# Patient Record
Sex: MALE | Race: WHITE | NOT HISPANIC OR LATINO | Employment: OTHER | URBAN - METROPOLITAN AREA
[De-identification: names, ages, dates, MRNs, and addresses within clinical notes are randomized per-mention and may not be internally consistent; named-entity substitution may affect disease eponyms.]

---

## 2020-10-06 ENCOUNTER — COMMUNICATION - HEALTHEAST (OUTPATIENT)
Dept: SCHEDULING | Facility: CLINIC | Age: 55
End: 2020-10-06

## 2020-10-15 ENCOUNTER — COMMUNICATION - HEALTHEAST (OUTPATIENT)
Dept: SCHEDULING | Facility: CLINIC | Age: 55
End: 2020-10-15

## 2020-10-19 ENCOUNTER — RECORDS - HEALTHEAST (OUTPATIENT)
Dept: LAB | Facility: CLINIC | Age: 55
End: 2020-10-19

## 2020-10-20 LAB
ALBUMIN SERPL-MCNC: 2 G/DL (ref 3.5–5)
ALP SERPL-CCNC: 188 U/L (ref 45–120)
ALT SERPL W P-5'-P-CCNC: 37 U/L (ref 0–45)
ANION GAP SERPL CALCULATED.3IONS-SCNC: 5 MMOL/L (ref 5–18)
AST SERPL W P-5'-P-CCNC: 48 U/L (ref 0–40)
BASOPHILS # BLD AUTO: 0.2 THOU/UL (ref 0–0.2)
BASOPHILS NFR BLD AUTO: 2 % (ref 0–2)
BILIRUB SERPL-MCNC: 5.2 MG/DL (ref 0–1)
BUN SERPL-MCNC: 3 MG/DL (ref 8–22)
CALCIUM SERPL-MCNC: 7.9 MG/DL (ref 8.5–10.5)
CHLORIDE BLD-SCNC: 101 MMOL/L (ref 98–107)
CO2 SERPL-SCNC: 28 MMOL/L (ref 22–31)
CREAT SERPL-MCNC: 0.59 MG/DL (ref 0.7–1.3)
EOSINOPHIL # BLD AUTO: 0.4 THOU/UL (ref 0–0.4)
EOSINOPHIL NFR BLD AUTO: 3 % (ref 0–6)
ERYTHROCYTE [DISTWIDTH] IN BLOOD BY AUTOMATED COUNT: 12.2 % (ref 11–14.5)
GFR SERPL CREATININE-BSD FRML MDRD: >60 ML/MIN/1.73M2
GLUCOSE BLD-MCNC: 52 MG/DL (ref 70–125)
HCT VFR BLD AUTO: 31.1 % (ref 40–54)
HGB BLD-MCNC: 10.6 G/DL (ref 14–18)
IMM GRANULOCYTES # BLD: 0.1 THOU/UL
IMM GRANULOCYTES NFR BLD: 1 %
LYMPHOCYTES # BLD AUTO: 2.8 THOU/UL (ref 0.8–4.4)
LYMPHOCYTES NFR BLD AUTO: 19 % (ref 20–40)
MAGNESIUM SERPL-MCNC: 1.6 MG/DL (ref 1.8–2.6)
MCH RBC QN AUTO: 35.5 PG (ref 27–34)
MCHC RBC AUTO-ENTMCNC: 34.1 G/DL (ref 32–36)
MCV RBC AUTO: 104 FL (ref 80–100)
MONOCYTES # BLD AUTO: 1.5 THOU/UL (ref 0–0.9)
MONOCYTES NFR BLD AUTO: 11 % (ref 2–10)
NEUTROPHILS # BLD AUTO: 9.4 THOU/UL (ref 2–7.7)
NEUTROPHILS NFR BLD AUTO: 66 % (ref 50–70)
PLATELET # BLD AUTO: 338 THOU/UL (ref 140–440)
PMV BLD AUTO: 11.7 FL (ref 8.5–12.5)
POTASSIUM BLD-SCNC: 3.5 MMOL/L (ref 3.5–5)
PROT SERPL-MCNC: 5.4 G/DL (ref 6–8)
RBC # BLD AUTO: 2.99 MILL/UL (ref 4.4–6.2)
SODIUM SERPL-SCNC: 134 MMOL/L (ref 136–145)
WBC: 14.3 THOU/UL (ref 4–11)

## 2020-12-30 ENCOUNTER — VIRTUAL VISIT (OUTPATIENT)
Dept: NEUROLOGY | Facility: CLINIC | Age: 55
End: 2020-12-30
Payer: MEDICARE

## 2020-12-30 ENCOUNTER — RECORDS - HEALTHEAST (OUTPATIENT)
Dept: ADMINISTRATIVE | Facility: OTHER | Age: 55
End: 2020-12-30

## 2020-12-30 VITALS — HEIGHT: 73 IN | WEIGHT: 170 LBS | BODY MASS INDEX: 22.53 KG/M2

## 2020-12-30 DIAGNOSIS — M21.331: Primary | ICD-10-CM

## 2020-12-30 DIAGNOSIS — D32.9 MENINGIOMA (H): ICD-10-CM

## 2020-12-30 DIAGNOSIS — S06.5XAA SUBDURAL HEMATOMA (H): ICD-10-CM

## 2020-12-30 PROBLEM — I15.9 SECONDARY HYPERTENSION: Status: ACTIVE | Noted: 2017-01-03

## 2020-12-30 PROBLEM — G93.40 ACUTE ENCEPHALOPATHY: Status: ACTIVE | Noted: 2020-12-30

## 2020-12-30 PROBLEM — M14.672 CHARCOT'S JOINT OF FOOT, LEFT: Status: ACTIVE | Noted: 2019-10-09

## 2020-12-30 PROBLEM — E87.6 HYPOKALEMIA: Status: ACTIVE | Noted: 2020-12-30

## 2020-12-30 PROBLEM — G93.40 ACUTE ENCEPHALOPATHY: Status: RESOLVED | Noted: 2020-12-30 | Resolved: 2020-12-30

## 2020-12-30 PROCEDURE — 99205 OFFICE O/P NEW HI 60 MIN: CPT | Mod: 95 | Performed by: PSYCHIATRY & NEUROLOGY

## 2020-12-30 RX ORDER — NAPROXEN 500 MG/1
500 TABLET ORAL 2 TIMES DAILY
COMMUNITY
Start: 2020-12-13 | End: 2021-01-01

## 2020-12-30 RX ORDER — ASPIRIN 81 MG
1 TABLET, DELAYED RELEASE (ENTERIC COATED) ORAL DAILY
COMMUNITY

## 2020-12-30 RX ORDER — GABAPENTIN 300 MG/1
2 CAPSULE ORAL 2 TIMES DAILY
COMMUNITY
Start: 2020-11-06

## 2020-12-30 RX ORDER — INSULIN ASPART 100 [IU]/ML
INJECTION, SOLUTION INTRAVENOUS; SUBCUTANEOUS
COMMUNITY
Start: 2020-10-14 | End: 2022-01-01

## 2020-12-30 SDOH — HEALTH STABILITY: MENTAL HEALTH: HOW OFTEN DO YOU HAVE 6 OR MORE DRINKS ON ONE OCCASION?: NOT ASKED

## 2020-12-30 SDOH — HEALTH STABILITY: MENTAL HEALTH: HOW MANY STANDARD DRINKS CONTAINING ALCOHOL DO YOU HAVE ON A TYPICAL DAY?: NOT ASKED

## 2020-12-30 SDOH — HEALTH STABILITY: MENTAL HEALTH: HOW OFTEN DO YOU HAVE A DRINK CONTAINING ALCOHOL?: NOT ASKED

## 2020-12-30 ASSESSMENT — MIFFLIN-ST. JEOR: SCORE: 1669.99

## 2020-12-30 NOTE — PROGRESS NOTES
NEUROLOGY CONSULTATION NOTE       [facility] NEUROLOGY [place]  [address] [place] [address]  Tel: [phone]  Fax: [phone]  [email or web address]     Peewee Hess,  1965, MRN [id number]  PCP: Sarah Canseco, [phone]  Date: 2020     ASSESSMENT & PLAN     Diagnosis code  1.  Acquired wrist drop, right  2. Subdural hematoma (H)  3. Meningioma (H)     Right wrist wrap  55-year-old male with history of alcohol abuse, alcohol withdrawal seizure who was recently admitted to the hospital with encephalopathy and afterwards was noted to have right wrist drop.  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  I have scheduled him for EMG of the right upper extremity followed by a face-to-face visit to decide on further treatment.  Generally speaking wristdrop tends to respond to conservative treatment.    Left frontal convexity subdural hematoma  During hospitalization in 2020 he had a CT of the head that showed 3 mm left frontal convexity subdural hematoma.  Additionally he had a right frontal convexity meningioma.  I have recommended repeating CT of the head to ensure stability.  Follow-up will be the day he gets EMG    Thank you again for this referral, please feel free to contact me if you have any questions.    Tiburcio Gallagher MD  [facility]  ([facility], [facility]     REASON FOR CONSULTATION Extremity Weakness and Video Visit        HISTORY OF PRESENT ILLNESS     We have been requested by Dr. Canseco to evaluate Peewee Hess who is a 55 year old  male for right wrist drop    Patient is a 55-year-old male with history of alcohol abuse, diabetes, hypertension who was admitted to the hospital in 2020 with confusion and had a CT of the head that showed 2 mm subdural hematoma who was referred for evaluation of right upper extremity weakness along with difficulty moving his right wrist and finger.   According to patient he was admitted to the hospital for alcohol withdrawal seizure and afterwards he was in a TCU.  Couple of days later he noticed that he had difficulty with extending his right wrist.  He did not have any weakness proximally.  Also, although he had a small subdural hematoma and a right frontal convexity meningioma he was told no intervention is needed.  He denies any loss of consciousness, tonic-clonic activity or any tongue biting.  He denies any weakness in the right lower extremity or similar symptoms on the left.     PROBLEM LIST   Patient Active Problem List   Diagnosis Code     Alcohol dependence with unspecified alcohol-induced disorder (H) F10.29     Charcot's joint of foot, left M14.672     Fall W19.XXXA     Hypokalemia E87.6     Hypomagnesemia E83.42     Iron deficiency anemia D50.9     Restless legs G25.81     Type 2 diabetes mellitus without complication (H) E11.9     Secondary hypertension I15.9     Tobacco use disorder F17.200     Meningioma (H) D32.9         PAST MEDICAL & SURGICAL HISTORY     Past Medical History:   Patient  has a past medical history of Acute encephalopathy (12/30/2020).    Surgical History:  He  has no past surgical history on file.     SOCIAL HISTORY     Reviewed, and he  reports that he has been smoking cigarettes. He has been smoking about 0.50 packs per day. He has never used smokeless tobacco. He reports previous alcohol use.     FAMILY HISTORY     Reviewed, and family history includes Cancer in his father; Hyperlipidemia in his mother.     ALLERGIES     No Known Allergies      REVIEW OF SYSTEMS     A 12 point review of system was performed and was negative except as outlined in the history of present illness.     HOME MEDICATIONS       Current Outpatient Medications:      acetaminophen-codeine (TYLENOL #3) 300-30 MG tablet, TAKE 1 TABLET BY MOUTH TWICE DAILY AS NEEDED, Disp: , Rfl:      aspirin (ASA) 81 MG EC tablet, Take 81 mg by mouth, Disp: , Rfl:       "gabapentin (NEURONTIN) 300 MG capsule, Take 2 capsules by mouth 2 times daily, Disp: , Rfl:      insulin aspart (NOVOLOG FLEXPEN) 100 UNIT/ML pen, Check blood sugar three (3) times daily. 11.9 Type 2 without complications BD Ultra-fine Magdalena Pen Needles - NDC [address]-98 - dispense 1 case, refill PRN for 1 year Accu-chek Guide blood glucose meter - dispense 1 Accu-chek Guide test strips (50 ct. boxes) - dispense 1, refill PRN for 1 year Accu-chek FastClix lancets (box of 102 ct.) - dispense 1, refill PRN for 1 year, Disp: , Rfl:      magnesium oxide (MAG-OX) 400 (241.3 Mg) MG tablet, Take 400 mg by mouth, Disp: , Rfl:      multivitamin, therapeutic with minerals (THERA-VIT-M) TABS tablet, Take 1 tablet by mouth, Disp: , Rfl:      naproxen (NAPROSYN) 500 MG tablet, Take 500 mg by mouth 2 times daily, Disp: , Rfl:      Omega-3 Fatty Acids (FISH OIL PO), Take 1 g by mouth, Disp: , Rfl:       PHYSICAL EXAM     Vital signs  Ht 1.854 m (6' 1\")   Wt 77.1 kg (170 lb)   BMI 22.43 kg/m      Weight:   170 lbs 0 oz    Patient is alert and oriented x3 no acute distress.  Vital signs were reviewed and are documented in electronic medical record.  Neck supple.  Speech was normal with no dysarthria or aphasia.  He has right wrist drop.  No weakness proximally.  Left upper extremity strength was normal.  Gait normal.     DIAGNOSTIC STUDIES     PERTINENT RADIOLOGY  Following imaging studies were reviewed:     CT BRAIN 10/8/2020  1.  Stable head CT compared to 10/08/2020.  2.  No significant change in the 3 mm thick extra-axial tubular hyperdensity in the left frontal convexity, suspicious for small subdural hematoma.  3.  No new intracranial hemorrhage.  4.  Stable 6.8 x 8.4 mm partially calcified extra-axial density in the right frontal convexity, likely meningioma.   5.  Mild diffuse cerebral parenchymal volume loss. Presumed chronic hypertensive/microvascular ischemic white matter changes     PERTINENT LABS  Following labs were " "reviewed:  Component Name Value Ref Range   Sodium 134 (L) 136 - 145 mmol/L   Potassium 3.5 3.5 - 5 mmol/L   Chloride 101 98 - 107 mmol/L   CO2 28 22 - 31 mmol/L   Anion Gap, Calculation 5 5 - 18 mmol/L   Glucose 52 (LL)   Comment: Results questionable: Plasma in contact with cells greater than 4 hours. 70 - 125 mg/dL   BUN 3 (L) 8 - 22 mg/dL   Creatinine 0.59 (L) 0.7 - 1.3 mg/dL   GFR MDRD Af Amer >60 >60 mL/min/1.73m2   GFR MDRD Non Af Amer >60 >60 mL/min/1.73m2   Bilirubin, Total 5.2 (H) 0 - 1 mg/dL   Calcium 7.9 (L) 8.5 - 10.5 mg/dL   Protein, Total 5.4 (L) 6 - 8 g/dL   Albumin 2.0 (L) 3.5 - 5 g/dL   Alkaline Phosphatase 188 (H) 45 - 120 U/L   AST 48 (H) 0 - 40 U/L   ALT 37 0 - 45 U/L   Specimen Collected on   Blood 10/20/2020 5:37 AM     VIDEO VISIT CONSENT  Patient is being evaluated via a billable video visit. The patient has been notified of following:     \"This video visit will be conducted via a call between you and your physician/provider. We have found that certain health care needs can be provided without the need for an in-person physical exam. This service lets us provide the care you need with a video conversation. If a prescription is necessary we can send it directly to your pharmacy. If lab work is needed we can place an order for that and you can then stop by our lab to have the test done at a later time. If during the course of the call the physician/provider feels a video visit is not appropriate, you will not be charged for this service.\"    Physician has received verbal consent for a Video Visit from the patient? YES  Patient would like the video invitation sent by: []E-mail  [x]Evostor     VIDEO VISIT DETAILS  Type of service: Video Visit  Video Start Time: 12:25 PM  Video End Time (time video stopped): 12:35 PM  Originating Location (Patient Location): Patient's Home  Distant Location (provider location): [facility]   Mode of Communication: Video Conference via " []Ale [x]Doximity              Total time spent for face to face visit, reviewing labs/imaging studies, counseling and coordination of care was: 1 Hour 15 Minutes More than 50% of this time was spent on counseling and coordination of care.      This note was dictated using voice recognition software.  Any grammatical or context distortions are unintentional and inherent to the software.

## 2020-12-30 NOTE — LETTER
2020         RE: Peewee Hess   [address] [facility] [address]        Dear Colleague,    Thank you for referring your patient, Peewee Hess, to the [facility] [place]. Please see a copy of my visit note below.    NEUROLOGY CONSULTATION NOTE       [facility] NEUROLOGY [place] [address] [place] [address]  Tel: [phone]  Fax: [phone]  [email or web address]     Peewee Hess,  1965, MRN [id number]  PCP: Sarah Canseco, [phone]  Date: 2020     ASSESSMENT & PLAN     Diagnosis code  1.  Acquired wrist drop, right  2. Subdural hematoma (H)  3. Meningioma (H)     Right wrist wrap  55-year-old male with history of alcohol abuse, alcohol withdrawal seizure who was recently admitted to the hospital with encephalopathy and afterwards was noted to have right wrist drop.  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  I have scheduled him for EMG of the right upper extremity followed by a face-to-face visit to decide on further treatment.  Generally speaking wristdrop tends to respond to conservative treatment.    Left frontal convexity subdural hematoma  During hospitalization in 2020 he had a CT of the head that showed 3 mm left frontal convexity subdural hematoma.  Additionally he had a right frontal convexity meningioma.  I have recommended repeating CT of the head to ensure stability.  Follow-up will be the day he gets EMG    Thank you again for this referral, please feel free to contact me if you have any questions.    Tiburcio Gallagher MD  [facility]  ([facility], [facility]     REASON FOR CONSULTATION Extremity Weakness and Video Visit        HISTORY OF PRESENT ILLNESS     We have been requested by Dr. Canseco to evaluate Peewee eHss who is a 55 year old  male for right wrist drop    Patient is a 55-year-old male with history of alcohol abuse, diabetes,  hypertension who was admitted to the hospital in October 2020 with confusion and had a CT of the head that showed 2 mm subdural hematoma who was referred for evaluation of right upper extremity weakness along with difficulty moving his right wrist and finger.  According to patient he was admitted to the hospital for alcohol withdrawal seizure and afterwards he was in a TCU.  Couple of days later he noticed that he had difficulty with extending his right wrist.  He did not have any weakness proximally.  Also, although he had a small subdural hematoma and a right frontal convexity meningioma he was told no intervention is needed.  He denies any loss of consciousness, tonic-clonic activity or any tongue biting.  He denies any weakness in the right lower extremity or similar symptoms on the left.     PROBLEM LIST   Patient Active Problem List   Diagnosis Code     Alcohol dependence with unspecified alcohol-induced disorder (H) F10.29     Charcot's joint of foot, left M14.672     Fall W19.XXXA     Hypokalemia E87.6     Hypomagnesemia E83.42     Iron deficiency anemia D50.9     Restless legs G25.81     Type 2 diabetes mellitus without complication (H) E11.9     Secondary hypertension I15.9     Tobacco use disorder F17.200     Meningioma (H) D32.9         PAST MEDICAL & SURGICAL HISTORY     Past Medical History:   Patient  has a past medical history of Acute encephalopathy (12/30/2020).    Surgical History:  He  has no past surgical history on file.     SOCIAL HISTORY     Reviewed, and he  reports that he has been smoking cigarettes. He has been smoking about 0.50 packs per day. He has never used smokeless tobacco. He reports previous alcohol use.     FAMILY HISTORY     Reviewed, and family history includes Cancer in his father; Hyperlipidemia in his mother.     ALLERGIES     No Known Allergies      REVIEW OF SYSTEMS     A 12 point review of system was performed and was negative except as outlined in the history of present  "illness.     HOME MEDICATIONS       Current Outpatient Medications:      acetaminophen-codeine (TYLENOL #3) 300-30 MG tablet, TAKE 1 TABLET BY MOUTH TWICE DAILY AS NEEDED, Disp: , Rfl:      aspirin (ASA) 81 MG EC tablet, Take 81 mg by mouth, Disp: , Rfl:      gabapentin (NEURONTIN) 300 MG capsule, Take 2 capsules by mouth 2 times daily, Disp: , Rfl:      insulin aspart (NOVOLOG FLEXPEN) 100 UNIT/ML pen, Check blood sugar three (3) times daily. 11.9 Type 2 without complications BD Ultra-fine Magdalena Pen Needles - NDC [address]-37 - dispense 1 case, refill PRN for 1 year Accu-chek Guide blood glucose meter - dispense 1 Accu-chek Guide test strips (50 ct. boxes) - dispense 1, refill PRN for 1 year Accu-chek FastClix lancets (box of 102 ct.) - dispense 1, refill PRN for 1 year, Disp: , Rfl:      magnesium oxide (MAG-OX) 400 (241.3 Mg) MG tablet, Take 400 mg by mouth, Disp: , Rfl:      multivitamin, therapeutic with minerals (THERA-VIT-M) TABS tablet, Take 1 tablet by mouth, Disp: , Rfl:      naproxen (NAPROSYN) 500 MG tablet, Take 500 mg by mouth 2 times daily, Disp: , Rfl:      Omega-3 Fatty Acids (FISH OIL PO), Take 1 g by mouth, Disp: , Rfl:       PHYSICAL EXAM     Vital signs  Ht 1.854 m (6' 1\")   Wt 77.1 kg (170 lb)   BMI 22.43 kg/m      Weight:   170 lbs 0 oz    Patient is alert and oriented x3 no acute distress.  Vital signs were reviewed and are documented in electronic medical record.  Neck supple.  Speech was normal with no dysarthria or aphasia.  He has right wrist drop.  No weakness proximally.  Left upper extremity strength was normal.  Gait normal.     DIAGNOSTIC STUDIES     PERTINENT RADIOLOGY  Following imaging studies were reviewed:     CT BRAIN 10/8/2020  1.  Stable head CT compared to 10/08/2020.  2.  No significant change in the 3 mm thick extra-axial tubular hyperdensity in the left frontal convexity, suspicious for small subdural hematoma.  3.  No new intracranial hemorrhage.  4.  Stable 6.8 x 8.4 " "mm partially calcified extra-axial density in the right frontal convexity, likely meningioma.   5.  Mild diffuse cerebral parenchymal volume loss. Presumed chronic hypertensive/microvascular ischemic white matter changes     PERTINENT LABS  Following labs were reviewed:  Component Name Value Ref Range   Sodium 134 (L) 136 - 145 mmol/L   Potassium 3.5 3.5 - 5 mmol/L   Chloride 101 98 - 107 mmol/L   CO2 28 22 - 31 mmol/L   Anion Gap, Calculation 5 5 - 18 mmol/L   Glucose 52 (LL)   Comment: Results questionable: Plasma in contact with cells greater than 4 hours. 70 - 125 mg/dL   BUN 3 (L) 8 - 22 mg/dL   Creatinine 0.59 (L) 0.7 - 1.3 mg/dL   GFR MDRD Af Amer >60 >60 mL/min/1.73m2   GFR MDRD Non Af Amer >60 >60 mL/min/1.73m2   Bilirubin, Total 5.2 (H) 0 - 1 mg/dL   Calcium 7.9 (L) 8.5 - 10.5 mg/dL   Protein, Total 5.4 (L) 6 - 8 g/dL   Albumin 2.0 (L) 3.5 - 5 g/dL   Alkaline Phosphatase 188 (H) 45 - 120 U/L   AST 48 (H) 0 - 40 U/L   ALT 37 0 - 45 U/L   Specimen Collected on   Blood 10/20/2020 5:37 AM     VIDEO VISIT CONSENT  Patient is being evaluated via a billable video visit. The patient has been notified of following:     \"This video visit will be conducted via a call between you and your physician/provider. We have found that certain health care needs can be provided without the need for an in-person physical exam. This service lets us provide the care you need with a video conversation. If a prescription is necessary we can send it directly to your pharmacy. If lab work is needed we can place an order for that and you can then stop by our lab to have the test done at a later time. If during the course of the call the physician/provider feels a video visit is not appropriate, you will not be charged for this service.\"    Physician has received verbal consent for a Video Visit from the patient? YES  Patient would like the video invitation sent by: []E-mail  [x]AudioPixels     VIDEO VISIT DETAILS  Type of service: Video " Visit  Video Start Time: 12:25 PM  Video End Time (time video stopped): 12:35 PM  Originating Location (Patient Location): Patient's Home  Distant Location (provider location): [facility] [place]   Mode of Communication: Video Conference via [][employer] [x]Doximity              Total time spent for face to face visit, reviewing labs/imaging studies, counseling and coordination of care was: 1 Hour 15 Minutes More than 50% of this time was spent on counseling and coordination of care.      This note was dictated using voice recognition software.  Any grammatical or context distortions are unintentional and inherent to the software.       Again, thank you for allowing me to participate in the care of your patient.        Sincerely,        Tiburcio Gallagher MD

## 2020-12-30 NOTE — NURSING NOTE
Chief Complaint   Patient presents with     Extremity Weakness     RUE weakness- difficulty moving his wrist and fingers      Video Visit     Text: [phone]     Juani Rizvi CMA on 12/30/2020 at 12:06 PM

## 2020-12-30 NOTE — NURSING NOTE
Study Result    EXAM: CT HEAD WO CONTRAST  LOCATION: [facility]  DATE/TIME: 10/8/2020 6:14 PM     INDICATION: Altered mental status Confusion. Previous CT head concerns a 2 mm subdural hematoma and recommend repeat CT head in about 6 hours  COMPARISON: Head CT 10/08/2020  TECHNIQUE: Routine without IV contrast. Multiplanar reformats. Dose reduction techniques were used.     FINDINGS:  INTRACRANIAL CONTENTS: Stable 3 mm thick extra-axial tubular hyperdensity in the left frontal convexity (series 4.2, image 109). No new intracranial hemorrhage. Mild diffuse cerebral parenchymal volume loss. No midline shift. The basilar cisterns are   patent. No cerebellar tonsillar ectopia. Stable 6.8 x 8.4 mm partially calcified extra-axial density in the right frontal convexity. Mild periventricular and scattered foci of deep white matter hypodensities involving both cerebral hemispheres. No CT   evidence for an acute infarct. No cerebellar tonsillar ectopia.     VISUALIZED ORBITS/SINUSES/MASTOIDS: No intraorbital abnormality. Large chronic mucus retention cysts involving the left maxillary sinus and the left sphenoid sinus. No middle ear or mastoid effusion.     BONES/SOFT TISSUES: No acute abnormality.     IMPRESSION:   1.  Stable head CT compared to 10/08/2020.  2.  No significant change in the 3 mm thick extra-axial tubular hyperdensity in the left frontal convexity, suspicious for small subdural hematoma.  3.  No new intracranial hemorrhage.  4.  Stable 6.8 x 8.4 mm partially calcified extra-axial density in the right frontal convexity, likely meningioma.   5.  Mild diffuse cerebral parenchymal volume loss. Presumed chronic hypertensive/microvascular ischemic white matter changes.

## 2021-01-01 ENCOUNTER — RECORDS - HEALTHEAST (OUTPATIENT)
Dept: ADMINISTRATIVE | Facility: CLINIC | Age: 56
End: 2021-01-01

## 2021-01-01 ENCOUNTER — APPOINTMENT (OUTPATIENT)
Dept: CT IMAGING | Facility: HOSPITAL | Age: 56
DRG: 896 | End: 2021-01-01
Attending: EMERGENCY MEDICINE
Payer: COMMERCIAL

## 2021-01-01 ENCOUNTER — APPOINTMENT (OUTPATIENT)
Dept: OCCUPATIONAL THERAPY | Facility: HOSPITAL | Age: 56
DRG: 896 | End: 2021-01-01
Payer: COMMERCIAL

## 2021-01-01 ENCOUNTER — APPOINTMENT (OUTPATIENT)
Dept: CARDIOLOGY | Facility: HOSPITAL | Age: 56
DRG: 896 | End: 2021-01-01
Attending: HOSPITALIST
Payer: COMMERCIAL

## 2021-01-01 ENCOUNTER — APPOINTMENT (OUTPATIENT)
Dept: PHYSICAL THERAPY | Facility: HOSPITAL | Age: 56
DRG: 896 | End: 2021-01-01
Payer: COMMERCIAL

## 2021-01-01 ENCOUNTER — APPOINTMENT (OUTPATIENT)
Dept: RADIOLOGY | Facility: HOSPITAL | Age: 56
DRG: 896 | End: 2021-01-01
Attending: EMERGENCY MEDICINE
Payer: COMMERCIAL

## 2021-01-01 ENCOUNTER — APPOINTMENT (OUTPATIENT)
Dept: ULTRASOUND IMAGING | Facility: HOSPITAL | Age: 56
DRG: 896 | End: 2021-01-01
Attending: INTERNAL MEDICINE
Payer: COMMERCIAL

## 2021-01-01 ENCOUNTER — APPOINTMENT (OUTPATIENT)
Dept: PHYSICAL THERAPY | Facility: HOSPITAL | Age: 56
DRG: 896 | End: 2021-01-01
Attending: HOSPITALIST
Payer: COMMERCIAL

## 2021-01-01 ENCOUNTER — HOSPITAL ENCOUNTER (INPATIENT)
Facility: HOSPITAL | Age: 56
LOS: 8 days | Discharge: HOME HEALTH | DRG: 896 | End: 2021-11-12
Attending: EMERGENCY MEDICINE | Admitting: HOSPITALIST
Payer: COMMERCIAL

## 2021-01-01 ENCOUNTER — APPOINTMENT (OUTPATIENT)
Dept: OCCUPATIONAL THERAPY | Facility: HOSPITAL | Age: 56
DRG: 896 | End: 2021-01-01
Attending: HOSPITALIST
Payer: COMMERCIAL

## 2021-01-01 ENCOUNTER — APPOINTMENT (OUTPATIENT)
Dept: RADIOLOGY | Facility: HOSPITAL | Age: 56
DRG: 896 | End: 2021-01-01
Attending: HOSPITALIST
Payer: COMMERCIAL

## 2021-01-01 ENCOUNTER — HEALTH MAINTENANCE LETTER (OUTPATIENT)
Age: 56
End: 2021-01-01

## 2021-01-01 VITALS
SYSTOLIC BLOOD PRESSURE: 104 MMHG | HEIGHT: 73 IN | DIASTOLIC BLOOD PRESSURE: 59 MMHG | RESPIRATION RATE: 18 BRPM | BODY MASS INDEX: 23.64 KG/M2 | HEART RATE: 94 BPM | OXYGEN SATURATION: 90 % | WEIGHT: 178.4 LBS | TEMPERATURE: 99.6 F

## 2021-01-01 DIAGNOSIS — F10.929 ALCOHOLIC INTOXICATION WITH COMPLICATION (H): ICD-10-CM

## 2021-01-01 DIAGNOSIS — R29.6 FALLS FREQUENTLY: ICD-10-CM

## 2021-01-01 DIAGNOSIS — S92.912A CLOSED NONDISPLACED FRACTURE OF PHALANX OF TOE OF LEFT FOOT, UNSPECIFIED TOE, INITIAL ENCOUNTER: ICD-10-CM

## 2021-01-01 DIAGNOSIS — R73.9 HYPERGLYCEMIA: ICD-10-CM

## 2021-01-01 DIAGNOSIS — F10.29 ALCOHOL DEPENDENCE WITH UNSPECIFIED ALCOHOL-INDUCED DISORDER (H): Primary | ICD-10-CM

## 2021-01-01 LAB
ALBUMIN SERPL-MCNC: 1.7 G/DL (ref 3.5–5)
ALBUMIN SERPL-MCNC: 1.8 G/DL (ref 3.5–5)
ALBUMIN SERPL-MCNC: 1.9 G/DL (ref 3.5–5)
ALBUMIN SERPL-MCNC: 2 G/DL (ref 3.5–5)
ALBUMIN SERPL-MCNC: 2.1 G/DL (ref 3.5–5)
ALBUMIN UR-MCNC: 30 MG/DL
ALP SERPL-CCNC: 252 U/L (ref 45–120)
ALP SERPL-CCNC: 258 U/L (ref 45–120)
ALP SERPL-CCNC: 275 U/L (ref 45–120)
ALP SERPL-CCNC: 284 U/L (ref 45–120)
ALP SERPL-CCNC: 306 U/L (ref 45–120)
ALP SERPL-CCNC: 384 U/L (ref 45–120)
ALP SERPL-CCNC: 431 U/L (ref 45–120)
ALT SERPL W P-5'-P-CCNC: 21 U/L (ref 0–45)
ALT SERPL W P-5'-P-CCNC: 24 U/L (ref 0–45)
ALT SERPL W P-5'-P-CCNC: 25 U/L (ref 0–45)
ALT SERPL W P-5'-P-CCNC: 28 U/L (ref 0–45)
ALT SERPL W P-5'-P-CCNC: 28 U/L (ref 0–45)
ALT SERPL W P-5'-P-CCNC: 34 U/L (ref 0–45)
ALT SERPL W P-5'-P-CCNC: 41 U/L (ref 0–45)
ANION GAP SERPL CALCULATED.3IONS-SCNC: 12 MMOL/L (ref 5–18)
ANION GAP SERPL CALCULATED.3IONS-SCNC: 13 MMOL/L (ref 5–18)
ANION GAP SERPL CALCULATED.3IONS-SCNC: 4 MMOL/L (ref 5–18)
ANION GAP SERPL CALCULATED.3IONS-SCNC: 5 MMOL/L (ref 5–18)
ANION GAP SERPL CALCULATED.3IONS-SCNC: 5 MMOL/L (ref 5–18)
ANION GAP SERPL CALCULATED.3IONS-SCNC: 7 MMOL/L (ref 5–18)
APPEARANCE UR: CLEAR
AST SERPL W P-5'-P-CCNC: 39 U/L (ref 0–40)
AST SERPL W P-5'-P-CCNC: 48 U/L (ref 0–40)
AST SERPL W P-5'-P-CCNC: 54 U/L (ref 0–40)
AST SERPL W P-5'-P-CCNC: 58 U/L (ref 0–40)
AST SERPL W P-5'-P-CCNC: 61 U/L (ref 0–40)
AST SERPL W P-5'-P-CCNC: 63 U/L (ref 0–40)
AST SERPL W P-5'-P-CCNC: 91 U/L (ref 0–40)
ATRIAL RATE - MUSE: 102 BPM
ATRIAL RATE - MUSE: 126 BPM
BACTERIA #/AREA URNS HPF: ABNORMAL /HPF
BACTERIA BLD CULT: NO GROWTH
BACTERIA BLD CULT: NO GROWTH
BACTERIA UR CULT: NO GROWTH
BILIRUB DIRECT SERPL-MCNC: 1.7 MG/DL
BILIRUB DIRECT SERPL-MCNC: 2 MG/DL
BILIRUB SERPL-MCNC: 2.3 MG/DL (ref 0–1)
BILIRUB SERPL-MCNC: 2.7 MG/DL (ref 0–1)
BILIRUB SERPL-MCNC: 2.8 MG/DL (ref 0–1)
BILIRUB SERPL-MCNC: 3.2 MG/DL (ref 0–1)
BILIRUB SERPL-MCNC: 3.4 MG/DL (ref 0–1)
BILIRUB SERPL-MCNC: 3.6 MG/DL (ref 0–1)
BILIRUB SERPL-MCNC: 7.1 MG/DL (ref 0–1)
BILIRUB UR QL STRIP: ABNORMAL
BUN SERPL-MCNC: 2 MG/DL (ref 8–22)
BUN SERPL-MCNC: 2 MG/DL (ref 8–22)
BUN SERPL-MCNC: 5 MG/DL (ref 8–22)
BUN SERPL-MCNC: 6 MG/DL (ref 8–22)
BUN SERPL-MCNC: 8 MG/DL (ref 8–22)
BUN SERPL-MCNC: 8 MG/DL (ref 8–22)
CALCIUM SERPL-MCNC: 7.1 MG/DL (ref 8.5–10.5)
CALCIUM SERPL-MCNC: 7.2 MG/DL (ref 8.5–10.5)
CALCIUM SERPL-MCNC: 7.4 MG/DL (ref 8.5–10.5)
CALCIUM SERPL-MCNC: 7.6 MG/DL (ref 8.5–10.5)
CALCIUM SERPL-MCNC: 7.8 MG/DL (ref 8.5–10.5)
CALCIUM SERPL-MCNC: 7.8 MG/DL (ref 8.5–10.5)
CHLORIDE BLD-SCNC: 84 MMOL/L (ref 98–107)
CHLORIDE BLD-SCNC: 88 MMOL/L (ref 98–107)
CHLORIDE BLD-SCNC: 95 MMOL/L (ref 98–107)
CHLORIDE BLD-SCNC: 95 MMOL/L (ref 98–107)
CHLORIDE BLD-SCNC: 96 MMOL/L (ref 98–107)
CHLORIDE BLD-SCNC: 98 MMOL/L (ref 98–107)
CO2 SERPL-SCNC: 26 MMOL/L (ref 22–31)
CO2 SERPL-SCNC: 28 MMOL/L (ref 22–31)
CO2 SERPL-SCNC: 30 MMOL/L (ref 22–31)
CO2 SERPL-SCNC: 31 MMOL/L (ref 22–31)
CO2 SERPL-SCNC: 32 MMOL/L (ref 22–31)
CO2 SERPL-SCNC: 32 MMOL/L (ref 22–31)
COLOR UR AUTO: ABNORMAL
CREAT SERPL-MCNC: 0.6 MG/DL (ref 0.7–1.3)
CREAT SERPL-MCNC: 0.62 MG/DL (ref 0.7–1.3)
CREAT SERPL-MCNC: 0.65 MG/DL (ref 0.7–1.3)
CREAT SERPL-MCNC: 0.67 MG/DL (ref 0.7–1.3)
CREAT SERPL-MCNC: 0.83 MG/DL (ref 0.7–1.3)
CREAT SERPL-MCNC: 0.84 MG/DL (ref 0.7–1.3)
DIASTOLIC BLOOD PRESSURE - MUSE: 80 MMHG
DIASTOLIC BLOOD PRESSURE - MUSE: NORMAL MMHG
ERYTHROCYTE [DISTWIDTH] IN BLOOD BY AUTOMATED COUNT: 14.6 % (ref 10–15)
ERYTHROCYTE [DISTWIDTH] IN BLOOD BY AUTOMATED COUNT: 15.4 % (ref 10–15)
ERYTHROCYTE [DISTWIDTH] IN BLOOD BY AUTOMATED COUNT: 15.5 % (ref 10–15)
ERYTHROCYTE [DISTWIDTH] IN BLOOD BY AUTOMATED COUNT: 15.8 % (ref 10–15)
ETHANOL SERPL-MCNC: 144 MG/DL
GFR SERPL CREATININE-BSD FRML MDRD: >90 ML/MIN/1.73M2
GLUCOSE BLD-MCNC: 113 MG/DL (ref 70–125)
GLUCOSE BLD-MCNC: 179 MG/DL (ref 70–125)
GLUCOSE BLD-MCNC: 266 MG/DL (ref 70–125)
GLUCOSE BLD-MCNC: 355 MG/DL (ref 70–125)
GLUCOSE BLD-MCNC: 440 MG/DL (ref 70–125)
GLUCOSE BLD-MCNC: 79 MG/DL (ref 70–125)
GLUCOSE BLDC GLUCOMTR-MCNC: 106 MG/DL (ref 70–99)
GLUCOSE BLDC GLUCOMTR-MCNC: 109 MG/DL (ref 70–99)
GLUCOSE BLDC GLUCOMTR-MCNC: 110 MG/DL (ref 70–99)
GLUCOSE BLDC GLUCOMTR-MCNC: 111 MG/DL (ref 70–99)
GLUCOSE BLDC GLUCOMTR-MCNC: 116 MG/DL (ref 70–99)
GLUCOSE BLDC GLUCOMTR-MCNC: 125 MG/DL (ref 70–99)
GLUCOSE BLDC GLUCOMTR-MCNC: 126 MG/DL (ref 70–99)
GLUCOSE BLDC GLUCOMTR-MCNC: 127 MG/DL (ref 70–99)
GLUCOSE BLDC GLUCOMTR-MCNC: 128 MG/DL (ref 70–99)
GLUCOSE BLDC GLUCOMTR-MCNC: 158 MG/DL (ref 70–99)
GLUCOSE BLDC GLUCOMTR-MCNC: 162 MG/DL (ref 70–99)
GLUCOSE BLDC GLUCOMTR-MCNC: 167 MG/DL (ref 70–99)
GLUCOSE BLDC GLUCOMTR-MCNC: 176 MG/DL (ref 70–99)
GLUCOSE BLDC GLUCOMTR-MCNC: 179 MG/DL (ref 70–99)
GLUCOSE BLDC GLUCOMTR-MCNC: 184 MG/DL (ref 70–99)
GLUCOSE BLDC GLUCOMTR-MCNC: 186 MG/DL (ref 70–99)
GLUCOSE BLDC GLUCOMTR-MCNC: 188 MG/DL (ref 70–99)
GLUCOSE BLDC GLUCOMTR-MCNC: 255 MG/DL (ref 70–99)
GLUCOSE BLDC GLUCOMTR-MCNC: 272 MG/DL (ref 70–99)
GLUCOSE BLDC GLUCOMTR-MCNC: 276 MG/DL (ref 70–99)
GLUCOSE BLDC GLUCOMTR-MCNC: 286 MG/DL (ref 70–99)
GLUCOSE BLDC GLUCOMTR-MCNC: 290 MG/DL (ref 70–99)
GLUCOSE BLDC GLUCOMTR-MCNC: 308 MG/DL (ref 70–99)
GLUCOSE BLDC GLUCOMTR-MCNC: 308 MG/DL (ref 70–99)
GLUCOSE BLDC GLUCOMTR-MCNC: 310 MG/DL (ref 70–99)
GLUCOSE BLDC GLUCOMTR-MCNC: 324 MG/DL (ref 70–99)
GLUCOSE BLDC GLUCOMTR-MCNC: 38 MG/DL (ref 70–99)
GLUCOSE BLDC GLUCOMTR-MCNC: 39 MG/DL (ref 70–99)
GLUCOSE BLDC GLUCOMTR-MCNC: 40 MG/DL (ref 70–99)
GLUCOSE BLDC GLUCOMTR-MCNC: 45 MG/DL (ref 70–99)
GLUCOSE BLDC GLUCOMTR-MCNC: 56 MG/DL (ref 70–99)
GLUCOSE BLDC GLUCOMTR-MCNC: 59 MG/DL (ref 70–99)
GLUCOSE BLDC GLUCOMTR-MCNC: 77 MG/DL (ref 70–99)
GLUCOSE BLDC GLUCOMTR-MCNC: 78 MG/DL (ref 70–99)
GLUCOSE BLDC GLUCOMTR-MCNC: 79 MG/DL (ref 70–99)
GLUCOSE BLDC GLUCOMTR-MCNC: 79 MG/DL (ref 70–99)
GLUCOSE BLDC GLUCOMTR-MCNC: 80 MG/DL (ref 70–99)
GLUCOSE BLDC GLUCOMTR-MCNC: 81 MG/DL (ref 70–99)
GLUCOSE BLDC GLUCOMTR-MCNC: 85 MG/DL (ref 70–99)
GLUCOSE BLDC GLUCOMTR-MCNC: 87 MG/DL (ref 70–99)
GLUCOSE BLDC GLUCOMTR-MCNC: 88 MG/DL (ref 70–99)
GLUCOSE BLDC GLUCOMTR-MCNC: 89 MG/DL (ref 70–99)
GLUCOSE BLDC GLUCOMTR-MCNC: 96 MG/DL (ref 70–99)
GLUCOSE UR STRIP-MCNC: 70 MG/DL
HBA1C MFR BLD: 7.8 %
HBV CORE AB SERPL QL IA: NEGATIVE
HBV SURFACE AB SERPLBLD QL IA.RAPID: NEGATIVE
HBV SURFACE AG SERPL QL IA: NONREACTIVE
HCT VFR BLD AUTO: 26.3 % (ref 40–53)
HCT VFR BLD AUTO: 28.6 % (ref 40–53)
HCT VFR BLD AUTO: 33.3 % (ref 40–53)
HCT VFR BLD AUTO: 34.5 % (ref 40–53)
HCV AB SERPL QL IA: NEGATIVE
HGB BLD-MCNC: 10.1 G/DL (ref 13.3–17.7)
HGB BLD-MCNC: 11.5 G/DL (ref 13.3–17.7)
HGB BLD-MCNC: 12.1 G/DL (ref 13.3–17.7)
HGB BLD-MCNC: 9.3 G/DL (ref 13.3–17.7)
HGB UR QL STRIP: ABNORMAL
HOLD SPECIMEN: NORMAL
HOLD SPECIMEN: NORMAL
HYALINE CASTS: 10 /LPF
INR PPP: 1.41 (ref 0.9–1.15)
INR PPP: 1.51 (ref 0.9–1.15)
INR PPP: 1.58 (ref 0.9–1.15)
INTERPRETATION ECG - MUSE: NORMAL
INTERPRETATION ECG - MUSE: NORMAL
KETONES UR STRIP-MCNC: NEGATIVE MG/DL
LACTATE SERPL-SCNC: 1.3 MMOL/L (ref 0.7–2)
LACTATE SERPL-SCNC: 1.7 MMOL/L (ref 0.7–2)
LACTATE SERPL-SCNC: 4.3 MMOL/L (ref 0.7–2)
LACTATE SERPL-SCNC: 7.5 MMOL/L (ref 0.7–2)
LEUKOCYTE ESTERASE UR QL STRIP: ABNORMAL
LIPASE SERPL-CCNC: <9 U/L (ref 0–52)
MAGNESIUM SERPL-MCNC: 1.6 MG/DL (ref 1.8–2.6)
MAGNESIUM SERPL-MCNC: 1.6 MG/DL (ref 1.8–2.6)
MAGNESIUM SERPL-MCNC: 1.7 MG/DL (ref 1.8–2.6)
MAGNESIUM SERPL-MCNC: 1.9 MG/DL (ref 1.8–2.6)
MAGNESIUM SERPL-MCNC: 2 MG/DL (ref 1.8–2.6)
MCH RBC QN AUTO: 33 PG (ref 26.5–33)
MCH RBC QN AUTO: 33.2 PG (ref 26.5–33)
MCH RBC QN AUTO: 33.9 PG (ref 26.5–33)
MCH RBC QN AUTO: 34.1 PG (ref 26.5–33)
MCHC RBC AUTO-ENTMCNC: 34.5 G/DL (ref 31.5–36.5)
MCHC RBC AUTO-ENTMCNC: 35.1 G/DL (ref 31.5–36.5)
MCHC RBC AUTO-ENTMCNC: 35.3 G/DL (ref 31.5–36.5)
MCHC RBC AUTO-ENTMCNC: 35.4 G/DL (ref 31.5–36.5)
MCV RBC AUTO: 95 FL (ref 78–100)
MCV RBC AUTO: 96 FL (ref 78–100)
MUCOUS THREADS #/AREA URNS LPF: PRESENT /LPF
NITRATE UR QL: NEGATIVE
P AXIS - MUSE: 53 DEGREES
P AXIS - MUSE: 65 DEGREES
PH UR STRIP: 6.5 [PH] (ref 5–7)
PHOSPHATE SERPL-MCNC: 2.4 MG/DL (ref 2.5–4.5)
PHOSPHATE SERPL-MCNC: 2.4 MG/DL (ref 2.5–4.5)
PHOSPHATE SERPL-MCNC: 2.5 MG/DL (ref 2.5–4.5)
PHOSPHATE SERPL-MCNC: 2.8 MG/DL (ref 2.5–4.5)
PHOSPHATE SERPL-MCNC: 2.8 MG/DL (ref 2.5–4.5)
PHOSPHATE SERPL-MCNC: 2.9 MG/DL (ref 2.5–4.5)
PLATELET # BLD AUTO: 114 10E3/UL (ref 150–450)
PLATELET # BLD AUTO: 124 10E3/UL (ref 150–450)
PLATELET # BLD AUTO: 129 10E3/UL (ref 150–450)
PLATELET # BLD AUTO: 134 10E3/UL (ref 150–450)
PLATELET # BLD AUTO: 244 10E3/UL (ref 150–450)
POTASSIUM BLD-SCNC: 3.6 MMOL/L (ref 3.5–5)
POTASSIUM BLD-SCNC: 3.6 MMOL/L (ref 3.5–5)
POTASSIUM BLD-SCNC: 3.7 MMOL/L (ref 3.5–5)
POTASSIUM BLD-SCNC: 3.7 MMOL/L (ref 3.5–5)
POTASSIUM BLD-SCNC: 3.9 MMOL/L (ref 3.5–5)
POTASSIUM BLD-SCNC: 4.1 MMOL/L (ref 3.5–5)
POTASSIUM BLD-SCNC: 4.6 MMOL/L (ref 3.5–5)
PR INTERVAL - MUSE: 162 MS
PR INTERVAL - MUSE: 176 MS
PROT SERPL-MCNC: 4.8 G/DL (ref 6–8)
PROT SERPL-MCNC: 4.9 G/DL (ref 6–8)
PROT SERPL-MCNC: 5.1 G/DL (ref 6–8)
PROT SERPL-MCNC: 5.2 G/DL (ref 6–8)
PROT SERPL-MCNC: 5.5 G/DL (ref 6–8)
PROT SERPL-MCNC: 5.9 G/DL (ref 6–8)
PROT SERPL-MCNC: 6.1 G/DL (ref 6–8)
QRS DURATION - MUSE: 72 MS
QRS DURATION - MUSE: 74 MS
QT - MUSE: 302 MS
QT - MUSE: 366 MS
QTC - MUSE: 437 MS
QTC - MUSE: 477 MS
R AXIS - MUSE: -3 DEGREES
R AXIS - MUSE: -36 DEGREES
RBC # BLD AUTO: 2.73 10E6/UL (ref 4.4–5.9)
RBC # BLD AUTO: 2.98 10E6/UL (ref 4.4–5.9)
RBC # BLD AUTO: 3.48 10E6/UL (ref 4.4–5.9)
RBC # BLD AUTO: 3.65 10E6/UL (ref 4.4–5.9)
RBC URINE: >182 /HPF
SARS-COV-2 RNA RESP QL NAA+PROBE: NEGATIVE
SARS-COV-2 RNA RESP QL NAA+PROBE: NEGATIVE
SODIUM SERPL-SCNC: 128 MMOL/L (ref 136–145)
SODIUM SERPL-SCNC: 129 MMOL/L (ref 136–145)
SODIUM SERPL-SCNC: 131 MMOL/L (ref 136–145)
SP GR UR STRIP: 1.03 (ref 1–1.03)
SYSTOLIC BLOOD PRESSURE - MUSE: 126 MMHG
SYSTOLIC BLOOD PRESSURE - MUSE: NORMAL MMHG
T AXIS - MUSE: 20 DEGREES
T AXIS - MUSE: 41 DEGREES
UROBILINOGEN UR STRIP-MCNC: >12 MG/DL
VENTRICULAR RATE- MUSE: 102 BPM
VENTRICULAR RATE- MUSE: 126 BPM
WBC # BLD AUTO: 10.3 10E3/UL (ref 4–11)
WBC # BLD AUTO: 11 10E3/UL (ref 4–11)
WBC # BLD AUTO: 11.7 10E3/UL (ref 4–11)
WBC # BLD AUTO: 12.3 10E3/UL (ref 4–11)
WBC URINE: 22 /HPF

## 2021-01-01 PROCEDURE — 250N000011 HC RX IP 250 OP 636: Performed by: HOSPITALIST

## 2021-01-01 PROCEDURE — 93005 ELECTROCARDIOGRAM TRACING: CPT

## 2021-01-01 PROCEDURE — 250N000013 HC RX MED GY IP 250 OP 250 PS 637: Performed by: HOSPITALIST

## 2021-01-01 PROCEDURE — 97116 GAIT TRAINING THERAPY: CPT | Mod: GP

## 2021-01-01 PROCEDURE — 84100 ASSAY OF PHOSPHORUS: CPT | Performed by: HOSPITALIST

## 2021-01-01 PROCEDURE — 36415 COLL VENOUS BLD VENIPUNCTURE: CPT | Performed by: INTERNAL MEDICINE

## 2021-01-01 PROCEDURE — 120N000001 HC R&B MED SURG/OB

## 2021-01-01 PROCEDURE — 250N000012 HC RX MED GY IP 250 OP 636 PS 637: Performed by: HOSPITALIST

## 2021-01-01 PROCEDURE — 99232 SBSQ HOSP IP/OBS MODERATE 35: CPT | Performed by: INTERNAL MEDICINE

## 2021-01-01 PROCEDURE — 36415 COLL VENOUS BLD VENIPUNCTURE: CPT | Performed by: HOSPITALIST

## 2021-01-01 PROCEDURE — 83036 HEMOGLOBIN GLYCOSYLATED A1C: CPT | Performed by: HOSPITALIST

## 2021-01-01 PROCEDURE — 85027 COMPLETE CBC AUTOMATED: CPT | Performed by: EMERGENCY MEDICINE

## 2021-01-01 PROCEDURE — 99285 EMERGENCY DEPT VISIT HI MDM: CPT | Mod: 25

## 2021-01-01 PROCEDURE — 97530 THERAPEUTIC ACTIVITIES: CPT | Mod: GP

## 2021-01-01 PROCEDURE — 93306 TTE W/DOPPLER COMPLETE: CPT

## 2021-01-01 PROCEDURE — 250N000013 HC RX MED GY IP 250 OP 250 PS 637: Performed by: INTERNAL MEDICINE

## 2021-01-01 PROCEDURE — 97535 SELF CARE MNGMENT TRAINING: CPT | Mod: GO

## 2021-01-01 PROCEDURE — 85049 AUTOMATED PLATELET COUNT: CPT | Performed by: HOSPITALIST

## 2021-01-01 PROCEDURE — 258N000003 HC RX IP 258 OP 636: Performed by: HOSPITALIST

## 2021-01-01 PROCEDURE — 85027 COMPLETE CBC AUTOMATED: CPT | Performed by: HOSPITALIST

## 2021-01-01 PROCEDURE — 76705 ECHO EXAM OF ABDOMEN: CPT

## 2021-01-01 PROCEDURE — 99223 1ST HOSP IP/OBS HIGH 75: CPT | Performed by: HOSPITALIST

## 2021-01-01 PROCEDURE — 83735 ASSAY OF MAGNESIUM: CPT | Performed by: HOSPITALIST

## 2021-01-01 PROCEDURE — 80076 HEPATIC FUNCTION PANEL: CPT | Performed by: FAMILY MEDICINE

## 2021-01-01 PROCEDURE — C9803 HOPD COVID-19 SPEC COLLECT: HCPCS

## 2021-01-01 PROCEDURE — 99233 SBSQ HOSP IP/OBS HIGH 50: CPT | Performed by: HOSPITALIST

## 2021-01-01 PROCEDURE — 73560 X-RAY EXAM OF KNEE 1 OR 2: CPT | Mod: LT

## 2021-01-01 PROCEDURE — 250N000011 HC RX IP 250 OP 636: Performed by: EMERGENCY MEDICINE

## 2021-01-01 PROCEDURE — 83735 ASSAY OF MAGNESIUM: CPT | Performed by: INTERNAL MEDICINE

## 2021-01-01 PROCEDURE — 84100 ASSAY OF PHOSPHORUS: CPT | Performed by: INTERNAL MEDICINE

## 2021-01-01 PROCEDURE — 93005 ELECTROCARDIOGRAM TRACING: CPT | Performed by: HOSPITALIST

## 2021-01-01 PROCEDURE — 250N000012 HC RX MED GY IP 250 OP 636 PS 637: Performed by: EMERGENCY MEDICINE

## 2021-01-01 PROCEDURE — 82040 ASSAY OF SERUM ALBUMIN: CPT | Performed by: INTERNAL MEDICINE

## 2021-01-01 PROCEDURE — 250N000013 HC RX MED GY IP 250 OP 250 PS 637: Performed by: EMERGENCY MEDICINE

## 2021-01-01 PROCEDURE — 81001 URINALYSIS AUTO W/SCOPE: CPT | Performed by: HOSPITALIST

## 2021-01-01 PROCEDURE — 99232 SBSQ HOSP IP/OBS MODERATE 35: CPT | Performed by: FAMILY MEDICINE

## 2021-01-01 PROCEDURE — 80053 COMPREHEN METABOLIC PANEL: CPT | Performed by: INTERNAL MEDICINE

## 2021-01-01 PROCEDURE — 99222 1ST HOSP IP/OBS MODERATE 55: CPT | Mod: 95 | Performed by: INTERNAL MEDICINE

## 2021-01-01 PROCEDURE — 97110 THERAPEUTIC EXERCISES: CPT | Mod: GP

## 2021-01-01 PROCEDURE — 99233 SBSQ HOSP IP/OBS HIGH 50: CPT | Performed by: INTERNAL MEDICINE

## 2021-01-01 PROCEDURE — 258N000003 HC RX IP 258 OP 636: Performed by: INTERNAL MEDICINE

## 2021-01-01 PROCEDURE — 83605 ASSAY OF LACTIC ACID: CPT | Performed by: FAMILY MEDICINE

## 2021-01-01 PROCEDURE — 250N000013 HC RX MED GY IP 250 OP 250 PS 637: Performed by: FAMILY MEDICINE

## 2021-01-01 PROCEDURE — 85610 PROTHROMBIN TIME: CPT | Performed by: HOSPITALIST

## 2021-01-01 PROCEDURE — 93306 TTE W/DOPPLER COMPLETE: CPT | Mod: 26 | Performed by: INTERNAL MEDICINE

## 2021-01-01 PROCEDURE — HZ2ZZZZ DETOXIFICATION SERVICES FOR SUBSTANCE ABUSE TREATMENT: ICD-10-PCS | Performed by: EMERGENCY MEDICINE

## 2021-01-01 PROCEDURE — 97162 PT EVAL MOD COMPLEX 30 MIN: CPT | Mod: GP

## 2021-01-01 PROCEDURE — 97166 OT EVAL MOD COMPLEX 45 MIN: CPT | Mod: GO

## 2021-01-01 PROCEDURE — 83735 ASSAY OF MAGNESIUM: CPT | Performed by: EMERGENCY MEDICINE

## 2021-01-01 PROCEDURE — 84132 ASSAY OF SERUM POTASSIUM: CPT | Performed by: HOSPITALIST

## 2021-01-01 PROCEDURE — 82040 ASSAY OF SERUM ALBUMIN: CPT | Performed by: FAMILY MEDICINE

## 2021-01-01 PROCEDURE — 36415 COLL VENOUS BLD VENIPUNCTURE: CPT | Performed by: EMERGENCY MEDICINE

## 2021-01-01 PROCEDURE — 82077 ASSAY SPEC XCP UR&BREATH IA: CPT | Performed by: EMERGENCY MEDICINE

## 2021-01-01 PROCEDURE — 86706 HEP B SURFACE ANTIBODY: CPT | Performed by: INTERNAL MEDICINE

## 2021-01-01 PROCEDURE — 80053 COMPREHEN METABOLIC PANEL: CPT | Performed by: HOSPITALIST

## 2021-01-01 PROCEDURE — 96374 THER/PROPH/DIAG INJ IV PUSH: CPT

## 2021-01-01 PROCEDURE — 80048 BASIC METABOLIC PNL TOTAL CA: CPT | Performed by: EMERGENCY MEDICINE

## 2021-01-01 PROCEDURE — 82040 ASSAY OF SERUM ALBUMIN: CPT | Performed by: HOSPITALIST

## 2021-01-01 PROCEDURE — 83605 ASSAY OF LACTIC ACID: CPT | Performed by: HOSPITALIST

## 2021-01-01 PROCEDURE — 73630 X-RAY EXAM OF FOOT: CPT | Mod: LT

## 2021-01-01 PROCEDURE — 71045 X-RAY EXAM CHEST 1 VIEW: CPT

## 2021-01-01 PROCEDURE — 93005 ELECTROCARDIOGRAM TRACING: CPT | Performed by: EMERGENCY MEDICINE

## 2021-01-01 PROCEDURE — 84295 ASSAY OF SERUM SODIUM: CPT | Performed by: INTERNAL MEDICINE

## 2021-01-01 PROCEDURE — 36415 COLL VENOUS BLD VENIPUNCTURE: CPT | Performed by: FAMILY MEDICINE

## 2021-01-01 PROCEDURE — 258N000001 HC RX 258: Performed by: STUDENT IN AN ORGANIZED HEALTH CARE EDUCATION/TRAINING PROGRAM

## 2021-01-01 PROCEDURE — 86803 HEPATITIS C AB TEST: CPT | Performed by: INTERNAL MEDICINE

## 2021-01-01 PROCEDURE — 93010 ELECTROCARDIOGRAM REPORT: CPT | Performed by: INTERNAL MEDICINE

## 2021-01-01 PROCEDURE — 84100 ASSAY OF PHOSPHORUS: CPT | Performed by: EMERGENCY MEDICINE

## 2021-01-01 PROCEDURE — 82247 BILIRUBIN TOTAL: CPT | Performed by: EMERGENCY MEDICINE

## 2021-01-01 PROCEDURE — 87040 BLOOD CULTURE FOR BACTERIA: CPT | Performed by: HOSPITALIST

## 2021-01-01 PROCEDURE — 99222 1ST HOSP IP/OBS MODERATE 55: CPT | Performed by: PODIATRIST

## 2021-01-01 PROCEDURE — 87635 SARS-COV-2 COVID-19 AMP PRB: CPT | Performed by: INTERNAL MEDICINE

## 2021-01-01 PROCEDURE — 85610 PROTHROMBIN TIME: CPT | Performed by: EMERGENCY MEDICINE

## 2021-01-01 PROCEDURE — 97110 THERAPEUTIC EXERCISES: CPT | Mod: GO

## 2021-01-01 PROCEDURE — 99239 HOSP IP/OBS DSCHRG MGMT >30: CPT | Performed by: INTERNAL MEDICINE

## 2021-01-01 PROCEDURE — 87635 SARS-COV-2 COVID-19 AMP PRB: CPT | Performed by: EMERGENCY MEDICINE

## 2021-01-01 PROCEDURE — 86704 HEP B CORE ANTIBODY TOTAL: CPT | Performed by: INTERNAL MEDICINE

## 2021-01-01 PROCEDURE — 87086 URINE CULTURE/COLONY COUNT: CPT | Performed by: HOSPITALIST

## 2021-01-01 PROCEDURE — 70450 CT HEAD/BRAIN W/O DYE: CPT

## 2021-01-01 PROCEDURE — 83690 ASSAY OF LIPASE: CPT | Performed by: EMERGENCY MEDICINE

## 2021-01-01 PROCEDURE — 87340 HEPATITIS B SURFACE AG IA: CPT | Performed by: INTERNAL MEDICINE

## 2021-01-01 RX ORDER — DULOXETIN HYDROCHLORIDE 60 MG/1
60 CAPSULE, DELAYED RELEASE ORAL DAILY
Status: DISCONTINUED | OUTPATIENT
Start: 2021-01-01 | End: 2021-01-01 | Stop reason: HOSPADM

## 2021-01-01 RX ORDER — ONDANSETRON 2 MG/ML
4 INJECTION INTRAMUSCULAR; INTRAVENOUS EVERY 6 HOURS PRN
Status: DISCONTINUED | OUTPATIENT
Start: 2021-01-01 | End: 2021-01-01 | Stop reason: HOSPADM

## 2021-01-01 RX ORDER — FOLIC ACID 1 MG/1
1 TABLET ORAL DAILY
Status: DISCONTINUED | OUTPATIENT
Start: 2021-01-01 | End: 2021-01-01 | Stop reason: HOSPADM

## 2021-01-01 RX ORDER — LIDOCAINE 40 MG/G
CREAM TOPICAL
Status: DISCONTINUED | OUTPATIENT
Start: 2021-01-01 | End: 2021-01-01 | Stop reason: HOSPADM

## 2021-01-01 RX ORDER — PIPERACILLIN SODIUM, TAZOBACTAM SODIUM 3; .375 G/15ML; G/15ML
3.38 INJECTION, POWDER, LYOPHILIZED, FOR SOLUTION INTRAVENOUS EVERY 8 HOURS
Status: DISCONTINUED | OUTPATIENT
Start: 2021-01-01 | End: 2021-01-01

## 2021-01-01 RX ORDER — GABAPENTIN 300 MG/1
900 CAPSULE ORAL EVERY 8 HOURS
Status: COMPLETED | OUTPATIENT
Start: 2021-01-01 | End: 2021-01-01

## 2021-01-01 RX ORDER — HALOPERIDOL 5 MG/ML
2.5-5 INJECTION INTRAMUSCULAR EVERY 6 HOURS PRN
Status: DISCONTINUED | OUTPATIENT
Start: 2021-01-01 | End: 2021-01-01

## 2021-01-01 RX ORDER — METHOCARBAMOL 500 MG/1
500 TABLET, FILM COATED ORAL EVERY 4 HOURS PRN
Status: DISCONTINUED | OUTPATIENT
Start: 2021-01-01 | End: 2021-01-01 | Stop reason: HOSPADM

## 2021-01-01 RX ORDER — ACETAMINOPHEN 325 MG/1
650 TABLET ORAL EVERY 4 HOURS PRN
Status: DISCONTINUED | OUTPATIENT
Start: 2021-01-01 | End: 2021-01-01

## 2021-01-01 RX ORDER — BISACODYL 10 MG
10 SUPPOSITORY, RECTAL RECTAL DAILY PRN
Status: DISCONTINUED | OUTPATIENT
Start: 2021-01-01 | End: 2021-01-01 | Stop reason: HOSPADM

## 2021-01-01 RX ORDER — ACAMPROSATE CALCIUM 333 MG/1
666 TABLET, DELAYED RELEASE ORAL 3 TIMES DAILY
Qty: 90 TABLET | Refills: 0 | Status: SHIPPED | OUTPATIENT
Start: 2021-01-01

## 2021-01-01 RX ORDER — METOPROLOL TARTRATE 25 MG/1
12.5 TABLET, FILM COATED ORAL 2 TIMES DAILY
Qty: 60 TABLET | Refills: 0 | Status: SHIPPED | OUTPATIENT
Start: 2021-01-01 | End: 2022-01-01

## 2021-01-01 RX ORDER — ACAMPROSATE CALCIUM 333 MG/1
666 TABLET, DELAYED RELEASE ORAL 3 TIMES DAILY
Status: DISCONTINUED | OUTPATIENT
Start: 2021-01-01 | End: 2021-01-01 | Stop reason: HOSPADM

## 2021-01-01 RX ORDER — MAGNESIUM OXIDE 400 MG/1
400 TABLET ORAL 2 TIMES DAILY
Status: DISCONTINUED | OUTPATIENT
Start: 2021-01-01 | End: 2021-01-01 | Stop reason: HOSPADM

## 2021-01-01 RX ORDER — FOLIC ACID 1 MG/1
1 TABLET ORAL ONCE
Status: COMPLETED | OUTPATIENT
Start: 2021-01-01 | End: 2021-01-01

## 2021-01-01 RX ORDER — SODIUM CHLORIDE 9 MG/ML
INJECTION, SOLUTION INTRAVENOUS CONTINUOUS
Status: DISCONTINUED | OUTPATIENT
Start: 2021-01-01 | End: 2021-01-01

## 2021-01-01 RX ORDER — MULTIVITAMIN,THERAPEUTIC
1 TABLET ORAL DAILY
Status: DISCONTINUED | OUTPATIENT
Start: 2021-01-01 | End: 2021-01-01

## 2021-01-01 RX ORDER — LANOLIN ALCOHOL/MO/W.PET/CERES
100 CREAM (GRAM) TOPICAL DAILY
Status: COMPLETED | OUTPATIENT
Start: 2021-01-01 | End: 2021-01-01

## 2021-01-01 RX ORDER — FLUMAZENIL 0.1 MG/ML
0.2 INJECTION, SOLUTION INTRAVENOUS
Status: DISCONTINUED | OUTPATIENT
Start: 2021-01-01 | End: 2021-01-01 | Stop reason: HOSPADM

## 2021-01-01 RX ORDER — GABAPENTIN 300 MG/1
600 CAPSULE ORAL 3 TIMES DAILY
Status: DISCONTINUED | OUTPATIENT
Start: 2021-01-01 | End: 2021-01-01 | Stop reason: HOSPADM

## 2021-01-01 RX ORDER — LORAZEPAM 0.5 MG/1
1-2 TABLET ORAL EVERY 30 MIN PRN
Status: DISCONTINUED | OUTPATIENT
Start: 2021-01-01 | End: 2021-01-01

## 2021-01-01 RX ORDER — GABAPENTIN 300 MG/1
600 CAPSULE ORAL EVERY 8 HOURS
Status: DISCONTINUED | OUTPATIENT
Start: 2021-01-01 | End: 2021-01-01

## 2021-01-01 RX ORDER — GABAPENTIN 100 MG/1
100 CAPSULE ORAL EVERY 8 HOURS
Status: DISCONTINUED | OUTPATIENT
Start: 2021-01-01 | End: 2021-01-01

## 2021-01-01 RX ORDER — DEXTROSE MONOHYDRATE 25 G/50ML
25-50 INJECTION, SOLUTION INTRAVENOUS
Status: DISCONTINUED | OUTPATIENT
Start: 2021-01-01 | End: 2021-01-01 | Stop reason: HOSPADM

## 2021-01-01 RX ORDER — AMOXICILLIN 250 MG
2 CAPSULE ORAL 2 TIMES DAILY PRN
Status: DISCONTINUED | OUTPATIENT
Start: 2021-01-01 | End: 2021-01-01 | Stop reason: HOSPADM

## 2021-01-01 RX ORDER — GABAPENTIN 300 MG/1
1200 CAPSULE ORAL ONCE
Status: COMPLETED | OUTPATIENT
Start: 2021-01-01 | End: 2021-01-01

## 2021-01-01 RX ORDER — LORAZEPAM 2 MG/ML
1-2 INJECTION INTRAMUSCULAR EVERY 30 MIN PRN
Status: DISCONTINUED | OUTPATIENT
Start: 2021-01-01 | End: 2021-01-01

## 2021-01-01 RX ORDER — FOLIC ACID 1 MG/1
1 TABLET ORAL DAILY
Qty: 30 TABLET | Refills: 0 | Status: SHIPPED | OUTPATIENT
Start: 2021-01-01

## 2021-01-01 RX ORDER — METHOCARBAMOL 500 MG/1
500 TABLET, FILM COATED ORAL EVERY 6 HOURS PRN
Status: DISCONTINUED | OUTPATIENT
Start: 2021-01-01 | End: 2021-01-01

## 2021-01-01 RX ORDER — MULTIPLE VITAMINS W/ MINERALS TAB 9MG-400MCG
1 TAB ORAL DAILY
Status: DISCONTINUED | OUTPATIENT
Start: 2021-01-01 | End: 2021-01-01 | Stop reason: HOSPADM

## 2021-01-01 RX ORDER — GABAPENTIN 600 MG/1
600 TABLET ORAL 2 TIMES DAILY
Status: DISCONTINUED | OUTPATIENT
Start: 2021-01-01 | End: 2021-01-01

## 2021-01-01 RX ORDER — OLANZAPINE 5 MG/1
5-10 TABLET, ORALLY DISINTEGRATING ORAL EVERY 6 HOURS PRN
Status: DISCONTINUED | OUTPATIENT
Start: 2021-01-01 | End: 2021-01-01

## 2021-01-01 RX ORDER — AMOXICILLIN 250 MG
1 CAPSULE ORAL 2 TIMES DAILY PRN
Status: DISCONTINUED | OUTPATIENT
Start: 2021-01-01 | End: 2021-01-01 | Stop reason: HOSPADM

## 2021-01-01 RX ORDER — SODIUM CHLORIDE 9 MG/ML
INJECTION, SOLUTION INTRAVENOUS CONTINUOUS
Status: ACTIVE | OUTPATIENT
Start: 2021-01-01 | End: 2021-01-01

## 2021-01-01 RX ORDER — PIPERACILLIN SODIUM, TAZOBACTAM SODIUM 3; .375 G/15ML; G/15ML
3.38 INJECTION, POWDER, LYOPHILIZED, FOR SOLUTION INTRAVENOUS ONCE
Status: COMPLETED | OUTPATIENT
Start: 2021-01-01 | End: 2021-01-01

## 2021-01-01 RX ORDER — NICOTINE POLACRILEX 4 MG
15-30 LOZENGE BUCCAL
Status: DISCONTINUED | OUTPATIENT
Start: 2021-01-01 | End: 2021-01-01 | Stop reason: HOSPADM

## 2021-01-01 RX ORDER — HALOPERIDOL 5 MG/ML
2.5-5 INJECTION INTRAMUSCULAR EVERY 4 HOURS PRN
Status: DISCONTINUED | OUTPATIENT
Start: 2021-01-01 | End: 2021-01-01

## 2021-01-01 RX ORDER — ONDANSETRON 4 MG/1
4 TABLET, ORALLY DISINTEGRATING ORAL EVERY 6 HOURS PRN
Status: DISCONTINUED | OUTPATIENT
Start: 2021-01-01 | End: 2021-01-01 | Stop reason: HOSPADM

## 2021-01-01 RX ORDER — MAGNESIUM HYDROXIDE/ALUMINUM HYDROXICE/SIMETHICONE 120; 1200; 1200 MG/30ML; MG/30ML; MG/30ML
30 SUSPENSION ORAL EVERY 4 HOURS PRN
Status: DISCONTINUED | OUTPATIENT
Start: 2021-01-01 | End: 2021-01-01 | Stop reason: HOSPADM

## 2021-01-01 RX ORDER — LORAZEPAM 2 MG/ML
1 INJECTION INTRAMUSCULAR ONCE
Status: COMPLETED | OUTPATIENT
Start: 2021-01-01 | End: 2021-01-01

## 2021-01-01 RX ORDER — ASPIRIN 81 MG/1
81 TABLET ORAL DAILY
Status: DISCONTINUED | OUTPATIENT
Start: 2021-01-01 | End: 2021-01-01 | Stop reason: HOSPADM

## 2021-01-01 RX ORDER — DULOXETIN HYDROCHLORIDE 60 MG/1
60 CAPSULE, DELAYED RELEASE ORAL DAILY
COMMUNITY
Start: 2021-01-01

## 2021-01-01 RX ORDER — GABAPENTIN 300 MG/1
300 CAPSULE ORAL EVERY 8 HOURS
Status: DISCONTINUED | OUTPATIENT
Start: 2021-01-01 | End: 2021-01-01

## 2021-01-01 RX ADMIN — ALUMINUM HYDROXIDE, MAGNESIUM HYDROXIDE, AND SIMETHICONE 30 ML: 200; 200; 20 SUSPENSION ORAL at 09:07

## 2021-01-01 RX ADMIN — INSULIN ASPART 3 UNITS: 100 INJECTION, SOLUTION INTRAVENOUS; SUBCUTANEOUS at 08:06

## 2021-01-01 RX ADMIN — SODIUM CHLORIDE: 9 INJECTION, SOLUTION INTRAVENOUS at 18:06

## 2021-01-01 RX ADMIN — GABAPENTIN 600 MG: 300 CAPSULE ORAL at 09:56

## 2021-01-01 RX ADMIN — METOPROLOL TARTRATE 12.5 MG: 25 TABLET, FILM COATED ORAL at 09:21

## 2021-01-01 RX ADMIN — GABAPENTIN 900 MG: 300 CAPSULE ORAL at 14:13

## 2021-01-01 RX ADMIN — METHOCARBAMOL 500 MG: 500 TABLET, FILM COATED ORAL at 22:18

## 2021-01-01 RX ADMIN — MAGNESIUM OXIDE TAB 400 MG (241.3 MG ELEMENTAL MG) 400 MG: 400 (241.3 MG) TAB at 08:13

## 2021-01-01 RX ADMIN — Medication 100 MG: at 08:06

## 2021-01-01 RX ADMIN — ACETAMINOPHEN 650 MG: 325 TABLET ORAL at 22:58

## 2021-01-01 RX ADMIN — GABAPENTIN 900 MG: 300 CAPSULE ORAL at 22:37

## 2021-01-01 RX ADMIN — INSULIN ASPART 1 UNITS: 100 INJECTION, SOLUTION INTRAVENOUS; SUBCUTANEOUS at 09:09

## 2021-01-01 RX ADMIN — SODIUM CHLORIDE: 9 INJECTION, SOLUTION INTRAVENOUS at 14:55

## 2021-01-01 RX ADMIN — DEXTROSE AND SODIUM CHLORIDE: 5; 450 INJECTION, SOLUTION INTRAVENOUS at 04:43

## 2021-01-01 RX ADMIN — MULTIPLE VITAMINS W/ MINERALS TAB 1 TABLET: TAB at 08:06

## 2021-01-01 RX ADMIN — METHOCARBAMOL 500 MG: 500 TABLET, FILM COATED ORAL at 18:28

## 2021-01-01 RX ADMIN — ACAMPROSATE CALCIUM ENTERIC-COATED 666 MG: 333 TABLET, DELAYED RELEASE ORAL at 13:34

## 2021-01-01 RX ADMIN — FOLIC ACID 1 MG: 1 TABLET ORAL at 08:18

## 2021-01-01 RX ADMIN — Medication 50 MG: at 13:11

## 2021-01-01 RX ADMIN — INSULIN GLARGINE 15 UNITS: 100 INJECTION, SOLUTION SUBCUTANEOUS at 08:06

## 2021-01-01 RX ADMIN — ONDANSETRON 4 MG: 2 INJECTION INTRAMUSCULAR; INTRAVENOUS at 04:57

## 2021-01-01 RX ADMIN — METHOCARBAMOL 500 MG: 500 TABLET, FILM COATED ORAL at 19:18

## 2021-01-01 RX ADMIN — ACAMPROSATE CALCIUM ENTERIC-COATED 666 MG: 333 TABLET, DELAYED RELEASE ORAL at 21:08

## 2021-01-01 RX ADMIN — METHOCARBAMOL 500 MG: 500 TABLET, FILM COATED ORAL at 05:03

## 2021-01-01 RX ADMIN — GABAPENTIN 600 MG: 300 CAPSULE ORAL at 21:02

## 2021-01-01 RX ADMIN — ASPIRIN 81 MG: 81 TABLET, COATED ORAL at 09:51

## 2021-01-01 RX ADMIN — Medication 100 MG: at 17:11

## 2021-01-01 RX ADMIN — ONDANSETRON 4 MG: 2 INJECTION INTRAMUSCULAR; INTRAVENOUS at 06:25

## 2021-01-01 RX ADMIN — GABAPENTIN 600 MG: 300 CAPSULE ORAL at 13:00

## 2021-01-01 RX ADMIN — Medication 5 MG: at 02:05

## 2021-01-01 RX ADMIN — ONDANSETRON 4 MG: 2 INJECTION INTRAMUSCULAR; INTRAVENOUS at 08:47

## 2021-01-01 RX ADMIN — METOPROLOL TARTRATE 12.5 MG: 25 TABLET, FILM COATED ORAL at 08:13

## 2021-01-01 RX ADMIN — POTASSIUM & SODIUM PHOSPHATES POWDER PACK 280-160-250 MG 1 PACKET: 280-160-250 PACK at 09:59

## 2021-01-01 RX ADMIN — Medication 100 MG: at 09:59

## 2021-01-01 RX ADMIN — GABAPENTIN 900 MG: 300 CAPSULE ORAL at 06:46

## 2021-01-01 RX ADMIN — GABAPENTIN 900 MG: 300 CAPSULE ORAL at 22:58

## 2021-01-01 RX ADMIN — GABAPENTIN 900 MG: 300 CAPSULE ORAL at 14:59

## 2021-01-01 RX ADMIN — ENOXAPARIN SODIUM 40 MG: 40 INJECTION SUBCUTANEOUS at 20:55

## 2021-01-01 RX ADMIN — LORAZEPAM 1 MG: 2 INJECTION INTRAMUSCULAR; INTRAVENOUS at 13:03

## 2021-01-01 RX ADMIN — MAGNESIUM OXIDE TAB 400 MG (241.3 MG ELEMENTAL MG) 400 MG: 400 (241.3 MG) TAB at 09:59

## 2021-01-01 RX ADMIN — Medication 5 MG: at 20:50

## 2021-01-01 RX ADMIN — POTASSIUM & SODIUM PHOSPHATES POWDER PACK 280-160-250 MG 1 PACKET: 280-160-250 PACK at 21:08

## 2021-01-01 RX ADMIN — ENOXAPARIN SODIUM 40 MG: 40 INJECTION SUBCUTANEOUS at 21:19

## 2021-01-01 RX ADMIN — METHOCARBAMOL 500 MG: 500 TABLET, FILM COATED ORAL at 00:50

## 2021-01-01 RX ADMIN — ASPIRIN 81 MG: 81 TABLET, COATED ORAL at 09:59

## 2021-01-01 RX ADMIN — DULOXETINE HYDROCHLORIDE 60 MG: 60 CAPSULE, DELAYED RELEASE ORAL at 08:13

## 2021-01-01 RX ADMIN — FOLIC ACID 1 MG: 1 TABLET ORAL at 08:13

## 2021-01-01 RX ADMIN — ASPIRIN 81 MG: 81 TABLET, COATED ORAL at 08:18

## 2021-01-01 RX ADMIN — PIPERACILLIN SODIUM AND TAZOBACTAM SODIUM 3.38 G: 3; .375 INJECTION, POWDER, LYOPHILIZED, FOR SOLUTION INTRAVENOUS at 11:27

## 2021-01-01 RX ADMIN — METOPROLOL TARTRATE 12.5 MG: 25 TABLET, FILM COATED ORAL at 08:32

## 2021-01-01 RX ADMIN — ASPIRIN 81 MG: 81 TABLET, COATED ORAL at 08:32

## 2021-01-01 RX ADMIN — FOLIC ACID 1 MG: 1 TABLET ORAL at 10:00

## 2021-01-01 RX ADMIN — FOLIC ACID 1 MG: 1 TABLET ORAL at 09:20

## 2021-01-01 RX ADMIN — GABAPENTIN 600 MG: 300 CAPSULE ORAL at 14:15

## 2021-01-01 RX ADMIN — ONDANSETRON 4 MG: 2 INJECTION INTRAMUSCULAR; INTRAVENOUS at 22:11

## 2021-01-01 RX ADMIN — MAGNESIUM OXIDE TAB 400 MG (241.3 MG ELEMENTAL MG) 400 MG: 400 (241.3 MG) TAB at 11:08

## 2021-01-01 RX ADMIN — DULOXETINE HYDROCHLORIDE 60 MG: 60 CAPSULE, DELAYED RELEASE ORAL at 09:20

## 2021-01-01 RX ADMIN — METOPROLOL TARTRATE 12.5 MG: 25 TABLET, FILM COATED ORAL at 10:45

## 2021-01-01 RX ADMIN — INSULIN ASPART 2 UNITS: 100 INJECTION, SOLUTION INTRAVENOUS; SUBCUTANEOUS at 08:35

## 2021-01-01 RX ADMIN — METOPROLOL TARTRATE 12.5 MG: 25 TABLET, FILM COATED ORAL at 21:08

## 2021-01-01 RX ADMIN — GABAPENTIN 600 MG: 300 CAPSULE ORAL at 21:18

## 2021-01-01 RX ADMIN — Medication 1 MG: at 20:52

## 2021-01-01 RX ADMIN — METHOCARBAMOL 500 MG: 500 TABLET, FILM COATED ORAL at 08:06

## 2021-01-01 RX ADMIN — DULOXETINE HYDROCHLORIDE 60 MG: 60 CAPSULE, DELAYED RELEASE ORAL at 09:52

## 2021-01-01 RX ADMIN — METHOCARBAMOL 500 MG: 500 TABLET, FILM COATED ORAL at 04:49

## 2021-01-01 RX ADMIN — PIPERACILLIN SODIUM AND TAZOBACTAM SODIUM 3.38 G: 3; .375 INJECTION, POWDER, LYOPHILIZED, FOR SOLUTION INTRAVENOUS at 09:18

## 2021-01-01 RX ADMIN — ACAMPROSATE CALCIUM ENTERIC-COATED 666 MG: 333 TABLET, DELAYED RELEASE ORAL at 13:00

## 2021-01-01 RX ADMIN — POTASSIUM & SODIUM PHOSPHATES POWDER PACK 280-160-250 MG 1 PACKET: 280-160-250 PACK at 14:02

## 2021-01-01 RX ADMIN — ENOXAPARIN SODIUM 40 MG: 40 INJECTION SUBCUTANEOUS at 21:00

## 2021-01-01 RX ADMIN — MAGNESIUM OXIDE TAB 400 MG (241.3 MG ELEMENTAL MG) 400 MG: 400 (241.3 MG) TAB at 21:18

## 2021-01-01 RX ADMIN — INSULIN GLARGINE 15 UNITS: 100 INJECTION, SOLUTION SUBCUTANEOUS at 09:08

## 2021-01-01 RX ADMIN — GABAPENTIN 900 MG: 300 CAPSULE ORAL at 22:30

## 2021-01-01 RX ADMIN — METHOCARBAMOL 500 MG: 500 TABLET, FILM COATED ORAL at 20:48

## 2021-01-01 RX ADMIN — Medication 100 MG: at 09:18

## 2021-01-01 RX ADMIN — PIPERACILLIN SODIUM AND TAZOBACTAM SODIUM 3.38 G: 3; .375 INJECTION, POWDER, LYOPHILIZED, FOR SOLUTION INTRAVENOUS at 17:01

## 2021-01-01 RX ADMIN — DULOXETINE HYDROCHLORIDE 60 MG: 60 CAPSULE, DELAYED RELEASE ORAL at 08:32

## 2021-01-01 RX ADMIN — ACAMPROSATE CALCIUM ENTERIC-COATED 666 MG: 333 TABLET, DELAYED RELEASE ORAL at 21:18

## 2021-01-01 RX ADMIN — ACAMPROSATE CALCIUM ENTERIC-COATED 666 MG: 333 TABLET, DELAYED RELEASE ORAL at 14:15

## 2021-01-01 RX ADMIN — POTASSIUM & SODIUM PHOSPHATES POWDER PACK 280-160-250 MG 1 PACKET: 280-160-250 PACK at 09:20

## 2021-01-01 RX ADMIN — DEXTROSE AND SODIUM CHLORIDE: 5; 900 INJECTION, SOLUTION INTRAVENOUS at 09:14

## 2021-01-01 RX ADMIN — MULTIPLE VITAMINS W/ MINERALS TAB 1 TABLET: TAB at 09:59

## 2021-01-01 RX ADMIN — METOPROLOL TARTRATE 12.5 MG: 25 TABLET, FILM COATED ORAL at 20:50

## 2021-01-01 RX ADMIN — GABAPENTIN 1200 MG: 300 CAPSULE ORAL at 17:12

## 2021-01-01 RX ADMIN — GABAPENTIN 900 MG: 300 CAPSULE ORAL at 06:41

## 2021-01-01 RX ADMIN — ACAMPROSATE CALCIUM ENTERIC-COATED 666 MG: 333 TABLET, DELAYED RELEASE ORAL at 21:02

## 2021-01-01 RX ADMIN — ENOXAPARIN SODIUM 40 MG: 40 INJECTION SUBCUTANEOUS at 20:57

## 2021-01-01 RX ADMIN — INSULIN GLARGINE 15 UNITS: 100 INJECTION, SOLUTION SUBCUTANEOUS at 08:30

## 2021-01-01 RX ADMIN — GABAPENTIN 600 MG: 300 CAPSULE ORAL at 14:02

## 2021-01-01 RX ADMIN — ACAMPROSATE CALCIUM ENTERIC-COATED 666 MG: 333 TABLET, DELAYED RELEASE ORAL at 20:52

## 2021-01-01 RX ADMIN — ACAMPROSATE CALCIUM ENTERIC-COATED 666 MG: 333 TABLET, DELAYED RELEASE ORAL at 08:13

## 2021-01-01 RX ADMIN — FOLIC ACID 1 MG: 1 TABLET ORAL at 17:12

## 2021-01-01 RX ADMIN — METOPROLOL TARTRATE 12.5 MG: 25 TABLET, FILM COATED ORAL at 08:17

## 2021-01-01 RX ADMIN — SODIUM CHLORIDE: 9 INJECTION, SOLUTION INTRAVENOUS at 02:10

## 2021-01-01 RX ADMIN — MULTIPLE VITAMINS W/ MINERALS TAB 1 TABLET: TAB at 09:21

## 2021-01-01 RX ADMIN — FOLIC ACID 1 MG: 1 TABLET ORAL at 13:11

## 2021-01-01 RX ADMIN — ACAMPROSATE CALCIUM ENTERIC-COATED 666 MG: 333 TABLET, DELAYED RELEASE ORAL at 09:51

## 2021-01-01 RX ADMIN — ONDANSETRON 4 MG: 2 INJECTION INTRAMUSCULAR; INTRAVENOUS at 13:07

## 2021-01-01 RX ADMIN — INSULIN ASPART 1 UNITS: 100 INJECTION, SOLUTION INTRAVENOUS; SUBCUTANEOUS at 09:21

## 2021-01-01 RX ADMIN — ACETAMINOPHEN 650 MG: 325 TABLET ORAL at 08:37

## 2021-01-01 RX ADMIN — SODIUM CHLORIDE 500 ML: 9 INJECTION, SOLUTION INTRAVENOUS at 17:01

## 2021-01-01 RX ADMIN — ACAMPROSATE CALCIUM ENTERIC-COATED 666 MG: 333 TABLET, DELAYED RELEASE ORAL at 09:20

## 2021-01-01 RX ADMIN — METHOCARBAMOL 500 MG: 500 TABLET, FILM COATED ORAL at 13:37

## 2021-01-01 RX ADMIN — GABAPENTIN 900 MG: 300 CAPSULE ORAL at 14:42

## 2021-01-01 RX ADMIN — ACAMPROSATE CALCIUM ENTERIC-COATED 666 MG: 333 TABLET, DELAYED RELEASE ORAL at 14:07

## 2021-01-01 RX ADMIN — INSULIN GLARGINE 15 UNITS: 100 INJECTION, SOLUTION SUBCUTANEOUS at 09:21

## 2021-01-01 RX ADMIN — GABAPENTIN 600 MG: 300 CAPSULE ORAL at 08:17

## 2021-01-01 RX ADMIN — MAGNESIUM OXIDE TAB 400 MG (241.3 MG ELEMENTAL MG) 400 MG: 400 (241.3 MG) TAB at 20:50

## 2021-01-01 RX ADMIN — ACAMPROSATE CALCIUM ENTERIC-COATED 666 MG: 333 TABLET, DELAYED RELEASE ORAL at 08:17

## 2021-01-01 RX ADMIN — GABAPENTIN 600 MG: 300 CAPSULE ORAL at 08:13

## 2021-01-01 RX ADMIN — ASPIRIN 81 MG: 81 TABLET, COATED ORAL at 09:20

## 2021-01-01 RX ADMIN — DULOXETINE HYDROCHLORIDE 60 MG: 60 CAPSULE, DELAYED RELEASE ORAL at 10:00

## 2021-01-01 RX ADMIN — SODIUM CHLORIDE 1000 ML: 9 INJECTION, SOLUTION INTRAVENOUS at 10:46

## 2021-01-01 RX ADMIN — ACETAMINOPHEN 650 MG: 325 TABLET ORAL at 17:12

## 2021-01-01 RX ADMIN — GABAPENTIN 600 MG: 300 CAPSULE ORAL at 13:34

## 2021-01-01 RX ADMIN — METHOCARBAMOL 500 MG: 500 TABLET, FILM COATED ORAL at 21:38

## 2021-01-01 RX ADMIN — GABAPENTIN 900 MG: 300 CAPSULE ORAL at 06:21

## 2021-01-01 RX ADMIN — ASPIRIN 81 MG: 81 TABLET, COATED ORAL at 08:06

## 2021-01-01 RX ADMIN — SODIUM CHLORIDE: 9 INJECTION, SOLUTION INTRAVENOUS at 21:06

## 2021-01-01 RX ADMIN — ASPIRIN 81 MG: 81 TABLET, COATED ORAL at 09:18

## 2021-01-01 RX ADMIN — MULTIPLE VITAMINS W/ MINERALS TAB 1 TABLET: TAB at 08:17

## 2021-01-01 RX ADMIN — GABAPENTIN 600 MG: 300 CAPSULE ORAL at 08:32

## 2021-01-01 RX ADMIN — GABAPENTIN 600 MG: 300 CAPSULE ORAL at 14:06

## 2021-01-01 RX ADMIN — INSULIN ASPART 3 UNITS: 100 INJECTION, SOLUTION INTRAVENOUS; SUBCUTANEOUS at 17:45

## 2021-01-01 RX ADMIN — DULOXETINE HYDROCHLORIDE 60 MG: 60 CAPSULE, DELAYED RELEASE ORAL at 09:18

## 2021-01-01 RX ADMIN — MAGNESIUM OXIDE TAB 400 MG (241.3 MG ELEMENTAL MG) 400 MG: 400 (241.3 MG) TAB at 08:18

## 2021-01-01 RX ADMIN — ACAMPROSATE CALCIUM ENTERIC-COATED 666 MG: 333 TABLET, DELAYED RELEASE ORAL at 14:02

## 2021-01-01 RX ADMIN — INSULIN ASPART 1 UNITS: 100 INJECTION, SOLUTION INTRAVENOUS; SUBCUTANEOUS at 13:06

## 2021-01-01 RX ADMIN — LORAZEPAM 1 MG: 0.5 TABLET ORAL at 08:47

## 2021-01-01 RX ADMIN — ENOXAPARIN SODIUM 40 MG: 40 INJECTION SUBCUTANEOUS at 21:02

## 2021-01-01 RX ADMIN — POTASSIUM & SODIUM PHOSPHATES POWDER PACK 280-160-250 MG 1 PACKET: 280-160-250 PACK at 14:42

## 2021-01-01 RX ADMIN — SODIUM CHLORIDE: 9 INJECTION, SOLUTION INTRAVENOUS at 17:05

## 2021-01-01 RX ADMIN — GABAPENTIN 600 MG: 300 CAPSULE ORAL at 20:48

## 2021-01-01 RX ADMIN — MULTIPLE VITAMINS W/ MINERALS TAB 1 TABLET: TAB at 09:18

## 2021-01-01 RX ADMIN — PIPERACILLIN SODIUM AND TAZOBACTAM SODIUM 3.38 G: 3; .375 INJECTION, POWDER, LYOPHILIZED, FOR SOLUTION INTRAVENOUS at 00:54

## 2021-01-01 RX ADMIN — FOLIC ACID 1 MG: 1 TABLET ORAL at 08:33

## 2021-01-01 RX ADMIN — ASPIRIN 81 MG: 81 TABLET, COATED ORAL at 08:13

## 2021-01-01 RX ADMIN — Medication 5 MG: at 22:17

## 2021-01-01 RX ADMIN — METHOCARBAMOL 500 MG: 500 TABLET, FILM COATED ORAL at 13:49

## 2021-01-01 RX ADMIN — POTASSIUM & SODIUM PHOSPHATES POWDER PACK 280-160-250 MG 1 PACKET: 280-160-250 PACK at 22:05

## 2021-01-01 RX ADMIN — GABAPENTIN 600 MG: 300 CAPSULE ORAL at 09:20

## 2021-01-01 RX ADMIN — DULOXETINE HYDROCHLORIDE 60 MG: 60 CAPSULE, DELAYED RELEASE ORAL at 08:18

## 2021-01-01 RX ADMIN — FOLIC ACID 1 MG: 1 TABLET ORAL at 09:18

## 2021-01-01 RX ADMIN — MULTIPLE VITAMINS W/ MINERALS TAB 1 TABLET: TAB at 16:14

## 2021-01-01 RX ADMIN — INSULIN ASPART 8 UNITS: 100 INJECTION, SOLUTION INTRAVENOUS; SUBCUTANEOUS at 14:48

## 2021-01-01 RX ADMIN — MULTIPLE VITAMINS W/ MINERALS TAB 1 TABLET: TAB at 17:12

## 2021-01-01 RX ADMIN — ENOXAPARIN SODIUM 40 MG: 40 INJECTION SUBCUTANEOUS at 21:35

## 2021-01-01 RX ADMIN — DULOXETINE HYDROCHLORIDE 60 MG: 60 CAPSULE, DELAYED RELEASE ORAL at 08:06

## 2021-01-01 RX ADMIN — FOLIC ACID 1 MG: 1 TABLET ORAL at 08:06

## 2021-01-01 RX ADMIN — GABAPENTIN 600 MG: 300 CAPSULE ORAL at 21:08

## 2021-01-01 RX ADMIN — METOPROLOL TARTRATE 12.5 MG: 25 TABLET, FILM COATED ORAL at 21:02

## 2021-01-01 RX ADMIN — ENOXAPARIN SODIUM 40 MG: 40 INJECTION SUBCUTANEOUS at 20:30

## 2021-01-01 RX ADMIN — MAGNESIUM OXIDE TAB 400 MG (241.3 MG ELEMENTAL MG) 400 MG: 400 (241.3 MG) TAB at 21:02

## 2021-01-01 RX ADMIN — MULTIPLE VITAMINS W/ MINERALS TAB 1 TABLET: TAB at 08:13

## 2021-01-01 RX ADMIN — SODIUM CHLORIDE: 9 INJECTION, SOLUTION INTRAVENOUS at 10:42

## 2021-01-01 RX ADMIN — INSULIN ASPART 2 UNITS: 100 INJECTION, SOLUTION INTRAVENOUS; SUBCUTANEOUS at 18:09

## 2021-01-01 RX ADMIN — FOLIC ACID 1 MG: 1 TABLET ORAL at 09:56

## 2021-01-01 RX ADMIN — MULTIPLE VITAMINS W/ MINERALS TAB 1 TABLET: TAB at 08:32

## 2021-01-01 RX ADMIN — Medication 100 MG: at 08:47

## 2021-01-01 RX ADMIN — ENOXAPARIN SODIUM 40 MG: 40 INJECTION SUBCUTANEOUS at 21:09

## 2021-01-01 RX ADMIN — METHOCARBAMOL 500 MG: 500 TABLET, FILM COATED ORAL at 08:13

## 2021-01-01 RX ADMIN — METOPROLOL TARTRATE 12.5 MG: 25 TABLET, FILM COATED ORAL at 09:59

## 2021-01-01 ASSESSMENT — ACTIVITIES OF DAILY LIVING (ADL)
ADLS_ACUITY_SCORE: 15
ADLS_ACUITY_SCORE: 21
ADLS_ACUITY_SCORE: 17
ADLS_ACUITY_SCORE: 11
ADLS_ACUITY_SCORE: 8
ADLS_ACUITY_SCORE: 19
ADLS_ACUITY_SCORE: 14
ADLS_ACUITY_SCORE: 15
ADLS_ACUITY_SCORE: 15
ADLS_ACUITY_SCORE: 14
DEPENDENT_IADLS:: CLEANING;COOKING;LAUNDRY;SHOPPING;MEAL PREPARATION;TRANSPORTATION
ADLS_ACUITY_SCORE: 17
ADLS_ACUITY_SCORE: 8
ADLS_ACUITY_SCORE: 17
ADLS_ACUITY_SCORE: 19
ADLS_ACUITY_SCORE: 15
ADLS_ACUITY_SCORE: 13
ADLS_ACUITY_SCORE: 12
ADLS_ACUITY_SCORE: 15
ADLS_ACUITY_SCORE: 16
ADLS_ACUITY_SCORE: 21
ADLS_ACUITY_SCORE: 11
ADLS_ACUITY_SCORE: 15
ADLS_ACUITY_SCORE: 11
ADLS_ACUITY_SCORE: 14
ADLS_ACUITY_SCORE: 13
ADLS_ACUITY_SCORE: 21
ADLS_ACUITY_SCORE: 15
ADLS_ACUITY_SCORE: 17
ADLS_ACUITY_SCORE: 19
ADLS_ACUITY_SCORE: 17
ADLS_ACUITY_SCORE: 15
ADLS_ACUITY_SCORE: 17
ADLS_ACUITY_SCORE: 13
ADLS_ACUITY_SCORE: 23
ADLS_ACUITY_SCORE: 15
ADLS_ACUITY_SCORE: 17
ADLS_ACUITY_SCORE: 17
ADLS_ACUITY_SCORE: 15
ADLS_ACUITY_SCORE: 21
ADLS_ACUITY_SCORE: 17
ADLS_ACUITY_SCORE: 15
ADLS_ACUITY_SCORE: 13
ADLS_ACUITY_SCORE: 17
ADLS_ACUITY_SCORE: 14
ADLS_ACUITY_SCORE: 17
ADLS_ACUITY_SCORE: 15
ADLS_ACUITY_SCORE: 19
ADLS_ACUITY_SCORE: 17
ADLS_ACUITY_SCORE: 15
ADLS_ACUITY_SCORE: 12
ADLS_ACUITY_SCORE: 15
ADLS_ACUITY_SCORE: 13
ADLS_ACUITY_SCORE: 15
ADLS_ACUITY_SCORE: 17
ADLS_ACUITY_SCORE: 17
ADLS_ACUITY_SCORE: 19
ADLS_ACUITY_SCORE: 15
ADLS_ACUITY_SCORE: 17
ADLS_ACUITY_SCORE: 21
ADLS_ACUITY_SCORE: 13
ADLS_ACUITY_SCORE: 11
ADLS_ACUITY_SCORE: 15
ADLS_ACUITY_SCORE: 21
ADLS_ACUITY_SCORE: 12
ADLS_ACUITY_SCORE: 19
ADLS_ACUITY_SCORE: 19
ADLS_ACUITY_SCORE: 14
ADLS_ACUITY_SCORE: 11
ADLS_ACUITY_SCORE: 15
ADLS_ACUITY_SCORE: 15
ADLS_ACUITY_SCORE: 21
ADLS_ACUITY_SCORE: 13
ADLS_ACUITY_SCORE: 17
ADLS_ACUITY_SCORE: 11
ADLS_ACUITY_SCORE: 15
ADLS_ACUITY_SCORE: 11
ADLS_ACUITY_SCORE: 21
ADLS_ACUITY_SCORE: 15
ADLS_ACUITY_SCORE: 17
ADLS_ACUITY_SCORE: 15
ADLS_ACUITY_SCORE: 17
ADLS_ACUITY_SCORE: 15
ADLS_ACUITY_SCORE: 19
ADLS_ACUITY_SCORE: 13
ADLS_ACUITY_SCORE: 21
ADLS_ACUITY_SCORE: 19
ADLS_ACUITY_SCORE: 17
ADLS_ACUITY_SCORE: 14
ADLS_ACUITY_SCORE: 17
ADLS_ACUITY_SCORE: 15
ADLS_ACUITY_SCORE: 19
ADLS_ACUITY_SCORE: 15
ADLS_ACUITY_SCORE: 13
ADLS_ACUITY_SCORE: 14
ADLS_ACUITY_SCORE: 11
ADLS_ACUITY_SCORE: 15
ADLS_ACUITY_SCORE: 19
ADLS_ACUITY_SCORE: 19
ADLS_ACUITY_SCORE: 12
ADLS_ACUITY_SCORE: 19
ADLS_ACUITY_SCORE: 21
ADLS_ACUITY_SCORE: 17
ADLS_ACUITY_SCORE: 17
ADLS_ACUITY_SCORE: 21
ADLS_ACUITY_SCORE: 15
ADLS_ACUITY_SCORE: 12
ADLS_ACUITY_SCORE: 19
ADLS_ACUITY_SCORE: 17
ADLS_ACUITY_SCORE: 8
ADLS_ACUITY_SCORE: 17
ADLS_ACUITY_SCORE: 21
ADLS_ACUITY_SCORE: 8
ADLS_ACUITY_SCORE: 17
ADLS_ACUITY_SCORE: 15
ADLS_ACUITY_SCORE: 13
ADLS_ACUITY_SCORE: 11
ADLS_ACUITY_SCORE: 14
ADLS_ACUITY_SCORE: 15
ADLS_ACUITY_SCORE: 19
ADLS_ACUITY_SCORE: 13
ADLS_ACUITY_SCORE: 11
ADLS_ACUITY_SCORE: 19
ADLS_ACUITY_SCORE: 12
ADLS_ACUITY_SCORE: 17
ADLS_ACUITY_SCORE: 21
ADLS_ACUITY_SCORE: 17
ADLS_ACUITY_SCORE: 21
ADLS_ACUITY_SCORE: 17
ADLS_ACUITY_SCORE: 21
ADLS_ACUITY_SCORE: 14
ADLS_ACUITY_SCORE: 17
ADLS_ACUITY_SCORE: 15
ADLS_ACUITY_SCORE: 15
ADLS_ACUITY_SCORE: 19
ADLS_ACUITY_SCORE: 17
ADLS_ACUITY_SCORE: 15
ADLS_ACUITY_SCORE: 17
ADLS_ACUITY_SCORE: 15
ADLS_ACUITY_SCORE: 21
ADLS_ACUITY_SCORE: 17
ADLS_ACUITY_SCORE: 21
ADLS_ACUITY_SCORE: 13
ADLS_ACUITY_SCORE: 14
ADLS_ACUITY_SCORE: 17
ADLS_ACUITY_SCORE: 15
ADLS_ACUITY_SCORE: 13
ADLS_ACUITY_SCORE: 17
ADLS_ACUITY_SCORE: 15
ADLS_ACUITY_SCORE: 19
ADLS_ACUITY_SCORE: 13
ADLS_ACUITY_SCORE: 11
ADLS_ACUITY_SCORE: 13
ADLS_ACUITY_SCORE: 15
ADLS_ACUITY_SCORE: 15
ADLS_ACUITY_SCORE: 19
ADLS_ACUITY_SCORE: 19
ADLS_ACUITY_SCORE: 11
ADLS_ACUITY_SCORE: 15
ADLS_ACUITY_SCORE: 21
ADLS_ACUITY_SCORE: 19
ADLS_ACUITY_SCORE: 19
ADLS_ACUITY_SCORE: 15
ADLS_ACUITY_SCORE: 21
ADLS_ACUITY_SCORE: 19
ADLS_ACUITY_SCORE: 17
ADLS_ACUITY_SCORE: 19
ADLS_ACUITY_SCORE: 17
ADLS_ACUITY_SCORE: 15
ADLS_ACUITY_SCORE: 11
ADLS_ACUITY_SCORE: 21
ADLS_ACUITY_SCORE: 15
ADLS_ACUITY_SCORE: 15
ADLS_ACUITY_SCORE: 14
ADLS_ACUITY_SCORE: 8
ADLS_ACUITY_SCORE: 17
ADLS_ACUITY_SCORE: 11
ADLS_ACUITY_SCORE: 17
ADLS_ACUITY_SCORE: 12
ADLS_ACUITY_SCORE: 16
ADLS_ACUITY_SCORE: 15
ADLS_ACUITY_SCORE: 19
ADLS_ACUITY_SCORE: 21
ADLS_ACUITY_SCORE: 17

## 2021-01-01 ASSESSMENT — MIFFLIN-ST. JEOR
SCORE: 1703.1
SCORE: 1701.74
SCORE: 1664.99
SCORE: 1734.4
SCORE: 1699.47

## 2021-01-01 ASSESSMENT — ENCOUNTER SYMPTOMS
MYALGIAS: 1
NECK PAIN: 1
HEADACHES: 1
ABDOMINAL PAIN: 0
BACK PAIN: 0
APPETITE CHANGE: 1
JOINT SWELLING: 1

## 2021-01-21 ENCOUNTER — HOSPITAL ENCOUNTER (OUTPATIENT)
Dept: CT IMAGING | Facility: CLINIC | Age: 56
Discharge: HOME | End: 2021-01-21
Attending: PSYCHIATRY & NEUROLOGY

## 2021-01-21 DIAGNOSIS — D32.9 MENINGIOMA (H): ICD-10-CM

## 2021-01-21 DIAGNOSIS — M21.331: ICD-10-CM

## 2021-11-04 PROBLEM — F10.929 ALCOHOLIC INTOXICATION WITH COMPLICATION (H): Status: ACTIVE | Noted: 2021-01-01
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
## 2021-11-04 PROBLEM — S92.912A: Status: ACTIVE | Noted: 2021-01-01

## 2021-11-04 PROBLEM — R73.9 HYPERGLYCEMIA: Status: ACTIVE | Noted: 2021-01-01

## 2021-11-04 PROBLEM — R29.6 FALLS FREQUENTLY: Status: ACTIVE | Noted: 2021-01-01

## 2021-11-04 NOTE — ED NOTES
Pt is sleeping, VSS. OK for pt to eat/drink per . Water at bedside. Daughter will order food when pt wakes up

## 2021-11-04 NOTE — ED NOTES
"[facility] ED Handoff Report    ED Chief Complaint: ETOH, falls    ED Diagnosis:  (R29.6) Falls frequently  Comment: ETOH,falls, hyperglycemia  Plan: n/a    (S92.912A) Closed nondisplaced fracture of phalanx of toe of left foot, unspecified toe, initial encounter  Comment: n/a  Plan: admit    (F10.929) Alcoholic intoxication with complication (H)  Comment: n/a  Plan: n/a    (R73.9) Hyperglycemia  Comment: n/a  Plan: n/a       PMH:    Past Medical History:   Diagnosis Date     Acute encephalopathy 12/30/2020     Diabetes mellitus (H)         Code Status:  No Order     Falls Risk: Yes Band: Applied    Current Living Situation/Residence: lives with a significant other     Elimination Status: Continent: Yes     Activity Level: SBA    Patients Preferred Language:  English     Needed: No    Vital Signs:  /74   Pulse 100   Temp 98  F (36.7  C) (Oral)   Resp 17   Ht 1.854 m (6' 1\")   Wt 77.1 kg (170 lb)   SpO2 96%   BMI 22.43 kg/m       Cardiac Rhythm: SR and St    Pain Score: 0/10    Is the Patient Confused:  No    Last Food or Drink: 11/3/21 at unknown    Focused Assessment:  See note    Tests Performed: Done: Imaging    Treatments Provided:  Fluids, vitamins, insulin    Family Dynamics/Concerns: No    Family Updated On Visitor Policy: Yes    Plan of Care Communicated to Family: Yes    Who Was Updated about Plan of Care: Wife    Belongings Checklist Done and Signed by Patient: Yes    Covid: asymptomatic , negative    Additional Information: Pt awake and alert. Has not been getting up out of bed here. Has a broken toe so will need assist with moving     11/4/2021 4:08 PM   {RN:[id number]:Ly Bergeron  "

## 2021-11-04 NOTE — CONSULTS
"Care Management Initial Consult    General Information  Assessment completed with: Patient, Spouse or significant other, Peewee and wife Daina  Type of CM/SW Visit: Initial Assessment    Primary Care Provider verified and updated as needed: Yes   Readmission within the last 30 days: no previous admission in last 30 days      Reason for Consult: discharge planning  Advance Care Planning: Advance Care Planning Reviewed: other (comment) (\"Don't have one\")          Communication Assessment  Patient's communication style: spoken language (English or Bilingual)             Cognitive  Cognitive/Neuro/Behavioral: WDL                      Living Environment:   People in home: spouse, other (see comments) (\"wife and niece\")     Current living Arrangements: house (\"live on the lower level of niece's house\")      Able to return to prior arrangements: yes       Family/Social Support:  Care provided by: self  Provides care for: no one  Marital Status:   Wife  Daina       Description of Support System: Supportive, Involved    Support Assessment: Adequate family and caregiver support, Adequate social supports, Patient communicates needs well met    Current Resources:   Patient receiving home care services: No     Community Resources: None  Equipment currently used at home: walker, rolling, cane, straight, wheelchair, manual, glucometer (\"I use the walker the most inside the house\")  Supplies currently used at home: Diabetic Supplies, Other (\"dentures\")    Employment/Financial:  Employment Status: disabled     Employment/ Comments: \"no  history\"  Financial Concerns:     Referral to Financial Counselor: No       Lifestyle & Psychosocial Needs:  Social Determinants of Health     Tobacco Use: High Risk     Smoking Tobacco Use: Current Every Day Smoker     Smokeless Tobacco Use: Never Used   Alcohol Use:      Frequency of Alcohol Consumption:      Average Number of Drinks:      Frequency of Binge Drinking:  " "  Financial Resource Strain:      Difficulty of Paying Living Expenses:    Food Insecurity:      Worried About Running Out of Food in the Last Year:      Ran Out of Food in the Last Year:    Transportation Needs:      Lack of Transportation (Medical):      Lack of Transportation (Non-Medical):    Physical Activity:      Days of Exercise per Week:      Minutes of Exercise per Session:    Stress:      Feeling of Stress :    Social Connections:      Frequency of Communication with Friends and Family:      Frequency of Social Gatherings with Friends and Family:      Attends Tenriism Services:      Active Member of Clubs or Organizations:      Attends Club or Organization Meetings:      Marital Status:    Intimate Partner Violence:      Fear of Current or Ex-Partner:      Emotionally Abused:      Physically Abused:      Sexually Abused:    Depression:      PHQ-2 Score:    Housing Stability:      Unable to Pay for Housing in the Last Year:      Number of Places Lived in the Last Year:      Unstable Housing in the Last Year:        Functional Status:  Prior to admission patient needed assistance:   Dependent ADLs:: Ambulation-walker, Ambulation-cane, Wheelchair-independent, Bathing  Dependent IADLs:: Cleaning, Cooking, Laundry, Shopping, Meal Preparation, Transportation (\"I only drive very occasionally\")  Assesssment of Functional Status: Not at baseline with ADL Functioning, Not at baseline with mobility, Not at  functional baseline    Mental Health Status:  Mental Health Status: Current Concern  Mental Health Management: Medication    Chemical Dependency Status:  Chemical Dependency Status: Current Concern  Chemical Dependency Management: Previous treatment (\"I have had Rule 25's in the past\")          Values/Beliefs:  Spiritual, Cultural Beliefs, Tenriism Practices, Values that affect care:                 Additional Information:  Peewee lives in a house with his wife and wife's niece. They live in the \"lower level of " "the house and there are many steps he has to use. We are looking for a different place to live with less steps\".    He has a cane, walker, and wheelchair at home for mobility. He \"mostly uses the walker around the house\".    He is independent with some ADLs and wife helps with other needs.    He is interested in a Rule 25 and CD resources.    Wife to transport at discharge.    Liset Diehl RN      "

## 2021-11-04 NOTE — ED TRIAGE NOTES
W/c to triage.  Pt states has been drinking gain for about 5 months and in the last week has had some falls.  C/o pain in L knee and foot since last fall on [place].  denies neck pain. Denies blood thinners.  Wife reports pt has not been eating much but drinking a lot.  Pt states last drink this morning--[employer] HARD LEMONADE AND [place].

## 2021-11-04 NOTE — ED PROVIDER NOTES
EMERGENCY DEPARTMENT ENCOUNTER      NAME: Peewee Hess  AGE: 56 year old male  YOB: 1965  MRN: [id number]  EVALUATION DATE & TIME: 11/4/2021  9:38 AM    PCP: Sarah Canseco    ED PROVIDER: Sridhar Birmingham M.D.      Chief Complaint   Patient presents with     Alcohol Problem         FINAL IMPRESSION:  1. Falls frequently    2. Closed nondisplaced fracture of phalanx of toe of left foot, unspecified toe, initial encounter    3. Alcoholic intoxication with complication (H)    4. Hyperglycemia          ED COURSE & MEDICAL DECISION MAKING:    Pertinent Labs & Imaging studies reviewed. (See chart for details)  ED Course as of Nov 04 1526   Thu Nov 04, 2021   0956 Patient is a 56-year-old gentleman with history of insulin-dependent diabetes, alcohol dependence, presents with frequent falls, anorexia due to upset stomach, essential failure to thrive at home.  Brought in by his wife.  He is pleasant, intoxicated, has swelling of his left knee and left foot which we will get x-rays of.  We also get a CT scan of the head due to his recent falls.  Syncope at home could be due to a number of things, hypovolemia, hypoglycemia, arrhythmia due to of electrolyte imbalance.  Work-up will revolve around this as well as liver failure. Patient will need admission. He will certainly go into withdrawal (drinking for 3 months), so we will watch him closely with [facility] protocol      1040 EKG shows sinus tachycardia with a rate of 102. No acute ischemic ST-T wave morphology.  Left axis deviation.  Normal intervals.  When compared to prior EKG on October 5, 2020, there is no significant change.  Impression: Sinus tachycardia.      1129 Alcohol, Blood(!): 144   1130 INR(!): 1.41   1130 C/w prior labs. Known liver disease   Bilirubin Total(!): 3.6   1130 Sodium(!): 128   1130 Without DKA   Glucose(!!): 440   1130 Anion Gap: 12   1139 Diffuse demineralization. Fracture of the proximal phalanx of the second toe, both at the  base and distal margin. The fracture at the base may be more corticated and chronic. Posttraumatic fracture deformity of the calcaneus but the chronicity of this   finding is uncertain.   Foot XR, G/E 3 views, left   1139 The left knee is negative for fracture or compartmental narrowing. No effusion.   XR Knee Left 1/2 Views   1159 1.  No CT evidence for acute intracranial process.  2.  Brain atrophy and presumed chronic microvascular ischemic changes as above.   Head CT w/o contrast     Patient was admitted to the hospital for left foot fracture, syncope, alcohol dependence, hypoglycemia.  He was given 1 mg of Ativan so far, [facility] orders are in with protocol to give additional doses of Ativan as needed.  I have a strong suspicion he will go into withdrawal during this hospitalization.    Additional ED Course Timestamps:  9:43 AM I met with the patient for the initial interview and physical examination. Discussed plan for treatment and workup in the ED.    12:44 PM I discussed the case with hospitalist, Dr Stanley, who accepts the patient      At the conclusion of the encounter I discussed the results of all of the tests and the disposition. The questions were answered. The patient or family acknowledged understanding and was agreeable with the care plan.       PPE: Provider wore gloves, N95 mask, eye protection, and paper mask.     MEDICATIONS GIVEN IN THE EMERGENCY:  Medications   haloperidol lactate (HALDOL) injection 2.5-5 mg (has no administration in time range)   LORazepam (ATIVAN) tablet 1-2 mg (has no administration in time range)     Or   LORazepam (ATIVAN) injection 1-2 mg (has no administration in time range)   sodium chloride 0.9% infusion ( Intravenous New Bag 11/4/21 1455)   LORazepam (ATIVAN) injection 1 mg (1 mg Intravenous Given 11/4/21 1303)   folic acid (FOLVITE) tablet 1 mg (1 mg Oral Given 11/4/21 1311)   thiamine tablet 50 mg (50 mg Oral Given 11/4/21 1311)   insulin aspart (NovoLOG) injection  "(RAPID ACTING) (8 Units Subcutaneous Given 11/4/21 5938)         NEW PRESCRIPTIONS STARTED AT TODAY'S ER VISIT  New Prescriptions    No medications on file          =================================================================    HPI    Patient information was obtained from: the patient and his wife    Use of : N/A         Peewee Hess is a 56 year old male with a pertinent history of diabetes mellitus, acute encephalopathy, alcohol dependence, charcot's joint of left foot, hypokalemia, hypomagnesemia, hypertension, tobacco use disorder, and meingioma who presents to this ED via walk in for evaluation of an alcohol problem.     The patient reports that he has been drinking excessively for the past three months. This has caused him to fall many times recently, with him falling three times two days ago (11/2). The patient endorses syncope, left leg pain, left foot swelling and intermittent headache and neck pain. Additionally, he has had a decreased appetite for the last three weeks. He states that his head \"feels funny.\" He notes that he has gone through withdrawal symptoms previously. The patient currently takes tylenol 3 for chronic shoulder pain, baby Asprin, gabapentin and an antidepressant. The patient denies back pain, chest pain, abdominal pain, and any other symptoms or complaints at this time.      REVIEW OF SYSTEMS   Review of Systems   Constitutional: Positive for appetite change.        Positive for intoxicated    Cardiovascular: Negative for chest pain.   Gastrointestinal: Negative for abdominal pain.   Musculoskeletal: Positive for joint swelling (left foot), myalgias (left leg and foot) and neck pain (intermittent). Negative for back pain.   Neurological: Positive for syncope and headaches (intermittent).        Positive for falls, and blacking out due to drinking.    All other systems reviewed and are negative.      PAST MEDICAL HISTORY:  Past Medical History:   Diagnosis Date     " Acute encephalopathy 12/30/2020     Diabetes mellitus (H)        PAST SURGICAL HISTORY:  Past Surgical History:   Procedure Laterality Date     IR MISCELLANEOUS PROCEDURE  4/14/2007           CURRENT MEDICATIONS:    Current Facility-Administered Medications   Medication     haloperidol lactate (HALDOL) injection 2.5-5 mg     LORazepam (ATIVAN) tablet 1-2 mg    Or     LORazepam (ATIVAN) injection 1-2 mg     sodium chloride 0.9% infusion     Current Outpatient Medications   Medication     acetaminophen-codeine (TYLENOL #3) 300-30 MG tablet     aspirin (ASA) 81 MG EC tablet     DULoxetine (CYMBALTA) 60 MG capsule     gabapentin (NEURONTIN) 300 MG capsule     insulin aspart (NOVOLOG FLEXPEN) 100 UNIT/ML pen     magnesium oxide (MAG-OX) 400 (241.3 Mg) MG tablet     multivitamin, therapeutic with minerals (THERA-VIT-M) TABS tablet     Omega-3 Fatty Acids (FISH OIL PO)       ALLERGIES:  No Known Allergies    FAMILY HISTORY:  Family History   Problem Relation Age of Onset     Hyperlipidemia Mother      Cancer Father        SOCIAL HISTORY:   Social History     Socioeconomic History     Marital status:      Spouse name: Not on file     Number of children: Not on file     Years of education: Not on file     Highest education level: Not on file   Occupational History     Not on file   Tobacco Use     Smoking status: Current Every Day Smoker     Packs/day: 0.50     Types: Cigarettes     Smokeless tobacco: Never Used   Substance and Sexual Activity     Alcohol use: Not Currently     Comment: 6 pack daily with 2-4 shots daily. Last drink 10/4     Drug use: Not on file     Sexual activity: Not on file   Other Topics Concern     Not on file   Social History Narrative     Not on file     Social Determinants of Health     Financial Resource Strain:      Difficulty of Paying Living Expenses:    Food Insecurity:      Worried About Running Out of Food in the Last Year:      Ran Out of Food in the Last Year:    Transportation  "Needs:      Lack of Transportation (Medical):      Lack of Transportation (Non-Medical):    Physical Activity:      Days of Exercise per Week:      Minutes of Exercise per Session:    Stress:      Feeling of Stress :    Social Connections:      Frequency of Communication with Friends and Family:      Frequency of Social Gatherings with Friends and Family:      Attends Restoration Services:      Active Member of Clubs or Organizations:      Attends Club or Organization Meetings:      Marital Status:    Intimate Partner Violence:      Fear of Current or Ex-Partner:      Emotionally Abused:      Physically Abused:      Sexually Abused:        VITALS:  /74   Pulse 100   Temp 98  F (36.7  C) (Oral)   Resp 17   Ht 1.854 m (6' 1\")   Wt 77.1 kg (170 lb)   SpO2 96%   BMI 22.43 kg/m      PHYSICAL EXAM    Constitutional: Well developed, well nourished. Weak and chronically ill appearing.   HENT: Normocephalic, atraumatic, mucous membranes moist, nose normal. Neck- Supple, gross ROM intact.   Eyes: Pupils mid-range, conjunctiva without injection, no discharge.   Respiratory: Clear to auscultation bilaterally, no respiratory distress, no wheezing, speaks full sentences easily. No cough.  Cardiovascular: Tachycardic, regular rhythm, no murmurs. No pedal edema, 2+ DP pulses.   GI: Soft, no tenderness to deep palpation in all quadrants, no masses.  Musculoskeletal: Moving all 4 extremities intentionally and without pain. Poor ROM from left shoulder pain. Swelling of left knee and left foot with erythema overlying the mid foot.   Skin: Warm, dry, no rash. Scattered abrasions to bilateral legs.  Neurologic: Alert & oriented x 3, cranial nerves grossly intact. Clinically intoxicated.  Psychiatric: Affect normal, cooperative.       LAB:  All pertinent labs reviewed and interpreted.  Labs Ordered and Resulted from Time of ED Arrival to Time of ED Departure   CBC WITH PLATELETS - Abnormal       Result Value    WBC Count 11.0   "    RBC Count 3.65 (*)     Hemoglobin 12.1 (*)     Hematocrit 34.5 (*)     MCV 95      MCH 33.2 (*)     MCHC 35.1      RDW 14.6      Platelet Count 129 (*)    BASIC METABOLIC PANEL - Abnormal    Sodium 128 (*)     Potassium 3.6      Chloride 84 (*)     Carbon Dioxide (CO2) 32 (*)     Anion Gap 12      Urea Nitrogen 5 (*)     Creatinine 0.83      Calcium 7.8 (*)     Glucose 440 (*)     GFR Estimate >90     HEPATIC FUNCTION PANEL - Abnormal    Bilirubin Total 3.6 (*)     Bilirubin Direct 2.0 (*)     Protein Total 6.1      Albumin 2.1 (*)     Alkaline Phosphatase 431 (*)     AST 91 (*)     ALT 41     INR - Abnormal    INR 1.41 (*)    ETHYL ALCOHOL LEVEL - Abnormal    Alcohol, Blood 144 (*)    GLUCOSE BY METER - Abnormal    GLUCOSE BY METER POCT 308 (*)    LIPASE - Normal    Lipase <9     COVID-19 VIRUS (CORONAVIRUS) BY PCR - Normal    SARS CoV2 PCR Negative     MAGNESIUM - Normal    Magnesium 1.9     PHOSPHORUS - Normal    Phosphorus 2.8     GLUCOSE MONITOR NURSING POCT       RADIOLOGY:  Reviewed all pertinent imaging. Please see official radiology report.  XR Knee Left 1/2 Views   Final Result   IMPRESSION:    The left knee is negative for fracture or compartmental narrowing. No effusion.      Foot XR, G/E 3 views, left   Final Result   IMPRESSION:    Diffuse demineralization. Fracture of the proximal phalanx of the second toe, both at the base and distal margin. The fracture at the base may be more corticated and chronic. Posttraumatic fracture deformity of the calcaneus but the chronicity of this    finding is uncertain.      Head CT w/o contrast   Final Result   IMPRESSION:   1.  No CT evidence for acute intracranial process.   2.  Brain atrophy and presumed chronic microvascular ischemic changes as above.          EKG:    All EKG interpretations will be found in ED course above.      I, Susan Murillo am serving as a scribe to document services personally performed by Dr. Sridhar Birmingham based on my observation and  the provider's statements to me. I, Sridhar Birmingham MD attest that Susan Murillo is acting in a scribe capacity, has observed my performance of the services and has documented them in accordance with my direction.    Sridhar Birmingham M.D.  Emergency Medicine  [facility] EMERGENCY DEPARTMENT  [address] [place] [id number]  [phone]  Dept: [phone]      Sridhar Birmingham MD  11/04/21 6610

## 2021-11-04 NOTE — ED NOTES
Nursing Assessment: Patient presents here for evaluation of injury related to a fall that happened about two days ago in which he passed out and fell. He is complaining of left knee pain. He is unaware if he struck his head. He also reports that he has been drinking for the past 5 months and is seeking resources to stop. His last drink occurred this morning at about 6am. He drinks ryan and [employer] Hard Lemonade. He is answering questions appropriately and appears very lucid. No obvious injuries noted, including abrasions or bruises. Lung sounds are clear, heart tones are normal. Cardiac monitor reveals normal sinus rhythm.

## 2021-11-04 NOTE — PHARMACY-ADMISSION MEDICATION HISTORY
Pharmacy Note - Admission Medication History    Pertinent Provider Information:      ______________________________________________________________________    Prior To Admission (PTA) med list completed and updated in EMR.       Prior to Admission Medications   Prescriptions Last Dose Informant Patient Reported? Taking?   DULoxetine (CYMBALTA) 60 MG capsule 11/4/2021 at am  Yes Yes   Sig: Take 60 mg by mouth daily   Omega-3 Fatty Acids (FISH OIL PO) Past Week at Unknown time  Yes Yes   Sig: Take 1 g by mouth   acetaminophen-codeine (TYLENOL #3) 300-30 MG tablet 11/4/2021 at am  Yes Yes   Sig: TAKE 1 TABLET BY MOUTH TWICE DAILY AS NEEDED   aspirin (ASA) 81 MG EC tablet 11/4/2021 at am  Yes Yes   Sig: Take 81 mg by mouth   gabapentin (NEURONTIN) 300 MG capsule 11/4/2021 at am  Yes Yes   Sig: Take 2 capsules by mouth 2 times daily   insulin aspart (NOVOLOG FLEXPEN) 100 UNIT/ML pen 11/4/2021 at Unknown time  Yes Yes   Sig: Check blood sugar three (3) times daily.  11.9 Type 2 without complications  BD Ultra-fine Magdalena Pen Needles - NDC [address]-11 - dispense 1 case,  refill PRN for 1 year  Accu-chek Guide blood glucose meter - dispense 1  Accu-chek Guide test strips (50 ct. boxes) - dispense 1, refill PRN for 1 year  Accu-chek FastClix lancets (box of 102 ct.) - dispense 1, refill PRN for 1 year   magnesium oxide (MAG-OX) 400 (241.3 Mg) MG tablet 11/3/2021 at pm  Yes Yes   Sig: Take 400 mg by mouth every evening    multivitamin, therapeutic with minerals (THERA-VIT-M) TABS tablet Past Week at Unknown time  Yes Yes   Sig: Take 1 tablet by mouth      Facility-Administered Medications: None     Information source(s): Patient, Family member and CareEverywhere/SureScripts  Method of interview communication: in-person    Summary of Changes to PTA Med List  New:   Discontinued:   Changed:     Patient was asked about OTC/herbal products specifically.  PTA med list reflects this.    In the past week, patient estimated taking  medication this percent of the time:  greater than 90%.    Allergies were reviewed, assessed, and updated with the patient.      Patient did not bring any medications to the hospital and can't retrieve from home. No multi-dose medications are available for use during hospital stay.     The information provided in this note is only as accurate as the sources available at the time of the update(s).    Thank you for the opportunity to participate in the care of this patient.    Amara Coughlin PharmD.  11/4/2021 10:53 AM

## 2021-11-04 NOTE — ED NOTES
Can't get to insulin from accudose machine. Notified pharmacy who is profiling med now, will send from central pharmacy

## 2021-11-04 NOTE — H&P
Admission History and Physical   Peewee Hess,  1965, MRN [id number]    [facility]    PCP: Sarah Canseco, [phone]          Extended Emergency Contact Information  Primary Emergency Contact: Daina Hess  Address: [address] [place] [address] [place]  Home Phone: [phone]  Mobile Phone: [phone]  Relation: Spouse       Assessment and Plan       56M with severe alcohol abuse disorder, hx of severe necrotizing pancreatitis requiring resection, peripheral neuropathy, diabetes who presents with 5 months of excessive alcohol intake, alcohol intoxication, falls, and hyperglycemia.    #EtOH abuse, intoxication and withdrawal  -CIWA with ativan  -gabapentin taper down to target dose 600mg TID (home dose BID)  - consult  -thiamine, folate, MVI    #Alcoholic liver disease, alcoholic hepatitis -   -monitor, no need for steroids at this time.    #Insulin dependent DM -   -home regimen: 70/30 approximately 10-15units.  -inpatient: lantus 10, 1:10 carb exchange, medium sliding scale insulin.     #Falls - Fall precautions.  PT/OT    #L Charcot foot, fracture 2nd toe - podiatry consult.    #Acute toxic encephalopathy - due to alcohol.  Treatment as above.    #old lacunar cerebellar CVA seen on CT- asa      Clinically Significant Risk Factors Present on Admission         #Pseudo Hyponatremia: Na = 128 mmol/L (Ref range: 136 - 145 mmol/L) on admission, will monitor as appropriate     #Coagulapathy of liver disease: INR = 1.41 (Ref range: 0.90 - 1.15) and/or PTT = N/A on admission, will monitor for bleeding  #thrombocytopenia - due to liver disease  #Platelet Defect: home medication list includes an antiplatelet medication        Checklist:  Code Status:   full, confirmed with wife  Diet:   DM  Booth Catheter: Not present  Central Lines: None  DVT px:  Enoxaparin (Lovenox) SQ        Advanced Care Planning  I anticipate the patient will be admitted to the  "hospital for at least 2 midnights for the evaluation and treatment of the conditions discussed above.     Chief Complaint: Falls, alcohol intake     HPI:    Peewee Hess is a 56 year old male with severe alcohol abuse disorder, hx of severe necrotizing pancreatitis requiring resection, peripheral neuropathy, diabetes who presents with 5 months of excessive alcohol intake with multiple blackout episodes, repeating falls, poor oral intake.  Things have gotten bad enough at home that his wife was able to convince to come in for detox and referral to rehab.  After his last admission 1 year ago he remained sober for about 6 months.  He was discharged to TCU and didn't make to CD rehab due to COVID.       In the ER: received ativan x 1.    History is provided by patient and wife       Physical Exam:  Temp:  [97  F (36.1  C)-98  F (36.7  C)] 98  F (36.7  C)  Pulse:  [101-120] 103  Resp:  [15-20] 16  BP: ()/(63-85) 94/64  SpO2:  [92 %-100 %] 95 %  BP 94/64   Pulse 103   Temp 98  F (36.7  C) (Oral)   Resp 16   Ht 1.854 m (6' 1\")   Wt 77.1 kg (170 lb)   SpO2 95%   BMI 22.43 kg/m       General:  Poor self care, chronically ill appearing, no distress. Resting comfortably  Neurologic:  Somnolent after ativan but easily awakened.  Oriented, facial symmetry preserved, fluent speech. Moves all 4 spontaneously  Psych: calm, mood and affect appropriate to situation  HEENT:  Anicteric, MMM, unremarkable dentition  CV: RRR no MRG, normal S1 and S2, no edema  Lungs: CTAB.  Easyrespirations  Abd: soft, NT, normoactive BS  Skin: abrasions, picking.  MSK: charcot L foot with some edema 3rd toe.  Central Lines and Tubes: None (no ling, CVC, feeding tubes)         Pertinent Test Findings  Radiology Results (results reviewed):   Results for orders placed or performed during the hospital encounter of 11/04/21   Head CT w/o contrast    Impression    IMPRESSION:  1.  No CT evidence for acute intracranial process.  2.  Brain " atrophy and presumed chronic microvascular ischemic changes as above.   Foot XR, G/E 3 views, left    Impression    IMPRESSION:   Diffuse demineralization. Fracture of the proximal phalanx of the second toe, both at the base and distal margin. The fracture at the base may be more corticated and chronic. Posttraumatic fracture deformity of the calcaneus but the chronicity of this   finding is uncertain.   XR Knee Left 1/2 Views    Impression    IMPRESSION:   The left knee is negative for fracture or compartmental narrowing. No effusion.       EKG (personally reviewed): sinus tachycardia and no acute ischemic changes      Medical History  Past Medical History:   Diagnosis Date     Acute encephalopathy 12/30/2020     Diabetes mellitus (H)         Surgical History  Past Surgical History:   Procedure Laterality Date     IR MISCELLANEOUS PROCEDURE  4/14/2007          Social History  Social History     Tobacco Use     Smoking status: Current Every Day Smoker     Packs/day: 0.50     Types: Cigarettes     Smokeless tobacco: Never Used   Substance Use Topics     Alcohol use: Not Currently     Comment: 6 pack daily with 2-4 shots daily. Last drink 10/4     Drug use: None          Allergies  No Known Allergies Family History  I have reviewed this patient's family history and updated it with pertinent information if needed.  Family History   Problem Relation Age of Onset     Hyperlipidemia Mother      Cancer Father                Prior to Admission Medications   Prior to Admission Medications   Prescriptions Last Dose Informant Patient Reported? Taking?   DULoxetine (CYMBALTA) 60 MG capsule 11/4/2021 at am  Yes Yes   Sig: Take 60 mg by mouth daily   Omega-3 Fatty Acids (FISH OIL PO) Past Week at Unknown time  Yes Yes   Sig: Take 1 g by mouth   acetaminophen-codeine (TYLENOL #3) 300-30 MG tablet 11/4/2021 at am  Yes Yes   Sig: TAKE 1 TABLET BY MOUTH TWICE DAILY AS NEEDED   aspirin (ASA) 81 MG EC tablet 11/4/2021 at am  Yes Yes    Sig: Take 81 mg by mouth   gabapentin (NEURONTIN) 300 MG capsule 11/4/2021 at am  Yes Yes   Sig: Take 2 capsules by mouth 2 times daily   insulin aspart (NOVOLOG FLEXPEN) 100 UNIT/ML pen 11/4/2021 at Unknown time  Yes Yes   Sig: Check blood sugar three (3) times daily.  11.9 Type 2 without complications  BD Ultra-fine Magdalena Pen Needles - NDC [address]-36 - dispense 1 case,  refill PRN for 1 year  Accu-chek Guide blood glucose meter - dispense 1  Accu-chek Guide test strips (50 ct. boxes) - dispense 1, refill PRN for 1 year  Accu-chek FastClix lancets (box of 102 ct.) - dispense 1, refill PRN for 1 year   magnesium oxide (MAG-OX) 400 (241.3 Mg) MG tablet 11/3/2021 at pm  Yes Yes   Sig: Take 400 mg by mouth every evening    multivitamin, therapeutic with minerals (THERA-VIT-M) TABS tablet Past Week at Unknown time  Yes Yes   Sig: Take 1 tablet by mouth      Facility-Administered Medications: None          Review of Systems:    10point review of systems negative except as listed in HPI      Pertinent Labs  Lab Results: personally reviewed.     Most Recent 3 CBC's:  Recent Labs   Lab Test 11/04/21  1027 10/20/20  0537 10/12/20  0628   WBC 11.0 14.3* 10.5   HGB 12.1* 10.6* 10.7*   MCV 95 104* 105*   * 338 208     Most Recent 3 BMP's:  Recent Labs   Lab Test 11/04/21  1027 10/20/20  0537 10/14/20  1116 10/14/20  0729 10/14/20  0537 10/13/20  0753 10/13/20  0606   * 134*  --   --   --   --  134*   POTASSIUM 3.6 3.5  --   --  4.0   < > 3.6  3.6   CHLORIDE 84* 101  --   --   --   --  101   CO2 32* 28  --   --   --   --  25   BUN 5* 3*  --   --   --   --  6*   CR 0.83 0.59*  --   --   --   --  0.58*   ANIONGAP 12 5  --   --   --   --  8   BECKY 7.8* 7.9*  --   --   --   --  7.5*   * 52* 209*   < >  --    < > 250*    < > = values in this interval not displayed.     Most Recent 2 LFT's:  Recent Labs   Lab Test 11/04/21  1027 10/20/20  0537   AST 91* 48*   ALT 41 37   ALKPHOS 431* 188*   BILITOTAL 3.6* 5.2*      Most Recent 3 INR's:  Recent Labs   Lab Test 11/04/21  1027 10/05/20  1124   INR 1.41* 1.44*     Most Recent 3 Troponin's:No lab results found.    Senait Stanley MD  Internal Medicine Hospitalist  11/4/2021  2:48 PM

## 2021-11-05 NOTE — PLAN OF CARE
Problem: Alcohol Withdrawal  Goal: Alcohol Withdrawal Symptom Control  Outcome: Improving   Pt AxO but forgets sometime. Pt c/o H/A got tylenol X2 and was effective. Pt had an emesis this night was given prn Zofran and was effective. CIWA scoring 3 and 4. Tele rhythm sinus tachy. NS at 100 ml/ Hr.

## 2021-11-05 NOTE — CONSULTS
Consultation - Foot and Ankle/Podiatry  Peewee Hess,  1965, MRN [id number]    Admitting Dx: Hyperglycemia [R73.9]  Falls frequently [R29.6]  Alcoholic intoxication with complication (H) [F10.929]  Closed nondisplaced fracture of phalanx of toe of left foot, unspecified toe, initial encounter [L03.378S]    PCP: Sarah Canseco, [phone]   Code status:  Full Code       Extended Emergency Contact Information  Primary Emergency Contact: Daina Hess  Address: [address] [id number] [facility]  Home Phone: [phone]  Mobile Phone: [phone]  Relation: Spouse       ASSESSMENT   Charcot left foot  Diabetic Foot Ulceration right   Principal Problem:    Alcoholic intoxication with complication (H)  Active Problems:    Alcohol dependence with unspecified alcohol-induced disorder (H)    Fall    Type 2 diabetes mellitus without complication (H)    Hyperglycemia    Falls frequently    Closed nondisplaced fracture of phalanx of toe of left foot, unspecified toe, initial encounter       PLAN   -There is a stable eschar, resolving ulceration dorsal medial right midfoot. No erythema bilateral feet. Mild ecchymosis dorsal left forefoot. I reviewed the patient's x-rays which indicate a mildly displaced intra articular fracture of the medial head and base of the proximal phalanx of the 2nd digit. Old fracture with depression of the calcaneus. WBC 12.3. Afebrile.     -I reviewed the above with the patient today, and based on these findings I do not recommend surgical treatment of either the resolving ulceration or fractures of the 2nd digit left foot.     -We discussed that the ulceration on the right midfoot has a stable eschar and is progressing well; no dressings necessary at this time as there is no open lesion. He is asymptomatic regarding the 2nd digit fractures, will continue to monitor. The calcaneal fracturing does appear to be old fractures associated with charcot process.      -Medical management per hospitalist. I will like to see him for follow-up in 2-3 weeks at the [facility] vascular center for outpatient cares.     Thank you for the consultation. I will sign off at this time. Please contact me with questions.      Forrest Starks DPM  [facility] Podiatry/Foot & Ankle Surgery  On-Call: [phone]  ______________________________________________________________________        Reason For Consult: Alcoholic intoxication with complication (H)  Fracture 2nd digit left foot  Charcot left foot  Diabetic Foot Ulceration left foot        HPI    I have been requested by Dr. Stanley to evaluate Peewee Hess who is a 56 year old year old male for the above. The patient was admitted to [facility] for detoxification. He describes having a sore on his right midfoot over the past year but believes it has resolved. He does not currently follow with a foot specialist. PMH significant for alcoholism, DM2.          Medical History  Past Medical History:   Diagnosis Date     Acute encephalopathy 12/30/2020     Diabetes mellitus (H)      Surgical History  He  has a past surgical history that includes IR Miscellaneous Procedure (4/14/2007).   Social History  Reviewed, and he  reports that he has been smoking cigarettes. He has been smoking about 0.50 packs per day. He has never used smokeless tobacco. He reports previous alcohol use.   Allergies  No Known Allergies Family History  family history includes Cancer in his father; Hyperlipidemia in his mother.  family history not pertinent to presenting problem.    Psychosocial Needs  Social History     Social History Narrative     Not on file     Additional psychosocial needs reviewed per nursing assessment.       Prior to Admission Medications   Medications Prior to Admission   Medication Sig Dispense Refill Last Dose     acetaminophen-codeine (TYLENOL #3) 300-30 MG tablet TAKE 1 TABLET BY MOUTH TWICE DAILY AS NEEDED   11/4/2021 at am      aspirin (ASA) 81 MG EC tablet Take 81 mg by mouth   11/4/2021 at am     DULoxetine (CYMBALTA) 60 MG capsule Take 60 mg by mouth daily   11/4/2021 at am     gabapentin (NEURONTIN) 300 MG capsule Take 2 capsules by mouth 2 times daily   11/4/2021 at am     insulin aspart (NOVOLOG FLEXPEN) 100 UNIT/ML pen Check blood sugar three (3) times daily.  11.9 Type 2 without complications  BD Ultra-fine Magdalena Pen Needles - NDC [address]-09 - dispense 1 case,  refill PRN for 1 year  Accu-chek Guide blood glucose meter - dispense 1  Accu-chek Guide test strips (50 ct. boxes) - dispense 1, refill PRN for 1 year  Accu-chek FastClix lancets (box of 102 ct.) - dispense 1, refill PRN for 1 year   11/4/2021 at Unknown time     magnesium oxide (MAG-OX) 400 (241.3 Mg) MG tablet Take 400 mg by mouth every evening    11/3/2021 at pm     multivitamin, therapeutic with minerals (THERA-VIT-M) TABS tablet Take 1 tablet by mouth   Past Week at Unknown time     Omega-3 Fatty Acids (FISH OIL PO) Take 1 g by mouth   Past Week at Unknown time          Imaging: reviewed images          Review of Systems:  All other systems negative in detail except what is noted in HPI.  Physical Exam:  Temp:  [98  F (36.7  C)-98.5  F (36.9  C)] 98.3  F (36.8  C)  Pulse:  [100-140] 124  Resp:  [15-20] 18  BP: ()/(59-85) 113/69  SpO2:  [92 %-98 %] 94 %    General appearance: Patient is alert and fully cooperative with history & exam.  No sign of distress is noted during the visit.  Psychiatric: Affect is pleasant & appropriate.  Patient appears motivated to improve health.  Respiratory: Breathing is regular & unlabored while sitting.  HEENT: Hearing is intact to spoken word.  Speech is clear.  No gross evidence of visual impairment that would impact ambulation.    Vascular: Dorsalis pedis palpable bilateral.   Dermatologic: Stable eschar, resolving ulceration dorsal medial right midfoot. No erythema bilateral feet. Mild ecchymosis dorsal left forefoot.  "  Neurologic: Diminished to light touch bilateral.   Musculoskeletal: Collapse of medial arch left foot. Contracted digits bilateral.      /69 (BP Location: Left arm, Patient Position: Left side)   Pulse (!) 124   Temp 98.3  F (36.8  C) (Oral)   Resp 18   Ht 1.854 m (6' 1\")   Wt 80.8 kg (178 lb 1.6 oz)   SpO2 94%   BMI 23.50 kg/m      BMI= Body mass index is 23.5 kg/m .    Pertinent Labs    [unfilled]       Recent Labs   Lab 11/05/21  0914 11/04/21  1027   * 128*   CO2 30 32*   BUN 8 5*       Lab Results   Component Value Date    ALT 34 11/05/2021    AST 63 (H) 11/05/2021    ALKPHOS 384 (H) 11/05/2021        No results found for: CRP   [unfilled]     Pertinent Radiology  Radiology Results: Reviewed  Foot XR, G/E 3 views, left    Result Date: 11/4/2021  EXAM: XR FOOT LEFT G/E 3 VIEWS LOCATION: [facility] DATE/TIME: 11/4/2021 11:11 AM INDICATION: Neuropathy, painless swelling and redness to midfoot. COMPARISON: None.     IMPRESSION: Diffuse demineralization. Fracture of the proximal phalanx of the second toe, both at the base and distal margin. The fracture at the base may be more corticated and chronic. Posttraumatic fracture deformity of the calcaneus but the chronicity of this finding is uncertain.    XR Knee Left 1/2 Views    Result Date: 11/4/2021  EXAM: XR KNEE LT 1/2 VW LOCATION: [facility] DATE/TIME: 11/4/2021 11:11 AM INDICATION: Frequent falls, pain, swelling. COMPARISON: None.     IMPRESSION: The left knee is negative for fracture or compartmental narrowing. No effusion.    Head CT w/o contrast    Result Date: 11/4/2021  EXAM: CT HEAD W/O CONTRAST LOCATION: [facility] DATE/TIME: 11/4/2021 11:06 AM INDICATION: Trauma. Head injury. COMPARISON: None. TECHNIQUE: Routine CT Head without IV contrast. Multiplanar reformats. Dose reduction techniques were used. FINDINGS: INTRACRANIAL CONTENTS: No intracranial hemorrhage, " extraaxial collection, or mass effect.  No CT evidence of acute infarct. Mild presumed chronic small vessel ischemic changes. Mild to moderate generalized volume loss. No hydrocephalus. VISUALIZED ORBITS/SINUSES/MASTOIDS: No intraorbital abnormality. Mucus retention cysts in the left sphenoid sinus locule. Complete opacification of the left maxillary sinus. Subtotal opacification of the left frontal sinus. Opacified left frontoethmoidal  recess. No middle ear or mastoid effusion. BONES/SOFT TISSUES: No acute abnormality.     IMPRESSION: 1.  No CT evidence for acute intracranial process. 2.  Brain atrophy and presumed chronic microvascular ischemic changes as above.

## 2021-11-05 NOTE — PROGRESS NOTES
[facility] Hospitalist Progress Note  [facility]  Summary:     56M with severe alcohol abuse disorder, hx of severe necrotizing pancreatitis requiring resection, peripheral neuropathy, diabetes who presents with 5 months of excessive alcohol intake, alcohol intoxication, falls, and hyperglycemia.    Assessment/Plan    #EtOH abuse, intoxication and withdrawal  -CIWA with ativan  -gabapentin taper down to target dose 600mg TID (home dose BID)  - consult   -thiamine, folate, MVI     #Elevated lactate - Very likely due to alcoholic hepatitis and impaired lactate clearance.  No acidosis or anion gap.  Review of labs reveals a chronically elevated lactate.  Nothing for sepsis.  -IVF, zosyn for now and obtain basic infectious w/u.  Stop zosyn if cultures negative and afebrile      #Alcoholic liver disease, alcoholic hepatitis -   -monitor, no need for steroids at this time. GI consult      #Insulin dependent DM - A1c 7.8, running high  -home regimen: 70/30 approximately 10-15units.  -inpatient: lantus 15, 1:10 carb exchange, medium sliding scale insulin.      #Falls - Fall precautions.  PT/OT     #L Charcot foot, fracture 2nd toe; DM foot ulcer - podiatry f/u outpatient in 2-3 weeks.     #Acute toxic encephalopathy - due to alcohol.  Treatment as above.    #hypophos - replacement per protocol    #tachycardia - looks like may have had an atrial tachycardia related to EtOH intake. He was abruptly tachycardic and p-wave morphology was different than when sinus 100s.     -ECHO.  -low dose lopressor if needed     #old lacunar cerebellar CVA seen on CT- asa    Clinically Significant Risk Factors Present on Admission         # Hyponatremia: Na = 128 mmol/L (Ref range: 136 - 145 mmol/L) on admission, will monitor as appropriate     # Coagulation Defect: INR = 1.41 (Ref range: 0.90 - 1.15) and/or PTT = N/A on admission, will monitor for bleeding  # Platelet Defect: home medication list includes an antiplatelet  medication      Checklist:  Code Status: Full Code    Diet: Combination Diet Regular Diet Adult; Consistent Carb 60 Grams CHO per Meal Diet    Ling Catheter: Not present  Central Lines: None  DVT px:  Enoxaparin (Lovenox) SQ        Expected discharge: multiday    Overnight Events/Subjective/Notable results:  Seen twice today.  Once in AM and then again later in AM when lactate returned (within 10 minutes)  Not with significant withdrawal symptoms  BG improving.  Worsening T.bili  Abruputly tachy 130s last night.  P-wave morphology is different.  Asymptomatic.  No CP or SOB.  Emesis overnight    4 point ROS otherwise negative    Objective    Vital signs in last 24 hours  Temp:  [97  F (36.1  C)-98.5  F (36.9  C)] 98  F (36.7  C)  Pulse:  [100-140] 140  Resp:  [15-20] 17  BP: ()/(63-85) 136/63  SpO2:  [92 %-100 %] 98 % O2 Device: None (Room air)    Weight:   178 lbs 1.6 oz    Intake/Output last 3 shifts  I/O last 3 completed shifts:  In: 1291 [I.V.:1291]  Out: 1 [Emesis/NG output:1]  Body mass index is 23.5 kg/m .    Physical Exam     General:  Poor self care, chronically ill appearing, no distress. Resting comfortably  Neurologic:  Somnolent after ativan but easily awakened.  Oriented, facial symmetry preserved, fluent speech. Moves all 4 spontaneously  Psych: calm, mood and affect appropriate to situation  HEENT:  Anicteric, MMM, unremarkable dentition  CV: RRR no MRG, normal S1 and S2, no edema  Lungs: CTAB.  Easyrespirations  Abd: soft, NT, normoactive BS  Skin: abrasions, picking.  MSK: charcot L foot with some edema 3rd toe.  Central Lines and Tubes: None (no ling, CVC, feeding tubes)                         I have reviewed all labs, medications, imaging studies in the last 24 hours.  Pertinent findings&changes discussed above.    Data     Discussed with: podiatry    Senait Stanley MD  Internal Medicine Hospitalist  1:21 PM

## 2021-11-05 NOTE — PROGRESS NOTES
"CLINICAL NUTRITION SERVICES - ASSESSMENT NOTE     Nutrition Prescription    RECOMMENDATIONS FOR MDs/PROVIDERS TO ORDER:  None    Malnutrition Status:    severe    Recommendations already ordered by Registered Dietitian (RD):  Medical food supplement therapy- Glucerna daily  Multivitamin/mineral supplement therapy     Future/Additional Recommendations:  Adjust supplements pending intake/acceptance     REASON FOR ASSESSMENT  Peewee Hess is a/an 56 year old male assessed by the dietitian for Admission Nutrition Risk Screen for positive with weight loss but unsure of how much.     Pt presents with 5 month excessive ETOH intake with ETOH hepatitis, frequent falls with foot fx  Hx DM, severe etoh abuse disorder, severe necrotizing pancreatitis, peripheral neuropathy.    NUTRITION HISTORY  Pt reports eating < 50% past week or 2. Was eating regular 3 average size meals/day prior.   Drinking daily mikes hard lemonade and ryan  Take 1 Ensure shake/day at home    CURRENT NUTRITION ORDERS  Diet: Moderate Consistent Carbohydrate 60 g /meal  Intake/Tolerance:   Pt reports did not tolerate breakfast and had emesis. Was able to tolerate a small amount of MP and chicken for lunch    Pt requesting protein monico    LABS  Labs reviewed  Na 131, improved  MEDICATIONS  Medications reviewed  Ssi, novolog 1u: 10 g cHO, folic acid, lantus daily, mvi, iv abx, neutraphos, thiamine    ANTHROPOMETRICS  Height: 185.4 cm (6' 1\")  Most Recent Weight: 80.8 kg (178 lb 1.6 oz)    IBW: 83.6 kg  BMI: Normal BMI  Weight History:   Wt Readings from Last 10 Encounters:   11/04/21 80.8 kg (178 lb 1.6 oz)   12/30/20 77.1 kg (170 lb)   1013/20 81.6 kg (169 lb 12.8 oz)  No weight loss noted x 10 months. Weight gain d/t drinking    Dosing Weight: 80.8 kg    ASSESSED NUTRITION NEEDS  Estimated Energy Needs: 7150-6177 kcals/day (25 - 30 kcals/kg)  Justification: Maintenance  Estimated Protein Needs: 64-81 grams protein/day (0.8 - 1 grams of " pro/kg)  Justification: Maintenance  Estimated Fluid Needs: 2479-9715 mL/day (25 - 30 mL/kg)   Justification: Maintenance    PHYSICAL FINDINGS  See malnutrition section below.  N/V multiple times overnight and this am.     MALNUTRITION:  % Weight Loss:  None noted  % Intake:  </= 50% for >/= 5 days (severe malnutrition)  Subcutaneous Fat Loss:  Orbital region moderate depletion and Upper arm region moderate depletion  Muscle Loss:  Dorsal hand region moderate depletion and Posterior calf region moderate depletion  Fluid Retention:  None noted  Pt notes decreased strength and muscle loss in legs  No chronic vitamin deficiencies noted    Malnutrition: Patient meets diagnostic criteria for severe protein calorie malnutrition in the context of acute illness as evidenced by  poor nutrient intake, subcutaneous fat loss and muscle loss      NUTRITION DIAGNOSIS  Malnutrition related to acute on chronic illness as evidenced by intake < 50% x 2 weeks, moderate fat and muscle loss      INTERVENTIONS  Implementation  Recommended pt go slow with food and choose easy to digest foods for the next 24 hours  Medical food supplement therapy- Glucerna daily  Multivitamin/mineral supplement therapy     Goals  Intake > 75% of estimated needs     Monitoring/Evaluation  Progress toward goals will be monitored and evaluated per protocol.

## 2021-11-05 NOTE — PLAN OF CARE
Problem: Adult Inpatient Plan of Care  Goal: Optimal Comfort and Wellbeing  Outcome: Improving     Problem: Alcohol Withdrawal  Goal: Alcohol Withdrawal Symptom Control  Outcome: Improving     Problem: Substance Misuse (Alcohol Withdrawal)  Goal: Readiness for Change Identified  Outcome: Improving   Pt alert/ oriented, denies pain. CIWA score 4-5, on tele, sinus tachy. Had several episodes of nausea and vomiting, given zofran and it was effective. Call light within reach, NS infusing at 100 ml/hr.

## 2021-11-05 NOTE — PLAN OF CARE
Patient complained of bilateral shoulder pain this morning. PRN tylenol given. Patient stated pain as better, but still rated pain 7/10. Patient up to the chair with an assist of one with therapy this afternoon. Call light within reach and encouraged patient to use call light for assistance. Bed and chair alarm used for safety. Patient score 2 and then 1 on CIWA during this shift for slight anxiety and numbness/tingling. Patient noted to have lactic of 7.5. MD notified. Patient received total of 1250 ml in boluses during this shift. CXR done. UA sent. Patient received IV zosyn as ordered. Patient remained afebrile during this shift.

## 2021-11-05 NOTE — PROGRESS NOTES
11/05/21 1335   Quick Adds   Type of Visit Initial Occupational Therapy Evaluation       Present no   Living Environment   People in home spouse   Current Living Arrangements house   Living Environment Comments Pt lives in basement level, walk-in shower, pt has walker and cane   Self-Care   Usual Activity Tolerance moderate   Current Activity Tolerance moderate   Disability/Function   Hearing Difficulty or Deaf no   Change in Functional Status Since Onset of Current Illness/Injury yes   General Information   Onset of Illness/Injury or Date of Surgery 11/04/21   Referring Physician Jaden   Patient/Family Therapy Goal Statement (OT) home   Existing Precautions/Restrictions fall   Cognitive Status Examination   Orientation Status person;place;time   Cognitive Status Comments Pt demonstrates decreased safety awareness, decreased judgement, and decreased insight into deficits.   Visual Perception   Visual Impairment/Limitations WFL   Sensory   Sensory Comments Impaired sensation B hands and feet   Posture   Posture Comments B shoulders limited R greater than L   Range of Motion Comprehensive   Comment, General Range of Motion Pt has anti gravity motion but does not take resistance.  Question if he is truly able to follow MMT.   Coordination   Coordination Comments limited FMC B   Bed Mobility   Comment (Bed Mobility) NT   Transfers   Transfer Comments Min A, poor motor planning and decreased safety with mobility.   Balance   Balance Comments CGA/Min A with standing balance   Activities of Daily Living   BADL Assessment   (SBA to don and doff socks)   Clinical Impression   Criteria for Skilled Therapeutic Interventions Met (OT) yes;meets criteria;skilled treatment is necessary   OT Diagnosis Impaired independence with ADLs   OT Problem List-Impairments impacting ADL activity tolerance impaired;balance;cognition;coordination;mobility;range of motion (ROM);sensation;strength   Assessment of  Occupational Performance 5 or more Performance Deficits   Planned Therapy Interventions (OT) ADL retraining;balance training;bed mobility training;cognition;fine motor coordination training;ROM;strengthening;transfer training   Clinical Decision Making Complexity (OT) moderate complexity   Therapy Frequency (OT) Daily   Predicted Duration of Therapy 1 week   Risk & Benefits of therapy have been explained evaluation/treatment results reviewed;care plan/treatment goals reviewed;participants included;patient   Comment-Clinical Impression Pt seen bedside for OT eval and treatment.  Pt demonstrates decreased cognition as well as decreased independence and safety with ADLs and mobility.  Will continue to address daily while inpatient.  Recommend TCU at discharge.  Pt will need 24 hour assist and supervision due to cognitive deficits and high risk for falls.   OT Discharge Planning    OT Discharge Recommendation (DC Rec) Transitional Care Facility;Home with assist;home with home care occupational therapy   OT Rationale for DC Rec Pt is high fall risk.  If pt returns home, he would need 24 hour assist.   Total Evaluation Time (Minutes)   Total Evaluation Time (Minutes) 15

## 2021-11-05 NOTE — PROGRESS NOTES
11/05/21 1300   Quick Adds   Type of Visit Initial PT Evaluation   Living Environment   People in home spouse   Current Living Arrangements house   Home Accessibility stairs within home   Number of Stairs, Within Home, Primary greater than 10 stairs   Stair Railings, Within Home, Primary railings safe and in good condition   Transportation Anticipated family or friend will provide   Living Environment Comments bedroom basement   Self-Care   Usual Activity Tolerance moderate   Current Activity Tolerance poor   Equipment Currently Used at Home walker, rolling   Activity/Exercise/Self-Care Comment states indep ADL's, wife supervises shower   General Information   Onset of Illness/Injury or Date of Surgery 11/04/21   Referring Physician ANAI Stanley MD   Patient/Family Therapy Goals Statement (PT) none stated   Pertinent History of Current Problem (include personal factors and/or comorbidities that impact the POC) admit 2/2 frequent falls, ETOH abuse, fx toe L foot   Existing Precautions/Restrictions fall   General Observations agreeable to PT   Cognition   Orientation Status (Cognition) oriented x 3   Affect/Mental Status (Cognition) WFL   Follows Commands (Cognition) 75-90% accuracy;verbal cues/prompting required;repetition of directions required   Pain Assessment   Patient Currently in Pain No   Range of Motion (ROM)   ROM Comment BLE ROM WFL   Strength   Strength Comments BLE strength 4/5, faatigues quickly   Bed Mobility   Bed Mobility supine-sit   Supine-Sit [place] (Bed Mobility) minimum assist (75% patient effort);verbal cues   Bed Mobility Limitations decreased ability to use arms for pushing/pulling;decreased ability to use legs for bridging/pushing   Impairments Contributing to Impaired Bed Mobility impaired coordination;decreased strength   Assistive Device (Bed Mobility) bed rails   Comment (Bed Mobility) needs sstep by step instruction to complete on his own   Transfers   Transfers sit-stand transfer    Sit-Stand Transfer   Sit-Stand [place] (Transfers) minimum assist (75% patient effort);verbal cues   Assistive Device (Sit-Stand Transfers) walker, front-wheeled   Sit/Stand Transfer Comments cues for hand placement and positioning   Gait/Stairs (Locomotion)   [place] Level (Gait) contact guard;verbal cues   Assistive Device (Gait) walker, front-wheeled   Distance in Feet (Required for LE Total Joints) 8   Pattern (Gait) step-to   Deviations/Abnormal Patterns (Gait) ataxic;base of support, wide;[place] decreased;stride length decreased   Comment (Gait/Stairs) feet do not cleat the floor when taking steps, cues to bring legs closer together and stay closer to the walker   Clinical Impression   Criteria for Skilled Therapeutic Intervention yes, treatment indicated   PT Diagnosis (PT) decreased balance, decreased activity tolerance   Influenced by the following impairments fatigue, decreased balance   Functional limitations due to impairments fatigue, decreased balance   Clinical Presentation Stable/Uncomplicated   Clinical Presentation Rationale presents as medically diagnosed   Clinical Decision Making (Complexity) moderate complexity   Therapy Frequency (PT) Daily   Predicted Duration of Therapy Intervention (days/wks) 7 days   Planned Therapy Interventions (PT) balance training;bed mobility training;gait training;strengthening;transfer training   Anticipated Equipment Needs at Discharge (PT) walker, rolling   Risk & Benefits of therapy have been explained care plan/treatment goals reviewed;patient   PT Discharge Planning    PT Discharge Recommendation (DC Rec) Transitional Care Facility;home with assist;home with home care physical therapy   PT Rationale for DC Rec if home will need 24 hour supervision for all OOB activities as is high risk for falls   PT Brief overview of current status  tolerated PT well   Total Evaluation Time   Total Evaluation Time (Minutes) 10
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
## 2021-11-06 NOTE — PLAN OF CARE
Problem: Adult Inpatient Plan of Care  Goal: Absence of Hospital-Acquired Illness or Injury  Intervention: Identify and Manage Fall Risk  Recent Flowsheet Documentation  Taken 11/5/2021 0173 by Florin Marie RN  Safety Promotion/Fall Prevention:   activity supervised   bed alarm on    Problem: Alcohol Withdrawal  Goal: Alcohol Withdrawal Symptom Control  Outcome: Improving    Problem: Acute Neurologic Deterioration (Alcohol Withdrawal)  Goal: Optimal Neurologic Function  Outcome: Improving    No acute events overnight.  Scores between 2-6 on CIWA for anxiety, mild tremors, mild nausea.  Not high enough to medicate. Did complain of pain in shoulders and legs rated 8/10.  Contacted Dr. Chen, ordered PRN robaxin 500 mg Q4h which patient states is effective.  Sinus tach on telemetry.  IVF NS running at 75 ml/hr

## 2021-11-06 NOTE — PLAN OF CARE
Problem: Alcohol Withdrawal  Goal: Alcohol Withdrawal Symptom Control  Outcome: Improving   Pt denies any pain. Pt is feeling much better this evening after his late dinner.  Pt still on CIWA, scoring 1 for mild anxiety. Tele sinus tachy and NS @ 75 ml.

## 2021-11-06 NOTE — PLAN OF CARE
Patient complained of chronic pain throughout this shift. PRN robaxin given once with some relief reported. Patient up with an assist of one and walker. Call light within reach and encouraged patient to use call light for assistance. Bed and chair alarm on for safety. Patient scored a 3 and then 6 on CIWA. Patient has chronic numbness/tingling. Patient also not aware of current date, but he did know it was November 2021. Patient noted to have mild headache with nausea and dry-heaving this afternoon. PRN zofran given with relief reported. Patient had poor appetite throughout this shift. BG was 84 and 111. Encouraged food and fluids.

## 2021-11-06 NOTE — PROGRESS NOTES
Chart reviewed. 11/05 OT/PT recommendations TCU. Referral made to [facility] team regarding pt's interest in a RULE 25 and CD resources.    Met with patient to introduce care management role, progression of care, and possible services at discharges. Discussed OT/PT recommendations that of TCU. Provided Human TCU list. In agreement of having referrals made; referrals sent to [facility], [facility] and [facility]. Pt feels that spouse is able to provide transport at discharge.

## 2021-11-06 NOTE — CONSULTS
"SW consulted for CD resources. Pt admitted with Alcohol Intoxication.   Sw completed chart review and met with patient in his room to introduce self, SW role and provide resources. Pt lives with his wife, pt disabled at baseline (Has [employer] Medicare plan). Pt states he has been on disability stemming from a fall on stairs where he broke both shoulders. Prior, pt was a nicole.   Pt reports he has been drinking his whole life and drinks most of the day, as early as 6:30am. Pt has no children, reports his wife does not drink, \"one glass of wine every 4 months\"  Pt reports longest period of sobriety was for 9 months ago when he was in rehab for his shoulders. Pt uses a walker at home and able to complete steps with hand rail. Pt reports his goal is \"to stay alive.\" SW discussed treatment and provided resources on obtaining a Rule 25 Assessment/Chemical Dependency assessment ([facility]), pt has a Medicare plan, unsure what coverage may be. Encouraged pt to consider treatment program to promote overall well-being, health and prevent further hospitalizations. Pt's wife is involved and supportive in treatment and recovery.    Sw left contact information in room for wife.     Monica Zaman, HANANE    "

## 2021-11-06 NOTE — PROGRESS NOTES
[facility] DIGESTIVE HEALTH PROGRESS NOTE      Subjective:              Feeling ok today. A little more jittery today. No abd pain. Some reports of nausea/vomiting.      Objective:                Body mass index is 23.5 kg/m .  Vital signs in last 24 hrs;  Temp:  [97.9  F (36.6  C)-98.5  F (36.9  C)] 97.9  F (36.6  C)  Pulse:  [] 100  Resp:  [16-18] 17  BP: ()/(55-81) 108/81  SpO2:  [90 %-97 %] 97 %    Physical Exam:   General: A little more agitated appearing today.   Cardiovascular: Tachycardic.   Chest: Non-labored breathing.   Abdomen: soft, non-tender, non-distended  Neurologic: Alert. Mild postural weakness.     Current Labs:  CMP Results: Recent Labs   Lab Test 11/06/21  0855 11/06/21  0727 11/05/21  2146   NA  --  131*  --    POTASSIUM  --  3.6  --    CHLORIDE  --  95*  --    CO2  --  32*  --    ANIONGAP  --  4*  --    GLC 85 79   < >   BUN  --  8  --    CR  --  0.67*  --    BILITOTAL  --  3.4*  --    ALKPHOS  --  284*  --    ALT  --  28  --    AST  --  61*  --     < > = values in this interval not displayed.      CBC  Recent Labs   Lab 11/06/21  0727 11/05/21  0914 11/04/21  1027   WBC 11.7* 12.3* 11.0   RBC 2.73* 3.48* 3.65*   HGB 9.3* 11.5* 12.1*   HCT 26.3* 33.3* 34.5*   MCV 96 96 95   MCH 34.1* 33.0 33.2*   MCHC 35.4 34.5 35.1   RDW 15.8* 15.4* 14.6   * 124* 129*     INR  Recent Labs   Lab 11/05/21  1148 11/04/21  1027   INR 1.58* 1.41*      Lipase   Date Value Ref Range Status   11/04/2021 <9 0 - 52 U/L Final   10/05/2020 <9 0 - 52 U/L Final       Relevant Imaging:  US with cholelithiasis. CBD 5 mm. Hepatic steatosis.     Assessment:       Khai is a 56-year-old male with history of alcohol abuse who we are consulted on for alcoholic hepatitis.     # Alcoholic hepatitis  # Coagulopathy  # hepatic steatosis  - Labs from admission are consistent with alcoholic hepatitis with AST>ALT. His INR was elevated at 1.41. His discriminate function on admission was 22. His bilirubin 11/5 was up to  7.1. This brings his DF to 25 which is still consistent with mild alcoholic hepatitis.   -It is less likely that his liver lab bump is due to an obstructive process or viral hep.     - Liver labs down a little today.   - Hep B Sag negative. Hep C ab, B core Ab and B Ab pending.   - US showing hepatic steatosis and cholelithiasis    # Alcohol withdrawal.   - On CIWA      Plan:     - Continue supportive care  - Alcohol abstinence is mainstay of treatment, along with good nutrition.   - I will arrange GI clinic follow up in 4-8 weeks on discharge.   - GI will now sign off. Please call with additional questions or concerns.                Wesley Khan, DO  Thank you for the opportunity to participate in the care of this patient.   Please feel free to call me with any questions or concerns.  Phone number [phone].

## 2021-11-06 NOTE — PROGRESS NOTES
[facility]    Medicine Progress Note - Hospitalist Service       Date of Admission:  11/4/2021    Assessment & Plan           Peewee Hess is a 56 year old male admitted on 11/4/2021. He has history of severe alcohol abuse disorder, hx of severe necrotizing pancreatitis requiring resection, peripheral neuropathy, diabetes and presented with 5 months of excessive alcohol intake, alcohol intoxication, falls, and hyperglycemia     #EtOH abuse, intoxication and withdrawal  -CIWA with ativan. Still scoring but hasn't needed any protocol triggered Ativan since 11/4  -gabapentin taper down to target dose 600mg TID (home dose BID)  - consult   -thiamine, folate, MVI     #Lactic acidosis - Very likely due to alcoholic hepatitis and impaired lactate clearance.  No acidosis or anion gap.  Review of labs reveals a chronically elevated lactate.  Nothing for sepsis.  -Was on empiric Zosyn but cultures negative, lactate normalized, abdominal exam benign. Can stop Zosyn     #Alcoholic liver disease, alcoholic steatosis  #Alcoholic hepatitis  -monitor, no need for steroids at this time. GI saw and recommend outpatient followup and alcohol cessation  -Trend liver labs. Thus far improving    #Nausea, vomiting  -Uncertain cause  -Labs all improving, and he states nausea a bit better today though did still throw up this morning- tolerated few bites breakfast afterward though.  -Maybe from withdrawal  -Monitor symptoms  -Further workup may be needed if not improving      #Insulin dependent DM - A1c 7.8  -home regimen: 70/30 approximately 10-15units.  -inpatient: lantus 15, 1:10 carb exchange, medium sliding scale insulin. Controlling BG well     #Falls - Fall precautions.  PT/OT     #L Charcot foot, fracture 2nd toe; DM foot ulcer - podiatry f/u outpatient in 2-3 weeks.     #Acute toxic encephalopathy - due to alcohol.  Treatment as above. Seems to be getting better.     #hypophos - replacement per protocol.  Resolved    #Severe malnutrition- reduced intake and muscle loss noted. RD following and recommend Glucerna supplement.     #tachycardia - looks like may have had an atrial tachycardia related to EtOH intake. He was abruptly tachycardic and p-wave morphology was different than when sinus 100s.     -ECHO ok  -low dose lopressor if needed     #old lacunar cerebellar CVA seen on CT- home asa    #Mood, home Cymbalta       Diet: Combination Diet Regular Diet Adult; Consistent Carb 60 Grams CHO per Meal Diet  Snacks/Supplements Adult: Glucerna; Between Meals    DVT Prophylaxis: Moderate risk. Lovenox   Booth Catheter: Not present  Central Lines: None  Code Status: Full Code      Disposition Plan   Disposition: Home vs TCU in 1-2 days  Discharge barriers: nausea, lab stability  Medically ready to discharge today: No  Estimated discharge date: 11/08/2021     The patient's care was discussed with the Patient and Patient's Family.    Valentine Herrera MD  Hospitalist Service  [facility]  Text page via [employer] Paging/Directory      Clinically Significant Risk Factors Present on Admission           # Severe Malnutrition, POA: based on Reduced intake;Subcutaneous fat loss;Muscle loss (11/05/21 1500)     ____________        Physical Exam   Vital Signs: Temp: 97.9  F (36.6  C) Temp src: Oral BP: 108/81 Pulse: 100   Resp: 17 SpO2: 97 % O2 Device: None (Room air)    Weight: 178 lbs 1.6 oz  General: in no apparent distress, non-toxic and alert male lying in hospital bed oriented x3. Appears fatigued  HEENT: Head normocephalic atraumatic, oral mucosa moist. Sclerae anicteric  CV: Regular rhythm, normal rate, no murmurs  Resp: No wheezes, no rales or rhonchi, no focal consolidations  GI: Belly soft, nondistended, nontender, bowel sounds present  Skin: No rashes or lesions  Extremities: No peripheral edema  Psych: Normal affect, mood euthymic  Neuro: CNII-XII grossly intact, moving all 4 extremities      Data   Recent  Results (from the past 24 hour(s))   Hepatitis B surface antigen    Collection Time: 11/05/21  4:23 PM   Result Value Ref Range    Hepatitis B Surface Antigen Nonreactive Nonreactive   Lactic acid whole blood    Collection Time: 11/05/21  4:23 PM   Result Value Ref Range    Lactic Acid 4.3 (HH) 0.7 - 2.0 mmol/L   Glucose by meter    Collection Time: 11/05/21  4:53 PM   Result Value Ref Range    GLUCOSE BY METER POCT 116 (H) 70 - 99 mg/dL   Glucose by meter    Collection Time: 11/05/21  9:46 PM   Result Value Ref Range    GLUCOSE BY METER POCT 81 70 - 99 mg/dL   Lactic acid whole blood    Collection Time: 11/05/21 10:43 PM   Result Value Ref Range    Lactic Acid 1.3 0.7 - 2.0 mmol/L   CBC with platelets    Collection Time: 11/06/21  7:27 AM   Result Value Ref Range    WBC Count 11.7 (H) 4.0 - 11.0 10e3/uL    RBC Count 2.73 (L) 4.40 - 5.90 10e6/uL    Hemoglobin 9.3 (L) 13.3 - 17.7 g/dL    Hematocrit 26.3 (L) 40.0 - 53.0 %    MCV 96 78 - 100 fL    MCH 34.1 (H) 26.5 - 33.0 pg    MCHC 35.4 31.5 - 36.5 g/dL    RDW 15.8 (H) 10.0 - 15.0 %    Platelet Count 114 (L) 150 - 450 10e3/uL   Comprehensive metabolic panel    Collection Time: 11/06/21  7:27 AM   Result Value Ref Range    Sodium 131 (L) 136 - 145 mmol/L    Potassium 3.6 3.5 - 5.0 mmol/L    Chloride 95 (L) 98 - 107 mmol/L    Carbon Dioxide (CO2) 32 (H) 22 - 31 mmol/L    Anion Gap 4 (L) 5 - 18 mmol/L    Urea Nitrogen 8 8 - 22 mg/dL    Creatinine 0.67 (L) 0.70 - 1.30 mg/dL    Calcium 7.1 (L) 8.5 - 10.5 mg/dL    Glucose 79 70 - 125 mg/dL    Alkaline Phosphatase 284 (H) 45 - 120 U/L    AST 61 (H) 0 - 40 U/L    ALT 28 0 - 45 U/L    Protein Total 4.8 (L) 6.0 - 8.0 g/dL    Albumin 1.7 (L) 3.5 - 5.0 g/dL    Bilirubin Total 3.4 (H) 0.0 - 1.0 mg/dL    GFR Estimate >90 >60 mL/min/1.73m2   Magnesium    Collection Time: 11/06/21  7:27 AM   Result Value Ref Range    Magnesium 1.9 1.8 - 2.6 mg/dL   Phosphorus    Collection Time: 11/06/21  7:27 AM   Result Value Ref Range     Phosphorus 2.8 2.5 - 4.5 mg/dL   Glucose by meter    Collection Time: 11/06/21  8:55 AM   Result Value Ref Range    GLUCOSE BY METER POCT 85 70 - 99 mg/dL   Glucose by meter    Collection Time: 11/06/21  1:48 PM   Result Value Ref Range    GLUCOSE BY METER POCT 111 (H) 70 - 99 mg/dL     ____________  Interval History   Data reviewed today: I reviewed all medications, new labs and imaging results over the last 24 hours. I personally reviewed no images or EKG's today.  patient states doing ok today, feeling better bit by bit. not much appetite, throwing up at times. ate few bites breakfast today. denies abd pain. stooling ok. urine very dark, has not noticed blood. dizzy when stands up, has been falling at home. labs improving. he asked that I call and update his wife Daina and I did call her today.

## 2021-11-07 NOTE — PROGRESS NOTES
[facility]    Medicine Progress Note - Hospitalist Service       Date of Admission:  11/4/2021    Assessment & Plan                      Peewee Hess is a 56 year old male admitted on 11/4/2021. He has history of severe alcohol abuse disorder, hx of severe necrotizing pancreatitis requiring resection, peripheral neuropathy, diabetes and presented with 5 months of excessive alcohol intake, alcohol intoxication, falls, and hyperglycemia     #EtOH abuse, intoxication and withdrawal  #Acute toxic encephalopathy   -CIWA with ativan. Still scoring but hasn't needed any protocol triggered Ativan since 11/4  -gabapentin taper down to target dose 600mg TID (home dose BID)  -SW consult, addiction medicine consult  -thiamine, folate, MVI     #Lactic acidosis - Very likely due to alcoholic hepatitis and impaired lactate clearance.  No acidosis or anion gap.  Review of labs reveals a chronically elevated lactate.  Nothing for sepsis.  -Was on empiric Zosyn but cultures negative, lactate normalized, abdominal exam benign. Can stop Zosyn     #Alcoholic liver disease, alcoholic steatosis  #Alcoholic hepatitis  -monitor, no need for steroids at this time. GI saw and recommend outpatient followup and alcohol cessation  -GI signed off  -- Monitor LFT    #Nausea, vomiting, resolved  -Uncertain cause  -Maybe from withdrawal      #Insulin dependent DM - A1c 7.8  -home regimen: 70/30 approximately 10-15units.  -inpatient: lantus 15, 1:10 carb exchange, medium sliding scale insulin. Controlling BG well     #Falls - Fall precautions.  PT/OT     #L Charcot foot, fracture 2nd toe; DM foot ulcer -   -- Saw podiatry here, no interventions needed. Recommend OP follow up in 2-3 weeks      #hypophos - replacement per protocol. Resolved    #Severe malnutrition- reduced intake and muscle loss noted. RD following and recommend Glucerna supplement.     #tachycardia - looks like may have had an atrial tachycardia related to  EtOH intake. He was abruptly tachycardic and p-wave morphology was different than when sinus 100s.     -ECHO ok  -low dose lopressor if needed     #old lacunar cerebellar CVA seen on CT- home asa    #Mood, home Cymbalta         Diet: Snacks/Supplements Adult: Glucerna; Between Meals  Combination Diet Consistent Carb 60 Grams CHO per Meal Diet    DVT Prophylaxis: Enoxaparin (Lovenox) SQ  Booth Catheter: Not present  Central Lines: None  Code Status: Full Code      Disposition Plan   Expected discharge: 11/08/2021   recommended to transitional care unit once safe disposition plan/ TCU bed available.     The patient's care was discussed with the Patient and Patient's Family.    Sara Atkinson MD  Hospitalist Service  [facility]  Securely message with the [employer]oc[employer] Web Console (learn more here)  Text page via [employer] Paging/Directory        Clinically Significant Risk Factors Present on Admission           # Severe Malnutrition, POA: based on Reduced intake;Subcutaneous fat loss;Muscle loss (11/05/21 1500)     ______________________________________________________________________    Interval History   Scoring low on CIWA. His wife is at the bedside and she is hoping that he can get resources for IP treatment and medication therapy prior to discharge as his previous Rule 25 assessments with OP Tx recs have not been successful and he returns to drinking.    Data reviewed today: I reviewed all medications, new labs and imaging results over the last 24 hours.     Physical Exam   Vital Signs: Temp: 98.5  F (36.9  C) Temp src: Oral BP: 116/84 Pulse: 96   Resp: 20 SpO2: 92 % O2 Device: None (Room air) Oxygen Delivery: 2 LPM  Weight: 178 lbs 1.6 oz  General: Well developed overweight middle aged man, No apparent distress  Head: Normocephalic, atraumatic  Eyes: Pupils equal, round and reactive, Extra-ocular movements intact  Mouth: Mucus membranes moist  Neck: Supple  Cardiovascular: Regular rate and  rhythm, Normal S1, S2  Lungs: Clear to auscultation bilaterally  Abdomen: Soft, Non-tender, not distended, Bowel sounds present  Extremities: No edema, no clubbing  Skin: Warm and well-perfused without lesions.  Neurologic: Alert and oriented. Face is symmetric, Moves all extremities equally      Data   Recent Labs   Lab 11/07/21  1653 11/07/21  1159 11/07/21  0701 11/06/21  0855 11/06/21  0727 11/05/21  1653 11/05/21  1148 11/05/21  1138 11/05/21  0914 11/04/21  1509 11/04/21  1027   WBC  --   --  10.3  --  11.7*  --   --   --  12.3*  --  11.0   HGB  --   --  10.1*  --  9.3*  --   --   --  11.5*  --  12.1*   MCV  --   --  96  --  96  --   --   --  96  --  95   PLT  --   --  134*  --  114*  --   --   --  124*  --  129*   INR  --   --  1.51*  --   --   --  1.58*  --   --   --  1.41*   NA  --   --  131*  --  131*  --   --   --  131*  --  128*   POTASSIUM  --   --  3.7  --  3.6  --   --   --  4.6  --  3.6   CHLORIDE  --   --  95*  --  95*  --   --   --  88*  --  84*   CO2  --   --  31  --  32*  --   --   --  30  --  32*   BUN  --   --  6*  --  8  --   --   --  8  --  5*   CR  --   --  0.65*  --  0.67*  --   --   --  0.84  --  0.83   ANIONGAP  --   --  5  --  4*  --   --   --  13  --  12   BECKY  --   --  7.2*  --  7.1*  --   --   --  7.8*  --  7.8*   GLC 87 89 113   < > 79   < >  --    < > 355*   < > 440*   ALBUMIN  --   --  1.8*  --  1.7*  --   --   --  2.0*  --  2.1*   PROTTOTAL  --   --  5.2*  --  4.8*  --   --   --  5.9*  --  6.1   BILITOTAL  --   --  3.2*  --  3.4*  --   --   --  7.1*  --  3.6*   ALKPHOS  --   --  306*  --  284*  --   --   --  384*  --  431*   ALT  --   --  28  --  28  --   --   --  34  --  41   AST  --   --  58*  --  61*  --   --   --  63*  --  91*   LIPASE  --   --   --   --   --   --   --   --   --   --  <9    < > = values in this interval not displayed.     No results found for this or any previous visit (from the past 24 hour(s)).

## 2021-11-07 NOTE — PLAN OF CARE
Problem: Adult Inpatient Plan of Care  Goal: Plan of Care Review  Outcome: Improving     Problem: Alcohol Withdrawal  Goal: Alcohol Withdrawal Symptom Control  Outcome: Improving     Problem: Adult Inpatient Plan of Care  Goal: Optimal Comfort and Wellbeing  Outcome: Improving     VSS. CIWA scoring 2. Pt is pleasant and cooperative. Small emesis this AM, PRN zofran.

## 2021-11-07 NOTE — PLAN OF CARE
Problem: Alcohol Withdrawal  Goal: Alcohol Withdrawal Symptom Control  Outcome: Improving     Problem: Substance Misuse (Alcohol Withdrawal)  Goal: Readiness for Change Identified  Outcome: Improving   Pt is still on CIWA scoring 2 from mild headache and tactile disturbances. Pt had no nausea this shift. Pt's pain and headache was managed with prn Robaxin and was effective.

## 2021-11-07 NOTE — PLAN OF CARE
Patient complained of chronic shoulder pain and headache this morning. Robaxin given with some relief reported. Patient denied headache this afternoon. Patient is unsteady on his feet. Patient needs an assist of one and walker. Call light within reach and encouraged patient to use call light for assistance. Bed and chair alarm used for safety. Patient scored 5 and then 0 on CIWA. Patient scored for headache, anxiety, numbness/tingling. Sx resolved this afternoon.

## 2021-11-08 NOTE — PLAN OF CARE
Patient denied pain until bed time when he complained of a headache.  Robaxin given with relief, patient sleeping on re-assessment.  Scoring a 1 on CIWA for mild anxiety.  Ate 50% of dinner.  Sinus tach on tele.  Assist x 1 with walker, call light within reach.

## 2021-11-08 NOTE — PLAN OF CARE
"  Problem: Alcohol Withdrawal  Goal: Alcohol Withdrawal Symptom Control  11/8/2021 1641 by Sandhya Donohue, RN  Outcome: Improving  11/8/2021 1639 by Sandhya Donohue, RN  Outcome: Improving     Problem: Diabetes Comorbidity  Goal: Blood Glucose Level Within Targeted Range  Outcome: Improving   Pt was pleasant this am. He c/o pain in his BUE shoulders. He denied pain intervention.    CIWA's were performed twice so far this shift and at noon the pt was sleeping so unable to perform. He has scored 2-8 this shift.    At 1045 the pt stated that he had to go to the bathroom and this writer ambulated the pt to the toilet with a gait belt. He was shaky and slightly unsteady. While he was sitting on the toilet he became very diaphoretic and pale in color. This writer along with CNA got the pt back to bed and checked the pt's sugar at 1055. The BS at that time was 38.  Pt was alert and conscious and was given 30 grams CHO orange juice per his request. He declined the glucose tablets.  He then came up to 39 and another 30 grams CHO was given and the blood sugar was 56 at 1125. He started to get his color back and ordered lunch. He ate 50% of a burger, but guzzled 480 ml of apple juice.  He sugar was rechecked at 1144 and at that time his sugar was 126.  He was back to baseline ad stated he felt so much better.  MD was paged, Dr. Perkins and he stated that he was going to put the Lantus on hold and discontinue the carb count ratio with meals.    Blood sugar was rechecked at 1400 and at that time it was 125.    Pt had a large loose BM this shift.  IV fluids were started and are running at 75 ml/hr through PIV in the left wrist, which is P/I.    Pt did have some nausea and a high CIWA this am that required him to have Zofran and ativan. This was helpful and the pt was able to sleep and the \"bugs\" he was seeing went away.    Pt on mag, potassium and phos protocol. All were within limits except the phos which " was 2.4 and PO intervention was ordered and will recheck after tomorrow am dose the following am 11/11/2021.    Pt was also seen by addiction specialist and started on Campral 666 mg TID. First dose was given.    Will cont to monitor this pt and alert the MD of any hypoglycemia or status changes.

## 2021-11-08 NOTE — PLAN OF CARE
Problem: Adult Inpatient Plan of Care  Goal: Plan of Care Review  Outcome: Improving     Problem: Alcohol Withdrawal  Goal: Alcohol Withdrawal Symptom Control  Outcome: Improving     VSS. Pt states slight headache here and there. No nausea overnight. CIWA protocol continues. Pt is pleasant.

## 2021-11-08 NOTE — CONSULTS
"Addiction Medicine Inpatient Consultation      Peewee Hess,  1965, MRN [id number]    Hospital Locations: [facility] [R73.9]  Falls frequently [R29.6]  Alcoholic intoxication with complication (H) [F10929]  Closed nondisplaced fracture of phalanx of toe of left foot, unspecified toe, initial encounter [N41.805D]    PCP: Sarah Canseco, [phone]   Code status:  Full Code       Extended Emergency Contact Information  Primary Emergency Contact: Daina Hess  Address: [address] [id number] [facility]  Home Phone: [phone]  Mobile Phone: [phone]  Relation: Spouse       Date of Admission: 21  Date of Consult: 2021    Tele-Visit Details    Type of service:  Video Visit    Time Service  Began (time 1st connected with pt): 0955    Time Service Ended (time completely finished with pt): 1021    Originating Location (pt. Location): Pt room     Distant Location (provider location): [facility]    Reason for Televisit: COVID 19    Mode of Communication:  Video Conference via [employer]    Physician has received verbal consent for a video visit from the patient? Yes    _________________________________________________________    Attestation:     Case reviewed and patient seen by video with Dr. Shelby Velasco.  Agree with Hx, Diagnosis and recommendations as outlined below.        Sheri Mclain MD  Addiction Medicine Service  [facility]   Page me (click here for Meliza Mclain)         ___________________________________________________________      Reason for Consultation: \"alcohol withdrawal\"         ASSESSMENT and RECOMMENDATIONS:   #alcohol use disorder, recurrent, severe  #cannabis use, unspecified  #major depressive disorder     Peewee Hess is a 56 year old male who currently meets diagnostic criteria for alcohol use disorder, severe. He is expresses some ambivalence towards engaging in treatment for this " "though is open to hearing options and starting medication. He appears to be in the preparatory stage of change regarding his alcohol use and the precontemplative stage of change regarding his cannabis use. As outlined below, will begin medication assistance today and refer to our clinic for additional follow-up and CD assessment.        Patient has elevated bbilirubin and evidence of hepatic steatosis       No significant withdrawal symptoms       Also depression is significant, and may be worsened by his alcohol use.         Wife is supportive of him getting treatment.      Plan:    1. Start acamprosate 666 mg tid   2. Referral made to [facility] addiction clinic for follow-up and chemical assessment  3. Additional treatment recommendations following completion of chemical assessment and/or Rule 25; residential vs outpatient treamtnet options   - given comorbid depression, would likely benefit from a dual diagnosis program              Admission Status:  Voluntary    Code Status:  CODE STATUS: Full Code    HPI:    Peewee Hess is a 56 year old old male with a history of alcohol use, cannabis use, severe necrotizing pancreatitis requiring resection, peripheral neuropathy, diabetes, and fatty infiltration of the liver.  He was admitted 10/2020 for jaundice and found to have hepatic steatosis at that time.  However, his bilirubing normalized during that admission.   It is unclear whether the pancreatitis was related to alcohol use, but likely.      History is provided by the patient and review of the electronic medical record.    Today he states he has been \"self medicating\" with alcohol and marijuana for ~1 year; states these are helpful with his pain and stress though have negatively impacted his mood \"well it helps and then it makes it worse.\" Currently smoking Cannabis daily and drinking ~14 standard drinks/day, has been drinking daily since October 2020 exact amount \"fluctuates\" though current amount is less " "than prior as his wife expressed concerns about his drinking. He is more vague about his cannabis frequency and adds that this is under the direction of his primary care physician who \"can't wait for it to be legal\".  He has fallen multiple times this week which led up to his admission to the hospital.      Will crave alcohol and experience withdrawal between drinks; these withdrawal symptoms including shaking, sweating, dizziness WITHOUT any history of seizure or hallucinations. He also notices that he is needed more alcohol to have the same effect as before. Longest period of sobriety was when doing shoulder rehab following workplace injury (this was 9 months in duration, during 2020).     A typical day for him is \"just sitting in the basement\" will play some videogames, watch HOSTEX. Says it has been depressing being out of work, going from a physical job to not leaving his house \"it's basically like being on house arrest.\" He says he \"might\" be interested in participating in a treatment program for alcohol use though expresses frustration with \"all the red tape\" and logistical barriers to this. He is also open to starting a medication for alcohol cravings and we discussed the pros/cons of various options ultimately recommending acamprosate, which he is agreeable with.     Patient's SANDY was 199 on presentation, Total Bili was 3.6 and Albumin was 2.1,  Lipase was WNL. He also had hypokalemia and hypomagnesemia.     Since admission, his CIWA scores have been0 to 3.  He was given 1 mg lorazepam this am, but more for agitation, rather than elevated  CIWA.      ETOH:  Type and method of use: Method of use: drinking  First use: >20 years ago  Last use: 11/4/21  Amount/frequency: 6 light beers + 1/3 litre of braydon / day (~14 standard drinks/day)   Previous pharmacotherapy: YES/NO: no   Hx of complicated withdrawal: none -- endorses history of \"DTs\" which he describes as shakes, sweating, dizziness. Denies seizures or " "hallucinations.     Opioids:  Type and method of use: tylenol with codeine, vicodin (has prescriptions)  First use: not asked   Last use: 11/8/21     Cannabis: endorses daily use of cannabis \"as much as I need\" for pain management, amount fluctuates daily    Denies any other current recreational or pharmacologic substance use.     Treatment Hx:  States he has participated in treatment programs for alcohol use >20 years ago (1 residential, 1 outpatient program)    DSM 5 Criteria for Substance Use Disorder alcohol cannabis   Tolerance yes    Withdrawal yes    Larger/longer yes yes   Cut down/control yes    Time     Interfering with activities yes yes   Use despite harm yes    Role obligations     Hazardous situations     Cravings yes    Social or Interpersonal problems yes      Total                                 Severe =  > 6 criteria    Past Medical History:    PAST MEDICAL HISTORY:   Past Medical History:   Diagnosis Date     Acute encephalopathy 12/30/2020     Diabetes mellitus (H)      Necrotizing pancreatitis with resection.     Sudural hematoma in past.      Psychiatric History:  PSYCH HISTORY: history of depression, worsening, on cymbalta. Current symptoms low mood, low energy, anhedonia. Saw therapist many years ago, none lately. Denies SI/SIB.     Family History:   Substance Use History:  None per chart review  Mental illness: none per chart review.       Social History:  SOCIAL HISTORY: Lives with wife, niece, and niece's boyfriend + 3 dogs. Supportive family though unideal living situation as he hopes to move out of niece's home. Not working >1 year, on disability since shoulder injury, had previously worked as a nicole.     PTA Meds:  Medications Prior to Admission   Medication Sig Dispense Refill Last Dose     acetaminophen-codeine (TYLENOL #3) 300-30 MG tablet TAKE 1 TABLET BY MOUTH TWICE DAILY AS NEEDED   11/4/2021 at am     aspirin (ASA) 81 MG EC tablet Take 81 mg by mouth   11/4/2021 at am     " DULoxetine (CYMBALTA) 60 MG capsule Take 60 mg by mouth daily   11/4/2021 at am     gabapentin (NEURONTIN) 300 MG capsule Take 2 capsules by mouth 2 times daily   11/4/2021 at am     insulin aspart (NOVOLOG FLEXPEN) 100 UNIT/ML pen Check blood sugar three (3) times daily.  11.9 Type 2 without complications  BD Ultra-fine Magdalena Pen Needles - NDC [address]-45 - dispense 1 case,  refill PRN for 1 year  Accu-chek Guide blood glucose meter - dispense 1  Accu-chek Guide test strips (50 ct. boxes) - dispense 1, refill PRN for 1 year  Accu-chek FastClix lancets (box of 102 ct.) - dispense 1, refill PRN for 1 year   11/4/2021 at Unknown time     magnesium oxide (MAG-OX) 400 (241.3 Mg) MG tablet Take 400 mg by mouth every evening    11/3/2021 at pm     multivitamin, therapeutic with minerals (THERA-VIT-M) TABS tablet Take 1 tablet by mouth   Past Week at Unknown time     Omega-3 Fatty Acids (FISH OIL PO) Take 1 g by mouth   Past Week at Unknown time               Allergies:  No Known Allergies    [place] Prescription Monitoring Program:  Links to  and Non-Opioid SmartSet:     Filled vicodin rx in October 2021.     Review of Systems:    Constitutional               No fever. Recent decreased appetite.   Vision and Hearing:     Within normal limits   Respiratory:              No cough or shortness or breath.    Cardiovascular   No chest pain at rest or with exertion.      Gastrointestinal    No nausea, vomiting, diarrhea, or constipation.    Urologic   Denies dysuria or change in frequency.  Neurologic   Denies headache, tremor, hx of seizure or focal weakness.     Psychiatric   Denies suicidal ideation, plan or intent.  Denies homicidal ideaton or hallucinations  Rheumatologic   Endorses multiple sources of pain, including shoulders  Hematologic   Denies epistaxis or easy bruising.   Dermatalogic   No piloerection or diaphoresis.  No rash or itching.            Physical Exam:  /74   Pulse 99   Temp 97.8  F (36.6  " C) (Oral)   Resp 19   Ht 1.854 m (6' 1\")   Wt 80.6 kg (177 lb 9.6 oz)   SpO2 93%   BMI 23.43 kg/m      Consult conducted over video.    Physical exam from yesterday showed Lungs, CV and abdomen - WNL    .General appearance   Appears stated age   Dermatologic              No piloerection or diaphoresis  Neurologic   Orientation:person, place, time and situation   No Tremor   Psychiatric Mental Status Examination     Semi- Cooperative     Mood:  \"shitty\"                Affect:  blunted                Thought content:  No SI, HI or hallucinations                Thought processes:  Linear                Speech:  Normal                Motor:  Normal                Insight/judgement:  fair/ fair    Pertinent Labs, Radiology, and EKG:  Results for orders placed or performed during the hospital encounter of 11/04/21 (from the past 24 hour(s))   Glucose by meter   Result Value Ref Range    GLUCOSE BY METER POCT 89 70 - 99 mg/dL   Lactic Acid STAT   Result Value Ref Range    Lactic Acid 1.7 0.7 - 2.0 mmol/L   Glucose by meter   Result Value Ref Range    GLUCOSE BY METER POCT 87 70 - 99 mg/dL   Potassium   Result Value Ref Range    Potassium 3.7 3.5 - 5.0 mmol/L   Magnesium   Result Value Ref Range    Magnesium 1.6 (L) 1.8 - 2.6 mg/dL   Phosphorus   Result Value Ref Range    Phosphorus 2.4 (L) 2.5 - 4.5 mg/dL   Hepatic panel   Result Value Ref Range    Bilirubin Total 2.8 (H) 0.0 - 1.0 mg/dL    Bilirubin Direct 1.7 (H) <=0.5 mg/dL    Protein Total 5.1 (L) 6.0 - 8.0 g/dL    Albumin 1.7 (L) 3.5 - 5.0 g/dL    Alkaline Phosphatase 275 (H) 45 - 120 U/L    AST 54 (H) 0 - 40 U/L    ALT 25 0 - 45 U/L   Extra Tube    Narrative    The following orders were created for panel order Extra Tube.  Procedure                               Abnormality         Status                     ---------                               -----------         ------                     Extra Purple Top Tube[[id number]]                            In " process                   Please view results for these tests on the individual orders.   Glucose by meter   Result Value Ref Range    GLUCOSE BY METER POCT 78 70 - 99 mg/dL

## 2021-11-08 NOTE — PROGRESS NOTES
[facility]    PROGRESS NOTE - Hospitalist Service    Assessment and Plan  Patient is new to me, Khai Hess is 56 years old male with a past medical history of severe alcohol abuse disorder, hx of severe necrotizing pancreatitis requiring resection, peripheral neuropathy, diabetes and presented with 5 months of excessive alcohol intake, alcohol intoxication, falls, and hyperglycemia, he was admitted with    Acute alcohol desiccation/withdrawal  - Started on CIWA protocol  - Still scores high requires Ativan.  - Addiction medicine following, appreciate input  - Start IV fluid support as patient has oral intake.    Acute encephalopathy  - Metabolic, secondary to alcohol withdrawal  - Improving  - Continue supportive care  - PT/OT evaluation    Lactic acidosis  - Secondary to above  - Improving  - No sign or symptoms of sepsis  - Initially started on Zosyn empirically  - Discontinue Zosyn with negative cultures    Tachycardia  - Secondary to the above  - Start low-dose metoprolol  - Patient had low oral intake  - Echo shows EF of 65% otherwise unremarkable  - Start IV fluid     Nausea and vomiting  - Secondary to alcohol withdrawal  - Had episode of vomiting this morning  - Start IV fluid continue with Zofran  - As needed    Diabetes mellitus type 2  - Patient had multiple runs of hypoglycemia this morning  - Last hemoglobin A1c 7.8  - Hold Lantus and carb counting  - Cover with insulin sliding scale  - If persistent hypoglycemia will change IV fluid to D5    Acute alcoholic hepatitis  - Second alcohol dependence  - GI consult appreciate input  - No need for steroid at this point  - Continue to monitor LFTs    Hyponatremia  - Second alcohol abuse  - Start gentle IV hydration  - Continue to monitor sodium level    Left Charcot joint with left second toe fracture  - Podiatry consult, appreciate input  - No intervention needed  - Follow-up with podiatry as outpatient 2 to 3 weeks    Severe  malnutrition  - Protein and calories  - Dietitian consult  - Continue supplement    History of cerebellar CVA  - Resume home aspirin    Depression/anxiety  - Continue Cymbalta    Significant weakness and deconditioning  -Secondary to above  - PT/OT evaluation      Principal Problem:    Alcoholic intoxication with complication (H)  Active Problems:    Alcohol dependence with unspecified alcohol-induced disorder (H)    Fall    Type 2 diabetes mellitus without complication (H)    Hyperglycemia    Falls frequently    Closed nondisplaced fracture of phalanx of toe of left foot, unspecified toe, initial encounter      VTE prophylaxis:  Pneumatic Compression Devices  DIET: Orders Placed This Encounter      Combination Diet Consistent Carb 60 Grams CHO per Meal Diet      Disposition/Barriers to discharge: Alcohol withdrawal, hypoglycemia  Code Status: Full Code    Subjective:  Khai is feeling worse today, had vomiting this morning.  Blood sugars in the low side.  Still shaky.    PHYSICAL EXAM  Vitals:    11/04/21 0829 11/04/21 1644 11/08/21 0832   Weight: 77.1 kg (170 lb) 80.8 kg (178 lb 1.6 oz) 80.6 kg (177 lb 9.6 oz)     B/P:126/82 T:98.3 P:95 R:17     Intake/Output Summary (Last 24 hours) at 11/8/2021 1204  Last data filed at 11/8/2021 0800  Gross per 24 hour   Intake 723 ml   Output 800 ml   Net -77 ml      Body mass index is 23.43 kg/m .    Constitutional: awake, alert, cooperative, no apparent distress, and appears stated age  Eyes: Lids and lashes normal, pupils equal, round and reactive to light, extra ocular muscles intact, sclera clear, conjunctiva normal  ENT: Normocephalic, without obvious abnormality, atraumatic, sinuses nontender on palpation, external ears without lesions, oral pharynx with moist mucous membranes, tonsils without erythema or exudates, gums normal and good dentition.  Respiratory: No increased work of breathing, good air exchange, clear to auscultation bilaterally, no crackles or  wheezing  Cardiovascular: Tachycardia, normal S1 and S2, no S3 or S4, and no murmur noted  GI: No scars, normal bowel sounds, soft, non-distended, non-tender, no masses palpated, no hepatosplenomegally  Skin: no bruising or bleeding and normal skin color, texture, turgor  Musculoskeletal: There is no redness, warmth, or swelling of the joints.  Full range of motion noted.  no lower extremity pitting edema present  Neurologic: Awake, alert, oriented to name, place and time.  Bilateral hand tremors.  Neuropsychiatric: Appropriate with examiner      PERTINENT LABS/IMAGING:  Recent Labs   Lab 11/08/21  1144 11/08/21  1125 11/08/21  1116 11/08/21  0751 11/08/21  0636 11/07/21  1159 11/07/21  0701 11/06/21  0855 11/06/21  0727 11/05/21  1653 11/05/21  1148 11/05/21  1138 11/05/21  0914 11/04/21  1509 11/04/21  1027   WBC  --   --   --   --   --   --  10.3  --  11.7*  --   --   --  12.3*  --  11.0   HGB  --   --   --   --   --   --  10.1*  --  9.3*  --   --   --  11.5*  --  12.1*   MCV  --   --   --   --   --   --  96  --  96  --   --   --  96  --  95   PLT  --   --   --   --   --   --  134*  --  114*  --   --   --  124*  --  129*   INR  --   --   --   --   --   --  1.51*  --   --   --  1.58*  --   --   --  1.41*   NA  --   --   --   --   --   --  131*  --  131*  --   --   --  131*  --  128*   POTASSIUM  --   --   --   --  3.7  --  3.7  --  3.6  --   --   --  4.6  --  3.6   CHLORIDE  --   --   --   --   --   --  95*  --  95*  --   --   --  88*  --  84*   CO2  --   --   --   --   --   --  31  --  32*  --   --   --  30  --  32*   BUN  --   --   --   --   --   --  6*  --  8  --   --   --  8  --  5*   CR  --   --   --   --   --   --  0.65*  --  0.67*  --   --   --  0.84  --  0.83   ANIONGAP  --   --   --   --   --   --  5  --  4*  --   --   --  13  --  12   BECKY  --   --   --   --   --   --  7.2*  --  7.1*  --   --   --  7.8*  --  7.8*   GLC 77 56* 45*   < >  --    < > 113   < > 79   < >  --    < > 355*   < > 440*   ALBUMIN  --    --   --   --  1.7*  --  1.8*  --  1.7*  --   --   --  2.0*  --  2.1*   PROTTOTAL  --   --   --   --  5.1*  --  5.2*  --  4.8*  --   --   --  5.9*  --  6.1   BILITOTAL  --   --   --   --  2.8*  --  3.2*  --  3.4*  --   --   --  7.1*  --  3.6*   ALKPHOS  --   --   --   --  275*  --  306*  --  284*  --   --   --  384*  --  431*   ALT  --   --   --   --  25  --  28  --  28  --   --   --  34  --  41   AST  --   --   --   --  54*  --  58*  --  61*  --   --   --  63*  --  91*   LIPASE  --   --   --   --   --   --   --   --   --   --   --   --   --   --  <9    < > = values in this interval not displayed.     No results found for this or any previous visit (from the past 24 hour(s)).    Discussed with patient, family, GI, nursing staff and discharge planner    Johnnie Perkins MD  [facility]  [phone]

## 2021-11-09 NOTE — PROGRESS NOTES
"CLINICAL NUTRITION SERVICES - REASSESSMENT NOTE     Nutrition Prescription    RECOMMENDATIONS FOR MDs/PROVIDERS TO ORDER:  Recommend discontinuing or lifting CHO restriction d/t hypoglycemia and minimal hyperglycemia. Current CHO limit also does not meet estimated kcal needs at 5803-6968 kcal/day. Would recommend at least CHO of 75 g/meal or more.     Malnutrition Status:    Severe    Recommendations already ordered by Registered Dietitian (RD):  Change Glucerna to Ensure enlive and increased to tid    Future/Additional Recommendations:  Adjust supplements pending intake/BG/acceptance     EVALUATION OF THE PROGRESS TOWARD GOALS   Diet: Moderate Consistent Carbohydrate, 60 g /meal  Supplement: Glucerna daily  Intake: Variable, 25-75% of meals. Usually breakfast and lunch </= 50% and supper at 75%  Noted ate 25% of breakfast and had emesis today  Good fluid intake, 1840 ml/day     Estimate intake 750-780 kcal, 13-26 g protein/day    Started on D5NaCl 50 ml/hr = 1200 ml, 60 g cho, 204 kcal/day    Pt meeting < 50% of estimated needs x 5 days hospitalized + < 50% x 2 weeks PTA    Pt reports doing \" a little better\". He had emesis after bites of eggs at breakfast and lunch. He reports fruit and Glucerna going down the best. He agreed to increase supplements. He reports he is taking the glucerna, however 2 unopened on bedside table  C/o not getting much sleep.      NEW FINDINGS   Continues in ETOH withdrawl    ANTHROPOMETRICS  Height: 185.4 cm (6' 1\")  Most Recent Weight: 80.6 kg (177 lb 9.6 oz)  11/8. Down 1 lb from 4 days prior  IBW: 83.6 kg  BMI: Normal BMI  Weight History:       Wt Readings from Last 10 Encounters:   11/04/21 80.8 kg (178 lb 1.6 oz)   12/30/20 77.1 kg (170 lb)   1013/20           81.6 kg (169 lb 12.8 oz)  No weight loss noted x 10 months. Weight gain d/t drinking  Dosing Weight: 80.8 kg     ASSESSED NUTRITION NEEDS  Estimated Energy Needs: 5053-1381 kcals/day (25 - 30 kcals/kg)  Justification: " Maintenance  Estimated Protein Needs: 64-81 grams protein/day (0.8 - 1 grams of pro/kg)  Justification: Maintenance  Estimated Fluid Needs: 5359-1594 mL/day (25 - 30 mL/kg)   Justification: Maintenance    PHYSICAL FINDINGS  See malnutrition section below.    GI CONCERNS  Loose stools, 2-4/day  Emesis this am and per pt, at lunch    LABS  Reviewed  Na 131, low and stable  Mag 1.6, decreasing- being replaced  Phos 2.4, decreasing- being replaced  LFTs improving  BG  mg/dl past 48 hours. Having daily episodes of hypoglycemia. At 1055 yesterday and 0345 overnight    MEDICATIONS  Reviewed  Folic acid, ssi, magox bid, mvi with minerals  Kphos replacement yesterday    MALNUTRITION:  % Weight Loss:  None noted  % Intake:  </= 50% for >/= 5 days (severe malnutrition)  Subcutaneous Fat Loss:  Orbital region moderate depletion and Upper arm region moderate depletion  Muscle Loss:  Dorsal hand region moderate depletion and Posterior calf region moderate depletion  Fluid Retention:  None noted  Pt notes decreased strength and muscle loss in legs  No chronic vitamin deficiencies noted     Malnutrition: Patient meets diagnostic criteria for severe protein calorie malnutrition in the context of acute illness as evidenced by  poor nutrient intake, subcutaneous fat loss and muscle loss    Previous Goals   Intake > 75% of estimated needs  Evaluation: Not met    Previous Nutrition Diagnosis  Malnutrition related to acute on chronic illness as evidenced by intake < 50% x 2 weeks, moderate fat and muscle loss    Evaluation: No change    CURRENT NUTRITION DIAGNOSIS  Malnutrition related to acute on chronic illness as evidenced by intake < 50% x 2 weeks, moderate fat and muscle loss      INTERVENTIONS  Implementation  Recommend discontinuing or lifting CHO restriction d/t hypoglycemia and minimal hyperglycemia. Current CHO limit also does not meet estimated kcal needs at 9766-6944 kcal/day. Would recommend at least CHO of 75 g/meal  or more.     Will change Glucerna to Ensure Enlive to help prevent hypoglycemia and increase nutrition intake, TID between meals = 1080 kcal, 60 g protein total.     Goals  Intake > 75% of estimated needs  BG     Monitoring/Evaluation  Progress toward goals will be monitored and evaluated per protocol.

## 2021-11-09 NOTE — SIGNIFICANT EVENT
Chart reviewed. 02.08.16 - LOV w/Dr. Charles Olea. 04.19.17 - Pending Endo f/u appt w/Dr. Tyson Levi. Rx e-prescribed. Pt had a fall approximately at 0008.  Pt had been attempting to sort the blankets in the bed and moved himself to the edge of the bed, slipping down.  Staff arrived immediately after bed alarm went off.  Pt denied hitting his head, said he landed on his right knee.  Denies pain, ROM intact to RLE.  Vitals intact.  MD updated.

## 2021-11-09 NOTE — PLAN OF CARE
Problem: Diabetes Comorbidity  Goal: Blood Glucose Level Within Targeted Range  Outcome: No Change   BG spot checked at 0345- 40.  Pt felt clammy and tired at this time otherwise denied Sx.  Juice w/ sugar packets and a snack given with recheck 59.  More juice provided with recheck 80.  MD updated and IV D5 initiated.      Problem: Alcohol Withdrawal  Goal: Alcohol Withdrawal Symptom Control  Outcome: Improving   CIWAs 1, 2 for slight tremor and nausea.  Pt tolerating PO intake well.  Tele NSR.

## 2021-11-09 NOTE — PROGRESS NOTES
Care Management Follow Up    Length of Stay (days): 5    Expected Discharge Date: 11/10/2021     Concerns to be Addressed:     Medical mgmt  Patient plan of care discussed at interdisciplinary rounds: Yes    Anticipated Discharge Disposition:  TCU     Anticipated Discharge Services:  Therapy   Anticipated Discharge DME:      Patient/family educated on Medicare website which has current facility and service quality ratings:  yes  Education Provided on the Discharge Plan:  yes  Patient/Family in Agreement with the Plan:  Yes, pt and spouse agreeable to TCU    Referrals Placed by CM/SW:  To [facility]  Private pay costs discussed: Not applicable    Additional Information:  BI spoke with spouse Daina to discuss discharge plan. Updated Daina on bed limitations in community. Referrals out to area around home. CD resources previously provided. Pt/spouse hope pt will go to CD treatment after he is physically stronger. Plan to [facility] with family transport once bed is available.     HANANE Heredia

## 2021-11-09 NOTE — PROGRESS NOTES
[facility]    PROGRESS NOTE - Hospitalist Service    Assessment and Plan    56 years old male with a past medical history of severe alcohol abuse disorder, hx of severe necrotizing pancreatitis requiring resection, peripheral neuropathy, diabetes and presented with 5 months of excessive alcohol intake, alcohol intoxication, falls, and hyperglycemia, he was admitted with     Acute alcohol desiccation/withdrawal  - Started on CIWA protocol  - Initially scores high requires Ativan.  Now improving  - Addiction medicine following, appreciate input  - Start IV fluid support as patient has oral intake.  - Change IV fluid to dextrose because of hypoglycemia     Acute encephalopathy  - Metabolic, secondary to alcohol withdrawal  - Improving  - Continue supportive care  - PT/OT evaluation     Lactic acidosis  - Secondary to above  - Improving  - No sign or symptoms of sepsis  - Initially started on Zosyn empirically  - Discontinue Zosyn with negative cultures     Tachycardia  - Secondary to the above  - Start low-dose metoprolol  - Patient had low oral intake  - Echo shows EF of 65% otherwise unremarkable  - Start IV fluid for hypoglycemia     Nausea and vomiting  - Secondary to alcohol withdrawal  - Had episode of vomiting this morning  - Start IV fluid continue with Zofran  - As needed     Diabetes mellitus type 2  - Patient had multiple runs of hypoglycemia this morning  - Last hemoglobin A1c 7.8  - Hold Lantus and carb counting  - Cover with insulin sliding scale  - will change IV fluid to D5, may discontinue dextrose if blood sugar is above 200     Acute alcoholic hepatitis  - Second alcohol dependence  - GI consult appreciate input  - No need for steroid at this point  - Continue to monitor LFTs     Hyponatremia  - Second alcohol abuse  - Start gentle IV hydration  - Continue to monitor sodium level     Left Charcot joint with left second toe fracture  - Podiatry consult, appreciate input  - No  intervention needed  - Follow-up with podiatry as outpatient 2 to 3 weeks     Severe malnutrition  - Protein and calories  - Dietitian consult  - Continue supplement     History of cerebellar CVA  - Resume home aspirin     Depression/anxiety  - Continue Cymbalta     Significant weakness and deconditioning  -Secondary to above  - PT/OT evaluation  - Patient needs TCU on discharge    Principal Problem:    Alcoholic intoxication with complication (H)  Active Problems:    Alcohol dependence with unspecified alcohol-induced disorder (H)    Fall    Type 2 diabetes mellitus without complication (H)    Hyperglycemia    Falls frequently    Closed nondisplaced fracture of phalanx of toe of left foot, unspecified toe, initial encounter      VTE prophylaxis:  Pneumatic Compression Devices  DIET: Orders Placed This Encounter      Combination Diet Consistent Carb 60 Grams CHO per Meal Diet      Disposition/Barriers to discharge: TCU placement, hypoglycemia  Code Status: Full Code    Subjective:  Peewee is feeling much better today, had hypoglycemic episode this morning with shakiness.  Resolved after oral intake    PHYSICAL EXAM  Vitals:    11/04/21 0829 11/04/21 1644 11/08/21 0832   Weight: 77.1 kg (170 lb) 80.8 kg (178 lb 1.6 oz) 80.6 kg (177 lb 9.6 oz)     B/P:118/71 T:98.2 P:90 R:17     Intake/Output Summary (Last 24 hours) at 11/9/2021 1633  Last data filed at 11/9/2021 1535  Gross per 24 hour   Intake 2766 ml   Output 1360 ml   Net 1406 ml      Body mass index is 23.43 kg/m .    Constitutional: awake, alert, cooperative, no apparent distress, and appears stated age  Eyes: Lids and lashes normal, pupils equal, round and reactive to light, extra ocular muscles intact, sclera clear, conjunctiva normal  ENT: Normocephalic, without obvious abnormality, atraumatic, sinuses nontender on palpation, external ears without lesions, oral pharynx with moist mucous membranes, tonsils without erythema or exudates, gums normal and good  dentition.  Respiratory: No increased work of breathing, good air exchange, clear to auscultation bilaterally, no crackles or wheezing  Cardiovascular: Normal apical impulse, regular rate and rhythm, normal S1 and S2, no S3 or S4, and no murmur noted  GI: No scars, normal bowel sounds, soft, non-distended, non-tender, no masses palpated, no hepatosplenomegally  Skin: no bruising or bleeding and normal skin color, texture, turgor  Musculoskeletal: There is no redness, warmth, or swelling of the joints.  Full range of motion noted.  no lower extremity pitting edema present  Neurologic: Bilateral tremors.    Neuropsychiatric: Appropriate with examiner      PERTINENT LABS/IMAGING:  Recent Labs   Lab 11/09/21  1202 11/09/21  0856 11/09/21  0712 11/08/21  0751 11/08/21  0636 11/07/21  0846 11/07/21  0701 11/06/21  0855 11/06/21  0727 11/05/21  1653 11/05/21  1148 11/05/21  1138 11/05/21  0914 11/04/21  1509 11/04/21  1027   WBC  --   --   --   --   --   --  10.3  --  11.7*  --   --   --  12.3*  --  11.0   HGB  --   --   --   --   --   --  10.1*  --  9.3*  --   --   --  11.5*  --  12.1*   MCV  --   --   --   --   --   --  96  --  96  --   --   --  96  --  95   PLT  --   --   --   --   --   --  134*  --  114*  --   --   --  124*  --  129*   INR  --   --   --   --   --   --  1.51*  --   --   --  1.58*  --   --   --  1.41*   NA  --   --  131*  --   --   --  131*  --  131*  --   --   --  131*  --  128*   POTASSIUM  --   --  4.1  --  3.7  --  3.7  --  3.6  --   --   --  4.6  --  3.6   CHLORIDE  --   --  98  --   --   --  95*  --  95*  --   --   --  88*  --  84*   CO2  --   --  28  --   --   --  31  --  32*  --   --   --  30  --  32*   BUN  --   --  2*  --   --   --  6*  --  8  --   --   --  8  --  5*   CR  --   --  0.60*  --   --   --  0.65*  --  0.67*  --   --   --  0.84  --  0.83   ANIONGAP  --   --  5  --   --   --  5  --  4*  --   --   --  13  --  12   BECKY  --   --  7.4*  --   --   --  7.2*  --  7.1*  --   --   --  7.8*  --   7.8*   * 176* 179*   < >  --    < > 113   < > 79   < >  --    < > 355*   < > 440*   ALBUMIN  --   --  1.7*  --  1.7*  --  1.8*  --  1.7*  --   --   --  2.0*  --  2.1*   PROTTOTAL  --   --  4.9*  --  5.1*  --  5.2*  --  4.8*  --   --   --  5.9*  --  6.1   BILITOTAL  --   --  2.3*  --  2.8*  --  3.2*  --  3.4*  --   --   --  7.1*  --  3.6*   ALKPHOS  --   --  252*  --  275*  --  306*  --  284*  --   --   --  384*  --  431*   ALT  --   --  24  --  25  --  28  --  28  --   --   --  34  --  41   AST  --   --  48*  --  54*  --  58*  --  61*  --   --   --  63*  --  91*   LIPASE  --   --   --   --   --   --   --   --   --   --   --   --   --   --  <9    < > = values in this interval not displayed.     No results found for this or any previous visit (from the past 24 hour(s)).    Discussed with patient, family, nursing staff and discharge planner    Johnnie Perkins MD  [facility] Medicine Service  [phone]

## 2021-11-10 NOTE — PROGRESS NOTES
"Spiritual Assessment:     Frustrated and restless during this visit; reports that he is not sleeping well at night    Anxious/concerned about the future    Open to options for TCU    Support from wife    Care Provided:   Empathic listening and presence  Helped patient in processing of emotions  Normalized/validated his feelings of frustration, disappointment and discouragement  Encouraged being patient with himself    Full Spiritual Care Note: Met with Harlan due to staff referral and LOS. He states that today is going badly because he found out he is not going to discharge today. He is disappointed by this, as he is anxious to get some sleep in a comfortable bed. He states that there is a lot of \"construction noise\" all through the night. He was physically restless during our conversation, shifting his weight and trying to find a comfortable position. Harlan expressed frustration with being in a bed with a bed alarm on. He also voiced irritation that \"I'm not able to make any decisions for myself.... my wife won't tell me what's going on.\" When asked, he states that he trusts that his wife loves him and cares enough to make decisions for him. He states that he would like to go to a facility in [place], but says he will accept whatever is open to him. Harlan says \"I've been down this road before.\" When asked for clarification, he expresses some feelings of concern/anxiety that he doesn't know how this is going to end. I encouraged him to be patient with himself as he is going through this. Harlan welcomed my offer of lavender oil.     Plan of Care: Will remain available for further support as patient/family needs/desires.    Fatou Atkinson M.Div.  Staff   [phone]    "

## 2021-11-10 NOTE — PLAN OF CARE
Problem: Alcohol Withdrawal  Goal: Alcohol Withdrawal Symptom Control  Outcome: Improving   CIWA done. No medications given per protocol.       Problem: Diabetes Comorbidity  Goal: Blood Glucose Level Within Targeted Range  Outcome: Improving   Blood sugar high at bedtime. House officer notified. Orders to discontinue D5NS and check blood sugar in a couple hours.     Tiffanie Galan RN

## 2021-11-10 NOTE — PLAN OF CARE
Problem: Alcohol Withdrawal  Goal: Alcohol Withdrawal Symptom Control  Outcome: Improving     Problem: Diabetes Comorbidity  Goal: Blood Glucose Level Within Targeted Range  Outcome: Improving   Pt pleasant and cooperative most of this shift.  He has used his call light appropriately and has had no falls, after fall on nocs last night. All alarms are on and audible.    Pain- pt still c/o pain in his BUE primarily in his shoulders.  He states that it always hurts and at home he would use alcohol to help with the pain of his injury one year ago.  He stated also that he has been drinking since he was 15 years old.    Blood sugars today on this shift have been below 200 he was 176(breakfast),128 (lunch), and 184 (dinner). Only s/s insulin was used thi shift. Was instructed to call the MD if BS was greater than 200.    Pt cont on the D5 0.9% Ns running at 50 ml/hr cont, sodium this am was 131    Mag level this am was 1.6 again and spoke with MD to see if we could discontinue the mag protocol as it never changes bc we are not treating it every time. MD agreed and ordered for the pt to receive Mag Oxide  mg. He also dc'd the phos and K+ protocol this am as well.    Pt's PIV accidentally was pulled out as the pt was sleeping and a new PIV was placed in his left forearm and is P/I.    CIWA's this shift have been 1-3 and pt is doing much better today, no hallucinations or auditory disturbances.    SW has been working on TCU for the pt and been in contact with the his wife Daina to coordinate the POC.    VSS pt on tele and has been NSR    Cont to be cont/incontinent with bowels at times.  He used the urinal several times this shift and has put out greater that 700 ml of margi urine.    Will cont to monitor this pt and alert the MD of any status changes.

## 2021-11-10 NOTE — PROGRESS NOTES
Addiction Medicine Progress Note  11/10/2021      Tele-Visit Details     Type of service:  Video Visit  Video Start Time (time video started):  0937  Video End Time (time video stopped):  0947  Originating Location (pt. Location):  [facility], patient's room  Distant Location (provider location): writer onsite at [facility]   Reason for Televisit: COVID 19   Mode of Communication:  [facility]  Physician has received verbal consent for a video visit from the patient? Yes      Assessment/Plan    Principal Problem:    Alcoholic intoxication with complication (H)  Active Problems:    Alcohol dependence with unspecified alcohol-induced disorder (H)    Fall    Type 2 diabetes mellitus without complication (H)    Hyperglycemia    Falls frequently    Closed nondisplaced fracture of phalanx of toe of left foot, unspecified toe, initial encounter    Alcohol use disorder, severe, dependence (H)    Started on acamprosate 666 mg 3 times daily and has no known side effects thus far.  Renal function has remained within normal limits and this may be continued at discharge.  He is no longer in alcohol withdrawal.  I have discontinued CIWA and associated medications.  An order has been placed in the discharge navigator for patient to follow-up at the [facility] outpatient addiction medicine clinic.  Discussed with patient today the importance of considering returning to alcoholics anonymous or another type of outpatient psychosocial treatment since he has had minimal success with finding a treatment facility in that takes his insurance.  Patient is agreeable to this plan in addition to discharging to a TCU.    Addiction medicine will sign off at this time.  If there are any furtherconcerns or questions, please do not hesitate to contact the addiction medicine team through [facility].      Subjective  Patient reports that he is hopeful about going to the transitional care unit since he had 9 months of sobriety  "last year following a TCU stay.  He is highly motivated to not return to drinking and is willing to continue taking acamprosate.  He denies any known side effects to the medication over the last couple of days.  He has been to AA in the past and is willing to try going to that since he has difficulty finding a CD treatment that will take his insurance.    ROS:    No nausea or vomiting  Denies tremor    Objective    Vital signs in last 24 hours  Temp:  [97.6  F (36.4  C)-98.9  F (37.2  C)] 98.9  F (37.2  C)  Pulse:  [87-97] 94  Resp:  [16-20] 18  BP: (114-142)/(64-79) 115/64  SpO2:  [91 %-96 %] 94 %        Physical Exam    Notes of previous providers and nursing staff reviewed for the purpose of the this telehealth visit.    Gen: Awake and alert. In no acute distress. Pleasant and cooperative  Neuro: speech is normal  Tremor: none  Eyes: EOMI. There is no scleral icterus.  Skin: no jaundice, diaphoresis, goose bumps  Respiratory: no cough, breathing is non-labored    Psychiatric Mental Status Examination:  Orientation: person, place, date, time  Appearance: The patient appears stated age, appropriately dressed.   Reliability:  appears to be an adequate historian.    Behavior: makes good eye contact, cooperative and engaged in the interview.   There is no evidence of responding to hallucinations or flashbacks.  Speech: spontaneous and coherent, with a normal rate, rhythm and tone.  Associations: connected, intact.  Language:There are no difficulties with expressive or receptive language as observed throughout the interview.    Mood: Described as \" down\"    Affect:  Slightly blunted   Judgement: Able to make basic decision regarding safety.  Insight: Good, intact.   Thought process: Logical   Thought content: No evidence of delusions or paranoia.    Fund of knowledge: Average, intact.   Attention / Concentration: Able to remain focused during the interview with minimal distractibility or need for redirection.  Short " Term Memory: Intact  Long Term Memory: Intact  Cognitive Function: Intact      Pertinent Labs   Lab Results: I have personally reviewed the labs.      Marilou Glover MD  Addiction Medicine

## 2021-11-10 NOTE — PROGRESS NOTES
[facility]    PROGRESS NOTE - Hospitalist Service    Assessment and Plan    56 years old male with a past medical history of severe alcohol abuse disorder, hx of severe necrotizing pancreatitis requiring resection, peripheral neuropathy, diabetes and presented with 5 months of excessive alcohol intake, alcohol intoxication, falls, and hyperglycemia, he was admitted with     Acute alcohol desiccation/withdrawal  - Started on CIWA protocol  - Initially scores high requires Ativan.  Now improving  - Addiction medicine following, appreciate input  - Start IV fluid support as patient has oral intake.  - Change IV fluid to dextrose because of hypoglycemia  - Discontinue IV fluid and 11/10/2021 as blood sugar is stable     Acute encephalopathy  - Metabolic, secondary to alcohol withdrawal  - Improving  - Continue supportive care  - PT/OT evaluation     Lactic acidosis  - Secondary to above  - Improving  - No sign or symptoms of sepsis  - Initially started on Zosyn empirically  - Discontinue Zosyn with negative cultures     Tachycardia  - Secondary to the above  - Started on low-dose metoprolol  - Patient had low oral intake  - Echo shows EF of 65% otherwise unremarkable  - Improving on IV fluid, discontinue IV fluid     Nausea and vomiting  - Secondary to alcohol withdrawal  - Had episode of vomiting this morning  - Start IV fluid continue with Zofran  - As needed     Diabetes mellitus type 2  - Patient had multiple runs of hypoglycemia this morning  - Last hemoglobin A1c 7.8  - Held Lantus and carb counting  - Cover with insulin sliding scale  -Improving with D5,   - Discontinue IV fluid and start low-dose Lantus at lower dose  - Continue to monitor blood glucose     Acute alcoholic hepatitis  - Second alcohol dependence  - GI consult appreciate input  - No need for steroid at this point  - Continue to monitor LFTs     Chronic hyponatremia  - Second alcohol abuse  - Stable, sodium level between  129-131  - Continue to monitor sodium level  - Discontinue IV fluid     Left Charcot joint with left second toe fracture  - Podiatry consult, appreciate input  - No intervention needed  - Follow-up with podiatry as outpatient 2 to 3 weeks     Severe malnutrition  - Protein and calories  - Dietitian consult  - Continue supplement     History of cerebellar CVA  - Resume home aspirin     Depression/anxiety  - Continue Cymbalta     Significant weakness and deconditioning  -Secondary to above  - PT/OT evaluation  - Patient needs TCU on discharge, pending acceptance       Principal Problem:    Alcoholic intoxication with complication (H)  Active Problems:    Alcohol dependence with unspecified alcohol-induced disorder (H)    Fall    Type 2 diabetes mellitus without complication (H)    Hyperglycemia    Falls frequently    Closed nondisplaced fracture of phalanx of toe of left foot, unspecified toe, initial encounter    Alcohol use disorder, severe, dependence (H)      VTE prophylaxis:  Pneumatic Compression Devices  DIET: Orders Placed This Encounter      Combination Diet Consistent Carb 60 Grams CHO per Meal Diet      Disposition/Barriers to discharge: TCU placement  Code Status: Full Code    Subjective:  Peewee is feeling much better today, still blood sugar since yesterday . no fever chills overnight.  Tolerating oral diet.    PHYSICAL EXAM  Vitals:    11/04/21 1644 11/08/21 0832 11/09/21 1924   Weight: 80.8 kg (178 lb 1.6 oz) 80.6 kg (177 lb 9.6 oz) 84.1 kg (185 lb 4.8 oz)     B/P:113/68 T:99 P:92 R:18     Intake/Output Summary (Last 24 hours) at 11/10/2021 1330  Last data filed at 11/10/2021 1300  Gross per 24 hour   Intake 966 ml   Output 750 ml   Net 216 ml      Body mass index is 24.45 kg/m .    Constitutional: awake, alert, cooperative, no apparent distress, and appears stated age  Eyes: Lids and lashes normal, pupils equal, round and reactive to light, extra ocular muscles intact, sclera clear, conjunctiva  normal  ENT: Normocephalic, without obvious abnormality, atraumatic, sinuses nontender on palpation, external ears without lesions, oral pharynx with moist mucous membranes, tonsils without erythema or exudates, gums normal and good dentition.  Respiratory: No increased work of breathing, good air exchange, clear to auscultation bilaterally, no crackles or wheezing  Cardiovascular: Normal apical impulse, regular rate and rhythm, normal S1 and S2, no S3 or S4, and no murmur noted  GI: No scars, normal bowel sounds, soft, non-distended, non-tender, no masses palpated, no hepatosplenomegally  Skin: no bruising or bleeding and normal skin color, texture, turgor  Musculoskeletal: There is no redness, warmth, or swelling of the joints.  Full range of motion noted.  no lower extremity pitting edema present  Neurologic: Awake, alert, oriented to name, place and time.  Cranial nerves II-XII are grossly intact.  Motor is 5 out of 5 bilaterally.   Sensory is intact.    Neuropsychiatric: Appropriate with examiner      PERTINENT LABS/IMAGING:  Recent Labs   Lab 11/10/21  1128 11/10/21  0748 11/10/21  0732 11/09/21  0856 11/09/21  0712 11/08/21  0751 11/08/21  0636 11/07/21  0846 11/07/21  0701 11/06/21  0855 11/06/21  0727 11/05/21  1653 11/05/21  1148 11/05/21  1138 11/05/21  0914 11/04/21  1509 11/04/21  1027   WBC  --   --   --   --   --   --   --   --  10.3  --  11.7*  --   --   --  12.3*  --  11.0   HGB  --   --   --   --   --   --   --   --  10.1*  --  9.3*  --   --   --  11.5*  --  12.1*   MCV  --   --   --   --   --   --   --   --  96  --  96  --   --   --  96  --  95   PLT  --   --  244  --   --   --   --   --  134*  --  114*  --   --   --  124*  --  129*   INR  --   --   --   --   --   --   --   --  1.51*  --   --   --  1.58*  --   --   --  1.41*   NA  --   --  129*  --  131*  --   --   --  131*  --  131*  --   --   --  131*  --  128*   POTASSIUM  --   --  3.9  --  4.1  --  3.7  --  3.7  --  3.6  --   --   --  4.6  --   3.6   CHLORIDE  --   --  96*  --  98  --   --   --  95*  --  95*  --   --   --  88*  --  84*   CO2  --   --  26  --  28  --   --   --  31  --  32*  --   --   --  30  --  32*   BUN  --   --  2*  --  2*  --   --   --  6*  --  8  --   --   --  8  --  5*   CR  --   --  0.62*  --  0.60*  --   --   --  0.65*  --  0.67*  --   --   --  0.84  --  0.83   ANIONGAP  --   --  7  --  5  --   --   --  5  --  4*  --   --   --  13  --  12   BECKY  --   --  7.6*  --  7.4*  --   --   --  7.2*  --  7.1*  --   --   --  7.8*  --  7.8*   * 272* 266*   < > 179*   < >  --    < > 113   < > 79   < >  --    < > 355*   < > 440*   ALBUMIN  --   --  1.9*  --  1.7*  --  1.7*  --  1.8*  --  1.7*  --   --   --  2.0*  --  2.1*   PROTTOTAL  --   --  5.5*  --  4.9*  --  5.1*  --  5.2*  --  4.8*  --   --   --  5.9*  --  6.1   BILITOTAL  --   --  2.7*  --  2.3*  --  2.8*  --  3.2*  --  3.4*  --   --   --  7.1*  --  3.6*   ALKPHOS  --   --  258*  --  252*  --  275*  --  306*  --  284*  --   --   --  384*  --  431*   ALT  --   --  21  --  24  --  25  --  28  --  28  --   --   --  34  --  41   AST  --   --  39  --  48*  --  54*  --  58*  --  61*  --   --   --  63*  --  91*   LIPASE  --   --   --   --   --   --   --   --   --   --   --   --   --   --   --   --  <9    < > = values in this interval not displayed.     No results found for this or any previous visit (from the past 24 hour(s)).    Discussed with patient, family, addiction medicine, nursing staff and discharge planner    Johnnie Perkins MD  [facility] Medicine Service  [phone]

## 2021-11-10 NOTE — PLAN OF CARE
Problem: Alcohol Withdrawal  Goal: Alcohol Withdrawal Symptom Control  Outcome: Improving   Peewee scored a 1 at midnight for mild/intermittent nausea and a 2 at 04 for a mild headache.  Problem: Diabetes Comorbidity  Goal: Blood Glucose Level Within Targeted Range  Outcome: No Change   His glucose was high at 9 & 11pm but was rechecked at 02 due to his previous drop at 04.  It was still high at 255.

## 2021-11-10 NOTE — PROGRESS NOTES
Care Management Follow Up    Length of Stay (days): 6    Expected Discharge Date: 11/11/2021     Concerns to be Addressed:     Medical mgmt  Patient plan of care discussed at interdisciplinary rounds: Yes    Anticipated Discharge Disposition:  TCU     Anticipated Discharge Services:  Therapy   Anticipated Discharge DME:    Referrals Placed by CM/BI:  To [facility] [facility]Us  Private pay costs discussed: Not applicable    Additional Information:  Left additional VMs for facilities and sent new referrals to [facility].     Spoke [facility], needed full referral as regular admissions lady is not there. Sent. Spoke with Ami; referral sent to [facility] to get insurance auth if bed is available. Plan to [facility] with family transport once bed is available and insurance auth provided.  CM following.    HANANE Heredia

## 2021-11-11 NOTE — PLAN OF CARE
Problem: Adult Inpatient Plan of Care  Goal: Absence of Hospital-Acquired Illness or Injury  Intervention: Prevent and Manage VTE (Venous Thromboembolism) Risk  Recent Flowsheet Documentation  Taken 11/10/2021 0817 by Sandhya Donohue RN  VTE Prevention/Management: anticoagulant therapy maintained   Pt cooperative with all cares and treatments this shift. He stated that his pain was tolerable and denied any intervention. He used the TV to distract him.    Pt very upset this am that he was not going to leave. He say she likes us here, but that it is very loud and he cannot sleep, with doors slamming and opening nonstop.      Pt up and much more steady on his feet today with walker. Ambulating to the bathroom with gait belt and SBA.   CIWA scores have been 0-1 for the past 24 hours, and those were dc'd    Tele NSR, VSS    Blood sugars have been up this past 24 hours and MD aware and ordered for the Lantus to be restarted at 5 units this evening.  He is back on his carb count as well and eating fairly well. He states that he gets full very easy.  Also, per MD order, did the diabetic skills education and the pt as well as wife were well educated and versed in how to deal with hyper and hypo glycemia. They were both able to verbalize how to treat and manage both. The wife stated that the pt doesn't always do a good job of managing either at times especially when he is drinking.    PIV is saline locked    Will cont to monitor this pt and alert the MD of any status changes.

## 2021-11-11 NOTE — PROGRESS NOTES
"CLINICAL NUTRITION SERVICES - REASSESSMENT NOTE     Nutrition Prescription    RECOMMENDATIONS FOR MDs/PROVIDERS TO ORDER:  None    Malnutrition Status:    Severe    Recommendations already ordered by Registered Dietitian (RD):  Change Ensure to Glucerna tid = 660 kcal, 30 g protein    Future/Additional Recommendations:  Adjust supplements pending intake/BG/acceptance     EVALUATION OF THE PROGRESS TOWARD GOALS   Diet: Moderate Consistent Carbohydrate, 60 g /meal  Supplement: Ensure Enlive tid = 1080 kcal, 60 g protein    Intake: Improved yesterday; 75% of lunch, 25% of supper with estimated intake 789 kcal, 40 g protein. Ordered 3 rd meal at 705 kcal, 33 g protein but intake not documented.   Ate 0-25% day prior.   Supplement intake not documented but pt reports taking    Pt meeting < 50% of estimated needs x 6 days hospitalized + < 50% x 2 weeks PTA       NEW FINDINGS   Continues in ETOH withdrawl    ANTHROPOMETRICS  Height: 185.4 cm (6' 1\")  Most Recent Weight: 80.6 kg (177 lb 9.6 oz)  11/8. Down 1 lb from 4 days prior  IBW: 83.6 kg  BMI: Normal BMI  Weight History:       Wt Readings from Last 10 Encounters:   11/04/21 80.8 kg (178 lb 1.6 oz)   12/30/20 77.1 kg (170 lb)   1013/20           81.6 kg (169 lb 12.8 oz)  No weight loss noted x 10 months. Weight gain d/t drinking  Dosing Weight: 80.8 kg     ASSESSED NUTRITION NEEDS  Estimated Energy Needs: 7987-7569 kcals/day (25 - 30 kcals/kg)  Justification: Maintenance  Estimated Protein Needs: 64-81 grams protein/day (0.8 - 1 grams of pro/kg)  Justification: Maintenance  Estimated Fluid Needs: 8405-5633 mL/day (25 - 30 mL/kg)   Justification: Maintenance    PHYSICAL FINDINGS  See malnutrition section below.    GI CONCERNS  Loose stools, 2 yesterday  Early satiety    LABS  Reviewed 11/10  Na 129, decreased  LFTs improving  -272 mg/dl past 24 hours. No more episodes of hypoglycemia, now elevated    MEDICATIONS  Reviewed  Folic acid, ssi, magox bid, mvi with " minerals  novolog 1: 10 g CHO, lantus daily added yesterday    MALNUTRITION:  % Weight Loss:  None noted  % Intake:  </= 50% for >/= 5 days (severe malnutrition)  Subcutaneous Fat Loss:  Orbital region moderate depletion and Upper arm region moderate depletion  Muscle Loss:  Dorsal hand region moderate depletion and Posterior calf region moderate depletion  Fluid Retention:  None noted  Pt notes decreased strength and muscle loss in legs  No chronic vitamin deficiencies noted     Malnutrition: Patient meets diagnostic criteria for severe protein calorie malnutrition in the context of acute illness as evidenced by  poor nutrient intake, subcutaneous fat loss and muscle loss    Previous Goals   Intake > 75% of estimated needs  Evaluation: Improving    BG   Evaluation: Not met    Previous Nutrition Diagnosis  Malnutrition related to acute on chronic illness as evidenced by intake < 50% x 2 weeks, moderate fat and muscle loss    Evaluation: Improving    CURRENT NUTRITION DIAGNOSIS  Malnutrition related to acute on chronic illness as evidenced by intake < 50% x 2 weeks, moderate fat and muscle loss      Altered nutrient utilization r/t DM, ETOH withdrawl evidenced by hyperglycemia/hypoglycemia    INTERVENTIONS  Implementation  Change Ensure Enlive back to Glucerna tid to improve BG control = 660 kcal, 30 g protein    Goals  Intake > 75% of estimated needs  BG     Monitoring/Evaluation  Progress toward goals will be monitored and evaluated per protocol.

## 2021-11-11 NOTE — PROGRESS NOTES
Care Management Follow Up    Length of Stay (days): 7    Expected Discharge Date: 11/12/2021     Concerns to be Addressed:  Medical management; Placement     Patient plan of care discussed at interdisciplinary rounds: No    Anticipated Discharge Disposition:  TCU vs. CD treatment?     Anticipated Discharge Services:  Therapy  Anticipated Discharge DME:  None    Private pay costs discussed: Not applicable    Additional Information:  The writer called to follow up on placement at [facility]. Admissions stated that they had not seen a referral and requested another full referral be sent. The writer faxed a full SNF referral. Will follow up later today.    Mao Willard RN

## 2021-11-11 NOTE — PLAN OF CARE
Problem: Fall Injury Risk  Goal: Absence of Fall and Fall-Related Injury  Outcome: Improving  Intervention: Promote Injury-Free Environment  Recent Flowsheet Documentation  Taken 11/11/2021 0400 by Briseyda Fuller, RN  Safety Promotion/Fall Prevention:   room near nurse's station   safety round/check completed  Taken 11/11/2021 0000 by Briseyda Fuller, RN  Safety Promotion/Fall Prevention:   room near nurse's station   safety round/check completed       Pt reporting HA. Ice pack given. LSC but diminished. BS active. Voiding without difficulty.

## 2021-11-11 NOTE — PROGRESS NOTES
[facility]    PROGRESS NOTE - Hospitalist Service    Assessment and Plan    56 years old male with a past medical history of severe alcohol abuse disorder, hx of severe necrotizing pancreatitis requiring resection, peripheral neuropathy, diabetes and presented with 5 months of excessive alcohol intake, alcohol intoxication, falls, and hyperglycemia, he was admitted with     Acute alcohol withdrawal  - off  CIWA protocol  - Addiction medicine consulted   Started on acamprosate - c/w this at discharge      Acute encephalopathy  - Metabolic, secondary to alcohol withdrawal  - Improving  - Continue supportive care  - PT/OT evaluation     Lactic acidosis  - Secondary to above  -resolved   - No sign or symptoms of sepsis  - Initially started on Zosyn empirically  - Discontinue Zosyn with negative cultures     Tachycardia  - Secondary to the above  - Started on low-dose metoprolol  - Patient had low oral intake  - Echo shows EF of 65% otherwise unremarkable     Nausea and vomiting  Resolved, asking for food today   Prn antiemetics      Diabetes mellitus type 2  - Patient had multiple runs of hypoglycemia 10/10  - Last hemoglobin A1c 7.8  - Cover with insulin sliding scale  C/w low-dose Lantus      Acute alcoholic hepatitis  - Second alcohol dependence  - GI consult appreciate input  - No need for steroid at this point  - Continue to monitor LFTs     Chronic hyponatremia  -due to alcohol abuse  - Stable, sodium level between 129-131  - Continue to monitor sodium level  - Discontinue IV fluid     Left Charcot joint with left second toe fracture  - Podiatry consult, appreciate input  - No intervention needed  - Follow-up with podiatry as outpatient 2 to 3 weeks     Severe malnutrition  - Protein and calories  - Dietitian consult  - Continue supplement     History of cerebellar CVA  - Resume home aspirin     Depression/anxiety  - Continue Cymbalta     Significant weakness and deconditioning  -Secondary  to above  - PT/OT evaluation  - Patient needs TCU on discharge,care manager following      VTE prophylaxis:  Pneumatic Compression Devices    Disposition/Barriers to discharge: TCU placement  Code Status: Full Code    Subjective:  Sugars better, asking for food , not in pain     PHYSICAL EXAM  Vitals:    11/04/21 1644 11/08/21 0832 11/09/21 1924   Weight: 80.8 kg (178 lb 1.6 oz) 80.6 kg (177 lb 9.6 oz) 84.1 kg (185 lb 4.8 oz)     B/P:113/68 T:99 P:92 R:18     Intake/Output Summary (Last 24 hours) at 11/10/2021 1330  Last data filed at 11/10/2021 1300  Gross per 24 hour   Intake 966 ml   Output 750 ml   Net 216 ml      Body mass index is 24.45 kg/m .    Constitutional: aaox3   Respiratory: clear to auscultation bilaterally, no crackles or wheezing  Cardiovascular: Normal apical impulse, regular rate and rhythm, normal S1 and S2, no S3 or S4, and no murmur noted  GI:  normal bowel sounds, soft, non-distended, non-tender, no masses palpated, no hepatosplenomegally  Neurologic: Awake, alert, oriented to name, place and time.  Cranial nerves II-XII are grossly intact.  Motor is 5 out of 5 bilaterally.   Sensory is intact.    Neuropsychiatric: Appropriate with examiner      PERTINENT LABS/IMAGING:  Recent Labs   Lab 11/11/21  1208 11/11/21  0847 11/10/21  2056 11/10/21  0748 11/10/21  0732 11/09/21  0856 11/09/21  0712 11/08/21  0751 11/08/21  0636 11/07/21  0846 11/07/21  0701 11/06/21  0855 11/06/21  0727 11/05/21  1653 11/05/21  1148 11/05/21  1138 11/05/21  0914   WBC  --   --   --   --   --   --   --   --   --   --  10.3  --  11.7*  --   --   --  12.3*   HGB  --   --   --   --   --   --   --   --   --   --  10.1*  --  9.3*  --   --   --  11.5*   MCV  --   --   --   --   --   --   --   --   --   --  96  --  96  --   --   --  96   PLT  --   --   --   --  244  --   --   --   --   --  134*  --  114*  --   --   --  124*   INR  --   --   --   --   --   --   --   --   --   --  1.51*  --   --   --  1.58*  --   --    NA  --    --   --   --  129*  --  131*  --   --   --  131*  --  131*  --   --   --  131*   POTASSIUM  --   --   --   --  3.9  --  4.1  --  3.7  --  3.7  --  3.6  --   --   --  4.6   CHLORIDE  --   --   --   --  96*  --  98  --   --   --  95*  --  95*  --   --   --  88*   CO2  --   --   --   --  26  --  28  --   --   --  31  --  32*  --   --   --  30   BUN  --   --   --   --  2*  --  2*  --   --   --  6*  --  8  --   --   --  8   CR  --   --   --   --  0.62*  --  0.60*  --   --   --  0.65*  --  0.67*  --   --   --  0.84   ANIONGAP  --   --   --   --  7  --  5  --   --   --  5  --  4*  --   --   --  13   BECKY  --   --   --   --  7.6*  --  7.4*  --   --   --  7.2*  --  7.1*  --   --   --  7.8*   GLC 96 79 167*   < > 266*   < > 179*   < >  --    < > 113   < > 79   < >  --    < > 355*   ALBUMIN  --   --   --   --  1.9*  --  1.7*  --  1.7*  --  1.8*  --  1.7*  --   --   --  2.0*   PROTTOTAL  --   --   --   --  5.5*  --  4.9*  --  5.1*  --  5.2*  --  4.8*  --   --   --  5.9*   BILITOTAL  --   --   --   --  2.7*  --  2.3*  --  2.8*  --  3.2*  --  3.4*  --   --   --  7.1*   ALKPHOS  --   --   --   --  258*  --  252*  --  275*  --  306*  --  284*  --   --   --  384*   ALT  --   --   --   --  21  --  24  --  25  --  28  --  28  --   --   --  34   AST  --   --   --   --  39  --  48*  --  54*  --  58*  --  61*  --   --   --  63*    < > = values in this interval not displayed.     No results found for this or any previous visit (from the past 24 hour(s)).    Discussed with patient, family, addiction medicine, nursing staff and discharge planner

## 2021-11-11 NOTE — PLAN OF CARE
"  Problem: Adult Inpatient Plan of Care  Goal: Patient-Specific Goal (Individualized)  Outcome: Improving   Peewee has been hospitalized before with alcohol intoxication that causes multiple problems with his diabetes. States he doesn't really care if he goes to treatment or tcu. He is alright with either. Has good support of his wife.  Problem: Substance Misuse (Alcohol Withdrawal)  Goal: Readiness for Change Identified  Outcome: Improving   \"I understand no alcohol but then they take away my cigarettes to? Reaction I got when confronted about his vape instrument.  Problem: Fall Injury Risk  Goal: Absence of Fall and Fall-Related Injury  Outcome: Improving  Intervention: Promote Injury-Free Environment  Recent Flowsheet Documentation  Taken 11/11/2021 0800 by Harper Elliott RN  Safety Promotion/Fall Prevention:   activity supervised   bed alarm on   nonskid shoes/slippers when out of bed   room door open   room near nurse's station   Needs reminding to use his call light before leaving bed or chair. Alarms are on.  "

## 2021-11-11 NOTE — PROGRESS NOTES
Care Management Follow Up    Length of Stay (days): 7    Expected Discharge Date: 11/11/2021     Concerns to be Addressed:     Medical mgmt  Patient plan of care discussed at interdisciplinary rounds: Yes    Anticipated Discharge Disposition:  Is TCU still appropriate?      Anticipated Discharge Services:  Therapy   Anticipated Discharge DME:    Referrals Placed by CM/SW:  To [facility] TCUs  Private pay costs discussed: Not applicable    Additional Information:  Facility reviews questioning stabilization of blood sugar before acceptance. Two facilities questioning skilled need for TCU and concerned about not getting insurance auth due to low skilled need. Believe that CD treatment is more needed. Updates from therapy recommended. Referral also sent yesterday to [facility] with TCU and CD trx options. SWCM to review options with MD and pt/spouse. CM continuing to follow.     HANANE Heredia

## 2021-11-12 NOTE — PLAN OF CARE
Occupational Therapy Discharge Summary    Reason for therapy discharge:    Discharged to home.    Progress towards therapy goal(s). See goals on Care Plan in [facility] electronic health record for goal details.  Goals not met.  Barriers to achieving goals:   discharge from facility.    Therapy recommendation(s):    24 hour assist

## 2021-11-12 NOTE — PROGRESS NOTES
Care Management Follow Up    Length of Stay (days): 8    Expected Discharge Date: 11/12/2021     Concerns to be Addressed:     Placement  Patient plan of care discussed at interdisciplinary rounds: Yes    Anticipated Discharge Disposition:  Home w/HC vs. TCU     Anticipated Discharge Services:  RN/PT/OT/HHA/SW  Anticipated Discharge DME:      Patient/family educated on Medicare website which has current facility and service quality ratings:  yes  Education Provided on the Discharge Plan:  Yes, ongoing  Patient/Family in Agreement with the Plan:  Yes, ongoing    Referrals Placed by CM/SW:  TCU / home care  Private pay costs discussed: Not applicable    Additional Information:  Call to [facility]. Able to accept pt for start of care on Mon/Tues. MD updated. Spouse updated.     Sent referral to [facility]. Awaiting confirmation of availability.    [facility] not able to accept for this service area.    BICM spoke with spouse regarding discharge home today with home care. Explained TCU limitations and limited skilled need per facility admissions. Spouse is working until 4; set to be with her mother who has Alzheimers tonight; She will work on arranging care for her mother. Pt has previously had home care after a TCU stay at a [facility]. Can't remember agency name. BI will provide CD trx resources and set up home care before discharge. Likely discharge to home w/home care and spouse to transport after 4 PM.     HANANE Heredia

## 2021-11-12 NOTE — PLAN OF CARE
Problem: Alcohol Withdrawal  Goal: Alcohol Withdrawal Symptom Control  Outcome: Adequate for Discharge     CIWA 0      Problem: Adult Inpatient Plan of Care  Goal: Plan of Care Review  Outcome: Adequate for Discharge      Discharge after works get done with work at 4pm. Planning on home with home care.

## 2021-11-12 NOTE — PLAN OF CARE
Physical Therapy Discharge Summary    Reason for therapy discharge:    Discharged to home with home therapy.    Progress towards therapy goal(s). See goals on Care Plan in [facility] electronic health record for goal details.  Goals partially met.  Barriers to achieving goals:   discharge from facility.    Therapy recommendation(s):    Further PT is recommended

## 2021-11-12 NOTE — PLAN OF CARE
Problem: Fall Injury Risk  Goal: Absence of Fall and Fall-Related Injury  Outcome: Improving       Pt reports HA. Pt oriented, pleasant. Using call light appropriately.   LSC. BS active. Incontinent of urine X1. NSR on tele, tachy with activity.

## 2021-11-12 NOTE — DISCHARGE SUMMARY
[facility] MEDICINE  DISCHARGE SUMMARY     Primary Care Physician: Sarah Canseco  Admission Date: 11/4/2021   Discharge Provider: Abimael Hayden MD Discharge Date: 11/12/2021   Diet: regular   Code Status: Full Code   Activity: as tolerated        Condition at Discharge: stable      REASON FOR PRESENTATION(See Admission Note for Details)   etoh abuse     PRINCIPAL & ACTIVE DISCHARGE DIAGNOSES     Principal Problem:    Alcoholic intoxication with complication (H)  Active Problems:    Alcohol dependence with unspecified alcohol-induced disorder (H)    Fall    Type 2 diabetes mellitus without complication (H)    Hyperglycemia    Falls frequently    Closed nondisplaced fracture of phalanx of toe of left foot, unspecified toe, initial encounter    Alcohol use disorder, severe, dependence (H)      SIGNIFICANT FINDINGS (Imaging, labs):     Results for orders placed or performed during the hospital encounter of 11/04/21   Head CT w/o contrast    Narrative    EXAM: CT HEAD W/O CONTRAST  LOCATION: [facility]  DATE/TIME: 11/4/2021 11:06 AM    INDICATION: Trauma. Head injury.  COMPARISON: None.  TECHNIQUE: Routine CT Head without IV contrast. Multiplanar reformats. Dose reduction techniques were used.    FINDINGS:  INTRACRANIAL CONTENTS: No intracranial hemorrhage, extraaxial collection, or mass effect.  No CT evidence of acute infarct. Mild presumed chronic small vessel ischemic changes. Mild to moderate generalized volume loss. No hydrocephalus.     VISUALIZED ORBITS/SINUSES/MASTOIDS: No intraorbital abnormality. Mucus retention cysts in the left sphenoid sinus locule. Complete opacification of the left maxillary sinus. Subtotal opacification of the left frontal sinus. Opacified left frontoethmoidal   recess. No middle ear or mastoid effusion.    BONES/SOFT TISSUES: No acute abnormality.      Impression    IMPRESSION:  1.  No CT evidence for acute intracranial  process.  2.  Brain atrophy and presumed chronic microvascular ischemic changes as above.   Foot XR, G/E 3 views, left    Narrative    EXAM: XR FOOT LEFT G/E 3 VIEWS  LOCATION: [facility]  DATE/TIME: 11/4/2021 11:11 AM    INDICATION: Neuropathy, painless swelling and redness to midfoot.  COMPARISON: None.      Impression    IMPRESSION:   Diffuse demineralization. Fracture of the proximal phalanx of the second toe, both at the base and distal margin. The fracture at the base may be more corticated and chronic. Posttraumatic fracture deformity of the calcaneus but the chronicity of this   finding is uncertain.   XR Knee Left 1/2 Views    Narrative    EXAM: XR KNEE LT 1/2 VW  LOCATION: [facility]  DATE/TIME: 11/4/2021 11:11 AM    INDICATION: Frequent falls, pain, swelling.  COMPARISON: None.      Impression    IMPRESSION:   The left knee is negative for fracture or compartmental narrowing. No effusion.   XR Chest Port 1 View    Narrative    EXAM: XR CHEST PORT 1 VIEW  LOCATION: [facility]  DATE/TIME: 11/5/2021 11:52 AM    INDICATION: Elevated lactic acid.  COMPARISON: Portable chest 10/06/2020      Impression    IMPRESSION: The cardiomediastinal silhouette and pulmonary vascularity are normal. Shallow inspiration with no focal consolidation, pneumothorax nor pleural effusion. Multiple healing left rib fractures redemonstrated.   US Abdomen Limited    Narrative    EXAM: US ABDOMEN LIMITED  LOCATION: [facility]  DATE/TIME: 11/5/2021 7:15 PM    INDICATION: Elevated liver transaminases.  COMPARISON: 5/6/2021.  TECHNIQUE: Limited abdominal ultrasound.    FINDINGS:    GALLBLADDER: Nondistended with a normal wall thickness. Within the gallbladder lumen are 2 small echogenic mobile shadowing calculi. No pericholecystic fluid or reproducible sonographic Jolley's sign.    BILE DUCTS: No biliary dilatation. The common duct measures  5 mm.    LIVER:  Increased parenchymal echogenicity, with decreased acoustic penetration, suggestive of fatty parenchymal infiltration. No focal mass.    RIGHT KIDNEY: No hydronephrosis.    PANCREAS: The visualized portions are normal.    No ascites.      Impression    IMPRESSION:  1.  Sonographic features suggestive of hepatic steatosis.  2.  Cholelithiasis without additional features to suggest acute cholecystitis.       Echocardiogram Complete    Narrative    [id number]  [id number]  [id number]  [id number]^BRAEDEN^[place] [address]     Name: KAREY LIVINGSTON  MRN: [id number]  : 1965  Study Date: 2021 12:58 PM  Age: 56 yrs  Gender: Male  Patient Location: [facility]  Reason For Study: Tachycardia  Ordering Physician: BOUBACAR DERAS  Referring Physician: BOUBACAR DERAS  Performed By: KADEN     BSA: 2.0 m2  Height: 73 in  Weight: 178 lb  HR: 93  ______________________________________________________________________________  Procedure  Complete Echo Adult.  ______________________________________________________________________________  Interpretation Summary     1. Normal left ventricular size and systolic performance with a visually  estimated ejection fraction of 65%.  2. No significant valvular heart disease is identified on this study.  3. Normal right ventricular size and systolic performance.  ______________________________________________________________________________  Left ventricle:  Normal left ventricular size and systolic performance with a visually  estimated ejection fraction of 65%. There is normal regional wall motion. Left  ventricular wall thickness is normal.     Assessment of LV Diastolic Function: The cumulative findings suggest normal  diastolic filling [The septal e' velocity is > 7 cm/s & lateral e' velocity  is  < 10 cm/s. The average E/e' is < 14. The TR velocity cannot be determined due  to insufficient tricuspid insufficiency signal. Left  atrial volume index is  less than 34 mL/mÂ ].     Right ventricle:  Normal right ventricular size and systolic performance.     Left atrium:  The left atrium is of normal size.     Right atrium:  The right atrium is of normal size.     IVC:  The IVC is of normal caliber.     Aortic valve:  The aortic valve is comprised of three cusps. No significant aortic stenosis  or aortic insufficiency is detected on this study.     Mitral valve:  The mitral valve appears morphologically normal. There is trace mitral  insufficiency.     Tricuspid valve:  The tricuspid valve is grossly morphologically normal. There is trace  tricuspid insufficiency.     Pulmonic valve:  The pulmonic valve is grossly morphologically normal.     Thoracic aorta:  The aortic root and proximal ascending aorta are of normal dimension.     Pericardium:  There is no significant pericardial effusion.  ______________________________________________________________________________  ______________________________________________________________________________  MMode/2D Measurements & Calculations  IVSd: 1.1 cm  LVIDd: 4.7 cm  LVIDs: 3.1 cm  LVPWd: 1.00 cm  FS: 33.2 %  LV mass(C)d: 178.1 grams  LV mass(C)dI: 87.0 grams/m2  Ao root diam: 3.6 cm  LA dimension: 3.5 cm  asc Aorta Diam: 3.5 cm  LA/Ao: 0.97  LVOT diam: 2.4 cm  LVOT area: 4.7 cm2  LA Volume Indexed (AL/bp): 27.1 ml/m2     RWT: 0.43     Time Measurements  MM HR: 93.0 BPM     Doppler Measurements & Calculations  MV E max gustavo: 64.5 cm/sec  MV A max gustavo: 93.5 cm/sec  MV E/A: 0.69  MV dec slope: 287.5 cm/sec2  MV dec time: 0.22 sec  Ao V2 max: 108.6 cm/sec  Ao max P.0 mmHg  Ao V2 mean: 89.5 cm/sec  Ao mean PG: 3.3 mmHg  Ao V2 VTI: 19.2 cm  LELO(I,D): 5.0 cm2  LELO(V,D): 4.5 cm2  LV V1 max P.4 mmHg  LV V1 max: 104.9 cm/sec  LV V1 VTI: 20.2 cm  SV(LVOT): 94.9 ml  SI(LVOT): 46.3 ml/m2  PA acc time: 0.10 sec  AV Gustavo Ratio (DI): 0.97  LELO Index (cm2/m2): 2.4  E/E' av.8  Lateral E/e': 6.7     Medial  E/e': 8.9     ______________________________________________________________________________  Report approved by: Randal Berkowitz 11/05/2021 01:50 PM               PENDING LABS          PROCEDURES ( this hospitalization only)          RECOMMENDATIONS TO OUTPATIENT PROVIDER FOR F/U VISIT     pcp fup 1-2 wks   Addiction med clinic     DISPOSITION     Home with home care    SUMMARY OF HOSPITAL COURSE:      Assessment and Plan     56 years old male with a past medical history of severe alcohol abuse disorder, hx of severe necrotizing pancreatitis requiring resection, peripheral neuropathy, diabetes and presented with 5 months of excessive alcohol intake, alcohol intoxication, falls, and hyperglycemia, he was admitted with     Acute alcohol withdrawal  - off  [facility] protocol  - Addiction medicine consulted   Started on acamprosate - c/w this at discharge      Acute encephalopathy  - Metabolic, secondary to alcohol withdrawal  -resolved , head ct negative   - Continue supportive care  - PT/OT evaluation     Lactic acidosis  - Secondary to above  -resolved   - No sign or symptoms of sepsis  - Initially started on Zosyn empirically  - Discontinue Zosyn with negative cultures     Tachycardia  - Secondary to the above  - Started on low-dose metoprolol  - Patient had low oral intake  - Echo shows EF of 65% otherwise unremarkable     Nausea and vomiting  Resolved, asking for food   Prn antiemetics      Diabetes mellitus type 2  - Patient had multiple runs of hypoglycemia 10/10  - Last hemoglobin A1c 7.8  Glucose checks at home, pcp fup      Acute alcoholic hepatitis  - Second alcohol dependence  - GI consult appreciate input  - No need for steroid at this point  - Continue to monitor LFTs     Chronic hyponatremia  -due to alcohol abuse  - Stable, sodium level between 129-131  - Continue to monitor sodium level  - Discontinue IV fluid     Left Charcot joint with left second toe fracture  - Podiatry consult, appreciate  input  - No intervention needed  - Follow-up with podiatry as outpatient 2 to 3 weeks     Severe malnutrition  - Protein and calories  - Dietitian consult  - Continue supplement     History of cerebellar CVA  - Resume home aspirin     Depression/anxiety  - Continue Cymbalta     Significant weakness and deconditioning  - Patient needs TCU on discharge but no tcu beds available and he is ok with discharge to home with home care        Discharge Medications with Med changes:        Review of your medicines      START taking      Dose / Directions   acamprosate 333 MG EC tablet  Commonly known as: CAMPRAL  Indication: Abuse or Misuse of Alcohol  Used for: Alcohol dependence with unspecified alcohol-induced disorder (H)      Dose: 666 mg  Take 2 tablets (666 mg) by mouth 3 times daily  Quantity: 90 tablet  Refills: 0     folic acid 1 MG tablet  Commonly known as: FOLVITE  Used for: Alcohol dependence with unspecified alcohol-induced disorder (H)      Dose: 1 mg  Start taking on: November 13, 2021  Take 1 tablet (1 mg) by mouth daily  Quantity: 30 tablet  Refills: 0     metoprolol tartrate 25 MG tablet  Commonly known as: LOPRESSOR  Used for: Alcohol dependence with unspecified alcohol-induced disorder (H)      Dose: 12.5 mg  Take 0.5 tablets (12.5 mg) by mouth 2 times daily  Quantity: 60 tablet  Refills: 0        CONTINUE these medicines which have NOT CHANGED      Dose / Directions   acetaminophen-codeine 300-30 MG tablet  Commonly known as: TYLENOL #3  Notes to patient: Ok to take a dose if needed for pain as prescribed      TAKE 1 TABLET BY MOUTH TWICE DAILY AS NEEDED  Refills: 0     aspirin 81 MG EC tablet  Commonly known as: ASA      Dose: 81 mg  Take 81 mg by mouth  Refills: 0     DULoxetine 60 MG capsule  Commonly known as: CYMBALTA      Dose: 60 mg  Take 60 mg by mouth daily  Refills: 0     FISH OIL PO      Dose: 1 g  Take 1 g by mouth  Refills: 0     gabapentin 300 MG capsule  Commonly known as: NEURONTIN       Dose: 2 capsule  Take 2 capsules by mouth 2 times daily  Refills: 0     magnesium oxide 400 (241.3 Mg) MG tablet  Commonly known as: MAG-OX      Dose: 400 mg  Take 400 mg by mouth every evening  Refills: 0     multivitamin, therapeutic with minerals tablet  Generic drug: multivitamin w/minerals      Dose: 1 tablet  Take 1 tablet by mouth  Refills: 0     NovoLOG FLEXPEN 100 UNIT/ML pen  Generic drug: insulin aspart      Check blood sugar three (3) times daily.  11.9 Type 2 without complications  BD Ultra-fine Magdalena Pen Needles - NDC [address]-11 - dispense 1 case,  refill PRN for 1 year  Accu-chek Guide blood glucose meter - dispense 1  Accu-chek Guide test strips (50 ct. boxes) - dispense 1, refill PRN for 1 year  Accu-chek FastClix lancets (box of 102 ct.) - dispense 1, refill PRN for 1 year  Refills: 0           Where to get your medicines      These medications were sent to [facility] [address]  [address] [facility] [address], [facility] [address]    Phone: [phone]     acamprosate 333 MG EC tablet    folic acid 1 MG tablet    metoprolol tartrate 25 MG tablet             Rationale for medication changes:      Above         Consults         Immunizations given this encounter     Most Recent Immunizations   Administered Date(s) Administered     Flu, Unspecified 10/05/2020           Anticoagulation Information      Recent INR results:   Recent Labs   Lab 11/07/21  0701   INR 1.51*     Warfarin doses (if applicable) or name of other anticoagulant:       Discharge Orders     Discharge Procedure Orders   MENTAL HEALTH REFERRAL  - Adult; Addiction Medicine Provider; Addiction Medicine Evaluation & Treatment; Addiction Med Consult & Eval - [facility] - Mental Health & Addiction (Medication Evals Only); Alcohol; Medication Assisted Treatment: Alcohol; ...   Standing Status: Future   Referral Priority: Routine Referral Type: Mental Health Outpatient   Requested Specialty: Addiction  Medicine     MENTAL HEALTH REFERRAL  - Adult; Addiction Medicine Provider, Assessments and Testing; Chemical Health Assessment; [facility] - Mental Health & Addiction - . [place] - [phone]; Patient call to schedule; Addiction Medicine Evaluation & Treatment; O...   Standing Status: Future   Referral Priority: Routine Referral Type: Mental Health Outpatient   Number of Visits Requested: 1     Reason for your hospital stay   Order Comments: Etoh abuse     Follow-up and recommended labs and tests    Order Comments: Follow up with primary care provider, Sarah Canseco, within 7 days for hospital follow- up.  No follow up labs or test are needed.     Activity   Order Comments: Your activity upon discharge: activity as tolerated     Order Specific Question Answer Comments   Is discharge order? Yes      MD face to face encounter   Order Comments: Documentation of Face to Face and Certification for Home Health Services    I certify that patient: Peewee Hess is under my care and that I, or a nurse practitioner or physician's assistant working with me, had a face-to-face encounter that meets the physician face-to-face encounter requirements with this patient on: 11/12/2021.    This encounter with the patient was in whole, or in part, for the following medical condition, which is the primary reason for home health care: deconditioned.    I certify that, based on my findings, the following services are medically necessary home health services: Nursing, Occupational Therapy, Physical Therapy, Social Work, and HHA .    My clinical findings support the need for the above services because: Nurse is needed: To provide assessment and oversight required in the home to assure adherence to the medical plan due to: deconditioned.., Occupational Therapy Services are needed to assess and treat cognitive ability and address ADL safety due to impairment in above ., Physical Therapy Services are needed to assess and treat the following  "functional impairments: above ., and above     Further, I certify that my clinical findings support that this patient is homebound (i.e. absences from home require considerable and taxing effort and are for medical reasons or [employer] services or infrequently or of short duration when for other reasons) because: Patient is bedbound due to: deconditioned..    Based on the above findings. I certify that this patient is confined to the home and needs intermittent skilled nursing care, physical therapy and/or speech therapy.  The patient is under my care, and I have initiated the establishment of the plan of care.  This patient will be followed by a physician who will periodically review the plan of care.  Physician/Provider to provide follow up care: Sarah Canseco    Attending hospital physician (the Medicare certified [place] provider): Abimael Hayden MD  Physician Signature: See electronic signature associated with these discharge orders.  Date: 11/12/2021     Diet   Order Comments: Follow this diet upon discharge: Orders Placed This Encounter      Snacks/Supplements Adult: Glucerna; Between Meals      Combination Diet Consistent Carb 60 Grams CHO per Meal Diet     Order Specific Question Answer Comments   Is discharge order? Yes      Examination     Vital Signs in last 24 hours:   Temp:  [98.2  F (36.8  C)-99.6  F (37.6  C)] 99.6  F (37.6  C)  Pulse:  [] 94  Resp:  [16-20] 18  BP: ()/(53-68) 104/59  SpO2:  [90 %-94 %] 90 %   /59 (BP Location: Left arm)   Pulse 94   Temp 99.6  F (37.6  C) (Oral)   Resp 18   Ht 1.854 m (6' 1\")   Wt 80.9 kg (178 lb 6.4 oz)   SpO2 90%   BMI 23.54 kg/m    General appearance: alert, appears stated age and cooperative  Lungs: clear to auscultation bilaterally  Heart: s1 and 2  Abdomen: soft, non-tender; bowel sounds normal; no masses,  no organomegaly  Extremities: no edema        Please see EMR for more detailed significant labs, imaging, consultant notes " etc.  Total time spent on discharge: 35 minutes    Abimael Hayden MD   [facility]: Ph:[phone]    CC:Sarah Canseco

## 2021-11-12 NOTE — PLAN OF CARE
Problem: Alcohol Withdrawal  Goal: Alcohol Withdrawal Symptom Control  Outcome: Improving   Pt calm and cooperative with cares. Alert and oriented x4. Up with assist of Reports loss of appetite. Endorses generalized weakness.

## 2021-11-12 NOTE — DISCHARGE INSTRUCTIONS
Home Care has been set up for you with [facility] If you haven't heard from them within 48 hours, call [phone] to schedule your first appointment next week.

## 2022-01-01 ENCOUNTER — APPOINTMENT (OUTPATIENT)
Dept: PHYSICAL THERAPY | Facility: HOSPITAL | Age: 57
DRG: 870 | End: 2022-01-01
Payer: COMMERCIAL

## 2022-01-01 ENCOUNTER — APPOINTMENT (OUTPATIENT)
Dept: OCCUPATIONAL THERAPY | Facility: HOSPITAL | Age: 57
DRG: 870 | End: 2022-01-01
Payer: COMMERCIAL

## 2022-01-01 ENCOUNTER — APPOINTMENT (OUTPATIENT)
Dept: SPEECH THERAPY | Facility: HOSPITAL | Age: 57
DRG: 870 | End: 2022-01-01
Attending: INTERNAL MEDICINE
Payer: COMMERCIAL

## 2022-01-01 ENCOUNTER — APPOINTMENT (OUTPATIENT)
Dept: CT IMAGING | Facility: HOSPITAL | Age: 57
DRG: 870 | End: 2022-01-01
Attending: INTERNAL MEDICINE
Payer: COMMERCIAL

## 2022-01-01 ENCOUNTER — APPOINTMENT (OUTPATIENT)
Dept: SPEECH THERAPY | Facility: HOSPITAL | Age: 57
DRG: 870 | End: 2022-01-01
Payer: COMMERCIAL

## 2022-01-01 ENCOUNTER — HOSPITAL ENCOUNTER (INPATIENT)
Facility: HOSPITAL | Age: 57
LOS: 44 days | DRG: 870 | End: 2022-02-28
Attending: EMERGENCY MEDICINE | Admitting: HOSPITALIST
Payer: COMMERCIAL

## 2022-01-01 ENCOUNTER — APPOINTMENT (OUTPATIENT)
Dept: OCCUPATIONAL THERAPY | Facility: HOSPITAL | Age: 57
DRG: 870 | End: 2022-01-01
Attending: INTERNAL MEDICINE
Payer: COMMERCIAL

## 2022-01-01 ENCOUNTER — APPOINTMENT (OUTPATIENT)
Dept: RADIOLOGY | Facility: HOSPITAL | Age: 57
DRG: 870 | End: 2022-01-01
Attending: INTERNAL MEDICINE
Payer: COMMERCIAL

## 2022-01-01 ENCOUNTER — APPOINTMENT (OUTPATIENT)
Dept: CT IMAGING | Facility: HOSPITAL | Age: 57
DRG: 870 | End: 2022-01-01
Attending: EMERGENCY MEDICINE
Payer: COMMERCIAL

## 2022-01-01 ENCOUNTER — APPOINTMENT (OUTPATIENT)
Dept: NEUROLOGY | Facility: HOSPITAL | Age: 57
DRG: 870 | End: 2022-01-01
Attending: INTERNAL MEDICINE
Payer: COMMERCIAL

## 2022-01-01 ENCOUNTER — APPOINTMENT (OUTPATIENT)
Dept: RADIOLOGY | Facility: HOSPITAL | Age: 57
DRG: 870 | End: 2022-01-01
Attending: EMERGENCY MEDICINE
Payer: COMMERCIAL

## 2022-01-01 ENCOUNTER — APPOINTMENT (OUTPATIENT)
Dept: CT IMAGING | Facility: HOSPITAL | Age: 57
DRG: 870 | End: 2022-01-01
Payer: COMMERCIAL

## 2022-01-01 ENCOUNTER — APPOINTMENT (OUTPATIENT)
Dept: PHYSICAL THERAPY | Facility: HOSPITAL | Age: 57
DRG: 870 | End: 2022-01-01
Attending: INTERNAL MEDICINE
Payer: COMMERCIAL

## 2022-01-01 ENCOUNTER — APPOINTMENT (OUTPATIENT)
Dept: CARDIOLOGY | Facility: HOSPITAL | Age: 57
DRG: 870 | End: 2022-01-01
Attending: INTERNAL MEDICINE
Payer: COMMERCIAL

## 2022-01-01 ENCOUNTER — APPOINTMENT (OUTPATIENT)
Dept: MRI IMAGING | Facility: HOSPITAL | Age: 57
DRG: 870 | End: 2022-01-01
Attending: INTERNAL MEDICINE
Payer: COMMERCIAL

## 2022-01-01 VITALS
WEIGHT: 171.4 LBS | HEART RATE: 94 BPM | DIASTOLIC BLOOD PRESSURE: 31 MMHG | SYSTOLIC BLOOD PRESSURE: 46 MMHG | RESPIRATION RATE: 22 BRPM | OXYGEN SATURATION: 92 % | HEIGHT: 73 IN | BODY MASS INDEX: 22.72 KG/M2 | TEMPERATURE: 97.3 F

## 2022-01-01 DIAGNOSIS — R00.0 TACHYCARDIA: ICD-10-CM

## 2022-01-01 DIAGNOSIS — R11.2 NON-INTRACTABLE VOMITING WITH NAUSEA, UNSPECIFIED VOMITING TYPE: ICD-10-CM

## 2022-01-01 DIAGNOSIS — J18.9 PNEUMONIA OF BOTH LUNGS DUE TO INFECTIOUS ORGANISM, UNSPECIFIED PART OF LUNG: ICD-10-CM

## 2022-01-01 DIAGNOSIS — R45.1 AGITATION: ICD-10-CM

## 2022-01-01 LAB
ACTH PLAS-MCNC: 12 PG/ML
ALBUMIN SERPL-MCNC: 1.1 G/DL (ref 3.5–5)
ALBUMIN SERPL-MCNC: 1.3 G/DL (ref 3.5–5)
ALBUMIN SERPL-MCNC: 1.5 G/DL (ref 3.5–5)
ALBUMIN SERPL-MCNC: 1.5 G/DL (ref 3.5–5)
ALBUMIN SERPL-MCNC: 1.6 G/DL (ref 3.5–5)
ALBUMIN SERPL-MCNC: 1.8 G/DL (ref 3.5–5)
ALBUMIN SERPL-MCNC: 2 G/DL (ref 3.5–5)
ALBUMIN SERPL-MCNC: 2.1 G/DL (ref 3.5–5)
ALBUMIN UR-MCNC: 30 MG/DL
ALBUMIN UR-MCNC: NEGATIVE MG/DL
ALP SERPL-CCNC: 106 U/L (ref 45–120)
ALP SERPL-CCNC: 107 U/L (ref 45–120)
ALP SERPL-CCNC: 114 U/L (ref 45–120)
ALP SERPL-CCNC: 118 U/L (ref 45–120)
ALP SERPL-CCNC: 132 U/L (ref 45–120)
ALP SERPL-CCNC: 133 U/L (ref 45–120)
ALP SERPL-CCNC: 135 U/L (ref 45–120)
ALP SERPL-CCNC: 137 U/L (ref 45–120)
ALP SERPL-CCNC: 145 U/L (ref 45–120)
ALP SERPL-CCNC: 173 U/L (ref 45–120)
ALP SERPL-CCNC: 194 U/L (ref 45–120)
ALP SERPL-CCNC: 199 U/L (ref 45–120)
ALP SERPL-CCNC: 237 U/L (ref 45–120)
ALT SERPL W P-5'-P-CCNC: 13 U/L (ref 0–45)
ALT SERPL W P-5'-P-CCNC: 13 U/L (ref 0–45)
ALT SERPL W P-5'-P-CCNC: 14 U/L (ref 0–45)
ALT SERPL W P-5'-P-CCNC: 16 U/L (ref 0–45)
ALT SERPL W P-5'-P-CCNC: 18 U/L (ref 0–45)
ALT SERPL W P-5'-P-CCNC: 19 U/L (ref 0–45)
ALT SERPL W P-5'-P-CCNC: 23 U/L (ref 0–45)
ALT SERPL W P-5'-P-CCNC: 23 U/L (ref 0–45)
ALT SERPL W P-5'-P-CCNC: 25 U/L (ref 0–45)
AMMONIA PLAS-SCNC: 32 UMOL/L (ref 11–35)
AMMONIA PLAS-SCNC: 56 UMOL/L (ref 11–35)
ANION GAP SERPL CALCULATED.3IONS-SCNC: 10 MMOL/L (ref 5–18)
ANION GAP SERPL CALCULATED.3IONS-SCNC: 11 MMOL/L (ref 5–18)
ANION GAP SERPL CALCULATED.3IONS-SCNC: 11 MMOL/L (ref 5–18)
ANION GAP SERPL CALCULATED.3IONS-SCNC: 12 MMOL/L (ref 5–18)
ANION GAP SERPL CALCULATED.3IONS-SCNC: 12 MMOL/L (ref 5–18)
ANION GAP SERPL CALCULATED.3IONS-SCNC: 13 MMOL/L (ref 5–18)
ANION GAP SERPL CALCULATED.3IONS-SCNC: 16 MMOL/L (ref 5–18)
ANION GAP SERPL CALCULATED.3IONS-SCNC: 16 MMOL/L (ref 5–18)
ANION GAP SERPL CALCULATED.3IONS-SCNC: 5 MMOL/L (ref 5–18)
ANION GAP SERPL CALCULATED.3IONS-SCNC: 6 MMOL/L (ref 5–18)
ANION GAP SERPL CALCULATED.3IONS-SCNC: 7 MMOL/L (ref 5–18)
ANION GAP SERPL CALCULATED.3IONS-SCNC: 8 MMOL/L (ref 5–18)
ANION GAP SERPL CALCULATED.3IONS-SCNC: 9 MMOL/L (ref 5–18)
APPEARANCE UR: CLEAR
APPEARANCE UR: CLEAR
AST SERPL W P-5'-P-CCNC: 27 U/L (ref 0–40)
AST SERPL W P-5'-P-CCNC: 31 U/L (ref 0–40)
AST SERPL W P-5'-P-CCNC: 35 U/L (ref 0–40)
AST SERPL W P-5'-P-CCNC: 37 U/L (ref 0–40)
AST SERPL W P-5'-P-CCNC: 37 U/L (ref 0–40)
AST SERPL W P-5'-P-CCNC: 40 U/L (ref 0–40)
AST SERPL W P-5'-P-CCNC: 44 U/L (ref 0–40)
AST SERPL W P-5'-P-CCNC: 47 U/L (ref 0–40)
AST SERPL W P-5'-P-CCNC: 48 U/L (ref 0–40)
AST SERPL W P-5'-P-CCNC: 49 U/L (ref 0–40)
AST SERPL W P-5'-P-CCNC: 51 U/L (ref 0–40)
AST SERPL W P-5'-P-CCNC: 52 U/L (ref 0–40)
AST SERPL W P-5'-P-CCNC: 56 U/L (ref 0–40)
ATRIAL RATE - MUSE: 131 BPM
ATRIAL RATE - MUSE: 138 BPM
ATRIAL RATE - MUSE: 87 BPM
BACTERIA #/AREA URNS HPF: ABNORMAL /HPF
BACTERIA BLD CULT: NO GROWTH
BACTERIA SPT CULT: NORMAL
BACTERIA SPT CULT: NORMAL
BACTERIA UR CULT: NO GROWTH
BASE EXCESS BLDA CALC-SCNC: 2.8 MMOL/L
BASE EXCESS BLDA CALC-SCNC: 4.7 MMOL/L
BASE EXCESS BLDV CALC-SCNC: 3.3 MMOL/L
BASOPHILS # BLD AUTO: 0 10E3/UL (ref 0–0.2)
BASOPHILS # BLD AUTO: 0.1 10E3/UL (ref 0–0.2)
BASOPHILS # BLD AUTO: 0.2 10E3/UL (ref 0–0.2)
BASOPHILS # BLD AUTO: 0.2 10E3/UL (ref 0–0.2)
BASOPHILS NFR BLD AUTO: 0 %
BASOPHILS NFR BLD AUTO: 0 %
BASOPHILS NFR BLD AUTO: 1 %
BILIRUB DIRECT SERPL-MCNC: 0.4 MG/DL
BILIRUB DIRECT SERPL-MCNC: 0.6 MG/DL
BILIRUB DIRECT SERPL-MCNC: 0.8 MG/DL
BILIRUB SERPL-MCNC: 0.6 MG/DL (ref 0–1)
BILIRUB SERPL-MCNC: 0.7 MG/DL (ref 0–1)
BILIRUB SERPL-MCNC: 0.7 MG/DL (ref 0–1)
BILIRUB SERPL-MCNC: 0.8 MG/DL (ref 0–1)
BILIRUB SERPL-MCNC: 0.9 MG/DL (ref 0–1)
BILIRUB SERPL-MCNC: 1 MG/DL (ref 0–1)
BILIRUB SERPL-MCNC: 1.1 MG/DL (ref 0–1)
BILIRUB SERPL-MCNC: 1.1 MG/DL (ref 0–1)
BILIRUB SERPL-MCNC: 1.2 MG/DL (ref 0–1)
BILIRUB SERPL-MCNC: 1.3 MG/DL (ref 0–1)
BILIRUB SERPL-MCNC: 1.9 MG/DL (ref 0–1)
BILIRUB UR QL STRIP: NEGATIVE
BILIRUB UR QL STRIP: NEGATIVE
BNP SERPL-MCNC: 100 PG/ML (ref 0–45)
BNP SERPL-MCNC: 137 PG/ML (ref 0–45)
BUN SERPL-MCNC: 10 MG/DL (ref 8–22)
BUN SERPL-MCNC: 12 MG/DL (ref 8–22)
BUN SERPL-MCNC: 12 MG/DL (ref 8–22)
BUN SERPL-MCNC: 3 MG/DL (ref 8–22)
BUN SERPL-MCNC: 4 MG/DL (ref 8–22)
BUN SERPL-MCNC: 5 MG/DL (ref 8–22)
BUN SERPL-MCNC: 5 MG/DL (ref 8–22)
BUN SERPL-MCNC: 6 MG/DL (ref 8–22)
BUN SERPL-MCNC: 7 MG/DL (ref 8–22)
BUN SERPL-MCNC: 8 MG/DL (ref 8–22)
C DIFF TOX B STL QL: NEGATIVE
C DIFF TOX B STL QL: NEGATIVE
C REACTIVE PROTEIN LHE: 0.2 MG/DL (ref 0–0.8)
C REACTIVE PROTEIN LHE: 0.3 MG/DL (ref 0–0.8)
C REACTIVE PROTEIN LHE: 0.6 MG/DL (ref 0–0.8)
C REACTIVE PROTEIN LHE: 0.8 MG/DL (ref 0–0.8)
C REACTIVE PROTEIN LHE: 2.3 MG/DL (ref 0–0.8)
C REACTIVE PROTEIN LHE: 2.8 MG/DL (ref 0–0.8)
C REACTIVE PROTEIN LHE: 4.8 MG/DL (ref 0–0.8)
C REACTIVE PROTEIN LHE: 6 MG/DL (ref 0–0.8)
C REACTIVE PROTEIN LHE: 6.3 MG/DL (ref 0–0.8)
CALCIUM SERPL-MCNC: 6.6 MG/DL (ref 8.5–10.5)
CALCIUM SERPL-MCNC: 6.7 MG/DL (ref 8.5–10.5)
CALCIUM SERPL-MCNC: 6.9 MG/DL (ref 8.5–10.5)
CALCIUM SERPL-MCNC: 7.1 MG/DL (ref 8.5–10.5)
CALCIUM SERPL-MCNC: 7.2 MG/DL (ref 8.5–10.5)
CALCIUM SERPL-MCNC: 7.2 MG/DL (ref 8.5–10.5)
CALCIUM SERPL-MCNC: 7.3 MG/DL (ref 8.5–10.5)
CALCIUM SERPL-MCNC: 7.4 MG/DL (ref 8.5–10.5)
CALCIUM SERPL-MCNC: 7.5 MG/DL (ref 8.5–10.5)
CALCIUM SERPL-MCNC: 7.6 MG/DL (ref 8.5–10.5)
CALCIUM SERPL-MCNC: 7.6 MG/DL (ref 8.5–10.5)
CALCIUM SERPL-MCNC: 7.7 MG/DL (ref 8.5–10.5)
CALCIUM SERPL-MCNC: 7.8 MG/DL (ref 8.5–10.5)
CALCIUM SERPL-MCNC: 7.9 MG/DL (ref 8.5–10.5)
CALCIUM SERPL-MCNC: 8 MG/DL (ref 8.5–10.5)
CALCIUM, IONIZED MEASURED: 1.06 MMOL/L (ref 1.11–1.3)
CALCIUM, IONIZED MEASURED: 1.07 MMOL/L (ref 1.11–1.3)
CALCIUM, IONIZED MEASURED: 1.13 MMOL/L (ref 1.11–1.3)
CHLORIDE BLD-SCNC: 100 MMOL/L (ref 98–107)
CHLORIDE BLD-SCNC: 100 MMOL/L (ref 98–107)
CHLORIDE BLD-SCNC: 101 MMOL/L (ref 98–107)
CHLORIDE BLD-SCNC: 101 MMOL/L (ref 98–107)
CHLORIDE BLD-SCNC: 102 MMOL/L (ref 98–107)
CHLORIDE BLD-SCNC: 104 MMOL/L (ref 98–107)
CHLORIDE BLD-SCNC: 106 MMOL/L (ref 98–107)
CHLORIDE BLD-SCNC: 106 MMOL/L (ref 98–107)
CHLORIDE BLD-SCNC: 107 MMOL/L (ref 98–107)
CHLORIDE BLD-SCNC: 108 MMOL/L (ref 98–107)
CHLORIDE BLD-SCNC: 109 MMOL/L (ref 98–107)
CHLORIDE BLD-SCNC: 110 MMOL/L (ref 98–107)
CHLORIDE BLD-SCNC: 111 MMOL/L (ref 98–107)
CHLORIDE BLD-SCNC: 111 MMOL/L (ref 98–107)
CHLORIDE BLD-SCNC: 112 MMOL/L (ref 98–107)
CHLORIDE BLD-SCNC: 112 MMOL/L (ref 98–107)
CHLORIDE BLD-SCNC: 115 MMOL/L (ref 98–107)
CHLORIDE BLD-SCNC: 116 MMOL/L (ref 98–107)
CHLORIDE BLD-SCNC: 116 MMOL/L (ref 98–107)
CHLORIDE BLD-SCNC: 118 MMOL/L (ref 98–107)
CHLORIDE BLD-SCNC: 99 MMOL/L (ref 98–107)
CO2 SERPL-SCNC: 18 MMOL/L (ref 22–31)
CO2 SERPL-SCNC: 18 MMOL/L (ref 22–31)
CO2 SERPL-SCNC: 19 MMOL/L (ref 22–31)
CO2 SERPL-SCNC: 20 MMOL/L (ref 22–31)
CO2 SERPL-SCNC: 21 MMOL/L (ref 22–31)
CO2 SERPL-SCNC: 22 MMOL/L (ref 22–31)
CO2 SERPL-SCNC: 22 MMOL/L (ref 22–31)
CO2 SERPL-SCNC: 23 MMOL/L (ref 22–31)
CO2 SERPL-SCNC: 24 MMOL/L (ref 22–31)
CO2 SERPL-SCNC: 25 MMOL/L (ref 22–31)
CO2 SERPL-SCNC: 26 MMOL/L (ref 22–31)
CO2 SERPL-SCNC: 26 MMOL/L (ref 22–31)
CO2 SERPL-SCNC: 27 MMOL/L (ref 22–31)
CO2 SERPL-SCNC: 27 MMOL/L (ref 22–31)
CO2 SERPL-SCNC: 28 MMOL/L (ref 22–31)
CO2 SERPL-SCNC: 28 MMOL/L (ref 22–31)
CO2 SERPL-SCNC: 31 MMOL/L (ref 22–31)
CO2 SERPL-SCNC: 32 MMOL/L (ref 22–31)
CO2 SERPL-SCNC: 33 MMOL/L (ref 22–31)
CO2 SERPL-SCNC: 33 MMOL/L (ref 22–31)
COHGB MFR BLD: 96.5 % (ref 96–97)
COHGB MFR BLD: 96.9 % (ref 96–97)
COLOR UR AUTO: YELLOW
COLOR UR AUTO: YELLOW
CORTICOSTER 1H P 250 UG ACTH SERPL-SCNC: 21 UG/DL
CORTICOSTER 30M P 250 UG ACTH SERPL-SCNC: 19.4 UG/DL
CORTICOSTER SERPL-MCNC: 10.6 UG/DL
CORTIS SERPL-MCNC: 19.4 UG/DL
CREAT SERPL-MCNC: 0.56 MG/DL (ref 0.7–1.3)
CREAT SERPL-MCNC: 0.57 MG/DL (ref 0.7–1.3)
CREAT SERPL-MCNC: 0.57 MG/DL (ref 0.7–1.3)
CREAT SERPL-MCNC: 0.58 MG/DL (ref 0.7–1.3)
CREAT SERPL-MCNC: 0.6 MG/DL (ref 0.7–1.3)
CREAT SERPL-MCNC: 0.61 MG/DL (ref 0.7–1.3)
CREAT SERPL-MCNC: 0.62 MG/DL (ref 0.7–1.3)
CREAT SERPL-MCNC: 0.62 MG/DL (ref 0.7–1.3)
CREAT SERPL-MCNC: 0.63 MG/DL (ref 0.7–1.3)
CREAT SERPL-MCNC: 0.66 MG/DL (ref 0.7–1.3)
CREAT SERPL-MCNC: 0.67 MG/DL (ref 0.7–1.3)
CREAT SERPL-MCNC: 0.69 MG/DL (ref 0.7–1.3)
CREAT SERPL-MCNC: 0.7 MG/DL (ref 0.7–1.3)
CREAT SERPL-MCNC: 0.7 MG/DL (ref 0.7–1.3)
CREAT SERPL-MCNC: 0.71 MG/DL (ref 0.7–1.3)
CREAT SERPL-MCNC: 0.71 MG/DL (ref 0.7–1.3)
CREAT SERPL-MCNC: 0.74 MG/DL (ref 0.7–1.3)
CREAT SERPL-MCNC: 0.75 MG/DL (ref 0.7–1.3)
CREAT SERPL-MCNC: 0.76 MG/DL (ref 0.7–1.3)
CREAT SERPL-MCNC: 0.77 MG/DL (ref 0.7–1.3)
CREAT SERPL-MCNC: 0.78 MG/DL (ref 0.7–1.3)
CREAT SERPL-MCNC: 0.78 MG/DL (ref 0.7–1.3)
CREAT SERPL-MCNC: 0.84 MG/DL (ref 0.7–1.3)
CREAT SERPL-MCNC: 0.85 MG/DL (ref 0.7–1.3)
D DIMER PPP FEU-MCNC: 0.46 UG/ML FEU (ref 0–0.5)
D DIMER PPP FEU-MCNC: 0.78 UG/ML FEU (ref 0–0.5)
D DIMER PPP FEU-MCNC: 0.81 UG/ML FEU (ref 0–0.5)
D DIMER PPP FEU-MCNC: 0.81 UG/ML FEU (ref 0–0.5)
D DIMER PPP FEU-MCNC: 0.82 UG/ML FEU (ref 0–0.5)
D DIMER PPP FEU-MCNC: 1.13 UG/ML FEU (ref 0–0.5)
DEPRECATED CALCIDIOL+CALCIFEROL SERPL-MC: 7 UG/L (ref 30–80)
DIASTOLIC BLOOD PRESSURE - MUSE: 59 MMHG
DIASTOLIC BLOOD PRESSURE - MUSE: NORMAL MMHG
DIASTOLIC BLOOD PRESSURE - MUSE: NORMAL MMHG
EOSINOPHIL # BLD AUTO: 0 10E3/UL (ref 0–0.7)
EOSINOPHIL # BLD AUTO: 0.1 10E3/UL (ref 0–0.7)
EOSINOPHIL # BLD AUTO: 0.2 10E3/UL (ref 0–0.7)
EOSINOPHIL # BLD AUTO: 0.4 10E3/UL (ref 0–0.7)
EOSINOPHIL # BLD AUTO: 0.5 10E3/UL (ref 0–0.7)
EOSINOPHIL # BLD AUTO: 0.6 10E3/UL (ref 0–0.7)
EOSINOPHIL NFR BLD AUTO: 0 %
EOSINOPHIL NFR BLD AUTO: 1 %
EOSINOPHIL NFR BLD AUTO: 1 %
EOSINOPHIL NFR BLD AUTO: 3 %
EOSINOPHIL NFR BLD AUTO: 4 %
EOSINOPHIL NFR BLD AUTO: 4 %
ERYTHROCYTE [DISTWIDTH] IN BLOOD BY AUTOMATED COUNT: 18.1 % (ref 10–15)
ERYTHROCYTE [DISTWIDTH] IN BLOOD BY AUTOMATED COUNT: 18.4 % (ref 10–15)
ERYTHROCYTE [DISTWIDTH] IN BLOOD BY AUTOMATED COUNT: 18.6 % (ref 10–15)
ERYTHROCYTE [DISTWIDTH] IN BLOOD BY AUTOMATED COUNT: 18.6 % (ref 10–15)
ERYTHROCYTE [DISTWIDTH] IN BLOOD BY AUTOMATED COUNT: 18.7 % (ref 10–15)
ERYTHROCYTE [DISTWIDTH] IN BLOOD BY AUTOMATED COUNT: 18.8 % (ref 10–15)
ERYTHROCYTE [DISTWIDTH] IN BLOOD BY AUTOMATED COUNT: 18.9 % (ref 10–15)
ERYTHROCYTE [DISTWIDTH] IN BLOOD BY AUTOMATED COUNT: 19 % (ref 10–15)
ERYTHROCYTE [DISTWIDTH] IN BLOOD BY AUTOMATED COUNT: 19.1 % (ref 10–15)
ERYTHROCYTE [DISTWIDTH] IN BLOOD BY AUTOMATED COUNT: 19.2 % (ref 10–15)
ERYTHROCYTE [DISTWIDTH] IN BLOOD BY AUTOMATED COUNT: 19.2 % (ref 10–15)
ERYTHROCYTE [DISTWIDTH] IN BLOOD BY AUTOMATED COUNT: 19.3 % (ref 10–15)
ERYTHROCYTE [DISTWIDTH] IN BLOOD BY AUTOMATED COUNT: 19.4 % (ref 10–15)
ERYTHROCYTE [DISTWIDTH] IN BLOOD BY AUTOMATED COUNT: 19.4 % (ref 10–15)
ERYTHROCYTE [DISTWIDTH] IN BLOOD BY AUTOMATED COUNT: 19.5 % (ref 10–15)
ERYTHROCYTE [DISTWIDTH] IN BLOOD BY AUTOMATED COUNT: 19.6 % (ref 10–15)
ERYTHROCYTE [DISTWIDTH] IN BLOOD BY AUTOMATED COUNT: 19.7 % (ref 10–15)
ERYTHROCYTE [DISTWIDTH] IN BLOOD BY AUTOMATED COUNT: 19.7 % (ref 10–15)
ERYTHROCYTE [DISTWIDTH] IN BLOOD BY AUTOMATED COUNT: 19.9 % (ref 10–15)
ETHANOL SERPL-MCNC: 10 MG/DL
FLUAV RNA SPEC QL NAA+PROBE: NEGATIVE
FLUBV RNA RESP QL NAA+PROBE: NEGATIVE
GFR SERPL CREATININE-BSD FRML MDRD: >90 ML/MIN/1.73M2
GLUCOSE BLD-MCNC: 102 MG/DL (ref 70–125)
GLUCOSE BLD-MCNC: 106 MG/DL (ref 70–125)
GLUCOSE BLD-MCNC: 149 MG/DL (ref 70–125)
GLUCOSE BLD-MCNC: 160 MG/DL (ref 70–125)
GLUCOSE BLD-MCNC: 161 MG/DL (ref 70–125)
GLUCOSE BLD-MCNC: 168 MG/DL (ref 70–125)
GLUCOSE BLD-MCNC: 173 MG/DL (ref 70–125)
GLUCOSE BLD-MCNC: 174 MG/DL (ref 70–125)
GLUCOSE BLD-MCNC: 179 MG/DL (ref 70–125)
GLUCOSE BLD-MCNC: 179 MG/DL (ref 70–125)
GLUCOSE BLD-MCNC: 180 MG/DL (ref 70–125)
GLUCOSE BLD-MCNC: 193 MG/DL (ref 70–125)
GLUCOSE BLD-MCNC: 195 MG/DL (ref 70–125)
GLUCOSE BLD-MCNC: 208 MG/DL (ref 70–125)
GLUCOSE BLD-MCNC: 217 MG/DL (ref 70–125)
GLUCOSE BLD-MCNC: 217 MG/DL (ref 70–125)
GLUCOSE BLD-MCNC: 255 MG/DL (ref 70–125)
GLUCOSE BLD-MCNC: 259 MG/DL (ref 70–125)
GLUCOSE BLD-MCNC: 264 MG/DL (ref 70–125)
GLUCOSE BLD-MCNC: 269 MG/DL (ref 70–125)
GLUCOSE BLD-MCNC: 269 MG/DL (ref 70–125)
GLUCOSE BLD-MCNC: 270 MG/DL (ref 70–125)
GLUCOSE BLD-MCNC: 288 MG/DL (ref 70–125)
GLUCOSE BLD-MCNC: 308 MG/DL (ref 70–125)
GLUCOSE BLD-MCNC: 317 MG/DL (ref 70–125)
GLUCOSE BLD-MCNC: 39 MG/DL (ref 70–125)
GLUCOSE BLD-MCNC: 65 MG/DL (ref 70–125)
GLUCOSE BLD-MCNC: 80 MG/DL (ref 70–125)
GLUCOSE BLD-MCNC: 85 MG/DL (ref 70–125)
GLUCOSE BLD-MCNC: 98 MG/DL (ref 70–125)
GLUCOSE BLDC GLUCOMTR-MCNC: 100 MG/DL (ref 70–99)
GLUCOSE BLDC GLUCOMTR-MCNC: 101 MG/DL (ref 70–99)
GLUCOSE BLDC GLUCOMTR-MCNC: 101 MG/DL (ref 70–99)
GLUCOSE BLDC GLUCOMTR-MCNC: 102 MG/DL (ref 70–99)
GLUCOSE BLDC GLUCOMTR-MCNC: 102 MG/DL (ref 70–99)
GLUCOSE BLDC GLUCOMTR-MCNC: 103 MG/DL (ref 70–99)
GLUCOSE BLDC GLUCOMTR-MCNC: 104 MG/DL (ref 70–99)
GLUCOSE BLDC GLUCOMTR-MCNC: 106 MG/DL (ref 70–99)
GLUCOSE BLDC GLUCOMTR-MCNC: 106 MG/DL (ref 70–99)
GLUCOSE BLDC GLUCOMTR-MCNC: 107 MG/DL (ref 70–99)
GLUCOSE BLDC GLUCOMTR-MCNC: 109 MG/DL (ref 70–99)
GLUCOSE BLDC GLUCOMTR-MCNC: 110 MG/DL (ref 70–99)
GLUCOSE BLDC GLUCOMTR-MCNC: 114 MG/DL (ref 70–99)
GLUCOSE BLDC GLUCOMTR-MCNC: 115 MG/DL (ref 70–99)
GLUCOSE BLDC GLUCOMTR-MCNC: 116 MG/DL (ref 70–99)
GLUCOSE BLDC GLUCOMTR-MCNC: 117 MG/DL (ref 70–99)
GLUCOSE BLDC GLUCOMTR-MCNC: 118 MG/DL (ref 70–99)
GLUCOSE BLDC GLUCOMTR-MCNC: 118 MG/DL (ref 70–99)
GLUCOSE BLDC GLUCOMTR-MCNC: 119 MG/DL (ref 70–99)
GLUCOSE BLDC GLUCOMTR-MCNC: 119 MG/DL (ref 70–99)
GLUCOSE BLDC GLUCOMTR-MCNC: 120 MG/DL (ref 70–99)
GLUCOSE BLDC GLUCOMTR-MCNC: 123 MG/DL (ref 70–99)
GLUCOSE BLDC GLUCOMTR-MCNC: 125 MG/DL (ref 70–99)
GLUCOSE BLDC GLUCOMTR-MCNC: 127 MG/DL (ref 70–99)
GLUCOSE BLDC GLUCOMTR-MCNC: 128 MG/DL (ref 70–99)
GLUCOSE BLDC GLUCOMTR-MCNC: 13 MG/DL (ref 70–99)
GLUCOSE BLDC GLUCOMTR-MCNC: 133 MG/DL (ref 70–99)
GLUCOSE BLDC GLUCOMTR-MCNC: 134 MG/DL (ref 70–99)
GLUCOSE BLDC GLUCOMTR-MCNC: 134 MG/DL (ref 70–99)
GLUCOSE BLDC GLUCOMTR-MCNC: 135 MG/DL (ref 70–99)
GLUCOSE BLDC GLUCOMTR-MCNC: 135 MG/DL (ref 70–99)
GLUCOSE BLDC GLUCOMTR-MCNC: 136 MG/DL (ref 70–99)
GLUCOSE BLDC GLUCOMTR-MCNC: 136 MG/DL (ref 70–99)
GLUCOSE BLDC GLUCOMTR-MCNC: 137 MG/DL (ref 70–99)
GLUCOSE BLDC GLUCOMTR-MCNC: 137 MG/DL (ref 70–99)
GLUCOSE BLDC GLUCOMTR-MCNC: 138 MG/DL (ref 70–99)
GLUCOSE BLDC GLUCOMTR-MCNC: 138 MG/DL (ref 70–99)
GLUCOSE BLDC GLUCOMTR-MCNC: 139 MG/DL (ref 70–99)
GLUCOSE BLDC GLUCOMTR-MCNC: 139 MG/DL (ref 70–99)
GLUCOSE BLDC GLUCOMTR-MCNC: 140 MG/DL (ref 70–99)
GLUCOSE BLDC GLUCOMTR-MCNC: 142 MG/DL (ref 70–99)
GLUCOSE BLDC GLUCOMTR-MCNC: 143 MG/DL (ref 70–99)
GLUCOSE BLDC GLUCOMTR-MCNC: 143 MG/DL (ref 70–99)
GLUCOSE BLDC GLUCOMTR-MCNC: 144 MG/DL (ref 70–99)
GLUCOSE BLDC GLUCOMTR-MCNC: 144 MG/DL (ref 70–99)
GLUCOSE BLDC GLUCOMTR-MCNC: 145 MG/DL (ref 70–99)
GLUCOSE BLDC GLUCOMTR-MCNC: 145 MG/DL (ref 70–99)
GLUCOSE BLDC GLUCOMTR-MCNC: 146 MG/DL (ref 70–99)
GLUCOSE BLDC GLUCOMTR-MCNC: 147 MG/DL (ref 70–99)
GLUCOSE BLDC GLUCOMTR-MCNC: 148 MG/DL (ref 70–99)
GLUCOSE BLDC GLUCOMTR-MCNC: 150 MG/DL (ref 70–99)
GLUCOSE BLDC GLUCOMTR-MCNC: 151 MG/DL (ref 70–99)
GLUCOSE BLDC GLUCOMTR-MCNC: 151 MG/DL (ref 70–99)
GLUCOSE BLDC GLUCOMTR-MCNC: 152 MG/DL (ref 70–99)
GLUCOSE BLDC GLUCOMTR-MCNC: 153 MG/DL (ref 70–99)
GLUCOSE BLDC GLUCOMTR-MCNC: 154 MG/DL (ref 70–99)
GLUCOSE BLDC GLUCOMTR-MCNC: 155 MG/DL (ref 70–99)
GLUCOSE BLDC GLUCOMTR-MCNC: 156 MG/DL (ref 70–99)
GLUCOSE BLDC GLUCOMTR-MCNC: 157 MG/DL (ref 70–99)
GLUCOSE BLDC GLUCOMTR-MCNC: 158 MG/DL (ref 70–99)
GLUCOSE BLDC GLUCOMTR-MCNC: 158 MG/DL (ref 70–99)
GLUCOSE BLDC GLUCOMTR-MCNC: 159 MG/DL (ref 70–99)
GLUCOSE BLDC GLUCOMTR-MCNC: 160 MG/DL (ref 70–99)
GLUCOSE BLDC GLUCOMTR-MCNC: 160 MG/DL (ref 70–99)
GLUCOSE BLDC GLUCOMTR-MCNC: 161 MG/DL (ref 70–99)
GLUCOSE BLDC GLUCOMTR-MCNC: 161 MG/DL (ref 70–99)
GLUCOSE BLDC GLUCOMTR-MCNC: 162 MG/DL (ref 70–99)
GLUCOSE BLDC GLUCOMTR-MCNC: 163 MG/DL (ref 70–99)
GLUCOSE BLDC GLUCOMTR-MCNC: 164 MG/DL (ref 70–99)
GLUCOSE BLDC GLUCOMTR-MCNC: 164 MG/DL (ref 70–99)
GLUCOSE BLDC GLUCOMTR-MCNC: 165 MG/DL (ref 70–99)
GLUCOSE BLDC GLUCOMTR-MCNC: 165 MG/DL (ref 70–99)
GLUCOSE BLDC GLUCOMTR-MCNC: 166 MG/DL (ref 70–99)
GLUCOSE BLDC GLUCOMTR-MCNC: 166 MG/DL (ref 70–99)
GLUCOSE BLDC GLUCOMTR-MCNC: 167 MG/DL (ref 70–99)
GLUCOSE BLDC GLUCOMTR-MCNC: 167 MG/DL (ref 70–99)
GLUCOSE BLDC GLUCOMTR-MCNC: 168 MG/DL (ref 70–99)
GLUCOSE BLDC GLUCOMTR-MCNC: 168 MG/DL (ref 70–99)
GLUCOSE BLDC GLUCOMTR-MCNC: 169 MG/DL (ref 70–99)
GLUCOSE BLDC GLUCOMTR-MCNC: 170 MG/DL (ref 70–99)
GLUCOSE BLDC GLUCOMTR-MCNC: 171 MG/DL (ref 70–99)
GLUCOSE BLDC GLUCOMTR-MCNC: 171 MG/DL (ref 70–99)
GLUCOSE BLDC GLUCOMTR-MCNC: 173 MG/DL (ref 70–99)
GLUCOSE BLDC GLUCOMTR-MCNC: 174 MG/DL (ref 70–99)
GLUCOSE BLDC GLUCOMTR-MCNC: 174 MG/DL (ref 70–99)
GLUCOSE BLDC GLUCOMTR-MCNC: 175 MG/DL (ref 70–99)
GLUCOSE BLDC GLUCOMTR-MCNC: 175 MG/DL (ref 70–99)
GLUCOSE BLDC GLUCOMTR-MCNC: 176 MG/DL (ref 70–99)
GLUCOSE BLDC GLUCOMTR-MCNC: 178 MG/DL (ref 70–99)
GLUCOSE BLDC GLUCOMTR-MCNC: 179 MG/DL (ref 70–99)
GLUCOSE BLDC GLUCOMTR-MCNC: 180 MG/DL (ref 70–99)
GLUCOSE BLDC GLUCOMTR-MCNC: 181 MG/DL (ref 70–99)
GLUCOSE BLDC GLUCOMTR-MCNC: 182 MG/DL (ref 70–99)
GLUCOSE BLDC GLUCOMTR-MCNC: 184 MG/DL (ref 70–99)
GLUCOSE BLDC GLUCOMTR-MCNC: 184 MG/DL (ref 70–99)
GLUCOSE BLDC GLUCOMTR-MCNC: 185 MG/DL (ref 70–99)
GLUCOSE BLDC GLUCOMTR-MCNC: 187 MG/DL (ref 70–99)
GLUCOSE BLDC GLUCOMTR-MCNC: 191 MG/DL (ref 70–99)
GLUCOSE BLDC GLUCOMTR-MCNC: 192 MG/DL (ref 70–99)
GLUCOSE BLDC GLUCOMTR-MCNC: 195 MG/DL (ref 70–99)
GLUCOSE BLDC GLUCOMTR-MCNC: 196 MG/DL (ref 70–99)
GLUCOSE BLDC GLUCOMTR-MCNC: 196 MG/DL (ref 70–99)
GLUCOSE BLDC GLUCOMTR-MCNC: 198 MG/DL (ref 70–99)
GLUCOSE BLDC GLUCOMTR-MCNC: 199 MG/DL (ref 70–99)
GLUCOSE BLDC GLUCOMTR-MCNC: 200 MG/DL (ref 70–99)
GLUCOSE BLDC GLUCOMTR-MCNC: 200 MG/DL (ref 70–99)
GLUCOSE BLDC GLUCOMTR-MCNC: 202 MG/DL (ref 70–99)
GLUCOSE BLDC GLUCOMTR-MCNC: 203 MG/DL (ref 70–99)
GLUCOSE BLDC GLUCOMTR-MCNC: 204 MG/DL (ref 70–99)
GLUCOSE BLDC GLUCOMTR-MCNC: 205 MG/DL (ref 70–99)
GLUCOSE BLDC GLUCOMTR-MCNC: 208 MG/DL (ref 70–99)
GLUCOSE BLDC GLUCOMTR-MCNC: 208 MG/DL (ref 70–99)
GLUCOSE BLDC GLUCOMTR-MCNC: 210 MG/DL (ref 70–99)
GLUCOSE BLDC GLUCOMTR-MCNC: 211 MG/DL (ref 70–99)
GLUCOSE BLDC GLUCOMTR-MCNC: 214 MG/DL (ref 70–99)
GLUCOSE BLDC GLUCOMTR-MCNC: 214 MG/DL (ref 70–99)
GLUCOSE BLDC GLUCOMTR-MCNC: 215 MG/DL (ref 70–99)
GLUCOSE BLDC GLUCOMTR-MCNC: 215 MG/DL (ref 70–99)
GLUCOSE BLDC GLUCOMTR-MCNC: 216 MG/DL (ref 70–99)
GLUCOSE BLDC GLUCOMTR-MCNC: 217 MG/DL (ref 70–99)
GLUCOSE BLDC GLUCOMTR-MCNC: 218 MG/DL (ref 70–99)
GLUCOSE BLDC GLUCOMTR-MCNC: 219 MG/DL (ref 70–99)
GLUCOSE BLDC GLUCOMTR-MCNC: 220 MG/DL (ref 70–99)
GLUCOSE BLDC GLUCOMTR-MCNC: 222 MG/DL (ref 70–99)
GLUCOSE BLDC GLUCOMTR-MCNC: 222 MG/DL (ref 70–99)
GLUCOSE BLDC GLUCOMTR-MCNC: 223 MG/DL (ref 70–99)
GLUCOSE BLDC GLUCOMTR-MCNC: 225 MG/DL (ref 70–99)
GLUCOSE BLDC GLUCOMTR-MCNC: 226 MG/DL (ref 70–99)
GLUCOSE BLDC GLUCOMTR-MCNC: 230 MG/DL (ref 70–99)
GLUCOSE BLDC GLUCOMTR-MCNC: 231 MG/DL (ref 70–99)
GLUCOSE BLDC GLUCOMTR-MCNC: 231 MG/DL (ref 70–99)
GLUCOSE BLDC GLUCOMTR-MCNC: 232 MG/DL (ref 70–99)
GLUCOSE BLDC GLUCOMTR-MCNC: 234 MG/DL (ref 70–99)
GLUCOSE BLDC GLUCOMTR-MCNC: 234 MG/DL (ref 70–99)
GLUCOSE BLDC GLUCOMTR-MCNC: 235 MG/DL (ref 70–99)
GLUCOSE BLDC GLUCOMTR-MCNC: 238 MG/DL (ref 70–99)
GLUCOSE BLDC GLUCOMTR-MCNC: 240 MG/DL (ref 70–99)
GLUCOSE BLDC GLUCOMTR-MCNC: 242 MG/DL (ref 70–99)
GLUCOSE BLDC GLUCOMTR-MCNC: 242 MG/DL (ref 70–99)
GLUCOSE BLDC GLUCOMTR-MCNC: 243 MG/DL (ref 70–99)
GLUCOSE BLDC GLUCOMTR-MCNC: 243 MG/DL (ref 70–99)
GLUCOSE BLDC GLUCOMTR-MCNC: 244 MG/DL (ref 70–99)
GLUCOSE BLDC GLUCOMTR-MCNC: 244 MG/DL (ref 70–99)
GLUCOSE BLDC GLUCOMTR-MCNC: 250 MG/DL (ref 70–99)
GLUCOSE BLDC GLUCOMTR-MCNC: 251 MG/DL (ref 70–99)
GLUCOSE BLDC GLUCOMTR-MCNC: 251 MG/DL (ref 70–99)
GLUCOSE BLDC GLUCOMTR-MCNC: 252 MG/DL (ref 70–99)
GLUCOSE BLDC GLUCOMTR-MCNC: 254 MG/DL (ref 70–99)
GLUCOSE BLDC GLUCOMTR-MCNC: 255 MG/DL (ref 70–99)
GLUCOSE BLDC GLUCOMTR-MCNC: 257 MG/DL (ref 70–99)
GLUCOSE BLDC GLUCOMTR-MCNC: 257 MG/DL (ref 70–99)
GLUCOSE BLDC GLUCOMTR-MCNC: 259 MG/DL (ref 70–99)
GLUCOSE BLDC GLUCOMTR-MCNC: 260 MG/DL (ref 70–99)
GLUCOSE BLDC GLUCOMTR-MCNC: 261 MG/DL (ref 70–99)
GLUCOSE BLDC GLUCOMTR-MCNC: 263 MG/DL (ref 70–99)
GLUCOSE BLDC GLUCOMTR-MCNC: 266 MG/DL (ref 70–99)
GLUCOSE BLDC GLUCOMTR-MCNC: 266 MG/DL (ref 70–99)
GLUCOSE BLDC GLUCOMTR-MCNC: 267 MG/DL (ref 70–99)
GLUCOSE BLDC GLUCOMTR-MCNC: 275 MG/DL (ref 70–99)
GLUCOSE BLDC GLUCOMTR-MCNC: 275 MG/DL (ref 70–99)
GLUCOSE BLDC GLUCOMTR-MCNC: 280 MG/DL (ref 70–99)
GLUCOSE BLDC GLUCOMTR-MCNC: 280 MG/DL (ref 70–99)
GLUCOSE BLDC GLUCOMTR-MCNC: 284 MG/DL (ref 70–99)
GLUCOSE BLDC GLUCOMTR-MCNC: 287 MG/DL (ref 70–99)
GLUCOSE BLDC GLUCOMTR-MCNC: 289 MG/DL (ref 70–99)
GLUCOSE BLDC GLUCOMTR-MCNC: 291 MG/DL (ref 70–99)
GLUCOSE BLDC GLUCOMTR-MCNC: 292 MG/DL (ref 70–99)
GLUCOSE BLDC GLUCOMTR-MCNC: 293 MG/DL (ref 70–99)
GLUCOSE BLDC GLUCOMTR-MCNC: 295 MG/DL (ref 70–99)
GLUCOSE BLDC GLUCOMTR-MCNC: 296 MG/DL (ref 70–99)
GLUCOSE BLDC GLUCOMTR-MCNC: 302 MG/DL (ref 70–99)
GLUCOSE BLDC GLUCOMTR-MCNC: 305 MG/DL (ref 70–99)
GLUCOSE BLDC GLUCOMTR-MCNC: 309 MG/DL (ref 70–99)
GLUCOSE BLDC GLUCOMTR-MCNC: 311 MG/DL (ref 70–99)
GLUCOSE BLDC GLUCOMTR-MCNC: 314 MG/DL (ref 70–99)
GLUCOSE BLDC GLUCOMTR-MCNC: 32 MG/DL (ref 70–99)
GLUCOSE BLDC GLUCOMTR-MCNC: 322 MG/DL (ref 70–99)
GLUCOSE BLDC GLUCOMTR-MCNC: 324 MG/DL (ref 70–99)
GLUCOSE BLDC GLUCOMTR-MCNC: 324 MG/DL (ref 70–99)
GLUCOSE BLDC GLUCOMTR-MCNC: 326 MG/DL (ref 70–99)
GLUCOSE BLDC GLUCOMTR-MCNC: 330 MG/DL (ref 70–99)
GLUCOSE BLDC GLUCOMTR-MCNC: 331 MG/DL (ref 70–99)
GLUCOSE BLDC GLUCOMTR-MCNC: 334 MG/DL (ref 70–99)
GLUCOSE BLDC GLUCOMTR-MCNC: 336 MG/DL (ref 70–99)
GLUCOSE BLDC GLUCOMTR-MCNC: 339 MG/DL (ref 70–99)
GLUCOSE BLDC GLUCOMTR-MCNC: 379 MG/DL (ref 70–99)
GLUCOSE BLDC GLUCOMTR-MCNC: 41 MG/DL (ref 70–99)
GLUCOSE BLDC GLUCOMTR-MCNC: 47 MG/DL (ref 70–99)
GLUCOSE BLDC GLUCOMTR-MCNC: 52 MG/DL (ref 70–99)
GLUCOSE BLDC GLUCOMTR-MCNC: 53 MG/DL (ref 70–99)
GLUCOSE BLDC GLUCOMTR-MCNC: 54 MG/DL (ref 70–99)
GLUCOSE BLDC GLUCOMTR-MCNC: 55 MG/DL (ref 70–99)
GLUCOSE BLDC GLUCOMTR-MCNC: 55 MG/DL (ref 70–99)
GLUCOSE BLDC GLUCOMTR-MCNC: 56 MG/DL (ref 70–99)
GLUCOSE BLDC GLUCOMTR-MCNC: 57 MG/DL (ref 70–99)
GLUCOSE BLDC GLUCOMTR-MCNC: 63 MG/DL (ref 70–99)
GLUCOSE BLDC GLUCOMTR-MCNC: 64 MG/DL (ref 70–99)
GLUCOSE BLDC GLUCOMTR-MCNC: 64 MG/DL (ref 70–99)
GLUCOSE BLDC GLUCOMTR-MCNC: 65 MG/DL (ref 70–99)
GLUCOSE BLDC GLUCOMTR-MCNC: 66 MG/DL (ref 70–99)
GLUCOSE BLDC GLUCOMTR-MCNC: 67 MG/DL (ref 70–99)
GLUCOSE BLDC GLUCOMTR-MCNC: 68 MG/DL (ref 70–99)
GLUCOSE BLDC GLUCOMTR-MCNC: 68 MG/DL (ref 70–99)
GLUCOSE BLDC GLUCOMTR-MCNC: 70 MG/DL (ref 70–99)
GLUCOSE BLDC GLUCOMTR-MCNC: 71 MG/DL (ref 70–99)
GLUCOSE BLDC GLUCOMTR-MCNC: 71 MG/DL (ref 70–99)
GLUCOSE BLDC GLUCOMTR-MCNC: 73 MG/DL (ref 70–99)
GLUCOSE BLDC GLUCOMTR-MCNC: 73 MG/DL (ref 70–99)
GLUCOSE BLDC GLUCOMTR-MCNC: 77 MG/DL (ref 70–99)
GLUCOSE BLDC GLUCOMTR-MCNC: 77 MG/DL (ref 70–99)
GLUCOSE BLDC GLUCOMTR-MCNC: 79 MG/DL (ref 70–99)
GLUCOSE BLDC GLUCOMTR-MCNC: 81 MG/DL (ref 70–99)
GLUCOSE BLDC GLUCOMTR-MCNC: 83 MG/DL (ref 70–99)
GLUCOSE BLDC GLUCOMTR-MCNC: 84 MG/DL (ref 70–99)
GLUCOSE BLDC GLUCOMTR-MCNC: 85 MG/DL (ref 70–99)
GLUCOSE BLDC GLUCOMTR-MCNC: 85 MG/DL (ref 70–99)
GLUCOSE BLDC GLUCOMTR-MCNC: 86 MG/DL (ref 70–99)
GLUCOSE BLDC GLUCOMTR-MCNC: 87 MG/DL (ref 70–99)
GLUCOSE BLDC GLUCOMTR-MCNC: 87 MG/DL (ref 70–99)
GLUCOSE BLDC GLUCOMTR-MCNC: 89 MG/DL (ref 70–99)
GLUCOSE BLDC GLUCOMTR-MCNC: 90 MG/DL (ref 70–99)
GLUCOSE BLDC GLUCOMTR-MCNC: 91 MG/DL (ref 70–99)
GLUCOSE BLDC GLUCOMTR-MCNC: 91 MG/DL (ref 70–99)
GLUCOSE BLDC GLUCOMTR-MCNC: 92 MG/DL (ref 70–99)
GLUCOSE BLDC GLUCOMTR-MCNC: 94 MG/DL (ref 70–99)
GLUCOSE BLDC GLUCOMTR-MCNC: 95 MG/DL (ref 70–99)
GLUCOSE BLDC GLUCOMTR-MCNC: 97 MG/DL (ref 70–99)
GLUCOSE BLDC GLUCOMTR-MCNC: 98 MG/DL (ref 70–99)
GLUCOSE BLDC GLUCOMTR-MCNC: 99 MG/DL (ref 70–99)
GLUCOSE BLDC GLUCOMTR-MCNC: <10 MG/DL (ref 70–99)
GLUCOSE UR STRIP-MCNC: NEGATIVE MG/DL
GLUCOSE UR STRIP-MCNC: NEGATIVE MG/DL
GRAM STAIN RESULT: NORMAL
HBA1C MFR BLD: 6.7 %
HBA1C MFR BLD: 7 %
HCO3 BLD-SCNC: 27 MMOL/L (ref 23–29)
HCO3 BLD-SCNC: 28 MMOL/L (ref 23–29)
HCO3 BLDV-SCNC: 26 MMOL/L (ref 24–30)
HCT VFR BLD AUTO: 27.4 % (ref 40–53)
HCT VFR BLD AUTO: 27.5 % (ref 40–53)
HCT VFR BLD AUTO: 28.2 % (ref 40–53)
HCT VFR BLD AUTO: 28.4 % (ref 40–53)
HCT VFR BLD AUTO: 28.4 % (ref 40–53)
HCT VFR BLD AUTO: 28.5 % (ref 40–53)
HCT VFR BLD AUTO: 28.6 % (ref 40–53)
HCT VFR BLD AUTO: 28.8 % (ref 40–53)
HCT VFR BLD AUTO: 29 % (ref 40–53)
HCT VFR BLD AUTO: 29.5 % (ref 40–53)
HCT VFR BLD AUTO: 29.7 % (ref 40–53)
HCT VFR BLD AUTO: 30.1 % (ref 40–53)
HCT VFR BLD AUTO: 30.2 % (ref 40–53)
HCT VFR BLD AUTO: 30.5 % (ref 40–53)
HCT VFR BLD AUTO: 30.9 % (ref 40–53)
HCT VFR BLD AUTO: 31.2 % (ref 40–53)
HCT VFR BLD AUTO: 31.6 % (ref 40–53)
HCT VFR BLD AUTO: 31.6 % (ref 40–53)
HCT VFR BLD AUTO: 31.7 % (ref 40–53)
HCT VFR BLD AUTO: 31.7 % (ref 40–53)
HCT VFR BLD AUTO: 32.3 % (ref 40–53)
HCT VFR BLD AUTO: 32.4 % (ref 40–53)
HCT VFR BLD AUTO: 32.8 % (ref 40–53)
HCT VFR BLD AUTO: 33.5 % (ref 40–53)
HCT VFR BLD AUTO: 33.8 % (ref 40–53)
HGB BLD-MCNC: 10.1 G/DL (ref 13.3–17.7)
HGB BLD-MCNC: 10.2 G/DL (ref 13.3–17.7)
HGB BLD-MCNC: 10.3 G/DL (ref 13.3–17.7)
HGB BLD-MCNC: 10.4 G/DL (ref 13.3–17.7)
HGB BLD-MCNC: 10.5 G/DL (ref 13.3–17.7)
HGB BLD-MCNC: 10.5 G/DL (ref 13.3–17.7)
HGB BLD-MCNC: 10.6 G/DL (ref 13.3–17.7)
HGB BLD-MCNC: 10.7 G/DL (ref 13.3–17.7)
HGB BLD-MCNC: 10.8 G/DL (ref 13.3–17.7)
HGB BLD-MCNC: 11 G/DL (ref 13.3–17.7)
HGB BLD-MCNC: 11.1 G/DL (ref 13.3–17.7)
HGB BLD-MCNC: 11.2 G/DL (ref 13.3–17.7)
HGB BLD-MCNC: 11.3 G/DL (ref 13.3–17.7)
HGB BLD-MCNC: 11.4 G/DL (ref 13.3–17.7)
HGB BLD-MCNC: 11.7 G/DL (ref 13.3–17.7)
HGB BLD-MCNC: 11.8 G/DL (ref 13.3–17.7)
HGB BLD-MCNC: 9.6 G/DL (ref 13.3–17.7)
HGB BLD-MCNC: 9.7 G/DL (ref 13.3–17.7)
HGB BLD-MCNC: 9.7 G/DL (ref 13.3–17.7)
HGB BLD-MCNC: 9.8 G/DL (ref 13.3–17.7)
HGB BLD-MCNC: 9.9 G/DL (ref 13.3–17.7)
HGB UR QL STRIP: ABNORMAL
HGB UR QL STRIP: ABNORMAL
HOLD SPECIMEN: NORMAL
HYALINE CASTS: 9 /LPF
IL6 SERPL-MCNC: 61.54 PG/ML
IMM GRANULOCYTES # BLD: 0 10E3/UL
IMM GRANULOCYTES # BLD: 0.1 10E3/UL
IMM GRANULOCYTES NFR BLD: 0 %
IMM GRANULOCYTES NFR BLD: 0 %
IMM GRANULOCYTES NFR BLD: 1 %
INR PPP: 1.67 (ref 0.9–1.15)
INR PPP: 1.69 (ref 0.9–1.15)
INR PPP: 1.72 (ref 0.9–1.15)
INTERPRETATION ECG - MUSE: NORMAL
ION CA PH 7.4: 1.13 MMOL/L (ref 1.11–1.3)
ION CA PH 7.4: 1.14 MMOL/L (ref 1.11–1.3)
ION CA PH 7.4: 1.2 MMOL/L (ref 1.11–1.3)
KETONES UR STRIP-MCNC: ABNORMAL MG/DL
KETONES UR STRIP-MCNC: NEGATIVE MG/DL
LACTATE SERPL-SCNC: 1 MMOL/L (ref 0.7–2)
LACTATE SERPL-SCNC: 1 MMOL/L (ref 0.7–2)
LACTATE SERPL-SCNC: 1.2 MMOL/L (ref 0.7–2)
LACTATE SERPL-SCNC: 1.3 MMOL/L (ref 0.7–2)
LACTATE SERPL-SCNC: 1.3 MMOL/L (ref 0.7–2)
LACTATE SERPL-SCNC: 1.6 MMOL/L (ref 0.7–2)
LACTATE SERPL-SCNC: 1.7 MMOL/L (ref 0.7–2)
LACTATE SERPL-SCNC: 1.7 MMOL/L (ref 0.7–2)
LACTATE SERPL-SCNC: 2.1 MMOL/L (ref 0.7–2)
LACTATE SERPL-SCNC: 2.1 MMOL/L (ref 0.7–2)
LACTATE SERPL-SCNC: 2.3 MMOL/L (ref 0.7–2)
LACTATE SERPL-SCNC: 2.4 MMOL/L (ref 0.7–2)
LACTATE SERPL-SCNC: 2.5 MMOL/L (ref 0.7–2)
LACTATE SERPL-SCNC: 3 MMOL/L (ref 0.7–2)
LACTATE SERPL-SCNC: 3.1 MMOL/L (ref 0.7–2)
LACTATE SERPL-SCNC: 3.1 MMOL/L (ref 0.7–2)
LACTATE SERPL-SCNC: 3.3 MMOL/L (ref 0.7–2)
LACTATE SERPL-SCNC: 3.3 MMOL/L (ref 0.7–2)
LACTATE SERPL-SCNC: 3.4 MMOL/L (ref 0.7–2)
LACTATE SERPL-SCNC: 3.6 MMOL/L (ref 0.7–2)
LEUKOCYTE ESTERASE UR QL STRIP: ABNORMAL
LEUKOCYTE ESTERASE UR QL STRIP: ABNORMAL
LIPASE SERPL-CCNC: <9 U/L (ref 0–52)
LIPASE SERPL-CCNC: <9 U/L (ref 0–52)
LVEF ECHO: NORMAL
LYMPHOCYTES # BLD AUTO: 0.7 10E3/UL (ref 0.8–5.3)
LYMPHOCYTES # BLD AUTO: 1.4 10E3/UL (ref 0.8–5.3)
LYMPHOCYTES # BLD AUTO: 1.9 10E3/UL (ref 0.8–5.3)
LYMPHOCYTES # BLD AUTO: 3 10E3/UL (ref 0.8–5.3)
LYMPHOCYTES # BLD AUTO: 3.1 10E3/UL (ref 0.8–5.3)
LYMPHOCYTES # BLD AUTO: 3.8 10E3/UL (ref 0.8–5.3)
LYMPHOCYTES NFR BLD AUTO: 14 %
LYMPHOCYTES NFR BLD AUTO: 22 %
LYMPHOCYTES NFR BLD AUTO: 23 %
LYMPHOCYTES NFR BLD AUTO: 36 %
LYMPHOCYTES NFR BLD AUTO: 5 %
LYMPHOCYTES NFR BLD AUTO: 6 %
MAGNESIUM SERPL-MCNC: 1.1 MG/DL (ref 1.8–2.6)
MAGNESIUM SERPL-MCNC: 1.3 MG/DL (ref 1.8–2.6)
MAGNESIUM SERPL-MCNC: 1.5 MG/DL (ref 1.8–2.6)
MAGNESIUM SERPL-MCNC: 1.6 MG/DL (ref 1.8–2.6)
MAGNESIUM SERPL-MCNC: 1.7 MG/DL (ref 1.8–2.6)
MAGNESIUM SERPL-MCNC: 1.8 MG/DL (ref 1.8–2.6)
MCH RBC QN AUTO: 30.4 PG (ref 26.5–33)
MCH RBC QN AUTO: 30.4 PG (ref 26.5–33)
MCH RBC QN AUTO: 30.5 PG (ref 26.5–33)
MCH RBC QN AUTO: 30.6 PG (ref 26.5–33)
MCH RBC QN AUTO: 30.6 PG (ref 26.5–33)
MCH RBC QN AUTO: 30.7 PG (ref 26.5–33)
MCH RBC QN AUTO: 30.7 PG (ref 26.5–33)
MCH RBC QN AUTO: 30.8 PG (ref 26.5–33)
MCH RBC QN AUTO: 30.9 PG (ref 26.5–33)
MCH RBC QN AUTO: 30.9 PG (ref 26.5–33)
MCH RBC QN AUTO: 31 PG (ref 26.5–33)
MCH RBC QN AUTO: 31.1 PG (ref 26.5–33)
MCH RBC QN AUTO: 31.2 PG (ref 26.5–33)
MCH RBC QN AUTO: 31.3 PG (ref 26.5–33)
MCH RBC QN AUTO: 31.5 PG (ref 26.5–33)
MCHC RBC AUTO-ENTMCNC: 32.8 G/DL (ref 31.5–36.5)
MCHC RBC AUTO-ENTMCNC: 33.9 G/DL (ref 31.5–36.5)
MCHC RBC AUTO-ENTMCNC: 34 G/DL (ref 31.5–36.5)
MCHC RBC AUTO-ENTMCNC: 34 G/DL (ref 31.5–36.5)
MCHC RBC AUTO-ENTMCNC: 34.1 G/DL (ref 31.5–36.5)
MCHC RBC AUTO-ENTMCNC: 34.1 G/DL (ref 31.5–36.5)
MCHC RBC AUTO-ENTMCNC: 34.2 G/DL (ref 31.5–36.5)
MCHC RBC AUTO-ENTMCNC: 34.3 G/DL (ref 31.5–36.5)
MCHC RBC AUTO-ENTMCNC: 34.4 G/DL (ref 31.5–36.5)
MCHC RBC AUTO-ENTMCNC: 34.5 G/DL (ref 31.5–36.5)
MCHC RBC AUTO-ENTMCNC: 34.6 G/DL (ref 31.5–36.5)
MCHC RBC AUTO-ENTMCNC: 34.7 G/DL (ref 31.5–36.5)
MCHC RBC AUTO-ENTMCNC: 34.8 G/DL (ref 31.5–36.5)
MCHC RBC AUTO-ENTMCNC: 34.8 G/DL (ref 31.5–36.5)
MCHC RBC AUTO-ENTMCNC: 34.9 G/DL (ref 31.5–36.5)
MCHC RBC AUTO-ENTMCNC: 35 G/DL (ref 31.5–36.5)
MCHC RBC AUTO-ENTMCNC: 35.1 G/DL (ref 31.5–36.5)
MCHC RBC AUTO-ENTMCNC: 35.3 G/DL (ref 31.5–36.5)
MCHC RBC AUTO-ENTMCNC: 35.3 G/DL (ref 31.5–36.5)
MCHC RBC AUTO-ENTMCNC: 35.4 G/DL (ref 31.5–36.5)
MCHC RBC AUTO-ENTMCNC: 35.7 G/DL (ref 31.5–36.5)
MCV RBC AUTO: 87 FL (ref 78–100)
MCV RBC AUTO: 87 FL (ref 78–100)
MCV RBC AUTO: 88 FL (ref 78–100)
MCV RBC AUTO: 89 FL (ref 78–100)
MCV RBC AUTO: 90 FL (ref 78–100)
MCV RBC AUTO: 91 FL (ref 78–100)
MCV RBC AUTO: 91 FL (ref 78–100)
MCV RBC AUTO: 94 FL (ref 78–100)
MONOCYTES # BLD AUTO: 0.4 10E3/UL (ref 0–1.3)
MONOCYTES # BLD AUTO: 0.5 10E3/UL (ref 0–1.3)
MONOCYTES # BLD AUTO: 0.6 10E3/UL (ref 0–1.3)
MONOCYTES # BLD AUTO: 0.6 10E3/UL (ref 0–1.3)
MONOCYTES # BLD AUTO: 0.7 10E3/UL (ref 0–1.3)
MONOCYTES # BLD AUTO: 0.9 10E3/UL (ref 0–1.3)
MONOCYTES NFR BLD AUTO: 2 %
MONOCYTES NFR BLD AUTO: 4 %
MONOCYTES NFR BLD AUTO: 7 %
MONOCYTES NFR BLD AUTO: 7 %
MRSA DNA SPEC QL NAA+PROBE: NEGATIVE
MRSA DNA SPEC QL NAA+PROBE: NEGATIVE
MUCOUS THREADS #/AREA URNS LPF: PRESENT /LPF
MUCOUS THREADS #/AREA URNS LPF: PRESENT /LPF
NEUTROPHILS # BLD AUTO: 10.1 10E3/UL (ref 1.6–8.3)
NEUTROPHILS # BLD AUTO: 10.4 10E3/UL (ref 1.6–8.3)
NEUTROPHILS # BLD AUTO: 14.7 10E3/UL (ref 1.6–8.3)
NEUTROPHILS # BLD AUTO: 22.6 10E3/UL (ref 1.6–8.3)
NEUTROPHILS # BLD AUTO: 5.5 10E3/UL (ref 1.6–8.3)
NEUTROPHILS # BLD AUTO: 9.2 10E3/UL (ref 1.6–8.3)
NEUTROPHILS NFR BLD AUTO: 51 %
NEUTROPHILS NFR BLD AUTO: 67 %
NEUTROPHILS NFR BLD AUTO: 69 %
NEUTROPHILS NFR BLD AUTO: 78 %
NEUTROPHILS NFR BLD AUTO: 90 %
NEUTROPHILS NFR BLD AUTO: 90 %
NITRATE UR QL: NEGATIVE
NITRATE UR QL: NEGATIVE
NRBC # BLD AUTO: 0 10E3/UL
NRBC BLD AUTO-RTO: 0 /100
O2/TOTAL GAS SETTING VFR VENT: 35 %
O2/TOTAL GAS SETTING VFR VENT: 80 %
OXYHGB MFR BLD: 95.1 % (ref 96–97)
OXYHGB MFR BLD: 95.6 % (ref 96–97)
OXYHGB MFR BLDV: 54.3 % (ref 70–75)
P AXIS - MUSE: 47 DEGREES
P AXIS - MUSE: 51 DEGREES
P AXIS - MUSE: 76 DEGREES
PCO2 BLD: 44 MM HG (ref 35–45)
PCO2 BLD: 44 MM HG (ref 35–45)
PCO2 BLDV: 48 MM HG (ref 35–50)
PEEP: 10 CM H2O
PEEP: 8 CM H2O
PH BLD: 7.4 [PH] (ref 7.37–7.44)
PH BLD: 7.43 [PH] (ref 7.37–7.44)
PH BLDV: 7.38 [PH] (ref 7.35–7.45)
PH UR STRIP: 5 [PH] (ref 5–7)
PH UR STRIP: 6.5 [PH] (ref 5–7)
PH: 7.5 (ref 7.35–7.45)
PH: 7.52 (ref 7.35–7.45)
PH: 7.52 (ref 7.35–7.45)
PHOSPHATE SERPL-MCNC: 2.4 MG/DL (ref 2.5–4.5)
PHOSPHATE SERPL-MCNC: 3.2 MG/DL (ref 2.5–4.5)
PLATELET # BLD AUTO: 141 10E3/UL (ref 150–450)
PLATELET # BLD AUTO: 143 10E3/UL (ref 150–450)
PLATELET # BLD AUTO: 157 10E3/UL (ref 150–450)
PLATELET # BLD AUTO: 161 10E3/UL (ref 150–450)
PLATELET # BLD AUTO: 166 10E3/UL (ref 150–450)
PLATELET # BLD AUTO: 192 10E3/UL (ref 150–450)
PLATELET # BLD AUTO: 202 10E3/UL (ref 150–450)
PLATELET # BLD AUTO: 202 10E3/UL (ref 150–450)
PLATELET # BLD AUTO: 205 10E3/UL (ref 150–450)
PLATELET # BLD AUTO: 205 10E3/UL (ref 150–450)
PLATELET # BLD AUTO: 206 10E3/UL (ref 150–450)
PLATELET # BLD AUTO: 214 10E3/UL (ref 150–450)
PLATELET # BLD AUTO: 222 10E3/UL (ref 150–450)
PLATELET # BLD AUTO: 223 10E3/UL (ref 150–450)
PLATELET # BLD AUTO: 226 10E3/UL (ref 150–450)
PLATELET # BLD AUTO: 229 10E3/UL (ref 150–450)
PLATELET # BLD AUTO: 233 10E3/UL (ref 150–450)
PLATELET # BLD AUTO: 258 10E3/UL (ref 150–450)
PLATELET # BLD AUTO: 259 10E3/UL (ref 150–450)
PLATELET # BLD AUTO: 269 10E3/UL (ref 150–450)
PLATELET # BLD AUTO: 274 10E3/UL (ref 150–450)
PLATELET # BLD AUTO: 282 10E3/UL (ref 150–450)
PLATELET # BLD AUTO: 284 10E3/UL (ref 150–450)
PLATELET # BLD AUTO: 285 10E3/UL (ref 150–450)
PLATELET # BLD AUTO: 294 10E3/UL (ref 150–450)
PLATELET # BLD AUTO: 297 10E3/UL (ref 150–450)
PLATELET # BLD AUTO: 303 10E3/UL (ref 150–450)
PO2 BLD: 83 MM HG (ref 80–90)
PO2 BLD: 84 MM HG (ref 80–90)
PO2 BLDV: 32 MM HG (ref 25–47)
POTASSIUM BLD-SCNC: 2.9 MMOL/L (ref 3.5–5)
POTASSIUM BLD-SCNC: 3 MMOL/L (ref 3.5–5)
POTASSIUM BLD-SCNC: 3.2 MMOL/L (ref 3.5–5)
POTASSIUM BLD-SCNC: 3.2 MMOL/L (ref 3.5–5)
POTASSIUM BLD-SCNC: 3.3 MMOL/L (ref 3.5–5)
POTASSIUM BLD-SCNC: 3.4 MMOL/L (ref 3.5–5)
POTASSIUM BLD-SCNC: 3.5 MMOL/L (ref 3.5–5)
POTASSIUM BLD-SCNC: 3.6 MMOL/L (ref 3.5–5)
POTASSIUM BLD-SCNC: 3.7 MMOL/L (ref 3.5–5)
POTASSIUM BLD-SCNC: 3.7 MMOL/L (ref 3.5–5)
POTASSIUM BLD-SCNC: 3.8 MMOL/L (ref 3.5–5)
POTASSIUM BLD-SCNC: 3.9 MMOL/L (ref 3.5–5)
POTASSIUM BLD-SCNC: 4 MMOL/L (ref 3.5–5)
POTASSIUM BLD-SCNC: 4.1 MMOL/L (ref 3.5–5)
POTASSIUM BLD-SCNC: 4.2 MMOL/L (ref 3.5–5)
POTASSIUM BLD-SCNC: 4.3 MMOL/L (ref 3.5–5)
POTASSIUM BLD-SCNC: 4.3 MMOL/L (ref 3.5–5)
POTASSIUM BLD-SCNC: 4.4 MMOL/L (ref 3.5–5)
POTASSIUM BLD-SCNC: 4.5 MMOL/L (ref 3.5–5)
POTASSIUM BLD-SCNC: 4.6 MMOL/L (ref 3.5–5)
POTASSIUM BLD-SCNC: 4.7 MMOL/L (ref 3.5–5)
POTASSIUM BLD-SCNC: 4.8 MMOL/L (ref 3.5–5)
PR INTERVAL - MUSE: 154 MS
PR INTERVAL - MUSE: 154 MS
PR INTERVAL - MUSE: 192 MS
PROCALCITONIN SERPL-MCNC: 0.23 NG/ML (ref 0–0.49)
PROCALCITONIN SERPL-MCNC: 0.31 NG/ML (ref 0–0.49)
PROCALCITONIN SERPL-MCNC: 0.36 NG/ML (ref 0–0.49)
PROCALCITONIN SERPL-MCNC: 0.73 NG/ML (ref 0–0.49)
PROCALCITONIN SERPL-MCNC: 2.52 NG/ML (ref 0–0.49)
PROT SERPL-MCNC: 3.3 G/DL (ref 6–8)
PROT SERPL-MCNC: 3.7 G/DL (ref 6–8)
PROT SERPL-MCNC: 3.8 G/DL (ref 6–8)
PROT SERPL-MCNC: 4 G/DL (ref 6–8)
PROT SERPL-MCNC: 4 G/DL (ref 6–8)
PROT SERPL-MCNC: 4.3 G/DL (ref 6–8)
PROT SERPL-MCNC: 4.5 G/DL (ref 6–8)
PROT SERPL-MCNC: 4.6 G/DL (ref 6–8)
PROT SERPL-MCNC: 4.9 G/DL (ref 6–8)
PROT SERPL-MCNC: 5 G/DL (ref 6–8)
PROT SERPL-MCNC: 5 G/DL (ref 6–8)
PROT SERPL-MCNC: 5.2 G/DL (ref 6–8)
PROT SERPL-MCNC: 5.9 G/DL (ref 6–8)
QRS DURATION - MUSE: 62 MS
QRS DURATION - MUSE: 72 MS
QRS DURATION - MUSE: 74 MS
QT - MUSE: 286 MS
QT - MUSE: 354 MS
QT - MUSE: 396 MS
QTC - MUSE: 422 MS
QTC - MUSE: 476 MS
QTC - MUSE: 536 MS
R AXIS - MUSE: -7 DEGREES
R AXIS - MUSE: 12 DEGREES
R AXIS - MUSE: 19 DEGREES
RATE: 16 RR/MIN
RATE: 16 RR/MIN
RBC # BLD AUTO: 3.08 10E6/UL (ref 4.4–5.9)
RBC # BLD AUTO: 3.1 10E6/UL (ref 4.4–5.9)
RBC # BLD AUTO: 3.14 10E6/UL (ref 4.4–5.9)
RBC # BLD AUTO: 3.15 10E6/UL (ref 4.4–5.9)
RBC # BLD AUTO: 3.16 10E6/UL (ref 4.4–5.9)
RBC # BLD AUTO: 3.17 10E6/UL (ref 4.4–5.9)
RBC # BLD AUTO: 3.18 10E6/UL (ref 4.4–5.9)
RBC # BLD AUTO: 3.2 10E6/UL (ref 4.4–5.9)
RBC # BLD AUTO: 3.21 10E6/UL (ref 4.4–5.9)
RBC # BLD AUTO: 3.29 10E6/UL (ref 4.4–5.9)
RBC # BLD AUTO: 3.29 10E6/UL (ref 4.4–5.9)
RBC # BLD AUTO: 3.35 10E6/UL (ref 4.4–5.9)
RBC # BLD AUTO: 3.36 10E6/UL (ref 4.4–5.9)
RBC # BLD AUTO: 3.37 10E6/UL (ref 4.4–5.9)
RBC # BLD AUTO: 3.39 10E6/UL (ref 4.4–5.9)
RBC # BLD AUTO: 3.39 10E6/UL (ref 4.4–5.9)
RBC # BLD AUTO: 3.42 10E6/UL (ref 4.4–5.9)
RBC # BLD AUTO: 3.43 10E6/UL (ref 4.4–5.9)
RBC # BLD AUTO: 3.44 10E6/UL (ref 4.4–5.9)
RBC # BLD AUTO: 3.47 10E6/UL (ref 4.4–5.9)
RBC # BLD AUTO: 3.53 10E6/UL (ref 4.4–5.9)
RBC # BLD AUTO: 3.57 10E6/UL (ref 4.4–5.9)
RBC # BLD AUTO: 3.59 10E6/UL (ref 4.4–5.9)
RBC # BLD AUTO: 3.61 10E6/UL (ref 4.4–5.9)
RBC # BLD AUTO: 3.64 10E6/UL (ref 4.4–5.9)
RBC # BLD AUTO: 3.64 10E6/UL (ref 4.4–5.9)
RBC # BLD AUTO: 3.71 10E6/UL (ref 4.4–5.9)
RBC # BLD AUTO: 3.82 10E6/UL (ref 4.4–5.9)
RBC # BLD AUTO: 3.83 10E6/UL (ref 4.4–5.9)
RBC URINE: 10 /HPF
RBC URINE: 9 /HPF
SA TARGET DNA: NEGATIVE
SA TARGET DNA: NEGATIVE
SAO2 % BLDV: 55.1 % (ref 70–75)
SARS-COV-2 RNA RESP QL NAA+PROBE: NEGATIVE
SARS-COV-2 RNA RESP QL NAA+PROBE: NEGATIVE
SARS-COV-2 RNA RESP QL NAA+PROBE: POSITIVE
SODIUM SERPL-SCNC: 137 MMOL/L (ref 136–145)
SODIUM SERPL-SCNC: 138 MMOL/L (ref 136–145)
SODIUM SERPL-SCNC: 139 MMOL/L (ref 136–145)
SODIUM SERPL-SCNC: 140 MMOL/L (ref 136–145)
SODIUM SERPL-SCNC: 141 MMOL/L (ref 136–145)
SODIUM SERPL-SCNC: 142 MMOL/L (ref 136–145)
SODIUM SERPL-SCNC: 143 MMOL/L (ref 136–145)
SODIUM SERPL-SCNC: 144 MMOL/L (ref 136–145)
SODIUM SERPL-SCNC: 145 MMOL/L (ref 136–145)
SP GR UR STRIP: 1.01 (ref 1–1.03)
SP GR UR STRIP: >1.05 (ref 1–1.03)
SQUAMOUS EPITHELIAL: <1 /HPF
SQUAMOUS EPITHELIAL: <1 /HPF
SYSTOLIC BLOOD PRESSURE - MUSE: 117 MMHG
SYSTOLIC BLOOD PRESSURE - MUSE: NORMAL MMHG
SYSTOLIC BLOOD PRESSURE - MUSE: NORMAL MMHG
T AXIS - MUSE: 42 DEGREES
T AXIS - MUSE: 54 DEGREES
T AXIS - MUSE: 81 DEGREES
TEMPERATURE: 37 DEGREES C
TEMPERATURE: 37 DEGREES C
TROPONIN I SERPL-MCNC: <0.01 NG/ML (ref 0–0.29)
UROBILINOGEN UR STRIP-MCNC: <2 MG/DL
UROBILINOGEN UR STRIP-MCNC: <2 MG/DL
VANCOMYCIN SERPL-MCNC: 21.5 MG/L
VENTILATION MODE: ABNORMAL
VENTILATION MODE: ABNORMAL
VENTILATOR TIDAL VOLUME: 500 ML
VENTILATOR TIDAL VOLUME: 500 ML
VENTRICULAR RATE- MUSE: 131 BPM
VENTRICULAR RATE- MUSE: 138 BPM
VENTRICULAR RATE- MUSE: 87 BPM
WBC # BLD AUTO: 10.1 10E3/UL (ref 4–11)
WBC # BLD AUTO: 10.1 10E3/UL (ref 4–11)
WBC # BLD AUTO: 10.5 10E3/UL (ref 4–11)
WBC # BLD AUTO: 11.4 10E3/UL (ref 4–11)
WBC # BLD AUTO: 12.1 10E3/UL (ref 4–11)
WBC # BLD AUTO: 12.4 10E3/UL (ref 4–11)
WBC # BLD AUTO: 12.5 10E3/UL (ref 4–11)
WBC # BLD AUTO: 12.7 10E3/UL (ref 4–11)
WBC # BLD AUTO: 12.8 10E3/UL (ref 4–11)
WBC # BLD AUTO: 13 10E3/UL (ref 4–11)
WBC # BLD AUTO: 13.3 10E3/UL (ref 4–11)
WBC # BLD AUTO: 13.4 10E3/UL (ref 4–11)
WBC # BLD AUTO: 13.9 10E3/UL (ref 4–11)
WBC # BLD AUTO: 14.4 10E3/UL (ref 4–11)
WBC # BLD AUTO: 14.5 10E3/UL (ref 4–11)
WBC # BLD AUTO: 14.5 10E3/UL (ref 4–11)
WBC # BLD AUTO: 14.6 10E3/UL (ref 4–11)
WBC # BLD AUTO: 14.8 10E3/UL (ref 4–11)
WBC # BLD AUTO: 14.9 10E3/UL (ref 4–11)
WBC # BLD AUTO: 15 10E3/UL (ref 4–11)
WBC # BLD AUTO: 15.3 10E3/UL (ref 4–11)
WBC # BLD AUTO: 16 10E3/UL (ref 4–11)
WBC # BLD AUTO: 16.4 10E3/UL (ref 4–11)
WBC # BLD AUTO: 16.6 10E3/UL (ref 4–11)
WBC # BLD AUTO: 18.8 10E3/UL (ref 4–11)
WBC # BLD AUTO: 24.6 10E3/UL (ref 4–11)
WBC # BLD AUTO: 24.6 10E3/UL (ref 4–11)
WBC # BLD AUTO: 25.8 10E3/UL (ref 4–11)
WBC # BLD AUTO: 8 10E3/UL (ref 4–11)
WBC # BLD AUTO: 9.3 10E3/UL (ref 4–11)
WBC # BLD AUTO: 9.4 10E3/UL (ref 4–11)
WBC CLUMPS #/AREA URNS HPF: PRESENT /HPF
WBC URINE: 10 /HPF
WBC URINE: 8 /HPF

## 2022-01-01 PROCEDURE — 97110 THERAPEUTIC EXERCISES: CPT | Mod: GP | Performed by: PHYSICAL THERAPIST

## 2022-01-01 PROCEDURE — 999N000215 HC STATISTIC HFNC ADULT NON-CPAP

## 2022-01-01 PROCEDURE — 250N000011 HC RX IP 250 OP 636: Performed by: INTERNAL MEDICINE

## 2022-01-01 PROCEDURE — P9047 ALBUMIN (HUMAN), 25%, 50ML: HCPCS | Performed by: INTERNAL MEDICINE

## 2022-01-01 PROCEDURE — 5A09357 ASSISTANCE WITH RESPIRATORY VENTILATION, LESS THAN 24 CONSECUTIVE HOURS, CONTINUOUS POSITIVE AIRWAY PRESSURE: ICD-10-PCS | Performed by: EMERGENCY MEDICINE

## 2022-01-01 PROCEDURE — 999N000157 HC STATISTIC RCP TIME EA 10 MIN

## 2022-01-01 PROCEDURE — 250N000013 HC RX MED GY IP 250 OP 250 PS 637: Performed by: HOSPITALIST

## 2022-01-01 PROCEDURE — C9113 INJ PANTOPRAZOLE SODIUM, VIA: HCPCS | Performed by: HOSPITALIST

## 2022-01-01 PROCEDURE — 83605 ASSAY OF LACTIC ACID: CPT | Performed by: INTERNAL MEDICINE

## 2022-01-01 PROCEDURE — C9113 INJ PANTOPRAZOLE SODIUM, VIA: HCPCS | Performed by: INTERNAL MEDICINE

## 2022-01-01 PROCEDURE — 71045 X-RAY EXAM CHEST 1 VIEW: CPT

## 2022-01-01 PROCEDURE — 84484 ASSAY OF TROPONIN QUANT: CPT | Performed by: EMERGENCY MEDICINE

## 2022-01-01 PROCEDURE — 99232 SBSQ HOSP IP/OBS MODERATE 35: CPT | Performed by: INTERNAL MEDICINE

## 2022-01-01 PROCEDURE — 250N000009 HC RX 250: Performed by: INTERNAL MEDICINE

## 2022-01-01 PROCEDURE — 84132 ASSAY OF SERUM POTASSIUM: CPT | Performed by: INTERNAL MEDICINE

## 2022-01-01 PROCEDURE — C9803 HOPD COVID-19 SPEC COLLECT: HCPCS

## 2022-01-01 PROCEDURE — 83735 ASSAY OF MAGNESIUM: CPT | Performed by: EMERGENCY MEDICINE

## 2022-01-01 PROCEDURE — 85027 COMPLETE CBC AUTOMATED: CPT | Performed by: INTERNAL MEDICINE

## 2022-01-01 PROCEDURE — 97530 THERAPEUTIC ACTIVITIES: CPT | Mod: GP

## 2022-01-01 PROCEDURE — 36415 COLL VENOUS BLD VENIPUNCTURE: CPT | Performed by: HOSPITALIST

## 2022-01-01 PROCEDURE — 250N000013 HC RX MED GY IP 250 OP 250 PS 637: Performed by: NURSE PRACTITIONER

## 2022-01-01 PROCEDURE — 92526 ORAL FUNCTION THERAPY: CPT | Mod: GN

## 2022-01-01 PROCEDURE — 250N000013 HC RX MED GY IP 250 OP 250 PS 637: Performed by: INTERNAL MEDICINE

## 2022-01-01 PROCEDURE — 99223 1ST HOSP IP/OBS HIGH 75: CPT | Performed by: NURSE PRACTITIONER

## 2022-01-01 PROCEDURE — 250N000009 HC RX 250: Performed by: HOSPITALIST

## 2022-01-01 PROCEDURE — 36415 COLL VENOUS BLD VENIPUNCTURE: CPT | Performed by: STUDENT IN AN ORGANIZED HEALTH CARE EDUCATION/TRAINING PROGRAM

## 2022-01-01 PROCEDURE — 120N000001 HC R&B MED SURG/OB

## 2022-01-01 PROCEDURE — 74230 X-RAY XM SWLNG FUNCJ C+: CPT

## 2022-01-01 PROCEDURE — HZ2ZZZZ DETOXIFICATION SERVICES FOR SUBSTANCE ABUSE TREATMENT: ICD-10-PCS | Performed by: HOSPITALIST

## 2022-01-01 PROCEDURE — 94640 AIRWAY INHALATION TREATMENT: CPT | Mod: 76

## 2022-01-01 PROCEDURE — 85379 FIBRIN DEGRADATION QUANT: CPT | Performed by: INTERNAL MEDICINE

## 2022-01-01 PROCEDURE — 200N000001 HC R&B ICU

## 2022-01-01 PROCEDURE — 258N000003 HC RX IP 258 OP 636: Performed by: HOSPITALIST

## 2022-01-01 PROCEDURE — 258N000003 HC RX IP 258 OP 636: Performed by: INTERNAL MEDICINE

## 2022-01-01 PROCEDURE — 97110 THERAPEUTIC EXERCISES: CPT | Mod: GP

## 2022-01-01 PROCEDURE — 999N000185 HC STATISTIC TRANSPORT TIME EA 15 MIN

## 2022-01-01 PROCEDURE — 82330 ASSAY OF CALCIUM: CPT | Performed by: INTERNAL MEDICINE

## 2022-01-01 PROCEDURE — 36415 COLL VENOUS BLD VENIPUNCTURE: CPT | Performed by: EMERGENCY MEDICINE

## 2022-01-01 PROCEDURE — 258N000001 HC RX 258: Performed by: INTERNAL MEDICINE

## 2022-01-01 PROCEDURE — 96375 TX/PRO/DX INJ NEW DRUG ADDON: CPT | Mod: 59

## 2022-01-01 PROCEDURE — 97530 THERAPEUTIC ACTIVITIES: CPT | Mod: GO

## 2022-01-01 PROCEDURE — 250N000011 HC RX IP 250 OP 636: Performed by: HOSPITALIST

## 2022-01-01 PROCEDURE — 97110 THERAPEUTIC EXERCISES: CPT | Mod: GO

## 2022-01-01 PROCEDURE — 70450 CT HEAD/BRAIN W/O DYE: CPT

## 2022-01-01 PROCEDURE — 97530 THERAPEUTIC ACTIVITIES: CPT | Mod: GP | Performed by: PHYSICAL THERAPIST

## 2022-01-01 PROCEDURE — 250N000012 HC RX MED GY IP 250 OP 636 PS 637: Performed by: INTERNAL MEDICINE

## 2022-01-01 PROCEDURE — 97535 SELF CARE MNGMENT TRAINING: CPT | Mod: GO

## 2022-01-01 PROCEDURE — 36415 COLL VENOUS BLD VENIPUNCTURE: CPT | Performed by: INTERNAL MEDICINE

## 2022-01-01 PROCEDURE — 92611 MOTION FLUOROSCOPY/SWALLOW: CPT | Mod: GN | Performed by: SPEECH-LANGUAGE PATHOLOGIST

## 2022-01-01 PROCEDURE — 84145 PROCALCITONIN (PCT): CPT | Performed by: INTERNAL MEDICINE

## 2022-01-01 PROCEDURE — 82306 VITAMIN D 25 HYDROXY: CPT | Performed by: INTERNAL MEDICINE

## 2022-01-01 PROCEDURE — 99233 SBSQ HOSP IP/OBS HIGH 50: CPT | Performed by: INTERNAL MEDICINE

## 2022-01-01 PROCEDURE — 94640 AIRWAY INHALATION TREATMENT: CPT

## 2022-01-01 PROCEDURE — 84100 ASSAY OF PHOSPHORUS: CPT | Performed by: INTERNAL MEDICINE

## 2022-01-01 PROCEDURE — 94003 VENT MGMT INPAT SUBQ DAY: CPT

## 2022-01-01 PROCEDURE — 99291 CRITICAL CARE FIRST HOUR: CPT | Performed by: INTERNAL MEDICINE

## 2022-01-01 PROCEDURE — 83735 ASSAY OF MAGNESIUM: CPT | Performed by: INTERNAL MEDICINE

## 2022-01-01 PROCEDURE — 82310 ASSAY OF CALCIUM: CPT | Performed by: INTERNAL MEDICINE

## 2022-01-01 PROCEDURE — 82805 BLOOD GASES W/O2 SATURATION: CPT | Performed by: HOSPITALIST

## 2022-01-01 PROCEDURE — 258N000002 HC RX IP 258 OP 250: Performed by: INTERNAL MEDICINE

## 2022-01-01 PROCEDURE — 97129 THER IVNTJ 1ST 15 MIN: CPT | Mod: GO

## 2022-01-01 PROCEDURE — 82140 ASSAY OF AMMONIA: CPT | Performed by: INTERNAL MEDICINE

## 2022-01-01 PROCEDURE — 84132 ASSAY OF SERUM POTASSIUM: CPT | Performed by: HOSPITALIST

## 2022-01-01 PROCEDURE — 999N000150 HC STATISTIC PT MED CONFERENCE < 30 MIN

## 2022-01-01 PROCEDURE — XW033H5 INTRODUCTION OF TOCILIZUMAB INTO PERIPHERAL VEIN, PERCUTANEOUS APPROACH, NEW TECHNOLOGY GROUP 5: ICD-10-PCS | Performed by: INTERNAL MEDICINE

## 2022-01-01 PROCEDURE — 92611 MOTION FLUOROSCOPY/SWALLOW: CPT | Mod: GN

## 2022-01-01 PROCEDURE — 97116 GAIT TRAINING THERAPY: CPT | Mod: GP

## 2022-01-01 PROCEDURE — 81001 URINALYSIS AUTO W/SCOPE: CPT | Performed by: INTERNAL MEDICINE

## 2022-01-01 PROCEDURE — 258N000003 HC RX IP 258 OP 636: Performed by: EMERGENCY MEDICINE

## 2022-01-01 PROCEDURE — 250N000009 HC RX 250: Performed by: NURSE PRACTITIONER

## 2022-01-01 PROCEDURE — 94799 UNLISTED PULMONARY SVC/PX: CPT

## 2022-01-01 PROCEDURE — 999N000009 HC STATISTIC AIRWAY CARE

## 2022-01-01 PROCEDURE — 94668 MNPJ CHEST WALL SBSQ: CPT

## 2022-01-01 PROCEDURE — 250N000011 HC RX IP 250 OP 636: Performed by: EMERGENCY MEDICINE

## 2022-01-01 PROCEDURE — 258N000003 HC RX IP 258 OP 636

## 2022-01-01 PROCEDURE — 999N000055 HC STATISTIC END TITIAL CO2 MONITORING

## 2022-01-01 PROCEDURE — 93005 ELECTROCARDIOGRAM TRACING: CPT

## 2022-01-01 PROCEDURE — 93005 ELECTROCARDIOGRAM TRACING: CPT | Performed by: NURSE PRACTITIONER

## 2022-01-01 PROCEDURE — 94667 MNPJ CHEST WALL 1ST: CPT

## 2022-01-01 PROCEDURE — 83605 ASSAY OF LACTIC ACID: CPT | Performed by: STUDENT IN AN ORGANIZED HEALTH CARE EDUCATION/TRAINING PROGRAM

## 2022-01-01 PROCEDURE — 96366 THER/PROPH/DIAG IV INF ADDON: CPT | Mod: 59

## 2022-01-01 PROCEDURE — 86140 C-REACTIVE PROTEIN: CPT | Performed by: INTERNAL MEDICINE

## 2022-01-01 PROCEDURE — 250N000013 HC RX MED GY IP 250 OP 250 PS 637: Performed by: EMERGENCY MEDICINE

## 2022-01-01 PROCEDURE — 272N000452 HC KIT SHRLOCK 5FR POWER PICC TRIPLE LUMEN

## 2022-01-01 PROCEDURE — 31500 INSERT EMERGENCY AIRWAY: CPT

## 2022-01-01 PROCEDURE — 82565 ASSAY OF CREATININE: CPT | Performed by: INTERNAL MEDICINE

## 2022-01-01 PROCEDURE — 87636 SARSCOV2 & INF A&B AMP PRB: CPT | Performed by: STUDENT IN AN ORGANIZED HEALTH CARE EDUCATION/TRAINING PROGRAM

## 2022-01-01 PROCEDURE — 85025 COMPLETE CBC W/AUTO DIFF WBC: CPT | Performed by: EMERGENCY MEDICINE

## 2022-01-01 PROCEDURE — 85025 COMPLETE CBC W/AUTO DIFF WBC: CPT | Performed by: INTERNAL MEDICINE

## 2022-01-01 PROCEDURE — 99232 SBSQ HOSP IP/OBS MODERATE 35: CPT | Performed by: NURSE PRACTITIONER

## 2022-01-01 PROCEDURE — 83036 HEMOGLOBIN GLYCOSYLATED A1C: CPT | Performed by: HOSPITALIST

## 2022-01-01 PROCEDURE — 94660 CPAP INITIATION&MGMT: CPT

## 2022-01-01 PROCEDURE — 80053 COMPREHEN METABOLIC PANEL: CPT | Performed by: INTERNAL MEDICINE

## 2022-01-01 PROCEDURE — 80053 COMPREHEN METABOLIC PANEL: CPT | Performed by: EMERGENCY MEDICINE

## 2022-01-01 PROCEDURE — 83605 ASSAY OF LACTIC ACID: CPT | Performed by: EMERGENCY MEDICINE

## 2022-01-01 PROCEDURE — 80048 BASIC METABOLIC PNL TOTAL CA: CPT | Performed by: INTERNAL MEDICINE

## 2022-01-01 PROCEDURE — 250N000009 HC RX 250: Performed by: EMERGENCY MEDICINE

## 2022-01-01 PROCEDURE — 250N000012 HC RX MED GY IP 250 OP 636 PS 637: Performed by: HOSPITALIST

## 2022-01-01 PROCEDURE — 97116 GAIT TRAINING THERAPY: CPT | Mod: GP | Performed by: PHYSICAL THERAPIST

## 2022-01-01 PROCEDURE — 82374 ASSAY BLOOD CARBON DIOXIDE: CPT | Performed by: INTERNAL MEDICINE

## 2022-01-01 PROCEDURE — 82533 TOTAL CORTISOL: CPT | Performed by: INTERNAL MEDICINE

## 2022-01-01 PROCEDURE — 82310 ASSAY OF CALCIUM: CPT | Performed by: HOSPITALIST

## 2022-01-01 PROCEDURE — 92610 EVALUATE SWALLOWING FUNCTION: CPT | Mod: GN

## 2022-01-01 PROCEDURE — 82248 BILIRUBIN DIRECT: CPT | Performed by: INTERNAL MEDICINE

## 2022-01-01 PROCEDURE — 82805 BLOOD GASES W/O2 SATURATION: CPT | Performed by: INTERNAL MEDICINE

## 2022-01-01 PROCEDURE — 92526 ORAL FUNCTION THERAPY: CPT | Mod: GN | Performed by: SPEECH-LANGUAGE PATHOLOGIST

## 2022-01-01 PROCEDURE — 83690 ASSAY OF LIPASE: CPT | Performed by: INTERNAL MEDICINE

## 2022-01-01 PROCEDURE — 85014 HEMATOCRIT: CPT | Performed by: INTERNAL MEDICINE

## 2022-01-01 PROCEDURE — 83529 ASAY OF INTERLEUKIN-6 (IL-6): CPT | Performed by: INTERNAL MEDICINE

## 2022-01-01 PROCEDURE — 250N000011 HC RX IP 250 OP 636: Performed by: STUDENT IN AN ORGANIZED HEALTH CARE EDUCATION/TRAINING PROGRAM

## 2022-01-01 PROCEDURE — 36569 INSJ PICC 5 YR+ W/O IMAGING: CPT

## 2022-01-01 PROCEDURE — 82077 ASSAY SPEC XCP UR&BREATH IA: CPT | Performed by: EMERGENCY MEDICINE

## 2022-01-01 PROCEDURE — 82040 ASSAY OF SERUM ALBUMIN: CPT | Performed by: INTERNAL MEDICINE

## 2022-01-01 PROCEDURE — 80400 ACTH STIMULATION PANEL: CPT | Performed by: INTERNAL MEDICINE

## 2022-01-01 PROCEDURE — 36600 WITHDRAWAL OF ARTERIAL BLOOD: CPT

## 2022-01-01 PROCEDURE — 87040 BLOOD CULTURE FOR BACTERIA: CPT | Performed by: INTERNAL MEDICINE

## 2022-01-01 PROCEDURE — 96376 TX/PRO/DX INJ SAME DRUG ADON: CPT

## 2022-01-01 PROCEDURE — 96365 THER/PROPH/DIAG IV INF INIT: CPT | Mod: 59

## 2022-01-01 PROCEDURE — 3E043XZ INTRODUCTION OF VASOPRESSOR INTO CENTRAL VEIN, PERCUTANEOUS APPROACH: ICD-10-PCS | Performed by: INTERNAL MEDICINE

## 2022-01-01 PROCEDURE — 99223 1ST HOSP IP/OBS HIGH 75: CPT | Performed by: HOSPITALIST

## 2022-01-01 PROCEDURE — 85610 PROTHROMBIN TIME: CPT | Performed by: HOSPITALIST

## 2022-01-01 PROCEDURE — 87641 MR-STAPH DNA AMP PROBE: CPT | Performed by: INTERNAL MEDICINE

## 2022-01-01 PROCEDURE — 83880 ASSAY OF NATRIURETIC PEPTIDE: CPT | Performed by: EMERGENCY MEDICINE

## 2022-01-01 PROCEDURE — 999N000065 XR CHEST PORT 1 VIEW

## 2022-01-01 PROCEDURE — 70551 MRI BRAIN STEM W/O DYE: CPT

## 2022-01-01 PROCEDURE — 85041 AUTOMATED RBC COUNT: CPT | Performed by: INTERNAL MEDICINE

## 2022-01-01 PROCEDURE — 999N000128 HC STATISTIC PERIPHERAL IV START W/O US GUIDANCE

## 2022-01-01 PROCEDURE — 84132 ASSAY OF SERUM POTASSIUM: CPT | Performed by: EMERGENCY MEDICINE

## 2022-01-01 PROCEDURE — XW043E5 INTRODUCTION OF REMDESIVIR ANTI-INFECTIVE INTO CENTRAL VEIN, PERCUTANEOUS APPROACH, NEW TECHNOLOGY GROUP 5: ICD-10-PCS | Performed by: INTERNAL MEDICINE

## 2022-01-01 PROCEDURE — 85610 PROTHROMBIN TIME: CPT | Performed by: EMERGENCY MEDICINE

## 2022-01-01 PROCEDURE — 71275 CT ANGIOGRAPHY CHEST: CPT

## 2022-01-01 PROCEDURE — 258N000001 HC RX 258: Performed by: HOSPITALIST

## 2022-01-01 PROCEDURE — 99231 SBSQ HOSP IP/OBS SF/LOW 25: CPT | Performed by: INTERNAL MEDICINE

## 2022-01-01 PROCEDURE — 82248 BILIRUBIN DIRECT: CPT | Performed by: HOSPITALIST

## 2022-01-01 PROCEDURE — 96361 HYDRATE IV INFUSION ADD-ON: CPT | Mod: 59

## 2022-01-01 PROCEDURE — 87635 SARS-COV-2 COVID-19 AMP PRB: CPT | Performed by: INTERNAL MEDICINE

## 2022-01-01 PROCEDURE — 96367 TX/PROPH/DG ADDL SEQ IV INF: CPT | Mod: 59

## 2022-01-01 PROCEDURE — G0463 HOSPITAL OUTPT CLINIC VISIT: HCPCS

## 2022-01-01 PROCEDURE — 83735 ASSAY OF MAGNESIUM: CPT | Performed by: HOSPITALIST

## 2022-01-01 PROCEDURE — 85048 AUTOMATED LEUKOCYTE COUNT: CPT | Performed by: INTERNAL MEDICINE

## 2022-01-01 PROCEDURE — 258N000003 HC RX IP 258 OP 636: Performed by: STUDENT IN AN ORGANIZED HEALTH CARE EDUCATION/TRAINING PROGRAM

## 2022-01-01 PROCEDURE — 74018 RADEX ABDOMEN 1 VIEW: CPT

## 2022-01-01 PROCEDURE — 94002 VENT MGMT INPAT INIT DAY: CPT

## 2022-01-01 PROCEDURE — 93306 TTE W/DOPPLER COMPLETE: CPT

## 2022-01-01 PROCEDURE — 96376 TX/PRO/DX INJ SAME DRUG ADON: CPT | Mod: 59

## 2022-01-01 PROCEDURE — 93010 ELECTROCARDIOGRAM REPORT: CPT | Performed by: INTERNAL MEDICINE

## 2022-01-01 PROCEDURE — 85610 PROTHROMBIN TIME: CPT | Performed by: INTERNAL MEDICINE

## 2022-01-01 PROCEDURE — 87493 C DIFF AMPLIFIED PROBE: CPT | Performed by: INTERNAL MEDICINE

## 2022-01-01 PROCEDURE — 84100 ASSAY OF PHOSPHORUS: CPT | Performed by: HOSPITALIST

## 2022-01-01 PROCEDURE — 74177 CT ABD & PELVIS W/CONTRAST: CPT

## 2022-01-01 PROCEDURE — 93306 TTE W/DOPPLER COMPLETE: CPT | Mod: 26 | Performed by: INTERNAL MEDICINE

## 2022-01-01 PROCEDURE — 250N000013 HC RX MED GY IP 250 OP 250 PS 637

## 2022-01-01 PROCEDURE — 99285 EMERGENCY DEPT VISIT HI MDM: CPT | Mod: 25

## 2022-01-01 PROCEDURE — 87493 C DIFF AMPLIFIED PROBE: CPT

## 2022-01-01 PROCEDURE — 97167 OT EVAL HIGH COMPLEX 60 MIN: CPT | Mod: GO

## 2022-01-01 PROCEDURE — 93005 ELECTROCARDIOGRAM TRACING: CPT | Performed by: HOSPITALIST

## 2022-01-01 PROCEDURE — 99207 PR NO CHARGE LOS: CPT | Performed by: INTERNAL MEDICINE

## 2022-01-01 PROCEDURE — 97162 PT EVAL MOD COMPLEX 30 MIN: CPT | Mod: GP

## 2022-01-01 PROCEDURE — 87205 SMEAR GRAM STAIN: CPT | Performed by: INTERNAL MEDICINE

## 2022-01-01 PROCEDURE — 87086 URINE CULTURE/COLONY COUNT: CPT | Performed by: INTERNAL MEDICINE

## 2022-01-01 PROCEDURE — 85004 AUTOMATED DIFF WBC COUNT: CPT | Performed by: INTERNAL MEDICINE

## 2022-01-01 PROCEDURE — 83036 HEMOGLOBIN GLYCOSYLATED A1C: CPT | Performed by: INTERNAL MEDICINE

## 2022-01-01 PROCEDURE — 80051 ELECTROLYTE PANEL: CPT | Performed by: INTERNAL MEDICINE

## 2022-01-01 PROCEDURE — 82024 ASSAY OF ACTH: CPT | Performed by: INTERNAL MEDICINE

## 2022-01-01 PROCEDURE — 71250 CT THORAX DX C-: CPT

## 2022-01-01 PROCEDURE — 99233 SBSQ HOSP IP/OBS HIGH 50: CPT | Performed by: EMERGENCY MEDICINE

## 2022-01-01 PROCEDURE — 83880 ASSAY OF NATRIURETIC PEPTIDE: CPT | Performed by: INTERNAL MEDICINE

## 2022-01-01 PROCEDURE — 99233 SBSQ HOSP IP/OBS HIGH 50: CPT | Performed by: NURSE PRACTITIONER

## 2022-01-01 PROCEDURE — 5A1955Z RESPIRATORY VENTILATION, GREATER THAN 96 CONSECUTIVE HOURS: ICD-10-PCS | Performed by: INTERNAL MEDICINE

## 2022-01-01 PROCEDURE — 999N000158 HC STATISTIC RCP TIME ED VENT EA 10 MIN

## 2022-01-01 PROCEDURE — 83605 ASSAY OF LACTIC ACID: CPT | Performed by: HOSPITALIST

## 2022-01-01 PROCEDURE — 99232 SBSQ HOSP IP/OBS MODERATE 35: CPT | Performed by: HOSPITALIST

## 2022-01-01 PROCEDURE — 99222 1ST HOSP IP/OBS MODERATE 55: CPT | Performed by: INTERNAL MEDICINE

## 2022-01-01 PROCEDURE — 99207 PR CDG-MDM COMPONENT: MEETS MODERATE - DOWN CODED: CPT | Performed by: INTERNAL MEDICINE

## 2022-01-01 PROCEDURE — 95822 EEG COMA OR SLEEP ONLY: CPT | Mod: 26 | Performed by: PSYCHIATRY & NEUROLOGY

## 2022-01-01 PROCEDURE — 85014 HEMATOCRIT: CPT | Performed by: HOSPITALIST

## 2022-01-01 PROCEDURE — 95822 EEG COMA OR SLEEP ONLY: CPT

## 2022-01-01 PROCEDURE — 87070 CULTURE OTHR SPECIMN AEROBIC: CPT | Performed by: INTERNAL MEDICINE

## 2022-01-01 PROCEDURE — 92526 ORAL FUNCTION THERAPY: CPT | Mod: GN | Performed by: REHABILITATION PRACTITIONER

## 2022-01-01 PROCEDURE — 80202 ASSAY OF VANCOMYCIN: CPT | Performed by: INTERNAL MEDICINE

## 2022-01-01 RX ORDER — RAMELTEON 8 MG/1
8 TABLET ORAL AT BEDTIME
Status: DISCONTINUED | OUTPATIENT
Start: 2022-01-01 | End: 2022-03-01 | Stop reason: HOSPADM

## 2022-01-01 RX ORDER — POTASSIUM CHLORIDE 7.45 MG/ML
10 INJECTION INTRAVENOUS
Status: COMPLETED | OUTPATIENT
Start: 2022-01-01 | End: 2022-01-01

## 2022-01-01 RX ORDER — LORAZEPAM 2 MG/ML
1-2 INJECTION INTRAMUSCULAR EVERY 4 HOURS PRN
Status: DISCONTINUED | OUTPATIENT
Start: 2022-01-01 | End: 2022-01-01

## 2022-01-01 RX ORDER — SODIUM CHLORIDE FOR INHALATION 3 %
3 VIAL, NEBULIZER (ML) INHALATION
Status: DISCONTINUED | OUTPATIENT
Start: 2022-01-01 | End: 2022-01-01

## 2022-01-01 RX ORDER — POTASSIUM CHLORIDE 1500 MG/1
40 TABLET, EXTENDED RELEASE ORAL ONCE
Status: COMPLETED | OUTPATIENT
Start: 2022-01-01 | End: 2022-01-01

## 2022-01-01 RX ORDER — METOPROLOL TARTRATE 1 MG/ML
2.5 INJECTION, SOLUTION INTRAVENOUS EVERY 6 HOURS
Status: DISCONTINUED | OUTPATIENT
Start: 2022-01-01 | End: 2022-01-01

## 2022-01-01 RX ORDER — NALOXONE HYDROCHLORIDE 0.4 MG/ML
0.4 INJECTION, SOLUTION INTRAMUSCULAR; INTRAVENOUS; SUBCUTANEOUS
Status: DISCONTINUED | OUTPATIENT
Start: 2022-01-01 | End: 2022-01-01

## 2022-01-01 RX ORDER — BARIUM SULFATE 400 MG/ML
SUSPENSION ORAL ONCE
Status: COMPLETED | OUTPATIENT
Start: 2022-01-01 | End: 2022-01-01

## 2022-01-01 RX ORDER — POTASSIUM CHLORIDE 1500 MG/1
20 TABLET, EXTENDED RELEASE ORAL ONCE
Status: COMPLETED | OUTPATIENT
Start: 2022-01-01 | End: 2022-01-01

## 2022-01-01 RX ORDER — ACETAMINOPHEN 650 MG/1
650 SUPPOSITORY RECTAL EVERY 8 HOURS PRN
Status: DISCONTINUED | OUTPATIENT
Start: 2022-01-01 | End: 2022-01-01

## 2022-01-01 RX ORDER — MAGNESIUM SULFATE HEPTAHYDRATE 40 MG/ML
2 INJECTION, SOLUTION INTRAVENOUS ONCE
Status: COMPLETED | OUTPATIENT
Start: 2022-01-01 | End: 2022-01-01

## 2022-01-01 RX ORDER — PANTOPRAZOLE SODIUM 40 MG/1
40 TABLET, DELAYED RELEASE ORAL
Status: DISCONTINUED | OUTPATIENT
Start: 2022-01-01 | End: 2022-03-01 | Stop reason: HOSPADM

## 2022-01-01 RX ORDER — NOREPINEPHRINE BITARTRATE 0.02 MG/ML
.01-.6 INJECTION, SOLUTION INTRAVENOUS CONTINUOUS
Status: DISCONTINUED | OUTPATIENT
Start: 2022-01-01 | End: 2022-01-01

## 2022-01-01 RX ORDER — MAGNESIUM OXIDE 400 MG/1
400 TABLET ORAL 2 TIMES DAILY
Status: DISCONTINUED | OUTPATIENT
Start: 2022-01-01 | End: 2022-01-01

## 2022-01-01 RX ORDER — FOLIC ACID 1 MG/1
1 TABLET ORAL DAILY
Status: DISCONTINUED | OUTPATIENT
Start: 2022-01-01 | End: 2022-03-01 | Stop reason: HOSPADM

## 2022-01-01 RX ORDER — MAGNESIUM OXIDE 400 MG/1
400 TABLET ORAL 3 TIMES DAILY
Status: DISPENSED | OUTPATIENT
Start: 2022-01-01 | End: 2022-01-01

## 2022-01-01 RX ORDER — ACETAZOLAMIDE 250 MG/1
250 TABLET ORAL ONCE
Status: COMPLETED | OUTPATIENT
Start: 2022-01-01 | End: 2022-01-01

## 2022-01-01 RX ORDER — LORAZEPAM 2 MG/ML
1 INJECTION INTRAMUSCULAR ONCE
Status: COMPLETED | OUTPATIENT
Start: 2022-01-01 | End: 2022-01-01

## 2022-01-01 RX ORDER — MORPHINE SULFATE 2 MG/ML
1-2 INJECTION, SOLUTION INTRAMUSCULAR; INTRAVENOUS
Status: DISCONTINUED | OUTPATIENT
Start: 2022-01-01 | End: 2022-03-01 | Stop reason: HOSPADM

## 2022-01-01 RX ORDER — GABAPENTIN 300 MG/1
600 CAPSULE ORAL EVERY 8 HOURS
Status: DISCONTINUED | OUTPATIENT
Start: 2022-01-01 | End: 2022-01-01

## 2022-01-01 RX ORDER — LORAZEPAM 2 MG/ML
1 CONCENTRATE ORAL EVERY 8 HOURS
Status: DISCONTINUED | OUTPATIENT
Start: 2022-01-01 | End: 2022-01-01

## 2022-01-01 RX ORDER — DEXMEDETOMIDINE HYDROCHLORIDE 4 UG/ML
.1-1.2 INJECTION, SOLUTION INTRAVENOUS CONTINUOUS
Status: DISCONTINUED | OUTPATIENT
Start: 2022-01-01 | End: 2022-01-01

## 2022-01-01 RX ORDER — MIDODRINE HYDROCHLORIDE 2.5 MG/1
2.5 TABLET ORAL
Status: DISCONTINUED | OUTPATIENT
Start: 2022-01-01 | End: 2022-01-01

## 2022-01-01 RX ORDER — LOPERAMIDE HCL 2 MG
2 CAPSULE ORAL ONCE
Status: COMPLETED | OUTPATIENT
Start: 2022-01-01 | End: 2022-01-01

## 2022-01-01 RX ORDER — ATROPINE SULFATE 10 MG/ML
2 SOLUTION/ DROPS OPHTHALMIC EVERY 4 HOURS PRN
Status: DISCONTINUED | OUTPATIENT
Start: 2022-01-01 | End: 2022-03-01 | Stop reason: HOSPADM

## 2022-01-01 RX ORDER — POTASSIUM CHLORIDE 20MEQ/15ML
40 LIQUID (ML) ORAL ONCE
Status: COMPLETED | OUTPATIENT
Start: 2022-01-01 | End: 2022-01-01

## 2022-01-01 RX ORDER — DEXTROSE MONOHYDRATE 100 MG/ML
INJECTION, SOLUTION INTRAVENOUS CONTINUOUS
Status: DISCONTINUED | OUTPATIENT
Start: 2022-01-01 | End: 2022-01-01

## 2022-01-01 RX ORDER — NICOTINE POLACRILEX 4 MG
15-30 LOZENGE BUCCAL
Status: DISCONTINUED | OUTPATIENT
Start: 2022-01-01 | End: 2022-01-01

## 2022-01-01 RX ORDER — QUETIAPINE FUMARATE 25 MG/1
25 TABLET, FILM COATED ORAL 2 TIMES DAILY WITH MEALS
Status: DISCONTINUED | OUTPATIENT
Start: 2022-01-01 | End: 2022-01-01

## 2022-01-01 RX ORDER — LANOLIN ALCOHOL/MO/W.PET/CERES
3 CREAM (GRAM) TOPICAL
Status: DISCONTINUED | OUTPATIENT
Start: 2022-01-01 | End: 2022-03-01 | Stop reason: HOSPADM

## 2022-01-01 RX ORDER — IOPAMIDOL 755 MG/ML
100 INJECTION, SOLUTION INTRAVASCULAR ONCE
Status: COMPLETED | OUTPATIENT
Start: 2022-01-01 | End: 2022-01-01

## 2022-01-01 RX ORDER — MIDODRINE HYDROCHLORIDE 5 MG/1
10 TABLET ORAL
Status: DISCONTINUED | OUTPATIENT
Start: 2022-01-01 | End: 2022-03-01 | Stop reason: HOSPADM

## 2022-01-01 RX ORDER — POTASSIUM CHLORIDE 20MEQ/15ML
20 LIQUID (ML) ORAL ONCE
Status: COMPLETED | OUTPATIENT
Start: 2022-01-01 | End: 2022-01-01

## 2022-01-01 RX ORDER — MEROPENEM 1 G/1
1 INJECTION, POWDER, FOR SOLUTION INTRAVENOUS EVERY 8 HOURS
Status: COMPLETED | OUTPATIENT
Start: 2022-01-01 | End: 2022-01-01

## 2022-01-01 RX ORDER — LORAZEPAM 2 MG/ML
1 INJECTION INTRAMUSCULAR
Status: DISCONTINUED | OUTPATIENT
Start: 2022-01-01 | End: 2022-03-01 | Stop reason: HOSPADM

## 2022-01-01 RX ORDER — LORAZEPAM 2 MG/ML
1 CONCENTRATE ORAL EVERY 4 HOURS
Status: DISCONTINUED | OUTPATIENT
Start: 2022-01-01 | End: 2022-01-01

## 2022-01-01 RX ORDER — ALBUTEROL SULFATE 0.83 MG/ML
2.5 SOLUTION RESPIRATORY (INHALATION) 3 TIMES DAILY
Status: DISCONTINUED | OUTPATIENT
Start: 2022-01-01 | End: 2022-01-01

## 2022-01-01 RX ORDER — ONDANSETRON 2 MG/ML
4 INJECTION INTRAMUSCULAR; INTRAVENOUS EVERY 6 HOURS PRN
Status: DISCONTINUED | OUTPATIENT
Start: 2022-01-01 | End: 2022-01-01

## 2022-01-01 RX ORDER — MULTIVITAMIN,THERAPEUTIC
1 TABLET ORAL DAILY
Status: DISCONTINUED | OUTPATIENT
Start: 2022-01-01 | End: 2022-03-01 | Stop reason: HOSPADM

## 2022-01-01 RX ORDER — DEXTROSE MONOHYDRATE 25 G/50ML
25-50 INJECTION, SOLUTION INTRAVENOUS
Status: DISCONTINUED | OUTPATIENT
Start: 2022-01-01 | End: 2022-01-01

## 2022-01-01 RX ORDER — PIPERACILLIN SODIUM, TAZOBACTAM SODIUM 3; .375 G/15ML; G/15ML
3.38 INJECTION, POWDER, LYOPHILIZED, FOR SOLUTION INTRAVENOUS ONCE
Status: COMPLETED | OUTPATIENT
Start: 2022-01-01 | End: 2022-01-01

## 2022-01-01 RX ORDER — MAGNESIUM OXIDE 400 MG/1
400 TABLET ORAL 3 TIMES DAILY
Status: COMPLETED | OUTPATIENT
Start: 2022-01-01 | End: 2022-01-01

## 2022-01-01 RX ORDER — ONDANSETRON 4 MG/1
4 TABLET, ORALLY DISINTEGRATING ORAL EVERY 6 HOURS PRN
Status: DISCONTINUED | OUTPATIENT
Start: 2022-01-01 | End: 2022-03-01 | Stop reason: HOSPADM

## 2022-01-01 RX ORDER — CHLORHEXIDINE GLUCONATE ORAL RINSE 1.2 MG/ML
15 SOLUTION DENTAL EVERY 12 HOURS
Status: DISCONTINUED | OUTPATIENT
Start: 2022-01-01 | End: 2022-01-01

## 2022-01-01 RX ORDER — POLYETHYLENE GLYCOL 3350 17 G/17G
17 POWDER, FOR SOLUTION ORAL DAILY PRN
Status: DISCONTINUED | OUTPATIENT
Start: 2022-01-01 | End: 2022-03-01 | Stop reason: HOSPADM

## 2022-01-01 RX ORDER — ONDANSETRON 2 MG/ML
4 INJECTION INTRAMUSCULAR; INTRAVENOUS EVERY 6 HOURS PRN
Status: DISCONTINUED | OUTPATIENT
Start: 2022-01-01 | End: 2022-03-01 | Stop reason: HOSPADM

## 2022-01-01 RX ORDER — SUCRALFATE ORAL 1 G/10ML
1 SUSPENSION ORAL 4 TIMES DAILY PRN
Status: DISCONTINUED | OUTPATIENT
Start: 2022-01-01 | End: 2022-01-01

## 2022-01-01 RX ORDER — ALBUTEROL SULFATE 5 MG/ML
2.5 SOLUTION RESPIRATORY (INHALATION) 3 TIMES DAILY
Status: DISCONTINUED | OUTPATIENT
Start: 2022-01-01 | End: 2022-01-01

## 2022-01-01 RX ORDER — MAGNESIUM SULFATE 4 G/50ML
4 INJECTION INTRAVENOUS ONCE
Status: COMPLETED | OUTPATIENT
Start: 2022-01-01 | End: 2022-01-01

## 2022-01-01 RX ORDER — ALBUTEROL SULFATE 0.83 MG/ML
2.5 SOLUTION RESPIRATORY (INHALATION)
Status: DISCONTINUED | OUTPATIENT
Start: 2022-01-01 | End: 2022-01-01

## 2022-01-01 RX ORDER — MAGNESIUM OXIDE 400 MG/1
400 TABLET ORAL 2 TIMES DAILY
Status: COMPLETED | OUTPATIENT
Start: 2022-01-01 | End: 2022-01-01

## 2022-01-01 RX ORDER — SODIUM CHLORIDE 450 MG/100ML
INJECTION, SOLUTION INTRAVENOUS CONTINUOUS
Status: DISCONTINUED | OUTPATIENT
Start: 2022-01-01 | End: 2022-01-01

## 2022-01-01 RX ORDER — GABAPENTIN 300 MG/1
900 CAPSULE ORAL EVERY 8 HOURS
Status: DISCONTINUED | OUTPATIENT
Start: 2022-01-01 | End: 2022-01-01

## 2022-01-01 RX ORDER — NALOXONE HYDROCHLORIDE 0.4 MG/ML
0.2 INJECTION, SOLUTION INTRAMUSCULAR; INTRAVENOUS; SUBCUTANEOUS
Status: DISCONTINUED | OUTPATIENT
Start: 2022-01-01 | End: 2022-01-01

## 2022-01-01 RX ORDER — LOPERAMIDE HCL 2 MG
2 CAPSULE ORAL 4 TIMES DAILY PRN
Status: DISCONTINUED | OUTPATIENT
Start: 2022-01-01 | End: 2022-03-01 | Stop reason: HOSPADM

## 2022-01-01 RX ORDER — GABAPENTIN 300 MG/1
1200 CAPSULE ORAL ONCE
Status: COMPLETED | OUTPATIENT
Start: 2022-01-01 | End: 2022-01-01

## 2022-01-01 RX ORDER — MULTIPLE VITAMINS W/ MINERALS TAB 9MG-400MCG
1 TAB ORAL DAILY
Status: DISCONTINUED | OUTPATIENT
Start: 2022-01-01 | End: 2022-01-01 | Stop reason: ALTCHOICE

## 2022-01-01 RX ORDER — LACTOBACILLUS RHAMNOSUS GG 10B CELL
1 CAPSULE ORAL 2 TIMES DAILY
Status: DISCONTINUED | OUTPATIENT
Start: 2022-01-01 | End: 2022-03-01 | Stop reason: HOSPADM

## 2022-01-01 RX ORDER — PIPERACILLIN SODIUM, TAZOBACTAM SODIUM 3; .375 G/15ML; G/15ML
3.38 INJECTION, POWDER, LYOPHILIZED, FOR SOLUTION INTRAVENOUS EVERY 6 HOURS
Status: DISCONTINUED | OUTPATIENT
Start: 2022-01-01 | End: 2022-01-01 | Stop reason: ALTCHOICE

## 2022-01-01 RX ORDER — ACETAMINOPHEN 325 MG/1
650 TABLET ORAL EVERY 8 HOURS PRN
Status: DISCONTINUED | OUTPATIENT
Start: 2022-01-01 | End: 2022-01-01

## 2022-01-01 RX ORDER — OLANZAPINE 5 MG/1
5-10 TABLET, ORALLY DISINTEGRATING ORAL EVERY 6 HOURS PRN
Status: DISCONTINUED | OUTPATIENT
Start: 2022-01-01 | End: 2022-03-01 | Stop reason: HOSPADM

## 2022-01-01 RX ORDER — THIAMINE HYDROCHLORIDE 100 MG/ML
100 INJECTION, SOLUTION INTRAMUSCULAR; INTRAVENOUS DAILY
Status: DISCONTINUED | OUTPATIENT
Start: 2022-01-01 | End: 2022-01-01

## 2022-01-01 RX ORDER — POTASSIUM CHLORIDE 29.8 MG/ML
20 INJECTION INTRAVENOUS
Status: DISCONTINUED | OUTPATIENT
Start: 2022-01-01 | End: 2022-01-01 | Stop reason: ALTCHOICE

## 2022-01-01 RX ORDER — PIPERACILLIN SODIUM, TAZOBACTAM SODIUM 3; .375 G/15ML; G/15ML
3.38 INJECTION, POWDER, LYOPHILIZED, FOR SOLUTION INTRAVENOUS EVERY 8 HOURS
Status: COMPLETED | OUTPATIENT
Start: 2022-01-01 | End: 2022-01-01

## 2022-01-01 RX ORDER — FOLIC ACID 5 MG/ML
1 INJECTION, SOLUTION INTRAMUSCULAR; INTRAVENOUS; SUBCUTANEOUS DAILY
Status: DISCONTINUED | OUTPATIENT
Start: 2022-01-01 | End: 2022-01-01

## 2022-01-01 RX ORDER — GUAIFENESIN 600 MG/1
15 TABLET, EXTENDED RELEASE ORAL DAILY
Status: DISCONTINUED | OUTPATIENT
Start: 2022-01-01 | End: 2022-01-01

## 2022-01-01 RX ORDER — LACTULOSE 10 G/15ML
20 SOLUTION ORAL ONCE
Status: COMPLETED | OUTPATIENT
Start: 2022-01-01 | End: 2022-01-01

## 2022-01-01 RX ORDER — GABAPENTIN 100 MG/1
200 CAPSULE ORAL 2 TIMES DAILY
Status: DISCONTINUED | OUTPATIENT
Start: 2022-01-01 | End: 2022-01-01

## 2022-01-01 RX ORDER — FOLIC ACID 1 MG/1
1 TABLET ORAL DAILY
Status: DISCONTINUED | OUTPATIENT
Start: 2022-01-01 | End: 2022-01-01 | Stop reason: ALTCHOICE

## 2022-01-01 RX ORDER — POTASSIUM CHLORIDE 1500 MG/1
20 TABLET, EXTENDED RELEASE ORAL ONCE
Status: DISCONTINUED | OUTPATIENT
Start: 2022-01-01 | End: 2022-01-01

## 2022-01-01 RX ORDER — POTASSIUM CHLORIDE 1500 MG/1
40 TABLET, EXTENDED RELEASE ORAL ONCE
Status: DISCONTINUED | OUTPATIENT
Start: 2022-01-01 | End: 2022-01-01

## 2022-01-01 RX ORDER — VANCOMYCIN HYDROCHLORIDE 1 G/200ML
1000 INJECTION, SOLUTION INTRAVENOUS EVERY 12 HOURS
Status: DISCONTINUED | OUTPATIENT
Start: 2022-01-01 | End: 2022-01-01

## 2022-01-01 RX ORDER — METOPROLOL SUCCINATE 25 MG/1
25 TABLET, EXTENDED RELEASE ORAL DAILY
Status: DISCONTINUED | OUTPATIENT
Start: 2022-01-01 | End: 2022-01-01

## 2022-01-01 RX ORDER — ALBUTEROL SULFATE 90 UG/1
2 AEROSOL, METERED RESPIRATORY (INHALATION) 3 TIMES DAILY
Status: DISCONTINUED | OUTPATIENT
Start: 2022-01-01 | End: 2022-01-01

## 2022-01-01 RX ORDER — MAGNESIUM OXIDE 400 MG/1
400 TABLET ORAL 3 TIMES DAILY
Status: DISCONTINUED | OUTPATIENT
Start: 2022-01-01 | End: 2022-01-01

## 2022-01-01 RX ORDER — METOPROLOL SUCCINATE 25 MG/1
25 TABLET, EXTENDED RELEASE ORAL DAILY
COMMUNITY
Start: 2021-01-01

## 2022-01-01 RX ORDER — FUROSEMIDE 10 MG/ML
40 INJECTION INTRAMUSCULAR; INTRAVENOUS ONCE
Status: COMPLETED | OUTPATIENT
Start: 2022-01-01 | End: 2022-01-01

## 2022-01-01 RX ORDER — POTASSIUM CHLORIDE 29.8 MG/ML
20 INJECTION INTRAVENOUS ONCE
Status: DISCONTINUED | OUTPATIENT
Start: 2022-01-01 | End: 2022-01-01

## 2022-01-01 RX ORDER — SODIUM CHLORIDE 9 MG/ML
INJECTION, SOLUTION INTRAVENOUS CONTINUOUS
Status: DISCONTINUED | OUTPATIENT
Start: 2022-01-01 | End: 2022-03-01 | Stop reason: HOSPADM

## 2022-01-01 RX ORDER — GABAPENTIN 250 MG/5ML
200 SOLUTION ORAL 2 TIMES DAILY
Status: DISCONTINUED | OUTPATIENT
Start: 2022-01-01 | End: 2022-01-01

## 2022-01-01 RX ORDER — BISACODYL 10 MG
10 SUPPOSITORY, RECTAL RECTAL DAILY PRN
Status: DISCONTINUED | OUTPATIENT
Start: 2022-01-01 | End: 2022-03-01 | Stop reason: HOSPADM

## 2022-01-01 RX ORDER — FAMOTIDINE 20 MG/1
20 TABLET, FILM COATED ORAL 2 TIMES DAILY PRN
Status: DISCONTINUED | OUTPATIENT
Start: 2022-01-01 | End: 2022-03-01 | Stop reason: HOSPADM

## 2022-01-01 RX ORDER — NALOXONE HYDROCHLORIDE 0.4 MG/ML
0.1 INJECTION, SOLUTION INTRAMUSCULAR; INTRAVENOUS; SUBCUTANEOUS
Status: DISCONTINUED | OUTPATIENT
Start: 2022-01-01 | End: 2022-03-01 | Stop reason: HOSPADM

## 2022-01-01 RX ORDER — FAMOTIDINE 20 MG/1
20 TABLET, FILM COATED ORAL 2 TIMES DAILY PRN
Status: DISCONTINUED | OUTPATIENT
Start: 2022-01-01 | End: 2022-01-01

## 2022-01-01 RX ORDER — FUROSEMIDE 10 MG/ML
20 INJECTION INTRAMUSCULAR; INTRAVENOUS EVERY 8 HOURS
Status: DISCONTINUED | OUTPATIENT
Start: 2022-01-01 | End: 2022-01-01

## 2022-01-01 RX ORDER — ALBUTEROL SULFATE 0.83 MG/ML
2.5 SOLUTION RESPIRATORY (INHALATION) 3 TIMES DAILY PRN
Status: DISCONTINUED | OUTPATIENT
Start: 2022-01-01 | End: 2022-01-01

## 2022-01-01 RX ORDER — LORAZEPAM 1 MG/1
1 TABLET ORAL 3 TIMES DAILY PRN
Status: DISCONTINUED | OUTPATIENT
Start: 2022-01-01 | End: 2022-01-01

## 2022-01-01 RX ORDER — ACETAMINOPHEN 650 MG/1
650 SUPPOSITORY RECTAL EVERY 8 HOURS PRN
Status: DISCONTINUED | OUTPATIENT
Start: 2022-01-01 | End: 2022-03-01 | Stop reason: HOSPADM

## 2022-01-01 RX ORDER — OLANZAPINE 5 MG/1
5 TABLET, ORALLY DISINTEGRATING ORAL ONCE
Status: COMPLETED | OUTPATIENT
Start: 2022-01-01 | End: 2022-01-01

## 2022-01-01 RX ORDER — ETOMIDATE 2 MG/ML
20 INJECTION INTRAVENOUS ONCE
Status: COMPLETED | OUTPATIENT
Start: 2022-01-01 | End: 2022-01-01

## 2022-01-01 RX ORDER — OLANZAPINE 5 MG/1
5 TABLET ORAL AT BEDTIME
Status: DISCONTINUED | OUTPATIENT
Start: 2022-01-01 | End: 2022-01-01

## 2022-01-01 RX ORDER — HUMAN INSULIN 100 [IU]/ML
INJECTION, SUSPENSION SUBCUTANEOUS 2 TIMES DAILY
COMMUNITY
Start: 2021-01-01

## 2022-01-01 RX ORDER — CALCIUM CARBONATE 500 MG/1
1000 TABLET, CHEWABLE ORAL 2 TIMES DAILY
Status: DISCONTINUED | OUTPATIENT
Start: 2022-01-01 | End: 2022-03-01 | Stop reason: HOSPADM

## 2022-01-01 RX ORDER — DEXAMETHASONE SODIUM PHOSPHATE 10 MG/ML
6 INJECTION, SOLUTION INTRAMUSCULAR; INTRAVENOUS DAILY
Status: DISCONTINUED | OUTPATIENT
Start: 2022-01-01 | End: 2022-01-01

## 2022-01-01 RX ORDER — NICOTINE POLACRILEX 4 MG
15-30 LOZENGE BUCCAL
Status: DISCONTINUED | OUTPATIENT
Start: 2022-01-01 | End: 2022-03-01 | Stop reason: HOSPADM

## 2022-01-01 RX ORDER — ALBUMIN (HUMAN) 12.5 G/50ML
25 SOLUTION INTRAVENOUS EVERY 8 HOURS
Status: COMPLETED | OUTPATIENT
Start: 2022-01-01 | End: 2022-01-01

## 2022-01-01 RX ORDER — HALOPERIDOL 5 MG/ML
2.5-5 INJECTION INTRAMUSCULAR EVERY 6 HOURS PRN
Status: DISCONTINUED | OUTPATIENT
Start: 2022-01-01 | End: 2022-03-01 | Stop reason: HOSPADM

## 2022-01-01 RX ORDER — CLONIDINE HYDROCHLORIDE 0.1 MG/1
0.1 TABLET ORAL EVERY 8 HOURS
Status: DISCONTINUED | OUTPATIENT
Start: 2022-01-01 | End: 2022-01-01

## 2022-01-01 RX ORDER — PIPERACILLIN SODIUM, TAZOBACTAM SODIUM 3; .375 G/15ML; G/15ML
3.38 INJECTION, POWDER, LYOPHILIZED, FOR SOLUTION INTRAVENOUS EVERY 8 HOURS
Status: DISCONTINUED | OUTPATIENT
Start: 2022-01-01 | End: 2022-01-01

## 2022-01-01 RX ORDER — SODIUM CHLORIDE FOR INHALATION 3 %
3 VIAL, NEBULIZER (ML) INHALATION 3 TIMES DAILY
Status: DISCONTINUED | OUTPATIENT
Start: 2022-01-01 | End: 2022-01-01

## 2022-01-01 RX ORDER — LIDOCAINE 40 MG/G
CREAM TOPICAL
Status: DISCONTINUED | OUTPATIENT
Start: 2022-01-01 | End: 2022-03-01 | Stop reason: HOSPADM

## 2022-01-01 RX ORDER — GABAPENTIN 100 MG/1
100 CAPSULE ORAL EVERY 8 HOURS
Status: DISCONTINUED | OUTPATIENT
Start: 2022-01-01 | End: 2022-01-01

## 2022-01-01 RX ORDER — FLUMAZENIL 0.1 MG/ML
0.2 INJECTION, SOLUTION INTRAVENOUS
Status: DISCONTINUED | OUTPATIENT
Start: 2022-01-01 | End: 2022-03-01 | Stop reason: HOSPADM

## 2022-01-01 RX ORDER — ONDANSETRON 2 MG/ML
4 INJECTION INTRAMUSCULAR; INTRAVENOUS ONCE
Status: COMPLETED | OUTPATIENT
Start: 2022-01-01 | End: 2022-01-01

## 2022-01-01 RX ORDER — POTASSIUM CHLORIDE 29.8 MG/ML
20 INJECTION INTRAVENOUS ONCE
Status: COMPLETED | OUTPATIENT
Start: 2022-01-01 | End: 2022-01-01

## 2022-01-01 RX ORDER — GABAPENTIN 300 MG/1
300 CAPSULE ORAL EVERY 8 HOURS
Status: DISCONTINUED | OUTPATIENT
Start: 2022-01-01 | End: 2022-01-01

## 2022-01-01 RX ORDER — NALOXONE HYDROCHLORIDE 0.4 MG/ML
0.2 INJECTION, SOLUTION INTRAMUSCULAR; INTRAVENOUS; SUBCUTANEOUS
Status: DISCONTINUED | OUTPATIENT
Start: 2022-01-01 | End: 2022-03-01 | Stop reason: HOSPADM

## 2022-01-01 RX ORDER — SUCRALFATE ORAL 1 G/10ML
1 SUSPENSION ORAL 4 TIMES DAILY PRN
Status: DISCONTINUED | OUTPATIENT
Start: 2022-01-01 | End: 2022-03-01 | Stop reason: HOSPADM

## 2022-01-01 RX ORDER — LIDOCAINE 4 G/G
1 PATCH TOPICAL EVERY 24 HOURS
Status: DISCONTINUED | OUTPATIENT
Start: 2022-01-01 | End: 2022-03-01 | Stop reason: HOSPADM

## 2022-01-01 RX ORDER — GABAPENTIN 100 MG/1
100 CAPSULE ORAL 2 TIMES DAILY
Status: DISCONTINUED | OUTPATIENT
Start: 2022-01-01 | End: 2022-03-01 | Stop reason: HOSPADM

## 2022-01-01 RX ORDER — DEXTROSE MONOHYDRATE 25 G/50ML
25-50 INJECTION, SOLUTION INTRAVENOUS
Status: DISCONTINUED | OUTPATIENT
Start: 2022-01-01 | End: 2022-03-01 | Stop reason: HOSPADM

## 2022-01-01 RX ORDER — CALCIUM GLUCONATE 20 MG/ML
2 INJECTION, SOLUTION INTRAVENOUS ONCE
Status: COMPLETED | OUTPATIENT
Start: 2022-01-01 | End: 2022-01-01

## 2022-01-01 RX ORDER — OLANZAPINE 5 MG/1
5 TABLET, ORALLY DISINTEGRATING ORAL 2 TIMES DAILY PRN
Status: DISCONTINUED | OUTPATIENT
Start: 2022-01-01 | End: 2022-03-01 | Stop reason: HOSPADM

## 2022-01-01 RX ORDER — QUETIAPINE FUMARATE 25 MG/1
75 TABLET, FILM COATED ORAL AT BEDTIME
Status: DISCONTINUED | OUTPATIENT
Start: 2022-01-01 | End: 2022-03-01 | Stop reason: HOSPADM

## 2022-01-01 RX ORDER — LORAZEPAM 1 MG/1
1 TABLET ORAL
Status: DISCONTINUED | OUTPATIENT
Start: 2022-01-01 | End: 2022-03-01 | Stop reason: HOSPADM

## 2022-01-01 RX ORDER — MIDODRINE HYDROCHLORIDE 2.5 MG/1
2.5 TABLET ORAL ONCE
Status: COMPLETED | OUTPATIENT
Start: 2022-01-01 | End: 2022-01-01

## 2022-01-01 RX ORDER — CEFAZOLIN SODIUM 1 G/50ML
1250 SOLUTION INTRAVENOUS ONCE
Status: COMPLETED | OUTPATIENT
Start: 2022-01-01 | End: 2022-01-01

## 2022-01-01 RX ORDER — METHOCARBAMOL 500 MG/1
500 TABLET, FILM COATED ORAL EVERY 6 HOURS PRN
Status: ACTIVE | OUTPATIENT
Start: 2022-01-01 | End: 2022-01-01

## 2022-01-01 RX ORDER — PROPOFOL 10 MG/ML
5-75 INJECTION, EMULSION INTRAVENOUS CONTINUOUS
Status: DISCONTINUED | OUTPATIENT
Start: 2022-01-01 | End: 2022-01-01

## 2022-01-01 RX ORDER — METOPROLOL SUCCINATE 25 MG/1
25 TABLET, EXTENDED RELEASE ORAL DAILY
Status: DISCONTINUED | OUTPATIENT
Start: 2022-01-01 | End: 2022-03-01 | Stop reason: HOSPADM

## 2022-01-01 RX ORDER — METOPROLOL TARTRATE 1 MG/ML
5 INJECTION, SOLUTION INTRAVENOUS EVERY 4 HOURS PRN
Status: DISCONTINUED | OUTPATIENT
Start: 2022-01-01 | End: 2022-03-01 | Stop reason: HOSPADM

## 2022-01-01 RX ORDER — HALOPERIDOL 5 MG/ML
1 INJECTION INTRAMUSCULAR EVERY 6 HOURS PRN
Status: DISCONTINUED | OUTPATIENT
Start: 2022-01-01 | End: 2022-03-01 | Stop reason: HOSPADM

## 2022-01-01 RX ORDER — ACETAMINOPHEN 325 MG/1
650 TABLET ORAL EVERY 8 HOURS PRN
Status: DISCONTINUED | OUTPATIENT
Start: 2022-01-01 | End: 2022-03-01 | Stop reason: HOSPADM

## 2022-01-01 RX ORDER — ACETAMINOPHEN AND CODEINE PHOSPHATE 300; 30 MG/1; MG/1
1 TABLET ORAL EVERY 6 HOURS PRN
COMMUNITY
Start: 2021-01-01

## 2022-01-01 RX ORDER — SODIUM CHLORIDE 9 MG/ML
INJECTION, SOLUTION INTRAVENOUS CONTINUOUS
Status: DISCONTINUED | OUTPATIENT
Start: 2022-01-01 | End: 2022-01-01

## 2022-01-01 RX ORDER — POTASSIUM CHLORIDE 29.8 MG/ML
20 INJECTION INTRAVENOUS
Status: COMPLETED | OUTPATIENT
Start: 2022-01-01 | End: 2022-01-01

## 2022-01-01 RX ORDER — LORAZEPAM 2 MG/ML
1-2 INJECTION INTRAMUSCULAR EVERY 30 MIN PRN
Status: DISCONTINUED | OUTPATIENT
Start: 2022-01-01 | End: 2022-01-01

## 2022-01-01 RX ORDER — ALBUMIN (HUMAN) 12.5 G/50ML
25 SOLUTION INTRAVENOUS ONCE
Status: COMPLETED | OUTPATIENT
Start: 2022-01-01 | End: 2022-01-01

## 2022-01-01 RX ORDER — LORAZEPAM 0.5 MG/1
1-2 TABLET ORAL EVERY 30 MIN PRN
Status: DISCONTINUED | OUTPATIENT
Start: 2022-01-01 | End: 2022-01-01

## 2022-01-01 RX ORDER — DULOXETIN HYDROCHLORIDE 60 MG/1
60 CAPSULE, DELAYED RELEASE ORAL DAILY
Status: DISCONTINUED | OUTPATIENT
Start: 2022-01-01 | End: 2022-03-01 | Stop reason: HOSPADM

## 2022-01-01 RX ORDER — DULOXETIN HYDROCHLORIDE 60 MG/1
60 CAPSULE, DELAYED RELEASE ORAL DAILY
Status: DISCONTINUED | OUTPATIENT
Start: 2022-01-01 | End: 2022-01-01

## 2022-01-01 RX ORDER — DEXTROSE MONOHYDRATE 100 MG/ML
INJECTION, SOLUTION INTRAVENOUS CONTINUOUS PRN
Status: DISCONTINUED | OUTPATIENT
Start: 2022-01-01 | End: 2022-03-01 | Stop reason: HOSPADM

## 2022-01-01 RX ORDER — CALCIUM CARBONATE 500 MG/1
1000 TABLET, CHEWABLE ORAL 4 TIMES DAILY PRN
Status: DISCONTINUED | OUTPATIENT
Start: 2022-01-01 | End: 2022-01-01

## 2022-01-01 RX ORDER — LIDOCAINE 40 MG/G
CREAM TOPICAL
Status: DISCONTINUED | OUTPATIENT
Start: 2022-01-01 | End: 2022-01-01

## 2022-01-01 RX ORDER — QUETIAPINE FUMARATE 25 MG/1
50 TABLET, FILM COATED ORAL AT BEDTIME
Status: DISCONTINUED | OUTPATIENT
Start: 2022-01-01 | End: 2022-01-01

## 2022-01-01 RX ORDER — FOLIC ACID 1 MG/1
1 TABLET ORAL DAILY
Status: DISCONTINUED | OUTPATIENT
Start: 2022-01-01 | End: 2022-01-01

## 2022-01-01 RX ORDER — TRAZODONE HYDROCHLORIDE 50 MG/1
50 TABLET, FILM COATED ORAL AT BEDTIME
Status: DISCONTINUED | OUTPATIENT
Start: 2022-01-01 | End: 2022-01-01

## 2022-01-01 RX ORDER — QUETIAPINE FUMARATE 25 MG/1
25 TABLET, FILM COATED ORAL 4 TIMES DAILY PRN
Status: DISCONTINUED | OUTPATIENT
Start: 2022-01-01 | End: 2022-03-01 | Stop reason: HOSPADM

## 2022-01-01 RX ORDER — COSYNTROPIN 0.25 MG/ML
250 INJECTION, POWDER, FOR SOLUTION INTRAMUSCULAR; INTRAVENOUS ONCE
Status: COMPLETED | OUTPATIENT
Start: 2022-01-01 | End: 2022-01-01

## 2022-01-01 RX ORDER — LACTULOSE 10 G/15ML
20 SOLUTION ORAL EVERY 4 HOURS
Status: DISPENSED | OUTPATIENT
Start: 2022-01-01 | End: 2022-01-01

## 2022-01-01 RX ADMIN — CALCIUM CARBONATE (ANTACID) CHEW TAB 500 MG 1000 MG: 500 CHEW TAB at 22:24

## 2022-01-01 RX ADMIN — ACETAMINOPHEN 650 MG: 325 TABLET, FILM COATED ORAL at 20:57

## 2022-01-01 RX ADMIN — QUETIAPINE 6.25 MG: 25 TABLET ORAL at 10:15

## 2022-01-01 RX ADMIN — MIDODRINE HYDROCHLORIDE 2.5 MG: 2.5 TABLET ORAL at 18:46

## 2022-01-01 RX ADMIN — QUETIAPINE 12.5 MG: 25 TABLET, FILM COATED ORAL at 08:26

## 2022-01-01 RX ADMIN — GABAPENTIN 100 MG: 100 CAPSULE ORAL at 09:20

## 2022-01-01 RX ADMIN — MIDODRINE HYDROCHLORIDE 2.5 MG: 2.5 TABLET ORAL at 17:47

## 2022-01-01 RX ADMIN — GABAPENTIN 200 MG: 100 CAPSULE ORAL at 20:28

## 2022-01-01 RX ADMIN — IPRATROPIUM BROMIDE AND ALBUTEROL 1 PUFF: 20; 100 SPRAY, METERED RESPIRATORY (INHALATION) at 18:48

## 2022-01-01 RX ADMIN — HALOPERIDOL LACTATE 1 MG: 5 INJECTION, SOLUTION INTRAMUSCULAR at 01:28

## 2022-01-01 RX ADMIN — MAGNESIUM OXIDE TAB 400 MG (241.3 MG ELEMENTAL MG) 400 MG: 400 (241.3 MG) TAB at 09:12

## 2022-01-01 RX ADMIN — CALCIUM CARBONATE (ANTACID) CHEW TAB 500 MG 1000 MG: 500 CHEW TAB at 08:41

## 2022-01-01 RX ADMIN — MIDODRINE HYDROCHLORIDE 2.5 MG: 2.5 TABLET ORAL at 17:59

## 2022-01-01 RX ADMIN — MIDODRINE HYDROCHLORIDE 2.5 MG: 2.5 TABLET ORAL at 08:53

## 2022-01-01 RX ADMIN — GABAPENTIN 200 MG: 250 SUSPENSION ORAL at 08:16

## 2022-01-01 RX ADMIN — ENOXAPARIN SODIUM 40 MG: 40 INJECTION SUBCUTANEOUS at 17:19

## 2022-01-01 RX ADMIN — LIDOCAINE 1 PATCH: 246 PATCH TOPICAL at 21:08

## 2022-01-01 RX ADMIN — IPRATROPIUM BROMIDE AND ALBUTEROL 1 PUFF: 20; 100 SPRAY, METERED RESPIRATORY (INHALATION) at 20:22

## 2022-01-01 RX ADMIN — Medication 15 G: at 08:06

## 2022-01-01 RX ADMIN — Medication 1 CAPSULE: at 18:46

## 2022-01-01 RX ADMIN — IPRATROPIUM BROMIDE AND ALBUTEROL 1 PUFF: 20; 100 SPRAY, METERED RESPIRATORY (INHALATION) at 10:16

## 2022-01-01 RX ADMIN — GABAPENTIN 200 MG: 100 CAPSULE ORAL at 20:48

## 2022-01-01 RX ADMIN — DEXAMETHASONE SODIUM PHOSPHATE 6 MG: 10 INJECTION, SOLUTION INTRAMUSCULAR; INTRAVENOUS at 12:11

## 2022-01-01 RX ADMIN — IPRATROPIUM BROMIDE AND ALBUTEROL 1 PUFF: 20; 100 SPRAY, METERED RESPIRATORY (INHALATION) at 17:59

## 2022-01-01 RX ADMIN — DULOXETINE HYDROCHLORIDE 60 MG: 60 CAPSULE, DELAYED RELEASE ORAL at 09:22

## 2022-01-01 RX ADMIN — IPRATROPIUM BROMIDE AND ALBUTEROL 1 PUFF: 20; 100 SPRAY, METERED RESPIRATORY (INHALATION) at 12:37

## 2022-01-01 RX ADMIN — INSULIN ASPART 1 UNITS: 100 INJECTION, SOLUTION INTRAVENOUS; SUBCUTANEOUS at 04:29

## 2022-01-01 RX ADMIN — MEROPENEM 1 G: 1 INJECTION, POWDER, FOR SOLUTION INTRAVENOUS at 20:34

## 2022-01-01 RX ADMIN — LIDOCAINE 1 PATCH: 246 PATCH TOPICAL at 20:38

## 2022-01-01 RX ADMIN — Medication 250 MG: at 08:37

## 2022-01-01 RX ADMIN — ANORECTAL OINTMENT: 15.7; .44; 24; 20.6 OINTMENT TOPICAL at 09:23

## 2022-01-01 RX ADMIN — CALCIUM CARBONATE (ANTACID) CHEW TAB 500 MG 1000 MG: 500 CHEW TAB at 21:40

## 2022-01-01 RX ADMIN — INSULIN ASPART 2 UNITS: 100 INJECTION, SOLUTION INTRAVENOUS; SUBCUTANEOUS at 12:15

## 2022-01-01 RX ADMIN — Medication 1 CAPSULE: at 08:03

## 2022-01-01 RX ADMIN — QUETIAPINE 25 MG: 25 TABLET ORAL at 08:05

## 2022-01-01 RX ADMIN — MAGNESIUM OXIDE TAB 400 MG (241.3 MG ELEMENTAL MG) 400 MG: 400 (241.3 MG) TAB at 20:49

## 2022-01-01 RX ADMIN — IPRATROPIUM BROMIDE AND ALBUTEROL 1 PUFF: 20; 100 SPRAY, METERED RESPIRATORY (INHALATION) at 16:16

## 2022-01-01 RX ADMIN — MORPHINE SULFATE 2 MG: 2 INJECTION, SOLUTION INTRAMUSCULAR; INTRAVENOUS at 21:31

## 2022-01-01 RX ADMIN — FOLIC ACID 1 MG: 1 TABLET ORAL at 09:15

## 2022-01-01 RX ADMIN — Medication 30 G: at 21:23

## 2022-01-01 RX ADMIN — IPRATROPIUM BROMIDE AND ALBUTEROL 1 PUFF: 20; 100 SPRAY, METERED RESPIRATORY (INHALATION) at 16:08

## 2022-01-01 RX ADMIN — GABAPENTIN 200 MG: 100 CAPSULE ORAL at 08:42

## 2022-01-01 RX ADMIN — Medication 200 MG: at 08:06

## 2022-01-01 RX ADMIN — FUROSEMIDE 20 MG: 10 INJECTION, SOLUTION INTRAMUSCULAR; INTRAVENOUS at 03:16

## 2022-01-01 RX ADMIN — PIPERACILLIN SODIUM AND TAZOBACTAM SODIUM 3.38 G: 3; .375 INJECTION, POWDER, LYOPHILIZED, FOR SOLUTION INTRAVENOUS at 05:38

## 2022-01-01 RX ADMIN — QUETIAPINE 12.5 MG: 25 TABLET, FILM COATED ORAL at 08:53

## 2022-01-01 RX ADMIN — DULOXETINE HYDROCHLORIDE 60 MG: 60 CAPSULE, DELAYED RELEASE ORAL at 08:03

## 2022-01-01 RX ADMIN — ACETAMINOPHEN AND CODEINE PHOSPHATE 1 TABLET: 300; 30 TABLET ORAL at 03:03

## 2022-01-01 RX ADMIN — IPRATROPIUM BROMIDE AND ALBUTEROL 1 PUFF: 20; 100 SPRAY, METERED RESPIRATORY (INHALATION) at 12:57

## 2022-01-01 RX ADMIN — ACETAMINOPHEN AND CODEINE PHOSPHATE 1 TABLET: 300; 30 TABLET ORAL at 16:52

## 2022-01-01 RX ADMIN — METOPROLOL SUCCINATE 25 MG: 25 TABLET, EXTENDED RELEASE ORAL at 08:58

## 2022-01-01 RX ADMIN — POTASSIUM CHLORIDE 40 MEQ: 1.5 SOLUTION ORAL at 09:38

## 2022-01-01 RX ADMIN — MAGNESIUM OXIDE TAB 400 MG (241.3 MG ELEMENTAL MG) 400 MG: 400 (241.3 MG) TAB at 21:00

## 2022-01-01 RX ADMIN — IPRATROPIUM BROMIDE AND ALBUTEROL 1 PUFF: 20; 100 SPRAY, METERED RESPIRATORY (INHALATION) at 20:31

## 2022-01-01 RX ADMIN — ACETAMINOPHEN AND CODEINE PHOSPHATE 1 TABLET: 300; 30 TABLET ORAL at 00:23

## 2022-01-01 RX ADMIN — PANTOPRAZOLE SODIUM 40 MG: 40 TABLET, DELAYED RELEASE ORAL at 06:15

## 2022-01-01 RX ADMIN — ACETAZOLAMIDE 250 MG: 250 TABLET ORAL at 08:19

## 2022-01-01 RX ADMIN — MAGNESIUM OXIDE TAB 400 MG (241.3 MG ELEMENTAL MG) 400 MG: 400 (241.3 MG) TAB at 20:25

## 2022-01-01 RX ADMIN — LIDOCAINE 1 PATCH: 246 PATCH TOPICAL at 21:41

## 2022-01-01 RX ADMIN — RAMELTEON 8 MG: 8 TABLET, FILM COATED ORAL at 20:57

## 2022-01-01 RX ADMIN — CLONIDINE HYDROCHLORIDE 0.1 MG: 0.1 TABLET ORAL at 21:32

## 2022-01-01 RX ADMIN — SODIUM CHLORIDE 1000 ML: 9 INJECTION, SOLUTION INTRAVENOUS at 18:00

## 2022-01-01 RX ADMIN — CALCIUM CARBONATE (ANTACID) CHEW TAB 500 MG 1000 MG: 500 CHEW TAB at 20:11

## 2022-01-01 RX ADMIN — ACETAMINOPHEN 650 MG: 325 TABLET, FILM COATED ORAL at 17:39

## 2022-01-01 RX ADMIN — DEXAMETHASONE SODIUM PHOSPHATE 6 MG: 10 INJECTION, SOLUTION INTRAMUSCULAR; INTRAVENOUS at 12:26

## 2022-01-01 RX ADMIN — POTASSIUM CHLORIDE 40 MEQ: 1500 TABLET, EXTENDED RELEASE ORAL at 09:15

## 2022-01-01 RX ADMIN — PANTOPRAZOLE SODIUM 40 MG: 40 TABLET, DELAYED RELEASE ORAL at 06:32

## 2022-01-01 RX ADMIN — DULOXETINE HYDROCHLORIDE 60 MG: 60 CAPSULE, DELAYED RELEASE ORAL at 08:25

## 2022-01-01 RX ADMIN — PANTOPRAZOLE SODIUM 40 MG: 40 TABLET, DELAYED RELEASE ORAL at 06:58

## 2022-01-01 RX ADMIN — CALCIUM CARBONATE (ANTACID) CHEW TAB 500 MG 1000 MG: 500 CHEW TAB at 21:44

## 2022-01-01 RX ADMIN — CALCIUM CARBONATE (ANTACID) CHEW TAB 500 MG 1000 MG: 500 CHEW TAB at 08:44

## 2022-01-01 RX ADMIN — MIDODRINE HYDROCHLORIDE 2.5 MG: 2.5 TABLET ORAL at 12:32

## 2022-01-01 RX ADMIN — MEROPENEM 1 G: 1 INJECTION, POWDER, FOR SOLUTION INTRAVENOUS at 19:23

## 2022-01-01 RX ADMIN — IPRATROPIUM BROMIDE AND ALBUTEROL 1 PUFF: 20; 100 SPRAY, METERED RESPIRATORY (INHALATION) at 07:57

## 2022-01-01 RX ADMIN — LIDOCAINE 1 PATCH: 246 PATCH TOPICAL at 20:46

## 2022-01-01 RX ADMIN — INSULIN ASPART 1 UNITS: 100 INJECTION, SOLUTION INTRAVENOUS; SUBCUTANEOUS at 23:23

## 2022-01-01 RX ADMIN — METFORMIN HYDROCHLORIDE 500 MG: 500 TABLET, FILM COATED ORAL at 16:48

## 2022-01-01 RX ADMIN — Medication 1 CAPSULE: at 16:56

## 2022-01-01 RX ADMIN — ENOXAPARIN SODIUM 40 MG: 40 INJECTION SUBCUTANEOUS at 14:46

## 2022-01-01 RX ADMIN — LORAZEPAM 2 MG: 2 INJECTION INTRAMUSCULAR; INTRAVENOUS at 22:57

## 2022-01-01 RX ADMIN — LIDOCAINE 1 PATCH: 246 PATCH TOPICAL at 21:00

## 2022-01-01 RX ADMIN — Medication 100 MG: at 09:20

## 2022-01-01 RX ADMIN — LORAZEPAM 2 MG: 2 INJECTION INTRAMUSCULAR; INTRAVENOUS at 01:50

## 2022-01-01 RX ADMIN — GABAPENTIN 1200 MG: 300 CAPSULE ORAL at 21:32

## 2022-01-01 RX ADMIN — DEXTROSE MONOHYDRATE 50 ML: 25 INJECTION, SOLUTION INTRAVENOUS at 05:19

## 2022-01-01 RX ADMIN — GABAPENTIN 200 MG: 100 CAPSULE ORAL at 20:55

## 2022-01-01 RX ADMIN — OLANZAPINE 10 MG: 5 TABLET, ORALLY DISINTEGRATING ORAL at 01:24

## 2022-01-01 RX ADMIN — MEROPENEM 1 G: 1 INJECTION, POWDER, FOR SOLUTION INTRAVENOUS at 12:32

## 2022-01-01 RX ADMIN — ENOXAPARIN SODIUM 40 MG: 40 INJECTION SUBCUTANEOUS at 14:23

## 2022-01-01 RX ADMIN — GABAPENTIN 200 MG: 100 CAPSULE ORAL at 20:32

## 2022-01-01 RX ADMIN — GABAPENTIN 100 MG: 100 CAPSULE ORAL at 21:26

## 2022-01-01 RX ADMIN — ENOXAPARIN SODIUM 40 MG: 40 INJECTION SUBCUTANEOUS at 16:33

## 2022-01-01 RX ADMIN — DULOXETINE HYDROCHLORIDE 60 MG: 60 CAPSULE, DELAYED RELEASE ORAL at 09:32

## 2022-01-01 RX ADMIN — IPRATROPIUM BROMIDE AND ALBUTEROL 1 PUFF: 20; 100 SPRAY, METERED RESPIRATORY (INHALATION) at 16:59

## 2022-01-01 RX ADMIN — FOLIC ACID 1 MG: 1 TABLET ORAL at 09:07

## 2022-01-01 RX ADMIN — IPRATROPIUM BROMIDE AND ALBUTEROL 1 PUFF: 20; 100 SPRAY, METERED RESPIRATORY (INHALATION) at 09:24

## 2022-01-01 RX ADMIN — IPRATROPIUM BROMIDE AND ALBUTEROL 1 PUFF: 20; 100 SPRAY, METERED RESPIRATORY (INHALATION) at 08:04

## 2022-01-01 RX ADMIN — PIPERACILLIN AND TAZOBACTAM 3.38 G: 3; .375 INJECTION, POWDER, FOR SOLUTION INTRAVENOUS at 16:34

## 2022-01-01 RX ADMIN — ALBUTEROL SULFATE 2.5 MG: 2.5 SOLUTION RESPIRATORY (INHALATION) at 20:01

## 2022-01-01 RX ADMIN — LORAZEPAM 2 MG: 2 INJECTION INTRAMUSCULAR; INTRAVENOUS at 10:26

## 2022-01-01 RX ADMIN — Medication 1 CAPSULE: at 17:09

## 2022-01-01 RX ADMIN — INSULIN ASPART 2 UNITS: 100 INJECTION, SOLUTION INTRAVENOUS; SUBCUTANEOUS at 09:09

## 2022-01-01 RX ADMIN — FUROSEMIDE 20 MG: 10 INJECTION, SOLUTION INTRAMUSCULAR; INTRAVENOUS at 11:38

## 2022-01-01 RX ADMIN — PIPERACILLIN AND TAZOBACTAM 3.38 G: 3; .375 INJECTION, POWDER, FOR SOLUTION INTRAVENOUS at 00:31

## 2022-01-01 RX ADMIN — INSULIN ASPART 2 UNITS: 100 INJECTION, SOLUTION INTRAVENOUS; SUBCUTANEOUS at 16:28

## 2022-01-01 RX ADMIN — Medication 100 MG: at 08:41

## 2022-01-01 RX ADMIN — IPRATROPIUM BROMIDE AND ALBUTEROL 1 PUFF: 20; 100 SPRAY, METERED RESPIRATORY (INHALATION) at 16:55

## 2022-01-01 RX ADMIN — PIPERACILLIN AND TAZOBACTAM 3.38 G: 3; .375 INJECTION, POWDER, FOR SOLUTION INTRAVENOUS at 17:23

## 2022-01-01 RX ADMIN — INSULIN GLARGINE 15 UNITS: 100 INJECTION, SOLUTION SUBCUTANEOUS at 12:23

## 2022-01-01 RX ADMIN — LIDOCAINE 1 PATCH: 246 PATCH TOPICAL at 20:36

## 2022-01-01 RX ADMIN — IPRATROPIUM BROMIDE AND ALBUTEROL 1 PUFF: 20; 100 SPRAY, METERED RESPIRATORY (INHALATION) at 17:01

## 2022-01-01 RX ADMIN — INSULIN ASPART 1 UNITS: 100 INJECTION, SOLUTION INTRAVENOUS; SUBCUTANEOUS at 08:12

## 2022-01-01 RX ADMIN — LORAZEPAM 1 MG: 1 TABLET ORAL at 02:16

## 2022-01-01 RX ADMIN — INSULIN ASPART 2 UNITS: 100 INJECTION, SOLUTION INTRAVENOUS; SUBCUTANEOUS at 11:51

## 2022-01-01 RX ADMIN — MIDODRINE HYDROCHLORIDE 2.5 MG: 2.5 TABLET ORAL at 08:41

## 2022-01-01 RX ADMIN — IPRATROPIUM BROMIDE AND ALBUTEROL 1 PUFF: 20; 100 SPRAY, METERED RESPIRATORY (INHALATION) at 12:42

## 2022-01-01 RX ADMIN — QUETIAPINE 75 MG: 25 TABLET ORAL at 20:04

## 2022-01-01 RX ADMIN — Medication 0.1 MCG/KG/MIN: at 23:43

## 2022-01-01 RX ADMIN — MELATONIN TAB 3 MG 3 MG: 3 TAB at 21:44

## 2022-01-01 RX ADMIN — MAGNESIUM OXIDE TAB 400 MG (241.3 MG ELEMENTAL MG) 400 MG: 400 (241.3 MG) TAB at 21:45

## 2022-01-01 RX ADMIN — THERA TABS 1 TABLET: TAB at 08:06

## 2022-01-01 RX ADMIN — METFORMIN HYDROCHLORIDE 1000 MG: 500 TABLET ORAL at 08:02

## 2022-01-01 RX ADMIN — CALCIUM GLUCONATE 2 G: 20 INJECTION, SOLUTION INTRAVENOUS at 11:49

## 2022-01-01 RX ADMIN — IOPAMIDOL 100 ML: 755 INJECTION, SOLUTION INTRAVENOUS at 18:23

## 2022-01-01 RX ADMIN — VANCOMYCIN HYDROCHLORIDE 750 MG: 1 INJECTION, POWDER, LYOPHILIZED, FOR SOLUTION INTRAVENOUS at 05:10

## 2022-01-01 RX ADMIN — RAMELTEON 8 MG: 8 TABLET, FILM COATED ORAL at 20:15

## 2022-01-01 RX ADMIN — MIDODRINE HYDROCHLORIDE 2.5 MG: 2.5 TABLET ORAL at 16:46

## 2022-01-01 RX ADMIN — MIDODRINE HYDROCHLORIDE 2.5 MG: 2.5 TABLET ORAL at 17:07

## 2022-01-01 RX ADMIN — THERA TABS 1 TABLET: TAB at 08:41

## 2022-01-01 RX ADMIN — DULOXETINE HYDROCHLORIDE 60 MG: 60 CAPSULE, DELAYED RELEASE ORAL at 09:21

## 2022-01-01 RX ADMIN — THERA TABS 1 TABLET: TAB at 08:12

## 2022-01-01 RX ADMIN — OLANZAPINE 5 MG: 5 TABLET, ORALLY DISINTEGRATING ORAL at 12:11

## 2022-01-01 RX ADMIN — QUETIAPINE 75 MG: 25 TABLET ORAL at 20:15

## 2022-01-01 RX ADMIN — ANORECTAL OINTMENT: 15.7; .44; 24; 20.6 OINTMENT TOPICAL at 08:33

## 2022-01-01 RX ADMIN — PANTOPRAZOLE SODIUM 40 MG: 40 TABLET, DELAYED RELEASE ORAL at 06:37

## 2022-01-01 RX ADMIN — IPRATROPIUM BROMIDE AND ALBUTEROL 1 PUFF: 20; 100 SPRAY, METERED RESPIRATORY (INHALATION) at 19:09

## 2022-01-01 RX ADMIN — INSULIN ASPART 1 UNITS: 100 INJECTION, SOLUTION INTRAVENOUS; SUBCUTANEOUS at 23:33

## 2022-01-01 RX ADMIN — ACETAMINOPHEN 650 MG: 325 TABLET, FILM COATED ORAL at 12:31

## 2022-01-01 RX ADMIN — THIAMINE HYDROCHLORIDE 100 MG: 100 INJECTION, SOLUTION INTRAMUSCULAR; INTRAVENOUS at 09:00

## 2022-01-01 RX ADMIN — DEXTROSE MONOHYDRATE: 100 INJECTION, SOLUTION INTRAVENOUS at 02:30

## 2022-01-01 RX ADMIN — FOLIC ACID 1 MG: 1 TABLET ORAL at 10:16

## 2022-01-01 RX ADMIN — IPRATROPIUM BROMIDE AND ALBUTEROL 1 PUFF: 20; 100 SPRAY, METERED RESPIRATORY (INHALATION) at 20:16

## 2022-01-01 RX ADMIN — THERA TABS 1 TABLET: TAB at 08:22

## 2022-01-01 RX ADMIN — ENOXAPARIN SODIUM 40 MG: 40 INJECTION SUBCUTANEOUS at 17:13

## 2022-01-01 RX ADMIN — OLANZAPINE 5 MG: 5 TABLET, FILM COATED ORAL at 21:01

## 2022-01-01 RX ADMIN — IPRATROPIUM BROMIDE AND ALBUTEROL 1 PUFF: 20; 100 SPRAY, METERED RESPIRATORY (INHALATION) at 21:01

## 2022-01-01 RX ADMIN — GABAPENTIN 200 MG: 100 CAPSULE ORAL at 08:31

## 2022-01-01 RX ADMIN — PANTOPRAZOLE SODIUM 40 MG: 40 TABLET, DELAYED RELEASE ORAL at 09:16

## 2022-01-01 RX ADMIN — GABAPENTIN 200 MG: 100 CAPSULE ORAL at 21:34

## 2022-01-01 RX ADMIN — METOPROLOL TARTRATE 5 MG: 5 INJECTION INTRAVENOUS at 00:15

## 2022-01-01 RX ADMIN — INSULIN ASPART 2 UNITS: 100 INJECTION, SOLUTION INTRAVENOUS; SUBCUTANEOUS at 16:07

## 2022-01-01 RX ADMIN — INSULIN ASPART 5 UNITS: 100 INJECTION, SOLUTION INTRAVENOUS; SUBCUTANEOUS at 20:03

## 2022-01-01 RX ADMIN — ENOXAPARIN SODIUM 40 MG: 40 INJECTION SUBCUTANEOUS at 16:30

## 2022-01-01 RX ADMIN — QUETIAPINE 75 MG: 25 TABLET ORAL at 20:44

## 2022-01-01 RX ADMIN — LIDOCAINE 1 PATCH: 246 PATCH TOPICAL at 21:22

## 2022-01-01 RX ADMIN — DULOXETINE HYDROCHLORIDE 60 MG: 60 CAPSULE, DELAYED RELEASE ORAL at 09:07

## 2022-01-01 RX ADMIN — CALCIUM CARBONATE (ANTACID) CHEW TAB 500 MG 1000 MG: 500 CHEW TAB at 21:47

## 2022-01-01 RX ADMIN — GABAPENTIN 200 MG: 100 CAPSULE ORAL at 08:34

## 2022-01-01 RX ADMIN — ACETAMINOPHEN AND CODEINE PHOSPHATE 1 TABLET: 300; 30 TABLET ORAL at 01:56

## 2022-01-01 RX ADMIN — THERA TABS 1 TABLET: TAB at 08:44

## 2022-01-01 RX ADMIN — QUETIAPINE 12.5 MG: 25 TABLET, FILM COATED ORAL at 09:39

## 2022-01-01 RX ADMIN — MULTIVIT AND MINERALS-FERROUS GLUCONATE 9 MG IRON/15 ML ORAL LIQUID 15 ML: at 10:37

## 2022-01-01 RX ADMIN — PIPERACILLIN SODIUM AND TAZOBACTAM SODIUM 3.38 G: 3; .375 INJECTION, POWDER, LYOPHILIZED, FOR SOLUTION INTRAVENOUS at 21:14

## 2022-01-01 RX ADMIN — ACETAMINOPHEN AND CODEINE PHOSPHATE 1 TABLET: 300; 30 TABLET ORAL at 01:58

## 2022-01-01 RX ADMIN — MIDODRINE HYDROCHLORIDE 2.5 MG: 2.5 TABLET ORAL at 12:26

## 2022-01-01 RX ADMIN — THERA TABS 1 TABLET: TAB at 08:27

## 2022-01-01 RX ADMIN — Medication 1 CAPSULE: at 21:01

## 2022-01-01 RX ADMIN — PANTOPRAZOLE SODIUM 40 MG: 40 TABLET, DELAYED RELEASE ORAL at 07:03

## 2022-01-01 RX ADMIN — GABAPENTIN 200 MG: 250 SUSPENSION ORAL at 22:31

## 2022-01-01 RX ADMIN — CHLORHEXIDINE GLUCONATE 0.12% ORAL RINSE 15 ML: 1.2 LIQUID ORAL at 20:07

## 2022-01-01 RX ADMIN — ANORECTAL OINTMENT: 15.7; .44; 24; 20.6 OINTMENT TOPICAL at 04:34

## 2022-01-01 RX ADMIN — DULOXETINE HYDROCHLORIDE 60 MG: 60 CAPSULE, DELAYED RELEASE ORAL at 08:41

## 2022-01-01 RX ADMIN — INSULIN ASPART 1 UNITS: 100 INJECTION, SOLUTION INTRAVENOUS; SUBCUTANEOUS at 03:53

## 2022-01-01 RX ADMIN — PANTOPRAZOLE SODIUM 40 MG: 40 INJECTION, POWDER, FOR SOLUTION INTRAVENOUS at 09:26

## 2022-01-01 RX ADMIN — MEROPENEM 1 G: 1 INJECTION, POWDER, FOR SOLUTION INTRAVENOUS at 11:20

## 2022-01-01 RX ADMIN — PANTOPRAZOLE SODIUM 40 MG: 40 TABLET, DELAYED RELEASE ORAL at 06:16

## 2022-01-01 RX ADMIN — IPRATROPIUM BROMIDE AND ALBUTEROL 1 PUFF: 20; 100 SPRAY, METERED RESPIRATORY (INHALATION) at 20:28

## 2022-01-01 RX ADMIN — PIPERACILLIN SODIUM AND TAZOBACTAM SODIUM 3.38 G: 3; .375 INJECTION, POWDER, LYOPHILIZED, FOR SOLUTION INTRAVENOUS at 19:38

## 2022-01-01 RX ADMIN — GABAPENTIN 200 MG: 250 SUSPENSION ORAL at 21:24

## 2022-01-01 RX ADMIN — IPRATROPIUM BROMIDE AND ALBUTEROL 1 PUFF: 20; 100 SPRAY, METERED RESPIRATORY (INHALATION) at 17:07

## 2022-01-01 RX ADMIN — THERA TABS 1 TABLET: TAB at 09:22

## 2022-01-01 RX ADMIN — Medication 1 CAPSULE: at 08:09

## 2022-01-01 RX ADMIN — Medication 1 CAPSULE: at 16:52

## 2022-01-01 RX ADMIN — PANTOPRAZOLE SODIUM 40 MG: 40 TABLET, DELAYED RELEASE ORAL at 06:10

## 2022-01-01 RX ADMIN — MAGNESIUM OXIDE TAB 400 MG (241.3 MG ELEMENTAL MG) 400 MG: 400 (241.3 MG) TAB at 21:22

## 2022-01-01 RX ADMIN — GABAPENTIN 200 MG: 100 CAPSULE ORAL at 08:38

## 2022-01-01 RX ADMIN — PIPERACILLIN SODIUM AND TAZOBACTAM SODIUM 3.38 G: 3; .375 INJECTION, POWDER, LYOPHILIZED, FOR SOLUTION INTRAVENOUS at 05:23

## 2022-01-01 RX ADMIN — IPRATROPIUM BROMIDE AND ALBUTEROL 1 PUFF: 20; 100 SPRAY, METERED RESPIRATORY (INHALATION) at 16:56

## 2022-01-01 RX ADMIN — PIPERACILLIN SODIUM AND TAZOBACTAM SODIUM 3.38 G: 3; .375 INJECTION, POWDER, LYOPHILIZED, FOR SOLUTION INTRAVENOUS at 21:47

## 2022-01-01 RX ADMIN — MIDODRINE HYDROCHLORIDE 2.5 MG: 2.5 TABLET ORAL at 11:45

## 2022-01-01 RX ADMIN — PANTOPRAZOLE SODIUM 40 MG: 40 INJECTION, POWDER, FOR SOLUTION INTRAVENOUS at 06:36

## 2022-01-01 RX ADMIN — ACETAMINOPHEN AND CODEINE PHOSPHATE 1 TABLET: 300; 30 TABLET ORAL at 07:08

## 2022-01-01 RX ADMIN — SODIUM CHLORIDE: 9 INJECTION, SOLUTION INTRAVENOUS at 00:52

## 2022-01-01 RX ADMIN — DULOXETINE HYDROCHLORIDE 60 MG: 60 CAPSULE, DELAYED RELEASE ORAL at 08:00

## 2022-01-01 RX ADMIN — POTASSIUM CHLORIDE 10 MEQ: 7.46 INJECTION, SOLUTION INTRAVENOUS at 14:20

## 2022-01-01 RX ADMIN — Medication 100 MG: at 09:15

## 2022-01-01 RX ADMIN — ENOXAPARIN SODIUM 40 MG: 40 INJECTION SUBCUTANEOUS at 14:33

## 2022-01-01 RX ADMIN — MAGNESIUM OXIDE TAB 400 MG (241.3 MG ELEMENTAL MG) 400 MG: 400 (241.3 MG) TAB at 09:51

## 2022-01-01 RX ADMIN — RAMELTEON 8 MG: 8 TABLET, FILM COATED ORAL at 20:46

## 2022-01-01 RX ADMIN — IPRATROPIUM BROMIDE AND ALBUTEROL 1 PUFF: 20; 100 SPRAY, METERED RESPIRATORY (INHALATION) at 12:44

## 2022-01-01 RX ADMIN — MELATONIN TAB 3 MG 3 MG: 3 TAB at 00:27

## 2022-01-01 RX ADMIN — ACETAMINOPHEN 650 MG: 325 TABLET ORAL at 00:13

## 2022-01-01 RX ADMIN — FOLIC ACID 1 MG: 1 TABLET ORAL at 09:19

## 2022-01-01 RX ADMIN — PIPERACILLIN SODIUM AND TAZOBACTAM SODIUM 3.38 G: 3; .375 INJECTION, POWDER, LYOPHILIZED, FOR SOLUTION INTRAVENOUS at 21:03

## 2022-01-01 RX ADMIN — MAGNESIUM OXIDE TAB 400 MG (241.3 MG ELEMENTAL MG) 400 MG: 400 (241.3 MG) TAB at 20:59

## 2022-01-01 RX ADMIN — ENOXAPARIN SODIUM 40 MG: 40 INJECTION SUBCUTANEOUS at 16:39

## 2022-01-01 RX ADMIN — MAGNESIUM SULFATE HEPTAHYDRATE 2 G: 40 INJECTION, SOLUTION INTRAVENOUS at 21:38

## 2022-01-01 RX ADMIN — ACETAMINOPHEN AND CODEINE PHOSPHATE 1 TABLET: 300; 30 TABLET ORAL at 10:33

## 2022-01-01 RX ADMIN — GABAPENTIN 200 MG: 100 CAPSULE ORAL at 08:03

## 2022-01-01 RX ADMIN — METOPROLOL TARTRATE 12.5 MG: 25 TABLET, FILM COATED ORAL at 21:30

## 2022-01-01 RX ADMIN — IPRATROPIUM BROMIDE AND ALBUTEROL 1 PUFF: 20; 100 SPRAY, METERED RESPIRATORY (INHALATION) at 08:15

## 2022-01-01 RX ADMIN — Medication 100 MG: at 09:33

## 2022-01-01 RX ADMIN — LORAZEPAM 1 MG: 1 TABLET ORAL at 01:58

## 2022-01-01 RX ADMIN — MAGNESIUM OXIDE TAB 400 MG (241.3 MG ELEMENTAL MG) 400 MG: 400 (241.3 MG) TAB at 20:51

## 2022-01-01 RX ADMIN — DEXMEDETOMIDINE HYDROCHLORIDE 0.5 MCG/KG/HR: 400 INJECTION INTRAVENOUS at 08:30

## 2022-01-01 RX ADMIN — TRAZODONE HYDROCHLORIDE 25 MG: 50 TABLET ORAL at 01:42

## 2022-01-01 RX ADMIN — ENOXAPARIN SODIUM 40 MG: 40 INJECTION SUBCUTANEOUS at 14:50

## 2022-01-01 RX ADMIN — MAGNESIUM OXIDE TAB 400 MG (241.3 MG ELEMENTAL MG) 400 MG: 400 (241.3 MG) TAB at 08:08

## 2022-01-01 RX ADMIN — INSULIN ASPART 2 UNITS: 100 INJECTION, SOLUTION INTRAVENOUS; SUBCUTANEOUS at 08:49

## 2022-01-01 RX ADMIN — LIDOCAINE 1 PATCH: 246 PATCH TOPICAL at 20:55

## 2022-01-01 RX ADMIN — CHLORHEXIDINE GLUCONATE 0.12% ORAL RINSE 15 ML: 1.2 LIQUID ORAL at 20:37

## 2022-01-01 RX ADMIN — LORAZEPAM 1 MG: 1 TABLET ORAL at 01:51

## 2022-01-01 RX ADMIN — MIDODRINE HYDROCHLORIDE 2.5 MG: 2.5 TABLET ORAL at 17:04

## 2022-01-01 RX ADMIN — GABAPENTIN 200 MG: 100 CAPSULE ORAL at 20:51

## 2022-01-01 RX ADMIN — DULOXETINE HYDROCHLORIDE 60 MG: 60 CAPSULE, DELAYED RELEASE ORAL at 08:23

## 2022-01-01 RX ADMIN — MORPHINE SULFATE 2 MG: 2 INJECTION, SOLUTION INTRAMUSCULAR; INTRAVENOUS at 20:50

## 2022-01-01 RX ADMIN — ENOXAPARIN SODIUM 40 MG: 40 INJECTION SUBCUTANEOUS at 14:15

## 2022-01-01 RX ADMIN — FOLIC ACID 1 MG: 1 TABLET ORAL at 08:14

## 2022-01-01 RX ADMIN — SODIUM CHLORIDE 30 MG/ML INHALATION SOLUTION 3 ML: 30 SOLUTION INHALANT at 08:21

## 2022-01-01 RX ADMIN — ENOXAPARIN SODIUM 40 MG: 40 INJECTION SUBCUTANEOUS at 18:46

## 2022-01-01 RX ADMIN — Medication 1 CAPSULE: at 09:13

## 2022-01-01 RX ADMIN — IPRATROPIUM BROMIDE AND ALBUTEROL 1 PUFF: 20; 100 SPRAY, METERED RESPIRATORY (INHALATION) at 07:49

## 2022-01-01 RX ADMIN — THIAMINE HYDROCHLORIDE 500 MG: 100 INJECTION, SOLUTION INTRAMUSCULAR; INTRAVENOUS at 14:35

## 2022-01-01 RX ADMIN — MULTIVIT AND MINERALS-FERROUS GLUCONATE 9 MG IRON/15 ML ORAL LIQUID 15 ML: at 08:26

## 2022-01-01 RX ADMIN — MELATONIN TAB 3 MG 3 MG: 3 TAB at 00:31

## 2022-01-01 RX ADMIN — ACETAMINOPHEN AND CODEINE PHOSPHATE 1 TABLET: 300; 30 TABLET ORAL at 23:46

## 2022-01-01 RX ADMIN — CALCIUM CARBONATE (ANTACID) CHEW TAB 500 MG 1000 MG: 500 CHEW TAB at 09:15

## 2022-01-01 RX ADMIN — FUROSEMIDE 20 MG: 10 INJECTION, SOLUTION INTRAMUSCULAR; INTRAVENOUS at 20:10

## 2022-01-01 RX ADMIN — ENOXAPARIN SODIUM 40 MG: 40 INJECTION SUBCUTANEOUS at 16:38

## 2022-01-01 RX ADMIN — CALCIUM CARBONATE (ANTACID) CHEW TAB 500 MG 1000 MG: 500 CHEW TAB at 08:01

## 2022-01-01 RX ADMIN — FENTANYL CITRATE 50 MCG/HR: 50 INJECTION INTRAVENOUS at 21:18

## 2022-01-01 RX ADMIN — MELATONIN TAB 3 MG 3 MG: 3 TAB at 20:53

## 2022-01-01 RX ADMIN — Medication 1 CAPSULE: at 20:54

## 2022-01-01 RX ADMIN — ENOXAPARIN SODIUM 40 MG: 40 INJECTION SUBCUTANEOUS at 17:59

## 2022-01-01 RX ADMIN — ACETAMINOPHEN 650 MG: 325 TABLET, FILM COATED ORAL at 00:47

## 2022-01-01 RX ADMIN — IPRATROPIUM BROMIDE AND ALBUTEROL 1 PUFF: 20; 100 SPRAY, METERED RESPIRATORY (INHALATION) at 09:57

## 2022-01-01 RX ADMIN — INSULIN ASPART 4 UNITS: 100 INJECTION, SOLUTION INTRAVENOUS; SUBCUTANEOUS at 08:13

## 2022-01-01 RX ADMIN — FAMOTIDINE 20 MG: 10 INJECTION, SOLUTION INTRAVENOUS at 18:23

## 2022-01-01 RX ADMIN — PIPERACILLIN SODIUM AND TAZOBACTAM SODIUM 3.38 G: 3; .375 INJECTION, POWDER, LYOPHILIZED, FOR SOLUTION INTRAVENOUS at 15:07

## 2022-01-01 RX ADMIN — TOCILIZUMAB 584 MG: 20 INJECTION, SOLUTION, CONCENTRATE INTRAVENOUS at 13:27

## 2022-01-01 RX ADMIN — MIDODRINE HYDROCHLORIDE 2.5 MG: 2.5 TABLET ORAL at 09:15

## 2022-01-01 RX ADMIN — IPRATROPIUM BROMIDE AND ALBUTEROL 1 PUFF: 20; 100 SPRAY, METERED RESPIRATORY (INHALATION) at 16:52

## 2022-01-01 RX ADMIN — QUETIAPINE 6.25 MG: 25 TABLET ORAL at 17:00

## 2022-01-01 RX ADMIN — THIAMINE HYDROCHLORIDE 500 MG: 100 INJECTION, SOLUTION INTRAMUSCULAR; INTRAVENOUS at 08:19

## 2022-01-01 RX ADMIN — MIDODRINE HYDROCHLORIDE 2.5 MG: 2.5 TABLET ORAL at 16:52

## 2022-01-01 RX ADMIN — CALCIUM CARBONATE (ANTACID) CHEW TAB 500 MG 1000 MG: 500 CHEW TAB at 08:53

## 2022-01-01 RX ADMIN — CHLORHEXIDINE GLUCONATE 0.12% ORAL RINSE 15 ML: 1.2 LIQUID ORAL at 19:23

## 2022-01-01 RX ADMIN — ENOXAPARIN SODIUM 40 MG: 40 INJECTION SUBCUTANEOUS at 15:06

## 2022-01-01 RX ADMIN — FUROSEMIDE 20 MG: 10 INJECTION, SOLUTION INTRAMUSCULAR; INTRAVENOUS at 03:19

## 2022-01-01 RX ADMIN — DEXTROSE MONOHYDRATE 50 ML: 25 INJECTION, SOLUTION INTRAVENOUS at 05:18

## 2022-01-01 RX ADMIN — VANCOMYCIN HYDROCHLORIDE 1000 MG: 1 INJECTION, SOLUTION INTRAVENOUS at 19:35

## 2022-01-01 RX ADMIN — GABAPENTIN 200 MG: 100 CAPSULE ORAL at 09:33

## 2022-01-01 RX ADMIN — MULTIPLE VITAMINS W/ MINERALS TAB 1 TABLET: TAB at 09:07

## 2022-01-01 RX ADMIN — CHLORHEXIDINE GLUCONATE 0.12% ORAL RINSE 15 ML: 1.2 LIQUID ORAL at 08:40

## 2022-01-01 RX ADMIN — MAGNESIUM OXIDE TAB 400 MG (241.3 MG ELEMENTAL MG) 400 MG: 400 (241.3 MG) TAB at 20:14

## 2022-01-01 RX ADMIN — ACETAMINOPHEN 650 MG: 325 TABLET, FILM COATED ORAL at 09:10

## 2022-01-01 RX ADMIN — LIDOCAINE 1 PATCH: 246 PATCH TOPICAL at 20:49

## 2022-01-01 RX ADMIN — ALBUTEROL SULFATE 2.5 MG: 2.5 SOLUTION RESPIRATORY (INHALATION) at 14:48

## 2022-01-01 RX ADMIN — QUETIAPINE 75 MG: 25 TABLET ORAL at 20:57

## 2022-01-01 RX ADMIN — MEROPENEM 1 G: 1 INJECTION, POWDER, FOR SOLUTION INTRAVENOUS at 03:26

## 2022-01-01 RX ADMIN — MAGNESIUM OXIDE TAB 400 MG (241.3 MG ELEMENTAL MG) 400 MG: 400 (241.3 MG) TAB at 20:04

## 2022-01-01 RX ADMIN — FOLIC ACID 1 MG: 1 TABLET ORAL at 08:06

## 2022-01-01 RX ADMIN — PANTOPRAZOLE SODIUM 40 MG: 40 TABLET, DELAYED RELEASE ORAL at 08:26

## 2022-01-01 RX ADMIN — GABAPENTIN 200 MG: 250 SUSPENSION ORAL at 09:27

## 2022-01-01 RX ADMIN — LIDOCAINE 1 PATCH: 246 PATCH TOPICAL at 21:45

## 2022-01-01 RX ADMIN — IPRATROPIUM BROMIDE AND ALBUTEROL 1 PUFF: 20; 100 SPRAY, METERED RESPIRATORY (INHALATION) at 12:56

## 2022-01-01 RX ADMIN — POTASSIUM CHLORIDE 40 MEQ: 1500 TABLET, EXTENDED RELEASE ORAL at 16:25

## 2022-01-01 RX ADMIN — SODIUM CHLORIDE 30 MG/ML INHALATION SOLUTION 3 ML: 30 SOLUTION INHALANT at 19:56

## 2022-01-01 RX ADMIN — QUETIAPINE 12.5 MG: 25 TABLET, FILM COATED ORAL at 16:02

## 2022-01-01 RX ADMIN — ALBUMIN HUMAN 25 G: 0.25 SOLUTION INTRAVENOUS at 08:13

## 2022-01-01 RX ADMIN — CALCIUM CARBONATE (ANTACID) CHEW TAB 500 MG 1000 MG: 500 CHEW TAB at 08:26

## 2022-01-01 RX ADMIN — THIAMINE HYDROCHLORIDE 500 MG: 100 INJECTION, SOLUTION INTRAMUSCULAR; INTRAVENOUS at 20:05

## 2022-01-01 RX ADMIN — POTASSIUM CHLORIDE 40 MEQ: 1500 TABLET, EXTENDED RELEASE ORAL at 18:26

## 2022-01-01 RX ADMIN — THERA TABS 1 TABLET: TAB at 09:12

## 2022-01-01 RX ADMIN — MAGNESIUM SULFATE HEPTAHYDRATE 2 G: 40 INJECTION, SOLUTION INTRAVENOUS at 07:56

## 2022-01-01 RX ADMIN — METFORMIN HYDROCHLORIDE 1000 MG: 500 TABLET ORAL at 16:25

## 2022-01-01 RX ADMIN — IPRATROPIUM BROMIDE AND ALBUTEROL 1 PUFF: 20; 100 SPRAY, METERED RESPIRATORY (INHALATION) at 16:39

## 2022-01-01 RX ADMIN — POTASSIUM CHLORIDE 20 MEQ: 1500 TABLET, EXTENDED RELEASE ORAL at 08:03

## 2022-01-01 RX ADMIN — INSULIN ASPART 2 UNITS: 100 INJECTION, SOLUTION INTRAVENOUS; SUBCUTANEOUS at 04:28

## 2022-01-01 RX ADMIN — IPRATROPIUM BROMIDE AND ALBUTEROL 1 PUFF: 20; 100 SPRAY, METERED RESPIRATORY (INHALATION) at 08:25

## 2022-01-01 RX ADMIN — MAGNESIUM OXIDE TAB 400 MG (241.3 MG ELEMENTAL MG) 400 MG: 400 (241.3 MG) TAB at 08:23

## 2022-01-01 RX ADMIN — IPRATROPIUM BROMIDE AND ALBUTEROL 1 PUFF: 20; 100 SPRAY, METERED RESPIRATORY (INHALATION) at 16:46

## 2022-01-01 RX ADMIN — INSULIN ASPART 6 UNITS: 100 INJECTION, SOLUTION INTRAVENOUS; SUBCUTANEOUS at 15:37

## 2022-01-01 RX ADMIN — TRAZODONE HYDROCHLORIDE 25 MG: 50 TABLET ORAL at 20:51

## 2022-01-01 RX ADMIN — ACETAMINOPHEN AND CODEINE PHOSPHATE 1 TABLET: 300; 30 TABLET ORAL at 17:36

## 2022-01-01 RX ADMIN — ACETAMINOPHEN AND CODEINE PHOSPHATE 1 TABLET: 300; 30 TABLET ORAL at 12:47

## 2022-01-01 RX ADMIN — ENOXAPARIN SODIUM 40 MG: 40 INJECTION SUBCUTANEOUS at 16:52

## 2022-01-01 RX ADMIN — IPRATROPIUM BROMIDE AND ALBUTEROL 1 PUFF: 20; 100 SPRAY, METERED RESPIRATORY (INHALATION) at 16:24

## 2022-01-01 RX ADMIN — POTASSIUM CHLORIDE 20 MEQ: 1500 TABLET, EXTENDED RELEASE ORAL at 09:58

## 2022-01-01 RX ADMIN — LORAZEPAM 1 MG: 1 TABLET ORAL at 08:01

## 2022-01-01 RX ADMIN — PIPERACILLIN SODIUM AND TAZOBACTAM SODIUM 3.38 G: 3; .375 INJECTION, POWDER, LYOPHILIZED, FOR SOLUTION INTRAVENOUS at 21:00

## 2022-01-01 RX ADMIN — CALCIUM CARBONATE (ANTACID) CHEW TAB 500 MG 1000 MG: 500 CHEW TAB at 08:14

## 2022-01-01 RX ADMIN — INSULIN ASPART 1 UNITS: 100 INJECTION, SOLUTION INTRAVENOUS; SUBCUTANEOUS at 12:20

## 2022-01-01 RX ADMIN — LIDOCAINE 1 PATCH: 246 PATCH TOPICAL at 20:31

## 2022-01-01 RX ADMIN — FOLIC ACID 1 MG: 1 TABLET ORAL at 09:48

## 2022-01-01 RX ADMIN — LORAZEPAM 1 MG: 2 INJECTION INTRAMUSCULAR; INTRAVENOUS at 21:31

## 2022-01-01 RX ADMIN — ACETAMINOPHEN 650 MG: 325 TABLET, FILM COATED ORAL at 01:33

## 2022-01-01 RX ADMIN — QUETIAPINE 6.25 MG: 25 TABLET ORAL at 08:31

## 2022-01-01 RX ADMIN — MAGNESIUM OXIDE TAB 400 MG (241.3 MG ELEMENTAL MG) 400 MG: 400 (241.3 MG) TAB at 20:57

## 2022-01-01 RX ADMIN — CALCIUM CARBONATE (ANTACID) CHEW TAB 500 MG 1000 MG: 500 CHEW TAB at 08:27

## 2022-01-01 RX ADMIN — TRAZODONE HYDROCHLORIDE 25 MG: 50 TABLET ORAL at 01:22

## 2022-01-01 RX ADMIN — IPRATROPIUM BROMIDE AND ALBUTEROL 1 PUFF: 20; 100 SPRAY, METERED RESPIRATORY (INHALATION) at 19:04

## 2022-01-01 RX ADMIN — ETOMIDATE 20 MG: 2 INJECTION INTRAVENOUS at 11:53

## 2022-01-01 RX ADMIN — METOPROLOL SUCCINATE 25 MG: 25 TABLET, EXTENDED RELEASE ORAL at 08:21

## 2022-01-01 RX ADMIN — DEXAMETHASONE SODIUM PHOSPHATE 6 MG: 10 INJECTION, SOLUTION INTRAMUSCULAR; INTRAVENOUS at 14:15

## 2022-01-01 RX ADMIN — GABAPENTIN 200 MG: 100 CAPSULE ORAL at 20:41

## 2022-01-01 RX ADMIN — MAGNESIUM OXIDE TAB 400 MG (241.3 MG ELEMENTAL MG) 400 MG: 400 (241.3 MG) TAB at 08:32

## 2022-01-01 RX ADMIN — CALCIUM CARBONATE (ANTACID) CHEW TAB 500 MG 1000 MG: 500 CHEW TAB at 21:00

## 2022-01-01 RX ADMIN — MULTIPLE VITAMINS W/ MINERALS TAB 1 TABLET: TAB at 08:42

## 2022-01-01 RX ADMIN — METOPROLOL TARTRATE 5 MG: 5 INJECTION INTRAVENOUS at 03:50

## 2022-01-01 RX ADMIN — DEXMEDETOMIDINE HYDROCHLORIDE 0.4 MCG/KG/HR: 400 INJECTION INTRAVENOUS at 22:13

## 2022-01-01 RX ADMIN — MIDODRINE HYDROCHLORIDE 2.5 MG: 2.5 TABLET ORAL at 08:14

## 2022-01-01 RX ADMIN — INSULIN ASPART 2 UNITS: 100 INJECTION, SOLUTION INTRAVENOUS; SUBCUTANEOUS at 08:06

## 2022-01-01 RX ADMIN — LIDOCAINE 1 PATCH: 246 PATCH TOPICAL at 20:56

## 2022-01-01 RX ADMIN — METOPROLOL SUCCINATE 25 MG: 25 TABLET, EXTENDED RELEASE ORAL at 08:08

## 2022-01-01 RX ADMIN — POTASSIUM CHLORIDE 20 MEQ: 29.8 INJECTION, SOLUTION INTRAVENOUS at 05:51

## 2022-01-01 RX ADMIN — THIAMINE HYDROCHLORIDE 500 MG: 100 INJECTION, SOLUTION INTRAMUSCULAR; INTRAVENOUS at 20:36

## 2022-01-01 RX ADMIN — THIAMINE HYDROCHLORIDE 100 MG: 100 INJECTION, SOLUTION INTRAMUSCULAR; INTRAVENOUS at 10:09

## 2022-01-01 RX ADMIN — POTASSIUM CHLORIDE 20 MEQ: 29.8 INJECTION, SOLUTION INTRAVENOUS at 08:24

## 2022-01-01 RX ADMIN — PIPERACILLIN SODIUM AND TAZOBACTAM SODIUM 3.38 G: 3; .375 INJECTION, POWDER, LYOPHILIZED, FOR SOLUTION INTRAVENOUS at 14:15

## 2022-01-01 RX ADMIN — IPRATROPIUM BROMIDE AND ALBUTEROL 1 PUFF: 20; 100 SPRAY, METERED RESPIRATORY (INHALATION) at 16:05

## 2022-01-01 RX ADMIN — Medication 1 CAPSULE: at 16:38

## 2022-01-01 RX ADMIN — ALBUTEROL SULFATE 2.5 MG: 2.5 SOLUTION RESPIRATORY (INHALATION) at 20:49

## 2022-01-01 RX ADMIN — MIDODRINE HYDROCHLORIDE 2.5 MG: 2.5 TABLET ORAL at 16:56

## 2022-01-01 RX ADMIN — LIDOCAINE 1 PATCH: 246 PATCH TOPICAL at 12:28

## 2022-01-01 RX ADMIN — FUROSEMIDE 20 MG: 10 INJECTION, SOLUTION INTRAMUSCULAR; INTRAVENOUS at 04:34

## 2022-01-01 RX ADMIN — PANTOPRAZOLE SODIUM 40 MG: 40 TABLET, DELAYED RELEASE ORAL at 06:38

## 2022-01-01 RX ADMIN — DULOXETINE HYDROCHLORIDE 60 MG: 60 CAPSULE, DELAYED RELEASE ORAL at 08:31

## 2022-01-01 RX ADMIN — Medication 100 MG: at 09:51

## 2022-01-01 RX ADMIN — FOLIC ACID 1 MG: 1 TABLET ORAL at 09:33

## 2022-01-01 RX ADMIN — CALCIUM CARBONATE (ANTACID) CHEW TAB 500 MG 1000 MG: 500 CHEW TAB at 21:26

## 2022-01-01 RX ADMIN — ALBUMIN HUMAN 25 G: 0.25 SOLUTION INTRAVENOUS at 17:09

## 2022-01-01 RX ADMIN — DULOXETINE HYDROCHLORIDE 60 MG: 60 CAPSULE, DELAYED RELEASE ORAL at 09:16

## 2022-01-01 RX ADMIN — GABAPENTIN 200 MG: 100 CAPSULE ORAL at 08:00

## 2022-01-01 RX ADMIN — RAMELTEON 8 MG: 8 TABLET, FILM COATED ORAL at 20:18

## 2022-01-01 RX ADMIN — INSULIN ASPART 3 UNITS: 100 INJECTION, SOLUTION INTRAVENOUS; SUBCUTANEOUS at 23:05

## 2022-01-01 RX ADMIN — INSULIN ASPART 4 UNITS: 100 INJECTION, SOLUTION INTRAVENOUS; SUBCUTANEOUS at 00:00

## 2022-01-01 RX ADMIN — LACTULOSE 20 G: 10 SOLUTION ORAL at 23:45

## 2022-01-01 RX ADMIN — INSULIN ASPART 5 UNITS: 100 INJECTION, SOLUTION INTRAVENOUS; SUBCUTANEOUS at 12:23

## 2022-01-01 RX ADMIN — QUETIAPINE 25 MG: 25 TABLET ORAL at 16:37

## 2022-01-01 RX ADMIN — HALOPERIDOL LACTATE 1 MG: 5 INJECTION, SOLUTION INTRAMUSCULAR at 21:47

## 2022-01-01 RX ADMIN — DEXTROSE MONOHYDRATE 25 ML: 25 INJECTION, SOLUTION INTRAVENOUS at 21:45

## 2022-01-01 RX ADMIN — INSULIN ASPART 1 UNITS: 100 INJECTION, SOLUTION INTRAVENOUS; SUBCUTANEOUS at 23:53

## 2022-01-01 RX ADMIN — VANCOMYCIN HYDROCHLORIDE 1000 MG: 1 INJECTION, SOLUTION INTRAVENOUS at 06:30

## 2022-01-01 RX ADMIN — CALCIUM CARBONATE (ANTACID) CHEW TAB 500 MG 1000 MG: 500 CHEW TAB at 20:57

## 2022-01-01 RX ADMIN — SODIUM CHLORIDE: 9 INJECTION, SOLUTION INTRAVENOUS at 16:33

## 2022-01-01 RX ADMIN — CHLORHEXIDINE GLUCONATE 0.12% ORAL RINSE 15 ML: 1.2 LIQUID ORAL at 09:26

## 2022-01-01 RX ADMIN — THERA TABS 1 TABLET: TAB at 08:53

## 2022-01-01 RX ADMIN — PANTOPRAZOLE SODIUM 40 MG: 40 TABLET, DELAYED RELEASE ORAL at 09:11

## 2022-01-01 RX ADMIN — GABAPENTIN 200 MG: 100 CAPSULE ORAL at 08:41

## 2022-01-01 RX ADMIN — QUETIAPINE 50 MG: 25 TABLET ORAL at 20:29

## 2022-01-01 RX ADMIN — METFORMIN HYDROCHLORIDE 1000 MG: 500 TABLET ORAL at 16:56

## 2022-01-01 RX ADMIN — CALCIUM CARBONATE (ANTACID) CHEW TAB 500 MG 1000 MG: 500 CHEW TAB at 09:50

## 2022-01-01 RX ADMIN — MAGNESIUM OXIDE TAB 400 MG (241.3 MG ELEMENTAL MG) 400 MG: 400 (241.3 MG) TAB at 10:16

## 2022-01-01 RX ADMIN — PROPOFOL 10 MCG/KG/MIN: 10 INJECTION, EMULSION INTRAVENOUS at 12:50

## 2022-01-01 RX ADMIN — POTASSIUM CHLORIDE 40 MEQ: 1500 TABLET, EXTENDED RELEASE ORAL at 09:33

## 2022-01-01 RX ADMIN — THIAMINE HYDROCHLORIDE 100 MG: 100 INJECTION, SOLUTION INTRAMUSCULAR; INTRAVENOUS at 08:51

## 2022-01-01 RX ADMIN — Medication 15 G: at 21:23

## 2022-01-01 RX ADMIN — TRAZODONE HYDROCHLORIDE 25 MG: 50 TABLET ORAL at 23:18

## 2022-01-01 RX ADMIN — ENOXAPARIN SODIUM 40 MG: 40 INJECTION SUBCUTANEOUS at 16:04

## 2022-01-01 RX ADMIN — LORAZEPAM 1 MG: 1 TABLET ORAL at 13:53

## 2022-01-01 RX ADMIN — MIDODRINE HYDROCHLORIDE 2.5 MG: 2.5 TABLET ORAL at 09:10

## 2022-01-01 RX ADMIN — GABAPENTIN 200 MG: 100 CAPSULE ORAL at 09:49

## 2022-01-01 RX ADMIN — MIDAZOLAM 2 MG: 1 INJECTION INTRAMUSCULAR; INTRAVENOUS at 21:16

## 2022-01-01 RX ADMIN — CALCIUM CARBONATE (ANTACID) CHEW TAB 500 MG 1000 MG: 500 CHEW TAB at 20:21

## 2022-01-01 RX ADMIN — QUETIAPINE 25 MG: 25 TABLET ORAL at 17:19

## 2022-01-01 RX ADMIN — QUETIAPINE 6.25 MG: 25 TABLET ORAL at 16:33

## 2022-01-01 RX ADMIN — IPRATROPIUM BROMIDE AND ALBUTEROL 1 PUFF: 20; 100 SPRAY, METERED RESPIRATORY (INHALATION) at 17:29

## 2022-01-01 RX ADMIN — CALCIUM CARBONATE (ANTACID) CHEW TAB 500 MG 1000 MG: 500 CHEW TAB at 20:04

## 2022-01-01 RX ADMIN — INSULIN ASPART 6 UNITS: 100 INJECTION, SOLUTION INTRAVENOUS; SUBCUTANEOUS at 00:47

## 2022-01-01 RX ADMIN — PIPERACILLIN SODIUM AND TAZOBACTAM SODIUM 3.38 G: 3; .375 INJECTION, POWDER, LYOPHILIZED, FOR SOLUTION INTRAVENOUS at 13:53

## 2022-01-01 RX ADMIN — OLANZAPINE 5 MG: 5 TABLET, ORALLY DISINTEGRATING ORAL at 10:11

## 2022-01-01 RX ADMIN — MAGNESIUM OXIDE TAB 400 MG (241.3 MG ELEMENTAL MG) 400 MG: 400 (241.3 MG) TAB at 08:11

## 2022-01-01 RX ADMIN — CALCIUM CARBONATE (ANTACID) CHEW TAB 500 MG 1000 MG: 500 CHEW TAB at 08:12

## 2022-01-01 RX ADMIN — MAGNESIUM SULFATE HEPTAHYDRATE 4 G: 80 INJECTION, SOLUTION INTRAVENOUS at 08:20

## 2022-01-01 RX ADMIN — HALOPERIDOL LACTATE 1 MG: 5 INJECTION, SOLUTION INTRAMUSCULAR at 06:24

## 2022-01-01 RX ADMIN — THERA TABS 1 TABLET: TAB at 08:43

## 2022-01-01 RX ADMIN — MAGNESIUM OXIDE TAB 400 MG (241.3 MG ELEMENTAL MG) 400 MG: 400 (241.3 MG) TAB at 08:02

## 2022-01-01 RX ADMIN — PIPERACILLIN SODIUM AND TAZOBACTAM SODIUM 3.38 G: 3; .375 INJECTION, POWDER, LYOPHILIZED, FOR SOLUTION INTRAVENOUS at 06:16

## 2022-01-01 RX ADMIN — MULTIPLE VITAMINS W/ MINERALS TAB 1 TABLET: TAB at 09:58

## 2022-01-01 RX ADMIN — SODIUM CHLORIDE: 9 INJECTION, SOLUTION INTRAVENOUS at 04:45

## 2022-01-01 RX ADMIN — PIPERACILLIN AND TAZOBACTAM 3.38 G: 3; .375 INJECTION, POWDER, FOR SOLUTION INTRAVENOUS at 04:15

## 2022-01-01 RX ADMIN — IPRATROPIUM BROMIDE AND ALBUTEROL 1 PUFF: 20; 100 SPRAY, METERED RESPIRATORY (INHALATION) at 20:56

## 2022-01-01 RX ADMIN — FUROSEMIDE 40 MG: 10 INJECTION, SOLUTION INTRAMUSCULAR; INTRAVENOUS at 07:59

## 2022-01-01 RX ADMIN — MAGNESIUM SULFATE IN WATER 2 G: 40 INJECTION, SOLUTION INTRAVENOUS at 08:22

## 2022-01-01 RX ADMIN — METOPROLOL SUCCINATE 25 MG: 25 TABLET, EXTENDED RELEASE ORAL at 08:00

## 2022-01-01 RX ADMIN — INSULIN ASPART 4 UNITS: 100 INJECTION, SOLUTION INTRAVENOUS; SUBCUTANEOUS at 02:09

## 2022-01-01 RX ADMIN — POTASSIUM CHLORIDE 20 MEQ: 29.8 INJECTION, SOLUTION INTRAVENOUS at 23:04

## 2022-01-01 RX ADMIN — INSULIN ASPART 6 UNITS: 100 INJECTION, SOLUTION INTRAVENOUS; SUBCUTANEOUS at 11:41

## 2022-01-01 RX ADMIN — DULOXETINE HYDROCHLORIDE 60 MG: 60 CAPSULE, DELAYED RELEASE ORAL at 07:47

## 2022-01-01 RX ADMIN — MAGNESIUM OXIDE TAB 400 MG (241.3 MG ELEMENTAL MG) 400 MG: 400 (241.3 MG) TAB at 08:42

## 2022-01-01 RX ADMIN — IPRATROPIUM BROMIDE AND ALBUTEROL 1 PUFF: 20; 100 SPRAY, METERED RESPIRATORY (INHALATION) at 20:41

## 2022-01-01 RX ADMIN — PANTOPRAZOLE SODIUM 40 MG: 40 TABLET, DELAYED RELEASE ORAL at 06:36

## 2022-01-01 RX ADMIN — ENOXAPARIN SODIUM 40 MG: 40 INJECTION SUBCUTANEOUS at 16:13

## 2022-01-01 RX ADMIN — LIDOCAINE 1 PATCH: 246 PATCH TOPICAL at 20:30

## 2022-01-01 RX ADMIN — VANCOMYCIN HYDROCHLORIDE 750 MG: 1 INJECTION, POWDER, LYOPHILIZED, FOR SOLUTION INTRAVENOUS at 20:58

## 2022-01-01 RX ADMIN — Medication 15 G: at 22:22

## 2022-01-01 RX ADMIN — RAMELTEON 8 MG: 8 TABLET, FILM COATED ORAL at 20:04

## 2022-01-01 RX ADMIN — PIPERACILLIN SODIUM AND TAZOBACTAM SODIUM 3.38 G: 3; .375 INJECTION, POWDER, LYOPHILIZED, FOR SOLUTION INTRAVENOUS at 05:31

## 2022-01-01 RX ADMIN — INSULIN ASPART 2 UNITS: 100 INJECTION, SOLUTION INTRAVENOUS; SUBCUTANEOUS at 17:32

## 2022-01-01 RX ADMIN — SODIUM CHLORIDE 500 ML: 9 INJECTION, SOLUTION INTRAVENOUS at 12:50

## 2022-01-01 RX ADMIN — LACTULOSE 20 G: 10 SOLUTION ORAL at 19:23

## 2022-01-01 RX ADMIN — GABAPENTIN 200 MG: 100 CAPSULE ORAL at 08:07

## 2022-01-01 RX ADMIN — METOPROLOL TARTRATE 12.5 MG: 25 TABLET, FILM COATED ORAL at 20:30

## 2022-01-01 RX ADMIN — PIPERACILLIN AND TAZOBACTAM 3.38 G: 3; .375 INJECTION, POWDER, LYOPHILIZED, FOR SOLUTION INTRAVENOUS at 16:50

## 2022-01-01 RX ADMIN — INSULIN ASPART 6 UNITS: 100 INJECTION, SOLUTION INTRAVENOUS; SUBCUTANEOUS at 08:32

## 2022-01-01 RX ADMIN — LORAZEPAM 1 MG: 1 TABLET ORAL at 00:04

## 2022-01-01 RX ADMIN — GABAPENTIN 200 MG: 100 CAPSULE ORAL at 21:41

## 2022-01-01 RX ADMIN — Medication 1 CAPSULE: at 09:15

## 2022-01-01 RX ADMIN — CALCIUM CARBONATE (ANTACID) CHEW TAB 500 MG 1000 MG: 500 CHEW TAB at 20:41

## 2022-01-01 RX ADMIN — INSULIN ASPART 6 UNITS: 100 INJECTION, SOLUTION INTRAVENOUS; SUBCUTANEOUS at 08:55

## 2022-01-01 RX ADMIN — LORAZEPAM 1 MG: 2 INJECTION INTRAMUSCULAR; INTRAVENOUS at 08:09

## 2022-01-01 RX ADMIN — METOPROLOL SUCCINATE 25 MG: 25 TABLET, EXTENDED RELEASE ORAL at 09:16

## 2022-01-01 RX ADMIN — MEROPENEM 1 G: 1 INJECTION, POWDER, FOR SOLUTION INTRAVENOUS at 19:30

## 2022-01-01 RX ADMIN — ENOXAPARIN SODIUM 40 MG: 40 INJECTION SUBCUTANEOUS at 14:39

## 2022-01-01 RX ADMIN — Medication 200 MG: at 20:05

## 2022-01-01 RX ADMIN — LORAZEPAM 1 MG: 1 TABLET ORAL at 20:51

## 2022-01-01 RX ADMIN — IPRATROPIUM BROMIDE AND ALBUTEROL 1 PUFF: 20; 100 SPRAY, METERED RESPIRATORY (INHALATION) at 17:15

## 2022-01-01 RX ADMIN — ALBUTEROL SULFATE 2.5 MG: 2.5 SOLUTION RESPIRATORY (INHALATION) at 15:18

## 2022-01-01 RX ADMIN — INSULIN ASPART 2 UNITS: 100 INJECTION, SOLUTION INTRAVENOUS; SUBCUTANEOUS at 08:29

## 2022-01-01 RX ADMIN — GABAPENTIN 200 MG: 100 CAPSULE ORAL at 08:25

## 2022-01-01 RX ADMIN — MEROPENEM 1 G: 1 INJECTION, POWDER, FOR SOLUTION INTRAVENOUS at 03:02

## 2022-01-01 RX ADMIN — MIDODRINE HYDROCHLORIDE 10 MG: 5 TABLET ORAL at 12:54

## 2022-01-01 RX ADMIN — INSULIN ASPART 2 UNITS: 100 INJECTION, SOLUTION INTRAVENOUS; SUBCUTANEOUS at 08:12

## 2022-01-01 RX ADMIN — MORPHINE SULFATE 1 MG: 2 INJECTION, SOLUTION INTRAMUSCULAR; INTRAVENOUS at 18:05

## 2022-01-01 RX ADMIN — DULOXETINE HYDROCHLORIDE 60 MG: 60 CAPSULE, DELAYED RELEASE ORAL at 09:27

## 2022-01-01 RX ADMIN — REMDESIVIR 100 MG: 100 INJECTION, POWDER, LYOPHILIZED, FOR SOLUTION INTRAVENOUS at 12:00

## 2022-01-01 RX ADMIN — LORAZEPAM 1 MG: 1 TABLET ORAL at 21:47

## 2022-01-01 RX ADMIN — LACTULOSE 20 G: 10 SOLUTION ORAL at 03:50

## 2022-01-01 RX ADMIN — MIDODRINE HYDROCHLORIDE 2.5 MG: 2.5 TABLET ORAL at 08:03

## 2022-01-01 RX ADMIN — ACETAMINOPHEN 650 MG: 325 TABLET, FILM COATED ORAL at 23:50

## 2022-01-01 RX ADMIN — Medication 100 MG: at 08:25

## 2022-01-01 RX ADMIN — Medication 1 CAPSULE: at 12:41

## 2022-01-01 RX ADMIN — VANCOMYCIN HYDROCHLORIDE 1000 MG: 1 INJECTION, SOLUTION INTRAVENOUS at 18:16

## 2022-01-01 RX ADMIN — DULOXETINE HYDROCHLORIDE 60 MG: 60 CAPSULE, DELAYED RELEASE ORAL at 09:09

## 2022-01-01 RX ADMIN — MAGNESIUM OXIDE TAB 400 MG (241.3 MG ELEMENTAL MG) 400 MG: 400 (241.3 MG) TAB at 13:00

## 2022-01-01 RX ADMIN — QUETIAPINE 50 MG: 25 TABLET ORAL at 20:41

## 2022-01-01 RX ADMIN — ACETAMINOPHEN AND CODEINE PHOSPHATE 1 TABLET: 300; 30 TABLET ORAL at 21:09

## 2022-01-01 RX ADMIN — MAGNESIUM SULFATE HEPTAHYDRATE 2 G: 40 INJECTION, SOLUTION INTRAVENOUS at 09:33

## 2022-01-01 RX ADMIN — Medication 1 CAPSULE: at 08:44

## 2022-01-01 RX ADMIN — THIAMINE HYDROCHLORIDE 100 MG: 100 INJECTION, SOLUTION INTRAMUSCULAR; INTRAVENOUS at 08:15

## 2022-01-01 RX ADMIN — ENOXAPARIN SODIUM 40 MG: 40 INJECTION SUBCUTANEOUS at 17:07

## 2022-01-01 RX ADMIN — PANTOPRAZOLE SODIUM 40 MG: 40 TABLET, DELAYED RELEASE ORAL at 06:29

## 2022-01-01 RX ADMIN — MAGNESIUM OXIDE TAB 400 MG (241.3 MG ELEMENTAL MG) 400 MG: 400 (241.3 MG) TAB at 20:55

## 2022-01-01 RX ADMIN — Medication 1 CAPSULE: at 17:28

## 2022-01-01 RX ADMIN — OLANZAPINE 5 MG: 5 TABLET, ORALLY DISINTEGRATING ORAL at 00:31

## 2022-01-01 RX ADMIN — MIDODRINE HYDROCHLORIDE 2.5 MG: 2.5 TABLET ORAL at 09:22

## 2022-01-01 RX ADMIN — MORPHINE SULFATE 2 MG: 2 INJECTION, SOLUTION INTRAMUSCULAR; INTRAVENOUS at 22:40

## 2022-01-01 RX ADMIN — ALBUTEROL SULFATE 2.5 MG: 2.5 SOLUTION RESPIRATORY (INHALATION) at 20:17

## 2022-01-01 RX ADMIN — LORAZEPAM 1 MG: 2 LIQUID ORAL at 23:34

## 2022-01-01 RX ADMIN — QUETIAPINE 12.5 MG: 25 TABLET, FILM COATED ORAL at 17:28

## 2022-01-01 RX ADMIN — Medication 100 MG: at 08:23

## 2022-01-01 RX ADMIN — MAGNESIUM OXIDE TAB 400 MG (241.3 MG ELEMENTAL MG) 400 MG: 400 (241.3 MG) TAB at 08:38

## 2022-01-01 RX ADMIN — IPRATROPIUM BROMIDE AND ALBUTEROL 1 PUFF: 20; 100 SPRAY, METERED RESPIRATORY (INHALATION) at 12:31

## 2022-01-01 RX ADMIN — ENOXAPARIN SODIUM 40 MG: 40 INJECTION SUBCUTANEOUS at 14:20

## 2022-01-01 RX ADMIN — GABAPENTIN 200 MG: 100 CAPSULE ORAL at 20:11

## 2022-01-01 RX ADMIN — IPRATROPIUM BROMIDE AND ALBUTEROL 1 PUFF: 20; 100 SPRAY, METERED RESPIRATORY (INHALATION) at 09:17

## 2022-01-01 RX ADMIN — LIDOCAINE 1 PATCH: 246 PATCH TOPICAL at 20:43

## 2022-01-01 RX ADMIN — IPRATROPIUM BROMIDE AND ALBUTEROL 1 PUFF: 20; 100 SPRAY, METERED RESPIRATORY (INHALATION) at 08:41

## 2022-01-01 RX ADMIN — CALCIUM CARBONATE (ANTACID) CHEW TAB 500 MG 1000 MG: 500 CHEW TAB at 09:21

## 2022-01-01 RX ADMIN — ACETAMINOPHEN AND CODEINE PHOSPHATE 1 TABLET: 300; 30 TABLET ORAL at 06:58

## 2022-01-01 RX ADMIN — LIDOCAINE 1 PATCH: 246 PATCH TOPICAL at 11:34

## 2022-01-01 RX ADMIN — MAGNESIUM OXIDE TAB 400 MG (241.3 MG ELEMENTAL MG) 400 MG: 400 (241.3 MG) TAB at 12:29

## 2022-01-01 RX ADMIN — LORAZEPAM 2 MG: 2 INJECTION INTRAMUSCULAR; INTRAVENOUS at 10:27

## 2022-01-01 RX ADMIN — PIPERACILLIN AND TAZOBACTAM 3.38 G: 3; .375 INJECTION, POWDER, FOR SOLUTION INTRAVENOUS at 16:30

## 2022-01-01 RX ADMIN — IPRATROPIUM BROMIDE AND ALBUTEROL 1 PUFF: 20; 100 SPRAY, METERED RESPIRATORY (INHALATION) at 13:44

## 2022-01-01 RX ADMIN — IPRATROPIUM BROMIDE AND ALBUTEROL 1 PUFF: 20; 100 SPRAY, METERED RESPIRATORY (INHALATION) at 20:05

## 2022-01-01 RX ADMIN — QUETIAPINE 6.25 MG: 25 TABLET ORAL at 16:59

## 2022-01-01 RX ADMIN — Medication 1 CAPSULE: at 17:59

## 2022-01-01 RX ADMIN — INSULIN ASPART 6 UNITS: 100 INJECTION, SOLUTION INTRAVENOUS; SUBCUTANEOUS at 17:37

## 2022-01-01 RX ADMIN — SODIUM CHLORIDE 500 ML: 9 INJECTION, SOLUTION INTRAVENOUS at 13:28

## 2022-01-01 RX ADMIN — FUROSEMIDE 20 MG: 10 INJECTION, SOLUTION INTRAMUSCULAR; INTRAVENOUS at 11:45

## 2022-01-01 RX ADMIN — IPRATROPIUM BROMIDE AND ALBUTEROL 1 PUFF: 20; 100 SPRAY, METERED RESPIRATORY (INHALATION) at 12:13

## 2022-01-01 RX ADMIN — Medication 1 CAPSULE: at 17:07

## 2022-01-01 RX ADMIN — ALBUTEROL SULFATE 2.5 MG: 2.5 SOLUTION RESPIRATORY (INHALATION) at 14:19

## 2022-01-01 RX ADMIN — PANTOPRAZOLE SODIUM 40 MG: 40 TABLET, DELAYED RELEASE ORAL at 06:39

## 2022-01-01 RX ADMIN — DULOXETINE HYDROCHLORIDE 60 MG: 60 CAPSULE, DELAYED RELEASE ORAL at 08:12

## 2022-01-01 RX ADMIN — IPRATROPIUM BROMIDE AND ALBUTEROL 1 PUFF: 20; 100 SPRAY, METERED RESPIRATORY (INHALATION) at 12:11

## 2022-01-01 RX ADMIN — REMDESIVIR 100 MG: 100 INJECTION, POWDER, LYOPHILIZED, FOR SOLUTION INTRAVENOUS at 17:01

## 2022-01-01 RX ADMIN — HALOPERIDOL LACTATE 1 MG: 5 INJECTION, SOLUTION INTRAMUSCULAR at 03:55

## 2022-01-01 RX ADMIN — MAGNESIUM OXIDE TAB 400 MG (241.3 MG ELEMENTAL MG) 400 MG: 400 (241.3 MG) TAB at 17:50

## 2022-01-01 RX ADMIN — DULOXETINE HYDROCHLORIDE 60 MG: 60 CAPSULE, DELAYED RELEASE ORAL at 08:58

## 2022-01-01 RX ADMIN — QUETIAPINE 75 MG: 25 TABLET ORAL at 21:23

## 2022-01-01 RX ADMIN — FUROSEMIDE 20 MG: 10 INJECTION, SOLUTION INTRAMUSCULAR; INTRAVENOUS at 20:46

## 2022-01-01 RX ADMIN — ALBUMIN HUMAN 25 G: 0.25 SOLUTION INTRAVENOUS at 08:20

## 2022-01-01 RX ADMIN — POTASSIUM CHLORIDE 40 MEQ: 1500 TABLET, EXTENDED RELEASE ORAL at 18:05

## 2022-01-01 RX ADMIN — SODIUM CHLORIDE 1000 ML: 9 INJECTION, SOLUTION INTRAVENOUS at 00:01

## 2022-01-01 RX ADMIN — LIDOCAINE 1 PATCH: 246 PATCH TOPICAL at 12:43

## 2022-01-01 RX ADMIN — LORAZEPAM 1 MG: 1 TABLET ORAL at 20:59

## 2022-01-01 RX ADMIN — THERA TABS 1 TABLET: TAB at 10:44

## 2022-01-01 RX ADMIN — DEXAMETHASONE SODIUM PHOSPHATE 6 MG: 10 INJECTION, SOLUTION INTRAMUSCULAR; INTRAVENOUS at 12:27

## 2022-01-01 RX ADMIN — SODIUM CHLORIDE 30 MG/ML INHALATION SOLUTION 3 ML: 30 SOLUTION INHALANT at 08:35

## 2022-01-01 RX ADMIN — METOPROLOL TARTRATE 5 MG: 5 INJECTION INTRAVENOUS at 20:35

## 2022-01-01 RX ADMIN — LORAZEPAM 1 MG: 1 TABLET ORAL at 04:15

## 2022-01-01 RX ADMIN — MEROPENEM 1 G: 1 INJECTION, POWDER, FOR SOLUTION INTRAVENOUS at 20:14

## 2022-01-01 RX ADMIN — FOLIC ACID 1 MG: 1 TABLET ORAL at 08:41

## 2022-01-01 RX ADMIN — PIPERACILLIN SODIUM AND TAZOBACTAM SODIUM 3.38 G: 3; .375 INJECTION, POWDER, LYOPHILIZED, FOR SOLUTION INTRAVENOUS at 06:01

## 2022-01-01 RX ADMIN — PANTOPRAZOLE SODIUM 40 MG: 40 TABLET, DELAYED RELEASE ORAL at 08:14

## 2022-01-01 RX ADMIN — IPRATROPIUM BROMIDE AND ALBUTEROL 1 PUFF: 20; 100 SPRAY, METERED RESPIRATORY (INHALATION) at 16:42

## 2022-01-01 RX ADMIN — GABAPENTIN 100 MG: 100 CAPSULE ORAL at 10:44

## 2022-01-01 RX ADMIN — PIPERACILLIN SODIUM AND TAZOBACTAM SODIUM 3.38 G: 3; .375 INJECTION, POWDER, LYOPHILIZED, FOR SOLUTION INTRAVENOUS at 05:02

## 2022-01-01 RX ADMIN — ANORECTAL OINTMENT: 15.7; .44; 24; 20.6 OINTMENT TOPICAL at 11:21

## 2022-01-01 RX ADMIN — ACETAMINOPHEN AND CODEINE PHOSPHATE 1 TABLET: 300; 30 TABLET ORAL at 13:16

## 2022-01-01 RX ADMIN — IPRATROPIUM BROMIDE AND ALBUTEROL 1 PUFF: 20; 100 SPRAY, METERED RESPIRATORY (INHALATION) at 13:20

## 2022-01-01 RX ADMIN — INSULIN ASPART 3 UNITS: 100 INJECTION, SOLUTION INTRAVENOUS; SUBCUTANEOUS at 20:08

## 2022-01-01 RX ADMIN — PANTOPRAZOLE SODIUM 40 MG: 40 INJECTION, POWDER, FOR SOLUTION INTRAVENOUS at 05:42

## 2022-01-01 RX ADMIN — PIPERACILLIN AND TAZOBACTAM 3.38 G: 3; .375 INJECTION, POWDER, FOR SOLUTION INTRAVENOUS at 00:36

## 2022-01-01 RX ADMIN — GABAPENTIN 200 MG: 250 SUSPENSION ORAL at 09:28

## 2022-01-01 RX ADMIN — QUETIAPINE 50 MG: 25 TABLET ORAL at 21:14

## 2022-01-01 RX ADMIN — LIDOCAINE 1 PATCH: 246 PATCH TOPICAL at 20:32

## 2022-01-01 RX ADMIN — THERA TABS 1 TABLET: TAB at 08:08

## 2022-01-01 RX ADMIN — INSULIN ASPART 8 UNITS: 100 INJECTION, SOLUTION INTRAVENOUS; SUBCUTANEOUS at 15:55

## 2022-01-01 RX ADMIN — TRAZODONE HYDROCHLORIDE 25 MG: 50 TABLET ORAL at 00:03

## 2022-01-01 RX ADMIN — CHLORHEXIDINE GLUCONATE 0.12% ORAL RINSE 15 ML: 1.2 LIQUID ORAL at 20:12

## 2022-01-01 RX ADMIN — GABAPENTIN 200 MG: 250 SUSPENSION ORAL at 09:07

## 2022-01-01 RX ADMIN — FUROSEMIDE 20 MG: 10 INJECTION, SOLUTION INTRAMUSCULAR; INTRAVENOUS at 03:46

## 2022-01-01 RX ADMIN — SODIUM CHLORIDE 500 ML: 9 INJECTION, SOLUTION INTRAVENOUS at 04:04

## 2022-01-01 RX ADMIN — METFORMIN HYDROCHLORIDE 1000 MG: 500 TABLET ORAL at 17:19

## 2022-01-01 RX ADMIN — MAGNESIUM OXIDE TAB 400 MG (241.3 MG ELEMENTAL MG) 400 MG: 400 (241.3 MG) TAB at 08:27

## 2022-01-01 RX ADMIN — GABAPENTIN 200 MG: 100 CAPSULE ORAL at 20:15

## 2022-01-01 RX ADMIN — ACETAMINOPHEN AND CODEINE PHOSPHATE 1 TABLET: 300; 30 TABLET ORAL at 22:44

## 2022-01-01 RX ADMIN — GABAPENTIN 200 MG: 100 CAPSULE ORAL at 08:05

## 2022-01-01 RX ADMIN — INSULIN ASPART 5 UNITS: 100 INJECTION, SOLUTION INTRAVENOUS; SUBCUTANEOUS at 08:05

## 2022-01-01 RX ADMIN — FOLIC ACID 1 MG: 1 TABLET ORAL at 08:03

## 2022-01-01 RX ADMIN — THERA TABS 1 TABLET: TAB at 08:38

## 2022-01-01 RX ADMIN — GABAPENTIN 200 MG: 250 SUSPENSION ORAL at 20:40

## 2022-01-01 RX ADMIN — HALOPERIDOL LACTATE 5 MG: 5 INJECTION, SOLUTION INTRAMUSCULAR at 03:56

## 2022-01-01 RX ADMIN — ENOXAPARIN SODIUM 40 MG: 40 INJECTION SUBCUTANEOUS at 16:46

## 2022-01-01 RX ADMIN — RAMELTEON 8 MG: 8 TABLET, FILM COATED ORAL at 21:26

## 2022-01-01 RX ADMIN — VANCOMYCIN HYDROCHLORIDE 750 MG: 1 INJECTION, POWDER, LYOPHILIZED, FOR SOLUTION INTRAVENOUS at 18:16

## 2022-01-01 RX ADMIN — QUETIAPINE 12.5 MG: 25 TABLET, FILM COATED ORAL at 12:39

## 2022-01-01 RX ADMIN — PIPERACILLIN AND TAZOBACTAM 3.38 G: 3; .375 INJECTION, POWDER, FOR SOLUTION INTRAVENOUS at 18:14

## 2022-01-01 RX ADMIN — CALCIUM CARBONATE (ANTACID) CHEW TAB 500 MG 1000 MG: 500 CHEW TAB at 10:51

## 2022-01-01 RX ADMIN — INSULIN ASPART 1 UNITS: 100 INJECTION, SOLUTION INTRAVENOUS; SUBCUTANEOUS at 23:11

## 2022-01-01 RX ADMIN — QUETIAPINE 25 MG: 25 TABLET ORAL at 16:31

## 2022-01-01 RX ADMIN — LORAZEPAM 1 MG: 1 TABLET ORAL at 08:14

## 2022-01-01 RX ADMIN — FOLIC ACID 1 MG: 1 TABLET ORAL at 08:08

## 2022-01-01 RX ADMIN — DULOXETINE HYDROCHLORIDE 60 MG: 60 CAPSULE, DELAYED RELEASE ORAL at 08:43

## 2022-01-01 RX ADMIN — SODIUM CHLORIDE: 9 INJECTION, SOLUTION INTRAVENOUS at 10:41

## 2022-01-01 RX ADMIN — PANTOPRAZOLE SODIUM 40 MG: 40 TABLET, DELAYED RELEASE ORAL at 06:13

## 2022-01-01 RX ADMIN — RAMELTEON 8 MG: 8 TABLET, FILM COATED ORAL at 20:44

## 2022-01-01 RX ADMIN — INSULIN ASPART 9 UNITS: 100 INJECTION, SOLUTION INTRAVENOUS; SUBCUTANEOUS at 08:52

## 2022-01-01 RX ADMIN — INSULIN ASPART 3 UNITS: 100 INJECTION, SOLUTION INTRAVENOUS; SUBCUTANEOUS at 08:24

## 2022-01-01 RX ADMIN — MIDODRINE HYDROCHLORIDE 2.5 MG: 2.5 TABLET ORAL at 16:38

## 2022-01-01 RX ADMIN — LORAZEPAM 1 MG: 1 TABLET ORAL at 00:42

## 2022-01-01 RX ADMIN — CALCIUM CARBONATE (ANTACID) CHEW TAB 500 MG 1000 MG: 500 CHEW TAB at 20:05

## 2022-01-01 RX ADMIN — METOPROLOL SUCCINATE 25 MG: 25 TABLET, EXTENDED RELEASE ORAL at 10:15

## 2022-01-01 RX ADMIN — FOLIC ACID 1 MG: 1 TABLET ORAL at 08:09

## 2022-01-01 RX ADMIN — CALCIUM CARBONATE (ANTACID) CHEW TAB 500 MG 1000 MG: 500 CHEW TAB at 08:42

## 2022-01-01 RX ADMIN — MAGNESIUM OXIDE TAB 400 MG (241.3 MG ELEMENTAL MG) 400 MG: 400 (241.3 MG) TAB at 08:48

## 2022-01-01 RX ADMIN — ACETAMINOPHEN AND CODEINE PHOSPHATE 1 TABLET: 300; 30 TABLET ORAL at 00:02

## 2022-01-01 RX ADMIN — ACETAMINOPHEN 650 MG: 325 TABLET ORAL at 15:45

## 2022-01-01 RX ADMIN — LIDOCAINE 1 PATCH: 246 PATCH TOPICAL at 00:52

## 2022-01-01 RX ADMIN — ACETAMINOPHEN 650 MG: 325 TABLET, FILM COATED ORAL at 00:04

## 2022-01-01 RX ADMIN — SODIUM CHLORIDE 500 ML: 9 INJECTION, SOLUTION INTRAVENOUS at 09:38

## 2022-01-01 RX ADMIN — SODIUM CHLORIDE: 9 INJECTION, SOLUTION INTRAVENOUS at 09:19

## 2022-01-01 RX ADMIN — SODIUM CHLORIDE 30 MG/ML INHALATION SOLUTION 3 ML: 30 SOLUTION INHALANT at 08:59

## 2022-01-01 RX ADMIN — FUROSEMIDE 20 MG: 10 INJECTION, SOLUTION INTRAMUSCULAR; INTRAVENOUS at 12:03

## 2022-01-01 RX ADMIN — CHLORHEXIDINE GLUCONATE 0.12% ORAL RINSE 15 ML: 1.2 LIQUID ORAL at 07:56

## 2022-01-01 RX ADMIN — ACETAMINOPHEN AND CODEINE PHOSPHATE 1 TABLET: 300; 30 TABLET ORAL at 17:09

## 2022-01-01 RX ADMIN — MIDODRINE HYDROCHLORIDE 2.5 MG: 2.5 TABLET ORAL at 11:17

## 2022-01-01 RX ADMIN — GABAPENTIN 200 MG: 100 CAPSULE ORAL at 20:57

## 2022-01-01 RX ADMIN — MULTIPLE VITAMINS W/ MINERALS TAB 1 TABLET: TAB at 08:59

## 2022-01-01 RX ADMIN — POTASSIUM CHLORIDE 40 MEQ: 1500 TABLET, EXTENDED RELEASE ORAL at 08:36

## 2022-01-01 RX ADMIN — FOLIC ACID 1 MG: 1 TABLET ORAL at 08:43

## 2022-01-01 RX ADMIN — INSULIN ASPART 4 UNITS: 100 INJECTION, SOLUTION INTRAVENOUS; SUBCUTANEOUS at 09:06

## 2022-01-01 RX ADMIN — LIDOCAINE 1 PATCH: 246 PATCH TOPICAL at 13:47

## 2022-01-01 RX ADMIN — MIDODRINE HYDROCHLORIDE 2.5 MG: 2.5 TABLET ORAL at 08:44

## 2022-01-01 RX ADMIN — Medication 1 CAPSULE: at 09:19

## 2022-01-01 RX ADMIN — LORAZEPAM 1 MG: 1 TABLET ORAL at 06:31

## 2022-01-01 RX ADMIN — QUETIAPINE 6.25 MG: 25 TABLET ORAL at 16:55

## 2022-01-01 RX ADMIN — GABAPENTIN 200 MG: 100 CAPSULE ORAL at 08:09

## 2022-01-01 RX ADMIN — CALCIUM CARBONATE (ANTACID) CHEW TAB 500 MG 1000 MG: 500 CHEW TAB at 20:46

## 2022-01-01 RX ADMIN — THERA TABS 1 TABLET: TAB at 08:01

## 2022-01-01 RX ADMIN — ACETAMINOPHEN 650 MG: 325 TABLET, FILM COATED ORAL at 16:45

## 2022-01-01 RX ADMIN — ENOXAPARIN SODIUM 40 MG: 40 INJECTION SUBCUTANEOUS at 16:56

## 2022-01-01 RX ADMIN — IPRATROPIUM BROMIDE AND ALBUTEROL 1 PUFF: 20; 100 SPRAY, METERED RESPIRATORY (INHALATION) at 09:09

## 2022-01-01 RX ADMIN — IPRATROPIUM BROMIDE AND ALBUTEROL 1 PUFF: 20; 100 SPRAY, METERED RESPIRATORY (INHALATION) at 20:32

## 2022-01-01 RX ADMIN — METOPROLOL SUCCINATE 25 MG: 25 TABLET, EXTENDED RELEASE ORAL at 08:10

## 2022-01-01 RX ADMIN — ACETAMINOPHEN 650 MG: 325 TABLET, FILM COATED ORAL at 17:29

## 2022-01-01 RX ADMIN — DEXTROSE MONOHYDRATE 15 ML/HR: 100 INJECTION, SOLUTION INTRAVENOUS at 02:00

## 2022-01-01 RX ADMIN — ACETAMINOPHEN 650 MG: 325 TABLET ORAL at 00:16

## 2022-01-01 RX ADMIN — INSULIN ASPART 1 UNITS: 100 INJECTION, SOLUTION INTRAVENOUS; SUBCUTANEOUS at 04:04

## 2022-01-01 RX ADMIN — POTASSIUM CHLORIDE 20 MEQ: 1500 TABLET, EXTENDED RELEASE ORAL at 08:11

## 2022-01-01 RX ADMIN — DULOXETINE HYDROCHLORIDE 60 MG: 60 CAPSULE, DELAYED RELEASE ORAL at 08:38

## 2022-01-01 RX ADMIN — IPRATROPIUM BROMIDE AND ALBUTEROL 1 PUFF: 20; 100 SPRAY, METERED RESPIRATORY (INHALATION) at 08:40

## 2022-01-01 RX ADMIN — LORAZEPAM 1 MG: 2 INJECTION INTRAMUSCULAR; INTRAVENOUS at 20:04

## 2022-01-01 RX ADMIN — Medication 100 MG: at 08:03

## 2022-01-01 RX ADMIN — IPRATROPIUM BROMIDE AND ALBUTEROL 1 PUFF: 20; 100 SPRAY, METERED RESPIRATORY (INHALATION) at 08:07

## 2022-01-01 RX ADMIN — CALCIUM CARBONATE (ANTACID) CHEW TAB 500 MG 1000 MG: 500 CHEW TAB at 20:55

## 2022-01-01 RX ADMIN — ACETAMINOPHEN AND CODEINE PHOSPHATE 1 TABLET: 300; 30 TABLET ORAL at 04:15

## 2022-01-01 RX ADMIN — CHLORHEXIDINE GLUCONATE 0.12% ORAL RINSE 15 ML: 1.2 LIQUID ORAL at 20:25

## 2022-01-01 RX ADMIN — DULOXETINE HYDROCHLORIDE 60 MG: 60 CAPSULE, DELAYED RELEASE ORAL at 08:26

## 2022-01-01 RX ADMIN — FUROSEMIDE 20 MG: 10 INJECTION, SOLUTION INTRAMUSCULAR; INTRAVENOUS at 12:21

## 2022-01-01 RX ADMIN — FUROSEMIDE 20 MG: 10 INJECTION, SOLUTION INTRAMUSCULAR; INTRAVENOUS at 12:19

## 2022-01-01 RX ADMIN — INSULIN ASPART 1 UNITS: 100 INJECTION, SOLUTION INTRAVENOUS; SUBCUTANEOUS at 23:45

## 2022-01-01 RX ADMIN — LIDOCAINE 1 PATCH: 246 PATCH TOPICAL at 12:37

## 2022-01-01 RX ADMIN — CALCIUM CARBONATE (ANTACID) CHEW TAB 500 MG 1000 MG: 500 CHEW TAB at 20:30

## 2022-01-01 RX ADMIN — ENOXAPARIN SODIUM 40 MG: 40 INJECTION SUBCUTANEOUS at 16:55

## 2022-01-01 RX ADMIN — METOPROLOL SUCCINATE 25 MG: 25 TABLET, EXTENDED RELEASE ORAL at 08:14

## 2022-01-01 RX ADMIN — PROPOFOL 40 MCG/KG/MIN: 10 INJECTION, EMULSION INTRAVENOUS at 14:45

## 2022-01-01 RX ADMIN — DULOXETINE HYDROCHLORIDE 60 MG: 60 CAPSULE, DELAYED RELEASE ORAL at 09:12

## 2022-01-01 RX ADMIN — QUETIAPINE 6.25 MG: 25 TABLET ORAL at 12:17

## 2022-01-01 RX ADMIN — LORAZEPAM 1 MG: 2 LIQUID ORAL at 00:36

## 2022-01-01 RX ADMIN — INSULIN ASPART 4 UNITS: 100 INJECTION, SOLUTION INTRAVENOUS; SUBCUTANEOUS at 08:36

## 2022-01-01 RX ADMIN — ALBUTEROL SULFATE 2.5 MG: 2.5 SOLUTION RESPIRATORY (INHALATION) at 13:04

## 2022-01-01 RX ADMIN — HALOPERIDOL LACTATE 1 MG: 5 INJECTION, SOLUTION INTRAMUSCULAR at 22:04

## 2022-01-01 RX ADMIN — SODIUM CHLORIDE 50 ML: 9 INJECTION, SOLUTION INTRAVENOUS at 12:20

## 2022-01-01 RX ADMIN — GABAPENTIN 200 MG: 100 CAPSULE ORAL at 20:54

## 2022-01-01 RX ADMIN — CALCIUM CARBONATE (ANTACID) CHEW TAB 500 MG 1000 MG: 500 CHEW TAB at 10:15

## 2022-01-01 RX ADMIN — METOPROLOL SUCCINATE 25 MG: 25 TABLET, EXTENDED RELEASE ORAL at 09:49

## 2022-01-01 RX ADMIN — QUETIAPINE 6.25 MG: 25 TABLET ORAL at 16:38

## 2022-01-01 RX ADMIN — CHLORHEXIDINE GLUCONATE 0.12% ORAL RINSE 15 ML: 1.2 LIQUID ORAL at 10:07

## 2022-01-01 RX ADMIN — MIDODRINE HYDROCHLORIDE 2.5 MG: 2.5 TABLET ORAL at 13:38

## 2022-01-01 RX ADMIN — DULOXETINE HYDROCHLORIDE 60 MG: 60 CAPSULE, DELAYED RELEASE ORAL at 08:44

## 2022-01-01 RX ADMIN — PIPERACILLIN SODIUM AND TAZOBACTAM SODIUM 3.38 G: 3; .375 INJECTION, POWDER, LYOPHILIZED, FOR SOLUTION INTRAVENOUS at 21:02

## 2022-01-01 RX ADMIN — Medication 100 MG: at 10:16

## 2022-01-01 RX ADMIN — QUETIAPINE 50 MG: 25 TABLET ORAL at 20:57

## 2022-01-01 RX ADMIN — GABAPENTIN 200 MG: 100 CAPSULE ORAL at 09:22

## 2022-01-01 RX ADMIN — CALCIUM CARBONATE (ANTACID) CHEW TAB 500 MG 1000 MG: 500 CHEW TAB at 10:44

## 2022-01-01 RX ADMIN — IPRATROPIUM BROMIDE AND ALBUTEROL 1 PUFF: 20; 100 SPRAY, METERED RESPIRATORY (INHALATION) at 12:26

## 2022-01-01 RX ADMIN — MELATONIN TAB 3 MG 3 MG: 3 TAB at 00:00

## 2022-01-01 RX ADMIN — ALBUTEROL SULFATE 2.5 MG: 2.5 SOLUTION RESPIRATORY (INHALATION) at 14:08

## 2022-01-01 RX ADMIN — GABAPENTIN 200 MG: 100 CAPSULE ORAL at 21:45

## 2022-01-01 RX ADMIN — METOPROLOL TARTRATE 12.5 MG: 25 TABLET, FILM COATED ORAL at 09:33

## 2022-01-01 RX ADMIN — ENOXAPARIN SODIUM 40 MG: 40 INJECTION SUBCUTANEOUS at 15:41

## 2022-01-01 RX ADMIN — LIDOCAINE 1 PATCH: 246 PATCH TOPICAL at 11:18

## 2022-01-01 RX ADMIN — METFORMIN HYDROCHLORIDE 1000 MG: 500 TABLET ORAL at 17:08

## 2022-01-01 RX ADMIN — LORAZEPAM 1 MG: 1 TABLET ORAL at 02:44

## 2022-01-01 RX ADMIN — QUETIAPINE 6.25 MG: 25 TABLET ORAL at 19:06

## 2022-01-01 RX ADMIN — FOLIC ACID 1 MG: 5 INJECTION, SOLUTION INTRAMUSCULAR; INTRAVENOUS; SUBCUTANEOUS at 11:48

## 2022-01-01 RX ADMIN — GABAPENTIN 200 MG: 100 CAPSULE ORAL at 20:25

## 2022-01-01 RX ADMIN — FUROSEMIDE 20 MG: 10 INJECTION, SOLUTION INTRAMUSCULAR; INTRAVENOUS at 20:28

## 2022-01-01 RX ADMIN — FOLIC ACID 1 MG: 1 TABLET ORAL at 09:51

## 2022-01-01 RX ADMIN — IPRATROPIUM BROMIDE AND ALBUTEROL 1 PUFF: 20; 100 SPRAY, METERED RESPIRATORY (INHALATION) at 08:38

## 2022-01-01 RX ADMIN — PIPERACILLIN AND TAZOBACTAM 3.38 G: 3; .375 INJECTION, POWDER, FOR SOLUTION INTRAVENOUS at 18:02

## 2022-01-01 RX ADMIN — INSULIN ASPART 3 UNITS: 100 INJECTION, SOLUTION INTRAVENOUS; SUBCUTANEOUS at 11:48

## 2022-01-01 RX ADMIN — MEROPENEM 1 G: 1 INJECTION, POWDER, FOR SOLUTION INTRAVENOUS at 03:04

## 2022-01-01 RX ADMIN — IPRATROPIUM BROMIDE AND ALBUTEROL 1 PUFF: 20; 100 SPRAY, METERED RESPIRATORY (INHALATION) at 11:37

## 2022-01-01 RX ADMIN — METOPROLOL SUCCINATE 25 MG: 25 TABLET, EXTENDED RELEASE ORAL at 08:44

## 2022-01-01 RX ADMIN — INSULIN ASPART 2 UNITS: 100 INJECTION, SOLUTION INTRAVENOUS; SUBCUTANEOUS at 20:24

## 2022-01-01 RX ADMIN — QUETIAPINE 6.25 MG: 25 TABLET ORAL at 08:13

## 2022-01-01 RX ADMIN — PIPERACILLIN SODIUM AND TAZOBACTAM SODIUM 3.38 G: 3; .375 INJECTION, POWDER, LYOPHILIZED, FOR SOLUTION INTRAVENOUS at 06:17

## 2022-01-01 RX ADMIN — PANTOPRAZOLE SODIUM 40 MG: 40 TABLET, DELAYED RELEASE ORAL at 06:43

## 2022-01-01 RX ADMIN — INSULIN ASPART 1 UNITS: 100 INJECTION, SOLUTION INTRAVENOUS; SUBCUTANEOUS at 20:36

## 2022-01-01 RX ADMIN — THERA TABS 1 TABLET: TAB at 10:15

## 2022-01-01 RX ADMIN — POTASSIUM CHLORIDE 20 MEQ: 1500 TABLET, EXTENDED RELEASE ORAL at 12:38

## 2022-01-01 RX ADMIN — GABAPENTIN 200 MG: 100 CAPSULE ORAL at 20:46

## 2022-01-01 RX ADMIN — QUETIAPINE 50 MG: 25 TABLET ORAL at 21:00

## 2022-01-01 RX ADMIN — INSULIN ASPART 6 UNITS: 100 INJECTION, SOLUTION INTRAVENOUS; SUBCUTANEOUS at 17:42

## 2022-01-01 RX ADMIN — CHLORHEXIDINE GLUCONATE 0.12% ORAL RINSE 15 ML: 1.2 LIQUID ORAL at 08:13

## 2022-01-01 RX ADMIN — GABAPENTIN 200 MG: 100 CAPSULE ORAL at 20:42

## 2022-01-01 RX ADMIN — THERA TABS 1 TABLET: TAB at 08:25

## 2022-01-01 RX ADMIN — MAGNESIUM SULFATE HEPTAHYDRATE 4 G: 80 INJECTION, SOLUTION INTRAVENOUS at 15:28

## 2022-01-01 RX ADMIN — FOLIC ACID 1 MG: 1 TABLET ORAL at 08:02

## 2022-01-01 RX ADMIN — MULTIPLE VITAMINS W/ MINERALS TAB 1 TABLET: TAB at 08:11

## 2022-01-01 RX ADMIN — LORAZEPAM 1 MG: 1 TABLET ORAL at 03:26

## 2022-01-01 RX ADMIN — INSULIN ASPART 3 UNITS: 100 INJECTION, SOLUTION INTRAVENOUS; SUBCUTANEOUS at 06:08

## 2022-01-01 RX ADMIN — GABAPENTIN 200 MG: 100 CAPSULE ORAL at 08:44

## 2022-01-01 RX ADMIN — LORAZEPAM 1 MG: 2 LIQUID ORAL at 23:05

## 2022-01-01 RX ADMIN — MAGNESIUM OXIDE TAB 400 MG (241.3 MG ELEMENTAL MG) 400 MG: 400 (241.3 MG) TAB at 20:21

## 2022-01-01 RX ADMIN — METOPROLOL TARTRATE 5 MG: 5 INJECTION INTRAVENOUS at 08:40

## 2022-01-01 RX ADMIN — CALCIUM CARBONATE (ANTACID) CHEW TAB 500 MG 1000 MG: 500 CHEW TAB at 20:54

## 2022-01-01 RX ADMIN — PIPERACILLIN AND TAZOBACTAM 3.38 G: 3; .375 INJECTION, POWDER, FOR SOLUTION INTRAVENOUS at 01:39

## 2022-01-01 RX ADMIN — SODIUM CHLORIDE 1000 ML: 9 INJECTION, SOLUTION INTRAVENOUS at 19:41

## 2022-01-01 RX ADMIN — LIDOCAINE 1 PATCH: 246 PATCH TOPICAL at 12:33

## 2022-01-01 RX ADMIN — PANTOPRAZOLE SODIUM 40 MG: 40 TABLET, DELAYED RELEASE ORAL at 06:31

## 2022-01-01 RX ADMIN — PANTOPRAZOLE SODIUM 40 MG: 40 TABLET, DELAYED RELEASE ORAL at 05:30

## 2022-01-01 RX ADMIN — MAGNESIUM OXIDE TAB 400 MG (241.3 MG ELEMENTAL MG) 400 MG: 400 (241.3 MG) TAB at 09:21

## 2022-01-01 RX ADMIN — ACETAMINOPHEN AND CODEINE PHOSPHATE 1 TABLET: 300; 30 TABLET ORAL at 08:03

## 2022-01-01 RX ADMIN — METOPROLOL SUCCINATE 25 MG: 25 TABLET, EXTENDED RELEASE ORAL at 09:10

## 2022-01-01 RX ADMIN — REMDESIVIR 100 MG: 100 INJECTION, POWDER, LYOPHILIZED, FOR SOLUTION INTRAVENOUS at 10:52

## 2022-01-01 RX ADMIN — SODIUM CHLORIDE 500 ML: 9 INJECTION, SOLUTION INTRAVENOUS at 21:48

## 2022-01-01 RX ADMIN — MAGNESIUM SULFATE HEPTAHYDRATE 4 G: 80 INJECTION, SOLUTION INTRAVENOUS at 16:26

## 2022-01-01 RX ADMIN — SODIUM CHLORIDE: 9 INJECTION, SOLUTION INTRAVENOUS at 22:58

## 2022-01-01 RX ADMIN — HALOPERIDOL LACTATE 2.5 MG: 5 INJECTION, SOLUTION INTRAMUSCULAR at 19:55

## 2022-01-01 RX ADMIN — MORPHINE SULFATE 1 MG: 2 INJECTION, SOLUTION INTRAMUSCULAR; INTRAVENOUS at 15:57

## 2022-01-01 RX ADMIN — FUROSEMIDE 20 MG: 10 INJECTION, SOLUTION INTRAMUSCULAR; INTRAVENOUS at 04:35

## 2022-01-01 RX ADMIN — QUETIAPINE 6.25 MG: 25 TABLET ORAL at 16:07

## 2022-01-01 RX ADMIN — ALBUTEROL SULFATE 2.5 MG: 2.5 SOLUTION RESPIRATORY (INHALATION) at 19:59

## 2022-01-01 RX ADMIN — QUETIAPINE 6.25 MG: 25 TABLET ORAL at 08:42

## 2022-01-01 RX ADMIN — ACETAMINOPHEN AND CODEINE PHOSPHATE 1 TABLET: 300; 30 TABLET ORAL at 10:50

## 2022-01-01 RX ADMIN — Medication 1 CAPSULE: at 08:53

## 2022-01-01 RX ADMIN — IPRATROPIUM BROMIDE AND ALBUTEROL 1 PUFF: 20; 100 SPRAY, METERED RESPIRATORY (INHALATION) at 11:53

## 2022-01-01 RX ADMIN — DEXTROSE MONOHYDRATE 50 ML: 25 INJECTION, SOLUTION INTRAVENOUS at 23:41

## 2022-01-01 RX ADMIN — HALOPERIDOL LACTATE 5 MG: 5 INJECTION, SOLUTION INTRAMUSCULAR at 22:46

## 2022-01-01 RX ADMIN — IPRATROPIUM BROMIDE AND ALBUTEROL 1 PUFF: 20; 100 SPRAY, METERED RESPIRATORY (INHALATION) at 20:54

## 2022-01-01 RX ADMIN — PANTOPRAZOLE SODIUM 40 MG: 40 TABLET, DELAYED RELEASE ORAL at 06:53

## 2022-01-01 RX ADMIN — FOLIC ACID 1 MG: 1 TABLET ORAL at 09:23

## 2022-01-01 RX ADMIN — ALBUTEROL SULFATE 2.5 MG: 2.5 SOLUTION RESPIRATORY (INHALATION) at 11:45

## 2022-01-01 RX ADMIN — SODIUM CHLORIDE 30 MG/ML INHALATION SOLUTION 3 ML: 30 SOLUTION INHALANT at 14:01

## 2022-01-01 RX ADMIN — LORAZEPAM 2 MG: 2 INJECTION INTRAMUSCULAR; INTRAVENOUS at 13:39

## 2022-01-01 RX ADMIN — MAGNESIUM SULFATE HEPTAHYDRATE 2 G: 40 INJECTION, SOLUTION INTRAVENOUS at 08:58

## 2022-01-01 RX ADMIN — DULOXETINE HYDROCHLORIDE 60 MG: 60 CAPSULE, DELAYED RELEASE ORAL at 08:53

## 2022-01-01 RX ADMIN — QUETIAPINE 12.5 MG: 25 TABLET, FILM COATED ORAL at 16:04

## 2022-01-01 RX ADMIN — CHLORHEXIDINE GLUCONATE 0.12% ORAL RINSE 15 ML: 1.2 LIQUID ORAL at 20:05

## 2022-01-01 RX ADMIN — INSULIN ASPART 2 UNITS: 100 INJECTION, SOLUTION INTRAVENOUS; SUBCUTANEOUS at 15:44

## 2022-01-01 RX ADMIN — SODIUM CHLORIDE 50 ML: 9 INJECTION, SOLUTION INTRAVENOUS at 18:24

## 2022-01-01 RX ADMIN — ENOXAPARIN SODIUM 40 MG: 40 INJECTION SUBCUTANEOUS at 17:29

## 2022-01-01 RX ADMIN — FUROSEMIDE 20 MG: 10 INJECTION, SOLUTION INTRAMUSCULAR; INTRAVENOUS at 21:13

## 2022-01-01 RX ADMIN — FUROSEMIDE 20 MG: 10 INJECTION, SOLUTION INTRAMUSCULAR; INTRAVENOUS at 20:05

## 2022-01-01 RX ADMIN — ACETAMINOPHEN 650 MG: 325 TABLET, FILM COATED ORAL at 08:23

## 2022-01-01 RX ADMIN — FUROSEMIDE 20 MG: 10 INJECTION, SOLUTION INTRAMUSCULAR; INTRAVENOUS at 12:27

## 2022-01-01 RX ADMIN — REMDESIVIR 200 MG: 100 INJECTION, POWDER, LYOPHILIZED, FOR SOLUTION INTRAVENOUS at 12:19

## 2022-01-01 RX ADMIN — DEXMEDETOMIDINE HYDROCHLORIDE 0.2 MCG/KG/HR: 400 INJECTION INTRAVENOUS at 09:27

## 2022-01-01 RX ADMIN — PANTOPRAZOLE SODIUM 40 MG: 40 INJECTION, POWDER, FOR SOLUTION INTRAVENOUS at 05:11

## 2022-01-01 RX ADMIN — POTASSIUM CHLORIDE 20 MEQ: 1500 TABLET, EXTENDED RELEASE ORAL at 23:02

## 2022-01-01 RX ADMIN — Medication 1 CAPSULE: at 20:16

## 2022-01-01 RX ADMIN — PIPERACILLIN SODIUM AND TAZOBACTAM SODIUM 3.38 G: 3; .375 INJECTION, POWDER, LYOPHILIZED, FOR SOLUTION INTRAVENOUS at 13:13

## 2022-01-01 RX ADMIN — MELATONIN TAB 3 MG 3 MG: 3 TAB at 22:04

## 2022-01-01 RX ADMIN — THERA TABS 1 TABLET: TAB at 09:33

## 2022-01-01 RX ADMIN — PIPERACILLIN SODIUM AND TAZOBACTAM SODIUM 3.38 G: 3; .375 INJECTION, POWDER, LYOPHILIZED, FOR SOLUTION INTRAVENOUS at 21:06

## 2022-01-01 RX ADMIN — GABAPENTIN 100 MG: 100 CAPSULE ORAL at 20:15

## 2022-01-01 RX ADMIN — Medication 30 G: at 20:34

## 2022-01-01 RX ADMIN — Medication 30 G: at 08:02

## 2022-01-01 RX ADMIN — ENOXAPARIN SODIUM 40 MG: 40 INJECTION SUBCUTANEOUS at 17:00

## 2022-01-01 RX ADMIN — MEROPENEM 1 G: 1 INJECTION, POWDER, FOR SOLUTION INTRAVENOUS at 03:49

## 2022-01-01 RX ADMIN — PIPERACILLIN SODIUM AND TAZOBACTAM SODIUM 3.38 G: 3; .375 INJECTION, POWDER, LYOPHILIZED, FOR SOLUTION INTRAVENOUS at 21:05

## 2022-01-01 RX ADMIN — FUROSEMIDE 20 MG: 10 INJECTION, SOLUTION INTRAMUSCULAR; INTRAVENOUS at 19:48

## 2022-01-01 RX ADMIN — POTASSIUM CHLORIDE 40 MEQ: 1.5 SOLUTION ORAL at 06:37

## 2022-01-01 RX ADMIN — FOLIC ACID 1 MG: 1 TABLET ORAL at 10:44

## 2022-01-01 RX ADMIN — SODIUM CHLORIDE 500 ML: 9 INJECTION, SOLUTION INTRAVENOUS at 05:09

## 2022-01-01 RX ADMIN — ENOXAPARIN SODIUM 40 MG: 40 INJECTION SUBCUTANEOUS at 14:35

## 2022-01-01 RX ADMIN — Medication 0.12 MCG/KG/MIN: at 12:24

## 2022-01-01 RX ADMIN — INSULIN GLARGINE 5 UNITS: 100 INJECTION, SOLUTION SUBCUTANEOUS at 20:38

## 2022-01-01 RX ADMIN — PANTOPRAZOLE SODIUM 40 MG: 40 TABLET, DELAYED RELEASE ORAL at 06:30

## 2022-01-01 RX ADMIN — SODIUM CHLORIDE 30 MG/ML INHALATION SOLUTION 3 ML: 30 SOLUTION INHALANT at 14:29

## 2022-01-01 RX ADMIN — PANTOPRAZOLE SODIUM 40 MG: 40 TABLET, DELAYED RELEASE ORAL at 08:25

## 2022-01-01 RX ADMIN — IPRATROPIUM BROMIDE AND ALBUTEROL 1 PUFF: 20; 100 SPRAY, METERED RESPIRATORY (INHALATION) at 13:18

## 2022-01-01 RX ADMIN — LORAZEPAM 1 MG: 2 LIQUID ORAL at 16:16

## 2022-01-01 RX ADMIN — CALCIUM CARBONATE (ANTACID) CHEW TAB 500 MG 1000 MG: 500 CHEW TAB at 08:20

## 2022-01-01 RX ADMIN — ALBUTEROL SULFATE 2.5 MG: 2.5 SOLUTION RESPIRATORY (INHALATION) at 07:07

## 2022-01-01 RX ADMIN — MIDODRINE HYDROCHLORIDE 2.5 MG: 2.5 TABLET ORAL at 16:08

## 2022-01-01 RX ADMIN — LORAZEPAM 1 MG: 2 INJECTION INTRAMUSCULAR; INTRAVENOUS at 02:15

## 2022-01-01 RX ADMIN — SODIUM CHLORIDE 1000 ML: 9 INJECTION, SOLUTION INTRAVENOUS at 08:48

## 2022-01-01 RX ADMIN — HALOPERIDOL LACTATE 1 MG: 5 INJECTION, SOLUTION INTRAMUSCULAR at 18:13

## 2022-01-01 RX ADMIN — MEROPENEM 1 G: 1 INJECTION, POWDER, FOR SOLUTION INTRAVENOUS at 20:49

## 2022-01-01 RX ADMIN — METOPROLOL TARTRATE 2.5 MG: 5 INJECTION INTRAVENOUS at 08:19

## 2022-01-01 RX ADMIN — IPRATROPIUM BROMIDE AND ALBUTEROL 1 PUFF: 20; 100 SPRAY, METERED RESPIRATORY (INHALATION) at 17:05

## 2022-01-01 RX ADMIN — GABAPENTIN 200 MG: 100 CAPSULE ORAL at 08:22

## 2022-01-01 RX ADMIN — POTASSIUM CHLORIDE 20 MEQ: 1500 TABLET, EXTENDED RELEASE ORAL at 12:31

## 2022-01-01 RX ADMIN — Medication 1 CAPSULE: at 22:26

## 2022-01-01 RX ADMIN — Medication 250 MG: at 15:37

## 2022-01-01 RX ADMIN — MIDODRINE HYDROCHLORIDE 2.5 MG: 2.5 TABLET ORAL at 09:12

## 2022-01-01 RX ADMIN — ACETAMINOPHEN 650 MG: 325 TABLET, FILM COATED ORAL at 11:12

## 2022-01-01 RX ADMIN — PIPERACILLIN SODIUM AND TAZOBACTAM SODIUM 3.38 G: 3; .375 INJECTION, POWDER, LYOPHILIZED, FOR SOLUTION INTRAVENOUS at 13:32

## 2022-01-01 RX ADMIN — CALCIUM CARBONATE (ANTACID) CHEW TAB 500 MG 1000 MG: 500 CHEW TAB at 09:10

## 2022-01-01 RX ADMIN — SODIUM CHLORIDE 50 ML: 9 INJECTION, SOLUTION INTRAVENOUS at 17:42

## 2022-01-01 RX ADMIN — MIDODRINE HYDROCHLORIDE 2.5 MG: 2.5 TABLET ORAL at 12:38

## 2022-01-01 RX ADMIN — SODIUM CHLORIDE 50 ML: 9 INJECTION, SOLUTION INTRAVENOUS at 13:16

## 2022-01-01 RX ADMIN — POTASSIUM CHLORIDE 20 MEQ: 149 INJECTION, SOLUTION, CONCENTRATE INTRAVENOUS at 22:41

## 2022-01-01 RX ADMIN — MULTIPLE VITAMINS W/ MINERALS TAB 1 TABLET: TAB at 07:53

## 2022-01-01 RX ADMIN — QUETIAPINE 6.25 MG: 25 TABLET ORAL at 16:12

## 2022-01-01 RX ADMIN — PIPERACILLIN AND TAZOBACTAM 3.38 G: 3; .375 INJECTION, POWDER, FOR SOLUTION INTRAVENOUS at 09:16

## 2022-01-01 RX ADMIN — RAMELTEON 8 MG: 8 TABLET, FILM COATED ORAL at 21:41

## 2022-01-01 RX ADMIN — GABAPENTIN 200 MG: 100 CAPSULE ORAL at 20:04

## 2022-01-01 RX ADMIN — PANTOPRAZOLE SODIUM 40 MG: 40 INJECTION, POWDER, FOR SOLUTION INTRAVENOUS at 09:38

## 2022-01-01 RX ADMIN — QUETIAPINE 12.5 MG: 25 TABLET, FILM COATED ORAL at 18:00

## 2022-01-01 RX ADMIN — PANTOPRAZOLE SODIUM 40 MG: 40 INJECTION, POWDER, FOR SOLUTION INTRAVENOUS at 05:33

## 2022-01-01 RX ADMIN — MAGNESIUM OXIDE TAB 400 MG (241.3 MG ELEMENTAL MG) 400 MG: 400 (241.3 MG) TAB at 21:47

## 2022-01-01 RX ADMIN — QUETIAPINE 75 MG: 25 TABLET ORAL at 21:40

## 2022-01-01 RX ADMIN — ENOXAPARIN SODIUM 40 MG: 40 INJECTION SUBCUTANEOUS at 16:07

## 2022-01-01 RX ADMIN — FOLIC ACID 1 MG: 1 TABLET ORAL at 08:01

## 2022-01-01 RX ADMIN — IPRATROPIUM BROMIDE AND ALBUTEROL 1 PUFF: 20; 100 SPRAY, METERED RESPIRATORY (INHALATION) at 17:21

## 2022-01-01 RX ADMIN — SODIUM CHLORIDE 50 ML: 9 INJECTION, SOLUTION INTRAVENOUS at 10:53

## 2022-01-01 RX ADMIN — ACETAMINOPHEN 650 MG: 325 TABLET, FILM COATED ORAL at 12:58

## 2022-01-01 RX ADMIN — MAGNESIUM OXIDE TAB 400 MG (241.3 MG ELEMENTAL MG) 400 MG: 400 (241.3 MG) TAB at 08:44

## 2022-01-01 RX ADMIN — THERA TABS 1 TABLET: TAB at 09:19

## 2022-01-01 RX ADMIN — ACETAMINOPHEN AND CODEINE PHOSPHATE 1 TABLET: 300; 30 TABLET ORAL at 18:11

## 2022-01-01 RX ADMIN — INSULIN ASPART 3 UNITS: 100 INJECTION, SOLUTION INTRAVENOUS; SUBCUTANEOUS at 12:02

## 2022-01-01 RX ADMIN — PANTOPRAZOLE SODIUM 40 MG: 40 TABLET, DELAYED RELEASE ORAL at 09:48

## 2022-01-01 RX ADMIN — PANTOPRAZOLE SODIUM 40 MG: 40 INJECTION, POWDER, FOR SOLUTION INTRAVENOUS at 08:41

## 2022-01-01 RX ADMIN — PIPERACILLIN SODIUM AND TAZOBACTAM SODIUM 3.38 G: 3; .375 INJECTION, POWDER, LYOPHILIZED, FOR SOLUTION INTRAVENOUS at 06:31

## 2022-01-01 RX ADMIN — LIDOCAINE 1 PATCH: 246 PATCH TOPICAL at 20:58

## 2022-01-01 RX ADMIN — THIAMINE HYDROCHLORIDE: 100 INJECTION, SOLUTION INTRAMUSCULAR; INTRAVENOUS at 00:31

## 2022-01-01 RX ADMIN — IPRATROPIUM BROMIDE AND ALBUTEROL 1 PUFF: 20; 100 SPRAY, METERED RESPIRATORY (INHALATION) at 14:29

## 2022-01-01 RX ADMIN — CALCIUM CARBONATE (ANTACID) CHEW TAB 500 MG 1000 MG: 500 CHEW TAB at 20:58

## 2022-01-01 RX ADMIN — IPRATROPIUM BROMIDE AND ALBUTEROL 1 PUFF: 20; 100 SPRAY, METERED RESPIRATORY (INHALATION) at 11:26

## 2022-01-01 RX ADMIN — POTASSIUM CHLORIDE 20 MEQ: 29.8 INJECTION, SOLUTION INTRAVENOUS at 00:33

## 2022-01-01 RX ADMIN — POTASSIUM CHLORIDE 20 MEQ: 149 INJECTION, SOLUTION, CONCENTRATE INTRAVENOUS at 22:59

## 2022-01-01 RX ADMIN — MIDODRINE HYDROCHLORIDE 2.5 MG: 2.5 TABLET ORAL at 09:20

## 2022-01-01 RX ADMIN — INSULIN ASPART 4 UNITS: 100 INJECTION, SOLUTION INTRAVENOUS; SUBCUTANEOUS at 16:42

## 2022-01-01 RX ADMIN — ENOXAPARIN SODIUM 40 MG: 40 INJECTION SUBCUTANEOUS at 16:51

## 2022-01-01 RX ADMIN — Medication 250 MG: at 08:03

## 2022-01-01 RX ADMIN — SODIUM CHLORIDE 500 ML: 9 INJECTION, SOLUTION INTRAVENOUS at 08:55

## 2022-01-01 RX ADMIN — MAGNESIUM OXIDE TAB 400 MG (241.3 MG ELEMENTAL MG) 400 MG: 400 (241.3 MG) TAB at 09:10

## 2022-01-01 RX ADMIN — CHLORHEXIDINE GLUCONATE 0.12% ORAL RINSE 15 ML: 1.2 LIQUID ORAL at 20:36

## 2022-01-01 RX ADMIN — ALBUTEROL SULFATE 2.5 MG: 2.5 SOLUTION RESPIRATORY (INHALATION) at 07:22

## 2022-01-01 RX ADMIN — LORAZEPAM 2 MG: 2 INJECTION INTRAMUSCULAR; INTRAVENOUS at 23:55

## 2022-01-01 RX ADMIN — PIPERACILLIN SODIUM AND TAZOBACTAM SODIUM 3.38 G: 3; .375 INJECTION, POWDER, LYOPHILIZED, FOR SOLUTION INTRAVENOUS at 06:03

## 2022-01-01 RX ADMIN — PANTOPRAZOLE SODIUM 40 MG: 40 TABLET, DELAYED RELEASE ORAL at 06:21

## 2022-01-01 RX ADMIN — ACETAMINOPHEN AND CODEINE PHOSPHATE 1 TABLET: 300; 30 TABLET ORAL at 14:00

## 2022-01-01 RX ADMIN — IPRATROPIUM BROMIDE AND ALBUTEROL 1 PUFF: 20; 100 SPRAY, METERED RESPIRATORY (INHALATION) at 08:54

## 2022-01-01 RX ADMIN — MAGNESIUM OXIDE TAB 400 MG (241.3 MG ELEMENTAL MG) 400 MG: 400 (241.3 MG) TAB at 20:30

## 2022-01-01 RX ADMIN — Medication 100 MG: at 08:27

## 2022-01-01 RX ADMIN — FUROSEMIDE 20 MG: 10 INJECTION, SOLUTION INTRAMUSCULAR; INTRAVENOUS at 12:36

## 2022-01-01 RX ADMIN — LIDOCAINE 1 PATCH: 246 PATCH TOPICAL at 20:26

## 2022-01-01 RX ADMIN — ALBUTEROL SULFATE 2.5 MG: 2.5 SOLUTION RESPIRATORY (INHALATION) at 10:28

## 2022-01-01 RX ADMIN — INSULIN ASPART 4 UNITS: 100 INJECTION, SOLUTION INTRAVENOUS; SUBCUTANEOUS at 09:18

## 2022-01-01 RX ADMIN — POTASSIUM CHLORIDE 10 MEQ: 7.46 INJECTION, SOLUTION INTRAVENOUS at 12:14

## 2022-01-01 RX ADMIN — Medication 200 MG: at 08:08

## 2022-01-01 RX ADMIN — QUETIAPINE 75 MG: 25 TABLET ORAL at 20:21

## 2022-01-01 RX ADMIN — GABAPENTIN 200 MG: 100 CAPSULE ORAL at 15:37

## 2022-01-01 RX ADMIN — DEXAMETHASONE SODIUM PHOSPHATE 6 MG: 10 INJECTION, SOLUTION INTRAMUSCULAR; INTRAVENOUS at 13:24

## 2022-01-01 RX ADMIN — MIDODRINE HYDROCHLORIDE 2.5 MG: 2.5 TABLET ORAL at 17:09

## 2022-01-01 RX ADMIN — FOLIC ACID 1 MG: 1 TABLET ORAL at 08:38

## 2022-01-01 RX ADMIN — THIAMINE HYDROCHLORIDE 100 MG: 100 INJECTION, SOLUTION INTRAMUSCULAR; INTRAVENOUS at 09:07

## 2022-01-01 RX ADMIN — ACETAMINOPHEN 650 MG: 325 TABLET ORAL at 16:23

## 2022-01-01 RX ADMIN — PROPOFOL 35 MCG/KG/MIN: 10 INJECTION, EMULSION INTRAVENOUS at 22:38

## 2022-01-01 RX ADMIN — GABAPENTIN 200 MG: 100 CAPSULE ORAL at 08:08

## 2022-01-01 RX ADMIN — IPRATROPIUM BROMIDE AND ALBUTEROL 1 PUFF: 20; 100 SPRAY, METERED RESPIRATORY (INHALATION) at 16:02

## 2022-01-01 RX ADMIN — INSULIN ASPART 3 UNITS: 100 INJECTION, SOLUTION INTRAVENOUS; SUBCUTANEOUS at 08:41

## 2022-01-01 RX ADMIN — Medication 1 CAPSULE: at 22:50

## 2022-01-01 RX ADMIN — PANTOPRAZOLE SODIUM 40 MG: 40 INJECTION, POWDER, FOR SOLUTION INTRAVENOUS at 22:46

## 2022-01-01 RX ADMIN — ACETAMINOPHEN 650 MG: 325 TABLET, FILM COATED ORAL at 13:32

## 2022-01-01 RX ADMIN — DULOXETINE HYDROCHLORIDE 60 MG: 60 CAPSULE, DELAYED RELEASE ORAL at 07:58

## 2022-01-01 RX ADMIN — DULOXETINE HYDROCHLORIDE 60 MG: 60 CAPSULE, DELAYED RELEASE ORAL at 10:15

## 2022-01-01 RX ADMIN — GABAPENTIN 200 MG: 100 CAPSULE ORAL at 21:43

## 2022-01-01 RX ADMIN — LORAZEPAM 1 MG: 1 TABLET ORAL at 00:47

## 2022-01-01 RX ADMIN — QUETIAPINE 75 MG: 25 TABLET ORAL at 20:31

## 2022-01-01 RX ADMIN — RAMELTEON 8 MG: 8 TABLET, FILM COATED ORAL at 20:21

## 2022-01-01 RX ADMIN — DEXMEDETOMIDINE HYDROCHLORIDE 0.4 MCG/KG/HR: 400 INJECTION INTRAVENOUS at 22:12

## 2022-01-01 RX ADMIN — GABAPENTIN 200 MG: 100 CAPSULE ORAL at 20:30

## 2022-01-01 RX ADMIN — ACETAMINOPHEN AND CODEINE PHOSPHATE 1 TABLET: 300; 30 TABLET ORAL at 12:28

## 2022-01-01 RX ADMIN — IPRATROPIUM BROMIDE AND ALBUTEROL 1 PUFF: 20; 100 SPRAY, METERED RESPIRATORY (INHALATION) at 09:19

## 2022-01-01 RX ADMIN — DULOXETINE HYDROCHLORIDE 60 MG: 60 CAPSULE, DELAYED RELEASE ORAL at 10:44

## 2022-01-01 RX ADMIN — QUETIAPINE 6.25 MG: 25 TABLET ORAL at 08:22

## 2022-01-01 RX ADMIN — CALCIUM CARBONATE (ANTACID) CHEW TAB 500 MG 1000 MG: 500 CHEW TAB at 20:25

## 2022-01-01 RX ADMIN — Medication 100 MG: at 09:23

## 2022-01-01 RX ADMIN — MAGNESIUM OXIDE TAB 400 MG (241.3 MG ELEMENTAL MG) 400 MG: 400 (241.3 MG) TAB at 09:48

## 2022-01-01 RX ADMIN — FUROSEMIDE 20 MG: 10 INJECTION, SOLUTION INTRAMUSCULAR; INTRAVENOUS at 12:52

## 2022-01-01 RX ADMIN — LIDOCAINE 1 PATCH: 246 PATCH TOPICAL at 06:40

## 2022-01-01 RX ADMIN — CALCIUM CARBONATE (ANTACID) CHEW TAB 500 MG 1000 MG: 500 CHEW TAB at 20:59

## 2022-01-01 RX ADMIN — Medication 100 MG: at 08:04

## 2022-01-01 RX ADMIN — METOPROLOL SUCCINATE 25 MG: 25 TABLET, EXTENDED RELEASE ORAL at 08:47

## 2022-01-01 RX ADMIN — CALCIUM CARBONATE (ANTACID) CHEW TAB 500 MG 1000 MG: 500 CHEW TAB at 08:09

## 2022-01-01 RX ADMIN — QUETIAPINE 50 MG: 25 TABLET ORAL at 20:56

## 2022-01-01 RX ADMIN — POTASSIUM CHLORIDE 20 MEQ: 149 INJECTION, SOLUTION, CONCENTRATE INTRAVENOUS at 05:06

## 2022-01-01 RX ADMIN — ACETAMINOPHEN AND CODEINE PHOSPHATE 1 TABLET: 300; 30 TABLET ORAL at 19:00

## 2022-01-01 RX ADMIN — INSULIN ASPART 3 UNITS: 100 INJECTION, SOLUTION INTRAVENOUS; SUBCUTANEOUS at 09:19

## 2022-01-01 RX ADMIN — SODIUM CHLORIDE 500 ML: 9 INJECTION, SOLUTION INTRAVENOUS at 18:00

## 2022-01-01 RX ADMIN — METFORMIN HYDROCHLORIDE 1000 MG: 500 TABLET ORAL at 08:27

## 2022-01-01 RX ADMIN — FOLIC ACID 1 MG: 1 TABLET ORAL at 08:32

## 2022-01-01 RX ADMIN — DULOXETINE HYDROCHLORIDE 60 MG: 60 CAPSULE, DELAYED RELEASE ORAL at 08:06

## 2022-01-01 RX ADMIN — FOLIC ACID 1 MG: 1 TABLET ORAL at 07:53

## 2022-01-01 RX ADMIN — FOLIC ACID 1 MG: 1 TABLET ORAL at 08:53

## 2022-01-01 RX ADMIN — LORAZEPAM 1 MG: 1 TABLET ORAL at 01:56

## 2022-01-01 RX ADMIN — RAMELTEON 8 MG: 8 TABLET, FILM COATED ORAL at 20:11

## 2022-01-01 RX ADMIN — FOLIC ACID 1 MG: 1 TABLET ORAL at 09:10

## 2022-01-01 RX ADMIN — CHLORHEXIDINE GLUCONATE 0.12% ORAL RINSE 15 ML: 1.2 LIQUID ORAL at 08:23

## 2022-01-01 RX ADMIN — BARIUM SULFATE 20 ML: 400 SUSPENSION ORAL at 10:48

## 2022-01-01 RX ADMIN — OLANZAPINE 5 MG: 5 TABLET, FILM COATED ORAL at 21:05

## 2022-01-01 RX ADMIN — PIPERACILLIN AND TAZOBACTAM 3.38 G: 3; .375 INJECTION, POWDER, FOR SOLUTION INTRAVENOUS at 09:08

## 2022-01-01 RX ADMIN — ALBUTEROL SULFATE 2.5 MG: 2.5 SOLUTION RESPIRATORY (INHALATION) at 13:43

## 2022-01-01 RX ADMIN — DULOXETINE HYDROCHLORIDE 60 MG: 60 CAPSULE, DELAYED RELEASE ORAL at 07:56

## 2022-01-01 RX ADMIN — MEROPENEM 1 G: 1 INJECTION, POWDER, FOR SOLUTION INTRAVENOUS at 19:58

## 2022-01-01 RX ADMIN — POTASSIUM CHLORIDE 20 MEQ: 1.5 SOLUTION ORAL at 08:06

## 2022-01-01 RX ADMIN — THERA TABS 1 TABLET: TAB at 09:48

## 2022-01-01 RX ADMIN — LIDOCAINE 1 PATCH: 246 PATCH TOPICAL at 20:37

## 2022-01-01 RX ADMIN — METOPROLOL TARTRATE 5 MG: 5 INJECTION INTRAVENOUS at 19:37

## 2022-01-01 RX ADMIN — FOLIC ACID 1 MG: 1 TABLET ORAL at 09:12

## 2022-01-01 RX ADMIN — THIAMINE HYDROCHLORIDE 250 MG: 100 INJECTION, SOLUTION INTRAMUSCULAR; INTRAVENOUS at 08:32

## 2022-01-01 RX ADMIN — LOPERAMIDE HYDROCHLORIDE 2 MG: 2 CAPSULE ORAL at 02:51

## 2022-01-01 RX ADMIN — OLANZAPINE 10 MG: 5 TABLET, ORALLY DISINTEGRATING ORAL at 00:35

## 2022-01-01 RX ADMIN — Medication 100 MG: at 08:29

## 2022-01-01 RX ADMIN — LORAZEPAM 2 MG: 2 INJECTION INTRAMUSCULAR; INTRAVENOUS at 22:59

## 2022-01-01 RX ADMIN — QUETIAPINE 12.5 MG: 25 TABLET, FILM COATED ORAL at 12:23

## 2022-01-01 RX ADMIN — Medication 100 MG: at 08:02

## 2022-01-01 RX ADMIN — THERA TABS 1 TABLET: TAB at 09:09

## 2022-01-01 RX ADMIN — IPRATROPIUM BROMIDE AND ALBUTEROL 1 PUFF: 20; 100 SPRAY, METERED RESPIRATORY (INHALATION) at 21:06

## 2022-01-01 RX ADMIN — PIPERACILLIN SODIUM AND TAZOBACTAM SODIUM 3.38 G: 3; .375 INJECTION, POWDER, LYOPHILIZED, FOR SOLUTION INTRAVENOUS at 16:18

## 2022-01-01 RX ADMIN — PROPOFOL 50 MCG/KG/MIN: 10 INJECTION, EMULSION INTRAVENOUS at 08:41

## 2022-01-01 RX ADMIN — QUETIAPINE 50 MG: 25 TABLET ORAL at 20:55

## 2022-01-01 RX ADMIN — GABAPENTIN 200 MG: 100 CAPSULE ORAL at 09:16

## 2022-01-01 RX ADMIN — LORAZEPAM 2 MG: 2 INJECTION INTRAMUSCULAR; INTRAVENOUS at 02:52

## 2022-01-01 RX ADMIN — ACETAMINOPHEN 650 MG: 325 TABLET, FILM COATED ORAL at 09:23

## 2022-01-01 RX ADMIN — Medication 30 G: at 21:01

## 2022-01-01 RX ADMIN — METOPROLOL SUCCINATE 25 MG: 25 TABLET, EXTENDED RELEASE ORAL at 08:28

## 2022-01-01 RX ADMIN — MAGNESIUM OXIDE TAB 400 MG (241.3 MG ELEMENTAL MG) 400 MG: 400 (241.3 MG) TAB at 13:39

## 2022-01-01 RX ADMIN — ACETAMINOPHEN AND CODEINE PHOSPHATE 1 TABLET: 300; 30 TABLET ORAL at 21:33

## 2022-01-01 RX ADMIN — MORPHINE SULFATE 1 MG: 2 INJECTION, SOLUTION INTRAMUSCULAR; INTRAVENOUS at 20:12

## 2022-01-01 RX ADMIN — MIDODRINE HYDROCHLORIDE 2.5 MG: 2.5 TABLET ORAL at 07:30

## 2022-01-01 RX ADMIN — CALCIUM CARBONATE (ANTACID) CHEW TAB 500 MG 500 MG: 500 CHEW TAB at 09:25

## 2022-01-01 RX ADMIN — MAGNESIUM OXIDE TAB 400 MG (241.3 MG ELEMENTAL MG) 400 MG: 400 (241.3 MG) TAB at 20:46

## 2022-01-01 RX ADMIN — CHLORHEXIDINE GLUCONATE 0.12% ORAL RINSE 15 ML: 1.2 LIQUID ORAL at 19:51

## 2022-01-01 RX ADMIN — FOLIC ACID 1 MG: 1 TABLET ORAL at 08:24

## 2022-01-01 RX ADMIN — LORAZEPAM 1 MG: 1 TABLET ORAL at 21:44

## 2022-01-01 RX ADMIN — IPRATROPIUM BROMIDE AND ALBUTEROL 1 PUFF: 20; 100 SPRAY, METERED RESPIRATORY (INHALATION) at 20:09

## 2022-01-01 RX ADMIN — THIAMINE HYDROCHLORIDE 500 MG: 100 INJECTION, SOLUTION INTRAMUSCULAR; INTRAVENOUS at 08:21

## 2022-01-01 RX ADMIN — GABAPENTIN 200 MG: 100 CAPSULE ORAL at 09:10

## 2022-01-01 RX ADMIN — LORAZEPAM 2 MG: 2 INJECTION INTRAMUSCULAR; INTRAVENOUS at 03:01

## 2022-01-01 RX ADMIN — THIAMINE HYDROCHLORIDE 500 MG: 100 INJECTION, SOLUTION INTRAMUSCULAR; INTRAVENOUS at 15:45

## 2022-01-01 RX ADMIN — MEROPENEM 1 G: 1 INJECTION, POWDER, FOR SOLUTION INTRAVENOUS at 13:47

## 2022-01-01 RX ADMIN — ACETAMINOPHEN AND CODEINE PHOSPHATE 1 TABLET: 300; 30 TABLET ORAL at 00:41

## 2022-01-01 RX ADMIN — IPRATROPIUM BROMIDE AND ALBUTEROL 1 PUFF: 20; 100 SPRAY, METERED RESPIRATORY (INHALATION) at 21:12

## 2022-01-01 RX ADMIN — MORPHINE SULFATE 2 MG: 2 INJECTION, SOLUTION INTRAMUSCULAR; INTRAVENOUS at 22:08

## 2022-01-01 RX ADMIN — FOLIC ACID 1 MG: 1 TABLET ORAL at 08:00

## 2022-01-01 RX ADMIN — IPRATROPIUM BROMIDE AND ALBUTEROL 1 PUFF: 20; 100 SPRAY, METERED RESPIRATORY (INHALATION) at 20:45

## 2022-01-01 RX ADMIN — MIDODRINE HYDROCHLORIDE 2.5 MG: 2.5 TABLET ORAL at 12:30

## 2022-01-01 RX ADMIN — IPRATROPIUM BROMIDE AND ALBUTEROL 1 PUFF: 20; 100 SPRAY, METERED RESPIRATORY (INHALATION) at 12:24

## 2022-01-01 RX ADMIN — LORAZEPAM 1 MG: 1 TABLET ORAL at 19:32

## 2022-01-01 RX ADMIN — Medication 1 CAPSULE: at 16:46

## 2022-01-01 RX ADMIN — MIDODRINE HYDROCHLORIDE 2.5 MG: 2.5 TABLET ORAL at 17:05

## 2022-01-01 RX ADMIN — LORAZEPAM 1 MG: 1 TABLET ORAL at 15:59

## 2022-01-01 RX ADMIN — BARIUM SULFATE: 400 SUSPENSION ORAL at 11:45

## 2022-01-01 RX ADMIN — GABAPENTIN 200 MG: 100 CAPSULE ORAL at 09:51

## 2022-01-01 RX ADMIN — DULOXETINE HYDROCHLORIDE 60 MG: 60 CAPSULE, DELAYED RELEASE ORAL at 08:19

## 2022-01-01 RX ADMIN — LIDOCAINE 1 PATCH: 246 PATCH TOPICAL at 21:01

## 2022-01-01 RX ADMIN — INSULIN ASPART 3 UNITS: 100 INJECTION, SOLUTION INTRAVENOUS; SUBCUTANEOUS at 09:46

## 2022-01-01 RX ADMIN — GABAPENTIN 200 MG: 100 CAPSULE ORAL at 09:15

## 2022-01-01 RX ADMIN — Medication 100 MG: at 08:14

## 2022-01-01 RX ADMIN — BARIUM SULFATE 20 ML: 400 SUSPENSION ORAL at 15:50

## 2022-01-01 RX ADMIN — VANCOMYCIN HYDROCHLORIDE 1000 MG: 1 INJECTION, SOLUTION INTRAVENOUS at 05:31

## 2022-01-01 RX ADMIN — BARIUM SULFATE 60 ML: 400 SUSPENSION ORAL at 09:18

## 2022-01-01 RX ADMIN — IPRATROPIUM BROMIDE AND ALBUTEROL 1 PUFF: 20; 100 SPRAY, METERED RESPIRATORY (INHALATION) at 20:34

## 2022-01-01 RX ADMIN — PROPOFOL 50 MCG/KG/MIN: 10 INJECTION, EMULSION INTRAVENOUS at 04:33

## 2022-01-01 RX ADMIN — ENOXAPARIN SODIUM 40 MG: 40 INJECTION SUBCUTANEOUS at 15:37

## 2022-01-01 RX ADMIN — ONDANSETRON 4 MG: 2 INJECTION INTRAMUSCULAR; INTRAVENOUS at 00:28

## 2022-01-01 RX ADMIN — PANTOPRAZOLE SODIUM 40 MG: 40 TABLET, DELAYED RELEASE ORAL at 06:34

## 2022-01-01 RX ADMIN — SODIUM CHLORIDE 500 ML: 9 INJECTION, SOLUTION INTRAVENOUS at 12:18

## 2022-01-01 RX ADMIN — INSULIN ASPART 4 UNITS: 100 INJECTION, SOLUTION INTRAVENOUS; SUBCUTANEOUS at 12:26

## 2022-01-01 RX ADMIN — GABAPENTIN 200 MG: 100 CAPSULE ORAL at 08:53

## 2022-01-01 RX ADMIN — INSULIN ASPART 8 UNITS: 100 INJECTION, SOLUTION INTRAVENOUS; SUBCUTANEOUS at 01:23

## 2022-01-01 RX ADMIN — IPRATROPIUM BROMIDE AND ALBUTEROL 1 PUFF: 20; 100 SPRAY, METERED RESPIRATORY (INHALATION) at 08:43

## 2022-01-01 RX ADMIN — PROPOFOL 35 MCG/KG/MIN: 10 INJECTION, EMULSION INTRAVENOUS at 18:23

## 2022-01-01 RX ADMIN — DULOXETINE HYDROCHLORIDE 60 MG: 60 CAPSULE, DELAYED RELEASE ORAL at 09:49

## 2022-01-01 RX ADMIN — ACETAMINOPHEN 650 MG: 325 TABLET, FILM COATED ORAL at 20:30

## 2022-01-01 RX ADMIN — QUETIAPINE 50 MG: 25 TABLET ORAL at 20:59

## 2022-01-01 RX ADMIN — MIDODRINE HYDROCHLORIDE 10 MG: 5 TABLET ORAL at 16:00

## 2022-01-01 RX ADMIN — ALBUTEROL SULFATE 2.5 MG: 2.5 SOLUTION RESPIRATORY (INHALATION) at 07:23

## 2022-01-01 RX ADMIN — Medication 1 CAPSULE: at 08:14

## 2022-01-01 RX ADMIN — MIDODRINE HYDROCHLORIDE 2.5 MG: 2.5 TABLET ORAL at 08:15

## 2022-01-01 RX ADMIN — MAGNESIUM OXIDE TAB 400 MG (241.3 MG ELEMENTAL MG) 400 MG: 400 (241.3 MG) TAB at 09:15

## 2022-01-01 RX ADMIN — ALBUTEROL SULFATE 2.5 MG: 2.5 SOLUTION RESPIRATORY (INHALATION) at 07:01

## 2022-01-01 RX ADMIN — Medication 1 CAPSULE: at 09:22

## 2022-01-01 RX ADMIN — MAGNESIUM OXIDE TAB 400 MG (241.3 MG ELEMENTAL MG) 400 MG: 400 (241.3 MG) TAB at 21:14

## 2022-01-01 RX ADMIN — GABAPENTIN 200 MG: 100 CAPSULE ORAL at 21:01

## 2022-01-01 RX ADMIN — ALBUMIN HUMAN 25 G: 0.25 SOLUTION INTRAVENOUS at 00:10

## 2022-01-01 RX ADMIN — LORAZEPAM 1 MG: 2 LIQUID ORAL at 08:41

## 2022-01-01 RX ADMIN — CHLORHEXIDINE GLUCONATE 0.12% ORAL RINSE 15 ML: 1.2 LIQUID ORAL at 08:53

## 2022-01-01 RX ADMIN — RAMELTEON 8 MG: 8 TABLET, FILM COATED ORAL at 21:47

## 2022-01-01 RX ADMIN — INSULIN ASPART 2 UNITS: 100 INJECTION, SOLUTION INTRAVENOUS; SUBCUTANEOUS at 20:35

## 2022-01-01 RX ADMIN — Medication 1 CAPSULE: at 17:46

## 2022-01-01 RX ADMIN — Medication 0.03 MCG/KG/MIN: at 11:00

## 2022-01-01 RX ADMIN — MELATONIN TAB 3 MG 3 MG: 3 TAB at 00:16

## 2022-01-01 RX ADMIN — SODIUM CHLORIDE: 9 INJECTION, SOLUTION INTRAVENOUS at 10:58

## 2022-01-01 RX ADMIN — SODIUM CHLORIDE 30 MG/ML INHALATION SOLUTION 3 ML: 30 SOLUTION INHALANT at 07:42

## 2022-01-01 RX ADMIN — LIDOCAINE HYDROCHLORIDE 30 ML: 20 SOLUTION ORAL; TOPICAL at 18:01

## 2022-01-01 RX ADMIN — Medication 5 MG: at 00:05

## 2022-01-01 RX ADMIN — Medication 0.03 MCG/KG/MIN: at 13:28

## 2022-01-01 RX ADMIN — ENOXAPARIN SODIUM 40 MG: 40 INJECTION SUBCUTANEOUS at 16:28

## 2022-01-01 RX ADMIN — PIPERACILLIN SODIUM AND TAZOBACTAM SODIUM 3.38 G: 3; .375 INJECTION, POWDER, LYOPHILIZED, FOR SOLUTION INTRAVENOUS at 05:46

## 2022-01-01 RX ADMIN — PIPERACILLIN AND TAZOBACTAM 3.38 G: 3; .375 INJECTION, POWDER, FOR SOLUTION INTRAVENOUS at 10:26

## 2022-01-01 RX ADMIN — QUETIAPINE 12.5 MG: 25 TABLET, FILM COATED ORAL at 17:03

## 2022-01-01 RX ADMIN — PIPERACILLIN SODIUM AND TAZOBACTAM SODIUM 3.38 G: 3; .375 INJECTION, POWDER, LYOPHILIZED, FOR SOLUTION INTRAVENOUS at 14:34

## 2022-01-01 RX ADMIN — ACETAMINOPHEN 650 MG: 325 TABLET, FILM COATED ORAL at 08:01

## 2022-01-01 RX ADMIN — AMOXICILLIN AND CLAVULANATE POTASSIUM 1 TABLET: 875; 125 TABLET, FILM COATED ORAL at 08:20

## 2022-01-01 RX ADMIN — MAGNESIUM OXIDE TAB 400 MG (241.3 MG ELEMENTAL MG) 400 MG: 400 (241.3 MG) TAB at 21:41

## 2022-01-01 RX ADMIN — METFORMIN HYDROCHLORIDE 500 MG: 500 TABLET, FILM COATED ORAL at 08:03

## 2022-01-01 RX ADMIN — IPRATROPIUM BROMIDE AND ALBUTEROL 1 PUFF: 20; 100 SPRAY, METERED RESPIRATORY (INHALATION) at 08:27

## 2022-01-01 RX ADMIN — PIPERACILLIN SODIUM AND TAZOBACTAM SODIUM 3.38 G: 3; .375 INJECTION, POWDER, LYOPHILIZED, FOR SOLUTION INTRAVENOUS at 13:47

## 2022-01-01 RX ADMIN — FOLIC ACID 1 MG: 1 TABLET ORAL at 08:11

## 2022-01-01 RX ADMIN — FOLIC ACID 1 MG: 1 TABLET ORAL at 08:56

## 2022-01-01 RX ADMIN — ENOXAPARIN SODIUM 40 MG: 40 INJECTION SUBCUTANEOUS at 14:24

## 2022-01-01 RX ADMIN — METOPROLOL SUCCINATE 25 MG: 25 TABLET, EXTENDED RELEASE ORAL at 08:23

## 2022-01-01 RX ADMIN — THERA TABS 1 TABLET: TAB at 08:31

## 2022-01-01 RX ADMIN — IPRATROPIUM BROMIDE AND ALBUTEROL 1 PUFF: 20; 100 SPRAY, METERED RESPIRATORY (INHALATION) at 21:02

## 2022-01-01 RX ADMIN — Medication 100 MG: at 08:44

## 2022-01-01 RX ADMIN — MAGNESIUM OXIDE TAB 400 MG (241.3 MG ELEMENTAL MG) 400 MG: 400 (241.3 MG) TAB at 08:53

## 2022-01-01 RX ADMIN — IPRATROPIUM BROMIDE AND ALBUTEROL 1 PUFF: 20; 100 SPRAY, METERED RESPIRATORY (INHALATION) at 16:41

## 2022-01-01 RX ADMIN — MAGNESIUM OXIDE TAB 400 MG (241.3 MG ELEMENTAL MG) 400 MG: 400 (241.3 MG) TAB at 08:05

## 2022-01-01 RX ADMIN — Medication 100 MG: at 09:10

## 2022-01-01 RX ADMIN — Medication 100 MG: at 08:42

## 2022-01-01 RX ADMIN — PANTOPRAZOLE SODIUM 40 MG: 40 TABLET, DELAYED RELEASE ORAL at 06:51

## 2022-01-01 RX ADMIN — VANCOMYCIN HYDROCHLORIDE 1000 MG: 1 INJECTION, SOLUTION INTRAVENOUS at 04:45

## 2022-01-01 RX ADMIN — QUETIAPINE 75 MG: 25 TABLET ORAL at 21:26

## 2022-01-01 RX ADMIN — CALCIUM CARBONATE (ANTACID) CHEW TAB 500 MG 1000 MG: 500 CHEW TAB at 08:23

## 2022-01-01 RX ADMIN — DEXTROSE MONOHYDRATE 25 ML: 25 INJECTION, SOLUTION INTRAVENOUS at 23:33

## 2022-01-01 RX ADMIN — INSULIN ASPART 2 UNITS: 100 INJECTION, SOLUTION INTRAVENOUS; SUBCUTANEOUS at 08:23

## 2022-01-01 RX ADMIN — ALBUTEROL SULFATE 2.5 MG: 2.5 SOLUTION RESPIRATORY (INHALATION) at 20:28

## 2022-01-01 RX ADMIN — HALOPERIDOL LACTATE 5 MG: 5 INJECTION, SOLUTION INTRAMUSCULAR at 10:53

## 2022-01-01 RX ADMIN — MIDODRINE HYDROCHLORIDE 2.5 MG: 2.5 TABLET ORAL at 17:28

## 2022-01-01 RX ADMIN — IPRATROPIUM BROMIDE AND ALBUTEROL 1 PUFF: 20; 100 SPRAY, METERED RESPIRATORY (INHALATION) at 07:58

## 2022-01-01 RX ADMIN — SODIUM CHLORIDE 30 MG/ML INHALATION SOLUTION 3 ML: 30 SOLUTION INHALANT at 21:06

## 2022-01-01 RX ADMIN — QUETIAPINE 50 MG: 25 TABLET ORAL at 20:30

## 2022-01-01 RX ADMIN — LIDOCAINE 1 PATCH: 246 PATCH TOPICAL at 11:14

## 2022-01-01 RX ADMIN — LORAZEPAM 2 MG: 2 INJECTION INTRAMUSCULAR; INTRAVENOUS at 02:22

## 2022-01-01 RX ADMIN — GABAPENTIN 200 MG: 100 CAPSULE ORAL at 20:58

## 2022-01-01 RX ADMIN — LIDOCAINE 1 PATCH: 246 PATCH TOPICAL at 21:35

## 2022-01-01 RX ADMIN — MIDODRINE HYDROCHLORIDE 2.5 MG: 2.5 TABLET ORAL at 17:02

## 2022-01-01 RX ADMIN — ACETAMINOPHEN 650 MG: 325 TABLET, FILM COATED ORAL at 06:09

## 2022-01-01 RX ADMIN — PANTOPRAZOLE SODIUM 40 MG: 40 TABLET, DELAYED RELEASE ORAL at 06:35

## 2022-01-01 RX ADMIN — DEXMEDETOMIDINE HYDROCHLORIDE 0.6 MCG/KG/HR: 400 INJECTION INTRAVENOUS at 15:29

## 2022-01-01 RX ADMIN — SODIUM CHLORIDE 30 MG/ML INHALATION SOLUTION 3 ML: 30 SOLUTION INHALANT at 20:17

## 2022-01-01 RX ADMIN — CALCIUM CARBONATE (ANTACID) CHEW TAB 500 MG 1000 MG: 500 CHEW TAB at 21:22

## 2022-01-01 RX ADMIN — MAGNESIUM OXIDE TAB 400 MG (241.3 MG ELEMENTAL MG) 400 MG: 400 (241.3 MG) TAB at 20:29

## 2022-01-01 RX ADMIN — DULOXETINE HYDROCHLORIDE 60 MG: 60 CAPSULE, DELAYED RELEASE ORAL at 09:15

## 2022-01-01 RX ADMIN — INSULIN ASPART 2 UNITS: 100 INJECTION, SOLUTION INTRAVENOUS; SUBCUTANEOUS at 08:54

## 2022-01-01 RX ADMIN — FOLIC ACID 1 MG: 1 TABLET ORAL at 09:58

## 2022-01-01 RX ADMIN — IPRATROPIUM BROMIDE AND ALBUTEROL 1 PUFF: 20; 100 SPRAY, METERED RESPIRATORY (INHALATION) at 12:17

## 2022-01-01 RX ADMIN — FUROSEMIDE 20 MG: 10 INJECTION, SOLUTION INTRAMUSCULAR; INTRAVENOUS at 20:25

## 2022-01-01 RX ADMIN — LIDOCAINE 1 PATCH: 246 PATCH TOPICAL at 20:44

## 2022-01-01 RX ADMIN — FOLIC ACID 1 MG: 1 TABLET ORAL at 08:44

## 2022-01-01 RX ADMIN — MAGNESIUM OXIDE TAB 400 MG (241.3 MG ELEMENTAL MG) 400 MG: 400 (241.3 MG) TAB at 08:14

## 2022-01-01 RX ADMIN — LIDOCAINE 1 PATCH: 246 PATCH TOPICAL at 20:39

## 2022-01-01 RX ADMIN — RAMELTEON 8 MG: 8 TABLET, FILM COATED ORAL at 21:23

## 2022-01-01 RX ADMIN — CALCIUM CARBONATE (ANTACID) CHEW TAB 500 MG 1000 MG: 500 CHEW TAB at 20:24

## 2022-01-01 RX ADMIN — ONDANSETRON 4 MG: 2 INJECTION INTRAMUSCULAR; INTRAVENOUS at 18:00

## 2022-01-01 RX ADMIN — LORAZEPAM 2 MG: 2 INJECTION INTRAMUSCULAR; INTRAVENOUS at 04:23

## 2022-01-01 RX ADMIN — GABAPENTIN 200 MG: 100 CAPSULE ORAL at 10:15

## 2022-01-01 RX ADMIN — OLANZAPINE 10 MG: 5 TABLET, ORALLY DISINTEGRATING ORAL at 00:39

## 2022-01-01 RX ADMIN — INSULIN ASPART 1 UNITS: 100 INJECTION, SOLUTION INTRAVENOUS; SUBCUTANEOUS at 15:48

## 2022-01-01 RX ADMIN — GABAPENTIN 200 MG: 100 CAPSULE ORAL at 21:22

## 2022-01-01 RX ADMIN — CALCIUM CARBONATE (ANTACID) CHEW TAB 500 MG 1000 MG: 500 CHEW TAB at 08:06

## 2022-01-01 RX ADMIN — DULOXETINE HYDROCHLORIDE 60 MG: 60 CAPSULE, DELAYED RELEASE ORAL at 09:51

## 2022-01-01 RX ADMIN — IPRATROPIUM BROMIDE AND ALBUTEROL 1 PUFF: 20; 100 SPRAY, METERED RESPIRATORY (INHALATION) at 19:40

## 2022-01-01 RX ADMIN — FOLIC ACID 1 MG: 1 TABLET ORAL at 12:36

## 2022-01-01 RX ADMIN — IPRATROPIUM BROMIDE AND ALBUTEROL 1 PUFF: 20; 100 SPRAY, METERED RESPIRATORY (INHALATION) at 21:46

## 2022-01-01 RX ADMIN — SODIUM CHLORIDE 30 MG/ML INHALATION SOLUTION 3 ML: 30 SOLUTION INHALANT at 20:01

## 2022-01-01 RX ADMIN — LIDOCAINE 1 PATCH: 246 PATCH TOPICAL at 21:34

## 2022-01-01 RX ADMIN — IPRATROPIUM BROMIDE AND ALBUTEROL 1 PUFF: 20; 100 SPRAY, METERED RESPIRATORY (INHALATION) at 21:26

## 2022-01-01 RX ADMIN — METFORMIN HYDROCHLORIDE 1000 MG: 500 TABLET ORAL at 16:38

## 2022-01-01 RX ADMIN — IPRATROPIUM BROMIDE AND ALBUTEROL 1 PUFF: 20; 100 SPRAY, METERED RESPIRATORY (INHALATION) at 09:11

## 2022-01-01 RX ADMIN — INSULIN ASPART 1 UNITS: 100 INJECTION, SOLUTION INTRAVENOUS; SUBCUTANEOUS at 16:07

## 2022-01-01 RX ADMIN — FUROSEMIDE 20 MG: 10 INJECTION, SOLUTION INTRAMUSCULAR; INTRAVENOUS at 04:43

## 2022-01-01 RX ADMIN — REMDESIVIR 100 MG: 100 INJECTION, POWDER, LYOPHILIZED, FOR SOLUTION INTRAVENOUS at 17:42

## 2022-01-01 RX ADMIN — HALOPERIDOL LACTATE 1 MG: 5 INJECTION, SOLUTION INTRAMUSCULAR at 11:33

## 2022-01-01 RX ADMIN — THERA TABS 1 TABLET: TAB at 08:03

## 2022-01-01 RX ADMIN — METFORMIN HYDROCHLORIDE 1000 MG: 500 TABLET ORAL at 08:20

## 2022-01-01 RX ADMIN — INSULIN ASPART 4 UNITS: 100 INJECTION, SOLUTION INTRAVENOUS; SUBCUTANEOUS at 20:35

## 2022-01-01 RX ADMIN — GABAPENTIN 100 MG: 100 CAPSULE ORAL at 08:14

## 2022-01-01 RX ADMIN — CALCIUM CARBONATE (ANTACID) CHEW TAB 500 MG 1000 MG: 500 CHEW TAB at 21:14

## 2022-01-01 RX ADMIN — QUETIAPINE 6.25 MG: 25 TABLET ORAL at 08:01

## 2022-01-01 RX ADMIN — PANTOPRAZOLE SODIUM 40 MG: 40 TABLET, DELAYED RELEASE ORAL at 07:13

## 2022-01-01 RX ADMIN — METOPROLOL TARTRATE 12.5 MG: 25 TABLET, FILM COATED ORAL at 08:07

## 2022-01-01 RX ADMIN — GABAPENTIN 200 MG: 100 CAPSULE ORAL at 08:11

## 2022-01-01 RX ADMIN — ACETAMINOPHEN 650 MG: 325 TABLET ORAL at 07:53

## 2022-01-01 RX ADMIN — SODIUM CHLORIDE: 4.5 INJECTION, SOLUTION INTRAVENOUS at 08:10

## 2022-01-01 RX ADMIN — POTASSIUM CHLORIDE 40 MEQ: 1.5 SOLUTION ORAL at 15:38

## 2022-01-01 RX ADMIN — FUROSEMIDE 20 MG: 10 INJECTION, SOLUTION INTRAMUSCULAR; INTRAVENOUS at 20:44

## 2022-01-01 RX ADMIN — ACETAMINOPHEN 650 MG: 325 TABLET, FILM COATED ORAL at 15:41

## 2022-01-01 RX ADMIN — MEROPENEM 1 G: 1 INJECTION, POWDER, FOR SOLUTION INTRAVENOUS at 20:05

## 2022-01-01 RX ADMIN — Medication 100 MG: at 08:53

## 2022-01-01 RX ADMIN — ANORECTAL OINTMENT: 15.7; .44; 24; 20.6 OINTMENT TOPICAL at 12:10

## 2022-01-01 RX ADMIN — FOLIC ACID 1 MG: 1 TABLET ORAL at 07:57

## 2022-01-01 RX ADMIN — OLANZAPINE 5 MG: 5 TABLET, ORALLY DISINTEGRATING ORAL at 02:51

## 2022-01-01 RX ADMIN — QUETIAPINE 25 MG: 25 TABLET ORAL at 08:08

## 2022-01-01 RX ADMIN — INSULIN ASPART 4 UNITS: 100 INJECTION, SOLUTION INTRAVENOUS; SUBCUTANEOUS at 04:43

## 2022-01-01 RX ADMIN — LORAZEPAM 1 MG: 1 TABLET ORAL at 13:49

## 2022-01-01 RX ADMIN — INSULIN ASPART 3 UNITS: 100 INJECTION, SOLUTION INTRAVENOUS; SUBCUTANEOUS at 09:54

## 2022-01-01 RX ADMIN — MAGNESIUM OXIDE TAB 400 MG (241.3 MG ELEMENTAL MG) 400 MG: 400 (241.3 MG) TAB at 08:25

## 2022-01-01 RX ADMIN — Medication 0.15 MCG/KG/MIN: at 06:49

## 2022-01-01 RX ADMIN — PIPERACILLIN AND TAZOBACTAM 3.38 G: 3; .375 INJECTION, POWDER, FOR SOLUTION INTRAVENOUS at 01:55

## 2022-01-01 RX ADMIN — GABAPENTIN 200 MG: 100 CAPSULE ORAL at 20:21

## 2022-01-01 RX ADMIN — METOPROLOL SUCCINATE 25 MG: 25 TABLET, EXTENDED RELEASE ORAL at 09:20

## 2022-01-01 RX ADMIN — ACETAMINOPHEN 650 MG: 325 TABLET, FILM COATED ORAL at 20:04

## 2022-01-01 RX ADMIN — INSULIN ASPART 1 UNITS: 100 INJECTION, SOLUTION INTRAVENOUS; SUBCUTANEOUS at 12:31

## 2022-01-01 RX ADMIN — QUETIAPINE 6.25 MG: 25 TABLET ORAL at 08:25

## 2022-01-01 RX ADMIN — QUETIAPINE 75 MG: 25 TABLET ORAL at 20:11

## 2022-01-01 RX ADMIN — MAGNESIUM OXIDE TAB 400 MG (241.3 MG ELEMENTAL MG) 400 MG: 400 (241.3 MG) TAB at 21:43

## 2022-01-01 RX ADMIN — FOLIC ACID 1 MG: 1 TABLET ORAL at 08:25

## 2022-01-01 RX ADMIN — ENOXAPARIN SODIUM 40 MG: 40 INJECTION SUBCUTANEOUS at 16:16

## 2022-01-01 RX ADMIN — MIDODRINE HYDROCHLORIDE 2.5 MG: 2.5 TABLET ORAL at 12:24

## 2022-01-01 RX ADMIN — IPRATROPIUM BROMIDE AND ALBUTEROL 1 PUFF: 20; 100 SPRAY, METERED RESPIRATORY (INHALATION) at 08:45

## 2022-01-01 RX ADMIN — METOPROLOL TARTRATE 5 MG: 5 INJECTION INTRAVENOUS at 16:43

## 2022-01-01 RX ADMIN — Medication 0.03 MCG/KG/MIN: at 18:35

## 2022-01-01 RX ADMIN — ALBUTEROL SULFATE 2.5 MG: 2.5 SOLUTION RESPIRATORY (INHALATION) at 07:36

## 2022-01-01 RX ADMIN — IPRATROPIUM BROMIDE AND ALBUTEROL 1 PUFF: 20; 100 SPRAY, METERED RESPIRATORY (INHALATION) at 08:19

## 2022-01-01 RX ADMIN — ALBUTEROL SULFATE 2.5 MG: 2.5 SOLUTION RESPIRATORY (INHALATION) at 20:05

## 2022-01-01 RX ADMIN — INSULIN ASPART 4 UNITS: 100 INJECTION, SOLUTION INTRAVENOUS; SUBCUTANEOUS at 08:00

## 2022-01-01 RX ADMIN — RAMELTEON 8 MG: 8 TABLET, FILM COATED ORAL at 22:26

## 2022-01-01 RX ADMIN — CALCIUM CARBONATE (ANTACID) CHEW TAB 500 MG 1000 MG: 500 CHEW TAB at 09:13

## 2022-01-01 RX ADMIN — IPRATROPIUM BROMIDE AND ALBUTEROL 1 PUFF: 20; 100 SPRAY, METERED RESPIRATORY (INHALATION) at 19:23

## 2022-01-01 RX ADMIN — QUETIAPINE 12.5 MG: 25 TABLET, FILM COATED ORAL at 10:44

## 2022-01-01 RX ADMIN — ACETAMINOPHEN AND CODEINE PHOSPHATE 1 TABLET: 300; 30 TABLET ORAL at 21:14

## 2022-01-01 RX ADMIN — ENOXAPARIN SODIUM 40 MG: 40 INJECTION SUBCUTANEOUS at 14:11

## 2022-01-01 RX ADMIN — MEROPENEM 1 G: 1 INJECTION, POWDER, FOR SOLUTION INTRAVENOUS at 03:28

## 2022-01-01 RX ADMIN — INSULIN GLARGINE 5 UNITS: 100 INJECTION, SOLUTION SUBCUTANEOUS at 21:37

## 2022-01-01 RX ADMIN — QUETIAPINE 12.5 MG: 25 TABLET, FILM COATED ORAL at 09:20

## 2022-01-01 RX ADMIN — OLANZAPINE 5 MG: 5 TABLET, ORALLY DISINTEGRATING ORAL at 21:47

## 2022-01-01 RX ADMIN — LIDOCAINE 1 PATCH: 246 PATCH TOPICAL at 20:54

## 2022-01-01 RX ADMIN — ALBUTEROL SULFATE 2.5 MG: 2.5 SOLUTION RESPIRATORY (INHALATION) at 07:04

## 2022-01-01 RX ADMIN — CHLORHEXIDINE GLUCONATE 0.12% ORAL RINSE 15 ML: 1.2 LIQUID ORAL at 20:45

## 2022-01-01 RX ADMIN — QUETIAPINE 6.25 MG: 25 TABLET ORAL at 16:52

## 2022-01-01 RX ADMIN — Medication 100 MG: at 09:49

## 2022-01-01 RX ADMIN — METOPROLOL TARTRATE 12.5 MG: 25 TABLET, FILM COATED ORAL at 12:28

## 2022-01-01 RX ADMIN — FUROSEMIDE 20 MG: 10 INJECTION, SOLUTION INTRAMUSCULAR; INTRAVENOUS at 11:57

## 2022-01-01 RX ADMIN — POTASSIUM CHLORIDE 20 MEQ: 29.8 INJECTION, SOLUTION INTRAVENOUS at 06:23

## 2022-01-01 RX ADMIN — LORAZEPAM 1 MG: 1 TABLET ORAL at 10:35

## 2022-01-01 RX ADMIN — MIDODRINE HYDROCHLORIDE 2.5 MG: 2.5 TABLET ORAL at 11:56

## 2022-01-01 RX ADMIN — ANORECTAL OINTMENT: 15.7; .44; 24; 20.6 OINTMENT TOPICAL at 12:29

## 2022-01-01 RX ADMIN — ALBUTEROL SULFATE 2.5 MG: 2.5 SOLUTION RESPIRATORY (INHALATION) at 08:59

## 2022-01-01 RX ADMIN — MAGNESIUM OXIDE TAB 400 MG (241.3 MG ELEMENTAL MG) 400 MG: 400 (241.3 MG) TAB at 08:01

## 2022-01-01 RX ADMIN — QUETIAPINE 12.5 MG: 25 TABLET, FILM COATED ORAL at 17:46

## 2022-01-01 RX ADMIN — GABAPENTIN 100 MG: 100 CAPSULE ORAL at 20:50

## 2022-01-01 RX ADMIN — LORAZEPAM 1 MG: 1 TABLET ORAL at 21:30

## 2022-01-01 RX ADMIN — HALOPERIDOL LACTATE 1 MG: 5 INJECTION, SOLUTION INTRAMUSCULAR at 20:54

## 2022-01-01 RX ADMIN — FUROSEMIDE 20 MG: 10 INJECTION, SOLUTION INTRAMUSCULAR; INTRAVENOUS at 04:16

## 2022-01-01 RX ADMIN — Medication 1 CAPSULE: at 08:41

## 2022-01-01 RX ADMIN — GABAPENTIN 200 MG: 100 CAPSULE ORAL at 21:14

## 2022-01-01 RX ADMIN — ALBUTEROL SULFATE 2.5 MG: 2.5 SOLUTION RESPIRATORY (INHALATION) at 19:43

## 2022-01-01 RX ADMIN — Medication 1 CAPSULE: at 08:43

## 2022-01-01 RX ADMIN — QUETIAPINE 50 MG: 25 TABLET ORAL at 21:43

## 2022-01-01 RX ADMIN — DULOXETINE HYDROCHLORIDE 60 MG: 60 CAPSULE, DELAYED RELEASE ORAL at 07:53

## 2022-01-01 RX ADMIN — MIDODRINE HYDROCHLORIDE 2.5 MG: 2.5 TABLET ORAL at 08:26

## 2022-01-01 RX ADMIN — GABAPENTIN 100 MG: 100 CAPSULE ORAL at 21:47

## 2022-01-01 RX ADMIN — LORAZEPAM 1 MG: 1 TABLET ORAL at 21:23

## 2022-01-01 RX ADMIN — QUETIAPINE 50 MG: 25 TABLET ORAL at 20:24

## 2022-01-01 RX ADMIN — ENOXAPARIN SODIUM 40 MG: 40 INJECTION SUBCUTANEOUS at 14:18

## 2022-01-01 RX ADMIN — DEXMEDETOMIDINE HYDROCHLORIDE 0.2 MCG/KG/HR: 400 INJECTION INTRAVENOUS at 04:57

## 2022-01-01 RX ADMIN — Medication 200 MG: at 09:58

## 2022-01-01 RX ADMIN — THERA TABS 1 TABLET: TAB at 08:00

## 2022-01-01 RX ADMIN — HALOPERIDOL LACTATE 1 MG: 5 INJECTION, SOLUTION INTRAMUSCULAR at 01:22

## 2022-01-01 RX ADMIN — Medication 100 MG: at 10:44

## 2022-01-01 RX ADMIN — POTASSIUM CHLORIDE 20 MEQ: 29.8 INJECTION, SOLUTION INTRAVENOUS at 01:43

## 2022-01-01 RX ADMIN — IPRATROPIUM BROMIDE AND ALBUTEROL 1 PUFF: 20; 100 SPRAY, METERED RESPIRATORY (INHALATION) at 12:33

## 2022-01-01 RX ADMIN — PANTOPRAZOLE SODIUM 40 MG: 40 TABLET, DELAYED RELEASE ORAL at 08:03

## 2022-01-01 RX ADMIN — THERA TABS 1 TABLET: TAB at 09:51

## 2022-01-01 RX ADMIN — Medication 40 MG: at 07:54

## 2022-01-01 RX ADMIN — LOPERAMIDE HYDROCHLORIDE 2 MG: 2 CAPSULE ORAL at 06:24

## 2022-01-01 RX ADMIN — CALCIUM CARBONATE (ANTACID) CHEW TAB 500 MG 1000 MG: 500 CHEW TAB at 09:16

## 2022-01-01 RX ADMIN — GABAPENTIN 200 MG: 100 CAPSULE ORAL at 09:12

## 2022-01-01 RX ADMIN — Medication 1 CAPSULE: at 08:08

## 2022-01-01 RX ADMIN — SODIUM CHLORIDE: 4.5 INJECTION, SOLUTION INTRAVENOUS at 06:14

## 2022-01-01 RX ADMIN — CALCIUM CARBONATE (ANTACID) CHEW TAB 500 MG 1000 MG: 500 CHEW TAB at 20:14

## 2022-01-01 RX ADMIN — MAGNESIUM OXIDE TAB 400 MG (241.3 MG ELEMENTAL MG) 400 MG: 400 (241.3 MG) TAB at 13:27

## 2022-01-01 RX ADMIN — ACETAMINOPHEN 650 MG: 325 TABLET, FILM COATED ORAL at 12:56

## 2022-01-01 RX ADMIN — QUETIAPINE 75 MG: 25 TABLET ORAL at 20:51

## 2022-01-01 RX ADMIN — IOPAMIDOL 100 ML: 755 INJECTION, SOLUTION INTRAVENOUS at 13:42

## 2022-01-01 RX ADMIN — LORAZEPAM 1 MG: 2 LIQUID ORAL at 16:28

## 2022-01-01 RX ADMIN — LORAZEPAM 1 MG: 1 TABLET ORAL at 19:48

## 2022-01-01 RX ADMIN — SODIUM CHLORIDE 30 MG/ML INHALATION SOLUTION 3 ML: 30 SOLUTION INHALANT at 13:04

## 2022-01-01 RX ADMIN — FOLIC ACID 1 MG: 1 TABLET ORAL at 08:22

## 2022-01-01 RX ADMIN — GABAPENTIN 200 MG: 100 CAPSULE ORAL at 21:00

## 2022-01-01 RX ADMIN — LORAZEPAM 1 MG: 1 TABLET ORAL at 21:41

## 2022-01-01 RX ADMIN — LORAZEPAM 1 MG: 1 TABLET ORAL at 06:24

## 2022-01-01 RX ADMIN — MAGNESIUM OXIDE TAB 400 MG (241.3 MG ELEMENTAL MG) 400 MG: 400 (241.3 MG) TAB at 21:01

## 2022-01-01 RX ADMIN — LIDOCAINE 1 PATCH: 246 PATCH TOPICAL at 21:21

## 2022-01-01 RX ADMIN — PIPERACILLIN SODIUM AND TAZOBACTAM SODIUM 3.38 G: 3; .375 INJECTION, POWDER, LYOPHILIZED, FOR SOLUTION INTRAVENOUS at 21:53

## 2022-01-01 RX ADMIN — DEXAMETHASONE SODIUM PHOSPHATE 6 MG: 10 INJECTION, SOLUTION INTRAMUSCULAR; INTRAVENOUS at 12:17

## 2022-01-01 RX ADMIN — PIPERACILLIN AND TAZOBACTAM 3.38 G: 3; .375 INJECTION, POWDER, FOR SOLUTION INTRAVENOUS at 09:28

## 2022-01-01 RX ADMIN — COSYNTROPIN 250 MCG: 0.25 INJECTION, POWDER, LYOPHILIZED, FOR SOLUTION INTRAVENOUS at 18:07

## 2022-01-01 RX ADMIN — FOLIC ACID 1 MG: 1 TABLET ORAL at 08:42

## 2022-01-01 RX ADMIN — PANTOPRAZOLE SODIUM 40 MG: 40 INJECTION, POWDER, FOR SOLUTION INTRAVENOUS at 06:37

## 2022-01-01 RX ADMIN — QUETIAPINE 6.25 MG: 25 TABLET ORAL at 08:08

## 2022-01-01 RX ADMIN — PIPERACILLIN AND TAZOBACTAM 3.38 G: 3; .375 INJECTION, POWDER, FOR SOLUTION INTRAVENOUS at 09:51

## 2022-01-01 RX ADMIN — INSULIN ASPART 1 UNITS: 100 INJECTION, SOLUTION INTRAVENOUS; SUBCUTANEOUS at 19:47

## 2022-01-01 RX ADMIN — THERA TABS 1 TABLET: TAB at 08:14

## 2022-01-01 RX ADMIN — INSULIN ASPART 3 UNITS: 100 INJECTION, SOLUTION INTRAVENOUS; SUBCUTANEOUS at 09:11

## 2022-01-01 RX ADMIN — MEROPENEM 1 G: 1 INJECTION, POWDER, FOR SOLUTION INTRAVENOUS at 04:21

## 2022-01-01 RX ADMIN — DULOXETINE HYDROCHLORIDE 60 MG: 60 CAPSULE, DELAYED RELEASE ORAL at 08:22

## 2022-01-01 RX ADMIN — CHLORHEXIDINE GLUCONATE 0.12% ORAL RINSE 15 ML: 1.2 LIQUID ORAL at 09:07

## 2022-01-01 RX ADMIN — FOLIC ACID 1 MG: 1 TABLET ORAL at 08:27

## 2022-01-01 RX ADMIN — PANTOPRAZOLE SODIUM 40 MG: 40 TABLET, DELAYED RELEASE ORAL at 06:57

## 2022-01-01 RX ADMIN — SODIUM CHLORIDE 1000 ML: 9 INJECTION, SOLUTION INTRAVENOUS at 22:41

## 2022-01-01 RX ADMIN — ALBUTEROL SULFATE 2.5 MG: 2.5 SOLUTION RESPIRATORY (INHALATION) at 15:07

## 2022-01-01 RX ADMIN — PIPERACILLIN SODIUM AND TAZOBACTAM SODIUM 3.38 G: 3; .375 INJECTION, POWDER, LYOPHILIZED, FOR SOLUTION INTRAVENOUS at 21:39

## 2022-01-01 RX ADMIN — GABAPENTIN 200 MG: 100 CAPSULE ORAL at 20:24

## 2022-01-01 RX ADMIN — INSULIN ASPART 2 UNITS: 100 INJECTION, SOLUTION INTRAVENOUS; SUBCUTANEOUS at 04:06

## 2022-01-01 RX ADMIN — Medication 200 MG: at 20:32

## 2022-01-01 RX ADMIN — INSULIN ASPART 2 UNITS: 100 INJECTION, SOLUTION INTRAVENOUS; SUBCUTANEOUS at 08:40

## 2022-01-01 RX ADMIN — INSULIN ASPART 2 UNITS: 100 INJECTION, SOLUTION INTRAVENOUS; SUBCUTANEOUS at 08:43

## 2022-01-01 RX ADMIN — Medication 100 MG: at 08:09

## 2022-01-01 RX ADMIN — DULOXETINE HYDROCHLORIDE 60 MG: 60 CAPSULE, DELAYED RELEASE ORAL at 08:14

## 2022-01-01 RX ADMIN — IOPAMIDOL 100 ML: 755 INJECTION, SOLUTION INTRAVENOUS at 21:58

## 2022-01-01 RX ADMIN — ENOXAPARIN SODIUM 40 MG: 40 INJECTION SUBCUTANEOUS at 14:05

## 2022-01-01 RX ADMIN — IPRATROPIUM BROMIDE AND ALBUTEROL 1 PUFF: 20; 100 SPRAY, METERED RESPIRATORY (INHALATION) at 16:34

## 2022-01-01 RX ADMIN — PIPERACILLIN SODIUM AND TAZOBACTAM SODIUM 3.38 G: 3; .375 INJECTION, POWDER, LYOPHILIZED, FOR SOLUTION INTRAVENOUS at 14:24

## 2022-01-01 RX ADMIN — METOPROLOL TARTRATE 12.5 MG: 25 TABLET, FILM COATED ORAL at 20:53

## 2022-01-01 RX ADMIN — GABAPENTIN 200 MG: 100 CAPSULE ORAL at 08:02

## 2022-01-01 RX ADMIN — MAGNESIUM OXIDE TAB 400 MG (241.3 MG ELEMENTAL MG) 400 MG: 400 (241.3 MG) TAB at 20:31

## 2022-01-01 RX ADMIN — Medication 100 MG: at 09:12

## 2022-01-01 RX ADMIN — IPRATROPIUM BROMIDE AND ALBUTEROL 1 PUFF: 20; 100 SPRAY, METERED RESPIRATORY (INHALATION) at 12:25

## 2022-01-01 RX ADMIN — ACETAMINOPHEN AND CODEINE PHOSPHATE 1 TABLET: 300; 30 TABLET ORAL at 08:28

## 2022-01-01 RX ADMIN — LORAZEPAM 1 MG: 1 TABLET ORAL at 20:25

## 2022-01-01 RX ADMIN — IPRATROPIUM BROMIDE AND ALBUTEROL 1 PUFF: 20; 100 SPRAY, METERED RESPIRATORY (INHALATION) at 11:45

## 2022-01-01 RX ADMIN — ALBUTEROL SULFATE 2.5 MG: 2.5 SOLUTION RESPIRATORY (INHALATION) at 14:01

## 2022-01-01 RX ADMIN — THERA TABS 1 TABLET: TAB at 08:04

## 2022-01-01 RX ADMIN — MAGNESIUM OXIDE TAB 400 MG (241.3 MG ELEMENTAL MG) 400 MG: 400 (241.3 MG) TAB at 08:03

## 2022-01-01 RX ADMIN — GABAPENTIN 200 MG: 100 CAPSULE ORAL at 22:25

## 2022-01-01 RX ADMIN — RAMELTEON 8 MG: 8 TABLET, FILM COATED ORAL at 20:32

## 2022-01-01 RX ADMIN — INSULIN ASPART 1 UNITS: 100 INJECTION, SOLUTION INTRAVENOUS; SUBCUTANEOUS at 03:30

## 2022-01-01 RX ADMIN — HALOPERIDOL LACTATE 1 MG: 5 INJECTION, SOLUTION INTRAMUSCULAR at 23:28

## 2022-01-01 RX ADMIN — CALCIUM CARBONATE (ANTACID) CHEW TAB 500 MG 1000 MG: 500 CHEW TAB at 08:03

## 2022-01-01 RX ADMIN — LIDOCAINE 1 PATCH: 246 PATCH TOPICAL at 12:30

## 2022-01-01 RX ADMIN — SODIUM CHLORIDE 500 ML: 9 INJECTION, SOLUTION INTRAVENOUS at 09:47

## 2022-01-01 RX ADMIN — FUROSEMIDE 20 MG: 10 INJECTION, SOLUTION INTRAMUSCULAR; INTRAVENOUS at 04:05

## 2022-01-01 RX ADMIN — INSULIN ASPART 8 UNITS: 100 INJECTION, SOLUTION INTRAVENOUS; SUBCUTANEOUS at 22:05

## 2022-01-01 RX ADMIN — DULOXETINE HYDROCHLORIDE 60 MG: 60 CAPSULE, DELAYED RELEASE ORAL at 08:02

## 2022-01-01 RX ADMIN — FENTANYL CITRATE 25 MCG/HR: 50 INJECTION INTRAVENOUS at 14:55

## 2022-01-01 RX ADMIN — ALBUTEROL SULFATE 2 PUFF: 90 AEROSOL, METERED RESPIRATORY (INHALATION) at 20:51

## 2022-01-01 RX ADMIN — MEROPENEM 1 G: 1 INJECTION, POWDER, FOR SOLUTION INTRAVENOUS at 03:30

## 2022-01-01 RX ADMIN — IPRATROPIUM BROMIDE AND ALBUTEROL 1 PUFF: 20; 100 SPRAY, METERED RESPIRATORY (INHALATION) at 21:15

## 2022-01-01 RX ADMIN — INSULIN ASPART 3 UNITS: 100 INJECTION, SOLUTION INTRAVENOUS; SUBCUTANEOUS at 00:05

## 2022-01-01 RX ADMIN — SODIUM CHLORIDE 500 ML: 9 INJECTION, SOLUTION INTRAVENOUS at 21:49

## 2022-01-01 RX ADMIN — QUETIAPINE 75 MG: 25 TABLET ORAL at 21:46

## 2022-01-01 RX ADMIN — BARIUM SULFATE 20 ML: 400 SUSPENSION ORAL at 09:18

## 2022-01-01 RX ADMIN — QUETIAPINE 6.25 MG: 25 TABLET ORAL at 09:49

## 2022-01-01 RX ADMIN — CALCIUM CARBONATE (ANTACID) CHEW TAB 500 MG 1000 MG: 500 CHEW TAB at 20:31

## 2022-01-01 RX ADMIN — DULOXETINE HYDROCHLORIDE 60 MG: 60 CAPSULE, DELAYED RELEASE ORAL at 14:43

## 2022-01-01 RX ADMIN — HALOPERIDOL LACTATE 1 MG: 5 INJECTION, SOLUTION INTRAMUSCULAR at 18:53

## 2022-01-01 RX ADMIN — FUROSEMIDE 20 MG: 10 INJECTION, SOLUTION INTRAMUSCULAR; INTRAVENOUS at 03:57

## 2022-01-01 RX ADMIN — ENOXAPARIN SODIUM 40 MG: 40 INJECTION SUBCUTANEOUS at 17:44

## 2022-01-01 RX ADMIN — TRAZODONE HYDROCHLORIDE 25 MG: 50 TABLET ORAL at 00:31

## 2022-01-01 RX ADMIN — MEROPENEM 1 G: 1 INJECTION, POWDER, FOR SOLUTION INTRAVENOUS at 11:36

## 2022-01-01 RX ADMIN — CALCIUM CARBONATE (ANTACID) CHEW TAB 500 MG 1000 MG: 500 CHEW TAB at 08:07

## 2022-01-01 RX ADMIN — POTASSIUM CHLORIDE 20 MEQ: 1500 TABLET, EXTENDED RELEASE ORAL at 08:25

## 2022-01-01 RX ADMIN — IPRATROPIUM BROMIDE AND ALBUTEROL 1 PUFF: 20; 100 SPRAY, METERED RESPIRATORY (INHALATION) at 16:58

## 2022-01-01 RX ADMIN — PIPERACILLIN AND TAZOBACTAM 3.38 G: 3; .375 INJECTION, POWDER, FOR SOLUTION INTRAVENOUS at 01:38

## 2022-01-01 RX ADMIN — ALBUTEROL SULFATE 2.5 MG: 2.5 SOLUTION RESPIRATORY (INHALATION) at 07:41

## 2022-01-01 RX ADMIN — POTASSIUM CHLORIDE 10 MEQ: 7.46 INJECTION, SOLUTION INTRAVENOUS at 13:22

## 2022-01-01 RX ADMIN — POTASSIUM CHLORIDE 20 MEQ: 1500 TABLET, EXTENDED RELEASE ORAL at 08:38

## 2022-01-01 RX ADMIN — IPRATROPIUM BROMIDE AND ALBUTEROL 1 PUFF: 20; 100 SPRAY, METERED RESPIRATORY (INHALATION) at 11:19

## 2022-01-01 RX ADMIN — LIDOCAINE 1 PATCH: 246 PATCH TOPICAL at 20:52

## 2022-01-01 RX ADMIN — SODIUM CHLORIDE 30 MG/ML INHALATION SOLUTION 3 ML: 30 SOLUTION INHALANT at 19:40

## 2022-01-01 RX ADMIN — IPRATROPIUM BROMIDE AND ALBUTEROL 1 PUFF: 20; 100 SPRAY, METERED RESPIRATORY (INHALATION) at 08:46

## 2022-01-01 RX ADMIN — SODIUM CHLORIDE 30 MG/ML INHALATION SOLUTION 3 ML: 30 SOLUTION INHALANT at 07:29

## 2022-01-01 RX ADMIN — IPRATROPIUM BROMIDE AND ALBUTEROL 1 PUFF: 20; 100 SPRAY, METERED RESPIRATORY (INHALATION) at 22:26

## 2022-01-01 RX ADMIN — IPRATROPIUM BROMIDE AND ALBUTEROL 1 PUFF: 20; 100 SPRAY, METERED RESPIRATORY (INHALATION) at 17:44

## 2022-01-01 RX ADMIN — Medication 100 MG: at 08:38

## 2022-01-01 RX ADMIN — BARIUM SULFATE 60 ML: 400 SUSPENSION ORAL at 10:49

## 2022-01-01 RX ADMIN — MAGNESIUM OXIDE TAB 400 MG (241.3 MG ELEMENTAL MG) 400 MG: 400 (241.3 MG) TAB at 22:25

## 2022-01-01 RX ADMIN — THERA TABS 1 TABLET: TAB at 09:16

## 2022-01-01 RX ADMIN — QUETIAPINE 12.5 MG: 25 TABLET, FILM COATED ORAL at 11:00

## 2022-01-01 RX ADMIN — PIPERACILLIN AND TAZOBACTAM 3.38 G: 3; .375 INJECTION, POWDER, FOR SOLUTION INTRAVENOUS at 01:40

## 2022-01-01 RX ADMIN — IPRATROPIUM BROMIDE AND ALBUTEROL 1 PUFF: 20; 100 SPRAY, METERED RESPIRATORY (INHALATION) at 16:07

## 2022-01-01 RX ADMIN — LIDOCAINE 1 PATCH: 246 PATCH TOPICAL at 21:11

## 2022-01-01 RX ADMIN — HALOPERIDOL LACTATE 1 MG: 5 INJECTION, SOLUTION INTRAMUSCULAR at 20:58

## 2022-01-01 RX ADMIN — ALBUTEROL SULFATE 2.5 MG: 2.5 SOLUTION RESPIRATORY (INHALATION) at 20:06

## 2022-01-01 RX ADMIN — LORAZEPAM 2 MG: 2 INJECTION INTRAMUSCULAR; INTRAVENOUS at 07:56

## 2022-01-01 RX ADMIN — Medication 30 G: at 06:56

## 2022-01-01 RX ADMIN — METFORMIN HYDROCHLORIDE 1000 MG: 500 TABLET ORAL at 07:47

## 2022-01-01 RX ADMIN — CALCIUM CARBONATE (ANTACID) CHEW TAB 500 MG 1000 MG: 500 CHEW TAB at 20:43

## 2022-01-01 RX ADMIN — VANCOMYCIN HYDROCHLORIDE 1250 MG: 5 INJECTION, POWDER, LYOPHILIZED, FOR SOLUTION INTRAVENOUS at 18:09

## 2022-01-01 RX ADMIN — MAGNESIUM OXIDE TAB 400 MG (241.3 MG ELEMENTAL MG) 400 MG: 400 (241.3 MG) TAB at 20:41

## 2022-01-01 RX ADMIN — Medication 1 CAPSULE: at 10:44

## 2022-01-01 RX ADMIN — LACTULOSE 20 G: 10 SOLUTION ORAL at 15:04

## 2022-01-01 RX ADMIN — ACETAMINOPHEN 650 MG: 325 TABLET, FILM COATED ORAL at 04:50

## 2022-01-01 RX ADMIN — PROPOFOL 50 MCG/KG/MIN: 10 INJECTION, EMULSION INTRAVENOUS at 12:26

## 2022-01-01 RX ADMIN — LIDOCAINE 1 PATCH: 246 PATCH TOPICAL at 12:17

## 2022-01-01 RX ADMIN — Medication 1 CAPSULE: at 09:09

## 2022-01-01 RX ADMIN — Medication 100 MG: at 08:12

## 2022-01-01 RX ADMIN — MIDODRINE HYDROCHLORIDE 2.5 MG: 2.5 TABLET ORAL at 09:17

## 2022-01-01 RX ADMIN — IPRATROPIUM BROMIDE AND ALBUTEROL 1 PUFF: 20; 100 SPRAY, METERED RESPIRATORY (INHALATION) at 20:04

## 2022-01-01 RX ADMIN — QUETIAPINE 75 MG: 25 TABLET ORAL at 22:25

## 2022-01-01 RX ADMIN — BARIUM SULFATE 60 ML: 400 SUSPENSION ORAL at 15:51

## 2022-01-01 RX ADMIN — FUROSEMIDE 20 MG: 10 INJECTION, SOLUTION INTRAMUSCULAR; INTRAVENOUS at 20:49

## 2022-01-01 RX ADMIN — Medication 200 MG: at 20:12

## 2022-01-01 RX ADMIN — ENOXAPARIN SODIUM 40 MG: 40 INJECTION SUBCUTANEOUS at 17:05

## 2022-01-01 RX ADMIN — MAGNESIUM SULFATE HEPTAHYDRATE 2 G: 40 INJECTION, SOLUTION INTRAVENOUS at 08:44

## 2022-01-01 RX ADMIN — MIDODRINE HYDROCHLORIDE 2.5 MG: 2.5 TABLET ORAL at 12:17

## 2022-01-01 RX ADMIN — POTASSIUM CHLORIDE 20 MEQ: 1500 TABLET, EXTENDED RELEASE ORAL at 12:28

## 2022-01-01 RX ADMIN — QUETIAPINE 50 MG: 25 TABLET ORAL at 20:25

## 2022-01-01 RX ADMIN — LORAZEPAM 1 MG: 1 TABLET ORAL at 21:58

## 2022-01-01 RX ADMIN — SODIUM CHLORIDE: 9 INJECTION, SOLUTION INTRAVENOUS at 01:44

## 2022-01-01 RX ADMIN — PIPERACILLIN AND TAZOBACTAM 3.38 G: 3; .375 INJECTION, POWDER, FOR SOLUTION INTRAVENOUS at 08:31

## 2022-01-01 RX ADMIN — Medication 100 MG: at 08:20

## 2022-01-01 RX ADMIN — LORAZEPAM 1 MG: 1 TABLET ORAL at 00:27

## 2022-01-01 RX ADMIN — LORAZEPAM 1 MG: 2 INJECTION INTRAMUSCULAR; INTRAVENOUS at 20:55

## 2022-01-01 RX ADMIN — INSULIN ASPART 1 UNITS: 100 INJECTION, SOLUTION INTRAVENOUS; SUBCUTANEOUS at 19:23

## 2022-01-01 RX ADMIN — SODIUM CHLORIDE: 4.5 INJECTION, SOLUTION INTRAVENOUS at 06:20

## 2022-01-01 RX ADMIN — PANTOPRAZOLE SODIUM 40 MG: 40 INJECTION, POWDER, FOR SOLUTION INTRAVENOUS at 06:31

## 2022-01-01 RX ADMIN — FOLIC ACID 1 MG: 1 TABLET ORAL at 08:05

## 2022-01-01 RX ADMIN — Medication 0.04 MCG/KG/MIN: at 00:14

## 2022-01-01 RX ADMIN — MEROPENEM 1 G: 1 INJECTION, POWDER, FOR SOLUTION INTRAVENOUS at 12:36

## 2022-01-01 RX ADMIN — GABAPENTIN 200 MG: 250 SUSPENSION ORAL at 20:37

## 2022-01-01 RX ADMIN — SODIUM CHLORIDE 500 ML: 9 INJECTION, SOLUTION INTRAVENOUS at 14:26

## 2022-01-01 ASSESSMENT — ACTIVITIES OF DAILY LIVING (ADL)
ADLS_ACUITY_SCORE: 32
ADLS_ACUITY_SCORE: 27
ADLS_ACUITY_SCORE: 32
ADLS_ACUITY_SCORE: 26
ADLS_ACUITY_SCORE: 24
ADLS_ACUITY_SCORE: 30
ADLS_ACUITY_SCORE: 32
ADLS_ACUITY_SCORE: 34
ADLS_ACUITY_SCORE: 34
ADLS_ACUITY_SCORE: 31
ADLS_ACUITY_SCORE: 26
ADLS_ACUITY_SCORE: 33
ADLS_ACUITY_SCORE: 26
ADLS_ACUITY_SCORE: 35
ADLS_ACUITY_SCORE: 36
ADLS_ACUITY_SCORE: 38
ADLS_ACUITY_SCORE: 34
ADLS_ACUITY_SCORE: 28
ADLS_ACUITY_SCORE: 28
ADLS_ACUITY_SCORE: 32
ADLS_ACUITY_SCORE: 30
ADLS_ACUITY_SCORE: 18
ADLS_ACUITY_SCORE: 23
ADLS_ACUITY_SCORE: 31
ADLS_ACUITY_SCORE: 32
ADLS_ACUITY_SCORE: 38
ADLS_ACUITY_SCORE: 26
ADLS_ACUITY_SCORE: 30
ADLS_ACUITY_SCORE: 36
ADLS_ACUITY_SCORE: 32
ADLS_ACUITY_SCORE: 30
ADLS_ACUITY_SCORE: 30
ADLS_ACUITY_SCORE: 32
ADLS_ACUITY_SCORE: 26
ADLS_ACUITY_SCORE: 30
ADLS_ACUITY_SCORE: 31
ADLS_ACUITY_SCORE: 30
ADLS_ACUITY_SCORE: 26
ADLS_ACUITY_SCORE: 28
ADLS_ACUITY_SCORE: 26
ADLS_ACUITY_SCORE: 25
ADLS_ACUITY_SCORE: 30
ADLS_ACUITY_SCORE: 30
ADLS_ACUITY_SCORE: 12
ADLS_ACUITY_SCORE: 34
ADLS_ACUITY_SCORE: 12
ADLS_ACUITY_SCORE: 32
ADLS_ACUITY_SCORE: 26
ADLS_ACUITY_SCORE: 18
ADLS_ACUITY_SCORE: 27
ADLS_ACUITY_SCORE: 32
ADLS_ACUITY_SCORE: 28
ADLS_ACUITY_SCORE: 12
ADLS_ACUITY_SCORE: 30
ADLS_ACUITY_SCORE: 26
ADLS_ACUITY_SCORE: 33
ADLS_ACUITY_SCORE: 26
ADLS_ACUITY_SCORE: 31
ADLS_ACUITY_SCORE: 21
ADLS_ACUITY_SCORE: 38
ADLS_ACUITY_SCORE: 32
ADLS_ACUITY_SCORE: 24
ADLS_ACUITY_SCORE: 30
ADLS_ACUITY_SCORE: 31
ADLS_ACUITY_SCORE: 26
ADLS_ACUITY_SCORE: 26
ADLS_ACUITY_SCORE: 28
ADLS_ACUITY_SCORE: 34
ADLS_ACUITY_SCORE: 30
ADLS_ACUITY_SCORE: 26
ADLS_ACUITY_SCORE: 33
ADLS_ACUITY_SCORE: 30
ADLS_ACUITY_SCORE: 26
ADLS_ACUITY_SCORE: 34
ADLS_ACUITY_SCORE: 32
ADLS_ACUITY_SCORE: 32
ADLS_ACUITY_SCORE: 34
ADLS_ACUITY_SCORE: 32
ADLS_ACUITY_SCORE: 26
ADLS_ACUITY_SCORE: 28
ADLS_ACUITY_SCORE: 34
ADLS_ACUITY_SCORE: 32
ADLS_ACUITY_SCORE: 36
ADLS_ACUITY_SCORE: 27
ADLS_ACUITY_SCORE: 34
ADLS_ACUITY_SCORE: 36
ADLS_ACUITY_SCORE: 32
ADLS_ACUITY_SCORE: 28
ADLS_ACUITY_SCORE: 25
ADLS_ACUITY_SCORE: 31
ADLS_ACUITY_SCORE: 25
ADLS_ACUITY_SCORE: 31
ADLS_ACUITY_SCORE: 34
ADLS_ACUITY_SCORE: 38
ADLS_ACUITY_SCORE: 28
ADLS_ACUITY_SCORE: 26
ADLS_ACUITY_SCORE: 32
ADLS_ACUITY_SCORE: 31
ADLS_ACUITY_SCORE: 32
ADLS_ACUITY_SCORE: 22
ADLS_ACUITY_SCORE: 32
ADLS_ACUITY_SCORE: 30
ADLS_ACUITY_SCORE: 26
ADLS_ACUITY_SCORE: 30
ADLS_ACUITY_SCORE: 28
ADLS_ACUITY_SCORE: 28
ADLS_ACUITY_SCORE: 30
ADLS_ACUITY_SCORE: 34
ADLS_ACUITY_SCORE: 25
ADLS_ACUITY_SCORE: 32
ADLS_ACUITY_SCORE: 26
ADLS_ACUITY_SCORE: 25
ADLS_ACUITY_SCORE: 32
ADLS_ACUITY_SCORE: 32
ADLS_ACUITY_SCORE: 36
ADLS_ACUITY_SCORE: 32
ADLS_ACUITY_SCORE: 22
ADLS_ACUITY_SCORE: 31
ADLS_ACUITY_SCORE: 30
ADLS_ACUITY_SCORE: 34
DEPENDENT_IADLS:: TRANSPORTATION
ADLS_ACUITY_SCORE: 32
ADLS_ACUITY_SCORE: 32
ADLS_ACUITY_SCORE: 38
ADLS_ACUITY_SCORE: 38
ADLS_ACUITY_SCORE: 32
ADLS_ACUITY_SCORE: 25
ADLS_ACUITY_SCORE: 28
ADLS_ACUITY_SCORE: 34
ADLS_ACUITY_SCORE: 21
ADLS_ACUITY_SCORE: 19
ADLS_ACUITY_SCORE: 28
ADLS_ACUITY_SCORE: 26
ADLS_ACUITY_SCORE: 32
ADLS_ACUITY_SCORE: 21
ADLS_ACUITY_SCORE: 28
ADLS_ACUITY_SCORE: 28
ADLS_ACUITY_SCORE: 21
ADLS_ACUITY_SCORE: 34
ADLS_ACUITY_SCORE: 32
ADLS_ACUITY_SCORE: 36
ADLS_ACUITY_SCORE: 27
ADLS_ACUITY_SCORE: 28
ADLS_ACUITY_SCORE: 28
ADLS_ACUITY_SCORE: 24
ADLS_ACUITY_SCORE: 32
ADLS_ACUITY_SCORE: 26
ADLS_ACUITY_SCORE: 34
ADLS_ACUITY_SCORE: 32
ADLS_ACUITY_SCORE: 23
ADLS_ACUITY_SCORE: 18
ADLS_ACUITY_SCORE: 36
ADLS_ACUITY_SCORE: 22
ADLS_ACUITY_SCORE: 31
ADLS_ACUITY_SCORE: 31
ADLS_ACUITY_SCORE: 38
ADLS_ACUITY_SCORE: 32
ADLS_ACUITY_SCORE: 30
ADLS_ACUITY_SCORE: 26
ADLS_ACUITY_SCORE: 26
ADLS_ACUITY_SCORE: 32
ADLS_ACUITY_SCORE: 24
ADLS_ACUITY_SCORE: 26
ADLS_ACUITY_SCORE: 32
ADLS_ACUITY_SCORE: 32
ADLS_ACUITY_SCORE: 24
ADLS_ACUITY_SCORE: 27
ADLS_ACUITY_SCORE: 25
ADLS_ACUITY_SCORE: 32
ADLS_ACUITY_SCORE: 30
ADLS_ACUITY_SCORE: 35
ADLS_ACUITY_SCORE: 26
ADLS_ACUITY_SCORE: 32
ADLS_ACUITY_SCORE: 32
ADLS_ACUITY_SCORE: 28
ADLS_ACUITY_SCORE: 28
ADLS_ACUITY_SCORE: 32
ADLS_ACUITY_SCORE: 26
ADLS_ACUITY_SCORE: 24
ADLS_ACUITY_SCORE: 24
ADLS_ACUITY_SCORE: 30
ADLS_ACUITY_SCORE: 12
ADLS_ACUITY_SCORE: 30
ADLS_ACUITY_SCORE: 25
ADLS_ACUITY_SCORE: 32
ADLS_ACUITY_SCORE: 34
ADLS_ACUITY_SCORE: 30
ADLS_ACUITY_SCORE: 12
ADLS_ACUITY_SCORE: 32
ADLS_ACUITY_SCORE: 32
ADLS_ACUITY_SCORE: 28
ADLS_ACUITY_SCORE: 32
ADLS_ACUITY_SCORE: 26
ADLS_ACUITY_SCORE: 21
ADLS_ACUITY_SCORE: 31
ADLS_ACUITY_SCORE: 32
ADLS_ACUITY_SCORE: 31
ADLS_ACUITY_SCORE: 26
ADLS_ACUITY_SCORE: 36
ADLS_ACUITY_SCORE: 24
ADLS_ACUITY_SCORE: 30
ADLS_ACUITY_SCORE: 22
ADLS_ACUITY_SCORE: 31
ADLS_ACUITY_SCORE: 30
ADLS_ACUITY_SCORE: 31
ADLS_ACUITY_SCORE: 26
ADLS_ACUITY_SCORE: 30
ADLS_ACUITY_SCORE: 36
ADLS_ACUITY_SCORE: 18
ADLS_ACUITY_SCORE: 19
ADLS_ACUITY_SCORE: 31
ADLS_ACUITY_SCORE: 25
ADLS_ACUITY_SCORE: 26
ADLS_ACUITY_SCORE: 28
ADLS_ACUITY_SCORE: 18
ADLS_ACUITY_SCORE: 31
ADLS_ACUITY_SCORE: 19
ADLS_ACUITY_SCORE: 32
ADLS_ACUITY_SCORE: 33
ADLS_ACUITY_SCORE: 30
ADLS_ACUITY_SCORE: 32
ADLS_ACUITY_SCORE: 32
ADLS_ACUITY_SCORE: 30
ADLS_ACUITY_SCORE: 32
ADLS_ACUITY_SCORE: 28
ADLS_ACUITY_SCORE: 31
ADLS_ACUITY_SCORE: 27
ADLS_ACUITY_SCORE: 34
ADLS_ACUITY_SCORE: 32
ADLS_ACUITY_SCORE: 27
ADLS_ACUITY_SCORE: 31
ADLS_ACUITY_SCORE: 26
ADLS_ACUITY_SCORE: 32
ADLS_ACUITY_SCORE: 34
ADLS_ACUITY_SCORE: 26
ADLS_ACUITY_SCORE: 24
ADLS_ACUITY_SCORE: 26
ADLS_ACUITY_SCORE: 28
ADLS_ACUITY_SCORE: 12
ADLS_ACUITY_SCORE: 28
ADLS_ACUITY_SCORE: 26
ADLS_ACUITY_SCORE: 32
ADLS_ACUITY_SCORE: 31
ADLS_ACUITY_SCORE: 32
ADLS_ACUITY_SCORE: 32
ADLS_ACUITY_SCORE: 30
ADLS_ACUITY_SCORE: 30
ADLS_ACUITY_SCORE: 26
ADLS_ACUITY_SCORE: 32
ADLS_ACUITY_SCORE: 30
ADLS_ACUITY_SCORE: 26
ADLS_ACUITY_SCORE: 21
ADLS_ACUITY_SCORE: 32
ADLS_ACUITY_SCORE: 32
ADLS_ACUITY_SCORE: 26
ADLS_ACUITY_SCORE: 36
ADLS_ACUITY_SCORE: 26
ADLS_ACUITY_SCORE: 30
ADLS_ACUITY_SCORE: 18
ADLS_ACUITY_SCORE: 32
ADLS_ACUITY_SCORE: 36
ADLS_ACUITY_SCORE: 32
ADLS_ACUITY_SCORE: 33
ADLS_ACUITY_SCORE: 36
ADLS_ACUITY_SCORE: 26
ADLS_ACUITY_SCORE: 31
ADLS_ACUITY_SCORE: 23
ADLS_ACUITY_SCORE: 26
ADLS_ACUITY_SCORE: 32
ADLS_ACUITY_SCORE: 34
ADLS_ACUITY_SCORE: 25
ADLS_ACUITY_SCORE: 30
ADLS_ACUITY_SCORE: 24
ADLS_ACUITY_SCORE: 24
ADLS_ACUITY_SCORE: 25
ADLS_ACUITY_SCORE: 26
ADLS_ACUITY_SCORE: 27
ADLS_ACUITY_SCORE: 12
ADLS_ACUITY_SCORE: 30
ADLS_ACUITY_SCORE: 18
ADLS_ACUITY_SCORE: 32
ADLS_ACUITY_SCORE: 34
ADLS_ACUITY_SCORE: 28
ADLS_ACUITY_SCORE: 34
ADLS_ACUITY_SCORE: 36
ADLS_ACUITY_SCORE: 26
ADLS_ACUITY_SCORE: 38
ADLS_ACUITY_SCORE: 12
ADLS_ACUITY_SCORE: 34
ADLS_ACUITY_SCORE: 34
ADLS_ACUITY_SCORE: 30
ADLS_ACUITY_SCORE: 28
ADLS_ACUITY_SCORE: 32
ADLS_ACUITY_SCORE: 34
ADLS_ACUITY_SCORE: 36
ADLS_ACUITY_SCORE: 26
ADLS_ACUITY_SCORE: 34
ADLS_ACUITY_SCORE: 24
ADLS_ACUITY_SCORE: 31
ADLS_ACUITY_SCORE: 32
ADLS_ACUITY_SCORE: 32
ADLS_ACUITY_SCORE: 28
ADLS_ACUITY_SCORE: 26
ADLS_ACUITY_SCORE: 19
ADLS_ACUITY_SCORE: 34
ADLS_ACUITY_SCORE: 34
ADLS_ACUITY_SCORE: 26
ADLS_ACUITY_SCORE: 32
ADLS_ACUITY_SCORE: 32
ADLS_ACUITY_SCORE: 24
ADLS_ACUITY_SCORE: 36
ADLS_ACUITY_SCORE: 24
ADLS_ACUITY_SCORE: 26
ADLS_ACUITY_SCORE: 27
ADLS_ACUITY_SCORE: 32
ADLS_ACUITY_SCORE: 30
ADLS_ACUITY_SCORE: 27
ADLS_ACUITY_SCORE: 33
ADLS_ACUITY_SCORE: 32
ADLS_ACUITY_SCORE: 26
ADLS_ACUITY_SCORE: 34
ADLS_ACUITY_SCORE: 27
ADLS_ACUITY_SCORE: 34
ADLS_ACUITY_SCORE: 34
ADLS_ACUITY_SCORE: 26
ADLS_ACUITY_SCORE: 27
ADLS_ACUITY_SCORE: 26
ADLS_ACUITY_SCORE: 26
ADLS_ACUITY_SCORE: 32
ADLS_ACUITY_SCORE: 26
ADLS_ACUITY_SCORE: 30
ADLS_ACUITY_SCORE: 32
ADLS_ACUITY_SCORE: 34
ADLS_ACUITY_SCORE: 32
ADLS_ACUITY_SCORE: 32
ADLS_ACUITY_SCORE: 29
ADLS_ACUITY_SCORE: 27
ADLS_ACUITY_SCORE: 12
ADLS_ACUITY_SCORE: 30
ADLS_ACUITY_SCORE: 32
ADLS_ACUITY_SCORE: 18
ADLS_ACUITY_SCORE: 30
ADLS_ACUITY_SCORE: 36
ADLS_ACUITY_SCORE: 32
ADLS_ACUITY_SCORE: 25
ADLS_ACUITY_SCORE: 26
ADLS_ACUITY_SCORE: 34
ADLS_ACUITY_SCORE: 26
ADLS_ACUITY_SCORE: 30
ADLS_ACUITY_SCORE: 27
ADLS_ACUITY_SCORE: 34
ADLS_ACUITY_SCORE: 30
ADLS_ACUITY_SCORE: 32
ADLS_ACUITY_SCORE: 30
ADLS_ACUITY_SCORE: 32
ADLS_ACUITY_SCORE: 24
ADLS_ACUITY_SCORE: 32
ADLS_ACUITY_SCORE: 28
ADLS_ACUITY_SCORE: 36
ADLS_ACUITY_SCORE: 27
ADLS_ACUITY_SCORE: 34
ADLS_ACUITY_SCORE: 30
ADLS_ACUITY_SCORE: 26
ADLS_ACUITY_SCORE: 26
ADLS_ACUITY_SCORE: 34
ADLS_ACUITY_SCORE: 28
ADLS_ACUITY_SCORE: 32
ADLS_ACUITY_SCORE: 31
ADLS_ACUITY_SCORE: 25
ADLS_ACUITY_SCORE: 30
ADLS_ACUITY_SCORE: 23
ADLS_ACUITY_SCORE: 26
ADLS_ACUITY_SCORE: 26
ADLS_ACUITY_SCORE: 34
ADLS_ACUITY_SCORE: 24
ADLS_ACUITY_SCORE: 27
ADLS_ACUITY_SCORE: 31
ADLS_ACUITY_SCORE: 28
ADLS_ACUITY_SCORE: 28
ADLS_ACUITY_SCORE: 26
ADLS_ACUITY_SCORE: 36
ADLS_ACUITY_SCORE: 32
ADLS_ACUITY_SCORE: 18
ADLS_ACUITY_SCORE: 26
ADLS_ACUITY_SCORE: 12
ADLS_ACUITY_SCORE: 30
ADLS_ACUITY_SCORE: 34
ADLS_ACUITY_SCORE: 32
ADLS_ACUITY_SCORE: 26
ADLS_ACUITY_SCORE: 36
ADLS_ACUITY_SCORE: 18
ADLS_ACUITY_SCORE: 26
ADLS_ACUITY_SCORE: 28
ADLS_ACUITY_SCORE: 34
ADLS_ACUITY_SCORE: 31
ADLS_ACUITY_SCORE: 35
ADLS_ACUITY_SCORE: 35
ADLS_ACUITY_SCORE: 26
ADLS_ACUITY_SCORE: 25
ADLS_ACUITY_SCORE: 31
ADLS_ACUITY_SCORE: 25
ADLS_ACUITY_SCORE: 26
ADLS_ACUITY_SCORE: 31
ADLS_ACUITY_SCORE: 32
ADLS_ACUITY_SCORE: 24
ADLS_ACUITY_SCORE: 25
ADLS_ACUITY_SCORE: 25
ADLS_ACUITY_SCORE: 18
ADLS_ACUITY_SCORE: 18
ADLS_ACUITY_SCORE: 32
ADLS_ACUITY_SCORE: 38
ADLS_ACUITY_SCORE: 27
ADLS_ACUITY_SCORE: 31
ADLS_ACUITY_SCORE: 28
ADLS_ACUITY_SCORE: 34
ADLS_ACUITY_SCORE: 21
ADLS_ACUITY_SCORE: 30
ADLS_ACUITY_SCORE: 32
ADLS_ACUITY_SCORE: 26
ADLS_ACUITY_SCORE: 36
ADLS_ACUITY_SCORE: 36
ADLS_ACUITY_SCORE: 26
ADLS_ACUITY_SCORE: 28
ADLS_ACUITY_SCORE: 33
ADLS_ACUITY_SCORE: 24
ADLS_ACUITY_SCORE: 28
ADLS_ACUITY_SCORE: 25
ADLS_ACUITY_SCORE: 33
ADLS_ACUITY_SCORE: 32
ADLS_ACUITY_SCORE: 32
ADLS_ACUITY_SCORE: 36
ADLS_ACUITY_SCORE: 32
ADLS_ACUITY_SCORE: 26
ADLS_ACUITY_SCORE: 31
ADLS_ACUITY_SCORE: 26
ADLS_ACUITY_SCORE: 21
ADLS_ACUITY_SCORE: 32
ADLS_ACUITY_SCORE: 33
ADLS_ACUITY_SCORE: 33
ADLS_ACUITY_SCORE: 31
ADLS_ACUITY_SCORE: 31
ADLS_ACUITY_SCORE: 32
ADLS_ACUITY_SCORE: 32
ADLS_ACUITY_SCORE: 26
ADLS_ACUITY_SCORE: 34
ADLS_ACUITY_SCORE: 30
ADLS_ACUITY_SCORE: 28
ADLS_ACUITY_SCORE: 32
ADLS_ACUITY_SCORE: 26
ADLS_ACUITY_SCORE: 28
ADLS_ACUITY_SCORE: 36
ADLS_ACUITY_SCORE: 32
ADLS_ACUITY_SCORE: 36
ADLS_ACUITY_SCORE: 30
ADLS_ACUITY_SCORE: 31
ADLS_ACUITY_SCORE: 28
ADLS_ACUITY_SCORE: 32
ADLS_ACUITY_SCORE: 30
ADLS_ACUITY_SCORE: 26
ADLS_ACUITY_SCORE: 34
ADLS_ACUITY_SCORE: 30
ADLS_ACUITY_SCORE: 26
ADLS_ACUITY_SCORE: 21
ADLS_ACUITY_SCORE: 24
ADLS_ACUITY_SCORE: 14
ADLS_ACUITY_SCORE: 26
ADLS_ACUITY_SCORE: 33
ADLS_ACUITY_SCORE: 36
ADLS_ACUITY_SCORE: 34
ADLS_ACUITY_SCORE: 32
ADLS_ACUITY_SCORE: 31
ADLS_ACUITY_SCORE: 28
ADLS_ACUITY_SCORE: 34
ADLS_ACUITY_SCORE: 12
ADLS_ACUITY_SCORE: 32
ADLS_ACUITY_SCORE: 26
ADLS_ACUITY_SCORE: 28
ADLS_ACUITY_SCORE: 31
ADLS_ACUITY_SCORE: 34
ADLS_ACUITY_SCORE: 30
ADLS_ACUITY_SCORE: 26
ADLS_ACUITY_SCORE: 32
ADLS_ACUITY_SCORE: 34
ADLS_ACUITY_SCORE: 26
ADLS_ACUITY_SCORE: 34
ADLS_ACUITY_SCORE: 18
ADLS_ACUITY_SCORE: 18
ADLS_ACUITY_SCORE: 34
ADLS_ACUITY_SCORE: 30
ADLS_ACUITY_SCORE: 26
ADLS_ACUITY_SCORE: 29
ADLS_ACUITY_SCORE: 21
ADLS_ACUITY_SCORE: 24
ADLS_ACUITY_SCORE: 27
ADLS_ACUITY_SCORE: 21
ADLS_ACUITY_SCORE: 23
ADLS_ACUITY_SCORE: 32
ADLS_ACUITY_SCORE: 18
ADLS_ACUITY_SCORE: 26
ADLS_ACUITY_SCORE: 32
ADLS_ACUITY_SCORE: 26
ADLS_ACUITY_SCORE: 34
ADLS_ACUITY_SCORE: 36
ADLS_ACUITY_SCORE: 24
ADLS_ACUITY_SCORE: 32
ADLS_ACUITY_SCORE: 28
ADLS_ACUITY_SCORE: 32
ADLS_ACUITY_SCORE: 35
ADLS_ACUITY_SCORE: 26
ADLS_ACUITY_SCORE: 26
ADLS_ACUITY_SCORE: 28
ADLS_ACUITY_SCORE: 34
ADLS_ACUITY_SCORE: 32
ADLS_ACUITY_SCORE: 21
ADLS_ACUITY_SCORE: 24
ADLS_ACUITY_SCORE: 32
ADLS_ACUITY_SCORE: 23
ADLS_ACUITY_SCORE: 26
ADLS_ACUITY_SCORE: 31
ADLS_ACUITY_SCORE: 21
ADLS_ACUITY_SCORE: 32
ADLS_ACUITY_SCORE: 27
ADLS_ACUITY_SCORE: 27
ADLS_ACUITY_SCORE: 18
ADLS_ACUITY_SCORE: 25
ADLS_ACUITY_SCORE: 25
ADLS_ACUITY_SCORE: 28
ADLS_ACUITY_SCORE: 35
ADLS_ACUITY_SCORE: 26
ADLS_ACUITY_SCORE: 28
ADLS_ACUITY_SCORE: 28
ADLS_ACUITY_SCORE: 18
ADLS_ACUITY_SCORE: 28
ADLS_ACUITY_SCORE: 32
ADLS_ACUITY_SCORE: 32
ADLS_ACUITY_SCORE: 25
ADLS_ACUITY_SCORE: 26
ADLS_ACUITY_SCORE: 30
ADLS_ACUITY_SCORE: 24
ADLS_ACUITY_SCORE: 36
ADLS_ACUITY_SCORE: 24
ADLS_ACUITY_SCORE: 32
ADLS_ACUITY_SCORE: 26
ADLS_ACUITY_SCORE: 25
ADLS_ACUITY_SCORE: 34
ADLS_ACUITY_SCORE: 26
ADLS_ACUITY_SCORE: 27
ADLS_ACUITY_SCORE: 34
ADLS_ACUITY_SCORE: 28
ADLS_ACUITY_SCORE: 31
ADLS_ACUITY_SCORE: 31
ADLS_ACUITY_SCORE: 21
ADLS_ACUITY_SCORE: 30
ADLS_ACUITY_SCORE: 32
ADLS_ACUITY_SCORE: 24
ADLS_ACUITY_SCORE: 26
ADLS_ACUITY_SCORE: 26
ADLS_ACUITY_SCORE: 36
ADLS_ACUITY_SCORE: 23
ADLS_ACUITY_SCORE: 32
ADLS_ACUITY_SCORE: 26
ADLS_ACUITY_SCORE: 33
ADLS_ACUITY_SCORE: 26
ADLS_ACUITY_SCORE: 32
ADLS_ACUITY_SCORE: 26
ADLS_ACUITY_SCORE: 34
ADLS_ACUITY_SCORE: 32
ADLS_ACUITY_SCORE: 30
ADLS_ACUITY_SCORE: 27
ADLS_ACUITY_SCORE: 38
ADLS_ACUITY_SCORE: 28
ADLS_ACUITY_SCORE: 32
ADLS_ACUITY_SCORE: 24
ADLS_ACUITY_SCORE: 32
ADLS_ACUITY_SCORE: 18
ADLS_ACUITY_SCORE: 36
ADLS_ACUITY_SCORE: 32
ADLS_ACUITY_SCORE: 33
ADLS_ACUITY_SCORE: 34
ADLS_ACUITY_SCORE: 34
ADLS_ACUITY_SCORE: 26
ADLS_ACUITY_SCORE: 26
ADLS_ACUITY_SCORE: 27
ADLS_ACUITY_SCORE: 34
ADLS_ACUITY_SCORE: 32
ADLS_ACUITY_SCORE: 38
ADLS_ACUITY_SCORE: 27
ADLS_ACUITY_SCORE: 38
ADLS_ACUITY_SCORE: 28
ADLS_ACUITY_SCORE: 30
ADLS_ACUITY_SCORE: 36
ADLS_ACUITY_SCORE: 32
ADLS_ACUITY_SCORE: 32
ADLS_ACUITY_SCORE: 26
ADLS_ACUITY_SCORE: 33
ADLS_ACUITY_SCORE: 24
ADLS_ACUITY_SCORE: 30
ADLS_ACUITY_SCORE: 34
ADLS_ACUITY_SCORE: 29
ADLS_ACUITY_SCORE: 31
ADLS_ACUITY_SCORE: 34
ADLS_ACUITY_SCORE: 27
ADLS_ACUITY_SCORE: 28
ADLS_ACUITY_SCORE: 25
ADLS_ACUITY_SCORE: 18
ADLS_ACUITY_SCORE: 26
ADLS_ACUITY_SCORE: 35
ADLS_ACUITY_SCORE: 25
ADLS_ACUITY_SCORE: 34
ADLS_ACUITY_SCORE: 26
ADLS_ACUITY_SCORE: 23
ADLS_ACUITY_SCORE: 26
ADLS_ACUITY_SCORE: 34
ADLS_ACUITY_SCORE: 26
ADLS_ACUITY_SCORE: 26
ADLS_ACUITY_SCORE: 27
ADLS_ACUITY_SCORE: 19
ADLS_ACUITY_SCORE: 32
ADLS_ACUITY_SCORE: 34
ADLS_ACUITY_SCORE: 28
ADLS_ACUITY_SCORE: 34
ADLS_ACUITY_SCORE: 34
ADLS_ACUITY_SCORE: 32
ADLS_ACUITY_SCORE: 27
ADLS_ACUITY_SCORE: 26
ADLS_ACUITY_SCORE: 26
ADLS_ACUITY_SCORE: 32
ADLS_ACUITY_SCORE: 26
ADLS_ACUITY_SCORE: 25
ADLS_ACUITY_SCORE: 28
ADLS_ACUITY_SCORE: 33
ADLS_ACUITY_SCORE: 35
ADLS_ACUITY_SCORE: 36
ADLS_ACUITY_SCORE: 34
ADLS_ACUITY_SCORE: 12
ADLS_ACUITY_SCORE: 32
ADLS_ACUITY_SCORE: 32
ADLS_ACUITY_SCORE: 30
ADLS_ACUITY_SCORE: 26
ADLS_ACUITY_SCORE: 28
ADLS_ACUITY_SCORE: 28
ADLS_ACUITY_SCORE: 32
ADLS_ACUITY_SCORE: 32
ADLS_ACUITY_SCORE: 25
ADLS_ACUITY_SCORE: 26
ADLS_ACUITY_SCORE: 34
ADLS_ACUITY_SCORE: 32
ADLS_ACUITY_SCORE: 34
ADLS_ACUITY_SCORE: 32
ADLS_ACUITY_SCORE: 28
ADLS_ACUITY_SCORE: 26
ADLS_ACUITY_SCORE: 32
ADLS_ACUITY_SCORE: 38
ADLS_ACUITY_SCORE: 32
ADLS_ACUITY_SCORE: 30
ADLS_ACUITY_SCORE: 31
ADLS_ACUITY_SCORE: 24
ADLS_ACUITY_SCORE: 33
ADLS_ACUITY_SCORE: 34
ADLS_ACUITY_SCORE: 28
ADLS_ACUITY_SCORE: 36
ADLS_ACUITY_SCORE: 34
ADLS_ACUITY_SCORE: 32
ADLS_ACUITY_SCORE: 26
ADLS_ACUITY_SCORE: 30
ADLS_ACUITY_SCORE: 26
ADLS_ACUITY_SCORE: 26
ADLS_ACUITY_SCORE: 32
ADLS_ACUITY_SCORE: 14
ADLS_ACUITY_SCORE: 19
ADLS_ACUITY_SCORE: 36
ADLS_ACUITY_SCORE: 30
ADLS_ACUITY_SCORE: 24
ADLS_ACUITY_SCORE: 24
ADLS_ACUITY_SCORE: 31
ADLS_ACUITY_SCORE: 36
ADLS_ACUITY_SCORE: 34
ADLS_ACUITY_SCORE: 30
ADLS_ACUITY_SCORE: 32
ADLS_ACUITY_SCORE: 32
ADLS_ACUITY_SCORE: 18
ADLS_ACUITY_SCORE: 26
ADLS_ACUITY_SCORE: 32
ADLS_ACUITY_SCORE: 30
ADLS_ACUITY_SCORE: 30
ADLS_ACUITY_SCORE: 31
ADLS_ACUITY_SCORE: 31
ADLS_ACUITY_SCORE: 36
ADLS_ACUITY_SCORE: 34
ADLS_ACUITY_SCORE: 30
ADLS_ACUITY_SCORE: 26
ADLS_ACUITY_SCORE: 18
ADLS_ACUITY_SCORE: 26
ADLS_ACUITY_SCORE: 32
ADLS_ACUITY_SCORE: 30
ADLS_ACUITY_SCORE: 28
ADLS_ACUITY_SCORE: 28
ADLS_ACUITY_SCORE: 21
ADLS_ACUITY_SCORE: 32
ADLS_ACUITY_SCORE: 26
ADLS_ACUITY_SCORE: 12
ADLS_ACUITY_SCORE: 21
ADLS_ACUITY_SCORE: 33
ADLS_ACUITY_SCORE: 32
ADLS_ACUITY_SCORE: 25
ADLS_ACUITY_SCORE: 26
ADLS_ACUITY_SCORE: 32
ADLS_ACUITY_SCORE: 26
ADLS_ACUITY_SCORE: 32
ADLS_ACUITY_SCORE: 30
ADLS_ACUITY_SCORE: 26
ADLS_ACUITY_SCORE: 30
ADLS_ACUITY_SCORE: 26
ADLS_ACUITY_SCORE: 24
ADLS_ACUITY_SCORE: 28
ADLS_ACUITY_SCORE: 34
ADLS_ACUITY_SCORE: 24
ADLS_ACUITY_SCORE: 30
ADLS_ACUITY_SCORE: 30
ADLS_ACUITY_SCORE: 36
ADLS_ACUITY_SCORE: 22
ADLS_ACUITY_SCORE: 24
ADLS_ACUITY_SCORE: 21
ADLS_ACUITY_SCORE: 19
ADLS_ACUITY_SCORE: 25
ADLS_ACUITY_SCORE: 30
ADLS_ACUITY_SCORE: 28
ADLS_ACUITY_SCORE: 34
ADLS_ACUITY_SCORE: 12
ADLS_ACUITY_SCORE: 26
ADLS_ACUITY_SCORE: 30
ADLS_ACUITY_SCORE: 32
ADLS_ACUITY_SCORE: 26
ADLS_ACUITY_SCORE: 32
ADLS_ACUITY_SCORE: 28
ADLS_ACUITY_SCORE: 26
ADLS_ACUITY_SCORE: 28
ADLS_ACUITY_SCORE: 28
ADLS_ACUITY_SCORE: 21
ADLS_ACUITY_SCORE: 32
ADLS_ACUITY_SCORE: 36
ADLS_ACUITY_SCORE: 32
ADLS_ACUITY_SCORE: 25
ADLS_ACUITY_SCORE: 26
ADLS_ACUITY_SCORE: 30
ADLS_ACUITY_SCORE: 30
ADLS_ACUITY_SCORE: 34
ADLS_ACUITY_SCORE: 33
ADLS_ACUITY_SCORE: 28
ADLS_ACUITY_SCORE: 32
ADLS_ACUITY_SCORE: 26
ADLS_ACUITY_SCORE: 12
ADLS_ACUITY_SCORE: 12
ADLS_ACUITY_SCORE: 32
ADLS_ACUITY_SCORE: 30
ADLS_ACUITY_SCORE: 34
ADLS_ACUITY_SCORE: 18
ADLS_ACUITY_SCORE: 32
ADLS_ACUITY_SCORE: 26
ADLS_ACUITY_SCORE: 26
ADLS_ACUITY_SCORE: 32
ADLS_ACUITY_SCORE: 27
ADLS_ACUITY_SCORE: 33
ADLS_ACUITY_SCORE: 25
ADLS_ACUITY_SCORE: 30
ADLS_ACUITY_SCORE: 26
ADLS_ACUITY_SCORE: 27
ADLS_ACUITY_SCORE: 26
ADLS_ACUITY_SCORE: 31
ADLS_ACUITY_SCORE: 31
ADLS_ACUITY_SCORE: 26
ADLS_ACUITY_SCORE: 26
ADLS_ACUITY_SCORE: 31
ADLS_ACUITY_SCORE: 28
ADLS_ACUITY_SCORE: 21
ADLS_ACUITY_SCORE: 23
ADLS_ACUITY_SCORE: 21
ADLS_ACUITY_SCORE: 28
ADLS_ACUITY_SCORE: 32
ADLS_ACUITY_SCORE: 32
ADLS_ACUITY_SCORE: 28
ADLS_ACUITY_SCORE: 34
ADLS_ACUITY_SCORE: 21
ADLS_ACUITY_SCORE: 28
ADLS_ACUITY_SCORE: 21
ADLS_ACUITY_SCORE: 30
ADLS_ACUITY_SCORE: 26
ADLS_ACUITY_SCORE: 24
ADLS_ACUITY_SCORE: 32
ADLS_ACUITY_SCORE: 30
ADLS_ACUITY_SCORE: 31
ADLS_ACUITY_SCORE: 27
ADLS_ACUITY_SCORE: 26
ADLS_ACUITY_SCORE: 31
ADLS_ACUITY_SCORE: 26
ADLS_ACUITY_SCORE: 31
ADLS_ACUITY_SCORE: 30
ADLS_ACUITY_SCORE: 32
ADLS_ACUITY_SCORE: 31
ADLS_ACUITY_SCORE: 32
ADLS_ACUITY_SCORE: 19
ADLS_ACUITY_SCORE: 26
ADLS_ACUITY_SCORE: 27
ADLS_ACUITY_SCORE: 24
ADLS_ACUITY_SCORE: 26
ADLS_ACUITY_SCORE: 32
ADLS_ACUITY_SCORE: 26
ADLS_ACUITY_SCORE: 26
ADLS_ACUITY_SCORE: 28
ADLS_ACUITY_SCORE: 34
ADLS_ACUITY_SCORE: 25
ADLS_ACUITY_SCORE: 26
ADLS_ACUITY_SCORE: 30
ADLS_ACUITY_SCORE: 30
ADLS_ACUITY_SCORE: 32
ADLS_ACUITY_SCORE: 27
ADLS_ACUITY_SCORE: 28
ADLS_ACUITY_SCORE: 30
ADLS_ACUITY_SCORE: 28
ADLS_ACUITY_SCORE: 18
ADLS_ACUITY_SCORE: 26
ADLS_ACUITY_SCORE: 28
ADLS_ACUITY_SCORE: 32
ADLS_ACUITY_SCORE: 26
ADLS_ACUITY_SCORE: 36
ADLS_ACUITY_SCORE: 28
ADLS_ACUITY_SCORE: 27
ADLS_ACUITY_SCORE: 26
ADLS_ACUITY_SCORE: 31
ADLS_ACUITY_SCORE: 26
ADLS_ACUITY_SCORE: 23
ADLS_ACUITY_SCORE: 14
ADLS_ACUITY_SCORE: 34
ADLS_ACUITY_SCORE: 27
ADLS_ACUITY_SCORE: 28
ADLS_ACUITY_SCORE: 27
ADLS_ACUITY_SCORE: 34
ADLS_ACUITY_SCORE: 32
ADLS_ACUITY_SCORE: 32
ADLS_ACUITY_SCORE: 30
ADLS_ACUITY_SCORE: 30
ADLS_ACUITY_SCORE: 26
ADLS_ACUITY_SCORE: 32
ADLS_ACUITY_SCORE: 32
ADLS_ACUITY_SCORE: 30
ADLS_ACUITY_SCORE: 34
ADLS_ACUITY_SCORE: 21
ADLS_ACUITY_SCORE: 28
ADLS_ACUITY_SCORE: 25
ADLS_ACUITY_SCORE: 30
ADLS_ACUITY_SCORE: 30
ADLS_ACUITY_SCORE: 34
ADLS_ACUITY_SCORE: 25
ADLS_ACUITY_SCORE: 28
ADLS_ACUITY_SCORE: 36
ADLS_ACUITY_SCORE: 34
ADLS_ACUITY_SCORE: 36
ADLS_ACUITY_SCORE: 34
ADLS_ACUITY_SCORE: 26
ADLS_ACUITY_SCORE: 21
ADLS_ACUITY_SCORE: 28
ADLS_ACUITY_SCORE: 32
ADLS_ACUITY_SCORE: 24
ADLS_ACUITY_SCORE: 30
ADLS_ACUITY_SCORE: 32
ADLS_ACUITY_SCORE: 21
ADLS_ACUITY_SCORE: 27
ADLS_ACUITY_SCORE: 27
ADLS_ACUITY_SCORE: 25
ADLS_ACUITY_SCORE: 32
ADLS_ACUITY_SCORE: 32
ADLS_ACUITY_SCORE: 34
ADLS_ACUITY_SCORE: 21
ADLS_ACUITY_SCORE: 32
ADLS_ACUITY_SCORE: 34
ADLS_ACUITY_SCORE: 30
ADLS_ACUITY_SCORE: 31
ADLS_ACUITY_SCORE: 26
ADLS_ACUITY_SCORE: 32
ADLS_ACUITY_SCORE: 32
ADLS_ACUITY_SCORE: 25
ADLS_ACUITY_SCORE: 24
ADLS_ACUITY_SCORE: 26
ADLS_ACUITY_SCORE: 28
ADLS_ACUITY_SCORE: 26
ADLS_ACUITY_SCORE: 34
ADLS_ACUITY_SCORE: 32
ADLS_ACUITY_SCORE: 28
ADLS_ACUITY_SCORE: 26
ADLS_ACUITY_SCORE: 26
ADLS_ACUITY_SCORE: 30
ADLS_ACUITY_SCORE: 24
ADLS_ACUITY_SCORE: 26
ADLS_ACUITY_SCORE: 32
ADLS_ACUITY_SCORE: 36
ADLS_ACUITY_SCORE: 34
ADLS_ACUITY_SCORE: 28
ADLS_ACUITY_SCORE: 12
ADLS_ACUITY_SCORE: 21
ADLS_ACUITY_SCORE: 32
ADLS_ACUITY_SCORE: 12
ADLS_ACUITY_SCORE: 31
ADLS_ACUITY_SCORE: 33
ADLS_ACUITY_SCORE: 25
ADLS_ACUITY_SCORE: 12
ADLS_ACUITY_SCORE: 21
ADLS_ACUITY_SCORE: 30
ADLS_ACUITY_SCORE: 26
ADLS_ACUITY_SCORE: 36
ADLS_ACUITY_SCORE: 32
ADLS_ACUITY_SCORE: 32
ADLS_ACUITY_SCORE: 31
ADLS_ACUITY_SCORE: 33
ADLS_ACUITY_SCORE: 24
ADLS_ACUITY_SCORE: 32
ADLS_ACUITY_SCORE: 27
ADLS_ACUITY_SCORE: 31
ADLS_ACUITY_SCORE: 34
ADLS_ACUITY_SCORE: 32
ADLS_ACUITY_SCORE: 26
ADLS_ACUITY_SCORE: 31
ADLS_ACUITY_SCORE: 34
ADLS_ACUITY_SCORE: 21
ADLS_ACUITY_SCORE: 32
ADLS_ACUITY_SCORE: 33
ADLS_ACUITY_SCORE: 28
ADLS_ACUITY_SCORE: 30
ADLS_ACUITY_SCORE: 27
ADLS_ACUITY_SCORE: 31
ADLS_ACUITY_SCORE: 34
ADLS_ACUITY_SCORE: 32
ADLS_ACUITY_SCORE: 32
ADLS_ACUITY_SCORE: 27
ADLS_ACUITY_SCORE: 34
ADLS_ACUITY_SCORE: 38
ADLS_ACUITY_SCORE: 30
ADLS_ACUITY_SCORE: 34
ADLS_ACUITY_SCORE: 25
ADLS_ACUITY_SCORE: 24
ADLS_ACUITY_SCORE: 26
ADLS_ACUITY_SCORE: 26
ADLS_ACUITY_SCORE: 31
ADLS_ACUITY_SCORE: 26
ADLS_ACUITY_SCORE: 32
ADLS_ACUITY_SCORE: 28
ADLS_ACUITY_SCORE: 36
ADLS_ACUITY_SCORE: 26
ADLS_ACUITY_SCORE: 36
ADLS_ACUITY_SCORE: 23
ADLS_ACUITY_SCORE: 24
ADLS_ACUITY_SCORE: 21
ADLS_ACUITY_SCORE: 34
ADLS_ACUITY_SCORE: 31
ADLS_ACUITY_SCORE: 34
ADLS_ACUITY_SCORE: 23
ADLS_ACUITY_SCORE: 14
ADLS_ACUITY_SCORE: 34
ADLS_ACUITY_SCORE: 32
ADLS_ACUITY_SCORE: 25
ADLS_ACUITY_SCORE: 22
ADLS_ACUITY_SCORE: 30
ADLS_ACUITY_SCORE: 24
ADLS_ACUITY_SCORE: 32
ADLS_ACUITY_SCORE: 34
ADLS_ACUITY_SCORE: 31
ADLS_ACUITY_SCORE: 32
ADLS_ACUITY_SCORE: 34
ADLS_ACUITY_SCORE: 31
ADLS_ACUITY_SCORE: 25
ADLS_ACUITY_SCORE: 36
ADLS_ACUITY_SCORE: 24
ADLS_ACUITY_SCORE: 18
ADLS_ACUITY_SCORE: 25
ADLS_ACUITY_SCORE: 32
ADLS_ACUITY_SCORE: 30
ADLS_ACUITY_SCORE: 18
ADLS_ACUITY_SCORE: 32
ADLS_ACUITY_SCORE: 24
ADLS_ACUITY_SCORE: 32
ADLS_ACUITY_SCORE: 34
ADLS_ACUITY_SCORE: 28
ADLS_ACUITY_SCORE: 34
ADLS_ACUITY_SCORE: 33
ADLS_ACUITY_SCORE: 32
ADLS_ACUITY_SCORE: 32
ADLS_ACUITY_SCORE: 27
ADLS_ACUITY_SCORE: 32
ADLS_ACUITY_SCORE: 23
ADLS_ACUITY_SCORE: 30
ADLS_ACUITY_SCORE: 21
ADLS_ACUITY_SCORE: 27
ADLS_ACUITY_SCORE: 26
ADLS_ACUITY_SCORE: 32
ADLS_ACUITY_SCORE: 26
ADLS_ACUITY_SCORE: 25
ADLS_ACUITY_SCORE: 28
ADLS_ACUITY_SCORE: 34
ADLS_ACUITY_SCORE: 28
ADLS_ACUITY_SCORE: 32
ADLS_ACUITY_SCORE: 23
ADLS_ACUITY_SCORE: 29
ADLS_ACUITY_SCORE: 27
ADLS_ACUITY_SCORE: 28
ADLS_ACUITY_SCORE: 32
ADLS_ACUITY_SCORE: 24
ADLS_ACUITY_SCORE: 30
ADLS_ACUITY_SCORE: 26
ADLS_ACUITY_SCORE: 36
ADLS_ACUITY_SCORE: 27
ADLS_ACUITY_SCORE: 30
ADLS_ACUITY_SCORE: 32
ADLS_ACUITY_SCORE: 34
ADLS_ACUITY_SCORE: 31
ADLS_ACUITY_SCORE: 26
ADLS_ACUITY_SCORE: 32
ADLS_ACUITY_SCORE: 34
ADLS_ACUITY_SCORE: 32
ADLS_ACUITY_SCORE: 32

## 2022-01-01 ASSESSMENT — MIFFLIN-ST. JEOR
SCORE: 1636.75
SCORE: 1740.75
SCORE: 1624.04
SCORE: 1561.9
SCORE: 1761.75
SCORE: 1757.75
SCORE: 1555.75
SCORE: 1730.75
SCORE: 1604.54
SCORE: 1752.75

## 2022-01-15 PROBLEM — R11.2 NON-INTRACTABLE VOMITING WITH NAUSEA, UNSPECIFIED VOMITING TYPE: Status: ACTIVE | Noted: 2022-01-01

## 2022-01-15 PROBLEM — F10.20 ALCOHOL USE DISORDER, SEVERE, DEPENDENCE (H): Status: ACTIVE | Noted: 2021-01-01

## 2022-01-15 PROBLEM — J18.9 PNEUMONIA OF BOTH LUNGS DUE TO INFECTIOUS ORGANISM, UNSPECIFIED PART OF LUNG: Status: ACTIVE | Noted: 2022-01-01

## 2022-01-15 PROBLEM — R00.0 TACHYCARDIA: Status: ACTIVE | Noted: 2022-01-01

## 2022-01-15 PROBLEM — E83.42 HYPOMAGNESEMIA: Status: ACTIVE | Noted: 2020-12-30

## 2022-01-15 PROBLEM — R73.9 HYPERGLYCEMIA: Status: RESOLVED | Noted: 2021-01-01 | Resolved: 2022-01-01

## 2022-01-15 PROBLEM — E87.6 HYPOKALEMIA: Status: RESOLVED | Noted: 2020-12-30 | Resolved: 2022-01-01

## 2022-01-15 NOTE — ED TRIAGE NOTES
Per EMS patient feeling unwell since Wednesday, states that he has had stomach upset, cough and now SOB. Today he became worse and was unable to get up and down the stairs without significant SOB and light headedness.

## 2022-01-15 NOTE — ED PROVIDER NOTES
EMERGENCY DEPARTMENT ENCOUNTER      NAME: Peewee Hess  AGE: 56 year old male  YOB: 1965  MRN: [id number]  EVALUATION DATE & TIME: 1/15/2022  4:26 PM    PCP: Sarah Canseco    ED PROVIDER: Phuong Marcum M.D.      Chief Complaint   Patient presents with     Shortness of Breath     Generalized Weakness     FINAL IMPRESSION:  1. Pneumonia of both lungs due to infectious organism, unspecified part of lung    2. Non-intractable vomiting with nausea, unspecified vomiting type    3. Tachycardia    4. Agitation      ED COURSE & MEDICAL DECISION MAKING:    Pertinent Labs & Imaging studies reviewed. (See chart for details)  ED Course as of 01/15/22 2236   Sat Klaus 15, 2022   1657 Patient is a 56-year-old male who comes in today for evaluation of shortness of breath.  He reports that his symptoms have been going on for a few days.  It started out with nausea and vomiting and a little bit of a cough that brings on the vomiting.  He complained of some lightheadedness going up and down the stairs.  He has had poor oral intake.  He reports there is no chance that he has COVID because he had been around anybody.  He was tachycardic on arrival and looks dehydrated.  We will get some fluids going and check some basic labs and a COVID test as well as a chest x-ray.  He is in agreement with the plan.   1743 Patient's x-ray showed some possible pneumonia.  His white count is elevated.  I will get CTA imaging on him to further evaluate.   1905 Patient's CT looks like COVID versus aspiration pneumonia.  His COVID came back negative so I suspect he could be some aspiration pneumonia.  His heart rate is still in the 130s so I think he will likely need admission to the hospital.  I will order some more fluid on him and we will work on getting him admitted.  I ordered Zosyn for him.   1941 Discussed the plan for admission with Dr. Herrera the hospitalist service.  She was in agreement.  Patient is tachycardic in the 140s.   I did notice that he supposed take metoprolol today and did not take it because he was vomiting so I did order a dose of oral metoprolol.  He is adamant that he has not been drinking.  He is quite fidgety though soaking them little Ativan which will fully relax him and maybe lowers heart rate a little bit.  He is needing 0 to 1 L of oxygen.  The lowest we saw him get off of oxygen was 91% so not sure if he even needs it.  We will give him a second liter of fluid to bring his heart rate down and admit him to a [facility] telemetry inpatient bed.   2053 Despite the patient adamantly reporting no alcohol use in the last few weeks his alcohol level was 10.  It makes me more suspicious that his elevated heart rate could be related to some alcohol withdrawal.  We will continue to treat him with Ativan as needed.     4:46 PM I met with the patient to gather history and to perform my initial exam. I discussed the plan for care while in the Emergency Department. PPE (gloves, surgical cap, N95 mask) was worn during patient encounters.   7:29 PM I discussed the case with hospitalist, Dr. Herrera, who accepts the patient for admission.    At the conclusion of the encounter I discussed  the results of all of the tests and the disposition with patient.   All questions were answered.  The patient acknowledged understanding and was involved in the decision making regarding the overall care plan.      MEDICATIONS GIVEN IN THE EMERGENCY:  Medications   DULoxetine (CYMBALTA) DR capsule 60 mg (has no administration in time range)   metoprolol succinate ER (TOPROL-XL) 24 hr tablet 25 mg (has no administration in time range)   lidocaine 1 % 0.1-1 mL (has no administration in time range)   lidocaine (LMX4) cream (has no administration in time range)   sodium chloride (PF) 0.9% PF flush 3 mL (has no administration in time range)   sodium chloride (PF) 0.9% PF flush 3 mL (has no administration in time range)   acetaminophen (TYLENOL) tablet 650 mg  (has no administration in time range)     Or   acetaminophen (TYLENOL) Suppository 650 mg (has no administration in time range)   magnesium hydroxide (MILK OF MAGNESIA) suspension 30 mL (has no administration in time range)   polyethylene glycol (MIRALAX) Packet 17 g (has no administration in time range)   bisacodyl (DULCOLAX) Suppository 10 mg (has no administration in time range)   ondansetron (ZOFRAN-ODT) ODT tab 4 mg (has no administration in time range)     Or   ondansetron (ZOFRAN) injection 4 mg (has no administration in time range)   calcium carbonate (TUMS) chewable tablet 1,000 mg (has no administration in time range)   famotidine (PEPCID) tablet 20 mg (has no administration in time range)   cloNIDine (CATAPRES) tablet 0.1 mg (0.1 mg Oral Given 1/15/22 2132)   OLANZapine zydis (zyPREXA) ODT tab 5-10 mg (has no administration in time range)     Or   haloperidol lactate (HALDOL) injection 2.5-5 mg (has no administration in time range)   flumazenil (ROMAZICON) injection 0.2 mg (has no administration in time range)   insulin aspart (NovoLOG) injection (RAPID ACTING) (has no administration in time range)   glucose gel 15-30 g (has no administration in time range)     Or   dextrose 50 % injection 25-50 mL (has no administration in time range)     Or   glucagon injection 1 mg (has no administration in time range)   sodium chloride 0.9% infusion (has no administration in time range)   gabapentin (NEURONTIN) capsule 900 mg (has no administration in time range)   gabapentin (NEURONTIN) capsule 600 mg (has no administration in time range)   gabapentin (NEURONTIN) capsule 300 mg (has no administration in time range)   gabapentin (NEURONTIN) capsule 100 mg (has no administration in time range)   LORazepam (ATIVAN) tablet 1-2 mg (has no administration in time range)     Or   LORazepam (ATIVAN) injection 1-2 mg (has no administration in time range)   melatonin tablet 5 mg (has no administration in time range)   sodium  chloride 0.9 % 1,000 mL with Infuvite Adult 10 mL, thiamine 100 mg, folic acid 1 mg infusion (has no administration in time range)   thiamine (B-1) tablet 100 mg (has no administration in time range)   folic acid (FOLVITE) tablet 1 mg (has no administration in time range)   multivitamin w/minerals (THERA-VIT-M) tablet 1 tablet (has no administration in time range)   Lidocaine (LIDOCARE) 4 % Patch 1 patch (1 patch Transdermal Patch/Med Applied 1/15/22 2134)   lidocaine patch in PLACE ( Transdermal Patch in Place 1/15/22 2138)   methocarbamol (ROBAXIN) tablet 500 mg (has no administration in time range)   magnesium sulfate 2 g in water intermittent infusion (2 g Intravenous New Bag 1/15/22 2138)   piperacillin-tazobactam (ZOSYN) 3.375 g vial to attach to  mL bag (has no administration in time range)   pantoprazole (PROTONIX) IV push injection 40 mg (has no administration in time range)   sucralfate (CARAFATE) suspension 1 g (has no administration in time range)   LORazepam (ATIVAN) 2 MG/ML (HIGH CONC) oral solution 1 mg (has no administration in time range)   0.9% sodium chloride BOLUS (has no administration in time range)   0.9% sodium chloride BOLUS (0 mLs Intravenous Stopped 1/15/22 1940)   ondansetron (ZOFRAN) injection 4 mg (4 mg Intravenous Given 1/15/22 1800)   lidocaine (viscous) (XYLOCAINE) 2 % 15 mL, alum & mag hydroxide-simethicone (MAALOX) 15 mL GI Cocktail (30 mLs Oral Given 1/15/22 1801)   famotidine (PEPCID) injection 20 mg (20 mg Intravenous Given 1/15/22 1823)   iopamidol (ISOVUE-370) solution 100 mL (100 mLs Intravenous Given 1/15/22 1823)   piperacillin-tazobactam (ZOSYN) 3.375 g vial to attach to  mL bag (3.375 g Intravenous New Bag 1/15/22 1938)   LORazepam (ATIVAN) injection 1 mg (1 mg Intravenous Given 1/15/22 2004)   0.9% sodium chloride BOLUS (1,000 mLs Intravenous New Bag 1/15/22 1941)   metoprolol tartrate (LOPRESSOR) half-tab 12.5 mg (12.5 mg Oral Given 1/15/22 2130)   LORazepam  "(ATIVAN) injection 1 mg (1 mg Intravenous Given 1/15/22 2131)   gabapentin (NEURONTIN) capsule 1,200 mg (1,200 mg Oral Given 1/15/22 2132)        =================================================================    HPI    Triage Note: No notes on file    Patient information was obtained from: Patient    Use of : N/A        Peewee Hess is a 56 year old male who has pertinent medical history of alcohol dependence, tobacco use disorder, DM II, and meningioma presents for evaluation of shortness of breath.     Patient reports he has been experiencing acid reflux \"big time\" over the past couple of days with associated chest pain. He also notes that he has been experiencing vomiting and puking. Patient notes some difficulty breathing, coughing, and decreased appetite. He has tried medications for his symptoms, but notes minimal relief in symptoms. Denies fever, or additional medical concerns or complaints at this time.     Of note, patient denies any known exposures to COVID-19.      REVIEW OF SYSTEMS   Except as stated in the HPI all other systems reviewed and are negative.    PAST MEDICAL HISTORY:  Past Medical History:   Diagnosis Date     Alcohol use disorder, severe, dependence (H) 11/10/2021     Alcohol-induced acute pancreatitis with infected necrosis      Cerebellar stroke (H)     Old lacunar cerebellar stroke noted on imaging     Diabetes mellitus (H)      Neuropathy        PAST SURGICAL HISTORY:  Past Surgical History:   Procedure Laterality Date     IR MISCELLANEOUS PROCEDURE  04/14/2007     PANCREAS SURGERY      Resection for necrotizing pancreatitis       CURRENT MEDICATIONS:      Current Facility-Administered Medications:      0.9% sodium chloride BOLUS, 1,000 mL, Intravenous, Once, Valentine Herrera MD     acetaminophen (TYLENOL) tablet 650 mg, 650 mg, Oral, Q8H PRN **OR** acetaminophen (TYLENOL) Suppository 650 mg, 650 mg, Rectal, Q8H PRN, Valentine Herrera MD     bisacodyl (DULCOLAX) Suppository " 10 mg, 10 mg, Rectal, Daily PRN, Valentine Herrera MD     calcium carbonate (TUMS) chewable tablet 1,000 mg, 1,000 mg, Oral, 4x Daily PRN, Valentine Herrera MD     cloNIDine (CATAPRES) tablet 0.1 mg, 0.1 mg, Oral, Q8H, Valentine Herrera MD, 0.1 mg at 01/15/22 2132     glucose gel 15-30 g, 15-30 g, Oral, Q15 Min PRN **OR** dextrose 50 % injection 25-50 mL, 25-50 mL, Intravenous, Q15 Min PRN **OR** glucagon injection 1 mg, 1 mg, Subcutaneous, Q15 Min PRN, Valentine Herrera MD     [START ON 1/16/2022] DULoxetine (CYMBALTA) DR capsule 60 mg, 60 mg, Oral, Daily, Valentine Herrera MD     famotidine (PEPCID) tablet 20 mg, 20 mg, Oral, BID PRN, Valentine Herrera MD     flumazenil (ROMAZICON) injection 0.2 mg, 0.2 mg, Intravenous, q1 min prn, Valentine Herrera MD     [START ON 1/16/2022] folic acid (FOLVITE) tablet 1 mg, 1 mg, Oral, Daily, Valentine Herrera MD     [START ON 1/23/2022] gabapentin (NEURONTIN) capsule 100 mg, 100 mg, Oral, Q8H, Valentine Herrera MD     [START ON 1/21/2022] gabapentin (NEURONTIN) capsule 300 mg, 300 mg, Oral, Q8H, Valentine Herrera MD     [START ON 1/19/2022] gabapentin (NEURONTIN) capsule 600 mg, 600 mg, Oral, Q8H, Valentine Herrera MD     [START ON 1/16/2022] gabapentin (NEURONTIN) capsule 900 mg, 900 mg, Oral, Q8H, Valentine Herrera MD     OLANZapine zydis (zyPREXA) ODT tab 5-10 mg, 5-10 mg, Oral, Q6H PRN **OR** haloperidol lactate (HALDOL) injection 2.5-5 mg, 2.5-5 mg, Intravenous, Q6H PRN, Valentine Herrera MD     insulin aspart (NovoLOG) injection (RAPID ACTING), 1-7 Units, Subcutaneous, Q6H CAROLANN, Valentine Herrera MD     Lidocaine (LIDOCARE) 4 % Patch 1 patch, 1 patch, Transdermal, Q24H, Valentine Herrera MD, 1 patch at 01/15/22 2134     lidocaine (LMX4) cream, , Topical, Q1H PRN, Valentine Herrera MD     lidocaine 1 % 0.1-1 mL, 0.1-1 mL, Other, Q1H PRN, Valentine Herrera MD     lidocaine patch in PLACE, , Transdermal, Q8H, Valentine Herrera MD     LORazepam (ATIVAN) 2 MG/ML (HIGH CONC) oral solution 1 mg, 1 mg, Oral, Q4H, Downs, Valentine,  MD     LORazepam (ATIVAN) tablet 1-2 mg, 1-2 mg, Oral, Q30 Min PRN **OR** LORazepam (ATIVAN) injection 1-2 mg, 1-2 mg, Intravenous, Q30 Min PRN, Valentine Herrera MD     magnesium hydroxide (MILK OF MAGNESIA) suspension 30 mL, 30 mL, Oral, Daily PRN, Valentine Herrera MD     magnesium sulfate 2 g in water intermittent infusion, 2 g, Intravenous, Once, Valentine Herrera MD, Last Rate: 50 mL/hr at 01/15/22 2138, 2 g at 01/15/22 2138     melatonin tablet 5 mg, 5 mg, Oral, QPM PRN, Valentine Herrera MD     methocarbamol (ROBAXIN) tablet 500 mg, 500 mg, Oral, Q6H PRN, Valentine Herrera MD     [START ON 1/16/2022] metoprolol succinate ER (TOPROL-XL) 24 hr tablet 25 mg, 25 mg, Oral, Daily, Valentine Herrera MD     [START ON 1/16/2022] multivitamin w/minerals (THERA-VIT-M) tablet 1 tablet, 1 tablet, Oral, Daily, Valentine Herrera MD     ondansetron (ZOFRAN-ODT) ODT tab 4 mg, 4 mg, Oral, Q6H PRN **OR** ondansetron (ZOFRAN) injection 4 mg, 4 mg, Intravenous, Q6H PRN, Valentien Herrera MD     pantoprazole (PROTONIX) IV push injection 40 mg, 40 mg, Intravenous, BID, Valentine Herrera MD     [START ON 1/16/2022] piperacillin-tazobactam (ZOSYN) 3.375 g vial to attach to  mL bag, 3.375 g, Intravenous, Q8H, Valentine Herrera MD     polyethylene glycol (MIRALAX) Packet 17 g, 17 g, Oral, Daily PRN, Valentine Herrera MD     sodium chloride (PF) 0.9% PF flush 3 mL, 3 mL, Intracatheter, Q8H, Valentine Herrera MD     sodium chloride (PF) 0.9% PF flush 3 mL, 3 mL, Intracatheter, q1 min prn, Valentine Herrera MD     sodium chloride 0.9 % 1,000 mL with Infuvite Adult 10 mL, thiamine 100 mg, folic acid 1 mg infusion, , Intravenous, Once, Valentine Herrera MD     sodium chloride 0.9% infusion, , Intravenous, Continuous, Valentine Herrera MD     sucralfate (CARAFATE) suspension 1 g, 1 g, Oral, 4x Daily PRN, Valentine Herrera MD     [START ON 1/16/2022] thiamine (B-1) tablet 100 mg, 100 mg, Oral, Daily, Valentine Herrera MD    Current Outpatient Medications:      acamprosate (CAMPRAL) 333  MG EC tablet, Take 2 tablets (666 mg) by mouth 3 times daily, Disp: 90 tablet, Rfl: 0     acetaminophen-codeine (TYLENOL W/CODEINE #3) 300-30 MG per tablet, Take 1 tablet by mouth every 6 hours as needed, Disp: , Rfl:      aspirin (ASA) 81 MG EC tablet, Take 81 mg by mouth, Disp: , Rfl:      DULoxetine (CYMBALTA) 60 MG capsule, Take 60 mg by mouth daily, Disp: , Rfl:      folic acid (FOLVITE) 1 MG tablet, Take 1 tablet (1 mg) by mouth daily, Disp: 30 tablet, Rfl: 0     gabapentin (NEURONTIN) 300 MG capsule, Take 2 capsules by mouth 2 times daily And prn, Disp: , Rfl:      insulin NPH-Regular (NOVOLIN 70/30 FLEXPEN) susp, Inject Subcutaneous 2 times daily Sliding scale, Disp: , Rfl:      metoprolol succinate ER (TOPROL-XL) 25 MG 24 hr tablet, Take 25 mg by mouth daily, Disp: , Rfl:      multivitamin, therapeutic with minerals (THERA-VIT-M) TABS tablet, Take 1 tablet by mouth daily , Disp: , Rfl:      Omega-3 Fatty Acids (FISH OIL PO), Take 1 g by mouth daily , Disp: , Rfl:     ALLERGIES:  No Known Allergies    FAMILY HISTORY:  Family History   Problem Relation Age of Onset     Hyperlipidemia Mother      Cancer Father        SOCIAL HISTORY:   Social History     Socioeconomic History     Marital status:      Spouse name: Not on file     Number of children: Not on file     Years of education: Not on file     Highest education level: Not on file   Occupational History     Not on file   Tobacco Use     Smoking status: Current Every Day Smoker     Packs/day: 0.50     Types: Cigarettes     Smokeless tobacco: Never Used   Substance and Sexual Activity     Alcohol use: Not Currently     Comment: 6 pack daily with 2-4 shots daily. Last drink 10/4     Drug use: Not on file     Sexual activity: Not on file   Other Topics Concern     Not on file   Social History Narrative     Not on file     Social Determinants of Health     Financial Resource Strain: Not on file   Food Insecurity: Not on file   Transportation Needs: Not  on file   Physical Activity: Not on file   Stress: Not on file   Social Connections: Not on file   Intimate Partner Violence: Not on file   Housing Stability: Not on file       PHYSICAL EXAM    VITAL SIGNS: /63   Pulse (!) 139   Temp 98.2  F (36.8  C) (Oral)   Resp 24   SpO2 94%    GENERAL: Awake, Alert, answering questions, odd affect, Well nourished  HEENT: Normal cephalic, Atraumatic, bilateral external ears normal, No scleral icterus, mask in place, dry mucous membranes  NECK: No obvious swelling or abnormality, No stridor  PULMONARY:Normal and symmetric breath sounds, No respiratory distress, Lungs clear to auscultation bilaterally. No wheezing  CARDIOVASCULAR: Tachycardic, Regular rhythm, Distal pulses present and normal.  ABDOMINAL: Soft, Nondistended, Nontender, No flank tenderness, No palpable masses  BACK: No bruising or tenderness.  EXTREMITIES: Moves all extremities spontaneously, warm, no edema, No major deformities  NEURO: No facial droop, normal motor function, Normal speech   PSYCH: Mildly agitated with odd affect   SKIN: No rashes on visualized skin, dry, warm     LAB:  All pertinent labs reviewed and interpreted.  Results for orders placed or performed during the hospital encounter of 01/15/22   XR Chest Port 1 View    Impression    IMPRESSION: There are mild opacities in both lungs which have increased from the prior exam. This is likely due to a superimposed infectious or inflammatory process on top of mild background scarring. Normal heart size and pulmonary vascularity. Old left   rib fractures.   CT Chest Pulmonary Embolism w Contrast    Impression    IMPRESSION:    1.  No acute pulmonary embolism.  2.  Multifocal bilateral airspace opacities are present mixed groundglass and interstitial thickening asymmetric to the right. Imaging features can be seen with (COVID-19)  pneumonia, though are nonspecific and can occur with a variety of infectious and   noninfectious processes. Given  the other findings below, aspiration is a strong differential consideration.  3.  Severe wall thickening of the middle and distal thirds of the esophagus consistent with a subacute esophagitis.  4.  Severe hepatic steatosis.  5.  Acute on chronic fracture deformities of multiple left lateral/posterolateral ribs.  6.  Cholelithiasis.  7.  Wall thickening of the imaged colon consistent with colitis.   Symptomatic; Yes; 1/12/2022 Influenza A/B & SARS-CoV2 (COVID-19) Virus PCR Multiplex Nasopharyngeal    Specimen: Nasopharyngeal; Swab   Result Value Ref Range    Influenza A PCR Negative Negative    Influenza B PCR Negative Negative    SARS CoV2 PCR Negative Negative   Extra Blue Top Tube   Result Value Ref Range    Hold Specimen JIC    Extra Red Top Tube   Result Value Ref Range    Hold Specimen JIC    Extra Green Top (Lithium Heparin) Tube   Result Value Ref Range    Hold Specimen JIC    Extra Purple Top Tube   Result Value Ref Range    Hold Specimen JIC    Extra Green Top (Lithium Heparin) ON ICE   Result Value Ref Range    Hold Specimen JIC    Result Value Ref Range    INR 1.69 (H) 0.90 - 1.15   Comprehensive metabolic panel   Result Value Ref Range    Sodium 139 136 - 145 mmol/L    Potassium 4.3 3.5 - 5.0 mmol/L    Chloride 101 98 - 107 mmol/L    Carbon Dioxide (CO2) 22 22 - 31 mmol/L    Anion Gap 16 5 - 18 mmol/L    Urea Nitrogen 6 (L) 8 - 22 mg/dL    Creatinine 0.78 0.70 - 1.30 mg/dL    Calcium 7.8 (L) 8.5 - 10.5 mg/dL    Glucose 259 (H) 70 - 125 mg/dL    Alkaline Phosphatase 237 (H) 45 - 120 U/L    AST 47 (H) 0 - 40 U/L    ALT 23 0 - 45 U/L    Protein Total 5.9 (L) 6.0 - 8.0 g/dL    Albumin 1.6 (L) 3.5 - 5.0 g/dL    Bilirubin Total 1.9 (H) 0.0 - 1.0 mg/dL    GFR Estimate >90 >60 mL/min/1.73m2   Result Value Ref Range    Magnesium 1.5 (L) 1.8 - 2.6 mg/dL   B-Type Natriuretic Peptide ([facility] Only)   Result Value Ref Range     (H) 0 - 45 pg/mL   Result Value Ref Range    Troponin I <0.01 0.00 - 0.29 ng/mL    CBC with platelets and differential   Result Value Ref Range    WBC Count 16.0 (H) 4.0 - 11.0 10e3/uL    RBC Count 3.82 (L) 4.40 - 5.90 10e6/uL    Hemoglobin 11.8 (L) 13.3 - 17.7 g/dL    Hematocrit 33.8 (L) 40.0 - 53.0 %    MCV 89 78 - 100 fL    MCH 30.9 26.5 - 33.0 pg    MCHC 34.9 31.5 - 36.5 g/dL    RDW 18.8 (H) 10.0 - 15.0 %    Platelet Count 274 150 - 450 10e3/uL    % Neutrophils 90 %    % Lymphocytes 5 %    % Monocytes 4 %    % Eosinophils 0 %    % Basophils 0 %    % Immature Granulocytes 1 %    NRBCs per 100 WBC 0 <1 /100    Absolute Neutrophils 14.7 (H) 1.6 - 8.3 10e3/uL    Absolute Lymphocytes 0.7 (L) 0.8 - 5.3 10e3/uL    Absolute Monocytes 0.6 0.0 - 1.3 10e3/uL    Absolute Eosinophils 0.0 0.0 - 0.7 10e3/uL    Absolute Basophils 0.0 0.0 - 0.2 10e3/uL    Absolute Immature Granulocytes 0.1 <=0.4 10e3/uL    Absolute NRBCs 0.0 10e3/uL   Alcohol level blood   Result Value Ref Range    Alcohol, Blood 10 (H) None detected mg/dL   Result Value Ref Range    Phosphorus 3.2 2.5 - 4.5 mg/dL   Result Value Ref Range    Hemoglobin A1C 7.0 (H) <=5.6 %   Lactic acid whole blood   Result Value Ref Range    Lactic Acid 3.4 (H) 0.7 - 2.0 mmol/L   Glucose by meter   Result Value Ref Range    GLUCOSE BY METER POCT 284 (H) 70 - 99 mg/dL       RADIOLOGY:  CT Chest Pulmonary Embolism w Contrast   Final Result   IMPRESSION:      1.  No acute pulmonary embolism.   2.  Multifocal bilateral airspace opacities are present mixed groundglass and interstitial thickening asymmetric to the right. Imaging features can be seen with (COVID-19)  pneumonia, though are nonspecific and can occur with a variety of infectious and    noninfectious processes. Given the other findings below, aspiration is a strong differential consideration.   3.  Severe wall thickening of the middle and distal thirds of the esophagus consistent with a subacute esophagitis.   4.  Severe hepatic steatosis.   5.  Acute on chronic fracture deformities of multiple left  lateral/posterolateral ribs.   6.  Cholelithiasis.   7.  Wall thickening of the imaged colon consistent with colitis.      XR Chest Port 1 View   Final Result   IMPRESSION: There are mild opacities in both lungs which have increased from the prior exam. This is likely due to a superimposed infectious or inflammatory process on top of mild background scarring. Normal heart size and pulmonary vascularity. Old left    rib fractures.          EKG:    Date and time: January 15, 2022 at 1639  Rate: 138 bpm  Rhythm: Sinus tachycardia  NV interval: 154 ms  QRS interval: 72 ms  QT/QTc: 354/536 ms  ST changes or T wave changes: No acute ST or T wave abnormalities  Change from prior ECG: No significant change from prior  I have independently reviewed and interpreted this EKG.     I, Debora Grubbs, am serving as a scribe to document services personally performed by Dr. Marcum based on my observation and the provider's statements to me. I, Phuong Marcum MD attest that Debora Grubbs is acting in a scribe capacity, has observed my performance of the services and has documented them in accordance with my direction.    Phuong Marcum M.D.  Emergency Medicine  [facility] EMERGENCY DEPARTMENT  [facility] [facility] [id number] [phone]  Dept: [phone]       Phuong Marcum MD  01/15/22 2486

## 2022-01-16 PROBLEM — R45.1 AGITATION: Status: ACTIVE | Noted: 2022-01-01

## 2022-01-16 NOTE — ED NOTES
Pt's oxygen sats decreased to mid 80s on 6 LPM via N/C.  NRB applied at 15 LPM and sats improved to mid to upper 90s.

## 2022-01-16 NOTE — ED NOTES
Spoke with Dr. Moore about pt's decreased oxygen sats.  It was decided to call respiratory therapy and start pt on high flow nasal cannula.  RT contacted.

## 2022-01-16 NOTE — ED PROVIDER NOTES
Update:   12:07 PM  The patient has been boarding in the emergency department.  I was asked by the hospitalist to intubate the patient due to his decreased mental status and therefore inability to tolerate BiPAP.  He was admitted for treatment of aspiration pneumonia.  The patient was moved to room 3 and underwent successful rapid sequence intubation and placed on a ventilator.  He is now on a propofol drip.    The patient is critically ill and has required 35 minutes of critical care time exclusive of procedures. This includes time spent interviewing the patient, ordering tests and medications, monitoring vital signs, reviewing results, patient updates, discussing the case with family and consultants, and admission.      PROCEDURE: Rapid Sequence Intubation   INDICATIONS: Respiratory Failure   PROCEDURE PROVIDER: Dr Omari Cochran   CONSENT: Consent for procedure was not obtained. Consent is implied given the emergent need.   PROCEDURE SPECIFIC CHECKLIST COMPLETED: Yes   TIME OUT: Universal protocol was followed. TIME OUT conducted just prior to starting procedure confirmed patient identity, site/side, procedure, patient position, and availability of correct equipment. Yes   MEDICATIONS: Etomidate, 20 mg, IV and Succinylcholine, 100 mg IV   TUBE DETAILS: 7.5 tube, at 22 cm at the lips   EQUIPMENT USED: Glidescope, size 3   POST-INTUBATION ASSESSMENT/NOTE: Difficulty of intubation:  Easy, straightforward    Post-intubation pulmonary exam:  equal and absent over the epigastrium    ET Tube placement was confirmed with:  auscultation with good, equal bilateral breath sounds, absence of breath sounds over the epigastrium, fog in the tube, colorimetric CO2 detector (good color change) and pulse oximeter readings stable or improving    Lowest oxygen saturation was 81%    Monitoring consisted of:  heart rate, cardiac monitor, continuous pulse oximeter, frequent blood pressure checks, IV access, constant attendance by RN  until patient is recovered and constant attendance by MD until patient is stable     COMPLICATIONS: Patient tolerated procedure well, without complication         Omari Cochran MD  01/16/22 [id number]

## 2022-01-16 NOTE — ED NOTES
0325: Called to initiate HFNC d/t Sp02 in the mid 80s on 15lpm NRB mask. Upon assessing patient, determined patient needed BiPAP d/t unresponsiveness in the setting of sedatives for alcohol withdrawal. Initiated on S/T14 15/8cmH20 and 50%' large over-the-face mask, liquicell in place. Patient registering adequate VT, mid 600 to 700ccs, VE in the 8-12lpm without tachypnea. Sp02 improved to mid 90s on the above settings. Patient has a sitter. Will closely monitor.     /61   Pulse (!) 126   Temp 98.2  F (36.8  C) (Oral)   Resp 17   SpO2 93%      Javier Leiva, RRT

## 2022-01-16 NOTE — ED NOTES
Pt restless, pulling at lines.  Pulled out IV.  Pt soaked in urine.  Administered Prn Ativan via IV once new line established.  Pt on camera for safety.  Pt attempting to crawl out of bed and pull oxygen nasal cannula off.  Continues to pull gown off and expose self.  Unable to redirect.  Pt using crass language.  Complete bed change.  Brief applied.  Multiple attempts to redirect.

## 2022-01-16 NOTE — ED NOTES
RN updated pt's wife Daina via telephone with plan for ICU admission and will be starting on drip to manage blood pressure.  Aware of neuro status.   RN encourages she call back in 4 hours for further updates.

## 2022-01-16 NOTE — ED NOTES
RT evaluated the pt, and he determined that a BiPap would be more appropriate.  Pt placed on BiPap and sats improved to mid 90s.

## 2022-01-16 NOTE — H&P
[facility]    History and Physical - Hospitalist Service       Date of Admission:  1/15/2022  Peewee Hess,  1965, MRN [id number]  PCP: Sarah Canseco    Assessment & Plan      Peewee Hess is a 56 year old male admitted on 1/15/2022. He has history of severe alcohol abuse disorder, necrotizing pancreatitis requiring resection, peripheral neuropathy, diabetes, cerebellar stroke, depression presents for evaluation of nausea, vomiting, and chest pain found to have probable aspiration pneumonia and alcohol withdrawal    #Probable aspiration pneumonia  Patient endorses recurrent episodes of vomiting, presents with sepsis, CT chest showing multifocal bilateral groundglass and interstitial infiltrates consistent with COVID-19 versus aspiration.  Also with severe wall thickening of the middle and distal esophagus  Start on Zosyn.  Patient thinks he can provide a sputum culture  IV fluid resuscitation.  Trend lactic acid  NPO.  Antiemetics as needed    #Alcohol withdrawal  Patient denied alcohol use for 3+ weeks however with a detectable blood alcohol level  History of severe alcoholism with withdrawal  CIWA protocol  SW consult  Thiamine, vitamins  Gabapentin taper  Clonidine   Hold home Campral    #Sinus tachycardia  HR 140s since arrival  Last time noted to have ectopic atrial tachycardia, this could be same  Resume home metoprolol  Clonidine per withdrawal protocol  Treat withdrawal and sepsis as above  Monitor on tele    #Probable severe esophagitis  IV PPI  N.p.o.  Hold home ASA  Sucralfate as needed  Consider GI consult if he remains symptomatic tomorrow    #Multiple rib fractures  CT suggestive of acute on subacute fractures of right ribs 7, 8, 9, 11  He might be falling a lot- alcoholism and history of cerebellar stroke  PT and OT evals  Pulmonary toilet/ protocol for rib fractures  Currently on 2L NC, reportedly with spO2 in 70s but with poor quality tracing. Does not look  to be in any respiratory distress    #IDDM  Home regimen is 70/30 5 units twice daily  Last time here he was on lantus 15 units, novolog 1:10 carb exchange, medium sliding scale insulin, but might need less now since it seems his home dose is also less now. For now NPO and just use sliding scale    #Hiccups  Replace mag  Treat above issues    #Alcoholic fatty liver  #Alcoholic hepatitis  [place] score of 7. Labs similar to last admission. Repeat labs in AM    #Neuropathy, home Cymbalta, higher dose gabapentin than home d/t withdrawal protocol  #Old lacunar cerebellar stroke, hold home ASA       Diet: NPO for Medical/Clinical Reasons Except for: Meds, Ice Chips  DVT Prophylaxis: Moderate risk. SCDs    Booth Catheter: Not present  Central Lines: None  Code Status: Full Code    Clinically Significant Risk Factors Present on Admission           # Hypomagnesemia: Mg = 1.5 mg/dL (Ref range: 1.8 - 2.6 mg/dL) on admission, will replace as needed   # Coagulation Defect: INR = 1.69 (Ref range: 0.90 - 1.15) and/or PTT = N/A on admission, will monitor for bleeding  # Platelet Defect: home medication list includes an antiplatelet medication   # Diabetes, type II: last A1C 7.8 % (Ref range: <=5.6 %)      Disposition Plan   Expected Discharge: 01/18/2022   Anticipated discharge location: home vs TCU       The patient's care was discussed with the Patient.    Valentine Herrera MD  [facility]  Text page via [facility] Paging/Directory      ______________________________________________________________________    Chief Complaint   Peewee Hess is a 56 year old male who presents for n/v and chest pain    History of Present Illness   Patient states he is here for evaluation of nausea, vomiting, and burning pain in his mid chest he attributes to his esophagus.  He tells me this has been going on for the past 4 days.  He has been having persistent hiccups.  Does endorse a cough and thinks he may be able to provide a  "sputum specimen.  Complains he was having some lower abdominal pain that has now resolved.  Complains he has loose, soft bowel movements that alternate with hard bowel movements.  Denies any shortness of breath, fever, chills, urinary issues.  Denies any recent alcohol use, tells me that he quit 3 weeks ago.  Tells me that \"I'm at my tip-top\" and indicated that he might leave the hospital against medical advice.  After discussion he did agree that he is not doing well and agreed to remain in the hospital.  Tells me that he had his upper teeth removed about 3 weeks ago.    Review of Systems    The 10 point Review of Systems is negative other than noted in the HPI or here.    Past Medical History    I have reviewed this patient's medical history and updated it with pertinent information if needed.   Past Medical History:   Diagnosis Date     Alcohol use disorder, severe, dependence (H) 11/10/2021     Alcohol-induced acute pancreatitis with infected necrosis      Cerebellar stroke (H)     Old lacunar cerebellar stroke noted on imaging     Diabetes mellitus (H)      Neuropathy        Past Surgical History   I have reviewed this patient's surgical history and updated it with pertinent information if needed.  Past Surgical History:   Procedure Laterality Date     IR MISCELLANEOUS PROCEDURE  04/14/2007     PANCREAS SURGERY      Resection for necrotizing pancreatitis       Social History   I have reviewed this patient's social history and updated it with pertinent information if needed.  Social History     Tobacco Use     Smoking status: Current Every Day Smoker     Packs/day: 0.50     Types: Cigarettes     Smokeless tobacco: Never Used   Substance Use Topics     Alcohol use: Not Currently     Comment: 6 pack daily with 2-4 shots daily. Last drink 10/4     Drug use: None       Family History   I have reviewed this patient's family history and updated it with pertinent information if needed.  Family History   Problem " Relation Age of Onset     Hyperlipidemia Mother      Cancer Father        Prior to Admission Medications   Prior to Admission Medications   Prescriptions Last Dose Informant Patient Reported? Taking?   DULoxetine (CYMBALTA) 60 MG capsule 1/14/2022  Yes Yes   Sig: Take 60 mg by mouth daily   Omega-3 Fatty Acids (FISH OIL PO)   Yes No   Sig: Take 1 g by mouth daily    acamprosate (CAMPRAL) 333 MG EC tablet 1/14/2022  No Yes   Sig: Take 2 tablets (666 mg) by mouth 3 times daily   acetaminophen-codeine (TYLENOL W/CODEINE #3) 300-30 MG per tablet 1/14/2022  Yes Yes   Sig: Take 1 tablet by mouth every 6 hours as needed   aspirin (ASA) 81 MG EC tablet 1/14/2022  Yes Yes   Sig: Take 81 mg by mouth   folic acid (FOLVITE) 1 MG tablet 1/14/2022  No Yes   Sig: Take 1 tablet (1 mg) by mouth daily   gabapentin (NEURONTIN) 300 MG capsule 1/14/2022  Yes Yes   Sig: Take 2 capsules by mouth 2 times daily And prn   insulin NPH-Regular (NOVOLIN 70/30 FLEXPEN) susp 1/15/2022 at 5 units  Yes Yes   Sig: Inject Subcutaneous 2 times daily Sliding scale   metoprolol succinate ER (TOPROL-XL) 25 MG 24 hr tablet 1/14/2022  Yes Yes   Sig: Take 25 mg by mouth daily   multivitamin, therapeutic with minerals (THERA-VIT-M) TABS tablet 1/14/2022  Yes Yes   Sig: Take 1 tablet by mouth daily       Facility-Administered Medications: None     Allergies   No Known Allergies    Physical Exam   Vital Signs: Temp: 98.2  F (36.8  C) Temp src: Oral BP: 117/63 Pulse: (!) 146   Resp: 20 SpO2: 93 % O2 Device: Nasal cannula Oxygen Delivery: 1 LPM  Weight: 0 lbs 0 oz    General: in no apparent distress, non-toxic and alert male lying in hospital bed oriented to person and place  HEENT: Head normocephalic atraumatic, oral mucosa moist. Sclerae anicteric  CV: Regular rhythm, tachycardic, no murmurs  Resp: No wheezes, no rales or rhonchi, no focal consolidations  GI: Belly soft, nondistended, nontender, bowel sounds present  Skin: No rashes or lesions  Extremities: No  peripheral edema  Psych: Normal affect, inappropriately elevated mood.  Laughing and joking around in the exam room, possibly confabulating.  Remained with genitalia exposed throughout interaction despite I made a few efforts to cover him up.  Neuro: CNII-XII grossly intact, moving all 4 extremities    Data   Data reviewed today: I reviewed all medications, new labs and imaging results over the last 24 hours.  EKG personally reviewed shows: Sinus tachycardia, rate 138 bpm    Recent Results (from the past 12 hour(s))   Symptomatic; Yes; 1/12/2022 Influenza A/B & SARS-CoV2 (COVID-19) Virus PCR Multiplex Nasopharyngeal    Collection Time: 01/15/22  4:49 PM    Specimen: Nasopharyngeal; Swab   Result Value Ref Range    Influenza A PCR Negative Negative    Influenza B PCR Negative Negative    SARS CoV2 PCR Negative Negative   Extra Blue Top Tube    Collection Time: 01/15/22  4:49 PM   Result Value Ref Range    Hold Specimen JIC    Extra Red Top Tube    Collection Time: 01/15/22  4:49 PM   Result Value Ref Range    Hold Specimen JIC    Extra Green Top (Lithium Heparin) Tube    Collection Time: 01/15/22  4:49 PM   Result Value Ref Range    Hold Specimen JIC    Extra Purple Top Tube    Collection Time: 01/15/22  4:49 PM   Result Value Ref Range    Hold Specimen JIC    Extra Green Top (Lithium Heparin) ON ICE    Collection Time: 01/15/22  4:49 PM   Result Value Ref Range    Hold Specimen JIC    INR    Collection Time: 01/15/22  4:49 PM   Result Value Ref Range    INR 1.69 (H) 0.90 - 1.15   Comprehensive metabolic panel    Collection Time: 01/15/22  4:49 PM   Result Value Ref Range    Sodium 139 136 - 145 mmol/L    Potassium 4.3 3.5 - 5.0 mmol/L    Chloride 101 98 - 107 mmol/L    Carbon Dioxide (CO2) 22 22 - 31 mmol/L    Anion Gap 16 5 - 18 mmol/L    Urea Nitrogen 6 (L) 8 - 22 mg/dL    Creatinine 0.78 0.70 - 1.30 mg/dL    Calcium 7.8 (L) 8.5 - 10.5 mg/dL    Glucose 259 (H) 70 - 125 mg/dL    Alkaline Phosphatase 237 (H) 45 -  120 U/L    AST 47 (H) 0 - 40 U/L    ALT 23 0 - 45 U/L    Protein Total 5.9 (L) 6.0 - 8.0 g/dL    Albumin 1.6 (L) 3.5 - 5.0 g/dL    Bilirubin Total 1.9 (H) 0.0 - 1.0 mg/dL    GFR Estimate >90 >60 mL/min/1.73m2   Magnesium    Collection Time: 01/15/22  4:49 PM   Result Value Ref Range    Magnesium 1.5 (L) 1.8 - 2.6 mg/dL   B-Type Natriuretic Peptide ([facility])    Collection Time: 01/15/22  4:49 PM   Result Value Ref Range     (H) 0 - 45 pg/mL   Troponin I    Collection Time: 01/15/22  4:49 PM   Result Value Ref Range    Troponin I <0.01 0.00 - 0.29 ng/mL   CBC with platelets and differential    Collection Time: 01/15/22  4:49 PM   Result Value Ref Range    WBC Count 16.0 (H) 4.0 - 11.0 10e3/uL    RBC Count 3.82 (L) 4.40 - 5.90 10e6/uL    Hemoglobin 11.8 (L) 13.3 - 17.7 g/dL    Hematocrit 33.8 (L) 40.0 - 53.0 %    MCV 89 78 - 100 fL    MCH 30.9 26.5 - 33.0 pg    MCHC 34.9 31.5 - 36.5 g/dL    RDW 18.8 (H) 10.0 - 15.0 %    Platelet Count 274 150 - 450 10e3/uL    % Neutrophils 90 %    % Lymphocytes 5 %    % Monocytes 4 %    % Eosinophils 0 %    % Basophils 0 %    % Immature Granulocytes 1 %    NRBCs per 100 WBC 0 <1 /100    Absolute Neutrophils 14.7 (H) 1.6 - 8.3 10e3/uL    Absolute Lymphocytes 0.7 (L) 0.8 - 5.3 10e3/uL    Absolute Monocytes 0.6 0.0 - 1.3 10e3/uL    Absolute Eosinophils 0.0 0.0 - 0.7 10e3/uL    Absolute Basophils 0.0 0.0 - 0.2 10e3/uL    Absolute Immature Granulocytes 0.1 <=0.4 10e3/uL    Absolute NRBCs 0.0 10e3/uL   Alcohol level blood    Collection Time: 01/15/22  8:04 PM   Result Value Ref Range    Alcohol, Blood 10 (H) None detected mg/dL

## 2022-01-16 NOTE — PHARMACY-ADMISSION MEDICATION HISTORY
Pharmacy Note - Admission Medication History    Pertinent Provider Information:   -Pt wasn't able to take any meds today due to nausea/vomiting. ______________________________________________________________________    Prior To Admission (PTA) med list completed and updated in EMR.       PTA Med List   Medication Sig Last Dose     acamprosate (CAMPRAL) 333 MG EC tablet Take 2 tablets (666 mg) by mouth 3 times daily 1/14/2022     acetaminophen-codeine (TYLENOL W/CODEINE #3) 300-30 MG per tablet Take 1 tablet by mouth every 6 hours as needed 1/14/2022     aspirin (ASA) 81 MG EC tablet Take 81 mg by mouth 1/14/2022     DULoxetine (CYMBALTA) 60 MG capsule Take 60 mg by mouth daily 1/14/2022     folic acid (FOLVITE) 1 MG tablet Take 1 tablet (1 mg) by mouth daily 1/14/2022     gabapentin (NEURONTIN) 300 MG capsule Take 2 capsules by mouth 2 times daily And prn 1/14/2022     insulin NPH-Regular (NOVOLIN 70/30 FLEXPEN) susp Inject Subcutaneous 2 times daily Sliding scale 1/15/2022 at 5 units     metoprolol succinate ER (TOPROL-XL) 25 MG 24 hr tablet Take 25 mg by mouth daily 1/14/2022     multivitamin, therapeutic with minerals (THERA-VIT-M) TABS tablet Take 1 tablet by mouth daily  1/14/2022       Information source(s): Patient, Family member and [facility]  Method of interview communication: in-person    Summary of Changes to PTA Med List  New: 70/30, toprol  Discontinued: lopressor  Changed: -    Patient was asked about OTC/herbal products specifically.  PTA med list reflects this.    In the past week, patient estimated taking medication this percent of the time:  50-90% due to illness.    Allergies were reviewed, assessed, and updated with the patient.      Patient does not use any multi-dose medications prior to admission.    The information provided in this note is only as accurate as the sources available at the time of the update(s).    Thank you for the opportunity to participate in the care of  this patient.    Ling Graf, PharmD, BCPS 01/15/22 7:53 PM          yes

## 2022-01-16 NOTE — PROGRESS NOTES
RT PROGRESS NOTE    VENT DAY# 1    CURRENT SETTINGS:   Ventilation Mode: CMV/AC  (Continuous Mandatory Ventilation/ Assist Control)  FiO2 (%): 60 %  Rate Set (breaths/minute): 16 breaths/min  Tidal Volume Set (mL): 500 mL  PEEP (cm H2O): 10 cmH2O  Oxygen Concentration (%): 50 %  Resp: 16      PATIENT PARAMETERS:  PIP 23  Pplat:  20  Pmean:  13  Compliance: 34  SBT: No     Secretions:  Scant amount of secretions via ETT  02 Sats:  99%    ETT SIZE 7.5 Secured at 23 cm at teeth/gums    Respiratory Medications: Albuterol TID     NOTE / SHIFT SUMMARY:   Pt. arrived from ED intubated, placed on full vent support, settings above. Titrate oxygen as tolerated, ETT repositioned for skin integrity, RT following      Lupe Pizano, RT

## 2022-01-16 NOTE — ED NOTES
"Paged Dr. Dejesus, awaiting response.  This RN also gave handoff report to JULIANO Newberry.    \"Please call Maria Fernanda HENAO in ED as soon as able regarding bed 3 and phone call with wife, thanks! - Maria Fernanda RN\"    RN notes wife has been updated on intubation and was previously aware of plan for ICU per this RN update, but was never informed of alcohol level and events leading up to placement of BiPAP yesterday/ overnight.   "

## 2022-01-16 NOTE — ED NOTES
"Paged Dr. Dejesus, awaiting response:     \"Just need to clarify, are we placing a central line in room 8?  Can call or place chart note with update, thanks!  - Maria Fernanda HENAO\"  "

## 2022-01-16 NOTE — ED NOTES
No change after Haloperidol and Ativan administration.  Charge nurse aware that pt should be a 1:1 for safety.

## 2022-01-16 NOTE — ED NOTES
Dr. Richardson called back, further updated on phone call with wife and phone number has been provided, Dr. Dejesus will call wife back she states

## 2022-01-16 NOTE — PROGRESS NOTES
PT intubated by ED physician with a 7.5 subglottic ETT approx 22@T. Place on initial vent settings of CMV 16 500 +10 80%.  Suctioned for no secretions.  BS clear, diminished bilat.  RT will continue to follow.    Casey Perez, RT  1/16/2022

## 2022-01-16 NOTE — CONSULTS
PULMONARY / CRITICAL CARE CONSULT NOTE    Date / Time of Admission:  1/15/2022  4:26 PM    Assessment:     Peewee Hess is a 56 year old male with history of HTN, DM, multiple abdominal surgeries, ETOH abuse, tobacco user.   Presents to ED on 1/15 for evaluation of nausea, vomiting and chest pain described as burning sensation.  Patient has been having persistent hiccups. Reports cough and shortness of breath.   Patient was tachycardic, normotensive, afebrile. COVID19 PCR was negative.   Elevated WBC, mild elevated LFTs, increased lactic acid, (+) alcohol level.   CTA showed no pulmonary embolism, multifocal pulmonary infiltrates, severe wall thickening of middle and distal esophagus, hepatic steatosis, acute on chronic rib fractures, wall thickening of colon splenic flexure.   Started on IV fluids and treatment for aspiration pneumonia. In addition PPI IV was started.   Patient was restless and agitated , received ativan and haldol.   Patient become hypoxic, stared on BiPAP. Intermittent agitation, sedatives were given.   Patient become unresponsive. Patient was intubated.   Admitted to ICU.     1. Acute respiratory failure s/p intubation 1/16/22  2. Aspiration pneumonia  3. Alcohol withdrawal symptoms   4. Esophagitis / acute gastritis   5. Acute on chronic rib fractures   Secondary to falls related to ETOH abuse  6. Alcohol abuse  7. Diabetes mellitus   8. Malnutrition   9. Deconditioning     Advance Directives: Full code    Plan:   1. Titrate vent settings A/C 16/500/10/60%, keep SpO2 > 90% and plateau pressure < 32  2. Sedation to keep RASS -1 to -2, propofol , fentanyl drip , versed IV PRN  3. Scheduled oral ativan   4. IV fluids NS  5. Pressors as needed to keep MAP > 65  6. Follow up blood culture , urine and tracheal secretion culture   7. Continue Abx Zosyn   8. NPO for now  9. Check lipase level  10. Monitor LFTs  11. PPI IV  12. Glucose level monitoring   13. Thiamine IV  14. DVT prophylaxis lovenox  SQ    Please contact me if you have any questions.  Total critical care time, not including separately billable procedure time: 45 minutes  This patient had a high probability of imminent or life threatening deterioration due to acute respiratory failure which required my direct attention, intervention and personal management.     Sean Zhang  Pulmonary / Critical Care  01/16/2022  2:28 PM          ICU DAILY CHECKLIST                           Can patient transfer out of MICU? no    FAST HUG:    Feeding:  Feeding: no.  Patient is receiving NPO    Booth: yes  Analgesia/Sedation:yes  Propofol and fentanyl  Thromboembolic prophylaxis: Yes; Mode:  Lovenox and SCDs  HOB>30:  Yes  Stress Ulcer Protocol Active: Yes; Mode: PPI  Glycemic Control: Any glucose > 180 yes; Mode of Insulin Therapy: Sliding Scale Insulin    INTUBATED:  Can patient have daily waking:  Yes  Can patient have spontaneous breathing trial:  Yes    Restraints? yes    PHYSICAL THERAPY AND MOBILITY:  Can patient have PT and mobility trial: no  Activity: bedrest    Reason for consult: acute respiratory failure    HPI:  Peewee Hess is a 56 year old male with history of HTN, DM, multiple abdominal surgeries, ETOH abuse, tobacco user.   Presents to ED on 1/15 for evaluation of nausea, vomiting and chest pain described as burning sensation.  Patient has been having persistent hiccups. Reports cough and shortness of breath.   Patient was tachycardic, normotensive, afebrile. COVID19 PCR was negative.   Elevated WBC, mild elevated LFTs, increased lactic acid, (+) alcohol level.   CTA showed no pulmonary embolism, multifocal pulmonary infiltrates, severe wall thickening of middle and distal esophagus, hepatic steatosis, acute on chronic rib fractures, wall thickening of colon splenic flexure.   Started on IV fluids and treatment for aspiration pneumonia. In addition PPI IV was started.   Patient was restless and agitated , received ativan and  haldol.   Patient become hypoxic, stared on BiPAP. Intermittent agitation, sedatives were given.   Patient become unresponsive. Patient was intubated.   Admitted to ICU.     Past Medical History:   Diagnosis Date     Alcohol use disorder, severe, dependence (H) 11/10/2021     Alcohol-induced acute pancreatitis with infected necrosis      Cerebellar stroke (H)     Old lacunar cerebellar stroke noted on imaging     Diabetes mellitus (H)      Neuropathy       Allergies: Patient has no known allergies.     MEDS:  Current Facility-Administered Medications   Medication     acetaminophen (TYLENOL) tablet 650 mg    Or     acetaminophen (TYLENOL) Suppository 650 mg     albuterol (PROVENTIL) neb solution 2.5 mg     bisacodyl (DULCOLAX) Suppository 10 mg     calcium carbonate (TUMS) chewable tablet 1,000 mg     chlorhexidine (PERIDEX) 0.12 % solution 15 mL     glucose gel 15-30 g    Or     dextrose 50 % injection 25-50 mL    Or     glucagon injection 1 mg     DULoxetine (CYMBALTA) DR capsule 60 mg     enoxaparin ANTICOAGULANT (LOVENOX) injection 40 mg     famotidine (PEPCID) tablet 20 mg     fentaNYL (SUBLIMAZE) infusion     flumazenil (ROMAZICON) injection 0.2 mg     folic acid (FOLVITE) tablet 1 mg     gabapentin (NEURONTIN) solution 200 mg     OLANZapine zydis (zyPREXA) ODT tab 5-10 mg    Or     haloperidol lactate (HALDOL) injection 2.5-5 mg     insulin aspart (NovoLOG) injection (RAPID ACTING)     Lidocaine (LIDOCARE) 4 % Patch 1 patch     lidocaine (LMX4) cream     lidocaine 1 % 0.1-1 mL     lidocaine patch in PLACE     LORazepam (ATIVAN) 2 MG/ML (HIGH CONC) oral solution 1 mg     LORazepam (ATIVAN) tablet 1-2 mg    Or     LORazepam (ATIVAN) injection 1-2 mg     magnesium hydroxide (MILK OF MAGNESIA) suspension 30 mL     propofol (DIPRIVAN) infusion    And     propofol (DIPRIVAN) bolus from infusion pump 10-20 mg    And     Medication Instruction     melatonin tablet 5 mg     methocarbamol (ROBAXIN) tablet 500 mg      metoprolol succinate ER (TOPROL-XL) 24 hr tablet 25 mg     midazolam (VERSED) injection 2 mg     multivitamin w/minerals (THERA-VIT-M) tablet 1 tablet     naloxone (NARCAN) injection 0.2 mg    Or     naloxone (NARCAN) injection 0.4 mg    Or     naloxone (NARCAN) injection 0.2 mg    Or     naloxone (NARCAN) injection 0.4 mg     norepinephrine (LEVOPHED) 4 mg in  mL infusion PREMIX     ondansetron (ZOFRAN-ODT) ODT tab 4 mg    Or     ondansetron (ZOFRAN) injection 4 mg     [START ON 1/17/2022] pantoprazole (PROTONIX) 2 mg/mL suspension 40 mg    Or     [START ON 1/17/2022] pantoprazole (PROTONIX) IV push injection 40 mg     piperacillin-tazobactam (ZOSYN) 3.375 g vial to attach to  mL bag     polyethylene glycol (MIRALAX) Packet 17 g     sodium chloride (PF) 0.9% PF flush 3 mL     sodium chloride (PF) 0.9% PF flush 3 mL     sodium chloride 0.9% infusion     sucralfate (CARAFATE) suspension 1 g     [START ON 1/17/2022] thiamine (B-1) injection 100 mg     Family History   Problem Relation Age of Onset     Hyperlipidemia Mother      Cancer Father        Social History     Socioeconomic History     Marital status:      Spouse name: Not on file     Number of children: Not on file     Years of education: Not on file     Highest education level: Not on file   Occupational History     Not on file   Tobacco Use     Smoking status: Current Every Day Smoker     Packs/day: 0.50     Types: Cigarettes     Smokeless tobacco: Never Used   Substance and Sexual Activity     Alcohol use: Not Currently     Comment: 6 pack daily with 2-4 shots daily. Last drink 10/4     Drug use: Not on file     Sexual activity: Not on file   Other Topics Concern     Not on file   Social History Narrative     Not on file     Social Determinants of Health     Financial Resource Strain: Not on file   Food Insecurity: Not on file   Transportation Needs: Not on file   Physical Activity: Not on file   Stress: Not on file   Social Connections:  Not on file   Intimate Partner Violence: Not on file   Housing Stability: Not on file       ROS  - Patient is intubated    Objective:   VITALS:  BP 93/58   Pulse 85   Temp (!) 95.5  F (35.3  C)   Resp 11   SpO2 100%   VENT:  Ventilation Mode: CMV/AC  (Continuous Mandatory Ventilation/ Assist Control)  FiO2 (%): 60 %  Rate Set (breaths/minute): 16 breaths/min  Tidal Volume Set (mL): 500 mL  PEEP (cm H2O): 8 cmH2O  Oxygen Concentration (%): 80 %  Resp: 11    EXAM:   Gen: sedated, vented  HEENT: pale conjunctiva, dry mucosa  Neck: no thyromegaly, masses or JVD  Lungs: discrete ronchi both HT  CV: regular, no murmurs or gallops appreciated  Abdomen: multiple surgical scars, soft, NT, BS decrease in frequency  Ext: no edema  Neuro: sedated, unresponsive       Data Review:  Recent Labs   Lab 01/16/22  0957 01/16/22  0757 01/16/22  0320 01/16/22  0150 01/15/22  2303 01/15/22  2119   * 195* 287* 309* 284* 284*      1/16/2022 07:57   Sodium 141   Potassium 4.1   Chloride 106   Carbon Dioxide 28   Urea Nitrogen 6 (L)   Creatinine 0.84   GFR Estimate >90   Calcium 7.1 (L)   Anion Gap 7   Magnesium 1.8   Albumin 1.5 (L)   Protein Total 5.2 (L)   Bilirubin Total 1.0   Alkaline Phosphatase 199 (H)   ALT 25   AST 49 (H)   Bilirubin Direct 0.6 (H)   Lactic Acid 3.3 (H)   Glucose 195 (H)      1/15/2022 16:49 1/16/2022 07:57   WBC 16.0 (H) 25.8 (H)   Hemoglobin 11.8 (L) 10.6 (L)   Hematocrit 33.8 (L) 30.9 (L)   Platelet Count 274 269   RBC Count 3.82 (L) 3.44 (L)   MCV 89 90   MCH 30.9 30.8   MCHC 34.9 34.3   RDW 18.8 (H) 18.6 (H)      1/15/2022 16:49   SARS CoV2 PCR Negative   Influenza A Negative   Influenza B Negative     CT CHEST PULMONARY EMBOLISM W CONTRAST  DATE/TIME: 1/15/2022 6:17 PM  INDICATION: Shortness of breath  COMPARISON: Portable AP view of the chest 01/15/2022; CT of the abdomen and pelvis 10/05/2020  FINDINGS:  ANGIOGRAM CHEST: Pulmonary arteries are normal caliber and negative for pulmonary emboli.  Normal caliber thoracic aorta. There are no findings to suggest acute aortic syndrome. Proximal great vessels are patent. Diminutive left vertebral artery arises directly from the arch. Mild atheromatous calcifications descending aorta and distal right innominate artery.  LUNGS AND PLEURA: Upper lung predominant mixed centrilobular and paraseptal emphysema. Superimposed patchy airspace opacities are present bilaterally, asymmetric to the right associated with internal interstitial opacity. Minimal right pleural fluid layers dependently. There is chronic thickening of the left posterior and posterolateral pleura adjacent to multiple left rib fracture deformities. Central airways are patent.  MEDIASTINUM: Cardiac chambers are normal in size. No pericardial effusion is present. Mildly enlarged lymph nodes in both analia, likely reactive in the setting of airspace opacities. Subcarinal, lower paratracheal and subaortic/prevascular lymph nodes are all normal by size criteria.  The middle third and distal thirds of the esophagus has circumferential wall thickening consistent with esophagitis. Imaged thyroid gland is normal.  CORONARY ARTERY CALCIFICATION: None.  UPPER ABDOMEN: There are multiple calcified gallstones in the neck of the gallbladder. Severe fatty infiltration of the liver. The imaged distal transverse colon/splenic flecture has wall thickening. Previous splenectomy with small splenule present. 15 mm length ovoid calcification in the region of the pancreas neck is unchanged.  MUSCULOSKELETAL: There are multiple ununited left posterior and posterolateral rib fracture deformities including lateral ribs 7, 8 and posterior ribs 9 and 11. The posterolateral right ninth rib is broken in 2 locations and the fractures have fairly sharp borders suggesting acute on subacute fractures..                                           IMPRESSION  1.  No acute pulmonary embolism.  2.  Multifocal bilateral airspace opacities are  present mixed groundglass and interstitial thickening asymmetric to the right. Imaging features can be seen with (COVID-19)  pneumonia, though are nonspecific and can occur with a variety of infectious and   noninfectious processes. Given the other findings below, aspiration is a strong differential consideration.  3.  Severe wall thickening of the middle and distal thirds of the esophagus consistent with a subacute esophagitis.  4.  Severe hepatic steatosis.  5.  Acute on chronic fracture deformities of multiple left lateral/posterolateral ribs.  6.  Cholelithiasis.  7.  Wall thickening of the imaged colon consistent with colitis.    XR CHEST PORT 1 VIEW  LOCATION: [facility]  DATE/TIME: 1/16/2022 12:34 PM  INDICATION: ET tube placement, PICC line placement  COMPARISON: CT pulmonary angiogram 01/15/2022                                         IMPRESSION:   ET tube tip projects 6.5 cm above the igor. Gastric tube extends towards the diaphragmatic hiatus, outside the field-of-view. Right PICC terminates upper right atrium.  Extensive bilateral airspace opacities are present with patchy lung involvement, unchanged from yesterday. No new focal lung volume loss. No visible pleural fluid or pneumothorax on this single supine view.    By:  Sean Zhang MD, 01/16/2022  2:28 PM    Primary Care Physician:  Sarah Canseco

## 2022-01-16 NOTE — ED NOTES
"From ER 8 at 1140 for airway management.  Per Hospitalist, pt not responding to sternal rub.  Has BiPap on, pt will not be able to self management mask.  Per previous RN, GCS 7, hypotensive.  Zosyn infusing, NS at 100ml/hr, banana on hold d/t limited PIV access.  Had BUE soft wrist restraints and locked restraints to BLE.      Admitted to ER 1/15/2022 via EMS for c/o chest pain, while pt stated it was for vomiting.  Tachy 140s then.  Had no tremors.  Was not nauseated or vomiting at the time.  No sweats.  DENIED alcohol intake, \"my last drink was 4 weeks ago.\"  He had a +SANDY. He appeared intoxicated.  Later moved to ER 8 for agitation/restlessness.  While in ER 8, pt required BiPap as his sats dropped into low to mid 80s.  While in ER 8, his blood pressures started to drop.      Moved to ER 3 for airway management.  Pt on Bipap (15/8, rate 14, 80%) managed by RT.  Eyes closed, did not respond to sternal rub.  He moved his arms~2 episodes.      1153 RSI'd: ETOM 20, SUCC 100.  22 at teeth, 7.5cm ET tube.  PEEP 8, FiO2 80%, TV: 500, rate 16)  1159 Propofol gtt at 10 mcg/kg/min  1205 triple lumen PICC placed to RUE  1220 OG 16F, 55 at lip  1225 PEEP 10  1230 portable CXR  1243 temp sensing ling 16F    Temp 95.5F via bladder.  Will start with MELISSA rajigger.  Restraints to BUE remain, pt has made attempt to pull at ET tube.  Restraints to BLE have been removed.        "

## 2022-01-16 NOTE — ED NOTES
"Paged Dr. Dejesus, awaiting response:    \"ED Room 8, (Harlan Hess) BP 79/50 (MAP 59).  Fluids + Banana Bag continue to infuse.  Hanging Zosyn now.  Please call Maria Fernanda HENAO with any further questions. Thanks! \"  "

## 2022-01-16 NOTE — ED NOTES
"Paged Dr. Dejessu, awaiting response:    \"Please call Maria Fernanda in ED regarding Room 8 (Peewee Hess) to discuss oral meds and BP 86/60 (after recheck).  Patient remains asleep, on BIPAP. Thanks! - Maria Fernanda RN\"  "

## 2022-01-16 NOTE — SIGNIFICANT EVENT
11:44 PM    Order for restraints was placed secondary to patient factors:    Category: nonviolent  Type of restraint: soft restraints x 2  Behavior: pulling at tubes, lines  Root cause of the behavior: alcohol withdrawal  Less-restrictive measures that failed: 1:1 sitter, redirection  Criteria for release from restraint: cooperative and not pulling at necessary lines    Will continue to re-evaluate clinical need for restraints every 24 hours.     10:31 PM    RN called me for increasing confusion. Patient is pulling at lines, more agitated and confused.  Will schedule some Ativan 1mg PO every 4 hours to try to stay ahead of withdrawal better  Lactic acid elevated 3.4, give another fluid bolus and recheck in AM  Already on abx and cultures in process    /63   Pulse (!) 139   Temp 98.2  F (36.8  C) (Oral)   Resp 24   SpO2 94%     Valentine Herrera MD  [facility] Medicine Service

## 2022-01-16 NOTE — PROCEDURES
"PICC Line Insertion Procedure Note    Pt. Name: Peewee Hess  MRN:        [id number]    Procedure: Insertion of a  TRIPLE Lumen  5 fr  Bard SOLO (valved) Power PICC, Lot number [id number]    Indications: Medication Drips    Contraindications : None    Procedure Details   Patient identified with 2 identifiers and \"Time Out\" conducted.  .     Central line insertion bundle followed: hand hygiene performed prior to procedure, site cleansed with cholraprep, hat, mask, sterile gloves,sterile gown worn, patient draped with maximum barrier head to toe drape, sterile field maintained.    The vein was assessed and found to be compressible and of adequate size. ZERO ml 1% Lidocaine administered sq to the insertion site (on Propofol). A 5 Fr PICC was inserted into the BASILIC vein of the right arm with ultrasound guidance. ONE attempt(s) required to access vein.   Catheter threaded without difficulty. Good blood return noted.    Modified Seldinger Technique used for insertion.    The 8 sharps that are included in the PICC insertion kit were accounted for and disposed of in the sharps container prior to breakdown of the sterile field.    Catheter secured with Statlock, biopatch and Tegaderm dressing applied.    Findings:  Total catheter length  41 cm, with 1 cm exposed. Mid upper arm circumference is 30 cm. Catheter was flushed with 30 cc NS. Patient  tolerated procedure well.    Tip placement verified in the distal SVC by 3CG technology:          CLABSI prevention brochure left at bedside.    Patient's primary RN notified PICC is ready for use.    Comments:          Jean Paul Chambers, RN, MSN, [facility]-BC  Vascular Access - [facility]        "

## 2022-01-17 NOTE — PROGRESS NOTES
Physical Therapy    Pt is not appropriate for skilled PT services at this time, as he is intubated and sedated.  Plan was discussed with OT.  Will D/C current PT orders.  Please reorder if status changes. Thank you.    1/17/2022 by Olivia Mata, PT

## 2022-01-17 NOTE — PLAN OF CARE
Precedex gtt added for sinus tach 120's up to 130's. Bear hugger blanket off---temps have been 99's to 100's. RASS scores at -3.Pt remains NPO. FIO2 remains at 70%.    Problem: Adult Inpatient Plan of Care  Goal: Absence of Hospital-Acquired Illness or Injury  Intervention: Prevent Infection    Problem: Alcohol Withdrawal  Goal: Alcohol Withdrawal Symptom Control  Outcome: No Change     Problem: Nutrition Impairment (Mechanical Ventilation, Invasive)  Goal: Optimal Nutrition Delivery  Outcome: No Change     Problem: Ventilator-Induced Lung Injury (Mechanical Ventilation, Invasive)  Goal: Absence of Ventilator-Induced Lung Injury  Intervention: Prevent Ventilator-Associated Pneumonia  Taylor Shell, RN

## 2022-01-17 NOTE — CONSULTS
CLINICAL NUTRITION SERVICES - ASSESSMENT NOTE     Nutrition Prescription    RECOMMENDATIONS FOR MDs/PROVIDERS TO ORDER:      Malnutrition Status:    unable    Recommendations already ordered by Registered Dietitian (RD):  Confirm FT placement then start TF:  Promote with fiber at 20 ml/hr, increase 10 ml every 8 hrs to goal 70 ml/hr continuous.  FWF 60 every 6 hrs.  Promote with Fiber @ goal of  70ml/hr  (1680ml/day)  will provide: 1680 kcals, 106 g PRO, 1396 ml free H20 from formula, 240 ml flushes, 218 g CHO, and 24 g fiber daily.  TF at goal meets estimated rpotein needs.  Propofol providing additional calories to meet needs.    Future/Additional Recommendations:  TF/propofol     REASON FOR ASSESSMENT  Peewee Hess is a/an 56 year old male assessed by the dietitian for Provider Order - Registered Dietitian to Assess and Order TF per Medical Nutrition Therapy Protocol    NUTRITION HISTORY  Pt intubated, sedated.  Discussed pt in team rounds.  Pt with nausea at admission, SOB.  Hx HTN, DM, ETOH abuse, smoker, malnutrition, pancreatitis.   Reviewed RD note from 11/11/21, was inpt 11/4-11/12/21.  Pt noted to have severe malnutrition at that time with wt loss, fat and muscle loss.      CURRENT NUTRITION ORDERS  Diet: NPO  Propofol infusing at 24.3 ml/hr, this provides 642 calories/day.    LABS  ROUTINE ICU LABS (Last four results)  [facility] Labs   Lab 01/17/22  0854 01/17/22  0538 01/17/22  0401 01/17/22  0001 01/16/22  0957 01/16/22  0757 01/15/22  2119 01/15/22  1649   NA  --  142  --   --   --  141  --  139   POTASSIUM  --  3.6  --   --   --  4.1  --  4.3   CHLORIDE  --  110*  --   --   --  106  --  101   CO2  --  21*  --   --   --  28  --  22   ANIONGAP  --  11  --   --   --  7  --  16   * 180* 162* 139*   < > 195*   < > 259*   BUN  --  8  --   --   --  6*  --  6*   CR  --  0.71  --   --   --  0.84  --  0.78   GFRESTIMATED  --  >90  --   --   --  >90  --  >90   BECKY  --  6.7*  --   --   --  7.1*  --   7.8*   MAG  --  1.8  --   --   --  1.8  --  1.5*   PHOS  --   --   --   --   --   --   --  3.2   PROTTOTAL  --  5.0*  --   --   --  5.2*  --  5.9*   ALBUMIN  --  1.3*  --   --   --  1.5*  --  1.6*   BILITOTAL  --  1.1*  --   --   --  1.0  --  1.9*   ALKPHOS  --  194*  --   --   --  199*  --  237*   AST  --  48*  --   --   --  49*  --  47*   ALT  --  23  --   --   --  25  --  23    < > = values in this interval not displayed.   Hgb A1c 7  1/15/22  CBC  Recent Labs   Lab 01/17/22  0538 01/16/22  0757 01/15/22  1649   WBC 13.3* 25.8* 16.0*   RBC 3.64* 3.44* 3.82*   HGB 11.4* 10.6* 11.8*   HCT 32.3* 30.9* 33.8*   MCV 89 90 89   MCH 31.3 30.8 30.9   MCHC 35.3 34.3 34.9   RDW 19.2* 18.6* 18.8*    269 274     INR  Recent Labs   Lab 01/16/22  0757 01/15/22  1649   INR 1.72* 1.69*     Arterial Blood Gas  Recent Labs   Lab 01/16/22  1345   PH 7.43   PCO2 44   PO2 83   HCO3 28   O2PER 80   Labs reviewed    MEDICATIONS    albuterol  2.5 mg Nebulization TID     chlorhexidine  15 mL Mouth/Throat Q12H     DULoxetine  60 mg Oral Daily     enoxaparin ANTICOAGULANT  40 mg Subcutaneous Q24H     folic acid  1 mg Oral Daily     gabapentin  200 mg Oral or Feeding Tube BID     insulin aspart  1-12 Units Subcutaneous Q4H     lidocaine  1 patch Transdermal Q24H     lidocaine   Transdermal Q8H     LORazepam  1 mg Oral Q8H     metoprolol succinate ER  25 mg Oral Daily     multivitamin w/minerals  1 tablet Oral Daily     pantoprazole  40 mg Per Feeding Tube QAM AC    Or     pantoprazole (PROTONIX) IV  40 mg Intravenous QAM AC     piperacillin-tazobactam  3.375 g Intravenous Q8H     sodium chloride (PF)  3 mL Intracatheter Q8H     thiamine  100 mg Intravenous Daily        dexmedetomidine 0.2 mcg/kg/hr (01/17/22 0457)     fentaNYL 50 mcg/hr (01/16/22 1957)     propofol (DIPRIVAN) infusion 50 mcg/kg/min (01/17/22 1018)    And     - MEDICATION INSTRUCTIONS -       norepinephrine 0.15 mcg/kg/min (01/17/22 0649)     sodium chloride 100 mL/hr  "at 01/17/22 0919      acetaminophen **OR** acetaminophen, bisacodyl, calcium carbonate, glucose **OR** dextrose **OR** glucagon, famotidine, flumazenil, OLANZapine zydis **OR** haloperidol lactate, lidocaine 4%, lidocaine (buffered or not buffered), LORazepam, LORazepam **OR** LORazepam, magnesium hydroxide, propofol (DIPRIVAN) infusion **AND** propofol **AND** CK total **AND** Triglycerides **AND** - MEDICATION INSTRUCTIONS - **AND** Notify Physician, melatonin, methocarbamol, metoprolol, midazolam, naloxone **OR** naloxone **OR** naloxone **OR** naloxone, ondansetron **OR** ondansetron, polyethylene glycol, sodium chloride (PF), sucralfate   Medications reviewed    ANTHROPOMETRICS  Height: 0 cm (Data Unavailable) 6' 1\" per chart  Most Recent Weight: 84.7 kg (186 lb 11.2 oz)    BMI: Normal BMI  Weight History:   Wt Readings from Last 6 Encounters:   01/17/22 84.7 kg (186 lb 11.2 oz)   11/12/21 80.9 kg (178 lb 6.4 oz)   12/30/20 77.1 kg (170 lb)   178 lb 1.6 oz 11/4/21    Dosing Weight: 84.7 kg, current    ASSESSED NUTRITION NEEDS  Estimated Energy Needs: 4662-2476 kcals/day (25 - 30 kcals/kg)  Justification: Maintenance  Estimated Protein Needs: 102-125 grams protein/day (1.2 - 1.5 grams of pro/kg)  Justification: Increased needs, monitor LFTs.  Estimated Fluid Needs: 2115+ mL/day (1 mL/kcal)   Justification: maintenance or pending fluid status.    PHYSICAL FINDINGS  See malnutrition section below.  Hypoactive bowel sounds  No BM   Pt has OG.  Chest x-ray 1/16 indicates FT outside field of view.  1/15 chest CT-UPPER ABDOMEN: There are multiple calcified gallstones in the neck of the gallbladder. Severe fatty infiltration of the liver. The imaged distal transverse colon/splenic flecture has wall thickening. Previous splenectomy with small splenule present. 15   mm length ovoid calcification in the region of the pancreas neck is unchanged.       MALNUTRITION:  % Weight Loss:  None noted  % Intake:  Unable, pt " intubated  Subcutaneous Fat Loss:  Orbital region mild to moderate depletion  Muscle Loss:  Temporal region mild depletion  Fluid Retention:  None noted    Malnutrition Diagnosis: Unable to determine due to lack intake hx, pt intubated    NUTRITION DIAGNOSIS  Swallowing difficulty related to acute illness as evidenced by intubation      INTERVENTIONS  Implementation  Nutrition Education: Unable to complete due to pt intubated   Enteral Nutrition - Initiate     Goals  Tolerate TF at goal rate  Meet nutrition needs  Electrolytes WNL     Monitoring/Evaluation  Progress toward goals will be monitored and evaluated per protocol.

## 2022-01-17 NOTE — PROGRESS NOTES
RT PROGRESS NOTE    VENT DAY# 2    CURRENT SETTINGS:   Ventilation Mode: CMV/AC  (Continuous Mandatory Ventilation/ Assist Control)  FiO2 (%): 50 %  Rate Set (breaths/minute): 16 breaths/min  Tidal Volume Set (mL): 500 mL  PEEP (cm H2O): 10 cmH2O  Oxygen Concentration (%): 50 %  Resp: (!) 0      PATIENT PARAMETERS:  PIP 20  Pplat:  18  Pmean:  12  Secretions:  Scant tan  02 Sats:  97%    ETT SIZE 7.5 Secured at 23 cm at teeth/gums    Respiratory Medications: Albuterol TID       NOTE / SHIFT SUMMARY:   Pt's respiratory status remained stable overnight. No ventilator changes were made. Pt stayed synchronous with the vent.    Pancho Pena, RRT

## 2022-01-17 NOTE — PLAN OF CARE
Problem: Communication Impairment (Mechanical Ventilation, Invasive)  Goal: Effective Communication  Outcome: No Change     Problem: Device-Related Complication Risk (Mechanical Ventilation, Invasive)  Goal: Optimal Device Function  Outcome: No Change     Problem: Inability to Wean (Mechanical Ventilation, Invasive)  Goal: Mechanical Ventilation Liberation  Outcome: No Change     Problem: Nutrition Impairment (Mechanical Ventilation, Invasive)  Goal: Optimal Nutrition Delivery  Outcome: No Change     Problem: Skin and Tissue Injury (Mechanical Ventilation, Invasive)  Goal: Absence of Device-Related Skin and Tissue Injury  Outcome: No Change     Problem: Ventilator-Induced Lung Injury (Mechanical Ventilation, Invasive)  Goal: Absence of Ventilator-Induced Lung Injury  Outcome: No Change

## 2022-01-17 NOTE — CONSULTS
Care Management Initial Consult    General Information  Assessment completed with: Spouse or significant other, Daina  Type of CM/SW Visit: Initial Assessment    Primary Care Provider verified and updated as needed: Yes   Readmission within the last 30 days: no previous admission in last 30 days      Reason for Consult: discharge planning,other (see comments) (Rule 25 info needed)  Advance Care Planning: Advance Care Planning Reviewed: no concerns identified  no ACP docs, will provide HCD once appropriate       Communication Assessment  Patient's communication style: spoken language (English or Bilingual)             Cognitive  Cognitive/Neuro/Behavioral: .WDL except (currently intubated)  Level of Consciousness: sedated  Arousal Level: arouses to pain  Orientation:  (ASHLEY)     Best Language:  (intubated)  Speech: endotracheal tube    Living Environment:   People in home: spouse  Daina  Current living Arrangements: house (renting, live in lower level)      Able to return to prior arrangements: yes       Family/Social Support:  Care provided by: self,spouse/significant other  Provides care for: no one, unable/limited ability to care for self  Marital Status:   Wife  Daina       Description of Support System: Supportive,Involved    Support Assessment: Adequate family and caregiver support    Current Resources:   Patient receiving home care services: No     Community Resources: None  Equipment currently used at home: cane, straight,walker, standard  Supplies currently used at home: None    Employment/Financial:  Employment Status: disabled     Employment/ Comments: no  Financial Concerns: No concerns identified   Referral to Financial Counselor: No       Lifestyle & Psychosocial Needs:  Social Determinants of Health     Tobacco Use: High Risk     Smoking Tobacco Use: Current Every Day Smoker     Smokeless Tobacco Use: Never Used   Alcohol Use: Not on file   Financial Resource Strain: Not on file   Food  Insecurity: Not on file   Transportation Needs: Not on file   Physical Activity: Not on file   Stress: Not on file   Social Connections: Not on file   Intimate Partner Violence: Not on file   Depression: Not on file   Housing Stability: Not on file       Functional Status:  Prior to admission patient needed assistance:   Dependent ADLs:: Independent  Dependent IADLs:: Transportation       Mental Health Status:          Chemical Dependency Status:                Values/Beliefs:  Spiritual, Cultural Beliefs, Cheondoism Practices, Values that affect care: no               Additional Information:  Met with patients spouse Daina in room to introduce care manager role and discuss discharge planning. Patient currently intubated and sedated. From home with spouse in the lower level of a single family home which they rent; independent at baseline except for transportation. Spouse assists as needed and provides transport at baseline. Patient has cane and walker available for use. 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 Does not have Healthcare Directive, will need to provide patient with information once appropriate. Care manager consulted for Rule 25/chemical dependency assessment; Social work care manager to follow up with patient once able, currently intubated. Discharge plan pending progression and recommendations. Care manager to follow.     Briseyda Dudley RN

## 2022-01-17 NOTE — PLAN OF CARE
Occupational Therapy: Orders received. Chart reviewed and discussed with care team.? Occupational Therapy not indicated due to pt medical status at this time.? OT Will complete orders at this time, please re-order when medically appropriate. Thank you. Mayra Arriaza OTR/L

## 2022-01-17 NOTE — PLAN OF CARE
Problem: Alcohol Withdrawal  Goal: Alcohol Withdrawal Symptom Control  Outcome: Improving   Wife came and visited today. She was unaware that patient had started drinking again. He takes a medicine that will make him sick if he drinks and noticed him getting sick about 2 weeks ago. Wife updated on plan of care. May call later this evening for an update. Will continue to monitor.  Andria Lugo RN

## 2022-01-17 NOTE — PROGRESS NOTES
PULMONARY / CRITICAL CARE PROGRESS NOTE    Date / Time of Admission:  1/15/2022  4:26 PM    Assessment:     Peewee Hess is a 56 year old male with history of HTN, DM, multiple abdominal surgeries, ETOH abuse, tobacco user.   Presents to ED on 1/15 for evaluation of nausea, vomiting and chest pain and shortness of breath.  Patient was tachycardic, normotensive, afebrile. COVID19 PCR was negative.   Elevated WBC, mild elevated LFTs, increased lactic acid, (+) alcohol level.   CTA showed no pulmonary embolism, multifocal pulmonary infiltrates, severe wall thickening of middle and distal esophagus, hepatic steatosis, acute on chronic rib fractures, wall thickening of colon splenic flexure.   Started on IV fluids and treatment for aspiration pneumonia. In addition PPI IV was started.   Patient was restless and agitated , received ativan and haldol. Patient become hypoxic, stared on BiPAP. Intermittent agitation, sedatives were given. Patient become unresponsive. Patient was intubated.     1. Acute respiratory failure s/p intubation 1/16/22  2. Aspiration pneumonia  3. Hypotension , tachycardia  EKG showed sinus tachycardia, patient is currently afebrile. Treated for aspiration pneumonia.  Requires low dose NE drip. Sedated with precedex, fentanyl and propofol.   Plan to resume home dose metoprolol once off pressors.   Follow up venous US LEs.   4. Alcohol withdrawal symptoms   5. Esophagitis / acute gastritis   6. Acute on chronic rib fractures   Secondary to falls related to ETOH abuse  7. Alcohol abuse  8. Diabetes mellitus , last HgA1c 7.0 (1/15/22)  9. Malnutrition   10. Deconditioning     Advance Directives: Full code    Plan:   1. Titrate vent settings A/C 16/500/8/40%, keep SpO2 > 90% and plateau pressure < 32  2. Sedation to keep RASS -1 to -2, precedex and fentanyl drip, wean off propofol drip, versed IV PRN  3. Increase dose of enteric ativan   4. Heplock IV fluids  5. Pressors as needed to keep MAP >  65  6. Resume home B-blockers if hemodynamically stable.   7. Follow up blood culture , urine and tracheal secretion culture   8. Continue Abx Zosyn   9. Follow up CXR in AM  10. Start tube feedings   11. PPI IV  12. Glucose level monitoring   13. Thiamine IV  14. Venous US both LEs  15. DVT prophylaxis lovenox SQ    Please contact me if you have any questions.  Total critical care time, not including separately billable procedure time: 45 minutes  This patient had a high probability of imminent or life threatening deterioration due to acute respiratory failure which required my direct attention, intervention and personal management.     Sean Zhang  Pulmonary / Critical Care  01/17/2022  8:27 AM          ICU DAILY CHECKLIST                           Can patient transfer out of MICU? no    FAST HUG:    Feeding:  Feeding: yes Patient is receiving  Tube feedings   Booth: yes  Analgesia/Sedation:yes  Precedex, fentanyl and propofol  Thromboembolic prophylaxis: Yes; Mode:  Lovenox and SCDs  HOB>30:  Yes  Stress Ulcer Protocol Active: Yes; Mode: PPI  Glycemic Control: Any glucose > 180 yes; Mode of Insulin Therapy: Sliding Scale Insulin    INTUBATED:  Can patient have daily waking:  Yes  Can patient have spontaneous breathing trial:  Yes    Restraints? yes    PHYSICAL THERAPY AND MOBILITY:  Can patient have PT and mobility trial: no  Activity: bedrest    Subjective:   HPI:  Peewee Hess is a 56 year old male with history of HTN, DM, multiple abdominal surgeries, ETOH abuse, tobacco user.   Presents to ED on 1/15 for evaluation of nausea, vomiting and chest pain described as burning sensation.  Patient has been having persistent hiccups. Reports cough and shortness of breath.   Patient was tachycardic, normotensive, afebrile. COVID19 PCR was negative.   Elevated WBC, mild elevated LFTs, increased lactic acid, (+) alcohol level.   CTA showed no pulmonary embolism, multifocal pulmonary infiltrates, severe  wall thickening of middle and distal esophagus, hepatic steatosis, acute on chronic rib fractures, wall thickening of colon splenic flexure.   Started on IV fluids and treatment for aspiration pneumonia. In addition PPI IV was started.   Patient was restless and agitated , received ativan and haldol.   Patient become hypoxic, stared on BiPAP. Intermittent agitation, sedatives were given.   Patient become unresponsive. Patient was intubated.   Admitted to ICU.     Events overnight  - FiO2 is down to 40%  - Mild trach secretions  - Afebrile, sinus tachycardia  - Mild hypotension , on NE  - Precedex added to treatment     Allergies: Patient has no known allergies.     MEDS:  Current Facility-Administered Medications   Medication     acetaminophen (TYLENOL) tablet 650 mg    Or     acetaminophen (TYLENOL) Suppository 650 mg     albuterol (PROVENTIL) neb solution 2.5 mg     bisacodyl (DULCOLAX) Suppository 10 mg     calcium carbonate (TUMS) chewable tablet 1,000 mg     chlorhexidine (PERIDEX) 0.12 % solution 15 mL     dexmedetomidine (PRECEDEX) 400 mcg in 0.9% sodium chloride 100 mL     glucose gel 15-30 g    Or     dextrose 50 % injection 25-50 mL    Or     glucagon injection 1 mg     DULoxetine (CYMBALTA) DR capsule 60 mg     enoxaparin ANTICOAGULANT (LOVENOX) injection 40 mg     famotidine (PEPCID) tablet 20 mg     fentaNYL (SUBLIMAZE) infusion     flumazenil (ROMAZICON) injection 0.2 mg     folic acid (FOLVITE) tablet 1 mg     gabapentin (NEURONTIN) solution 200 mg     OLANZapine zydis (zyPREXA) ODT tab 5-10 mg    Or     haloperidol lactate (HALDOL) injection 2.5-5 mg     insulin aspart (NovoLOG) injection (RAPID ACTING)     Lidocaine (LIDOCARE) 4 % Patch 1 patch     lidocaine (LMX4) cream     lidocaine 1 % 0.1-1 mL     lidocaine patch in PLACE     LORazepam (ATIVAN) 2 MG/ML (HIGH CONC) oral solution 1 mg     LORazepam (ATIVAN) injection 1-2 mg     LORazepam (ATIVAN) tablet 1-2 mg    Or     LORazepam (ATIVAN)  injection 1-2 mg     magnesium hydroxide (MILK OF MAGNESIA) suspension 30 mL     propofol (DIPRIVAN) infusion    And     propofol (DIPRIVAN) bolus from infusion pump 10-20 mg    And     Medication Instruction     melatonin tablet 5 mg     methocarbamol (ROBAXIN) tablet 500 mg     metoprolol (LOPRESSOR) injection 5 mg     metoprolol succinate ER (TOPROL-XL) 24 hr tablet 25 mg     midazolam (VERSED) injection 2 mg     multivitamin w/minerals (THERA-VIT-M) tablet 1 tablet     naloxone (NARCAN) injection 0.2 mg    Or     naloxone (NARCAN) injection 0.4 mg    Or     naloxone (NARCAN) injection 0.2 mg    Or     naloxone (NARCAN) injection 0.4 mg     norepinephrine (LEVOPHED) 4 mg in  mL infusion PREMIX     ondansetron (ZOFRAN-ODT) ODT tab 4 mg    Or     ondansetron (ZOFRAN) injection 4 mg     pantoprazole (PROTONIX) 2 mg/mL suspension 40 mg    Or     pantoprazole (PROTONIX) IV push injection 40 mg     piperacillin-tazobactam (ZOSYN) 3.375 g vial to attach to  mL bag     polyethylene glycol (MIRALAX) Packet 17 g     sodium chloride (PF) 0.9% PF flush 3 mL     sodium chloride (PF) 0.9% PF flush 3 mL     sodium chloride 0.9% infusion     sucralfate (CARAFATE) suspension 1 g     thiamine (B-1) injection 100 mg     Objective:   VITALS:  /72   Pulse (!) 127   Temp 99.3  F (37.4  C)   Resp 16   Wt 84.7 kg (186 lb 11.2 oz)   SpO2 98%   BMI 24.63 kg/m    VENT:  Ventilation Mode: CMV/AC  (Continuous Mandatory Ventilation/ Assist Control)  FiO2 (%): 50 %  Rate Set (breaths/minute): 16 breaths/min  Tidal Volume Set (mL): 500 mL  PEEP (cm H2O): 10 cmH2O  Oxygen Concentration (%): 50 %  Resp: 16    EXAM:   Gen: sedated, vented  HEENT: pale conjunctiva, dry mucosa  Neck: no thyromegaly, masses or JVD  Lungs: discrete ronchi both HT  CV: regular, tachycardic, no murmurs or gallops appreciated  Abdomen: multiple surgical scars, soft, NT, BS decrease in frequency  Ext: trace edema both LEs  Neuro: sedated,  unresponsive      Data Review:  Recent Labs   Lab 01/17/22  0538 01/17/22  0401 01/17/22  0001 01/16/22  2055 01/16/22  1731 01/16/22  1445   * 162* 139* 140* 167* 136*      1/16/2022 07:57   Sodium 141   Potassium 4.1   Chloride 106   Carbon Dioxide 28   Urea Nitrogen 6 (L)   Creatinine 0.84   GFR Estimate >90   Calcium 7.1 (L)   Anion Gap 7   Magnesium 1.8   Albumin 1.5 (L)   Protein Total 5.2 (L)   Bilirubin Total 1.0   Alkaline Phosphatase 199 (H)   ALT 25   AST 49 (H)   Bilirubin Direct 0.6 (H)   Lactic Acid 3.3 (H)   Glucose 195 (H)      1/15/2022 16:49 1/16/2022 07:57   WBC 16.0 (H) 25.8 (H)   Hemoglobin 11.8 (L) 10.6 (L)   Hematocrit 33.8 (L) 30.9 (L)   Platelet Count 274 269   RBC Count 3.82 (L) 3.44 (L)   MCV 89 90   MCH 30.9 30.8   MCHC 34.9 34.3   RDW 18.8 (H) 18.6 (H)      1/15/2022 16:49   SARS CoV2 PCR Negative   Influenza A Negative   Influenza B Negative     CT CHEST PULMONARY EMBOLISM W CONTRAST  DATE/TIME: 1/15/2022 6:17 PM  INDICATION: Shortness of breath  COMPARISON: Portable AP view of the chest 01/15/2022; CT of the abdomen and pelvis 10/05/2020  FINDINGS:  ANGIOGRAM CHEST: Pulmonary arteries are normal caliber and negative for pulmonary emboli. Normal caliber thoracic aorta. There are no findings to suggest acute aortic syndrome. Proximal great vessels are patent. Diminutive left vertebral artery arises directly from the arch. Mild atheromatous calcifications descending aorta and distal right innominate artery.  LUNGS AND PLEURA: Upper lung predominant mixed centrilobular and paraseptal emphysema. Superimposed patchy airspace opacities are present bilaterally, asymmetric to the right associated with internal interstitial opacity. Minimal right pleural fluid layers dependently. There is chronic thickening of the left posterior and posterolateral pleura adjacent to multiple left rib fracture deformities. Central airways are patent.  MEDIASTINUM: Cardiac chambers are normal in size. No  pericardial effusion is present. Mildly enlarged lymph nodes in both analia, likely reactive in the setting of airspace opacities. Subcarinal, lower paratracheal and subaortic/prevascular lymph nodes are all normal by size criteria.  The middle third and distal thirds of the esophagus has circumferential wall thickening consistent with esophagitis. Imaged thyroid gland is normal.  CORONARY ARTERY CALCIFICATION: None.  UPPER ABDOMEN: There are multiple calcified gallstones in the neck of the gallbladder. Severe fatty infiltration of the liver. The imaged distal transverse colon/splenic flecture has wall thickening. Previous splenectomy with small splenule present. 15 mm length ovoid calcification in the region of the pancreas neck is unchanged.  MUSCULOSKELETAL: There are multiple ununited left posterior and posterolateral rib fracture deformities including lateral ribs 7, 8 and posterior ribs 9 and 11. The posterolateral right ninth rib is broken in 2 locations and the fractures have fairly sharp borders suggesting acute on subacute fractures..                                           IMPRESSION  1.  No acute pulmonary embolism.  2.  Multifocal bilateral airspace opacities are present mixed groundglass and interstitial thickening asymmetric to the right. Imaging features can be seen with (COVID-19)  pneumonia, though are nonspecific and can occur with a variety of infectious and   noninfectious processes. Given the other findings below, aspiration is a strong differential consideration.  3.  Severe wall thickening of the middle and distal thirds of the esophagus consistent with a subacute esophagitis.  4.  Severe hepatic steatosis.  5.  Acute on chronic fracture deformities of multiple left lateral/posterolateral ribs.  6.  Cholelithiasis.  7.  Wall thickening of the imaged colon consistent with colitis.    XR CHEST PORT 1 VIEW  LOCATION: [facility]  DATE/TIME: 1/16/2022 12:34 PM  INDICATION:  ET tube placement, PICC line placement  COMPARISON: CT pulmonary angiogram 01/15/2022                                         IMPRESSION:   ET tube tip projects 6.5 cm above the igor. Gastric tube extends towards the diaphragmatic hiatus, outside the field-of-view. Right PICC terminates upper right atrium.  Extensive bilateral airspace opacities are present with patchy lung involvement, unchanged from yesterday. No new focal lung volume loss. No visible pleural fluid or pneumothorax on this single supine view.    By:  Sean Zhang MD, 01/17/2022  8:27 AM    Primary Care Physician:  Sarah Canseco

## 2022-01-18 NOTE — PLAN OF CARE
Problem: Adult Inpatient Plan of Care  Goal: Plan of Care Review  Outcome: Improving  Flowsheets (Taken 1/17/2022 1195)  Plan of Care Reviewed With: spouse  Outcome Summary:   Spoke with wife Daina this evening. Updated re: decreasing sedation and BP medication. Answered questions re: extubation readiness requirements. Remains unresponsive at this time on full vent support. RASS goal -2 to -3; currently -5, continuing to wean precedex as mika.    TF started this evening. Promote w/ fiber @ 20ml/h (goal = 70)  Progress: improving     Problem: Restraint/Seclusion for Violent Self-Destructive Behavior  Goal: Prevent/manage potential problems during restraint/seclusion  Description: Maintain safety of patient and others during period of restraint/seclusion.  Promote psychological and physical wellbeing.  Prevent injury to skin and involved body parts.  Promote nutrition, hydration, and elimination.  Outcome: Improving  Goal: Prevent future episodes of restraint or seclusion  Description: Identify nonphysical alternatives to restraint or seclusion.  Identify additional de-escalation supportive measures to use as alternatives to restraint or seclusion.  Outcome: Improving     Problem: Alcohol Withdrawal  Goal: Alcohol Withdrawal Symptom Control  Outcome: Improving  Intervention: Minimize or Manage Alcohol Withdrawal Symptoms  Recent Flowsheet Documentation  Taken 1/17/2022 2000 by Ministerio Aguayo, RN  Sensory Stimulation Regulation:   care clustered   lighting decreased   music on   quiet environment promoted  Aspiration Precautions: respiratory status monitored  Taken 1/17/2022 1600 by Ministerio Aguayo, RN  Sensory Stimulation Regulation:   care clustered   lighting decreased   music on   quiet environment promoted  Aspiration Precautions: respiratory status monitored     Problem: Inability to Wean (Mechanical Ventilation, Invasive)  Goal: Mechanical Ventilation Liberation  Outcome: Improving  Intervention: Promote Extubation  and Mechanical Ventilation Liberation  Recent Flowsheet Documentation  Taken 1/17/2022 2000 by Mniisterio Aguayo, RN  Sleep/Rest Enhancement:   awakenings minimized   consistent schedule promoted   noise level reduced   regular sleep/rest pattern promoted  Medication Review/Management:   medications reviewed   high-risk medications identified  Environmental Support:   calm environment promoted   caregiver consistency promoted   environmental consistency promoted  Taken 1/17/2022 1600 by Ministerio Aguayo, RN  Sleep/Rest Enhancement:   awakenings minimized   consistent schedule promoted   noise level reduced   regular sleep/rest pattern promoted  Medication Review/Management:   medications reviewed   high-risk medications identified  Environmental Support:   calm environment promoted   caregiver consistency promoted   environmental consistency promoted     Problem: Nutrition Impairment (Mechanical Ventilation, Invasive)  Goal: Optimal Nutrition Delivery  Outcome: Improving

## 2022-01-18 NOTE — PROGRESS NOTES
Bedside EEG was performed that included photic stimulation; hyperventilation was deferred. The patient was intubated and not responding to stims. Skin intact.    [id number] used.

## 2022-01-18 NOTE — PROGRESS NOTES
RT PROGRESS NOTE    VENT DAY# 3    CURRENT SETTINGS:   Ventilation Mode: CMV/AC  (Continuous Mandatory Ventilation/ Assist Control)  FiO2 (%): 35 %  Rate Set (breaths/minute): 16 breaths/min  Tidal Volume Set (mL): 500 mL  PEEP (cm H2O): 8 cmH2O  Oxygen Concentration (%): 35 %  Resp: 20      PATIENT PARAMETERS:  PIP 23  Pplat:  20  Pmean:  11  Secretions:  Scant tan/white thick  02 Sats:  97%    ETT SIZE 7.5 Secured at 23 cm at teeth/gums    Respiratory Medications: Albuterol TID      NOTE / SHIFT SUMMARY:    Pt's respiratory status remained stable overnight. Pt transported for head CT at approx 0335 and tolerated well. No ventilator changes were made. Pt stayed synchronous with the vent.    Pancho Pena, RRT

## 2022-01-18 NOTE — PROGRESS NOTES
Pt tolerated inline Albuterol neb well. BS were coarse all fields, no change after treatment.   Harlan Hermosillo, RT

## 2022-01-18 NOTE — PLAN OF CARE
Problem: Communication Impairment (Mechanical Ventilation, Invasive)  Goal: Effective Communication  Outcome: No Change     Problem: Device-Related Complication Risk (Mechanical Ventilation, Invasive)  Goal: Optimal Device Function  Outcome: No Change     Problem: Inability to Wean (Mechanical Ventilation, Invasive)  Goal: Mechanical Ventilation Liberation  Outcome: No Change     Problem: Nutrition Impairment (Mechanical Ventilation, Invasive)  Goal: Optimal Nutrition Delivery  Outcome: No Change     Problem: Skin and Tissue Injury (Mechanical Ventilation, Invasive)  Goal: Absence of Device-Related Skin and Tissue Injury  Outcome: No Change     Problem: Ventilator-Induced Lung Injury (Mechanical Ventilation, Invasive)  Goal: Absence of Ventilator-Induced Lung Injury  Outcome: No Change    Harlna Hermosillo, RT

## 2022-01-18 NOTE — PLAN OF CARE
Problem: Acute Neurologic Deterioration (Alcohol Withdrawal)  Goal: Optimal Neurologic Function  Outcome: No Change     Problem: Adult Inpatient Plan of Care  Goal: Plan of Care Review  Outcome: No Change   Patient off the sedation medication since last night. Tube feeding on hold for now because of emesis and high residual. Patient seem to be awake intermittently during this afternoon while turning and  doing cares especially suctioning. Open's eyes for briefly  and close again. Noticed increased endotracheal secretion. Turned and reposition in bed, K-3.4 this morning, receiving some IV Potassium bumps. Urine output continue to be on lower side, total urine output this shift was 260 ml. EEG done this afternoon, awaiting for results.

## 2022-01-18 NOTE — PROGRESS NOTES
Care Management Follow Up    Length of Stay (days): 3    Expected Discharge Date: 01/24/2022     Concerns to be Addressed:     Requires ICU level of care due to intubation, ventilatory support, and IV medications. EEG pending. Monitor labs.   Patient plan of care discussed at interdisciplinary rounds: Yes    Anticipated Discharge Disposition:  Goal Home     Anticipated Discharge Services:  To be determined pending progression and recommendations.   Anticipated Discharge DME:  To be determined closer to time of discharge     Patient/family educated on Medicare website which has current facility and service quality ratings:  Not at this time   Education Provided on the Discharge Plan:  Per team  Patient/Family in Agreement with the Plan:  yes    Referrals Placed by CM/SW:  None at this time  Private pay costs discussed: Not applicable    Additional Information:  Chart Reviewed. Per discussion in rounds patient off sedation with minimal response to stimuli, work up pending. Patient from home with spouse in the lower level of a single family home where they rent and is mostly independent at baseline. Has a cane and walker available for use as needed. Once appropriate social work care manager to follow up with Rule 25 and CD resources. Discharge plan pending progression and recommendations. Care manager to follow.       Briseyda Dudley RN

## 2022-01-18 NOTE — PROGRESS NOTES
Sedation was being decreased during shift, but patient was having no response to stimuli. By 0300 patient off of most of sedation, but still not responsive to pain. Patient also has no cough / gag reflex during suctioning.   During pupil assessment, patient pupils are slightly irregular and left pupil appears to be fixed.   Patient went for STAT head CT.   MD also notified about low urine output for patient. Nursing staff will continue to monitor. Aren Collado RN 1/18/2022

## 2022-01-18 NOTE — PROGRESS NOTES
[facility] Pulmonary/Critical Care Consult Team Note    Peewee Hess,  1965, MRN [id number]  Admitting Dx: Agitation [R45.1]  Tachycardia [R00.0]  Pneumonia of both lungs due to infectious organism, unspecified part of lung [J18.9]  Non-intractable vomiting with nausea, unspecified vomiting type [R11.2]  Date / Time of Admission:  1/15/2022  4:26 PM    Overnight Events:  Intake/Output last 3 shifts:  I/O last 3 completed shifts:  In: 3511.04 [I.V.:3144.04; NG/GT:180]  Out: 625 [Urine:625]  Pt off sedation since 3am  Pt had head CT that was negative for acute findings  On and off levophed for hypotension    Assessment/Plan: Peewee Hess is a 56 year old male with PMHx of HTN, DM, multiple abdominal surgeries, ETOH abuse, tobacco user. Who presents to ED on 1/15 for evaluation of nausea, vomiting and chest pain with agitation treated and developed aspiration PNA s/p intubation.     PULM/ID: Acute respiratory failure s/p intubation 22  - LAST VENT SETTINGS  : Ventilation Mode: CMV/AC  (Continuous Mandatory Ventilation/ Assist Control)  FiO2 (%): 35 %  Rate Set (breaths/minute): 16 breaths/min  Tidal Volume Set (mL): 500 mL  PEEP (cm H2O): 8 cmH2O  Oxygen Concentration (%): 35 %  Resp: 16  - Aspiration PNA on Zosyn    CV: Hypotension from sepsis due to aspiration PNA  - continue levophed to keep MAP >65  - Hx of HTN  - holding home bblocker  - Monitor on tele    GI: Acute on chronic rib fractures secondary to falls related to ETOH abuse  - malnutrition  - GI proph    RENAL:   - baseline Cr of 0.6  - Follow BUN/Creatinine  - strict I/O's    NEURO: off sedation without increased responsiveness  - if no response by tomorrow will have neuro see and do an EEG    ENDO:-   - Critical Care hyperglycemic protocol  - Accuchecks and sliding scale q4    FEN:   - electrolyte replacement protocol    Pt has critical illness and impairs breathing on vent such as there is high probability of imminent of life  threatening deterioration in the patient's condition.    Code Status: FULL CODE    Infusions:    dexmedetomidine Stopped (01/18/22 0642)     dextrose       fentaNYL Stopped (01/18/22 0330)     propofol (DIPRIVAN) infusion Stopped (01/17/22 3318)    And     - MEDICATION INSTRUCTIONS -       norepinephrine Stopped (01/18/22 1100)       ICU DAILY CHECKLIST           Can patient transfer out of MICU? no  FAST HUG:  Feeding:  Feeding: Yes  Booth:{Yes  Analgesia/Sedation:Yes  Thromboembolic prophylaxis:Lovenox  HOB>30:  Yes  Stress Ulcer Protocol Active: Yes; Mode: PPI/H2 Antagonist  Glycemic Control: No components found for: GLU Any glucose > 180 no; Mode of Insulin Therapy: Sliding Scale Insulin  INTUBATED:  Can patient have daily waking: no  Can patient have spontaneous breathing trial: no;  Does pt have restraints: MD RESTRAINT FOR NON-VIOLENT BEHAVIOR FACE TO FACE EVALUATION Patient's Immediate Situation: Patient demonstrated the following behaviors: Impulsive behavior, grabbing at support tubes/lines.  Patient's Reaction to the intervention Does patient understand the reason for restraint/seclusion? Unable to Express Medical Condition Is there any evidence of compromise of Skin integrity, Respiratory, Cardiovascular, Musculoskeletal, Hydration? No Behavioral ConditionIn consultation with the RN, is there a need to continue this restraint or seclusion? Yes See Restraint Flowsheet for complete restraint documentation and assessment.  PHYSICAL THERAPY AND MOBILITY: Can patient have PT and mobility trial: no Activity: ICU mobility protocol    Critical Care Time excluding procedures and family discussions greater than: 45 Minutes    Risk Factors Present on Admission:  Clinically Significant Risk Factors Present on Admission                       Juju Morgan DO  Pulmonary and Critical Care Attending  pgr [phone]    No Known Allergies    Meds: See MAR    Physical Exam:  /57   Pulse 89   Temp 97.6  F (36.4  " C) (Oral)   Resp 16   Ht 1.854 m (6' 0.99\")   Wt 84.7 kg (186 lb 11.7 oz)   SpO2 93%   BMI 24.64 kg/m    Intake/Output this shift:  I/O this shift:  In: 265.8 [I.V.:205.8; NG/GT:60]  Out: 165 [Urine:165]  GEN: Intubated and sedated, NAD  HEENT: et tube in place, NG tube in place  CVS: regular rhythm, no murmurs  RESP: Coarse juan f breath sounds  ABD: Soft, No abdominal pain with palpation, no guarding, no rigidity  EXT: Warm, well perfused, no edema  NEURO:  sedated  PSYCH: sedated    Pertinent Labs: Latest lab results in EHR personally reviewed.   CMP  Recent Labs   Lab 01/18/22  0812 01/18/22  0434 01/18/22  0426 01/17/22  2333 01/17/22  0854 01/17/22  0538 01/16/22  0957 01/16/22  0757 01/15/22  2119 01/15/22  1649   NA  --  143  --   --   --  142  --  141  --  139   POTASSIUM  --  3.4*  --   --   --  3.6  --  4.1  --  4.3   CHLORIDE  --  112*  --   --   --  110*  --  106  --  101   CO2  --  24  --   --   --  21*  --  28  --  22   ANIONGAP  --  7  --   --   --  11  --  7  --  16   * 179* 161* 148*   < > 180*   < > 195*   < > 259*   BUN  --  6*  --   --   --  8  --  6*  --  6*   CR  --  0.60*  --   --   --  0.71  --  0.84  --  0.78   GFRESTIMATED  --  >90  --   --   --  >90  --  >90  --  >90   BECKY  --  6.6*  --   --   --  6.7*  --  7.1*  --  7.8*   MAG  --   --   --   --   --  1.8  --  1.8  --  1.5*   PHOS  --   --   --   --   --   --   --   --   --  3.2   PROTTOTAL  --   --   --   --   --  5.0*  --  5.2*  --  5.9*   ALBUMIN  --   --   --   --   --  1.3*  --  1.5*  --  1.6*   BILITOTAL  --   --   --   --   --  1.1*  --  1.0  --  1.9*   ALKPHOS  --   --   --   --   --  194*  --  199*  --  237*   AST  --   --   --   --   --  48*  --  49*  --  47*   ALT  --   --   --   --   --  23  --  25  --  23    < > = values in this interval not displayed.     CBC  Recent Labs   Lab 01/18/22  0434 01/17/22  0538 01/16/22  0757 01/15/22  1649   WBC 10.5 13.3* 25.8* 16.0*   RBC 3.37* 3.64* 3.44* 3.82*   HGB 10.6* 11.4* " 10.6* 11.8*   HCT 29.7* 32.3* 30.9* 33.8*   MCV 88 89 90 89   MCH 31.5 31.3 30.8 30.9   MCHC 35.7 35.3 34.3 34.9   RDW 19.1* 19.2* 18.6* 18.8*    258 269 274     INR  Recent Labs   Lab 01/16/22  0757 01/15/22  1649   INR 1.72* 1.69*     Arterial Blood Gas  Recent Labs   Lab 01/16/22  1345   PH 7.43   PCO2 44   PO2 83   HCO3 28   O2PER 80       Cultures: personally reviewed.   7-Day Micro Results    Collected Updated Procedure Result Status    01/16/2022 1810 01/18/2022 0919 Respiratory Aerobic Bacterial Culture [[id number]]   Sputum from Endotracheal    Final result Component Value   Culture 1+ Normal otñito              01/16/2022 1622 01/17/2022 0051 MRSA MSSA PCR, Nasal Swab [[id number]]    Swab from Nares, Bilateral    Final result Component Value   MRSA Target DNA Negative   SA Target DNA Negative           01/16/2022 1552 01/17/2022 2246 Blood Culture Peripheral Blood [[id number]]    Peripheral Blood    Preliminary result Component Value   Culture No growth after 1 day P              01/15/2022 1649 01/15/2022 1718 Symptomatic; Yes; 1/12/2022 Influenza A/B & SARS-CoV2 (COVID-19) Virus PCR Multiplex Nasopharyngeal [[id number]]    Swab from Nasopharyngeal    Final result Component Value   Influenza A PCR Negative   Influenza B PCR Negative   SARS CoV2 PCR Negative   NEGATIVE: SARS-CoV-2 (COVID-19) RNA not detected, presumed negative          Imaging: personally reviewed.   Results for orders placed or performed during the hospital encounter of 01/15/22   XR Chest Port 1 View    Narrative    EXAM: XR CHEST PORT 1 VIEW  LOCATION: [facility]  DATE/TIME: 1/15/2022 5:16 PM    INDICATION: Shortness of breath  COMPARISON: 11/5/2021      Impression    IMPRESSION: There are mild opacities in both lungs which have increased from the prior exam. This is likely due to a superimposed infectious or inflammatory process on top of mild background scarring. Normal heart size and pulmonary  vascularity. Old left   rib fractures.   CT Chest Pulmonary Embolism w Contrast    Narrative    EXAM: CT CHEST PULMONARY EMBOLISM W CONTRAST  LOCATION: [facility]  DATE/TIME: 1/15/2022 6:17 PM    INDICATION: Shortness of breath  COMPARISON: Portable AP view of the chest 01/15/2022; CT of the abdomen and pelvis 10/05/2020  TECHNIQUE: CT chest pulmonary angiogram during arterial phase injection of IV contrast. Multiplanar reformats and MIP reconstructions were performed. Dose reduction techniques were used.   CONTRAST: 100ml isovue 370    FINDINGS:  ANGIOGRAM CHEST: Pulmonary arteries are normal caliber and negative for pulmonary emboli. Normal caliber thoracic aorta. There are no findings to suggest acute aortic syndrome. Proximal great vessels are patent. Diminutive left vertebral artery arises   directly from the arch. Mild atheromatous calcifications descending aorta and distal right innominate artery.    LUNGS AND PLEURA: Upper lung predominant mixed centrilobular and paraseptal emphysema. Superimposed patchy airspace opacities are present bilaterally, asymmetric to the right associated with internal interstitial opacity. Minimal right pleural fluid   layers dependently. There is chronic thickening of the left posterior and posterolateral pleura adjacent to multiple left rib fracture deformities. Central airways are patent.    MEDIASTINUM: Cardiac chambers are normal in size. No pericardial effusion is present. Mildly enlarged lymph nodes in both analia, likely reactive in the setting of airspace opacities. Subcarinal, lower paratracheal and subaortic/prevascular lymph nodes are   all normal by size criteria.    The middle third and distal thirds of the esophagus has circumferential wall thickening consistent with esophagitis. Imaged thyroid gland is normal.    CORONARY ARTERY CALCIFICATION: None.    UPPER ABDOMEN: There are multiple calcified gallstones in the neck of the gallbladder.  Severe fatty infiltration of the liver. The imaged distal transverse colon/splenic flecture has wall thickening. Previous splenectomy with small splenule present. 15   mm length ovoid calcification in the region of the pancreas neck is unchanged.    MUSCULOSKELETAL: There are multiple ununited left posterior and posterolateral rib fracture deformities including lateral ribs 7, 8 and posterior ribs 9 and 11. The posterolateral right ninth rib is broken in 2 locations and the fractures have fairly   sharp borders suggesting acute on subacute fractures..      Impression    IMPRESSION:    1.  No acute pulmonary embolism.  2.  Multifocal bilateral airspace opacities are present mixed groundglass and interstitial thickening asymmetric to the right. Imaging features can be seen with (COVID-19)  pneumonia, though are nonspecific and can occur with a variety of infectious and   noninfectious processes. Given the other findings below, aspiration is a strong differential consideration.  3.  Severe wall thickening of the middle and distal thirds of the esophagus consistent with a subacute esophagitis.  4.  Severe hepatic steatosis.  5.  Acute on chronic fracture deformities of multiple left lateral/posterolateral ribs.  6.  Cholelithiasis.  7.  Wall thickening of the imaged colon consistent with colitis.   XR Chest Port 1 View    Narrative    EXAM: XR CHEST PORT 1 VIEW  LOCATION: [facility]  DATE/TIME: 1/16/2022 12:34 PM    INDICATION: ET tube placement, PICC line placement  COMPARISON: CT pulmonary angiogram 01/15/2022      Impression    IMPRESSION:     ET tube tip projects 6.5 cm above the igor. Gastric tube extends towards the diaphragmatic hiatus, outside the field-of-view. Right PICC terminates upper right atrium.    Extensive bilateral airspace opacities are present with patchy lung involvement, unchanged from yesterday. No new focal lung volume loss.    No visible pleural fluid or pneumothorax  on this single supine view.   XR Abdomen Port 1 View    Narrative    EXAM: XR ABDOMEN PORT 1 VIEWS  LOCATION: [facility]  DATE/TIME: 1/17/2022 1:25 PM    INDICATION: Location of OG tube  COMPARISON: None.      Impression    IMPRESSION: Enteric tube in the body the stomach.    CT Head w/o Contrast    Narrative    EXAM: CT HEAD W/O CONTRAST  LOCATION: [facility]  DATE/TIME: 1/18/2022 3:45 AM    INDICATION: Headache, intracranial hemorrhage suspected.  COMPARISON: 11/4/2021  TECHNIQUE: Routine CT Head without IV contrast. Multiplanar reformats. Dose reduction techniques were used.    FINDINGS:  INTRACRANIAL CONTENTS: No intracranial hemorrhage, extraaxial collection, or mass effect. No CT evidence of acute infarct. Mild presumed chronic small vessel ischemic changes. Mild to moderate generalized volume loss. No hydrocephalus. Stable calcified   dural based lesion along the right frontal convexity measuring 1 cm in diameter, presumably reflecting a small meningioma.     VISUALIZED ORBITS/SINUSES/MASTOIDS: No intraorbital abnormality. Complete opacification of the left maxillary sinus with chronic mucoperiosteal reaction. Mucous retention cyst in the left sphenoid sinus. Mild mucosal thickening along the floor of the   left frontal sinus. Opacified left frontoethmoidal recess. No middle ear or mastoid effusion.    BONES/SOFT TISSUES: No acute abnormality.      Impression    IMPRESSION:  1.  No CT evidence for acute intracranial process.  2.  Stable chronic changes as above.   XR Chest Port 1 View    Narrative    EXAM: XR CHEST PORT 1 VIEW  LOCATION: [facility]  DATE/TIME: 1/18/2022 6:06 AM    INDICATION: Location of ET tube  COMPARISON: 01/16/2022.       Impression    IMPRESSION:Endotracheal tube is in the trachea at the level of the clavicular heads with tip 6 cm above the igor. PICC catheter from right upper extremity approach is in the distal  superior vena cava near its junction with the right atrium. Enteric   tube tip is below the diaphragm and not visualized. There is no pneumothorax. Again noted are bilateral patchy airspace opacities there has been increased consolidation or atelectasis at the right medial lung base. There are old healed left posterior rib   fractures no acute fractures are identified.       Patient Active Problem List   Diagnosis     Alcohol dependence with unspecified alcohol-induced disorder (H)     Charcot's joint of foot, left     Fall     Hypomagnesemia     Iron deficiency anemia     Restless legs     Type 2 diabetes mellitus without complication (H)     Secondary hypertension     Tobacco use disorder     Meningioma (H)     Falls frequently     Alcoholic intoxication with complication (H)     Closed nondisplaced fracture of phalanx of toe of left foot, unspecified toe, initial encounter     Alcohol use disorder, severe, dependence (H)     Sinus tachycardia     Pneumonia of both lungs due to infectious organism, unspecified part of lung     Non-intractable vomiting with nausea, unspecified vomiting type     Agitation       Juju Morgan DO  Pulmonary and Critical Care Attending  pgr [phone]

## 2022-01-18 NOTE — PROGRESS NOTES
RT PROGRESS NOTE    VENT DAY# 3    CURRENT SETTINGS:   Ventilation Mode: CMV/AC  (Continuous Mandatory Ventilation/ Assist Control)  FiO2 (%): 35 %  Rate Set (breaths/minute): 16 breaths/min  Tidal Volume Set (mL): 500 mL  PEEP (cm H2O): 8 cmH2O  Oxygen Concentration (%): 35 %  Resp: 16      PATIENT PARAMETERS:  PIP 26  Pplat:  22  Pmean:  12  Compliance: 32  SBT: No   Secretions:  scant  02 Sats:  97%    ETT SIZE 7.5 Secured at 23 cm at teeth/gums    Respiratory Medications: Albuterol TID     ABG: @1315 pH 7.40; pCO2 44; pO2 84; HCO3 27, %O2 Sat 95, BE 2.8 on listed vent settings    NOTE / SHIFT SUMMARY:   No weaning performed for PT today. He remained stable on the vent. RT will continue to follow.    Harlan Hermosillo, RT

## 2022-01-19 NOTE — PROGRESS NOTES
RT PROGRESS NOTE     VENT DAY# 4     CURRENT SETTINGS:              Ventilation Mode: CMV/AC  (Continuous Mandatory Ventilation/ Assist Control)  FiO2 (%): 45 %  Rate Set (breaths/minute): 16 breaths/min  Tidal Volume Set (mL): 500 mL  PEEP (cm H2O): 8 cmH2O  Oxygen Concentration (%): 45 %  Resp: 20        PATIENT PARAMETERS:  PIP 25  Pplat:  17  Pmean:  13  Secretions:  Small to moderate tan/white thick  02 Sats:  97%     ETT SIZE 7.5 Secured at 23 cm at teeth/gums     Respiratory Medications: Albuterol TID        NOTE / SHIFT SUMMARY:    Pt's respiratory status remained stable overnight. No ventilator changes were made. Pt stayed synchronous with the vent.     Pancho Pena, RRT

## 2022-01-19 NOTE — PLAN OF CARE
Problem: Adult Inpatient Plan of Care  Goal: Plan of Care Review  Outcome: No Change    Open eyes with cares.  Does not appear to track objects in field of vision.  Does not follow commands at this time.  Wife was updated at start of shift.    Occasionally coughing over the ventilator, cough sounds congested.  Air line suction with small amount of output.  Lavaged with minimal yellow output.  Had to turn FiO2 from 35% to 55% at start of shift, since then I have been able to wean down to 45%.    Rick Adame RN

## 2022-01-19 NOTE — PROGRESS NOTES
Care Management Follow Up    Length of Stay (days): 4    Expected Discharge Date: 01/24/2022     Concerns to be Addressed:     Requires ICU level of care due to intubation, ventilatory support, and IV medications.   Patient plan of care discussed at interdisciplinary rounds: Yes    Anticipated Discharge Disposition:  Goal Home     Anticipated Discharge Services:  To be determined pending progression and recommendations.   Anticipated Discharge DME:  To be determined closer to time of discharge    Patient/family educated on Medicare website which has current facility and service quality ratings:  Not needed at this time  Education Provided on the Discharge Plan:  Per team  Patient/Family in Agreement with the Plan:  yes    Referrals Placed by CM/SW:  None at this time   Private pay costs discussed: Not applicable    Additional Information:  Per discussion in rounds will do a weaning trial when wife here at hospital, patient is very anxious, but is more calm when spouse is present. Patient from home with spouse in the lower level of a single family home where they rent and is mostly independent at baseline. Has a cane and walker available for use as needed. Once appropriate social work care manager to follow up with Rule 25 and CD resources. Discharge plan pending progression and recommendations. Care manager to follow.       Briseyda Dudley RN

## 2022-01-19 NOTE — PLAN OF CARE
Problem: Restraint/Seclusion for Violent Self-Destructive Behavior  Goal: Prevent/manage potential problems during restraint/seclusion  Description: Maintain safety of patient and others during period of restraint/seclusion.  Promote psychological and physical wellbeing.  Prevent injury to skin and involved body parts.  Promote nutrition, hydration, and elimination.  Outcome: No Change     Problem: Adult Inpatient Plan of Care  Goal: Absence of Hospital-Acquired Illness or Injury  Intervention: Prevent Infection  Recent Flowsheet Documentation  Taken 1/19/2022 0830 by Radha Allen, RN  Infection Prevention: hand hygiene promoted   Patient open his eyes at times and tracks for brief second. Doesn't follow command. Urine output is minimum, RT trying to wean him off the vent.

## 2022-01-19 NOTE — PROGRESS NOTES
[facility] Pulmonary/Critical Care Consult Team Note    Peewee Hess,  1965, MRN [id number]  Admitting Dx: Agitation [R45.1]  Tachycardia [R00.0]  Pneumonia of both lungs due to infectious organism, unspecified part of lung [J18.9]  Non-intractable vomiting with nausea, unspecified vomiting type [R11.2]  Date / Time of Admission:  1/15/2022  4:26 PM    Overnight Events:  Intake/Output last 3 shifts:  I/O last 3 completed shifts:  In: 1647.8 [I.V.:1287.8; NG/GT:360]  Out: 545 [Urine:545]  Pt more awake this morning  Following commands  Off levophed for hypotension    Assessment/Plan: Peewee Hess is a 56 year old male with PMHx of HTN, DM, multiple abdominal surgeries, ETOH abuse, tobacco user. Who presents to ED on 1/15 for evaluation of nausea, vomiting and chest pain with agitation treated and developed aspiration PNA s/p intubation.     PULM/ID: Acute respiratory failure s/p intubation 22  - LAST VENT SETTINGS  : Ventilation Mode: CMV/AC  (Continuous Mandatory Ventilation/ Assist Control)  FiO2 (%): 45 %  Rate Set (breaths/minute): 16 breaths/min  Tidal Volume Set (mL): 500 mL  PEEP (cm H2O): 8 cmH2O  Oxygen Concentration (%): 45 %  Resp: 20  - Aspiration PNA on Zosyn    CV: Hypotension from sepsis due to aspiration PNA - off levophed  - continue levophed to keep MAP >65  - Hx of HTN  - holding home bblocker  - tachycardia this morning, has metoprolol PRN for HR >140  - Monitor on tele    GI: Acute on chronic rib fractures secondary to falls related to ETOH abuse  - malnutrition  - dietary following  - GI proph    RENAL:   - baseline Cr of 0.6  - Follow BUN/Creatinine  - strict I/O's    NEURO: off sedation without increased responsiveness  - EEG pending  - will stop ativan 1mg q8hrs    ENDO:-   - Critical Care hyperglycemic protocol  - Accuchecks and sliding scale q4    FEN:   - electrolyte replacement protocol    Pt has critical illness and impairs breathing on vent such as there is high  probability of imminent of life threatening deterioration in the patient's condition.    Code Status: FULL CODE    Infusions:    dexmedetomidine Stopped (01/18/22 0642)     dextrose       fentaNYL Stopped (01/18/22 0330)     propofol (DIPRIVAN) infusion Stopped (01/17/22 5418)    And     - MEDICATION INSTRUCTIONS -       norepinephrine Stopped (01/18/22 1100)       ICU DAILY CHECKLIST           Can patient transfer out of MICU? no  FAST HUG:  Feeding:  Feeding: Yes  Booth:{Yes  Analgesia/Sedation:Yes  Thromboembolic prophylaxis:Lovenox  HOB>30:  Yes  Stress Ulcer Protocol Active: Yes; Mode: PPI/H2 Antagonist  Glycemic Control: No components found for: GLU Any glucose > 180 no; Mode of Insulin Therapy: Sliding Scale Insulin  INTUBATED:  Can patient have daily waking: no  Can patient have spontaneous breathing trial: no;  Does pt have restraints: MD RESTRAINT FOR NON-VIOLENT BEHAVIOR FACE TO FACE EVALUATION Patient's Immediate Situation: Patient demonstrated the following behaviors: Impulsive behavior, grabbing at support tubes/lines.  Patient's Reaction to the intervention Does patient understand the reason for restraint/seclusion? Unable to Express Medical Condition Is there any evidence of compromise of Skin integrity, Respiratory, Cardiovascular, Musculoskeletal, Hydration? No Behavioral ConditionIn consultation with the RN, is there a need to continue this restraint or seclusion? Yes See Restraint Flowsheet for complete restraint documentation and assessment.  PHYSICAL THERAPY AND MOBILITY: Can patient have PT and mobility trial: no Activity: ICU mobility protocol    Critical Care Time excluding procedures and family discussions greater than: 45 Minutes    Risk Factors Present on Admission:  Clinically Significant Risk Factors Present on Admission               # Overweight: last Body mass index is 25.14 kg/m .          Juju Morgan DO  Pulmonary and Critical Care Attending  pgr [phone]    No Known  "Allergies    Meds: See MAR    Physical Exam:  BP (!) 87/60   Pulse 119   Temp 97.6  F (36.4  C) (Axillary)   Resp 20   Ht 1.854 m (6' 0.99\")   Wt 86.4 kg (190 lb 7.6 oz)   SpO2 94%   BMI 25.14 kg/m    Intake/Output this shift:  No intake/output data recorded.  GEN: Intubated and NAD  HEENT: et tube in place   CVS: regular rhythm, no murmurs  RESP: Coarse juan f breath sounds  ABD: Soft, No abdominal pain with palpation, no guarding, no rigidity  EXT: Warm, well perfused, no edema  NEURO:  Opens eyes to command    Pertinent Labs: Latest lab results in EHR personally reviewed.   CMP  Recent Labs   Lab 01/19/22  0328 01/18/22  2323 01/18/22 2004 01/18/22  1540 01/18/22  0812 01/18/22  0434 01/17/22  0854 01/17/22  0538 01/16/22  0957 01/16/22  0757 01/15/22  2119 01/15/22  1649   NA  --   --   --   --   --  143  --  142  --  141  --  139   POTASSIUM  --   --   --   --   --  3.4*  --  3.6  --  4.1  --  4.3   CHLORIDE  --   --   --   --   --  112*  --  110*  --  106  --  101   CO2  --   --   --   --   --  24  --  21*  --  28  --  22   ANIONGAP  --   --   --   --   --  7  --  11  --  7  --  16   * 151* 169* 166*   < > 179*   < > 180*   < > 195*   < > 259*   BUN  --   --   --   --   --  6*  --  8  --  6*  --  6*   CR  --   --   --   --   --  0.60*  --  0.71  --  0.84  --  0.78   GFRESTIMATED  --   --   --   --   --  >90  --  >90  --  >90  --  >90   BECKY  --   --   --   --   --  6.6*  --  6.7*  --  7.1*  --  7.8*   MAG  --   --   --   --   --   --   --  1.8  --  1.8  --  1.5*   PHOS  --   --   --   --   --   --   --   --   --   --   --  3.2   PROTTOTAL  --   --   --   --   --   --   --  5.0*  --  5.2*  --  5.9*   ALBUMIN  --   --   --   --   --   --   --  1.3*  --  1.5*  --  1.6*   BILITOTAL  --   --   --   --   --   --   --  1.1*  --  1.0  --  1.9*   ALKPHOS  --   --   --   --   --   --   --  194*  --  199*  --  237*   AST  --   --   --   --   --   --   --  48*  --  49*  --  47*   ALT  --   --   --   --   --   " --   --  23  --  25  --  23    < > = values in this interval not displayed.     CBC  Recent Labs   Lab 01/18/22  0434 01/17/22  0538 01/16/22  0757 01/15/22  1649   WBC 10.5 13.3* 25.8* 16.0*   RBC 3.37* 3.64* 3.44* 3.82*   HGB 10.6* 11.4* 10.6* 11.8*   HCT 29.7* 32.3* 30.9* 33.8*   MCV 88 89 90 89   MCH 31.5 31.3 30.8 30.9   MCHC 35.7 35.3 34.3 34.9   RDW 19.1* 19.2* 18.6* 18.8*    258 269 274     INR  Recent Labs   Lab 01/16/22  0757 01/15/22  1649   INR 1.72* 1.69*     Arterial Blood Gas  Recent Labs   Lab 01/18/22  1319 01/16/22  1345   PH 7.40 7.43   PCO2 44 44   PO2 84 83   HCO3 27 28   O2PER 35 80       Cultures: personally reviewed.   7-Day Micro Results    Collected Updated Procedure Result Status    01/16/2022 1810 01/18/2022 0919 Respiratory Aerobic Bacterial Culture [[id number]]   Sputum from Endotracheal    Final result Component Value   Culture 1+ Normal toñito              01/16/2022 1622 01/17/2022 0051 MRSA MSSA PCR, Nasal Swab [[id number]]    Swab from Nares, Bilateral    Final result Component Value   MRSA Target DNA Negative   SA Target DNA Negative           01/16/2022 1552 01/17/2022 2246 Blood Culture Peripheral Blood [[id number]]    Peripheral Blood    Preliminary result Component Value   Culture No growth after 1 day P              01/15/2022 1649 01/15/2022 1718 Symptomatic; Yes; 1/12/2022 Influenza A/B & SARS-CoV2 (COVID-19) Virus PCR Multiplex Nasopharyngeal [[id number]]    Swab from Nasopharyngeal    Final result Component Value   Influenza A PCR Negative   Influenza B PCR Negative   SARS CoV2 PCR Negative   NEGATIVE: SARS-CoV-2 (COVID-19) RNA not detected, presumed negative          Imaging: personally reviewed.   Results for orders placed or performed during the hospital encounter of 01/15/22   XR Chest Port 1 View    Narrative    EXAM: XR CHEST PORT 1 VIEW  LOCATION: [facility]  DATE/TIME: 1/15/2022 5:16 PM    INDICATION: Shortness of  breath  COMPARISON: 11/5/2021      Impression    IMPRESSION: There are mild opacities in both lungs which have increased from the prior exam. This is likely due to a superimposed infectious or inflammatory process on top of mild background scarring. Normal heart size and pulmonary vascularity. Old left   rib fractures.   CT Chest Pulmonary Embolism w Contrast    Narrative    EXAM: CT CHEST PULMONARY EMBOLISM W CONTRAST  LOCATION: [facility]  DATE/TIME: 1/15/2022 6:17 PM    INDICATION: Shortness of breath  COMPARISON: Portable AP view of the chest 01/15/2022; CT of the abdomen and pelvis 10/05/2020  TECHNIQUE: CT chest pulmonary angiogram during arterial phase injection of IV contrast. Multiplanar reformats and MIP reconstructions were performed. Dose reduction techniques were used.   CONTRAST: 100ml isovue 370    FINDINGS:  ANGIOGRAM CHEST: Pulmonary arteries are normal caliber and negative for pulmonary emboli. Normal caliber thoracic aorta. There are no findings to suggest acute aortic syndrome. Proximal great vessels are patent. Diminutive left vertebral artery arises   directly from the arch. Mild atheromatous calcifications descending aorta and distal right innominate artery.    LUNGS AND PLEURA: Upper lung predominant mixed centrilobular and paraseptal emphysema. Superimposed patchy airspace opacities are present bilaterally, asymmetric to the right associated with internal interstitial opacity. Minimal right pleural fluid   layers dependently. There is chronic thickening of the left posterior and posterolateral pleura adjacent to multiple left rib fracture deformities. Central airways are patent.    MEDIASTINUM: Cardiac chambers are normal in size. No pericardial effusion is present. Mildly enlarged lymph nodes in both analia, likely reactive in the setting of airspace opacities. Subcarinal, lower paratracheal and subaortic/prevascular lymph nodes are   all normal by size  criteria.    The middle third and distal thirds of the esophagus has circumferential wall thickening consistent with esophagitis. Imaged thyroid gland is normal.    CORONARY ARTERY CALCIFICATION: None.    UPPER ABDOMEN: There are multiple calcified gallstones in the neck of the gallbladder. Severe fatty infiltration of the liver. The imaged distal transverse colon/splenic flecture has wall thickening. Previous splenectomy with small splenule present. 15   mm length ovoid calcification in the region of the pancreas neck is unchanged.    MUSCULOSKELETAL: There are multiple ununited left posterior and posterolateral rib fracture deformities including lateral ribs 7, 8 and posterior ribs 9 and 11. The posterolateral right ninth rib is broken in 2 locations and the fractures have fairly   sharp borders suggesting acute on subacute fractures..      Impression    IMPRESSION:    1.  No acute pulmonary embolism.  2.  Multifocal bilateral airspace opacities are present mixed groundglass and interstitial thickening asymmetric to the right. Imaging features can be seen with (COVID-19)  pneumonia, though are nonspecific and can occur with a variety of infectious and   noninfectious processes. Given the other findings below, aspiration is a strong differential consideration.  3.  Severe wall thickening of the middle and distal thirds of the esophagus consistent with a subacute esophagitis.  4.  Severe hepatic steatosis.  5.  Acute on chronic fracture deformities of multiple left lateral/posterolateral ribs.  6.  Cholelithiasis.  7.  Wall thickening of the imaged colon consistent with colitis.   XR Chest Port 1 View    Narrative    EXAM: XR CHEST PORT 1 VIEW  LOCATION: [facility]  DATE/TIME: 1/16/2022 12:34 PM    INDICATION: ET tube placement, PICC line placement  COMPARISON: CT pulmonary angiogram 01/15/2022      Impression    IMPRESSION:     ET tube tip projects 6.5 cm above the igor. Gastric tube  extends towards the diaphragmatic hiatus, outside the field-of-view. Right PICC terminates upper right atrium.    Extensive bilateral airspace opacities are present with patchy lung involvement, unchanged from yesterday. No new focal lung volume loss.    No visible pleural fluid or pneumothorax on this single supine view.   XR Abdomen Port 1 View    Narrative    EXAM: XR ABDOMEN PORT 1 VIEWS  LOCATION: [facility]  DATE/TIME: 1/17/2022 1:25 PM    INDICATION: Location of OG tube  COMPARISON: None.      Impression    IMPRESSION: Enteric tube in the body the stomach.    CT Head w/o Contrast    Narrative    EXAM: CT HEAD W/O CONTRAST  LOCATION: [facility]  DATE/TIME: 1/18/2022 3:45 AM    INDICATION: Headache, intracranial hemorrhage suspected.  COMPARISON: 11/4/2021  TECHNIQUE: Routine CT Head without IV contrast. Multiplanar reformats. Dose reduction techniques were used.    FINDINGS:  INTRACRANIAL CONTENTS: No intracranial hemorrhage, extraaxial collection, or mass effect. No CT evidence of acute infarct. Mild presumed chronic small vessel ischemic changes. Mild to moderate generalized volume loss. No hydrocephalus. Stable calcified   dural based lesion along the right frontal convexity measuring 1 cm in diameter, presumably reflecting a small meningioma.     VISUALIZED ORBITS/SINUSES/MASTOIDS: No intraorbital abnormality. Complete opacification of the left maxillary sinus with chronic mucoperiosteal reaction. Mucous retention cyst in the left sphenoid sinus. Mild mucosal thickening along the floor of the   left frontal sinus. Opacified left frontoethmoidal recess. No middle ear or mastoid effusion.    BONES/SOFT TISSUES: No acute abnormality.      Impression    IMPRESSION:  1.  No CT evidence for acute intracranial process.  2.  Stable chronic changes as above.   XR Chest Port 1 View    Narrative    EXAM: XR CHEST PORT 1 VIEW  LOCATION: [facility]  DATE/TIME: 1/18/2022 6:06 AM    INDICATION: Location of ET tube  COMPARISON: 01/16/2022.       Impression    IMPRESSION:Endotracheal tube is in the trachea at the level of the clavicular heads with tip 6 cm above the igor. PICC catheter from right upper extremity approach is in the distal superior vena cava near its junction with the right atrium. Enteric   tube tip is below the diaphragm and not visualized. There is no pneumothorax. Again noted are bilateral patchy airspace opacities there has been increased consolidation or atelectasis at the right medial lung base. There are old healed left posterior rib   fractures no acute fractures are identified.       Patient Active Problem List   Diagnosis     Alcohol dependence with unspecified alcohol-induced disorder (H)     Charcot's joint of foot, left     Fall     Hypomagnesemia     Iron deficiency anemia     Restless legs     Type 2 diabetes mellitus without complication (H)     Secondary hypertension     Tobacco use disorder     Meningioma (H)     Falls frequently     Alcoholic intoxication with complication (H)     Closed nondisplaced fracture of phalanx of toe of left foot, unspecified toe, initial encounter     Alcohol use disorder, severe, dependence (H)     Sinus tachycardia     Pneumonia of both lungs due to infectious organism, unspecified part of lung     Non-intractable vomiting with nausea, unspecified vomiting type     Agitation       Juju oMrgan,   Pulmonary and Critical Care Attending  pgr [phone]

## 2022-01-19 NOTE — PLAN OF CARE
Problem: Communication Impairment (Mechanical Ventilation, Invasive)  Goal: Effective Communication  Outcome: No Change     Problem: Device-Related Complication Risk (Mechanical Ventilation, Invasive)  Goal: Optimal Device Function  Outcome: No Change     Problem: Inability to Wean (Mechanical Ventilation, Invasive)  Goal: Mechanical Ventilation Liberation  Outcome: No Change     Problem: Skin and Tissue Injury (Mechanical Ventilation, Invasive)  Goal: Absence of Device-Related Skin and Tissue Injury  Outcome: No Change     Problem: Ventilator-Induced Lung Injury (Mechanical Ventilation, Invasive)  Goal: Absence of Ventilator-Induced Lung Injury  Outcome: No Change    Katya Cunningham, RT

## 2022-01-19 NOTE — PROGRESS NOTES
RT PROGRESS NOTE     VENT DAY# 4     CURRENT SETTINGS:              Ventilation Mode: CMV/AC  (Continuous Mandatory Ventilation/ Assist Control)  FiO2 (%): 40 %  Rate Set (breaths/minute): 16 breaths/min  Tidal Volume Set (mL): 500 mL  PEEP (cm H2O): 8 cmH2O  Resp: 16     PATIENT PARAMETERS:  PIP 27  Pplat:  23  Pmean:  13  SBT: SBT x2 via PS 5/+8. First SBT lasted <20 minutes. Ended due to apnea. Second SBT lasted 1 hour and 26 minutes. Pt is not ready for extubation. Not consistently following commands.            Secretions:  Large thick white  02 Sats:  96%     ETT SIZE 7.5 Secured at 27 cm at teeth/gums     Respiratory Medications: Albuterol TID discontinued     ABG: @1315 pH 7.40; pCO2 44; pO2 84; HCO3 27, %O2 Sat 95, BE 2.8 on listed vent settings     NOTE / SHIFT SUMMARY:  Pt remains on full mechanical ventilator support. No ventilator changes. FiO2 titrated to keep oxygen saturation >90%. Cuff leak positive. No plans to extubate today as pt is not fully awake and consistently following commands. End tidal CO2 in place, reading ~high 20s. Continue to assess readiness to wean and extubate as appropriate.     Katya Cunningham, RT

## 2022-01-20 NOTE — PROGRESS NOTES
Patient is in ICU, and in critical condition.  Intensivist is actively managing.  Our team will follow peripherally, and will take over the care when patient is transferred out of ICU.    Please call our team when patient is ready to be transferred out of ICU.    [facility] Medicine Service  Alicia Parker

## 2022-01-20 NOTE — PROGRESS NOTES
* [facility] Pulmonary/Critical Care Consult Team Note    Peewee Hess,  1965, MRN [id number]  Admitting Dx: Agitation [R45.1]  Tachycardia [R00.0]  Pneumonia of both lungs due to infectious organism, unspecified part of lung [J18.9]  Non-intractable vomiting with nausea, unspecified vomiting type [R11.2]  Date / Time of Admission:  1/15/2022  4:26 PM    Overnight Events:  Intake/Output last 3 shifts:  I/O last 3 completed shifts:  In: 1106.9 [I.V.:476.9; NG/GT:630]  Out: 335 [Urine:335]  No acute events overnight    Assessment/Plan: Peewee Hess is a 56 year old male with PMHx of HTN, DM, multiple abdominal surgeries, ETOH abuse, tobacco user. Who presents to ED on 1/15 for evaluation of nausea, vomiting and chest pain with agitation treated and developed aspiration PNA s/p intubation.     PULM/ID: Acute respiratory failure s/p intubation 22  - LAST VENT SETTINGS  : Ventilation Mode: CMV/AC  (Continuous Mandatory Ventilation/ Assist Control)  FiO2 (%): 40 %  Rate Set (breaths/minute): 16 breaths/min  Tidal Volume Set (mL): 500 mL  PEEP (cm H2O): 8 cmH2O  Pressure Support (cm H2O): 5 cmH2O  Oxygen Concentration (%): 40 %  Resp: 15  - Aspiration PNA on Zosyn    CV: Hypotension from sepsis due to aspiration PNA - off levophed since   - continue levophed to keep MAP >65  - Hx of HTN  - holding home bblocker  - metoprolol PRN for HR >140  - Monitor on tele    GI: Acute on chronic rib fractures secondary to falls related to ETOH abuse  - malnutrition  - dietary following  - GI proph    RENAL:   - baseline Cr of 0.6  - Follow BUN/Creatinine  - strict I/O's  - net positive and edematous, will add one dose lasix 40 this am    NEURO: off sedation without increased responsiveness  - EEG without seizures and just diffuse slowing  - has been off benzo's or opiates since     ENDO:-   - Critical Care hyperglycemic protocol  - Accuchecks and sliding scale q4    FEN:   - electrolyte replacement  protocol    Pt has critical illness and impairs breathing on vent such as there is high probability of imminent of life threatening deterioration in the patient's condition.    Code Status: FULL CODE    Infusions:    dextrose       fentaNYL Stopped (01/18/22 1470)     propofol (DIPRIVAN) infusion Stopped (01/17/22 0060)    And     - MEDICATION INSTRUCTIONS -         ICU DAILY CHECKLIST           Can patient transfer out of MICU? no  FAST HUG:  Feeding:  Feeding: Yes  Booth:{Yes  Analgesia/Sedation:Yes  Thromboembolic prophylaxis:Lovenox  HOB>30:  Yes  Stress Ulcer Protocol Active: Yes; Mode: PPI/H2 Antagonist  Glycemic Control: No components found for: GLU Any glucose > 180 no; Mode of Insulin Therapy: Sliding Scale Insulin  INTUBATED:  Can patient have daily waking: no  Can patient have spontaneous breathing trial: no;  Does pt have restraints: MD RESTRAINT FOR NON-VIOLENT BEHAVIOR FACE TO FACE EVALUATION Patient's Immediate Situation: Patient demonstrated the following behaviors: Impulsive behavior, grabbing at support tubes/lines.  Patient's Reaction to the intervention Does patient understand the reason for restraint/seclusion? Unable to Express Medical Condition Is there any evidence of compromise of Skin integrity, Respiratory, Cardiovascular, Musculoskeletal, Hydration? No Behavioral ConditionIn consultation with the RN, is there a need to continue this restraint or seclusion? Yes See Restraint Flowsheet for complete restraint documentation and assessment.  PHYSICAL THERAPY AND MOBILITY: Can patient have PT and mobility trial: no Activity: ICU mobility protocol    Critical Care Time excluding procedures and family discussions greater than: 45 Minutes    Risk Factors Present on Admission:  Clinically Significant Risk Factors Present on Admission                         Juju Morgan DO  Pulmonary and Critical Care Attending  pgr [phone]    No Known Allergies    Meds: See MAR    Physical Exam:  /70  "  Pulse 117   Temp 98.1  F (36.7  C) (Axillary)   Resp 15   Ht 1.854 m (6' 0.99\")   Wt 86.4 kg (190 lb 7.6 oz)   SpO2 94%   BMI 25.14 kg/m    Intake/Output this shift:  No intake/output data recorded.  GEN: Intubated and NAD  HEENT: et tube in place   CVS: regular rhythm, no murmurs  RESP: Coarse juan f breath sounds  ABD: Soft, No abdominal pain with palpation, no guarding, no rigidity  EXT: Warm, well perfused, 2+edema  NEURO:  Opens eyes to command    Pertinent Labs: Latest lab results in EHR personally reviewed.   CMP  Recent Labs   Lab 01/20/22  0540 01/20/22  0351 01/19/22  2342 01/19/22  1943 01/19/22  0823 01/19/22  0805 01/18/22  0812 01/18/22  0434 01/17/22  0854 01/17/22  0538 01/16/22  0957 01/16/22  0757 01/15/22  2119 01/15/22  1649     --   --   --   --  141  --  143  --  142  --  141  --  139   POTASSIUM 4.0  --   --   --   --  4.2  --  3.4*  --  3.6  --  4.1  --  4.3   CHLORIDE 109*  --   --   --   --  110*  --  112*  --  110*  --  106  --  101   CO2 23  --   --   --   --  21*  --  24  --  21*  --  28  --  22   ANIONGAP 9  --   --   --   --  10  --  7  --  11  --  7  --  16   * 156* 156* 154*   < > 174*   < > 179*   < > 180*   < > 195*   < > 259*   BUN 8  --   --   --   --  7*  --  6*  --  8  --  6*  --  6*   CR 0.63*  --   --   --   --  0.58*  --  0.60*  --  0.71  --  0.84  --  0.78   GFRESTIMATED >90  --   --   --   --  >90  --  >90  --  >90  --  >90  --  >90   BECKY 7.1*  --   --   --   --  7.1*  --  6.6*  --  6.7*  --  7.1*  --  7.8*   MAG  --   --   --   --   --   --   --   --   --  1.8  --  1.8  --  1.5*   PHOS  --   --   --   --   --   --   --   --   --   --   --   --   --  3.2   PROTTOTAL  --   --   --   --   --   --   --   --   --  5.0*  --  5.2*  --  5.9*   ALBUMIN  --   --   --   --   --   --   --   --   --  1.3*  --  1.5*  --  1.6*   BILITOTAL  --   --   --   --   --   --   --   --   --  1.1*  --  1.0  --  1.9*   ALKPHOS  --   --   --   --   --   --   --   --   --  194*  " --  199*  --  237*   AST  --   --   --   --   --   --   --   --   --  48*  --  49*  --  47*   ALT  --   --   --   --   --   --   --   --   --  23  --  25  --  23    < > = values in this interval not displayed.     CBC  Recent Labs   Lab 01/20/22  0540 01/19/22  0805 01/18/22  0434 01/17/22  0538   WBC 14.4* 14.5* 10.5 13.3*   RBC 3.57* 3.83* 3.37* 3.64*   HGB 11.1* 11.7* 10.6* 11.4*   HCT 31.6* 33.5* 29.7* 32.3*   MCV 89 88 88 89   MCH 31.1 30.5 31.5 31.3   MCHC 35.1 34.9 35.7 35.3   RDW 19.7* 19.3* 19.1* 19.2*    205 205 258     INR  Recent Labs   Lab 01/16/22  0757 01/15/22  1649   INR 1.72* 1.69*     Arterial Blood Gas  Recent Labs   Lab 01/18/22  1319 01/16/22  1345   PH 7.40 7.43   PCO2 44 44   PO2 84 83   HCO3 27 28   O2PER 35 80       Cultures: personally reviewed.   7-Day Micro Results    Collected Updated Procedure Result Status    01/16/2022 1810 01/18/2022 0919 Respiratory Aerobic Bacterial Culture [[id number]]   Sputum from Endotracheal    Final result Component Value   Culture 1+ Normal toñito              01/16/2022 1622 01/17/2022 0051 MRSA MSSA PCR, Nasal Swab [[id number]]    Swab from Nares, Bilateral    Final result Component Value   MRSA Target DNA Negative   SA Target DNA Negative           01/16/2022 1552 01/17/2022 2246 Blood Culture Peripheral Blood [[id number]]    Peripheral Blood    Preliminary result Component Value   Culture No growth after 1 day P              01/15/2022 1649 01/15/2022 1718 Symptomatic; Yes; 1/12/2022 Influenza A/B & SARS-CoV2 (COVID-19) Virus PCR Multiplex Nasopharyngeal [[id number]]    Swab from Nasopharyngeal    Final result Component Value   Influenza A PCR Negative   Influenza B PCR Negative   SARS CoV2 PCR Negative   NEGATIVE: SARS-CoV-2 (COVID-19) RNA not detected, presumed negative          Imaging: personally reviewed.   Results for orders placed or performed during the hospital encounter of 01/15/22   XR Chest Port 1 View    Narrative    EXAM: XR  CHEST PORT 1 VIEW  LOCATION: [facility]  DATE/TIME: 1/15/2022 5:16 PM    INDICATION: Shortness of breath  COMPARISON: 11/5/2021      Impression    IMPRESSION: There are mild opacities in both lungs which have increased from the prior exam. This is likely due to a superimposed infectious or inflammatory process on top of mild background scarring. Normal heart size and pulmonary vascularity. Old left   rib fractures.   CT Chest Pulmonary Embolism w Contrast    Narrative    EXAM: CT CHEST PULMONARY EMBOLISM W CONTRAST  LOCATION: [facility]  DATE/TIME: 1/15/2022 6:17 PM    INDICATION: Shortness of breath  COMPARISON: Portable AP view of the chest 01/15/2022; CT of the abdomen and pelvis 10/05/2020  TECHNIQUE: CT chest pulmonary angiogram during arterial phase injection of IV contrast. Multiplanar reformats and MIP reconstructions were performed. Dose reduction techniques were used.   CONTRAST: 100ml isovue 370    FINDINGS:  ANGIOGRAM CHEST: Pulmonary arteries are normal caliber and negative for pulmonary emboli. Normal caliber thoracic aorta. There are no findings to suggest acute aortic syndrome. Proximal great vessels are patent. Diminutive left vertebral artery arises   directly from the arch. Mild atheromatous calcifications descending aorta and distal right innominate artery.    LUNGS AND PLEURA: Upper lung predominant mixed centrilobular and paraseptal emphysema. Superimposed patchy airspace opacities are present bilaterally, asymmetric to the right associated with internal interstitial opacity. Minimal right pleural fluid   layers dependently. There is chronic thickening of the left posterior and posterolateral pleura adjacent to multiple left rib fracture deformities. Central airways are patent.    MEDIASTINUM: Cardiac chambers are normal in size. No pericardial effusion is present. Mildly enlarged lymph nodes in both analia, likely reactive in the setting of  airspace opacities. Subcarinal, lower paratracheal and subaortic/prevascular lymph nodes are   all normal by size criteria.    The middle third and distal thirds of the esophagus has circumferential wall thickening consistent with esophagitis. Imaged thyroid gland is normal.    CORONARY ARTERY CALCIFICATION: None.    UPPER ABDOMEN: There are multiple calcified gallstones in the neck of the gallbladder. Severe fatty infiltration of the liver. The imaged distal transverse colon/splenic flecture has wall thickening. Previous splenectomy with small splenule present. 15   mm length ovoid calcification in the region of the pancreas neck is unchanged.    MUSCULOSKELETAL: There are multiple ununited left posterior and posterolateral rib fracture deformities including lateral ribs 7, 8 and posterior ribs 9 and 11. The posterolateral right ninth rib is broken in 2 locations and the fractures have fairly   sharp borders suggesting acute on subacute fractures..      Impression    IMPRESSION:    1.  No acute pulmonary embolism.  2.  Multifocal bilateral airspace opacities are present mixed groundglass and interstitial thickening asymmetric to the right. Imaging features can be seen with (COVID-19)  pneumonia, though are nonspecific and can occur with a variety of infectious and   noninfectious processes. Given the other findings below, aspiration is a strong differential consideration.  3.  Severe wall thickening of the middle and distal thirds of the esophagus consistent with a subacute esophagitis.  4.  Severe hepatic steatosis.  5.  Acute on chronic fracture deformities of multiple left lateral/posterolateral ribs.  6.  Cholelithiasis.  7.  Wall thickening of the imaged colon consistent with colitis.   XR Chest Port 1 View    Narrative    EXAM: XR CHEST PORT 1 VIEW  LOCATION: [facility]  DATE/TIME: 1/16/2022 12:34 PM    INDICATION: ET tube placement, PICC line placement  COMPARISON: CT pulmonary  angiogram 01/15/2022      Impression    IMPRESSION:     ET tube tip projects 6.5 cm above the igor. Gastric tube extends towards the diaphragmatic hiatus, outside the field-of-view. Right PICC terminates upper right atrium.    Extensive bilateral airspace opacities are present with patchy lung involvement, unchanged from yesterday. No new focal lung volume loss.    No visible pleural fluid or pneumothorax on this single supine view.   XR Abdomen Port 1 View    Narrative    EXAM: XR ABDOMEN PORT 1 VIEWS  LOCATION: [facility]  DATE/TIME: 1/17/2022 1:25 PM    INDICATION: Location of OG tube  COMPARISON: None.      Impression    IMPRESSION: Enteric tube in the body the stomach.    CT Head w/o Contrast    Narrative    EXAM: CT HEAD W/O CONTRAST  LOCATION: [facility]  DATE/TIME: 1/18/2022 3:45 AM    INDICATION: Headache, intracranial hemorrhage suspected.  COMPARISON: 11/4/2021  TECHNIQUE: Routine CT Head without IV contrast. Multiplanar reformats. Dose reduction techniques were used.    FINDINGS:  INTRACRANIAL CONTENTS: No intracranial hemorrhage, extraaxial collection, or mass effect. No CT evidence of acute infarct. Mild presumed chronic small vessel ischemic changes. Mild to moderate generalized volume loss. No hydrocephalus. Stable calcified   dural based lesion along the right frontal convexity measuring 1 cm in diameter, presumably reflecting a small meningioma.     VISUALIZED ORBITS/SINUSES/MASTOIDS: No intraorbital abnormality. Complete opacification of the left maxillary sinus with chronic mucoperiosteal reaction. Mucous retention cyst in the left sphenoid sinus. Mild mucosal thickening along the floor of the   left frontal sinus. Opacified left frontoethmoidal recess. No middle ear or mastoid effusion.    BONES/SOFT TISSUES: No acute abnormality.      Impression    IMPRESSION:  1.  No CT evidence for acute intracranial process.  2.  Stable chronic changes as  above.   XR Chest Port 1 View    Narrative    EXAM: XR CHEST PORT 1 VIEW  LOCATION: [facility]  DATE/TIME: 1/18/2022 6:06 AM    INDICATION: Location of ET tube  COMPARISON: 01/16/2022.       Impression    IMPRESSION:Endotracheal tube is in the trachea at the level of the clavicular heads with tip 6 cm above the igor. PICC catheter from right upper extremity approach is in the distal superior vena cava near its junction with the right atrium. Enteric   tube tip is below the diaphragm and not visualized. There is no pneumothorax. Again noted are bilateral patchy airspace opacities there has been increased consolidation or atelectasis at the right medial lung base. There are old healed left posterior rib   fractures no acute fractures are identified.       Patient Active Problem List   Diagnosis     Alcohol dependence with unspecified alcohol-induced disorder (H)     Charcot's joint of foot, left     Fall     Hypomagnesemia     Iron deficiency anemia     Restless legs     Type 2 diabetes mellitus without complication (H)     Secondary hypertension     Tobacco use disorder     Meningioma (H)     Falls frequently     Alcoholic intoxication with complication (H)     Closed nondisplaced fracture of phalanx of toe of left foot, unspecified toe, initial encounter     Alcohol use disorder, severe, dependence (H)     Sinus tachycardia     Pneumonia of both lungs due to infectious organism, unspecified part of lung     Non-intractable vomiting with nausea, unspecified vomiting type     Agitation       Juju Morgan DO  Pulmonary and Critical Care Attending  pgr [phone]

## 2022-01-20 NOTE — PROGRESS NOTES
RT PROGRESS NOTE     VENT DAY# 5     CURRENT SETTINGS:              Ventilation Mode: CMV/AC  (Continuous Mandatory Ventilation/ Assist Control)  FiO2 (%): 40 %  Rate Set (breaths/minute): 16 breaths/min  Tidal Volume Set (mL): 500 mL  PEEP (cm H2O): 8 cmH2O  Oxygen Concentration (%): 40 %  Resp: 20        PATIENT PARAMETERS:  PIP 16  Pplat:  13  Pmean:  9  Secretions:  Small to moderate white thick  02 Sats:  95%     ETT SIZE 7.5 Secured at 27 cm at teeth/gums     Respiratory Medications: None        NOTE / SHIFT SUMMARY:    Pt's respiratory status remained stable overnight. No ventilator changes were made. Pt stayed synchronous with the vent.     Pancho Pena, RRT

## 2022-01-20 NOTE — PROGRESS NOTES
Care Management Follow Up    Length of Stay (days): 5    Expected Discharge Date: 01/24/2022     Concerns to be Addressed:     Requires ICU level of care due to intubation and ventilatory support. Monitor Heart rate  Patient plan of care discussed at interdisciplinary rounds: Yes    Anticipated Discharge Disposition:  Goal Home     Anticipated Discharge Services:  To be determined pending progression and recommendations   Anticipated Discharge DME:  To be determined closer to time of discharge    Patient/family educated on Medicare website which has current facility and service quality ratings:  Not at this time   Education Provided on the Discharge Plan:  Per Team  Patient/Family in Agreement with the Plan:  Yes    Referrals Placed by CM/SW:  None at this time   Private pay costs discussed: Not applicable    Additional Information:  Per discussion in rounds more awake today-follows simple commands, will work on weaning once spouse at hospital. Patient from home with spouse in the lower level of a single family home where they rent and is mostly independent at baseline. Has a cane and walker available for use as needed. Once appropriate social work care manager to follow up with Rule 25 and CD resources. Discharge plan pending progression and recommendations. Care manager to follow.       Briseyda Dudley RN

## 2022-01-20 NOTE — PROGRESS NOTES
CLINICAL NUTRITION SERVICES - Brief note     Nutrition Prescription    RECOMMENDATIONS FOR MDs/PROVIDERS TO ORDER:  Consider start TF if pt remains on vent today.    Malnutrition Status:    unable    Recommendations already ordered by Registered Dietitian (RD):  TF orders active.  TF currently held for weaning trials.    Future/Additional Recommendations:  TF start/po diet     EVALUATION OF THE PROGRESS TOWARD GOALS   Diet: NPO  Nutrition Support: NPO.  TF held since 1/18 for weaning trials.  TF order in place:  Promote with fiber start at 20 ml/hr increase 10 ml every 8 hrs.   ml every 6 hrs.  Intake: NPO since 1/18.  TF started late 1/17, off on 1/18 early am.  Rate never above 20 ml/hr.     NEW FINDINGS   TF on hold day 3 for vent wean.

## 2022-01-20 NOTE — PROGRESS NOTES
RT PROGRESS NOTE    VENT DAY #  5    CURRENT SETTINGS:   Ventilation Mode: CMV/AC  (Continuous Mandatory Ventilation/ Assist Control)  FiO2 (%): 40 %  Rate Set (breaths/minute): 16 breaths/min  Tidal Volume Set (mL): 500 mL  PEEP (cm H2O): 8 cmH2O  Pressure Support (cm H2O): 5 cmH2O  Oxygen Concentration (%): 40 %  Resp: 16      PATIENT PARAMETERS:  PIP: 20   Pplat: 17  Pmean: 11   SBT: Yes   Secretions:  Mod tick pale yellow to white secretions.   02 Sats:  94-95%    ETT SIZE 7.5 Secured at 27 cm at teeth/gums    Respiratory Medications: None     ABG: None     NOTE / SHIFT SUMMARY:  Pt did wean on PS 5/peep+8 for 2 hrs and 31 minutes, weaning trial terminated due to frequent apnea. Weaning will be resumed in am.     Babak Avelar, RRT

## 2022-01-21 NOTE — PLAN OF CARE
Problem: Communication Impairment (Mechanical Ventilation, Invasive)  Goal: Effective Communication  Outcome: No Change     Problem: Device-Related Complication Risk (Mechanical Ventilation, Invasive)  Goal: Optimal Device Function  Outcome: No Change     Problem: Inability to Wean (Mechanical Ventilation, Invasive)  Goal: Mechanical Ventilation Liberation  Outcome: Improving     Problem: Ventilator-Induced Lung Injury (Mechanical Ventilation, Invasive)  Goal: Absence of Ventilator-Induced Lung Injury  Outcome: Improving      CPAP/PS 8/5 30 %. As tolerated x 185 minutes today

## 2022-01-21 NOTE — PROGRESS NOTES
Critical Care Progress Note      01/21/2022    Name: Peewee Hess MRN#: [id number]   Age: 56 year old YOB: 1965     Hsptl Day# 6  ICU DAY #    MV DAY #             Problem List:   Active Problems:    Alcohol dependence with unspecified alcohol-induced disorder (H)    Hypomagnesemia    Type 2 diabetes mellitus without complication (H)    Alcohol use disorder, severe, dependence (H)    Sinus tachycardia    Pneumonia of both lungs due to infectious organism, unspecified part of lung    Non-intractable vomiting with nausea, unspecified vomiting type    Agitation    Clinically Significant Risk Factors Present on Admission                                    Summary/Hospital Course:     Peewee Hess is a 56 year old male with PMHx of HTN, DM, multiple abdominal surgeries, ETOH abuse, tobacco user. Who presents to ED on 1/15 for evaluation of nausea, vomiting and chest pain with agitation treated and developed aspiration PNA s/p intubation      Assessment and plan :       I have personally reviewed the daily labs, imaging studies, cultures and discussed the case with referring physician and consulting physicians.     My assessment and plan by system for this patient is as follows:    Neurology/Psychiatry:   Off IV sedation.  Precedex was stopped on 1/18 at 3 AM  Ammonia is 56.    EEG with nonspecific dysfunction.  No seizures.  Continue gabapentin    Concern for Wernicke's encephalopathy.  Will do higher doses thiamine, 500 mg 3 times a day for 2 days then 250 daily      Cardiovascular:   Sinus tachycardia.  Otherwise stable       Pulmonary/Ventilator Management:   Acute hypoxic respiratory failure secondary to aspiration pneumonia.  Continue Zosyn.  Plan is to complete 7 days.    FiO2 30%.  PEEP of 8.  Wean PEEP as tolerated      GI and Nutrition :   Start enteral nutrition      Renal/Fluids/Electrolytes:   7-1/2 L positive.  Initiate diuresis    - monitor function and electrolytes as needed with  replacement per ICU protocols.  - generally avoid nephrotoxic agents such as NSAID, IV contrast unless specifically required  - adjust medications as needed for renal clearance  - follow I/O's as appropriate.    Infectious Disease:   Aspiration pneumonitis.  Cultures negative.  7 days of Zosyn.      ICU Prophylaxis:   1. DVT:  LMWH  2. VAP: HOB 30 degrees, chlorhexidine rinse  3. Stress Ulcer: PPI  4. Restraints: Nonviolent soft two point restraints required and necessary for patient safety and continued cares and good effect as patient continues to pull at necessary lines, tubes despite education and distraction. Will readdress daily.     Category: Non-violent   Type of Restraint: Soft limb x2.   Behavior: Pulling at IVand ling catheter tubings.   Root cause of behavior: Critical illness.   Less-restrictive methods that have failed: Redirection, reorientation. 1:1 NA at bedside.   Response to restraint: Not actively pulling atcurrent restraints.   Criteria for release from restraint: Responds to redirection. Leaves medical devices in place.                Key Medications:       chlorhexidine  15 mL Mouth/Throat Q12H     DULoxetine  60 mg Oral Daily     enoxaparin ANTICOAGULANT  40 mg Subcutaneous Q24H     folic acid  1 mg Oral Daily     gabapentin  200 mg Oral or Feeding Tube BID     insulin aspart  1-12 Units Subcutaneous Q4H     [Held by provider] lactulose  20 g Oral Q4H     lidocaine  1 patch Transdermal Q24H     lidocaine   Transdermal Q8H     multivitamin w/minerals  1 tablet Oral Daily     pantoprazole  40 mg Per Feeding Tube QAM AC    Or     pantoprazole (PROTONIX) IV  40 mg Intravenous QAM AC     piperacillin-tazobactam  3.375 g Intravenous Q8H     sodium chloride (PF)  3 mL Intracatheter Q8H     thiamine  100 mg Intravenous Daily       dextrose       fentaNYL Stopped (01/18/22 0330)               Physical Examination:   Temp:  [98.3  F (36.8  C)-99.1  F (37.3  C)] 99.1  F (37.3  C)  Pulse:  [109-144]  123  Resp:  [15-33] 25  BP: (102-137)/(62-85) 103/62  FiO2 (%):  [30 %-40 %] 30 %  SpO2:  [89 %-100 %] 94 %    Intake/Output Summary (Last 24 hours) at 1/21/2022 1020  Last data filed at 1/21/2022 1000  Gross per 24 hour   Intake 1205.9 ml   Output 1345 ml   Net -139.1 ml     Wt Readings from Last 4 Encounters:   01/19/22 86.4 kg (190 lb 7.6 oz)   11/12/21 80.9 kg (178 lb 6.4 oz)   12/30/20 77.1 kg (170 lb)     BP - Mean:  [] 78  Ventilation Mode: Other (see comments) (VC AC)  FiO2 (%): 30 %  Rate Set (breaths/minute): 16 breaths/min  Tidal Volume Set (mL): 500 mL  PEEP (cm H2O): 8 cmH2O  Pressure Support (cm H2O): (S) 5 cmH2O  Oxygen Concentration (%): 30 %  Resp: 25    Recent Labs   Lab 01/18/22  1319 01/16/22  1345   PH 7.40 7.43   PCO2 44 44   PO2 84 83   HCO3 27 28   O2PER 35 80       GEN: no acute distress   HEENT: head ncat, sclera anicteric, OP patent, trachea midline   PULM: unlabored synchronous with vent, clear anteriorly    CV/COR: RRR S1S2 no gallop,  No rub, no murmur  ABD: soft nontender, hypoactive bowel sounds, no mass  EXT: No significant edema  NEURO: Not waking up and not following commands  SKIN: no obvious rash  LINES: clean, dry intact         Data:   All data and imaging reviewed     ROUTINE ICU LABS (Last four results)  CMP  Recent Labs   Lab 01/21/22  0751 01/21/22  0509 01/21/22  0404 01/20/22  2310 01/20/22  0753 01/20/22  0540 01/19/22  0823 01/19/22  0805 01/18/22  0812 01/18/22  0434 01/17/22  0854 01/17/22  0538 01/16/22  0957 01/16/22  0757 01/15/22  2119 01/15/22  1649   NA  --  143  --   --   --  141  --  141  --  143  --  142  --  141  --  139   POTASSIUM  --  3.5  --   --   --  4.0  --  4.2  --  3.4*  --  3.6  --  4.1  --  4.3   CHLORIDE  --  109*  --   --   --  109*  --  110*  --  112*  --  110*  --  106  --  101   CO2  --  22  --   --   --  23  --  21*  --  24  --  21*  --  28  --  22   ANIONGAP  --  12  --   --   --  9  --  10  --  7  --  11  --  7  --  16   *  193* 155* 152*   < > 168*   < > 174*   < > 179*   < > 180*   < > 195*   < > 259*   BUN  --  10  --   --   --  8  --  7*  --  6*  --  8  --  6*  --  6*   CR  --  0.76  --   --   --  0.63*  --  0.58*  --  0.60*  --  0.71  --  0.84  --  0.78   GFRESTIMATED  --  >90  --   --   --  >90  --  >90  --  >90  --  >90  --  >90  --  >90   BECKY  --  7.4*  --   --   --  7.1*  --  7.1*  --  6.6*  --  6.7*  --  7.1*  --  7.8*   MAG  --   --   --   --   --   --   --   --   --   --   --  1.8  --  1.8  --  1.5*   PHOS  --   --   --   --   --   --   --   --   --   --   --   --   --   --   --  3.2   PROTTOTAL  --  5.0*  --   --   --   --   --   --   --   --   --  5.0*  --  5.2*  --  5.9*   ALBUMIN  --  1.1*  --   --   --   --   --   --   --   --   --  1.3*  --  1.5*  --  1.6*   BILITOTAL  --  1.3*  --   --   --   --   --   --   --   --   --  1.1*  --  1.0  --  1.9*   ALKPHOS  --  173*  --   --   --   --   --   --   --   --   --  194*  --  199*  --  237*   AST  --  44*  --   --   --   --   --   --   --   --   --  48*  --  49*  --  47*   ALT  --  19  --   --   --   --   --   --   --   --   --  23  --  25  --  23    < > = values in this interval not displayed.     CBC  Recent Labs   Lab 01/21/22  0509 01/20/22  0540 01/19/22  0805 01/18/22  0434   WBC 15.0* 14.4* 14.5* 10.5   RBC 3.64* 3.57* 3.83* 3.37*   HGB 11.2* 11.1* 11.7* 10.6*   HCT 31.6* 31.6* 33.5* 29.7*   MCV 87 89 88 88   MCH 30.8 31.1 30.5 31.5   MCHC 35.4 35.1 34.9 35.7   RDW 19.9* 19.7* 19.3* 19.1*    206 205 205     INR  Recent Labs   Lab 01/16/22  0757 01/15/22  1649   INR 1.72* 1.69*     Arterial Blood Gas  Recent Labs   Lab 01/18/22  1319 01/16/22  1345   PH 7.40 7.43   PCO2 44 44   PO2 84 83   HCO3 27 28   O2PER 35 80       All cultures:  No results for input(s): CULT in the last 168 hours.  No results found for this or any previous visit (from the past 24 hour(s)).      Billing: This patient is critically ill: Yes. Total critical care time today 40 min.  Patient  remains critically ill dependent on mechanical ventilation.

## 2022-01-21 NOTE — PROGRESS NOTES
0717 mechanical ventilation settings below, BS clear and diminished bilat. Suctioned inline small thick pale yellow.     RT PROGRESS NOTE    VENT DAY# 6    CURRENT SETTINGS:   Ventilation Mode: Other (see comments) (VC AC)  FiO2 (%): 30 %  Rate Set (breaths/minute): 16 breaths/min  Tidal Volume Set (mL): 500 mL  PEEP (cm H2O): 8 cmH2O  Pressure Support (cm H2O): (S) 5 cmH2O  Oxygen Concentration (%): 30 %  Resp: 25      PATIENT PARAMETERS:  PIP 8  Pplat:  15  Pmean:  8  Compliance: unable  SBT: no pending sedation interruption     02 Sats:  95%    ETT SIZE 7.5 Secured at 23 cm at teeth/gums    Respiratory Medications: none       NOTE / SHIFT SUMMARY:   5/8 CPAP/PS trial pending sedation interruption    Aren Matthews, RT

## 2022-01-21 NOTE — PLAN OF CARE
Problem: Adult Inpatient Plan of Care  Goal: Plan of Care Review  1/21/2022 1429 by Ministerio Aguayo RN  Outcome: Improving  1/21/2022 1422 by Ministerio Aguayo RN  Outcome: Improving  Flowsheets (Taken 1/21/2022 1422)  Plan of Care Reviewed With: spouse  Outcome Summary: Updated spouse Daina this am about plan for SBT today. Weaned x2hr, returned to resting settings 2/2 tachycardia (>150), SpO2=90%, MAP = 100. Ativan given. Remains in SR tachy w/ multi-focal PVCs. TF restarted at 20mL/h. D[employer]ic[employer] placed o/n for loose stools w/ lactulose.     Problem: Alcohol Withdrawal  Goal: Alcohol Withdrawal Symptom Control  Outcome: Improving  Intervention: Minimize or Manage Alcohol Withdrawal Symptoms  Recent Flowsheet Documentation  Taken 1/21/2022 1200 by Ministerio Aguayo RN  Sensory Stimulation Regulation:   care clustered   music on   quiet environment promoted   tactile stimulation minimized  Aspiration Precautions: respiratory status monitored  Taken 1/21/2022 0800 by Ministerio Aguayo RN  Sensory Stimulation Regulation:   care clustered   music on   quiet environment promoted   tactile stimulation minimized  Aspiration Precautions: respiratory status monitored     Problem: Inability to Wean (Mechanical Ventilation, Invasive)  Goal: Mechanical Ventilation Liberation  Outcome: Improving  Intervention: Promote Extubation and Mechanical Ventilation Liberation  Recent Flowsheet Documentation  Taken 1/21/2022 1200 by Ministerio Aguayo RN  Sleep/Rest Enhancement:   awakenings minimized   consistent schedule promoted   natural light exposure provided  Medication Review/Management:   medications reviewed   high-risk medications identified  Environmental Support:   calm environment promoted   environmental consistency promoted  Taken 1/21/2022 0800 by Ministerio Aguayo RN  Sleep/Rest Enhancement:   awakenings minimized   consistent schedule promoted   natural light exposure provided  Medication Review/Management:   medications reviewed   high-risk  medications identified  Environmental Support:   calm environment promoted   environmental consistency promoted     Problem: Nutrition Impairment (Mechanical Ventilation, Invasive)  Goal: Optimal Nutrition Delivery  Outcome: Improving  Intervention: Optimize Nutrition Delivery  Recent Flowsheet Documentation  Taken 1/21/2022 1200 by Ministerio Aguayo, RN  Nutrition Support Management: tube feeding restarted  Taken 1/21/2022 0800 by Ministerio Aguayo, RN  Nutrition Support Management: tube feeding held

## 2022-01-21 NOTE — PLAN OF CARE
Problem: Risk for Delirium  Goal: Improved Behavioral Control  Intervention: Prevent and Manage Agitation  Recent Flowsheet Documentation  Taken 1/21/2022 0000 by Rick Adame RN  Complementary Therapy: music therapy provided  Environment Familiarity/Consistency: daily routine followed  Taken 1/20/2022 2000 by Rick Adame, RN  Complementary Therapy: music therapy provided  Environment Familiarity/Consistency: daily routine followed    Increased agitation at this time.  Thrashing his head from side to side.  Attempting to sit up in bed even with restraints on.  Attempted to re-orientate patient but continues to be agitated.  2 mg of ativan was given PRN.  Will continue to monitor agitation.    On 20 mg of lactulose every 4 hours.  Having frequent watery stools starting at 2200 on 01/20/22.  Discussed with provider, okay to place rectal tube due to frequent watery stools.    Rick Adame, RN

## 2022-01-21 NOTE — PROGRESS NOTES
RT PROGRESS NOTE     VENT DAY# 6     CURRENT SETTINGS:              Ventilation Mode: CMV/AC  (Continuous Mandatory Ventilation/ Assist Control)  FiO2 (%): 30 %  Rate Set (breaths/minute): 16 breaths/min  Tidal Volume Set (mL): 500 mL  PEEP (cm H2O): 8 cmH2O  Oxygen Concentration (%): 30 %  Resp: 20        PATIENT PARAMETERS:  PIP 18  Pplat:  15  Pmean:  11  Secretions:  Small to moderate white thick  02 Sats:  98%     ETT SIZE 7.5 Secured at 23 cm at teeth/gums     Respiratory Medications: None        NOTE / SHIFT SUMMARY:    Pt's respiratory status remained stable overnight. No ventilator changes were made. Pt stayed synchronous with the vent. Plan to trial SBT again during the day.     Pancho Pena, RRT

## 2022-01-21 NOTE — PROGRESS NOTES
Care Management Follow Up    Length of Stay (days): 6    Expected Discharge Date: 01/26/2022     Concerns to be Addressed:     Continues to require ICU level of care due to intubation and ventilatory support. Restless-PRN medications. Monitor heart rate   Patient plan of care discussed at interdisciplinary rounds: Yes    Anticipated Discharge Disposition:  Goal Home     Anticipated Discharge Services:  To be determined pending progression and recommendations.   Anticipated Discharge DME:  To be determined closer to time of discharge    Patient/family educated on Medicare website which has current facility and service quality ratings:  Not at this time  Education Provided on the Discharge Plan:  Per team  Patient/Family in Agreement with the Plan:  Yes    Referrals Placed by CM/SW:  None at this time  Private pay costs discussed: Not applicable    Additional Information:  Per discussion in rounds restless over night PRN medications given. Patient from home with spouse in the lower level of a single family home where they rent and is mostly independent at baseline. Has a cane and walker available for use as needed. Once appropriate social work care manager to follow up with Rule 25 and CD resources. Discharge plan pending progression and recommendations. Care manager to follow.       Briseyda Dudley RN

## 2022-01-22 NOTE — PROGRESS NOTES
Critical Care Progress Note      01/22/2022    Name: Peewee Hess MRN#: [id number]   Age: 56 year old YOB: 1965     Hsptl Day# 7  ICU DAY #    MV DAY #             Problem List:   Active Problems:    Alcohol dependence with unspecified alcohol-induced disorder (H)    Hypomagnesemia    Type 2 diabetes mellitus without complication (H)    Alcohol use disorder, severe, dependence (H)    Sinus tachycardia    Pneumonia of both lungs due to infectious organism, unspecified part of lung    Non-intractable vomiting with nausea, unspecified vomiting type    Agitation    Clinically Significant Risk Factors Present on Admission                                    Summary/Hospital Course:     Peewee Hess is a 56 year old male with PMHx of HTN, DM, multiple abdominal surgeries, ETOH abuse, tobacco user. Who presents to ED on 1/15 for evaluation of nausea, vomiting and chest pain with agitation treated and developed aspiration PNA s/p intubation      Assessment and plan :       I have personally reviewed the daily labs, imaging studies, cultures and discussed the case with referring physician and consulting physicians.     My assessment and plan by system for this patient is as follows:    Neurology/Psychiatry:   Off IV sedation.  Precedex was stopped on 1/18 at 3 AM  Ammonia is 56.    EEG with nonspecific dysfunction.  No seizures.  Continue gabapentin    Concern for Wernicke's encephalopathy.  Will do higher doses thiamine, 500 mg 3 times a day for 2 days then 250 daily    The patient appears to be withdrawing, significant tachycardia and restlessness.  Start Precedex and get an MRI.      Cardiovascular:   Sinus tachycardia.  Otherwise stable  Sedated with Precedex.  Use metoprolol as needed to bring his heart rate down.       Pulmonary/Ventilator Management:   Acute hypoxic respiratory failure secondary to aspiration pneumonia.  Continue Zosyn.  Plan is to complete 7 days.    FiO2 30%.  PEEP of 8.  Wean  PEEP as tolerated    Pressure support trials daily.      GI and Nutrition :   Started enteral nutrition      Renal/Fluids/Electrolytes:   Continue Lasix at 20 mg every 8 hours.  Aim for a liter negative per day.    - monitor function and electrolytes as needed with replacement per ICU protocols.  - generally avoid nephrotoxic agents such as NSAID, IV contrast unless specifically required  - adjust medications as needed for renal clearance  - follow I/O's as appropriate.    Infectious Disease:   Aspiration pneumonitis.  Cultures negative.  7 days of Zosyn, then DC    ICU Prophylaxis:   1. DVT:  LMWH  2. VAP: HOB 30 degrees, chlorhexidine rinse  3. Stress Ulcer: PPI  4. Restraints: Nonviolent soft two point restraints required and necessary for patient safety and continued cares and good effect as patient continues to pull at necessary lines, tubes despite education and distraction. Will readdress daily.     Category: Non-violent   Type of Restraint: Soft limb x2.   Behavior: Pulling at IVand ling catheter tubings.   Root cause of behavior: Critical illness.   Less-restrictive methods that have failed: Redirection, reorientation. 1:1 NA at bedside.   Response to restraint: Not actively pulling atcurrent restraints.   Criteria for release from restraint: Responds to redirection. Leaves medical devices in place.                Key Medications:       chlorhexidine  15 mL Mouth/Throat Q12H     [START ON 1/23/2022] DULoxetine  60 mg Oral or Feeding Tube Daily     enoxaparin ANTICOAGULANT  40 mg Subcutaneous Q24H     [START ON 1/23/2022] folic acid  1 mg Oral or Feeding Tube Daily     furosemide  20 mg Intravenous Q8H     gabapentin  200 mg Oral or Feeding Tube BID     insulin aspart  1-12 Units Subcutaneous Q4H     lidocaine  1 patch Transdermal Q24H     lidocaine   Transdermal Q8H     metoprolol  2.5 mg Intravenous Q6H     [START ON 1/23/2022] multivitamins w/minerals  15 mL Oral or Feeding Tube Daily     pantoprazole   40 mg Per Feeding Tube QAM AC    Or     pantoprazole (PROTONIX) IV  40 mg Intravenous QAM AC     sodium chloride (PF)  3 mL Intracatheter Q8H     thiamine  500 mg Intravenous TID       dexmedetomidine 0.2 mcg/kg/hr (01/22/22 1052)     dextrose       fentaNYL Stopped (01/18/22 0330)               Physical Examination:   Temp:  [97.7  F (36.5  C)-100.6  F (38.1  C)] 99.1  F (37.3  C)  Pulse:  [] 120  Resp:  [16-28] 27  BP: ()/(50-90) 113/61  FiO2 (%):  [30 %] 30 %  SpO2:  [90 %-100 %] 92 %    Intake/Output Summary (Last 24 hours) at 1/21/2022 1020  Last data filed at 1/21/2022 1000  Gross per 24 hour   Intake 1205.9 ml   Output 1345 ml   Net -139.1 ml     Wt Readings from Last 4 Encounters:   01/22/22 86.8 kg (191 lb 5.8 oz)   11/12/21 80.9 kg (178 lb 6.4 oz)   12/30/20 77.1 kg (170 lb)     BP - Mean:  [] 76  Ventilation Mode: CPAP/PS  (Continuous positive airway pressure with Pressure Support)  FiO2 (%): 30 %  Rate Set (breaths/minute): 16 breaths/min  Tidal Volume Set (mL): 500 mL  PEEP (cm H2O): 8 cmH2O  Pressure Support (cm H2O): 5 cmH2O  Oxygen Concentration (%): 30 %  Resp: 27    Recent Labs   Lab 01/18/22  1319 01/16/22  1345   PH 7.40 7.43   PCO2 44 44   PO2 84 83   HCO3 27 28   O2PER 35 80       GEN: no acute distress   HEENT: head ncat, sclera anicteric, OP patent, trachea midline   PULM: unlabored synchronous with vent, clear anteriorly    CV/COR: RRR S1S2 no gallop,  No rub, no murmur  ABD: soft nontender, hypoactive bowel sounds, no mass  EXT: No significant edema  NEURO: Eyes open, not following commands.  Moving his head spontaneously.  SKIN: no obvious rash  LINES: clean, dry intact         Data:   All data and imaging reviewed     ROUTINE ICU LABS (Last four results)  CMP  Recent Labs   Lab 01/22/22  0800 01/22/22  0445 01/22/22  0405 01/22/22  0004 01/21/22  0751 01/21/22  0509 01/20/22  0753 01/20/22  0540 01/19/22  0823 01/19/22  0805 01/17/22  0854 01/17/22  0538 01/16/22  0957  01/16/22  0757 01/15/22  2119 01/15/22  1649   NA  --  143  --   --   --  143  --  141  --  141   < > 142  --  141  --  139   POTASSIUM  --  3.3*  --   --   --  3.5  --  4.0  --  4.2   < > 3.6  --  4.1  --  4.3   CHLORIDE  --  108*  --   --   --  109*  --  109*  --  110*   < > 110*  --  106  --  101   CO2  --  25  --   --   --  22  --  23  --  21*   < > 21*  --  28  --  22   ANIONGAP  --  10  --   --   --  12  --  9  --  10   < > 11  --  7  --  16   * 217* 174* 198*   < > 193*   < > 168*   < > 174*   < > 180*   < > 195*   < > 259*   BUN  --  12  --   --   --  10  --  8  --  7*   < > 8  --  6*  --  6*   CR  --  0.85  --   --   --  0.76  --  0.63*  --  0.58*   < > 0.71  --  0.84  --  0.78   GFRESTIMATED  --  >90  --   --   --  >90  --  >90  --  >90   < > >90  --  >90  --  >90   BECKY  --  7.8*  --   --   --  7.4*  --  7.1*  --  7.1*   < > 6.7*  --  7.1*  --  7.8*   MAG  --   --   --   --   --   --   --   --   --   --   --  1.8  --  1.8  --  1.5*   PHOS  --   --   --   --   --   --   --   --   --   --   --   --   --   --   --  3.2   PROTTOTAL  --   --   --   --   --  5.0*  --   --   --   --   --  5.0*  --  5.2*  --  5.9*   ALBUMIN  --   --   --   --   --  1.1*  --   --   --   --   --  1.3*  --  1.5*  --  1.6*   BILITOTAL  --   --   --   --   --  1.3*  --   --   --   --   --  1.1*  --  1.0  --  1.9*   ALKPHOS  --   --   --   --   --  173*  --   --   --   --   --  194*  --  199*  --  237*   AST  --   --   --   --   --  44*  --   --   --   --   --  48*  --  49*  --  47*   ALT  --   --   --   --   --  19  --   --   --   --   --  23  --  25  --  23    < > = values in this interval not displayed.     CBC  Recent Labs   Lab 01/22/22  0445 01/21/22  0509 01/20/22  0540 01/19/22  0805   WBC 14.9* 15.0* 14.4* 14.5*   RBC 3.59* 3.64* 3.57* 3.83*   HGB 11.0* 11.2* 11.1* 11.7*   HCT 31.2* 31.6* 31.6* 33.5*   MCV 87 87 89 88   MCH 30.6 30.8 31.1 30.5   MCHC 35.3 35.4 35.1 34.9   RDW 19.7* 19.9* 19.7* 19.3*    202 206 205      INR  Recent Labs   Lab 01/16/22  0757 01/15/22  1649   INR 1.72* 1.69*     Arterial Blood Gas  Recent Labs   Lab 01/18/22  1319 01/16/22  1345   PH 7.40 7.43   PCO2 44 44   PO2 84 83   HCO3 27 28   O2PER 35 80       All cultures:  No results for input(s): CULT in the last 168 hours.  No results found for this or any previous visit (from the past 24 hour(s)).      Billing: This patient is critically ill: Yes. Total critical care time today 38 min.  Patient remains critically ill dependent on mechanical ventilation.

## 2022-01-22 NOTE — PLAN OF CARE
Problem: Communication Impairment (Mechanical Ventilation, Invasive)  Goal: Effective Communication  Outcome: No Change     Problem: Device-Related Complication Risk (Mechanical Ventilation, Invasive)  Goal: Optimal Device Function  Outcome: No Change     Problem: Inability to Wean (Mechanical Ventilation, Invasive)  Goal: Mechanical Ventilation Liberation  Outcome: Improving

## 2022-01-22 NOTE — PLAN OF CARE
Problem: Communication Impairment (Mechanical Ventilation, Invasive)  Goal: Effective Communication  Outcome: Improving   Ventilation Mode: CMV/AC  (Continuous Mandatory Ventilation/ Assist Control)  FiO2 (%): 30 %  Rate Set (breaths/minute): 16 breaths/min  Tidal Volume Set (mL): 500 mL  PEEP (cm H2O): 8 cmH2O  Pressure Support (cm H2O): 5 cmH2O  Oxygen Concentration (%): 30 %  Resp: 24    Day 7 on vent  ETT: 7.5 Shiley  Secured 23@Teeth  Pplate: 14  PIP: 15  Mean: 9   Pt. Weaned by day shift 185 minutes. Continue current orders and monitoring.

## 2022-01-22 NOTE — PROGRESS NOTES
RT PROGRESS NOTE    VENT DAY# 7    CURRENT SETTINGS:   Ventilation Mode: CPAP/PS  (Continuous positive airway pressure with Pressure Support)  FiO2 (%): 30 %  Rate Set (breaths/minute): 16 breaths/min  Tidal Volume Set (mL): 500 mL  PEEP (cm H2O): 8 cmH2O  Pressure Support (cm H2O): 5 cmH2O  Oxygen Concentration (%): 30 %  Resp: 27      PATIENT PARAMETERS:  PIP 17  Pmean:  8  Compliance: 38  SBT: yes, wean on cpap 8, ps 5 30%   Wean time: 155 minutes   RSBI 53   Failed wean due to: end of designated trial, pt going to MRI .  Secretions:  Small white  02 Sats:  94%    ETT SIZE 7.5 Secured at 23 cm at teeth/gums    Respiratory Medications: none         NOTE / SHIFT SUMMARY:   Pt transported to MRI  On portable vent for imaging, no complications. continue daily wean trials.  Sana Jiangp, RT

## 2022-01-22 NOTE — PLAN OF CARE
Problem: Alcohol Withdrawal  Goal: Alcohol Withdrawal Symptom Control  Outcome: Improving   pt restless during shift PRN Haldol, ativan and tylenol  given was helpful.  HR was in 140's couple times did not sustain gave Metoprolol was helpful.  BP in 80's and MAP  < 65 noted while sleeping MD notified . Continue to monitor.

## 2022-01-23 NOTE — PLAN OF CARE
Problem: Adult Inpatient Plan of Care  Goal: Plan of Care Review  Outcome: Improving  Flowsheets  Taken 1/23/2022 1403  Outcome Summary:   Awake and following commands @ this time   Precedex decreased, norepi off.    Dignicare removed 2/2 failure to drain; large incontinent BM, replaced Dignicare with good patency.    Tolerating SBT.    Updated spouse Daina this afternoon.  Progress: improving  Taken 1/21/2022 1422  Plan of Care Reviewed With: spouse  Goal: Readiness for Transition of Care  Outcome: Improving     Problem: Restraint for Non-Violent/Non-Self-Destructive Behavior  Goal: Prevent/Manage Potential Problems  Description: Maintain safety of patient and others during period of restraint.  Promote psychological and physical wellbeing.  Prevent injury to skin and involved body parts.  Promote nutrition, hydration, and elimination.  Outcome: Improving     Problem: Alcohol Withdrawal  Goal: Alcohol Withdrawal Symptom Control  Outcome: Improving  Intervention: Minimize or Manage Alcohol Withdrawal Symptoms  Recent Flowsheet Documentation  Taken 1/23/2022 1200 by Ministerio Aguayo RN  Sensory Stimulation Regulation:   care clustered   music on   quiet environment promoted  Aspiration Precautions: respiratory status monitored  Taken 1/23/2022 0800 by Ministerio Aguayo RN  Sensory Stimulation Regulation:   care clustered   music on   quiet environment promoted  Aspiration Precautions: respiratory status monitored     Problem: Acute Neurologic Deterioration (Alcohol Withdrawal)  Goal: Optimal Neurologic Function  Outcome: Improving  Intervention: Minimize or Manage Acute Neurologic Symptoms  Recent Flowsheet Documentation  Taken 1/23/2022 1200 by Ministerio Aguayo RN  Sensory Stimulation Regulation:   care clustered   music on   quiet environment promoted  Airway/Ventilation Management: airway patency maintained  Cerebral Perfusion Promotion:   blood pressure monitored   sedation level decreased  Taken 1/23/2022 0800 by Dede  JULIANO Mandel  Sensory Stimulation Regulation:   care clustered   music on   quiet environment promoted     Problem: Inability to Wean (Mechanical Ventilation, Invasive)  Goal: Mechanical Ventilation Liberation  Outcome: Improving  Intervention: Promote Extubation and Mechanical Ventilation Liberation  Recent Flowsheet Documentation  Taken 1/23/2022 1200 by Ministerio Aguayo RN  Sleep/Rest Enhancement:   consistent schedule promoted   music provided   natural light exposure provided  Medication Review/Management:   medications reviewed   high-risk medications identified  Environmental Support:   calm environment promoted   caregiver consistency promoted   environmental consistency promoted  Taken 1/23/2022 0800 by Ministerio Aguayo RN  Sleep/Rest Enhancement:   awakenings minimized   consistent schedule promoted   music provided   natural light exposure provided  Medication Review/Management:   medications reviewed   high-risk medications identified  Environmental Support:   calm environment promoted   caregiver consistency promoted   environmental consistency promoted     Problem: Nutrition Impairment (Mechanical Ventilation, Invasive)  Goal: Optimal Nutrition Delivery  Outcome: Improving

## 2022-01-23 NOTE — PROGRESS NOTES
Critical Care Progress Note      01/23/2022    Name: Peewee Hess MRN#: [id number]   Age: 56 year old YOB: 1965     Hsptl Day# 8  ICU DAY #    MV DAY #             Problem List:   Active Problems:    Alcohol dependence with unspecified alcohol-induced disorder (H)    Hypomagnesemia    Type 2 diabetes mellitus without complication (H)    Alcohol use disorder, severe, dependence (H)    Sinus tachycardia    Pneumonia of both lungs due to infectious organism, unspecified part of lung    Non-intractable vomiting with nausea, unspecified vomiting type    Agitation  Hypokalemia  Clinically Significant Risk Factors Present on Admission                                    Summary/Hospital Course:     Peewee Hess is a 56 year old male with PMHx of HTN, DM, multiple abdominal surgeries, ETOH abuse, tobacco user. Who presents to ED on 1/15 for evaluation of nausea, vomiting and chest pain with agitation treated and developed aspiration PNA s/p intubation      Assessment and plan :       I have personally reviewed the daily labs, imaging studies, cultures and discussed the case with referring physician and consulting physicians.     My assessment and plan by system for this patient is as follows:    Neurology/Psychiatry:   Off IV sedation.  Precedex was stopped on 1/18 at 3 AM  Ammonia is 56.    EEG with nonspecific dysfunction.  No seizures.  Continue gabapentin    Concern for Wernicke's encephalopathy.  Will do higher doses thiamine, 500 mg 3 times a day for 2 days then 250 daily    The patient appears to be withdrawing, significant tachycardia and restlessness.  Started on Precedex.    MRI done on 1/22.  Few small foci of acute/early subacute infarction at the left temporal occipital region.  Small chronic infarctions left precentral gyrus  Not in A. fib.  Normal echo in November 2021.       Cardiovascular:   Sinus tachycardia.  Otherwise stable  Sedated with Precedex.   Levophed as needed to keep  MAP>65       Pulmonary/Ventilator Management:   Acute hypoxic respiratory failure secondary to aspiration pneumonia.  Completed 7 days of zosyn    FiO2 30%.  PEEP of 8.  Wean PEEP as tolerated    Pressure support trials daily.      GI and Nutrition :   Started enteral nutrition  Significant alcohol use.    Malnourished.  Significant hypoalbuminemia      Renal/Fluids/Electrolytes:   Continue Lasix.  Received 3 doses of 25% albumin to help with diuresis.  On I's and O's he appears to be 6 L positive but I wonder if it is no just documented correctly.  Weight on admission 84.7 kg.  Weight today 85.9 kg.  Does not appear to be having significant edema    Hypokalemia.  Aggressively replace.  Check magnesium and replace as well if needed.    - monitor function and electrolytes as needed with replacement per ICU protocols.  - generally avoid nephrotoxic agents such as NSAID, IV contrast unless specifically required  - adjust medications as needed for renal clearance  - follow I/O's as appropriate.    Infectious Disease:   Aspiration pneumonitis.  Cultures negative.  Completed 7 days of Zosyn    ICU Prophylaxis:   1. DVT:  LMWH  2. VAP: HOB 30 degrees, chlorhexidine rinse  3. Stress Ulcer: PPI  4. Restraints: Nonviolent soft two point restraints required and necessary for patient safety and continued cares and good effect as patient continues to pull at necessary lines, tubes despite education and distraction. Will readdress daily.     Category: Non-violent   Type of Restraint: Soft limb x2.   Behavior: Pulling at IVand ling catheter tubings.   Root cause of behavior: Critical illness.   Less-restrictive methods that have failed: Redirection, reorientation. 1:1 NA at bedside.   Response to restraint: Not actively pulling atcurrent restraints.   Criteria for release from restraint: Responds to redirection. Leaves medical devices in place.                Key Medications:       chlorhexidine  15 mL Mouth/Throat Q12H      DULoxetine  60 mg Oral or Feeding Tube Daily     enoxaparin ANTICOAGULANT  40 mg Subcutaneous Q24H     folic acid  1 mg Oral or Feeding Tube Daily     furosemide  20 mg Intravenous Q8H     gabapentin  200 mg Oral or Feeding Tube BID     insulin aspart  1-12 Units Subcutaneous Q4H     insulin glargine  15 Units Subcutaneous QAM AC     lidocaine  1 patch Transdermal Q24H     lidocaine   Transdermal Q8H     multivitamins w/minerals  15 mL Oral or Feeding Tube Daily     pantoprazole  40 mg Per Feeding Tube QAM AC    Or     pantoprazole (PROTONIX) IV  40 mg Intravenous QAM AC     sodium chloride (PF)  3 mL Intracatheter Q8H     thiamine  250 mg Intravenous Daily       dexmedetomidine 0.2 mcg/kg/hr (01/23/22 0841)     dextrose       fentaNYL Stopped (01/18/22 0330)     norepinephrine 0.03 mcg/kg/min (01/23/22 1100)               Physical Examination:   Temp:  [97.9  F (36.6  C)-100.2  F (37.9  C)] 99.4  F (37.4  C)  Pulse:  [] 108  Resp:  [16-33] 30  BP: ()/(50-87) 105/70  FiO2 (%):  [30 %] 30 %  SpO2:  [90 %-100 %] 94 %    Intake/Output Summary (Last 24 hours) at 1/21/2022 1020  Last data filed at 1/21/2022 1000  Gross per 24 hour   Intake 1205.9 ml   Output 1345 ml   Net -139.1 ml     Wt Readings from Last 4 Encounters:   01/23/22 85.9 kg (189 lb 6 oz)   11/12/21 80.9 kg (178 lb 6.4 oz)   12/30/20 77.1 kg (170 lb)     BP - Mean:  [] 83  Ventilation Mode: (S) CPAP/PS  (Continuous positive airway pressure with Pressure Support)  FiO2 (%): 30 %  Rate Set (breaths/minute): 16 breaths/min  Tidal Volume Set (mL): 500 mL  PEEP (cm H2O): 8 cmH2O  Pressure Support (cm H2O): 5 cmH2O  Oxygen Concentration (%): 30 %  Resp: 30    Recent Labs   Lab 01/18/22  1319 01/16/22  1345   PH 7.40 7.43   PCO2 44 44   PO2 84 83   HCO3 27 28   O2PER 35 80       GEN: no acute distress   HEENT: head ncat, sclera anicteric, OP patent, trachea midline   PULM: unlabored synchronous with vent, clear anteriorly    CV/COR: RRR S1S2 no  gallop,  No rub, no murmur  ABD: soft nontender, hypoactive bowel sounds, no mass  EXT: No significant edema  NEURO: Waking up.  Today he is following commands.  He nods yes or no to my questions.  SKIN: no obvious rash  LINES: clean, dry intact         Data:   All data and imaging reviewed     ROUTINE ICU LABS (Last four results)  CMP  Recent Labs   Lab 01/23/22  0758 01/23/22  0508 01/23/22  0441 01/23/22  0001 01/22/22  0800 01/22/22  0445 01/21/22  0751 01/21/22  0509 01/20/22  0753 01/20/22  0540 01/17/22  0854 01/17/22  0538   NA  --  145  --   --   --  143  --  143  --  141   < > 142   POTASSIUM  --  3.0*  3.0*  --   --   --  3.3*  --  3.5  --  4.0   < > 3.6   CHLORIDE  --  107  --   --   --  108*  --  109*  --  109*   < > 110*   CO2  --  28  --   --   --  25  --  22  --  23   < > 21*   ANIONGAP  --  10  --   --   --  10  --  12  --  9   < > 11   * 269* 238* 217*   < > 217*   < > 193*   < > 168*   < > 180*   BUN  --  12  --   --   --  12  --  10  --  8   < > 8   CR  --  0.78  --   --   --  0.85  --  0.76  --  0.63*   < > 0.71   GFRESTIMATED  --  >90  --   --   --  >90  --  >90  --  >90   < > >90   BECKY  --  7.9*  --   --   --  7.8*  --  7.4*  --  7.1*   < > 6.7*   MAG  --   --   --   --   --   --   --   --   --   --   --  1.8   PROTTOTAL  --   --   --   --   --   --   --  5.0*  --   --   --  5.0*   ALBUMIN  --   --   --   --   --   --   --  1.1*  --   --   --  1.3*   BILITOTAL  --   --   --   --   --   --   --  1.3*  --   --   --  1.1*   ALKPHOS  --   --   --   --   --   --   --  173*  --   --   --  194*   AST  --   --   --   --   --   --   --  44*  --   --   --  48*   ALT  --   --   --   --   --   --   --  19  --   --   --  23    < > = values in this interval not displayed.     CBC  Recent Labs   Lab 01/23/22  0508 01/22/22  0445 01/21/22  0509 01/20/22  0540   WBC 12.1* 14.9* 15.0* 14.4*   RBC 3.18* 3.59* 3.64* 3.57*   HGB 9.7* 11.0* 11.2* 11.1*   HCT 28.4* 31.2* 31.6* 31.6*   MCV 89 87 87 89   MCH  30.5 30.6 30.8 31.1   MCHC 34.2 35.3 35.4 35.1   RDW 19.0* 19.7* 19.9* 19.7*    202 202 206     INR  No lab results found in last 7 days.  Arterial Blood Gas  Recent Labs   Lab 01/18/22  1319 01/16/22  1345   PH 7.40 7.43   PCO2 44 44   PO2 84 83   HCO3 27 28   O2PER 35 80       All cultures:  No results for input(s): CULT in the last 168 hours.  Recent Results (from the past 24 hour(s))   MR Brain w/o Contrast    Narrative    EXAM: MR BRAIN W/O CONTRAST  LOCATION: [facility]  DATE/TIME: 1/22/2022 1:05 PM    INDICATION: Encephalopathy  COMPARISON: CT head 01/18/2022, MRI head 11/23/2020  TECHNIQUE: Routine multiplanar multisequence head MRI without intravenous contrast.    FINDINGS:  INTRACRANIAL CONTENTS: Please note study is significantly degraded by motion artifact. Suggestion of a few small foci of restricted diffusion at the left temporal occipital region; best appreciated on repeat axial diffusion weighted sequence series 20.2   image 12, image 11; as well as repeat coronal diffusion weighted sequence series 24.2 image 9. No definite associated hemorrhage or significant mass effect. Redemonstration of small chronic infarction left precentral gyrus. Mild generalized cerebral   atrophy. No hydrocephalus. Normal position of the cerebellar tonsils. Small chronic infarction lateral aspect left cerebellum. A few additional tiny infarctions demonstrated on prior exam are not well demonstrated on the current.    SELLA: Not well-visualized, grossly normal.    OSSEOUS STRUCTURES/SOFT TISSUES: Normal marrow signal. Flow voids are not well-visualized.     ORBITS: Not well-visualized.     SINUSES/MASTOIDS: The complete opacification left maxillary and left sphenoid sinus, similar to prior. Small amount of opacification right mastoid air cells.       Impression    IMPRESSION:  1.  Please note study is significantly degraded by motion artifact.  2.  Suggestion of a few small foci of  acute/early subacute infarction at the left temporal occipital region.  3.  No definite associated hemorrhage or significant mass effect.  4.  Small chronic infarction left precentral gyrus, similar to prior.  5.  Probable few small chronic infarctions cerebellar hemispheres.  6.  Mild atrophy.         Billing: This patient is critically ill: Yes. Total critical care time today 35 min.  Patient remains critically ill dependent on mechanical ventilation.

## 2022-01-23 NOTE — PROGRESS NOTES
Care Management Follow Up    Length of Stay (days): 8    Expected Discharge Date: To be determined.      Concerns to be Addressed:   ICU level of care, due to alteration in respiratory status, requiring oral intubation/mechanical ventilation and drips.    Patient plan of care discussed at interdisciplinary rounds: Yes    Anticipated Discharge Disposition:  Discharge goal is home pending response to treatment, medical needs and mobility closer to discharge.      Anticipated Discharge Services:  To be determined by destination, patient/family preferences, medical needs and mobility status closer to the time of discharge.  Anticipated Discharge DME:  To be determined.     Patient/family educated on Medicare website which has current facility and service quality ratings:  NA  Education Provided on the Discharge Plan:  Per team  Patient/Family in Agreement with the Plan:  yes    Referrals Placed by CM/SW:  None  Private pay costs discussed: Not applicable at this time.     Additional Information:  Patient lives at home with his spouse in the lower level of a single family home that they rent. He is largely independent at baseline. He has a cane and walker available for use as needed. Once appropriate, social work care manager to follow up with Rule 25 and CD resources. Discharge plan pending progression and recommendations, care management will follow.     Susan Joseph RN

## 2022-01-23 NOTE — PROGRESS NOTES
RT PROGRESS NOTE    VENT DAY # 8    CURRENT SETTINGS:   Ventilation Mode: (S) CPAP/PS  (Continuous positive airway pressure with Pressure Support)  FiO2 (%): 30 %  Rate Set (breaths/minute): 16 breaths/min  Tidal Volume Set (mL): 500 mL  PEEP (cm H2O): 8 cmH2O  Pressure Support (cm H2O): 5 cmH2O  Oxygen Concentration (%): 30 %  Resp: 27      PATIENT PARAMETERS:  PIP: 24  Pplat: 20  Pmean: 7.1     SBT: yes   Secretions: Mod thick white secretions   02 Sats:  96-98%    ETT SIZE 7.5 Secured at 23 cm at teeth/gums    Respiratory Medications: None     ABG:     NOTE / SHIFT SUMMARY:   Pt is weaning on PS 5-8 /peep+5 since 07:16 am and tolerating well with no obvious respiratory distress. Pt also denies any shortness of breath at this time. Pt will rest on full support at night. RT will continue to monitor closely.     Babak Avelar, RRT

## 2022-01-23 NOTE — PROGRESS NOTES
Ventilation Mode: CMV/AC  (Continuous Mandatory Ventilation/ Assist Control)  FiO2 (%): 30 %  Rate Set (breaths/minute): 16 breaths/min  Tidal Volume Set (mL): 500 mL  PEEP (cm H2O): 8 cmH2O  Pressure Support (cm H2O): 5 cmH2O  Oxygen Concentration (%): 30 %  Resp: 18    Vent day # 8   patient remains on full vent support on above, no vent changes made this shift. sats 96%  RR 24 PiP 24 PLAT 16. ETT 23 @ teeth, suctioned moderate amount of thick white secretions. Breath sounds diminished. Rt continue to monitor.

## 2022-01-24 NOTE — PROGRESS NOTES
RESPIRATORY CARE NOTE     Patient Name: Peewee Hess  Today's Date: 1/23/2022         Patient extubated at 18:25 pm and placed on 10 L Oxymask . BS: slightly coarse rhonchi and decreased @ bases. Patient's cough is strong and productive, patient is able to verbalize fairly upon extubation. No obvious complications at this time.  Will monitor closely and assess as needed.       Babak Avelar, RRT

## 2022-01-24 NOTE — PLAN OF CARE
Problem: Inability to Wean (Mechanical Ventilation, Invasive)  Goal: Mechanical Ventilation Liberation  Outcome: No Change  Intervention: Promote Extubation and Mechanical Ventilation Liberation     Pt started on oxymask 10L then required an increase to 15L, now on BiPAP 12/5, 40%. Pt lung sounds are diminished. Pt coughing up green sputum, fair to weak cough.     Problem: Acute Neurologic Deterioration (Alcohol Withdrawal)  Goal: Optimal Neurologic Function  Outcome: No Change  Intervention: Minimize or Manage Acute Neurologic Symptoms     Pt is alert but became restless around 2230, making BiPAP alarm, having mild hallucinations, and HR increasing. I gave ativan and pt has calmed down since then. Precedex is off. Pt is alert and oriented to self, redirectable, and can make needs known.    Problem: Skin and Tissue Injury (Mechanical Ventilation, Invasive)  Goal: Absence of Device-Related Skin and Tissue Injury  Outcome: No Change  Intervention: Maintain Skin and Tissue Health     Sacral mepilex in place. New mepilexes placed on R upper thigh and L shin. Some bruising on forearms.    Problem: Adult Inpatient Plan of Care  Goal: Plan of Care Review  Outcome: No Change   VSS, sinuc tachy with occasional PVCs and PACs. Levo has been off since 2115. Dignacare had 2 large bypasses but there was no kink in the tubing, output is brown/tan and loose or dwight-like, mixed consistencies, so dignacare was removed, brief in place. Pt receiving lasix, had almost 5L of urine out.

## 2022-01-24 NOTE — PROGRESS NOTES
Critical Care Medicine Progress Note  1/24/2022    ASSESSMENT/PLAN:  56 year old smoker with history of ongoing alcohol abuse, multiple abdominal surgeries, hypertension, T2DM, admitted on 1/15/2022 w/ acute hypoxic respiratory failure in setting of multifocal pneumonia requiring intubation on 1/16.  He was extubated 1/23 and his septic shock is resolved.    New events:    Clinically improved, transition from BiPAP to HFNC    Formal swallow evaluation by SLP    Scheduled albuterol nebs for bronchial hygiene    Neuro/Psych:   #. Alcohol abuse, clinical concern of alcohol withdrawal during his intubation.  He is currently at least 11 days following his most recent alcohol use, should be out of the risk window for severe alcohol withdrawal.  #. Clinical concern for Warnicke's encephalopathy secondary to alcohol abuse, on high-dose thiamine replacement.  #. Clinical concern for toxic metabolic encephalopathy (delirium) in setting of baseline alcohol abuse and prolonged hospitalization with intubation and critical illness.  Weaned off Precedex.    Plan to emphasize antipsychotic use rather than benzodiazepines    Pulmonary:   #. Acute hypoxic respiratory failure in setting of likely oversedation and multifocal pneumonia, intubated 1/16.  Extubated 1/23.    Required BiPAP 1/23 overnight    Transition to HFNC 1/24    Concern for significant bronchial congestion but is not unexpected following severe pneumonia    Scheduled albuterol nebs to help relieve bronchial congestion and to optimize bronchial hygiene    Head of bed elevation to minimize silent aspiration risk    SLP evaluation for swallow safety (patient failed bedside swallow eval)    Avoid BiPAP use in the future to minimize mucus plugging and subsequent VQ mismatch    Cardiovascular:   #. Shock, distributive, likely secondary to severe sepsis in setting of multifocal pneumonia. Goal MAP >/= 65.    Vasopressors: Norepinephrine no longer indicated,  discontinued    Infectious:  #. Severe sepsis secondary to multifocal community-acquired pneumonia.  Treated with with a 7-day course of Zosyn, completed on 1/22.    Renal:   #. Hypokalemia, most consistent with ongoing diuresis and poor nutrition.  Replacement protocol in place.  #. Hypocalcemia, most consistent with poor nutritional status.  Replacing.    GI:   #. Nausea vomiting in setting of multifocal pneumonia, RESOLVED.   #. Malnutrition, awaiting formal SLP evaluation for appropriate diet adjustments.    Heme:   No acute issues, monitor.  Appropriate for conservative transfusion criteria.    Endo:   #. Glucose management w/ aspart.  Started on D10 overnight in setting of hypoglycemia due to n.p.o. status, glargine on hold.  Goal glucose 140-180.    Access/Lines/Tubes: required and necessary for continued patient cares  PICC line  Booth cath    ICU prophylaxis:   #. VAP ppx: Not indicated  #. Stress ulcer: Not acutely indicated  #. Diet: N.p.o. pending formal SLP evaluation  #. VTE: Enoxaparin  #. Restraints: required and necessary for continued patient cares    CODE: Full    Billing: This patient is critically ill: Yes. Total critical care time today 34 min, excluding procedures.     Analia Mancini MD  Pulmonary and Critical Care     =================================================================    Interval history: No acute events overnight, afebrile.  Extubated 1/23.  Was on BiPAP overnight for some increased work of breathing.  Was put on oxime asked this morning and then switched to HFNC.  Diuresing well and is net negative almost 6 L. Discussed in multidisciplinary rounds.     Vitals: Temp:  [97.6  F (36.4  C)-99.3  F (37.4  C)] 98.2  F (36.8  C)  Pulse:  [] 118  Resp:  [8-33] 22  BP: ()/(49-85) 113/74  FiO2 (%):  [30 %-60 %] 50 %  SpO2:  [77 %-100 %] 96 %  Vent:   Ventilation Mode: CPAP/PS  (Continuous positive airway pressure with Pressure Support)  FiO2 (%): 50 %  Rate Set  (breaths/minute): 16 breaths/min  Tidal Volume Set (mL): 500 mL  PEEP (cm H2O): 5 cmH2O  Pressure Support (cm H2O): 5 cmH2O  Oxygen Concentration (%): 30 %  Resp: 22    Physical Exam:   General: Chronically ill-appearing  man, lying in bed, NAD  HEENT: EOMI, PERRL, MMM  CV: RRR, no M/R/G; extremities adequate perfused  Pulm: coarse bilateral breath sounds consistent with bronchial congestion, no wheezing, bilateral expiratory rhonchi, no crackles, intermittent cough  Abd: soft, ND, hypoactive bowel sounds  Msk: Warm to touch, no peripheral edema  Derm: No acute lesions or rashes on limited exam  Neuro: Awake, alert, attentive; moves extremities weakly but without focal gross deficit  Psych: Calm    Labs: personally reviewed & interpreted in EMR.   Imaging: personally reviewed & interpreted, including formal Radiology report in the EMR.    This report was prepared using speech recognition software.  Any typographical errors are unintentional.  Please, contact me directly for any clarifications of my report.    --------------------------------------------------------------------------------  Clinically Significant Risk Factors Present on Admission

## 2022-01-24 NOTE — PROGRESS NOTES
"RESPIRATORY CARE NOTE    Pt placed on V 60 BiPAP;  Tolerating well    Blood pressure 123/55, pulse 96, temperature 98  F (36.7  C), resp. rate 19, height 1.854 m (6' 0.99\"), weight 83.7 kg (184 lb 8.4 oz), SpO2 95 %.         01/24/22 0311   Tech Time   $Tech Time (10 minute increments) 1   Mode: CPAP/ BiPAP/ AVAPS/ AVAPS AE   CPAP/BiPAP/ AVAPS/ AVAPS AE Mode BiPAP S/T   Equipment   Device Serial Number [id number]   CPAP/BiPAP/Settings   $BIPAP/CPAP Therapy continuous   BIPAP/CPAP On Standby On   IPAP/EPAP (cmH2O) 12/5   Rate (breaths/min) 16   Oxygen (%) 50   Timed Inspiration (sec) 0.9   IPAP RISE  Settings (V60) 3   CPAP/BiPAP Patient Parameters   IPAP (cm H2O) 12 cmH2O   EPAP (cm H2O) 5 cmH2O   Pressure Support (cm H2O) 7 cmH2O   RR Total (breaths/min) 19 breaths/min   Vt (mL) 901 mL   Minute Ventilation (L/min) 17 L/min   Peak Inspiratory Pressure (cm H2O) 12 cmH2O   Pt.  Leak (L/min) 3 L/min   CPAP/BiPAP/AVAPS/AVAPS AE Alarms   High Pressure (cm H2O) 20 cmH2O   Low Pressure (cm H2O) 7   Low Pressure Delay (sec) 20 sec   Lo Min Vent 4   High Rate (breaths/min) 45 breaths/min   Low Rate (breaths/min) 12   Audible Alarm set at (Volume of Alarm) 7   CPAP/BiPAP/AVAPS/AVAPS AE Patient Assessment   Interface Face Mask - Large   Skin Integrity intact   Humidifier Checked N/A   RT Device Skin Assessment   Oxygen Delivery Device CPAP/BiPAP Mask   Site Appearance neck circumference Clean and dry   Site Appearance lips Clean and dry   Site Appearance bridge of nose Clean and dry  (liquicell in place)   Site Appearance occiput Clean and dry   Strap Tightness Finger Allowance between head and device strap   Skin Interventions Taken No adjustments needed     Arthur Fraser RT    "

## 2022-01-24 NOTE — PROGRESS NOTES
Speech-Language Pathology: Clinical Swallow Evaluation     01/24/22 1300   General Information   Onset of Illness/Injury or Date of Surgery 01/23/22   Pertinent History of Current Problem Extubated 1/23/22 after 11 day intubation. Per H&P: 56 year old smoker with history of ongoing alcohol abuse, multiple abdominal surgeries, hypertension, T2DM, admitted on 1/15/2022 w/ acute hypoxic respiratory failure in setting of multifocal pneumonia requiring intubation on 1/16.  He was extubated 1/23 and his septic shock is resolved.   General Observations voice sounds strong   Type of Evaluation   Type of Evaluation Swallow Evaluation   Oral Motor   Oral Musculature generally intact   Dentition (Oral Motor)   Dentition (Oral Motor) some missing teeth   Comment, Dentition (Oral Motor) natural teeth on bottom and no upper teeth. He has upper dentures that do not fit   Facial Symmetry (Oral Motor)   Facial Symmetry (Oral Motor) WNL   Lip Function (Oral Motor)   Lip Range of Motion (Oral Motor) WNL   Tongue Function (Oral Motor)   Tongue ROM (Oral Motor) WNL   Jaw Function (Oral Motor)   Jaw Function (Oral Motor) WNL   Vocal Quality/Secretion Management (Oral Motor)   Vocal Quality (Oral Motor) WNL   Comment, Vocal Quality/Secretion Management (Oral Motor) occasional coughing up of phlegm   General Swallowing Observations   Current Diet/Method of Nutritional Intake (General Swallowing Observations, NIS) NPO   Respiratory Support (General Swallowing Observations)   (HFNC)   Swallowing Evaluation Clinical swallow evaluation   Clinical Swallow Evaluation   Feeding Assistance frequent cues/help required   Clinical Swallow Evaluation Textures Trialed thin liquids;pureed;soft & bite-sized;solid foods   Clinical Swallow Eval: Thin Liquid Texture Trial   Mode of Presentation, Thin Liquids cup;straw   Volume of Liquid or Food Presented 4 oz   Oral Phase of Swallow WFL   Diagnostic Statement No oral dysphagia he had consistent coughing  "with straw drinking and no coughing with small sips from cup. He needs hand over hand assist d/t UE weakness.   Clinical Swallow Evaluation: Puree Solid Texture Trial   Mode of Presentation, Puree fed by clinician;spoon   Volume of Puree Presented 2 oz   Oral Phase, Puree WFL   Diagnostic Statement He does \"chew\" the applesauce, which is not typical. No oral residue after AP movement. No sx's aspiration   Clinical Swallow Eval: Soft & Bite Sized   Mode of Presentation fed by clinician  (softened cracker)   Volume Presented 1 bite   Oral Phase WFL   Diagnostic Statement No oral dysphagia or overt sx's aspiration   Clinical Swallow Evaluation: Solid Food Texture Trial   Diagnostic Statement Pt tried a piece of regular textured raudel cracker. He was not able to masticate it and had to spit it out.   Swallowing Recommendations   Diet Consistency Recommendations thin liquids (level 0);minced & moist (level 5)   Medication Administration Recommendations, Swallowing (SLP) whole in applesacue   Comment, Swallowing Recommendations D/t no upper dentition d/t upper dentures that do not fit, and adequate lower teeth, it is recommended pt start wtih minced and moist to compensate for lack of dentition. It is also recommended  he be on thin liquids with no straws - small sips from cup. VFSS recommended if sx's aspiration persist or declne in respiratory status.    General Therapy Interventions   Planned Therapy Interventions Dysphagia Treatment   Dysphagia treatment Modified diet education   Intervention Comments follow up re: diet tolerance and possible diet change from minced and moist. VFSS recommended if there is concern for aspiration   SLP Therapy Assessment/Plan   Criteria for Skilled Therapeutic Interventions Met (SLP Eval) yes;treatment indicated   SLP Diagnosis dysphagia   Rehab Potential (SLP Eval) good, to achieve stated therapy goals   Therapy Frequency (SLP Eval) 5 times/wk   Predicted Duration of Therapy " Intervention (SLP Eval) 5 days   Comment, Therapy Assessment/Plan (SLP) follow up re: diet tolerance and possible diet change from minced and moist. VFSS recommended if there is concern for aspiration    Total Evaluation Time   Total Evaluation Time (Minutes) 20

## 2022-01-24 NOTE — PROVIDER NOTIFICATION
Provider notified around 2000 that pt had increased o2 needs post intubation, asked for BiPAP. Pt was placed on BiPAP 12/5, 60%.    Later, pt had a low BG, 55, 25ml of D50 given, recheck 102. Provider notified of low BG and D10 was started at 25 ml/hr.

## 2022-01-25 NOTE — PROGRESS NOTES
Critical Care Medicine Progress Note  1/25/2022     ASSESSMENT/PLAN:  56 year old smoker with history of ongoing alcohol abuse, multiple abdominal surgeries, hypertension, T2DM, admitted on 1/15/2022 w/ acute hypoxic respiratory failure in setting of multifocal pneumonia requiring intubation on 1/16.  He was extubated 1/23 and his septic shock is resolved.    New events:    Wean down FiO2, try to get out of bed    Diamox + albumin    Start PTA metoprolol equivalent    Neuro/Psych:   #. Alcohol abuse, clinical concern of alcohol withdrawal during his intubation.  He is currently at least 11 days following his most recent alcohol use, should be out of the risk window for severe alcohol withdrawal.  #. Clinical concern for Warnicke's encephalopathy secondary to alcohol abuse, on high-dose thiamine replacement.  #. Clinical concern for toxic metabolic encephalopathy (delirium) in setting of baseline alcohol abuse and prolonged hospitalization with intubation and critical illness.  Weaned off Precedex.    Plan to emphasize antipsychotic use rather than benzodiazepines    Pulmonary:   #. Acute hypoxic respiratory failure in setting of likely oversedation and multifocal pneumonia, intubated 1/16.  Extubated 1/23.    Required BiPAP 1/23, transitioned to HFNC 1/24    Scheduled albuterol nebs to help relieve bronchial congestion and to optimize bronchial hygiene    Avoid BiPAP use in the future to minimize mucus plugging and subsequent VQ mismatch    Cardiovascular:   #. Shock, distributive, likely secondary to severe sepsis in setting of multifocal pneumonia. Goal MAP >/= 65. RESOLVED.  #. Sinus tachycardia, on PTA metoprolol XL 25 mg qDAY --> start metoprolol 12.5 mg BID on 1/25    Infectious:  #. Severe sepsis secondary to multifocal community-acquired pneumonia.  Treated with with a 7-day course of Zosyn, completed on 1/22.    Renal:   #. Hypokalemia, most consistent with ongoing diuresis and poor nutrition.  Replacement  protocol in place.  #. Hypocalcemia, most consistent with poor nutritional status.  Replacing.    GI:   #. Nausea vomiting in setting of multifocal pneumonia, RESOLVED.   #. Malnutrition, awaiting formal SLP evaluation for appropriate diet adjustments.    Heme:   No acute issues, monitor.  Appropriate for conservative transfusion criteria.    Endo:   #. Glucose management w/ aspart.  Started on D10 overnight in setting of hypoglycemia due to n.p.o. status, glargine on hold.  Goal glucose 140-180.    Access/Lines/Tubes: required and necessary for continued patient cares  PICC line  Booth cath    ICU prophylaxis:   #. VAP ppx: Not indicated  #. Stress ulcer: Not acutely indicated  #. Diet: minced & moist diet w/ thin liquids  #. VTE: Enoxaparin  #. Restraints: not indicated    CODE: Full    Billing: This patient is critically ill: Yes. Total critical care time today 32 min, excluding procedures.     Analia Mancini MD  Pulmonary and Critical Care     =================================================================    Interval history: No acute events overnight, afebrile. Remains on HFNC but feels better today, denies any further chest congestion. Discussed in multidisciplinary rounds.     Vitals: Temp:  [97.8  F (36.6  C)-98.6  F (37  C)] 98  F (36.7  C)  Pulse:  [] 110  Resp:  [13-27] 19  BP: ()/(57-79) 105/69  FiO2 (%):  [50 %-60 %] 60 %  SpO2:  [88 %-99 %] 95 %  Vent:   FiO2 (%): 60 %  Resp: 19    Physical Exam:   General: Chronically ill-appearing  man, lying in bed, NAD -- more awake & interactive today  HEENT: EOMI, PERRL, MMM  CV: RRR, no M/R/G; extremities adequate perfused  Pulm: equal bilateral breath sounds with improved bronchial congestion. No wheezing, no cough  Abd: soft, ND, hypoactive bowel sounds  Msk: Warm to touch, no peripheral edema  Derm: No acute lesions or rashes on limited exam  Neuro: Awake, alert, attentive; delayed but intact speech  Psych: Calm    Labs: personally  reviewed & interpreted in EMR.   Imaging: personally reviewed & interpreted, including formal Radiology report in the EMR.    This report was prepared using speech recognition software.  Any typographical errors are unintentional.  Please, contact me directly for any clarifications of my report.    --------------------------------------------------------------------------------  Clinically Significant Risk Factors Present on Admission

## 2022-01-25 NOTE — PROGRESS NOTES
Care Management Follow Up    Length of Stay (days): 10    Expected Discharge Date: To be determined.      Concerns to be Addressed:   ICU level of care, due to alteration in respiratory status, requiring High Flow oxygen at 50 liters, 60% FiO2.  IV Lasix every 8 hours. Monitor labs.   Patient plan of care discussed at interdisciplinary rounds: Yes  Follow up from rounds/notes:   Wean as tolerated. Encourage activity. Change medications to oral. Goal is for patient to be able to step down out of ICU on 1/26/2022. Therapy to evaluate and treat.     Anticipated Discharge Disposition:  Discharge goal is home pending response to treatment, medical needs and mobility closer to discharge.      Anticipated Discharge Services:  To be determined by destination, patient/family preferences, medical needs and mobility status closer to the time of discharge.  Anticipated Discharge DME:  To be determined.     Patient/family educated on Medicare website which has current facility and service quality ratings:  NA  Education Provided on the Discharge Plan:  Per team  Patient/Family in Agreement with the Plan:  yes    Referrals Placed by CM/SW:  None  Private pay costs discussed: Not applicable at this time.     Additional Information:  Patient lives at home with his spouse in the lower level of a single family home that they rent. He is largely independent at baseline. He has a cane and walker available for use as needed. Once appropriate, social work care manager will follow up with Rule 25 and CD resources. Discharge plan pending progression and recommendations, care management will follow.     Susan Joseph RN

## 2022-01-25 NOTE — PROGRESS NOTES
"CLINICAL NUTRITION SERVICES - REASSESSMENT NOTE     Nutrition Prescription    RECOMMENDATIONS FOR MDs/PROVIDERS TO ORDER:  Continue to monitor electrolytes and replace if low as pt showing signs of refeeding syndrome    Malnutrition Status:    Severe in acute illness    Recommendations already ordered by Registered Dietitian (RD):  Start Glucerna daily, lower carb supplement with protein.  Check phosphorus as pt showing signs of refeeding syndrome.  Encourage good po intake.    Future/Additional Recommendations:  Monitor refeeding labs.     EVALUATION OF THE PROGRESS TOWARD GOALS   Diet: Level 6: Soft & Bite-Sized Dysphagia Diet 1/25  Intake: ate 25% at supper yesterday.        Hx:  Pt NPO 1/15-1/17.  TF started 1/17 and infused part of the day at initial rate.  TF held 1/18-1/20 d/t weaning trials.  On 1/20 TF resumed in the afternoon and therafter held for in the mornings for weaning trials.  Pt extubated 1/23.       NEW FINDINGS   Pt extubated 1/23 and started po diet 1/24.  Speech therapist working with pt today.  Pt reports he had lost a bunch of weight but was regaining it.  Pt drinks Ensure at home occasionally.    ANTHROPOMETRICS  Height: 185.4 cm (6' .992\"[documented height[)  Admit wt 186 lb 11.7 oz 1/17/22.  Most Recent Weight: 74.3 kg (163 lb 12.8 oz)  ?accurate  Weight History:   Wt Readings from Last 8 Encounters:   01/25/22 74.3 kg (163 lb 12.8 oz)   11/12/21 80.9 kg (178 lb 6.4 oz)   12/30/20 77.1 kg (170 lb)   190 lb 7.6 oz 1/19/22, 189 lb 6 oz 1/23/22, 184 lb 8.4 oz 1/24/22  Wt down 21 lb in 1 day?, from 1/24 to 1/25.  Pt did have large diuresis on 1/23  Overall -2.7 L since admission.     PHYSICAL FINDINGS  See malnutrition section below.  Pt with difficulty using pincer grasp, needing some assist with feeding.     GI CONCERNS  LBM 1/24    LABS  ROUTINE ICU LABS (Last four results)  CMPRecent Labs   Lab 01/25/22  0829 01/25/22  0442 01/25/22  0429 01/25/22  0047 01/24/22  0600 01/24/22  0332 " 01/23/22 2042 01/23/22 2034 01/23/22  1530 01/23/22  1256 01/23/22  0758 01/23/22  0508 01/22/22  0800 01/22/22  0445 01/21/22  0751 01/21/22  0509   NA  --  143  --   --   --  145  --   --   --   --   --  145  --  143  --  143   POTASSIUM  --  3.2*  --   --   --  3.6  --  3.4*  --  3.3*  --  3.0*  3.0*  --  3.3*  --  3.5   CHLORIDE  --  102  --   --   --  107  --   --   --   --   --  107  --  108*  --  109*   CO2  --  31  --   --   --  31  --   --   --   --   --  28  --  25  --  22   ANIONGAP  --  10  --   --   --  7  --   --   --   --   --  10  --  10  --  12   * 149* 143* 259*   < > 102   < >  --    < >  --    < > 269*   < > 217*   < > 193*   BUN  --  7*  --   --   --  8  --   --   --   --   --  12  --  12  --  10   CR  --  0.57*  --   --   --  0.57*  --   --   --   --   --  0.78  --  0.85  --  0.76   GFRESTIMATED  --  >90  --   --   --  >90  --   --   --   --   --  >90  --  >90  --  >90   BECKY  --  7.8*  --   --   --  7.7*  --   --   --   --   --  7.9*  --  7.8*  --  7.4*   MAG  --  1.5*  --   --   --  1.8  --   --   --  1.3*  --   --   --   --   --   --    PROTTOTAL  --   --   --   --   --   --   --   --   --   --   --   --   --   --   --  5.0*   ALBUMIN  --   --   --   --   --   --   --   --   --   --   --   --   --   --   --  1.1*   BILITOTAL  --   --   --   --   --   --   --   --   --   --   --   --   --   --   --  1.3*   ALKPHOS  --   --   --   --   --   --   --   --   --   --   --   --   --   --   --  173*   AST  --   --   --   --   --   --   --   --   --   --   --   --   --   --   --  44*   ALT  --   --   --   --   --   --   --   --   --   --   --   --   --   --   --  19    < > = values in this interval not displayed.     CBC  Recent Labs   Lab 01/25/22  0442 01/24/22  0332 01/23/22  0508 01/22/22  0445   WBC 12.5* 14.6* 12.1* 14.9*   RBC 3.39* 3.21* 3.18* 3.59*   HGB 10.3* 9.9* 9.7* 11.0*   HCT 30.2* 28.8* 28.4* 31.2*   MCV 89 90 89 87   MCH 30.4 30.8 30.5 30.6   MCHC 34.1 34.4 34.2 35.3   RDW  19.5* 19.4* 19.0* 19.7*    161 166 202     INRNo lab results found in last 7 days.  Arterial Blood Gas  Recent Labs   Lab 01/25/22  0442 01/24/22  1003 01/18/22  1319   PH 7.52* 7.52* 7.40   PCO2  --   --  44   PO2  --   --  84   HCO3  --   --  27   O2PER  --   --  35   Potassium and magnesium replaced per protocol  Reviewed    MEDICATIONS    albuterol  2.5 mg Nebulization Q6H WA     chlorhexidine  15 mL Mouth/Throat Q12H     DULoxetine  60 mg Oral or Feeding Tube Daily     enoxaparin ANTICOAGULANT  40 mg Subcutaneous Q24H     folic acid  1 mg Intravenous Daily     furosemide  20 mg Intravenous Q8H     gabapentin  200 mg Oral or Feeding Tube BID     insulin aspart  1-12 Units Subcutaneous Q4H     [Held by provider] insulin glargine  15 Units Subcutaneous QAM AC     lidocaine  1 patch Transdermal Q24H     lidocaine   Transdermal Q8H     metoprolol tartrate  12.5 mg Oral BID     multivitamins w/minerals  15 mL Oral or Feeding Tube Daily     pantoprazole  40 mg Per Feeding Tube QAM AC    Or     pantoprazole (PROTONIX) IV  40 mg Intravenous QAM AC     sodium chloride (PF)  3 mL Intracatheter Q8H     thiamine  250 mg Intravenous Daily        dextrose Stopped (01/24/22 0230)      acetaminophen **OR** acetaminophen, bisacodyl, calcium carbonate, dextrose, glucose **OR** dextrose **OR** glucagon, famotidine, flumazenil, OLANZapine zydis **OR** haloperidol lactate, lidocaine 4%, lidocaine (buffered or not buffered), magnesium hydroxide, melatonin, menthol-zinc oxide, metoprolol, naloxone **OR** naloxone **OR** naloxone **OR** naloxone, ondansetron **OR** ondansetron, polyethylene glycol, sodium chloride (PF), sucralfate   Reviewed    MALNUTRITION:  % Weight Loss:  Difficult to determine. Has had some diuresis.  Today's wt seems inaccurate in light of other weights.  % Intake:  </= 50% for >/= 5 days (severe malnutrition).  NPO + no full day of TF.  Subcutaneous Fat Loss:  Orbital region moderate depletion  Muscle  Loss:  Temporal region moderate depletion, Clavicle bone region moderate depletion and Acromion bone region moderate depletion  Fluid Retention:  None noted    Malnutrition Diagnosis: Severe malnutrition  In Context of:  Acute illness or injury    Previous Goals   Tolerate TF at goal rate-completed  Meet nutrition needs-progrtessing  Electrolytes WNL-progressing    Previous Nutrition Diagnosis  Swallowing difficulty related to acute illness as evidenced by intubation   Evaluation: No longer applicable, nutrition diagnosis changed below    CURRENT NUTRITION DIAGNOSIS  Inadequate energy intake related to acute illness as evidenced by combination of NPO and frequent TF holds.      INTERVENTIONS  Implementation  Medical food supplement therapy    Goals  Patient to consume % of nutritionally adequate meals three times per day, or the equivalent with supplements/snacks.  Electrolytes WNL    Monitoring/Evaluation  Progress toward goals will be monitored and evaluated per protocol.

## 2022-01-25 NOTE — PROGRESS NOTES
Speech-Language Pathology: Video Swallow Study     01/25/22 1500   General Information   Onset of Illness/Injury or Date of Surgery 01/23/22   Pertinent History of Current Problem Extubated 1/23/22 after 11 day intubation. Per H&P: 56 year old smoker with history of ongoing alcohol abuse, multiple abdominal surgeries, hypertension, T2DM, admitted on 1/15/2022 w/ acute hypoxic respiratory failure in setting of multifocal pneumonia requiring intubation on 1/16.  He was extubated 1/23 and his septic shock is resolved.   General Observations Alert and cooperative; HFNC;   Type of Evaluation   Type of Evaluation Swallow Evaluation   Oral Motor   Oral Musculature generally intact   Dentition (Oral Motor)   Dentition (Oral Motor) some missing teeth  (edentulous upper)   Facial Symmetry (Oral Motor)   Facial Symmetry (Oral Motor) WNL   Lip Function (Oral Motor)   Lip Range of Motion (Oral Motor) WNL   Tongue Function (Oral Motor)   Tongue ROM (Oral Motor) WNL   Jaw Function (Oral Motor)   Jaw Function (Oral Motor) WNL   General Swallowing Observations   Current Diet/Method of Nutritional Intake (General Swallowing Observations, NIS) thin liquids (level 0);minced & moist (level 5)   Respiratory Support (General Swallowing Observations) nasal cannula  (HFNC)   Swallowing Evaluation Videofluoroscopic swallow study (VFSS)   VFSS Evaluation   Views Taken left lateral   VFSS Textures Trialed mildly thick liquids;thin liquids;pureed;solid foods   VFSS Eval: Thin Liquid Texture Trial   Mode of Presentation, Thin Liquid cup;straw;self-fed   Preparatory Phase Holding  (Variable bolus control )   Oral Phase, Thin Liquid Delayed AP movement;WFL   Pharyngeal Phase, Thin Liquid WFL;Delayed swallow reflex   Rosenbek's Penetration Aspiration Scale: Thin Liquid Trial Results 2 - contrast enters airway, remains above the vocal cords, no residue remains (penetration)   Diagnostic Statement Shallow to moderate, transient and trace penetration  with thin liquids intermittently; slight improvement with straw over cup   VFSS Eval: Mildly Thick Liquids   Mode of Presentation cup   Preparatory Phase WFL;Holding   Oral Phase WFL;Delayed AP movement   Pharyngeal Phase WFL   Rosenbek's Penetration Aspiration Scale 2 - contrast enters airway, remains above the vocal cords, no residue remains (penetration)   Diagnostic Statement Trace, shallow transient penetration   VFSS Evaluation: Puree Solid Texture Trial   Mode of Presentation, Puree spoon   Preparatory Phase Holding;WFL   Oral Phase, Puree WFL;Delayed AP movement  (disorganized AP movement)   Pharyngeal Phase, Puree WFL   Rosenbek's Penetration Aspiration Scale: Puree Food Trial Results 1 - no aspiration, contrast does not enter airway   VFSS Evaluation: Solid Food Texture Trial   Mode of Presentation, Solid self-fed   Preparatory Phase Holding   Oral Phase, Solid Delayed AP movement;Effortful AP movement  (anterior posterior mvmt repeatedly; disorganized bolus prep)   Pharyngeal Phase, Solid WFL   Rosenbek's Penetration Aspiration Scale: Solid Food Trial Results 1 - no aspiration, contrast does not enter airway   Swallowing Recommendations   Diet Consistency Recommendations minced & moist (level 5);thin liquids (level 0)   Supervision Level for Intake close supervision needed  (assist needed)   Swallowing Maneuver Recommendations alternate food and liquid intake   Monitoring/Assistance Required (Eating/Swallowing) monitor for cough or change in vocal quality with intake   Recommended Feeding/Eating Techniques (Swallow Eval) set-up and prepare tray;provide assist with feeding;maintain upright sitting position for eating;minimize distractions during oral intake   Comment, Swallowing Recommendations Videofluoroscopic Swallow Study completed. Patient had no aspiration with any consistency and trace, transient penetration with thin and nectar thick liquids; moderately deep x1 with thin; Swallows of thin with  straw appeared to have more shallow and less consistent penetration. Swallow response was mildly delayed and epiglottic inversion was complete.  Minimal stasis occurred with textures . Hyolaryngeal elevation was WNL and hyolaryngeal excursion was WNL. Recommend diet of Minced and Moist and Thin liquids, small bites and sips, slow rate of intake. SLP to follow.    General Therapy Interventions   Planned Therapy Interventions Dysphagia Treatment   Dysphagia treatment Compensatory strategies for swallowing;Instruction of safe swallow strategies;Modified diet education   SLP Therapy Assessment/Plan   Criteria for Skilled Therapeutic Interventions Met (SLP Eval) yes;treatment indicated   SLP Diagnosis dysphagia   Rehab Potential (SLP Eval) good, to achieve stated therapy goals   Therapy Frequency (SLP Eval) 5 times/wk   Predicted Duration of Therapy Intervention (SLP Eval) 6 days   Comment, Therapy Assessment/Plan (SLP) SLP to follow for safest, yet LRD    Total Evaluation Time   Total Evaluation Time (Minutes) 10   Coping Strategies   Trust Relationship/Rapport care explained;questions answered;questions encouraged;choices provided

## 2022-01-25 NOTE — PLAN OF CARE
Patient remains on HFNC 30L, 45% Sats high 90's. Scheduled nebs given via inline. O2 needs continues to improve. BiPAP dc'd per MD order. Will continue to wean O2 as tolerated.    Tam Rowland, RT

## 2022-01-26 NOTE — PROGRESS NOTES
Care Management Follow Up    Length of Stay (days): 11    Expected Discharge Date: To be determined.      Concerns to be Addressed:   Alteration in respiratory status requiring High Flow oxygen at 30 liters, 45% FiO2.  IV Lasix every 8 hours. Monitor labs.   Patient plan of care discussed at interdisciplinary rounds: Yes  Follow up from rounds/notes:   Oxygen weaned down to 30 liters, 30% FiO2. Anticipate downgrade from ICU today.     Anticipated Discharge Disposition:  Discharge goal is home pending response to treatment, medical needs and mobility closer to discharge.      Anticipated Discharge Services:  To be determined by destination, patient/family preferences, medical needs and mobility status closer to the time of discharge.  Anticipated Discharge DME:  To be determined.     Patient/family educated on Medicare website which has current facility and service quality ratings:  NA  Education Provided on the Discharge Plan:  Per team  Patient/Family in Agreement with the Plan:  yes    Referrals Placed by CM/SW:  None  Private pay costs discussed: Not applicable at this time.     Additional Information:  Patient lives at home with his spouse in the lower level of a single family home that they rent. He is largely independent at baseline. He has a cane and walker available for use as needed. Once appropriate, social work care manager will follow up with Rule 25 and CD resources. Discharge plan pending progression and recommendations, care management will follow.     Susan Joseph RN

## 2022-01-26 NOTE — PROGRESS NOTES
Desaturations and dyspnea not noted. Pt is did not tolerate titration. Current settings: 30 lpm/45% FIO2; sats mid 90s. RT following.    Rober Barton, RT

## 2022-01-26 NOTE — PROGRESS NOTES
Patient transferred from ICU, vitals stable. On high flow 02, maintains sats > 95 % if he sits up straight--he tends to lean over while in chair and sats drop to the low 80's. While sitting upright in bed, sats maintained well.  Alert and oriented generally, but slightly confused and needs reminders and cues. Wife here @ suppertime. Patient medicated with tylenol for general discomfort.

## 2022-01-26 NOTE — PROGRESS NOTES
CLINICAL NUTRITION SERVICES -Brief Note     Nutrition Prescription    RECOMMENDATIONS FOR MDs/PROVIDERS TO ORDER:  Phos 2.4 yesterday, consider replacing per protocol  Consider OT eval, may need built up utensils for self feeding.  Continue to monitor electrolytes and replace if low as pt showing signs of refeeding syndrome    Malnutrition Status:    Severe in acute illness    Recommendations already ordered by Registered Dietitian (RD):  Glucerna daily  Message sent to nutrition to send hard mug for beverages.    Future/Additional Recommendations:       EVALUATION OF THE PROGRESS TOWARD GOALS   Diet: Level 5: Minced & Moist Dysphagia Diet        NEW FINDINGS   Pt eating breakfast this morning during visit.  Pt needing assist opening creamer and having difficulty with styrofoam cup.  Pt reports that he has no upper teeth and unable to wear his dentures as they cause him to gag.  Pt likes the Glucerna.

## 2022-01-26 NOTE — PROGRESS NOTES
Daily Progress Note        CODE STATUS:  Full Code    01/26/22  Assessment/Plan:   55 YO gentleman with PMH significant for alcohol abuse, hypertension, DM-II, multiple abdominal surgeries who was admitted on 1/15/22 with alcohol withdrawal, possible aspiration pneumonia leading to respiratory failure requiring intubation on 1/16, successfully extubated on 1/23/22 and now transferred out of ICU on 1/26/22 for floor care.    Possible aspiration pneumonia  Acute hypoxic respiratory failure  -- Required intubation 1/16, extubated on 1/23. Required BiPAP 1/23, transitioned to HFNC on 1/24. Currently on 30LPM 40% FiO2  -- Completed 7 days course of IV zosyn on 1/22  -- Cont albuterol nebs and pulm hygiene  -- CT PE was negative for PE    Septic shock  -- Likely due to multifocal pneumonia. Resolved   -- Metoprolol started on 1/25 for sinus tachycardia  -- Patient is having intermittent hypotension, current BP 83/50. Got a bolus of 500ml NS this am around 4:30am. Will give him another bolus of 500ml NS. Check lactic acid and cortisol level    Alcohol withdrawal  Acute toxic metabolic encephalopathy  -- Clinical concern for alcohol withdrawal during the intubation  -- Precedex weaned off. Cont with thiamine    Hypokalemia  -- Replaced    Hyperglycemia  DM type 2  -- A1c was 7.0 on 1/15/22  -- Blood sugars ranging from 190s- 330s. Lantus dose increased. Cont with mealtime insulin and sliding scale    Nausea/vomiting  -- Resolved    Disposition; 2-3 days  Barrier to discharge; HFNC, intermittent hypotension      Subjective:  Interval History: Patient seen and examined. Notes, labs, imaging reports personally reviewed. Patient is new to me. He was sitting up in a chair with HFNC on. No new issues overnight. He did get a bolus of NS 500ml earlier this morning around 4:30am.    Review of Systems:   As mentioned in subjective.    Patient Active Problem List   Diagnosis     Alcohol dependence with unspecified alcohol-induced  disorder (H)     Charcot's joint of foot, left     Fall     Hypomagnesemia     Iron deficiency anemia     Restless legs     Type 2 diabetes mellitus without complication (H)     Secondary hypertension     Tobacco use disorder     Meningioma (H)     Falls frequently     Alcoholic intoxication with complication (H)     Closed nondisplaced fracture of phalanx of toe of left foot, unspecified toe, initial encounter     Alcohol use disorder, severe, dependence (H)     Sinus tachycardia     Pneumonia of both lungs due to infectious organism, unspecified part of lung     Non-intractable vomiting with nausea, unspecified vomiting type     Agitation       Scheduled Meds:    albuterol  2.5 mg Nebulization Q6H WA     DULoxetine  60 mg Oral or Feeding Tube Daily     enoxaparin ANTICOAGULANT  40 mg Subcutaneous Q24H     folic acid  1 mg Oral Daily     furosemide  20 mg Intravenous Q8H     gabapentin  200 mg Oral BID     insulin aspart  1-12 Units Subcutaneous Q4H     insulin glargine  15 Units Subcutaneous QAM AC     lidocaine  1 patch Transdermal Q24H     lidocaine   Transdermal Q8H     metoprolol tartrate  12.5 mg Oral BID     multivitamin, therapeutic  1 tablet Oral Daily     pantoprazole  40 mg Oral QAM AC     sodium chloride (PF)  3 mL Intracatheter Q8H     [START ON 1/28/2022] thiamine  100 mg Oral Daily     thiamine  250 mg Oral Daily     Continuous Infusions:    dextrose Stopped (01/24/22 0230)     PRN Meds:.acetaminophen **OR** acetaminophen, bisacodyl, calcium carbonate, dextrose, glucose **OR** dextrose **OR** glucagon, famotidine, flumazenil, OLANZapine zydis **OR** haloperidol lactate, lidocaine 4%, lidocaine (buffered or not buffered), magnesium hydroxide, melatonin, menthol-zinc oxide, metoprolol, naloxone **OR** naloxone **OR** naloxone **OR** naloxone, ondansetron **OR** ondansetron, polyethylene glycol, sodium chloride (PF), sucralfate    Objective:  Vital signs in last 24 hours:  Temp:  [98.1  F (36.7  C)-98.6   F (37  C)] 98.1  F (36.7  C)  Pulse:  [] 95  Resp:  [13-37] 20  BP: ()/(51-71) 114/61  FiO2 (%):  [30 %-45 %] 30 %  SpO2:  [94 %-100 %] 100 %        Intake/Output Summary (Last 24 hours) at 1/26/2022 1610  Last data filed at 1/26/2022 1150  Gross per 24 hour   Intake 840 ml   Output 2100 ml   Net -1260 ml       Physical Exam:    General: Not in obvious distress.  HEENT: NC, AT   Chest: Diminished breath sounds on auscultation bilaterally  Heart: S1S2 normal, regular. No M/R/G  Abdomen: Soft. NT, ND. Bowel sounds- active.  Extremities: No legs swelling  Neuro: Alert and awake, grossly non-focal      Lab Results:(I have personally reviewed the results)    Recent Results (from the past 24 hour(s))   Glucose by meter    Collection Time: 01/25/22  5:40 PM   Result Value Ref Range    GLUCOSE BY METER POCT 289 (H) 70 - 99 mg/dL   Glucose by meter    Collection Time: 01/25/22  8:35 PM   Result Value Ref Range    GLUCOSE BY METER POCT 234 (H) 70 - 99 mg/dL   Potassium    Collection Time: 01/25/22 10:17 PM   Result Value Ref Range    Potassium 2.9 (L) 3.5 - 5.0 mmol/L   Glucose by meter    Collection Time: 01/25/22 11:52 PM   Result Value Ref Range    GLUCOSE BY METER POCT 152 (H) 70 - 99 mg/dL   Glucose by meter    Collection Time: 01/26/22  3:47 AM   Result Value Ref Range    GLUCOSE BY METER POCT 133 (H) 70 - 99 mg/dL   CBC with platelets    Collection Time: 01/26/22  4:47 AM   Result Value Ref Range    WBC Count 12.4 (H) 4.0 - 11.0 10e3/uL    RBC Count 3.43 (L) 4.40 - 5.90 10e6/uL    Hemoglobin 10.7 (L) 13.3 - 17.7 g/dL    Hematocrit 30.5 (L) 40.0 - 53.0 %    MCV 89 78 - 100 fL    MCH 31.2 26.5 - 33.0 pg    MCHC 35.1 31.5 - 36.5 g/dL    RDW 19.1 (H) 10.0 - 15.0 %    Platelet Count 192 150 - 450 10e3/uL   Basic metabolic panel    Collection Time: 01/26/22  4:47 AM   Result Value Ref Range    Sodium 142 136 - 145 mmol/L    Potassium 3.8 3.5 - 5.0 mmol/L    Chloride 104 98 - 107 mmol/L    Carbon Dioxide (CO2) 26  22 - 31 mmol/L    Anion Gap 12 5 - 18 mmol/L    Urea Nitrogen 4 (L) 8 - 22 mg/dL    Creatinine 0.60 (L) 0.70 - 1.30 mg/dL    Calcium 8.0 (L) 8.5 - 10.5 mg/dL    Glucose 160 (H) 70 - 125 mg/dL    GFR Estimate >90 >60 mL/min/1.73m2   Magnesium    Collection Time: 01/26/22  4:47 AM   Result Value Ref Range    Magnesium 1.8 1.8 - 2.6 mg/dL   Potassium    Collection Time: 01/26/22  4:47 AM   Result Value Ref Range    Potassium 3.8 3.5 - 5.0 mmol/L   Glucose by meter    Collection Time: 01/26/22  7:59 AM   Result Value Ref Range    GLUCOSE BY METER POCT 215 (H) 70 - 99 mg/dL   Glucose by meter    Collection Time: 01/26/22 11:43 AM   Result Value Ref Range    GLUCOSE BY METER POCT 191 (H) 70 - 99 mg/dL       All laboratory and imaging data in the past 24 hours reviewed  Serum Glucose range:   Recent Labs   Lab 01/26/22  1143 01/26/22  0759 01/26/22 0447 01/26/22  0347   * 215* 160* 133*     ABG:   Recent Labs   Lab 01/25/22  0442 01/24/22  1003   PH 7.52* 7.52*     CBC:   Recent Labs   Lab 01/26/22 0447 01/25/22  0442 01/24/22  0332   WBC 12.4* 12.5* 14.6*   HGB 10.7* 10.3* 9.9*   HCT 30.5* 30.2* 28.8*   MCV 89 89 90    157 161     Chemistry:   Recent Labs   Lab 01/26/22  0447 01/25/22  2217 01/25/22  1539 01/25/22  0442 01/24/22  0332 01/22/22  0445 01/21/22  0509     --   --  143 145   < > 143   POTASSIUM 3.8  3.8 2.9* 3.3* 3.2* 3.6   < > 3.5   CHLORIDE 104  --   --  102 107   < > 109*   CO2 26  --   --  31 31   < > 22   BUN 4*  --   --  7* 8   < > 10   CR 0.60*  --   --  0.57* 0.57*   < > 0.76   GFRESTIMATED >90  --   --  >90 >90   < > >90   BECKY 8.0*  --   --  7.8* 7.7*   < > 7.4*   MAG 1.8  --   --  1.5* 1.8   < >  --    PROTTOTAL  --   --   --   --   --   --  5.0*   ALBUMIN  --   --   --   --   --   --  1.1*   AST  --   --   --   --   --   --  44*   ALT  --   --   --   --   --   --  19   ALKPHOS  --   --   --   --   --   --  173*   BILITOTAL  --   --   --   --   --   --  1.3*   PRIYANKA  --   --    --   --   --   --  56*    < > = values in this interval not displayed.     Coags:  No results for input(s): INR, PROTIME, PTT in the last 168 hours.    Invalid input(s): APTT  Cardiac Markers:  No results for input(s): CKTOTAL, TROPONINI in the last 168 hours.       MR Brain w/o Contrast    Result Date: 1/22/2022  EXAM: MR BRAIN W/O CONTRAST LOCATION: [facility] DATE/TIME: 1/22/2022 1:05 PM INDICATION: Encephalopathy COMPARISON: CT head 01/18/2022, MRI head 11/23/2020 TECHNIQUE: Routine multiplanar multisequence head MRI without intravenous contrast. FINDINGS: INTRACRANIAL CONTENTS: Please note study is significantly degraded by motion artifact. Suggestion of a few small foci of restricted diffusion at the left temporal occipital region; best appreciated on repeat axial diffusion weighted sequence series 20.2 image 12, image 11; as well as repeat coronal diffusion weighted sequence series 24.2 image 9. No definite associated hemorrhage or significant mass effect. Redemonstration of small chronic infarction left precentral gyrus. Mild generalized cerebral atrophy. No hydrocephalus. Normal position of the cerebellar tonsils. Small chronic infarction lateral aspect left cerebellum. A few additional tiny infarctions demonstrated on prior exam are not well demonstrated on the current. SELLA: Not well-visualized, grossly normal. OSSEOUS STRUCTURES/SOFT TISSUES: Normal marrow signal. Flow voids are not well-visualized. ORBITS: Not well-visualized. SINUSES/MASTOIDS: The complete opacification left maxillary and left sphenoid sinus, similar to prior. Small amount of opacification right mastoid air cells.     IMPRESSION: 1.  Please note study is significantly degraded by motion artifact. 2.  Suggestion of a few small foci of acute/early subacute infarction at the left temporal occipital region. 3.  No definite associated hemorrhage or significant mass effect. 4.  Small chronic infarction left  precentral gyrus, similar to prior. 5.  Probable few small chronic infarctions cerebellar hemispheres. 6.  Mild atrophy.    XR Chest Port 1 View    Result Date: 1/19/2022  EXAM: XR CHEST PORT 1 VIEW LOCATION: [facility] DATE/TIME: 1/19/2022 10:52 AM INDICATION: after advancing et tube COMPARISON: 1/18/2022.     IMPRESSION: Endotracheal tube is been advanced. Tip is 4 cm above the igor in good position. There our persistent bibasilar pulmonary opacities with partial clearing at the right base from the prior study and no change at the left base. No pneumothorax. Enteric tube tip is not visualized tip is below the diaphragm. PICC catheter from right upper extremity approach is unchanged with tip at the junction of the superior vena cava and right atrium. Left posterior rib fractures are again noted as well as deformity of the right humeral head.    XR Chest Port 1 View    Result Date: 1/18/2022  EXAM: XR CHEST PORT 1 VIEW LOCATION: [facility] DATE/TIME: 1/18/2022 6:06 AM INDICATION: Location of ET tube COMPARISON: 01/16/2022.     IMPRESSION:Endotracheal tube is in the trachea at the level of the clavicular heads with tip 6 cm above the igor. PICC catheter from right upper extremity approach is in the distal superior vena cava near its junction with the right atrium. Enteric tube tip is below the diaphragm and not visualized. There is no pneumothorax. Again noted are bilateral patchy airspace opacities there has been increased consolidation or atelectasis at the right medial lung base. There are old healed left posterior rib  fractures no acute fractures are identified.    XR Chest Port 1 View    Result Date: 1/16/2022  EXAM: XR CHEST PORT 1 VIEW LOCATION: [facility] DATE/TIME: 1/16/2022 12:34 PM INDICATION: ET tube placement, PICC line placement COMPARISON: CT pulmonary angiogram 01/15/2022     IMPRESSION: ET tube tip projects 6.5 cm above  the igor. Gastric tube extends towards the diaphragmatic hiatus, outside the field-of-view. Right PICC terminates upper right atrium. Extensive bilateral airspace opacities are present with patchy lung involvement, unchanged from yesterday. No new focal lung volume loss. No visible pleural fluid or pneumothorax on this single supine view.    XR Chest Port 1 View    Result Date: 1/15/2022  EXAM: XR CHEST PORT 1 VIEW LOCATION: [facility] DATE/TIME: 1/15/2022 5:16 PM INDICATION: Shortness of breath COMPARISON: 11/5/2021     IMPRESSION: There are mild opacities in both lungs which have increased from the prior exam. This is likely due to a superimposed infectious or inflammatory process on top of mild background scarring. Normal heart size and pulmonary vascularity. Old left  rib fractures.    XR Abdomen Port 1 View    Result Date: 1/17/2022  EXAM: XR ABDOMEN PORT 1 VIEWS LOCATION: [facility] DATE/TIME: 1/17/2022 1:25 PM INDICATION: Location of OG tube COMPARISON: None.     IMPRESSION: Enteric tube in the body the stomach.     CT Chest Pulmonary Embolism w Contrast    Result Date: 1/15/2022  EXAM: CT CHEST PULMONARY EMBOLISM W CONTRAST LOCATION: [facility] DATE/TIME: 1/15/2022 6:17 PM INDICATION: Shortness of breath COMPARISON: Portable AP view of the chest 01/15/2022; CT of the abdomen and pelvis 10/05/2020 TECHNIQUE: CT chest pulmonary angiogram during arterial phase injection of IV contrast. Multiplanar reformats and MIP reconstructions were performed. Dose reduction techniques were used. CONTRAST: 100ml isovue 370 FINDINGS: ANGIOGRAM CHEST: Pulmonary arteries are normal caliber and negative for pulmonary emboli. Normal caliber thoracic aorta. There are no findings to suggest acute aortic syndrome. Proximal great vessels are patent. Diminutive left vertebral artery arises directly from the arch. Mild atheromatous calcifications descending  aorta and distal right innominate artery. LUNGS AND PLEURA: Upper lung predominant mixed centrilobular and paraseptal emphysema. Superimposed patchy airspace opacities are present bilaterally, asymmetric to the right associated with internal interstitial opacity. Minimal right pleural fluid layers dependently. There is chronic thickening of the left posterior and posterolateral pleura adjacent to multiple left rib fracture deformities. Central airways are patent. MEDIASTINUM: Cardiac chambers are normal in size. No pericardial effusion is present. Mildly enlarged lymph nodes in both analia, likely reactive in the setting of airspace opacities. Subcarinal, lower paratracheal and subaortic/prevascular lymph nodes are  all normal by size criteria. The middle third and distal thirds of the esophagus has circumferential wall thickening consistent with esophagitis. Imaged thyroid gland is normal. CORONARY ARTERY CALCIFICATION: None. UPPER ABDOMEN: There are multiple calcified gallstones in the neck of the gallbladder. Severe fatty infiltration of the liver. The imaged distal transverse colon/splenic flecture has wall thickening. Previous splenectomy with small splenule present. 15 mm length ovoid calcification in the region of the pancreas neck is unchanged. MUSCULOSKELETAL: There are multiple ununited left posterior and posterolateral rib fracture deformities including lateral ribs 7, 8 and posterior ribs 9 and 11. The posterolateral right ninth rib is broken in 2 locations and the fractures have fairly sharp borders suggesting acute on subacute fractures..     IMPRESSION: 1.  No acute pulmonary embolism. 2.  Multifocal bilateral airspace opacities are present mixed groundglass and interstitial thickening asymmetric to the right. Imaging features can be seen with (COVID-19)  pneumonia, though are nonspecific and can occur with a variety of infectious and noninfectious processes. Given the other findings below, aspiration  is a strong differential consideration. 3.  Severe wall thickening of the middle and distal thirds of the esophagus consistent with a subacute esophagitis. 4.  Severe hepatic steatosis. 5.  Acute on chronic fracture deformities of multiple left lateral/posterolateral ribs. 6.  Cholelithiasis. 7.  Wall thickening of the imaged colon consistent with colitis.    CT Head w/o Contrast    Result Date: 1/18/2022  EXAM: CT HEAD W/O CONTRAST LOCATION: [facility] DATE/TIME: 1/18/2022 3:45 AM INDICATION: Headache, intracranial hemorrhage suspected. COMPARISON: 11/4/2021 TECHNIQUE: Routine CT Head without IV contrast. Multiplanar reformats. Dose reduction techniques were used. FINDINGS: INTRACRANIAL CONTENTS: No intracranial hemorrhage, extraaxial collection, or mass effect. No CT evidence of acute infarct. Mild presumed chronic small vessel ischemic changes. Mild to moderate generalized volume loss. No hydrocephalus. Stable calcified dural based lesion along the right frontal convexity measuring 1 cm in diameter, presumably reflecting a small meningioma. VISUALIZED ORBITS/SINUSES/MASTOIDS: No intraorbital abnormality. Complete opacification of the left maxillary sinus with chronic mucoperiosteal reaction. Mucous retention cyst in the left sphenoid sinus. Mild mucosal thickening along the floor of the left frontal sinus. Opacified left frontoethmoidal recess. No middle ear or mastoid effusion. BONES/SOFT TISSUES: No acute abnormality.     IMPRESSION: 1.  No CT evidence for acute intracranial process. 2.  Stable chronic changes as above.    XR Video Swallow with SLP or OT    Result Date: 1/25/2022  EXAM: XR VIDEO SWALLOW WITH SLP OR OT LOCATION: [facility] DATE/TIME: 1/25/2022 3:27 PM INDICATION: Difficulty swallowing. COMPARISON: None. TECHNIQUE: Routine swallow study with speech pathology using multiple barium thicknesses. FINDINGS: FLUOROSCOPIC TIME: 1.5 minutes NUMBER OF  IMAGES: 9 Swallow study with Speech Pathology using multiple barium thicknesses. Moderate to deep penetration with thin liquid (no cough reflex). Small amount of penetration with mildly thick consistency. No other penetration or aspiration.     EEG Coma or Sleep Only    Result Date: 2022  ELECTROENCEPHALOGRAM (EEG) REPORT [facility] NEUROLOGY [place] [address] [place] [address] Tel: [phone]  Fax: [phone] [email or web address] Peewee Hess,  1965, MRN [id number] PCP: Sarah Canseco Date: 2022 Principal Diagnosis: Altered mental status History : Patient is being evaluated for altered mental status. Medication listed includes Ativan Description of the Recording:  This is a multichannel digital EEG recording done using the international 10-20 placement system. Background of the recording consists of irregular 1 to 3 Hz polymorphic delta activity with amplitudes ranging between 20 to 30  V.  Alerting procedure produce minimal change.  Photic stimulation performed with standard protocol produced minimal change.  No transient sleep patterns were recorded. During this recording, no sharp discharges, spikes or electrographic seizure activity was recorded. Impression: This is an abnormal EEG due to diffusely slow polymorphic delta activity that minimally attenuates with alerting procedures.  This EEG suggests nonspecific generalized cerebral dysfunction.  No focal slowing or periodic discharges were seen to suggest seizures. Classification: Delta grade I generalized Tiburcio Gallagher MD [facility] This note was dictated using voice recognition software.  Any grammatical or context distortions are unintentional and inherent to the software.       Latest radiology report personally reviewed.    Note created using dragon voice recognition software so sounds alike errors may have escaped  editing.      01/26/2022   Morteza Nicholson MD  Hospitalist, [facility]  Pager: [phone]

## 2022-01-26 NOTE — PROGRESS NOTES
RT PROGRESS NOTE    CURRENT HIGH FLOW SETTINGS:  LITER FLOW: 30 LPM        FIO2:  30%  PATIENT PARAMETERS:  SAT: %  HR: 101  RR: 24  BS: diminished @ bases, good aerations upper airways.     Respiratory Medications: None     NOTE / SHIFT SUMMARY:   Supplemental oxygen has been titrated down to 30% and pt still able to maintain adequate saturations. RT will continue to monitor and assess as needed.       Babak Avelar, RRT

## 2022-01-27 NOTE — SIGNIFICANT EVENT
Significant Event Note    Time of event: 3:53 AM January 27, 2022    Description of event:  RN notified house officer of low blood pressure 86/52 (MAP 63). Pt is a 56 year old male here with history of alcohol use, T2DM, admitted with pneumonia, intubated 1/16-1/23 for acute hypoxic respiratory failure. Pt's blood pressures have been soft throughout this hospital stay. Pt is mentating well and in no acute distress. Otherwise stable.     GEN: no acute distress  CV: RRR, no murmurs, UE and LE well perfused  RESP: HFNC in place, diminished breath sounds bilat.   EXT: no peripheral edema. Pulses palpated in upper and lower extremities.     Plan:  Hypotension  -500ml bolus NS  -hold 4am dose of lasix.   -monitor    Discussed with: bedside nurse    Vance Grande DO

## 2022-01-27 NOTE — PROGRESS NOTES
Occupational Therapy       01/27/22 0916   Quick Adds   Type of Visit Initial Occupational Therapy Evaluation   Living Environment   Living Environment Comments see PT note   Self-Care   Activity/Exercise/Self-Care Comment see PT note   Disability/Function   Walking or Climbing Stairs ambulation difficulty, requires equipment   Mobility Management 4WW   Dressing/Bathing Difficulty no   Toileting issues no   General Information   Onset of Illness/Injury or Date of Surgery 01/15/22   Referring Physician Morteza Nicholson MBBS   Patient/Family Therapy Goal Statement (OT) unable to state   Cognitive Status Examination   Orientation Status not oriented to person, place or time   Safety Deficit severe deficit   Memory Deficit severe deficit   Attention Deficit severe deficit   Executive Function Deficit severe deficit   Bed Mobility   Bed Mobility supine-sit;sit-supine   Supine-Sit [place] (Bed Mobility) maximum assist (25% patient effort);2 person assist   Transfers   Transfers sit-stand transfer   Sit-Stand Transfer   Sit-Stand [place] (Transfers) maximum assist (25% patient effort);2 person assist   Clinical Impression   Criteria for Skilled Therapeutic Interventions Met (OT) yes;meets criteria;skilled treatment is necessary   OT Diagnosis Decreased ADL independence   OT Problem List-Impairments impacting ADL problems related to;activity tolerance impaired;balance;cognition   Assessment of Occupational Performance 5 or more Performance Deficits   Identified Performance Deficits Trsfs, dressing, bed mob, toileting, g/h   Planned Therapy Interventions (OT) ADL retraining;balance training;bed mobility training;cognition;strengthening;transfer training   Clinical Decision Making Complexity (OT) high complexity   Therapy Frequency (OT) Daily   Predicted Duration of Therapy 7 days   Risk & Benefits of therapy have been explained evaluation/treatment results reviewed;care plan/treatment goals reviewed   OT  Discharge Planning    OT Discharge Recommendation (DC Rec) [facility]-Motivated patient will benefit from intensive, interdisciplinary therapy.  Anticipate will be able to tolerate 3 hours of therapy per day   Total Evaluation Time (Minutes)   Total Evaluation Time (Minutes) 10

## 2022-01-27 NOTE — SIGNIFICANT EVENT
Patient blood pressure low( see Flow sheet) without dizziness.Booth is draining light margi urine.H.O called. He came to assess patien,see new orders. Continue to monitor.

## 2022-01-27 NOTE — PLAN OF CARE
Problem: Gas Exchange Impaired  Goal: Optimal Gas Exchange  1/27/2022 0803 by Neda Manrique RN  Outcome: Improving  1/27/2022 0541 by Neda Manrique RN  Outcome: Improving  Intervention: Optimize Oxygenation and Ventilation  Recent Flowsheet Documentation  Taken 1/27/2022 0000 by Neda Manrique RN  Head of Bed (HOB) Positioning: HOB at 45 degrees  Taken 1/26/2022 2000 by Neda Manrique RN  Head of Bed (HOB) Positioning: HOB at 45 degrees   Patient oxygen saturation 95% or above on highflow ( see flow sheet). Patient is able to keep I in place, he denies shortness of breath. Head of he bed elevated to 30-45%. Continue to monitor.

## 2022-01-27 NOTE — PROGRESS NOTES
RT PROGRESS NOTE    CURRENT HIGH FLOW SETTINGS:  LITER FLOW: 30LPM        FIO2:  40%  PATIENT PARAMETERS:  SAT: 95%  HR: 108  RR: 20  BS: diminished @ bases.     Respiratory Medications: Albuterol Neb Qh    NOTE / SHIFT SUMMARY:   Pt continues on the above mentioned settings and denies any shortness of breath or difficult breathing. Pt did receive his scheduled nebs as ordered and tolerated well. Supplemental oxygen will be titrated as indicated. RT will continue to follow.       Babak Avelar, RRT

## 2022-01-27 NOTE — PLAN OF CARE
Problem: Hypertension Acute  Goal: Blood Pressure Within Desired Range  1/27/2022 0806 by Neda Manrique, RN  Outcome: Improving  1/27/2022 0541 by Neda Manrique, RN  Outcome: Improving   Patient was hypotensive and received 500 ml on normal saline bolus an dblood pressure WNL ( see flow sheet). Continue to monitor vital signs and I&O.

## 2022-01-27 NOTE — PROGRESS NOTES
Neb given. Pt remains on high flow 30 lpm/40% FIO2; sats mid 90s. BS diminished. Pt shallow breaths. Deep breath and coughing encouraged. RT following.    Rober Barton, RT

## 2022-01-27 NOTE — PLAN OF CARE
Problem: Adult Inpatient Plan of Care  Goal: Optimal Comfort and Wellbeing  Intervention: Provide Person-Centered Care  Recent Flowsheet Documentation  Taken 1/27/2022 0931 by Omari Ritchie, RN  Trust Relationship/Rapport: care explained     Problem: Nutrition Impairment (Mechanical Ventilation, Invasive)  Goal: Optimal Nutrition Delivery  Outcome: No Change     Problem: Risk for Delirium  Goal: Improved Behavioral Control  Outcome: Improving   Patient received PRN Tylenol for lower back pain, continues on O2, Tracey lift, bed side swallow study scheduled for today. BP 83/50, MD updated. Bolus of IV normal saline 500cc. Recheck BP 99/62.  Magnesium 1.5, protocol implemented, Potassium 3.6, protocol implemented.

## 2022-01-27 NOTE — PROGRESS NOTES
01/27/22 0915   Quick Adds   Type of Visit Initial PT Evaluation   Living Environment   People in home spouse   Current Living Arrangements house   Home Accessibility stairs to enter home;stairs within home   Number of Stairs, Main Entrance 3   Stair Railings, Main Entrance railings safe and in good condition   Number of Stairs, Within Home, Primary greater than 10 stairs   Stair Railings, Within Home, Primary railings safe and in good condition   Self-Care   Usual Activity Tolerance moderate   Current Activity Tolerance poor   Equipment Currently Used at Home cane, straight;walker, rolling  (4WW)   Disability/Function   Walking or Climbing Stairs Difficulty yes   Walking or Climbing Stairs ambulation difficulty, requires equipment   Mobility Management 4WW   Dressing/Bathing Difficulty no   Toileting issues no   General Information   Onset of Illness/Injury or Date of Surgery 01/15/22   Referring Physician Morteza Nicholson MBBS   Pertinent History of Current Problem (include personal factors and/or comorbidities that impact the POC) 56 year old smoker with history of ongoing alcohol abuse, multiple abdominal surgeries, hypertension, T2DM, admitted on 1/15/2022 w/ acute hypoxic respiratory failure in setting of multifocal pneumonia requiring intubation on 1/16.  He was extubated 1/23 and his septic shock is resolved.   Existing Precautions/Restrictions fall   Cognition   Affect/Mental Status (Cognition) low arousal/lethargic   Strength   Manual Muscle Testing Quick Adds Deficits observed during functional mobility   Strength Comments Generalized weakness    Bed Mobility   Bed Mobility supine-sit   Supine-Sit [place] (Bed Mobility) moderate assist (50% patient effort);maximum assist (25% patient effort);2 person assist   Bed Mobility Limitations cognitive deficits;decreased ability to use arms for pushing/pulling;decreased ability to use legs for bridging/pushing;impaired ability to control trunk for  mobility   Impairments Contributing to Impaired Bed Mobility impaired balance;impaired coordination;impaired postural control;decreased strength   Assistive Device (Bed Mobility) bed rails;draw sheet   Transfers   Transfers sit-stand transfer;bed-chair transfer   Bed-Chair Transfer   Bed-Chair [place] (Transfers) moderate assist (50% patient effort);maximum assist (25% patient effort);2 person assist   Assistive Device (Bed-Chair Transfers)   (arm in arm )   Sit-Stand Transfer   Sit-Stand [place] (Transfers) moderate assist (50% patient effort);maximum assist (25% patient effort);2 person assist   Assistive Device (Sit-Stand Transfers) walker, front-wheeled   Gait/Stairs (Locomotion)   Comment (Gait/Stairs) n/a- pt unable to amb at this time    Clinical Impression   Criteria for Skilled Therapeutic Intervention yes, treatment indicated   PT Diagnosis (PT) Impaired functional mobility    Influenced by the following impairments weakness, SOB, fatigue    Functional limitations due to impairments Impaired strength, impaired transfers, impaired gait    Clinical Presentation Stable/Uncomplicated   Clinical Presentation Rationale Presents as diagnosed    Clinical Decision Making (Complexity) moderate complexity   Therapy Frequency (PT) Daily   Predicted Duration of Therapy Intervention (days/wks) 7 days    Planned Therapy Interventions (PT) balance training;bed mobility training;gait training;stair training;strengthening;transfer training   Anticipated Equipment Needs at Discharge (PT) walker, rolling   Risk & Benefits of therapy have been explained patient   PT Discharge Planning    PT Discharge Recommendation (DC Rec) Acute Rehab Center-Motivated patient will benefit from intensive, interdisciplinary therapy.  Anticipate will be able to tolerate 3 hours of therapy per day;Transitional Care Facility   Total Evaluation Time   Total Evaluation Time (Minutes) 10       Alejandra Duke, PT, DPT  1/27/2022

## 2022-01-28 NOTE — PLAN OF CARE
Problem: Adult Inpatient Plan of Care  Goal: Absence of Hospital-Acquired Illness or Injury  Intervention: Identify and Manage Fall Risk  Recent Flowsheet Documentation  Taken 1/28/2022 0800 by Omari Ritchie RN  Safety Promotion/Fall Prevention: bed alarm on     Problem: Gas Exchange Impaired  Goal: Optimal Gas Exchange  Outcome: Improving   No verbal or nonverbal expressions of pain, able to wean off high flow to NC 4 L. Lactid acid 2.3, chest CT ordered. Patient received PRN Ativan 1mg for increased anxiety.

## 2022-01-28 NOTE — PROGRESS NOTES
CLINICAL NUTRITION SERVICES - REASSESSMENT NOTE     Nutrition Prescription    RECOMMENDATIONS FOR MDs/PROVIDERS TO ORDER:  Recommend rechecking phos as pt is at risk for refeeding and low 1/26    Malnutrition Status:    Severe in acute illness    Recommendations already ordered by Registered Dietitian (RD):  None    Future/Additional Recommendations:  Monitor refeeding labs.  Adjust supplement pending intake/acceptance/needs     EVALUATION OF THE PROGRESS TOWARD GOALS   Diet: Level 5: Minced and moist-Moderate consistent CHO  Supplement: Glucerna daily    Intake: No intake documentation 1/26. Ate 25% of supper last night and 100% of breakfast this am     Pt reports he likes the glucerna shake. Intermittently confused    PHYSICAL FINDINGS  See malnutrition section below.    GI CONCERNS  LBM 1/26    LABS  Phos 2.4 1/26 not rechecked  Mag 1.5 1/27 on replacement protocol  Potassium and magnesium replaced per protocol  Reviewed    MEDICATIONS  Folic acid, lasix iv q 8 hr, novolog 1u: 7 g cho, ssi, lantus, magox, mvi, protonix, thiamine    CURRENT NUTRITION DIAGNOSIS  Inadequate oral intake related to acute illness as evidenced by eating 25% at more than 1 meal    INTERVENTIONS  Implementation  Recommend rechecking phosphorus as pt is at risk for refeeding and was low prior    Goals  Patient to consume % of nutritionally adequate meals three times per day, or the equivalent with supplements/snacks.- not met, progressing  Electrolytes WNL- not met - being replaced    Monitoring/Evaluation  Progress toward goals will be monitored and evaluated per protocol.

## 2022-01-28 NOTE — PLAN OF CARE
Problem: Risk for Delirium  Goal: Improved Behavioral Control  Outcome: No Change  Goal: Improved Attention and Thought Clarity  Outcome: No Change   Patient continues to remove high flow oxygen. Patient takes redirection to keep oxygen on but will forget and take it off again.

## 2022-01-28 NOTE — CONSULTS
"SW spoke briefly with pt's nurse Omari, who reported that pt had been anxious today and so doctor ordered something for pt to address this.  SW met with pt to address CD consult and discuss discharge planning.  Pt reported that he has been drinking alcohol since he was [age] years old.  He started with beer, then armand to VSOP ryan, and most recently On[employer]'s carbonated beer.  Pt stated that he knows he needs help with his chemical abuse issues.  \"That's why I'm here in the hospital.  Treatment would be the best.\"  Pt reported that up until right before this hospital admission he had not drank since November of 2021 (his last admission to [facility]).  Pt stated that he had been to TCU in the past, but could not recall where.  Pt stated that he had home care services in the past, but could not recall what home care agency.  Noted that upon further chart review, [facility] of [facility].  Pt discharged on 11/12/21 from [facility] with home care services of [facility] through [facility].  Pt seemed confused at times - thought he might have been at [facility] a year ago (cannot find record of this).  At one point, pt spoke about being put in a water tank?!  Discussed therapy recommendation for TCU or acute rehab.  Reviewed acute rehab list and TCU list and discussed differences between the two.  Pt would like referral sent to [facility] at this time (done).  Pt not interested in further acute rehab referrals.  Pt will review TCU list with wife Daina and make referral choices.  CM on 1/29 to follow up with pt and wife and send out TCU referrals.  Pt will likely need to complete TCU or acute rehab prior to doing chemical dependency treatment.        ROXANNA Armstrong, SHAYNESW 01/28/22 6:09 PM        "

## 2022-01-28 NOTE — PLAN OF CARE
Problem: Risk for Delirium  Goal: Optimal Coping  Outcome: No Change     Problem: Alcohol Withdrawal  Goal: Alcohol Withdrawal Symptom Control  Outcome: No Change     Problem: Communication Impairment (Mechanical Ventilation, Invasive)  Goal: Effective Communication  Outcome: No Change     Patient calm and complaint through our shift except for a couple moments of confusion. Once Writer walked into patient room and patient had removed his hi flow oxygen. Hi flow replaced and patient redirected. Patient blood sugars slightly high, insulin given per sliding scale. Patient verbalized lower back pain. Lidocaine applied.

## 2022-01-28 NOTE — PROGRESS NOTES
Daily Progress Note        CODE STATUS:  Full Code    01/26/22  Assessment/Plan:   57 YO gentleman with PMH significant for alcohol abuse, hypertension, DM-II, multiple abdominal surgeries who was admitted on 1/15/22 with alcohol withdrawal, possible aspiration pneumonia leading to respiratory failure requiring intubation on 1/16, successfully extubated on 1/23/22 and now transferred out of ICU on 1/26/22 for floor care.    Possible aspiration pneumonia  Acute hypoxic respiratory failure  -- Required intubation 1/16, extubated on 1/23. Required BiPAP 1/23, transitioned to HFNC on 1/24. Currently on 30LPM 40% FiO2  -- Completed 7 days course of IV zosyn on 1/22  -- Cont albuterol nebs and pulm hygiene  -- CT PE was negative for PE  -- Patient is tachycardic, and leucocytosis has worsened although no fevers; and O2 need improving. The lactic aid of 2.3 is likely due to hypotension and/or hypoxia. Will get a follow up CT chest to rule out any pneumonia complications.     Addendum:   -- CT chest today showed New airway wall thickening in the left upper and lower lobes with new consolidation in the dependent portion of the left upper lobe and at the left lung base, and mucoid impaction in left lower lobe airways.  -- With new consolidation and leucocytosis, will restart him on IV zosyn. Check procal. Will also ask pulm to follow the patient on the floor.       Septic shock  -- Likely due to multifocal pneumonia. Resolved   -- Metoprolol started on 1/25 for sinus tachycardia  -- Patient is having intermittent hypotension. Got 2 boluses of 500ml NS yesterday.Normal cortisol level noted    Alcohol withdrawal  Acute toxic metabolic encephalopathy  -- Clinical concern for alcohol withdrawal during the intubation  -- Precedex weaned off. Cont with thiamine. Ativan prn.    Hypokalemia  -- Replaced    Hyperglycemia  DM type 2  -- A1c was 7.0 on 1/15/22  -- Lantus dose increased. Cont with mealtime insulin and sliding  scale    Nausea/vomiting  -- Resolved    Disposition; 2-3 days  Barrier to discharge; Respiratory failure      Subjective:  Interval History: Patient seen and examined. States doing better. No new issues overnight.     Review of Systems:   As mentioned in subjective.    Patient Active Problem List   Diagnosis     Alcohol dependence with unspecified alcohol-induced disorder (H)     Charcot's joint of foot, left     Fall     Hypomagnesemia     Iron deficiency anemia     Restless legs     Type 2 diabetes mellitus without complication (H)     Secondary hypertension     Tobacco use disorder     Meningioma (H)     Falls frequently     Alcoholic intoxication with complication (H)     Closed nondisplaced fracture of phalanx of toe of left foot, unspecified toe, initial encounter     Alcohol use disorder, severe, dependence (H)     Sinus tachycardia     Pneumonia of both lungs due to infectious organism, unspecified part of lung     Non-intractable vomiting with nausea, unspecified vomiting type     Agitation       Scheduled Meds:    DULoxetine  60 mg Oral or Feeding Tube Daily     enoxaparin ANTICOAGULANT  40 mg Subcutaneous Q24H     folic acid  1 mg Oral Daily     furosemide  20 mg Intravenous Q8H     gabapentin  200 mg Oral BID     insulin aspart   Subcutaneous Daily with breakfast     insulin aspart   Subcutaneous Daily with lunch     insulin aspart   Subcutaneous Daily with supper     insulin aspart  1-7 Units Subcutaneous TID AC     insulin aspart  1-5 Units Subcutaneous At Bedtime     insulin glargine  25 Units Subcutaneous QAM AC     lidocaine  1 patch Transdermal Q24H     lidocaine   Transdermal Q8H     magnesium oxide  400 mg Oral TID     metoprolol tartrate  12.5 mg Oral BID     multivitamin, therapeutic  1 tablet Oral Daily     pantoprazole  40 mg Oral QAM AC     sodium chloride (PF)  3 mL Intracatheter Q8H     thiamine  100 mg Oral Daily     Continuous Infusions:    dextrose Stopped (01/24/22 0230)     PRN  Meds:.acetaminophen **OR** acetaminophen, albuterol, bisacodyl, calcium carbonate, dextrose, glucose **OR** dextrose **OR** glucagon, famotidine, flumazenil, OLANZapine zydis **OR** haloperidol lactate, lidocaine 4%, lidocaine (buffered or not buffered), magnesium hydroxide, melatonin, menthol-zinc oxide, metoprolol, naloxone **OR** naloxone **OR** naloxone **OR** naloxone, ondansetron **OR** ondansetron, polyethylene glycol, sodium chloride (PF), sucralfate, traZODone    Objective:  Vital signs in last 24 hours:  Temp:  [99  F (37.2  C)-100.7  F (38.2  C)] 99  F (37.2  C)  Pulse:  [106-124] 124  Resp:  [16-20] 16  BP: ()/(50-62) 101/60  FiO2 (%):  [40 %-45 %] 40 %  SpO2:  [91 %-98 %] 98 %        Intake/Output Summary (Last 24 hours) at 1/26/2022 1610  Last data filed at 1/26/2022 1150  Gross per 24 hour   Intake 840 ml   Output 2100 ml   Net -1260 ml       Physical Exam:    General: Not in obvious distress.  HEENT: NC, AT   Chest: Diminished breath sounds on auscultation bilaterally  Heart: S1S2 normal, regular. No M/R/G  Abdomen: Soft. NT, ND. Bowel sounds- active.  Extremities: No legs swelling  Neuro: Alert and awake, grossly non-focal      Lab Results:(I have personally reviewed the results)    Recent Results (from the past 24 hour(s))   Glucose by meter    Collection Time: 01/27/22 12:17 PM   Result Value Ref Range    GLUCOSE BY METER POCT 208 (H) 70 - 99 mg/dL   Lactic acid whole blood    Collection Time: 01/27/22  1:33 PM   Result Value Ref Range    Lactic Acid 1.2 0.7 - 2.0 mmol/L   Cortisol    Collection Time: 01/27/22  1:33 PM   Result Value Ref Range    Cortisol 19.4 ug/dL   Glucose by meter    Collection Time: 01/27/22  4:49 PM   Result Value Ref Range    GLUCOSE BY METER POCT 223 (H) 70 - 99 mg/dL   Glucose by meter    Collection Time: 01/27/22  8:51 PM   Result Value Ref Range    GLUCOSE BY METER POCT 223 (H) 70 - 99 mg/dL   Glucose by meter    Collection Time: 01/28/22  4:08 AM   Result Value  Ref Range    GLUCOSE BY METER POCT 255 (H) 70 - 99 mg/dL   CBC with platelets    Collection Time: 01/28/22  6:14 AM   Result Value Ref Range    WBC Count 18.8 (H) 4.0 - 11.0 10e3/uL    RBC Count 3.15 (L) 4.40 - 5.90 10e6/uL    Hemoglobin 9.8 (L) 13.3 - 17.7 g/dL    Hematocrit 28.5 (L) 40.0 - 53.0 %    MCV 91 78 - 100 fL    MCH 31.1 26.5 - 33.0 pg    MCHC 34.4 31.5 - 36.5 g/dL    RDW 19.5 (H) 10.0 - 15.0 %    Platelet Count 285 150 - 450 10e3/uL   Basic metabolic panel    Collection Time: 01/28/22  6:14 AM   Result Value Ref Range    Sodium 137 136 - 145 mmol/L    Potassium 3.8 3.5 - 5.0 mmol/L    Chloride 100 98 - 107 mmol/L    Carbon Dioxide (CO2) 21 (L) 22 - 31 mmol/L    Anion Gap 16 5 - 18 mmol/L    Urea Nitrogen 6 (L) 8 - 22 mg/dL    Creatinine 0.70 0.70 - 1.30 mg/dL    Calcium 7.4 (L) 8.5 - 10.5 mg/dL    Glucose 288 (H) 70 - 125 mg/dL    GFR Estimate >90 >60 mL/min/1.73m2   Lactic Acid STAT    Collection Time: 01/28/22  7:40 AM   Result Value Ref Range    Lactic Acid 2.3 (H) 0.7 - 2.0 mmol/L   Glucose by meter    Collection Time: 01/28/22  7:45 AM   Result Value Ref Range    GLUCOSE BY METER POCT 305 (H) 70 - 99 mg/dL       All laboratory and imaging data in the past 24 hours reviewed  Serum Glucose range:   Recent Labs   Lab 01/28/22  0745 01/28/22  0614 01/28/22  0408 01/27/22  2051   * 288* 255* 223*     ABG:   Recent Labs   Lab 01/25/22  0442 01/24/22  1003   PH 7.52* 7.52*     CBC:   Recent Labs   Lab 01/28/22  0614 01/27/22  0526 01/26/22  0447   WBC 18.8* 13.0* 12.4*   HGB 9.8* 9.7* 10.7*   HCT 28.5* 28.6* 30.5*   MCV 91 90 89    214 192     Chemistry:   Recent Labs   Lab 01/28/22  0614 01/27/22  0526 01/26/22  0447 01/25/22  1539 01/25/22 0442    138 142  --  143   POTASSIUM 3.8 3.6 3.8  3.8   < > 3.2*   CHLORIDE 100 102 104  --  102   CO2 21* 27 26  --  31   BUN 6* 6* 4*  --  7*   CR 0.70 0.67* 0.60*  --  0.57*   GFRESTIMATED >90 >90 >90  --  >90   BECKY 7.4* 7.6* 8.0*  --  7.8*    MAG  --  1.5* 1.8  --  1.5*    < > = values in this interval not displayed.     Coags:  No results for input(s): INR, PROTIME, PTT in the last 168 hours.    Invalid input(s): APTT  Cardiac Markers:  No results for input(s): CKTOTAL, TROPONINI in the last 168 hours.       MR Brain w/o Contrast    Result Date: 1/22/2022  EXAM: MR BRAIN W/O CONTRAST LOCATION: [facility] DATE/TIME: 1/22/2022 1:05 PM INDICATION: Encephalopathy COMPARISON: CT head 01/18/2022, MRI head 11/23/2020 TECHNIQUE: Routine multiplanar multisequence head MRI without intravenous contrast. FINDINGS: INTRACRANIAL CONTENTS: Please note study is significantly degraded by motion artifact. Suggestion of a few small foci of restricted diffusion at the left temporal occipital region; best appreciated on repeat axial diffusion weighted sequence series 20.2 image 12, image 11; as well as repeat coronal diffusion weighted sequence series 24.2 image 9. No definite associated hemorrhage or significant mass effect. Redemonstration of small chronic infarction left precentral gyrus. Mild generalized cerebral atrophy. No hydrocephalus. Normal position of the cerebellar tonsils. Small chronic infarction lateral aspect left cerebellum. A few additional tiny infarctions demonstrated on prior exam are not well demonstrated on the current. SELLA: Not well-visualized, grossly normal. OSSEOUS STRUCTURES/SOFT TISSUES: Normal marrow signal. Flow voids are not well-visualized. ORBITS: Not well-visualized. SINUSES/MASTOIDS: The complete opacification left maxillary and left sphenoid sinus, similar to prior. Small amount of opacification right mastoid air cells.     IMPRESSION: 1.  Please note study is significantly degraded by motion artifact. 2.  Suggestion of a few small foci of acute/early subacute infarction at the left temporal occipital region. 3.  No definite associated hemorrhage or significant mass effect. 4.  Small chronic infarction left  precentral gyrus, similar to prior. 5.  Probable few small chronic infarctions cerebellar hemispheres. 6.  Mild atrophy.    XR Chest Port 1 View    Result Date: 1/19/2022  EXAM: XR CHEST PORT 1 VIEW LOCATION: [facility] DATE/TIME: 1/19/2022 10:52 AM INDICATION: after advancing et tube COMPARISON: 1/18/2022.     IMPRESSION: Endotracheal tube is been advanced. Tip is 4 cm above the igor in good position. There our persistent bibasilar pulmonary opacities with partial clearing at the right base from the prior study and no change at the left base. No pneumothorax. Enteric tube tip is not visualized tip is below the diaphragm. PICC catheter from right upper extremity approach is unchanged with tip at the junction of the superior vena cava and right atrium. Left posterior rib fractures are again noted as well as deformity of the right humeral head.    XR Chest Port 1 View    Result Date: 1/18/2022  EXAM: XR CHEST PORT 1 VIEW LOCATION: [facility] DATE/TIME: 1/18/2022 6:06 AM INDICATION: Location of ET tube COMPARISON: 01/16/2022.     IMPRESSION:Endotracheal tube is in the trachea at the level of the clavicular heads with tip 6 cm above the igor. PICC catheter from right upper extremity approach is in the distal superior vena cava near its junction with the right atrium. Enteric tube tip is below the diaphragm and not visualized. There is no pneumothorax. Again noted are bilateral patchy airspace opacities there has been increased consolidation or atelectasis at the right medial lung base. There are old healed left posterior rib  fractures no acute fractures are identified.    XR Chest Port 1 View    Result Date: 1/16/2022  EXAM: XR CHEST PORT 1 VIEW LOCATION: [facility] DATE/TIME: 1/16/2022 12:34 PM INDICATION: ET tube placement, PICC line placement COMPARISON: CT pulmonary angiogram 01/15/2022     IMPRESSION: ET tube tip projects 6.5 cm above  the igor. Gastric tube extends towards the diaphragmatic hiatus, outside the field-of-view. Right PICC terminates upper right atrium. Extensive bilateral airspace opacities are present with patchy lung involvement, unchanged from yesterday. No new focal lung volume loss. No visible pleural fluid or pneumothorax on this single supine view.    XR Chest Port 1 View    Result Date: 1/15/2022  EXAM: XR CHEST PORT 1 VIEW LOCATION: [facility] DATE/TIME: 1/15/2022 5:16 PM INDICATION: Shortness of breath COMPARISON: 11/5/2021     IMPRESSION: There are mild opacities in both lungs which have increased from the prior exam. This is likely due to a superimposed infectious or inflammatory process on top of mild background scarring. Normal heart size and pulmonary vascularity. Old left  rib fractures.    XR Abdomen Port 1 View    Result Date: 1/17/2022  EXAM: XR ABDOMEN PORT 1 VIEWS LOCATION: [facility] DATE/TIME: 1/17/2022 1:25 PM INDICATION: Location of OG tube COMPARISON: None.     IMPRESSION: Enteric tube in the body the stomach.     CT Chest Pulmonary Embolism w Contrast    Result Date: 1/15/2022  EXAM: CT CHEST PULMONARY EMBOLISM W CONTRAST LOCATION: [facility] DATE/TIME: 1/15/2022 6:17 PM INDICATION: Shortness of breath COMPARISON: Portable AP view of the chest 01/15/2022; CT of the abdomen and pelvis 10/05/2020 TECHNIQUE: CT chest pulmonary angiogram during arterial phase injection of IV contrast. Multiplanar reformats and MIP reconstructions were performed. Dose reduction techniques were used. CONTRAST: 100ml isovue 370 FINDINGS: ANGIOGRAM CHEST: Pulmonary arteries are normal caliber and negative for pulmonary emboli. Normal caliber thoracic aorta. There are no findings to suggest acute aortic syndrome. Proximal great vessels are patent. Diminutive left vertebral artery arises directly from the arch. Mild atheromatous calcifications descending  aorta and distal right innominate artery. LUNGS AND PLEURA: Upper lung predominant mixed centrilobular and paraseptal emphysema. Superimposed patchy airspace opacities are present bilaterally, asymmetric to the right associated with internal interstitial opacity. Minimal right pleural fluid layers dependently. There is chronic thickening of the left posterior and posterolateral pleura adjacent to multiple left rib fracture deformities. Central airways are patent. MEDIASTINUM: Cardiac chambers are normal in size. No pericardial effusion is present. Mildly enlarged lymph nodes in both analia, likely reactive in the setting of airspace opacities. Subcarinal, lower paratracheal and subaortic/prevascular lymph nodes are  all normal by size criteria. The middle third and distal thirds of the esophagus has circumferential wall thickening consistent with esophagitis. Imaged thyroid gland is normal. CORONARY ARTERY CALCIFICATION: None. UPPER ABDOMEN: There are multiple calcified gallstones in the neck of the gallbladder. Severe fatty infiltration of the liver. The imaged distal transverse colon/splenic flecture has wall thickening. Previous splenectomy with small splenule present. 15 mm length ovoid calcification in the region of the pancreas neck is unchanged. MUSCULOSKELETAL: There are multiple ununited left posterior and posterolateral rib fracture deformities including lateral ribs 7, 8 and posterior ribs 9 and 11. The posterolateral right ninth rib is broken in 2 locations and the fractures have fairly sharp borders suggesting acute on subacute fractures..     IMPRESSION: 1.  No acute pulmonary embolism. 2.  Multifocal bilateral airspace opacities are present mixed groundglass and interstitial thickening asymmetric to the right. Imaging features can be seen with (COVID-19)  pneumonia, though are nonspecific and can occur with a variety of infectious and noninfectious processes. Given the other findings below, aspiration  is a strong differential consideration. 3.  Severe wall thickening of the middle and distal thirds of the esophagus consistent with a subacute esophagitis. 4.  Severe hepatic steatosis. 5.  Acute on chronic fracture deformities of multiple left lateral/posterolateral ribs. 6.  Cholelithiasis. 7.  Wall thickening of the imaged colon consistent with colitis.    CT Head w/o Contrast    Result Date: 1/18/2022  EXAM: CT HEAD W/O CONTRAST LOCATION: [facility] DATE/TIME: 1/18/2022 3:45 AM INDICATION: Headache, intracranial hemorrhage suspected. COMPARISON: 11/4/2021 TECHNIQUE: Routine CT Head without IV contrast. Multiplanar reformats. Dose reduction techniques were used. FINDINGS: INTRACRANIAL CONTENTS: No intracranial hemorrhage, extraaxial collection, or mass effect. No CT evidence of acute infarct. Mild presumed chronic small vessel ischemic changes. Mild to moderate generalized volume loss. No hydrocephalus. Stable calcified dural based lesion along the right frontal convexity measuring 1 cm in diameter, presumably reflecting a small meningioma. VISUALIZED ORBITS/SINUSES/MASTOIDS: No intraorbital abnormality. Complete opacification of the left maxillary sinus with chronic mucoperiosteal reaction. Mucous retention cyst in the left sphenoid sinus. Mild mucosal thickening along the floor of the left frontal sinus. Opacified left frontoethmoidal recess. No middle ear or mastoid effusion. BONES/SOFT TISSUES: No acute abnormality.     IMPRESSION: 1.  No CT evidence for acute intracranial process. 2.  Stable chronic changes as above.    XR Video Swallow with SLP or OT    Result Date: 1/25/2022  EXAM: XR VIDEO SWALLOW WITH SLP OR OT LOCATION: [facility] DATE/TIME: 1/25/2022 3:27 PM INDICATION: Difficulty swallowing. COMPARISON: None. TECHNIQUE: Routine swallow study with speech pathology using multiple barium thicknesses. FINDINGS: FLUOROSCOPIC TIME: 1.5 minutes NUMBER OF  IMAGES: 9 Swallow study with Speech Pathology using multiple barium thicknesses. Moderate to deep penetration with thin liquid (no cough reflex). Small amount of penetration with mildly thick consistency. No other penetration or aspiration.     EEG Coma or Sleep Only    Result Date: 2022  ELECTROENCEPHALOGRAM (EEG) REPORT [facility] NEUROLOGY [place] [address] [place] [address] Tel: [phone]  Fax: [phone] [email or web address] Peewee Hess,  1965, MRN [id number] PCP: Saarh Canseco Date: 2022 Principal Diagnosis: Altered mental status History : Patient is being evaluated for altered mental status. Medication listed includes Ativan Description of the Recording:  This is a multichannel digital EEG recording done using the international 10-20 placement system. Background of the recording consists of irregular 1 to 3 Hz polymorphic delta activity with amplitudes ranging between 20 to 30  V.  Alerting procedure produce minimal change.  Photic stimulation performed with standard protocol produced minimal change.  No transient sleep patterns were recorded. During this recording, no sharp discharges, spikes or electrographic seizure activity was recorded. Impression: This is an abnormal EEG due to diffusely slow polymorphic delta activity that minimally attenuates with alerting procedures.  This EEG suggests nonspecific generalized cerebral dysfunction.  No focal slowing or periodic discharges were seen to suggest seizures. Classification: Delta grade I generalized Tiburcio Gallagher MD [facility] This note was dictated using voice recognition software.  Any grammatical or context distortions are unintentional and inherent to the software.       Latest radiology report personally reviewed.    Note created using dragon voice recognition software so sounds alike errors may have escaped  editing.      01/28/2022   Morteza Nicholson MD  Hospitalist, [facility]  Pager: [phone]

## 2022-01-28 NOTE — PROGRESS NOTES
RCAT Treatment Plan    Patient Score: 4  Patient Acuity: 5    Clinical Indication for Therapy: unable to deep breath    Therapy Ordered: Albuterol Q 6 w/a PRN    Assessment Summary: ETOH abuse with 1/15/22 with alcohol withdrawal, possible aspiration pneumonia leading to respiratory failure requiring intubation on 1/16, successfully extubated on 1/23/22. Current smoker 0.5 ppd, no hx pulmonary disease, no home respiratory medications, BS clear, mildly diminished bases, no wheezing, O2 at 4 lpm/NC, will continue to titrate, change Albuterol to TID PRN wheezing, RT to follow.      Aren Matthews, RT  1/28/2022

## 2022-01-29 NOTE — PLAN OF CARE
Problem: Adult Inpatient Plan of Care  Goal: Plan of Care Review  Outcome: No Change  Goal: Patient-Specific Goal (Individualized)  Outcome: No Change  Goal: Absence of Hospital-Acquired Illness or Injury  Outcome: No Change  Intervention: Identify and Manage Fall Risk  Recent Flowsheet Documentation  Taken 1/29/2022 0400 by Kelsie Cannon RN  Safety Promotion/Fall Prevention: bed alarm on  Taken 1/29/2022 0000 by Kelsie Cannon RN  Safety Promotion/Fall Prevention: bed alarm on  Intervention: Prevent Skin Injury  Recent Flowsheet Documentation  Taken 1/29/2022 0400 by Kelsie Cannon RN  Body Position: position changed independently  Taken 1/29/2022 0000 by Kelsie Cannon RN  Body Position: position changed independently  Intervention: Prevent and Manage VTE (Venous Thromboembolism) Risk  Recent Flowsheet Documentation  Taken 1/29/2022 0400 by Kelsie Cannon RN  VTE Prevention/Management: fluids promoted  Taken 1/29/2022 0000 by Kelsie Cannon RN  VTE Prevention/Management: fluids promoted  Intervention: Prevent Infection  Recent Flowsheet Documentation  Taken 1/29/2022 0400 by Kelsie Cannon RN  Infection Prevention:   hand hygiene promoted   rest/sleep promoted  Taken 1/29/2022 0000 by Kelsie Cannon RN  Infection Prevention:   hand hygiene promoted   rest/sleep promoted  Goal: Optimal Comfort and Wellbeing  Outcome: No Change  Intervention: Provide Person-Centered Care  Recent Flowsheet Documentation  Taken 1/29/2022 0400 by Kelsie Cannon RN  Trust Relationship/Rapport:   care explained   choices provided  Taken 1/29/2022 0000 by Kelsie Cannon RN  Trust Relationship/Rapport:   care explained   choices provided  Goal: Readiness for Transition of Care  Outcome: No Change     Problem: Restraint/Seclusion for Violent Self-Destructive Behavior  Goal: Prevent/manage potential problems during restraint/seclusion  Description: Maintain safety of patient and others during period of restraint/seclusion.  Promote  psychological and physical wellbeing.  Prevent injury to skin and involved body parts.  Promote nutrition, hydration, and elimination.  Outcome: No Change  Goal: Prevent future episodes of restraint or seclusion  Description: Identify nonphysical alternatives to restraint or seclusion.  Identify additional de-escalation supportive measures to use as alternatives to restraint or seclusion.  Outcome: No Change     Problem: Risk for Delirium  Goal: Optimal Coping  Outcome: No Change  Goal: Improved Behavioral Control  Outcome: No Change  Goal: Improved Attention and Thought Clarity  Outcome: No Change  Goal: Improved Sleep  Outcome: No Change     Problem: Restraint for Non-Violent/Non-Self-Destructive Behavior  Goal: Prevent/Manage Potential Problems  Description: Maintain safety of patient and others during period of restraint.  Promote psychological and physical wellbeing.  Prevent injury to skin and involved body parts.  Promote nutrition, hydration, and elimination.  Outcome: No Change     Problem: Alcohol Withdrawal  Goal: Alcohol Withdrawal Symptom Control  Outcome: No Change     Problem: Acute Neurologic Deterioration (Alcohol Withdrawal)  Goal: Optimal Neurologic Function  Outcome: No Change     Problem: Substance Misuse (Alcohol Withdrawal)  Goal: Readiness for Change Identified  Outcome: No Change     Problem: Communication Impairment (Mechanical Ventilation, Invasive)  Goal: Effective Communication  Outcome: No Change     Problem: Device-Related Complication Risk (Mechanical Ventilation, Invasive)  Goal: Optimal Device Function  Outcome: No Change  Intervention: Optimize Device Care and Function  Recent Flowsheet Documentation  Taken 1/29/2022 0400 by Kelsie Cannon, RN  Airway Safety Measures: all equipment/monitors on and audible  Taken 1/29/2022 0000 by Kelsie Cannon, RN  Airway Safety Measures: all equipment/monitors on and audible     Problem: Inability to Wean (Mechanical Ventilation, Invasive)  Goal:  Mechanical Ventilation Liberation  Outcome: No Change     Problem: Nutrition Impairment (Mechanical Ventilation, Invasive)  Goal: Optimal Nutrition Delivery  Outcome: No Change     Problem: Skin and Tissue Injury (Mechanical Ventilation, Invasive)  Goal: Absence of Device-Related Skin and Tissue Injury  Outcome: No Change     Problem: Ventilator-Induced Lung Injury (Mechanical Ventilation, Invasive)  Goal: Absence of Ventilator-Induced Lung Injury  Outcome: No Change  Intervention: Prevent Ventilator-Associated Pneumonia  Recent Flowsheet Documentation  Taken 1/29/2022 0400 by Kelsie Cannon RN  Head of Bed (HOB) Positioning: HOB at 30-45 degrees  Taken 1/29/2022 0000 by Kelsie Cannon RN  Head of Bed (HOB) Positioning: HOB at 30-45 degrees     Problem: Violence Risk or Actual  Goal: Anger and Impulse Control  Outcome: No Change     Problem: Hypertension Acute  Goal: Blood Pressure Within Desired Range  Outcome: No Change     Problem: Gas Exchange Impaired  Goal: Optimal Gas Exchange  Outcome: No Change  Intervention: Optimize Oxygenation and Ventilation  Recent Flowsheet Documentation  Taken 1/29/2022 0400 by Kelsie Cannon RN  Head of Bed (HOB) Positioning: HOB at 30-45 degrees  Taken 1/29/2022 0000 by Kelsie Cannon RN  Head of Bed (HOB) Positioning: HOB at 30-45 degrees   Pt is sating in the low 90's on 5L via NC.   Problem: OT General Care Plan  Goal: Transfer (OT)  Description: Transfer (OT)  Outcome: No Change  Goal: Toilet Transfer/Toileting (OT)  Description: Toilet Transfer/Toileting (OT)  Outcome: No Change  Pt incontinent X1 for bm and has ling cath in place.   Goal: Cognitive (OT)  Description: Cognitive (OT)  Outcome: No Change

## 2022-01-29 NOTE — PROGRESS NOTES
Pulmonary follow up    Follow-up Note  January 28, 2022         Assessment and Plan:   Peewee Hess IS a 56 year old male with bilateral pneumonia, advanced emphysema, and acute hypoxic respiratory failure admitted on 1/15/2022.  1. Bilateral pneumonia with mucus plugging  2. Advanced emphysema  3. SRAVANTHI mucus plugging    -continue with aggressive pulmonary hygiene including up in chair TID, PT/OT, and will add nebs with flutter valve  -for now 3% hypertonic saline/albuterol nebs TID and flutter valve TID  -can consider vest therapy, but cough is productive now  -check sputum culture  -agree with antibiotics given some degree of low blood pressure (maybe from diuresis) and recent ventilator increasing risk for VAP now  -wean oxygen as able to keep SpO2 >88%  -check CXR in the am  -try to avoid PAP therapy as this can lead to further impaction of mucus           Interval History:     Patient was in ICU up until 1/26. Had been extubated 1/23. Has been improving with less oxygen, and now on low flow O2. No complaints. When I came in he coughed a large dark amount of mucus. No hemoptysis. No fevers. Had a period of low blood pressure overnight.           Review of Systems:   Negative other than mentioned above.          Medications:       albuterol  2.5 mg Nebulization TID     DULoxetine  60 mg Oral or Feeding Tube Daily     enoxaparin ANTICOAGULANT  40 mg Subcutaneous Q24H     folic acid  1 mg Oral Daily     furosemide  20 mg Intravenous Q8H     gabapentin  200 mg Oral BID     insulin aspart   Subcutaneous Daily with breakfast     insulin aspart   Subcutaneous Daily with lunch     insulin aspart   Subcutaneous Daily with supper     insulin aspart  1-7 Units Subcutaneous TID AC     insulin aspart  1-5 Units Subcutaneous At Bedtime     insulin glargine  25 Units Subcutaneous QAM AC     lidocaine  1 patch Transdermal Q24H     lidocaine   Transdermal Q8H     magnesium oxide  400 mg Oral TID     metoprolol tartrate  12.5  mg Oral BID     multivitamin, therapeutic  1 tablet Oral Daily     pantoprazole  40 mg Oral QAM AC     piperacillin-tazobactam  3.375 g Intravenous Q8H     sodium chloride  3 mL Nebulization TID     sodium chloride (PF)  3 mL Intracatheter Q8H     thiamine  100 mg Oral Daily     acetaminophen **OR** acetaminophen, albuterol, bisacodyl, calcium carbonate, dextrose, glucose **OR** dextrose **OR** glucagon, famotidine, flumazenil, haloperidol lactate, OLANZapine zydis **OR** haloperidol lactate, lidocaine 4%, lidocaine (buffered or not buffered), LORazepam, magnesium hydroxide, melatonin, menthol-zinc oxide, metoprolol, naloxone **OR** naloxone **OR** naloxone **OR** naloxone, ondansetron **OR** ondansetron, polyethylene glycol, sodium chloride (PF), sucralfate, traZODone         Physical Exam:   Temp:  [98.5  F (36.9  C)-100.7  F (38.2  C)] 99  F (37.2  C)  Pulse:  [104-124] 109  Resp:  [16-20] 16  BP: ()/(50-64) 100/64  FiO2 (%):  [40 %-45 %] 40 %  SpO2:  [91 %-98 %] 91 %    Intake/Output Summary (Last 24 hours) at 1/28/2022 1827  Last data filed at 1/28/2022 0912  Gross per 24 hour   Intake 240 ml   Output 950 ml   Net -710 ml     Constitutional:   Awake, alert and in no apparent distress     Eyes:   nonicteric     ENT:    oral mucosa dry without lesions     Lungs:   Good air flow.  No crackles. No rhonchi.  No wheezes. Diminished.     Cardiovascular:   Normal S1 and S2.  RRR.  No murmur, gallop or rub.     Abdomen:   NABS, soft, nontender, nondistended.       Musculoskeletal:   No edema     Neurologic:   Alert and conversant. Appears weak overall     Skin:   Warm, dry.  No rash on limited exam.             Data:   All laboratory and imaging data reviewed.    CMP  Recent Labs   Lab 01/28/22  1700 01/28/22  1213 01/28/22  0745 01/28/22  0614 01/27/22  0758 01/27/22  0526 01/26/22  0759 01/26/22  0447 01/25/22  2352 01/25/22  2217 01/25/22  0829 01/25/22  0442 01/24/22  0600 01/24/22  0332   NA  --   --   --   137  --  138  --  142  --   --   --  143  --  145   POTASSIUM  --   --   --  3.8  --  3.6  --  3.8  3.8  --  2.9*   < > 3.2*  --  3.6   CHLORIDE  --   --   --  100  --  102  --  104  --   --   --  102  --  107   CO2  --   --   --  21*  --  27  --  26  --   --   --  31  --  31   ANIONGAP  --   --   --  16  --  9  --  12  --   --   --  10  --  7   * 322* 305* 288*   < > 217*   < > 160*   < >  --    < > 149*   < > 102   BUN  --   --   --  6*  --  6*  --  4*  --   --   --  7*  --  8   CR  --   --   --  0.70  --  0.67*  --  0.60*  --   --   --  0.57*  --  0.57*   GFRESTIMATED  --   --   --  >90  --  >90  --  >90  --   --   --  >90  --  >90   BECKY  --   --   --  7.4*  --  7.6*  --  8.0*  --   --   --  7.8*  --  7.7*   MAG  --   --   --   --   --  1.5*  --  1.8  --   --   --  1.5*  --  1.8   PHOS  --   --   --   --   --   --   --   --   --   --   --  2.4*  --   --     < > = values in this interval not displayed.     CBC  Recent Labs   Lab 01/28/22  0614 01/27/22  0526 01/26/22  0447 01/25/22 0442   WBC 18.8* 13.0* 12.4* 12.5*   RBC 3.15* 3.17* 3.43* 3.39*   HGB 9.8* 9.7* 10.7* 10.3*   HCT 28.5* 28.6* 30.5* 30.2*   MCV 91 90 89 89   MCH 31.1 30.6 31.2 30.4   MCHC 34.4 33.9 35.1 34.1   RDW 19.5* 19.0* 19.1* 19.5*    214 192 157     INRNo lab results found in last 7 days.  Arterial Blood Gas  Recent Labs   Lab 01/25/22  0442 01/24/22  1003   PH 7.52* 7.52*     Urine Studies    Recent Labs   Lab Test 01/16/22  1621 11/05/21  1444 10/06/20  1957   URINEPH 6.5 6.5 6.5   NITRITE Negative Negative Negative   LEUKEST 25 Akin/uL* 75 Akin/uL* Negative   WBCU 10* 22* 0-5     CMV viral loads  No lab results found.  No results found for: CMQNT, CMVQ  EBV viral loads   No lab results found.  No results found for: EBVDN, EBRES, EBVDN, EBVSP, EBVPC, EBVPCR  Respiratory Virus Testing    No results found for: RS, FLUAG  Sputum Culture last 3 months:  No results found for: SDES No results found for: CULT     CT PE study from  1/15/22 personally reviewed. Advanced heterogenous emphysema with patchy areas of opacities. SRAVANTHI area of consolidation/collapse possibly consistent with mucus plugging.    Kyree Suero, DO

## 2022-01-29 NOTE — PLAN OF CARE
Problem: Risk for Delirium  Goal: Optimal Coping  Outcome: No Change     Problem: Alcohol Withdrawal  Goal: Alcohol Withdrawal Symptom Control  Outcome: No Change     Problem: Nutrition Impairment (Mechanical Ventilation, Invasive)  Goal: Optimal Nutrition Delivery  Outcome: No Change     Patient A and O with confusion. Did not get out of bed this sihft although staff reinforced the idea it would help his recovery. Patient ate only 25% of his dinner. Patient had chest CT scan. Pulmonary consult called in and zosyn started. Patient blood sugars 192 at dinner and 62 at HS. Patient given Shelby Juice, will monitor.

## 2022-01-29 NOTE — PROGRESS NOTES
Patient is on 4 lpm 02 sating 91% RR 16 . Breath sounds diminished both pre and post neb tx. Neb given x 1. Rt continue to monitor.

## 2022-01-29 NOTE — PROGRESS NOTES
"PT is currently on 6L NC with an SpO2 of 91%. Fidgety Breathing pattern regular Breath sounds diminished Cough type congested, nonproductive Sputum Type small, green, thick  Albuterol / Saline nebulizer given x1.  PT tolerated treatments well. Encourage use of IS and flutter and to cough up secretions.  RT will continue to follow.      BP 96/58 (BP Location: Left arm, Patient Position: Chair)   Pulse 95   Temp 99  F (37.2  C) (Oral)   Resp 18   Ht 1.854 m (6' 0.99\")   Wt 73 kg (161 lb)   SpO2 91%   BMI 21.25 kg/m      Casey Perez, RT  1/29/2022      "

## 2022-01-29 NOTE — PROGRESS NOTES
Pulmonary follow up    Follow-up Note  January 29, 2022         Assessment and Plan:   Peewee Hess IS a 56 year old male with bilateral pneumonia, advanced emphysema, and acute hypoxic respiratory failure admitted on 1/15/2022.  1. Bilateral pneumonia with mucus plugging  2. Advanced emphysema  3. SRAVANTHI mucus plugging    -continue with aggressive pulmonary hygiene including up in chair TID, PT/OT, and will add nebs with flutter valve  -for now 3% hypertonic saline/albuterol nebs TID and flutter valve TID  -can consider vest therapy, but cough is productive now  -check sputum culture, still pending  -agree with antibiotics given some degree of low blood pressure (maybe from diuresis) and recent ventilator increasing risk for VAP now  -wean oxygen as able to keep SpO2 >88%  -try to avoid PAP therapy as this can lead to further impaction of mucus           Interval History:     Patient was in ICU up until 1/26. Had been extubated 1/23. Has been improving with less oxygen, and now on low flow O2. No complaints.     Overnight his O2 needs did go up some. Still coughing and reports large mucus at times. Feels okay. No fevers.            Review of Systems:   Negative other than mentioned above.          Medications:       albuterol  2.5 mg Nebulization TID     DULoxetine  60 mg Oral or Feeding Tube Daily     enoxaparin ANTICOAGULANT  40 mg Subcutaneous Q24H     folic acid  1 mg Oral Daily     furosemide  20 mg Intravenous Q8H     gabapentin  200 mg Oral BID     insulin aspart   Subcutaneous Daily with breakfast     insulin aspart   Subcutaneous Daily with lunch     insulin aspart   Subcutaneous Daily with supper     insulin aspart  1-7 Units Subcutaneous TID AC     insulin aspart  1-5 Units Subcutaneous At Bedtime     insulin glargine  10 Units Subcutaneous QAM AC     lidocaine  1 patch Transdermal Q24H     lidocaine   Transdermal Q8H     metoprolol tartrate  12.5 mg Oral BID     multivitamin, therapeutic  1 tablet Oral  Daily     pantoprazole  40 mg Oral QAM AC     piperacillin-tazobactam  3.375 g Intravenous Q8H     sodium chloride  3 mL Nebulization TID     sodium chloride (PF)  3 mL Intracatheter Q8H     thiamine  100 mg Oral Daily     acetaminophen **OR** acetaminophen, acetaminophen-codeine, albuterol, bisacodyl, calcium carbonate, dextrose, glucose **OR** dextrose **OR** glucagon, famotidine, flumazenil, haloperidol lactate, OLANZapine zydis **OR** haloperidol lactate, lidocaine 4%, lidocaine (buffered or not buffered), LORazepam, magnesium hydroxide, melatonin, menthol-zinc oxide, metoprolol, naloxone **OR** naloxone **OR** naloxone **OR** naloxone, ondansetron **OR** ondansetron, polyethylene glycol, sodium chloride (PF), sucralfate, traZODone         Physical Exam:   Temp:  [98.1  F (36.7  C)-100.4  F (38  C)] 99.7  F (37.6  C)  Pulse:  [] 100  Resp:  [16-18] 18  BP: ()/(51-58) 105/51  SpO2:  [90 %-94 %] 94 %    Intake/Output Summary (Last 24 hours) at 1/28/2022 1827  Last data filed at 1/28/2022 0912  Gross per 24 hour   Intake 240 ml   Output 950 ml   Net -710 ml     Constitutional:   Awake, alert and in no apparent distress     Eyes:   nonicteric     ENT:    oral mucosa dry without lesions     Lungs:   Good air flow.  No crackles. No rhonchi.  No wheezes. Diminished.     Cardiovascular:   Normal S1 and S2.  RRR.  No murmur, gallop or rub.     Abdomen:   NABS, soft, nontender, nondistended.       Musculoskeletal:   No edema     Neurologic:   Alert and conversant. Appears weak overall     Skin:   Warm, dry.  No rash on limited exam.             Data:   All laboratory and imaging data reviewed.    CMP  Recent Labs   Lab 01/29/22  1227 01/29/22  0847 01/29/22  0618 01/29/22  0409 01/28/22  0745 01/28/22  0614 01/27/22  0758 01/27/22  0526 01/26/22  0759 01/26/22  0447 01/25/22  0829 01/25/22  0442   NA  --   --  140  --   --  137  --  138  --  142  --  143   POTASSIUM  --   --  3.0*  --   --  3.8  --  3.6  --   3.8  3.8   < > 3.2*   CHLORIDE  --   --  101  --   --  100  --  102  --  104  --  102   CO2  --   --  33*  --   --  21*  --  27  --  26  --  31   ANIONGAP  --   --  6  --   --  16  --  9  --  12  --  10   * 90 85 83   < > 288*   < > 217*   < > 160*   < > 149*   BUN  --   --  6*  --   --  6*  --  6*  --  4*  --  7*   CR  --   --  0.60*  --   --  0.70  --  0.67*  --  0.60*  --  0.57*   GFRESTIMATED  --   --  >90  --   --  >90  --  >90  --  >90  --  >90   BECKY  --   --  7.2*  --   --  7.4*  --  7.6*  --  8.0*  --  7.8*   MAG  --   --  1.3*  --   --   --   --  1.5*  --  1.8  --  1.5*   PHOS  --   --   --   --   --   --   --   --   --   --   --  2.4*    < > = values in this interval not displayed.     CBC  Recent Labs   Lab 01/29/22  0618 01/28/22  0614 01/27/22  0526 01/26/22 0447   WBC 16.6* 18.8* 13.0* 12.4*   RBC 3.10* 3.15* 3.17* 3.43*   HGB 9.6* 9.8* 9.7* 10.7*   HCT 27.4* 28.5* 28.6* 30.5*   MCV 88 91 90 89   MCH 31.0 31.1 30.6 31.2   MCHC 35.0 34.4 33.9 35.1   RDW 19.3* 19.5* 19.0* 19.1*    285 214 192     INRNo lab results found in last 7 days.  Arterial Blood Gas  Recent Labs   Lab 01/25/22  0442 01/24/22  1003   PH 7.52* 7.52*     Urine Studies    Recent Labs   Lab Test 01/16/22  1621 11/05/21  1444 10/06/20  1957   URINEPH 6.5 6.5 6.5   NITRITE Negative Negative Negative   LEUKEST 25 Akin/uL* 75 Akin/uL* Negative   WBCU 10* 22* 0-5     CMV viral loads  No lab results found.  No results found for: CMQNT, CMVQ  EBV viral loads   No lab results found.  No results found for: EBVDN, EBRES, EBVDN, EBVSP, EBVPC, EBVPCR  Respiratory Virus Testing    No results found for: RS, FLUAG  Sputum Culture last 3 months:  No results found for: SDES No results found for: CULT     CT PE study from 1/15/22 personally reviewed. Advanced heterogenous emphysema with patchy areas of opacities. SRAVANTHI area of consolidation/collapse possibly consistent with mucus plugging.    CXR 1/29/22 personally reviewed. Slightly  increased pulmonary opacities, but less penetration. No pleural effusions. Agree with radiology.    Kyree Suero, DO

## 2022-01-29 NOTE — PROGRESS NOTES
Pt on 6L oxymask with an SpO2 of 94%. Lungs diminished throughout. Albuterol and sodium chloride x2 given, with no signs of intolerance. Will continue to follow.

## 2022-01-29 NOTE — PROGRESS NOTES
Daily Progress Note        CODE STATUS:  Full Code    01/26/22  Assessment/Plan:   57 YO gentleman with PMH significant for alcohol abuse, hypertension, DM-II, multiple abdominal surgeries who was admitted on 1/15/22 with alcohol withdrawal, possible aspiration pneumonia leading to respiratory failure requiring intubation on 1/16, successfully extubated on 1/23/22 and now transferred out of ICU on 1/26/22 for floor care.    Possible aspiration pneumonia  Acute hypoxic respiratory failure  -- Required intubation 1/16, extubated on 1/23. Required BiPAP 1/23, transitioned to HFNC on 1/24. Currently on 30LPM 40% FiO2  -- Completed 7 days course of IV zosyn on 1/22  -- Cont albuterol nebs and pulm hygiene  -- CT PE was negative for PE on adission  -- CT chest 1/28 showed New airway wall thickening in the left upper and lower lobes with new consolidation in the dependent portion of the left upper lobe and at the left lung base, and mucoid impaction in left lower lobe airways.  -- With new consolidation and leucocytosis, restarted him on IV zosyn. Procal elevated as well. Appreciate pulm consult. Cont with pulm hygiene.    Septic shock  -- Likely due to multifocal pneumonia. Resolved   -- Metoprolol started on 1/25 for sinus tachycardia  -- Patient is having intermittent hypotension. Got 2 boluses of 500ml NS on 1/27.Normal cortisol level noted    Alcohol withdrawal  Acute toxic metabolic encephalopathy  -- Clinical concern for alcohol withdrawal during the intubation  -- Precedex weaned off. Cont with thiamine. Ativan prn.    Hypokalemia  -- Replaced    Hyperglycemia  DM type 2  -- A1c was 7.0 on 1/15/22  -- Had low blood sugar yesterday and this morning. Lantus dose decreased to 10 units. Cont with mealtime insulin and sliding scale    Nausea/vomiting  -- Resolved    Disposition; 2-3 days  Barrier to discharge; Respiratory failure      Subjective:  Interval History: Patient seen and examined. States doing better. No new  issues overnight.     Review of Systems:   As mentioned in subjective.    Patient Active Problem List   Diagnosis     Alcohol dependence with unspecified alcohol-induced disorder (H)     Charcot's joint of foot, left     Fall     Hypomagnesemia     Iron deficiency anemia     Restless legs     Type 2 diabetes mellitus without complication (H)     Secondary hypertension     Tobacco use disorder     Meningioma (H)     Falls frequently     Alcoholic intoxication with complication (H)     Closed nondisplaced fracture of phalanx of toe of left foot, unspecified toe, initial encounter     Alcohol use disorder, severe, dependence (H)     Sinus tachycardia     Pneumonia of both lungs due to infectious organism, unspecified part of lung     Non-intractable vomiting with nausea, unspecified vomiting type     Agitation       Scheduled Meds:    albuterol  2.5 mg Nebulization TID     DULoxetine  60 mg Oral or Feeding Tube Daily     enoxaparin ANTICOAGULANT  40 mg Subcutaneous Q24H     folic acid  1 mg Oral Daily     furosemide  20 mg Intravenous Q8H     gabapentin  200 mg Oral BID     insulin aspart   Subcutaneous Daily with breakfast     insulin aspart   Subcutaneous Daily with lunch     insulin aspart   Subcutaneous Daily with supper     insulin aspart  1-7 Units Subcutaneous TID AC     insulin aspart  1-5 Units Subcutaneous At Bedtime     insulin glargine  10 Units Subcutaneous QAM AC     lidocaine  1 patch Transdermal Q24H     lidocaine   Transdermal Q8H     metoprolol tartrate  12.5 mg Oral BID     multivitamin, therapeutic  1 tablet Oral Daily     pantoprazole  40 mg Oral QAM AC     piperacillin-tazobactam  3.375 g Intravenous Q8H     sodium chloride  3 mL Nebulization TID     sodium chloride (PF)  3 mL Intracatheter Q8H     thiamine  100 mg Oral Daily     Continuous Infusions:    dextrose Stopped (01/24/22 0230)     PRN Meds:.acetaminophen **OR** acetaminophen, acetaminophen-codeine, albuterol, bisacodyl, calcium  carbonate, dextrose, glucose **OR** dextrose **OR** glucagon, famotidine, flumazenil, haloperidol lactate, OLANZapine zydis **OR** haloperidol lactate, lidocaine 4%, lidocaine (buffered or not buffered), LORazepam, magnesium hydroxide, melatonin, menthol-zinc oxide, metoprolol, naloxone **OR** naloxone **OR** naloxone **OR** naloxone, ondansetron **OR** ondansetron, polyethylene glycol, sodium chloride (PF), sucralfate, traZODone    Objective:  Vital signs in last 24 hours:  Temp:  [98.1  F (36.7  C)-100.4  F (38  C)] 99  F (37.2  C)  Pulse:  [] 95  Resp:  [16-18] 18  BP: ()/(55-64) 96/58  SpO2:  [90 %-94 %] 94 %        Intake/Output Summary (Last 24 hours) at 1/26/2022 1610  Last data filed at 1/26/2022 1150  Gross per 24 hour   Intake 840 ml   Output 2100 ml   Net -1260 ml       Physical Exam:    General: Not in obvious distress.  HEENT: NC, AT   Chest: Diminished breath sounds on auscultation bilaterally  Heart: S1S2 normal, regular. No M/R/G  Abdomen: Soft. NT, ND. Bowel sounds- active.  Extremities: No legs swelling  Neuro: Alert and awake, grossly non-focal      Lab Results:(I have personally reviewed the results)    Recent Results (from the past 24 hour(s))   Procalcitonin    Collection Time: 01/28/22  4:12 PM   Result Value Ref Range    Procalcitonin 0.73 (H) 0.00 - 0.49 ng/mL   Glucose by meter    Collection Time: 01/28/22  5:00 PM   Result Value Ref Range    GLUCOSE BY METER POCT 192 (H) 70 - 99 mg/dL   Glucose by meter    Collection Time: 01/28/22  9:57 PM   Result Value Ref Range    GLUCOSE BY METER POCT 64 (L) 70 - 99 mg/dL   Glucose by meter    Collection Time: 01/28/22 11:14 PM   Result Value Ref Range    GLUCOSE BY METER POCT 84 70 - 99 mg/dL   Glucose by meter    Collection Time: 01/29/22 12:08 AM   Result Value Ref Range    GLUCOSE BY METER POCT 98 70 - 99 mg/dL   Lactic acid whole blood    Collection Time: 01/29/22  1:41 AM   Result Value Ref Range    Lactic Acid 1.2 0.7 - 2.0 mmol/L    Glucose by meter    Collection Time: 01/29/22  4:09 AM   Result Value Ref Range    GLUCOSE BY METER POCT 83 70 - 99 mg/dL   CBC with platelets    Collection Time: 01/29/22  6:18 AM   Result Value Ref Range    WBC Count 16.6 (H) 4.0 - 11.0 10e3/uL    RBC Count 3.10 (L) 4.40 - 5.90 10e6/uL    Hemoglobin 9.6 (L) 13.3 - 17.7 g/dL    Hematocrit 27.4 (L) 40.0 - 53.0 %    MCV 88 78 - 100 fL    MCH 31.0 26.5 - 33.0 pg    MCHC 35.0 31.5 - 36.5 g/dL    RDW 19.3 (H) 10.0 - 15.0 %    Platelet Count 282 150 - 450 10e3/uL   Basic metabolic panel    Collection Time: 01/29/22  6:18 AM   Result Value Ref Range    Sodium 140 136 - 145 mmol/L    Potassium 3.0 (L) 3.5 - 5.0 mmol/L    Chloride 101 98 - 107 mmol/L    Carbon Dioxide (CO2) 33 (H) 22 - 31 mmol/L    Anion Gap 6 5 - 18 mmol/L    Urea Nitrogen 6 (L) 8 - 22 mg/dL    Creatinine 0.60 (L) 0.70 - 1.30 mg/dL    Calcium 7.2 (L) 8.5 - 10.5 mg/dL    Glucose 85 70 - 125 mg/dL    GFR Estimate >90 >60 mL/min/1.73m2   Magnesium    Collection Time: 01/29/22  6:18 AM   Result Value Ref Range    Magnesium 1.3 (L) 1.8 - 2.6 mg/dL   Glucose by meter    Collection Time: 01/29/22  8:47 AM   Result Value Ref Range    GLUCOSE BY METER POCT 90 70 - 99 mg/dL   Glucose by meter    Collection Time: 01/29/22 12:27 PM   Result Value Ref Range    GLUCOSE BY METER POCT 119 (H) 70 - 99 mg/dL       All laboratory and imaging data in the past 24 hours reviewed  Serum Glucose range:   Recent Labs   Lab 01/29/22  1227 01/29/22  0847 01/29/22  0618 01/29/22  0409   * 90 85 83     ABG:   Recent Labs   Lab 01/25/22  0442 01/24/22  1003   PH 7.52* 7.52*     CBC:   Recent Labs   Lab 01/29/22  0618 01/28/22  0614 01/27/22  0526   WBC 16.6* 18.8* 13.0*   HGB 9.6* 9.8* 9.7*   HCT 27.4* 28.5* 28.6*   MCV 88 91 90    285 214     Chemistry:   Recent Labs   Lab 01/29/22  0618 01/28/22  0614 01/27/22  0526 01/26/22  0447    137 138 142   POTASSIUM 3.0* 3.8 3.6 3.8  3.8   CHLORIDE 101 100 102 104   CO2  33* 21* 27 26   BUN 6* 6* 6* 4*   CR 0.60* 0.70 0.67* 0.60*   GFRESTIMATED >90 >90 >90 >90   BECKY 7.2* 7.4* 7.6* 8.0*   MAG 1.3*  --  1.5* 1.8     Coags:  No results for input(s): INR, PROTIME, PTT in the last 168 hours.    Invalid input(s): APTT  Cardiac Markers:  No results for input(s): CKTOTAL, TROPONINI in the last 168 hours.       MR Brain w/o Contrast    Result Date: 1/22/2022  EXAM: MR BRAIN W/O CONTRAST LOCATION: [facility] DATE/TIME: 1/22/2022 1:05 PM INDICATION: Encephalopathy COMPARISON: CT head 01/18/2022, MRI head 11/23/2020 TECHNIQUE: Routine multiplanar multisequence head MRI without intravenous contrast. FINDINGS: INTRACRANIAL CONTENTS: Please note study is significantly degraded by motion artifact. Suggestion of a few small foci of restricted diffusion at the left temporal occipital region; best appreciated on repeat axial diffusion weighted sequence series 20.2 image 12, image 11; as well as repeat coronal diffusion weighted sequence series 24.2 image 9. No definite associated hemorrhage or significant mass effect. Redemonstration of small chronic infarction left precentral gyrus. Mild generalized cerebral atrophy. No hydrocephalus. Normal position of the cerebellar tonsils. Small chronic infarction lateral aspect left cerebellum. A few additional tiny infarctions demonstrated on prior exam are not well demonstrated on the current. SELLA: Not well-visualized, grossly normal. OSSEOUS STRUCTURES/SOFT TISSUES: Normal marrow signal. Flow voids are not well-visualized. ORBITS: Not well-visualized. SINUSES/MASTOIDS: The complete opacification left maxillary and left sphenoid sinus, similar to prior. Small amount of opacification right mastoid air cells.     IMPRESSION: 1.  Please note study is significantly degraded by motion artifact. 2.  Suggestion of a few small foci of acute/early subacute infarction at the left temporal occipital region. 3.  No definite associated hemorrhage  or significant mass effect. 4.  Small chronic infarction left precentral gyrus, similar to prior. 5.  Probable few small chronic infarctions cerebellar hemispheres. 6.  Mild atrophy.    XR Chest Port 1 View    Result Date: 1/19/2022  EXAM: XR CHEST PORT 1 VIEW LOCATION: [facility] DATE/TIME: 1/19/2022 10:52 AM INDICATION: after advancing et tube COMPARISON: 1/18/2022.     IMPRESSION: Endotracheal tube is been advanced. Tip is 4 cm above the igor in good position. There our persistent bibasilar pulmonary opacities with partial clearing at the right base from the prior study and no change at the left base. No pneumothorax. Enteric tube tip is not visualized tip is below the diaphragm. PICC catheter from right upper extremity approach is unchanged with tip at the junction of the superior vena cava and right atrium. Left posterior rib fractures are again noted as well as deformity of the right humeral head.    XR Chest Port 1 View    Result Date: 1/18/2022  EXAM: XR CHEST PORT 1 VIEW LOCATION: [facility] DATE/TIME: 1/18/2022 6:06 AM INDICATION: Location of ET tube COMPARISON: 01/16/2022.     IMPRESSION:Endotracheal tube is in the trachea at the level of the clavicular heads with tip 6 cm above the igor. PICC catheter from right upper extremity approach is in the distal superior vena cava near its junction with the right atrium. Enteric tube tip is below the diaphragm and not visualized. There is no pneumothorax. Again noted are bilateral patchy airspace opacities there has been increased consolidation or atelectasis at the right medial lung base. There are old healed left posterior rib  fractures no acute fractures are identified.    XR Chest Port 1 View    Result Date: 1/16/2022  EXAM: XR CHEST PORT 1 VIEW LOCATION: [facility] DATE/TIME: 1/16/2022 12:34 PM INDICATION: ET tube placement, PICC line placement COMPARISON: CT pulmonary angiogram  01/15/2022     IMPRESSION: ET tube tip projects 6.5 cm above the igor. Gastric tube extends towards the diaphragmatic hiatus, outside the field-of-view. Right PICC terminates upper right atrium. Extensive bilateral airspace opacities are present with patchy lung involvement, unchanged from yesterday. No new focal lung volume loss. No visible pleural fluid or pneumothorax on this single supine view.    XR Chest Port 1 View    Result Date: 1/15/2022  EXAM: XR CHEST PORT 1 VIEW LOCATION: [facility] DATE/TIME: 1/15/2022 5:16 PM INDICATION: Shortness of breath COMPARISON: 11/5/2021     IMPRESSION: There are mild opacities in both lungs which have increased from the prior exam. This is likely due to a superimposed infectious or inflammatory process on top of mild background scarring. Normal heart size and pulmonary vascularity. Old left  rib fractures.    XR Abdomen Port 1 View    Result Date: 1/17/2022  EXAM: XR ABDOMEN PORT 1 VIEWS LOCATION: [facility] DATE/TIME: 1/17/2022 1:25 PM INDICATION: Location of OG tube COMPARISON: None.     IMPRESSION: Enteric tube in the body the stomach.     CT Chest Pulmonary Embolism w Contrast    Result Date: 1/15/2022  EXAM: CT CHEST PULMONARY EMBOLISM W CONTRAST LOCATION: [facility] DATE/TIME: 1/15/2022 6:17 PM INDICATION: Shortness of breath COMPARISON: Portable AP view of the chest 01/15/2022; CT of the abdomen and pelvis 10/05/2020 TECHNIQUE: CT chest pulmonary angiogram during arterial phase injection of IV contrast. Multiplanar reformats and MIP reconstructions were performed. Dose reduction techniques were used. CONTRAST: 100ml isovue 370 FINDINGS: ANGIOGRAM CHEST: Pulmonary arteries are normal caliber and negative for pulmonary emboli. Normal caliber thoracic aorta. There are no findings to suggest acute aortic syndrome. Proximal great vessels are patent. Diminutive left vertebral artery arises directly  from the arch. Mild atheromatous calcifications descending aorta and distal right innominate artery. LUNGS AND PLEURA: Upper lung predominant mixed centrilobular and paraseptal emphysema. Superimposed patchy airspace opacities are present bilaterally, asymmetric to the right associated with internal interstitial opacity. Minimal right pleural fluid layers dependently. There is chronic thickening of the left posterior and posterolateral pleura adjacent to multiple left rib fracture deformities. Central airways are patent. MEDIASTINUM: Cardiac chambers are normal in size. No pericardial effusion is present. Mildly enlarged lymph nodes in both analia, likely reactive in the setting of airspace opacities. Subcarinal, lower paratracheal and subaortic/prevascular lymph nodes are  all normal by size criteria. The middle third and distal thirds of the esophagus has circumferential wall thickening consistent with esophagitis. Imaged thyroid gland is normal. CORONARY ARTERY CALCIFICATION: None. UPPER ABDOMEN: There are multiple calcified gallstones in the neck of the gallbladder. Severe fatty infiltration of the liver. The imaged distal transverse colon/splenic flecture has wall thickening. Previous splenectomy with small splenule present. 15 mm length ovoid calcification in the region of the pancreas neck is unchanged. MUSCULOSKELETAL: There are multiple ununited left posterior and posterolateral rib fracture deformities including lateral ribs 7, 8 and posterior ribs 9 and 11. The posterolateral right ninth rib is broken in 2 locations and the fractures have fairly sharp borders suggesting acute on subacute fractures..     IMPRESSION: 1.  No acute pulmonary embolism. 2.  Multifocal bilateral airspace opacities are present mixed groundglass and interstitial thickening asymmetric to the right. Imaging features can be seen with (COVID-19)  pneumonia, though are nonspecific and can occur with a variety of infectious and  noninfectious processes. Given the other findings below, aspiration is a strong differential consideration. 3.  Severe wall thickening of the middle and distal thirds of the esophagus consistent with a subacute esophagitis. 4.  Severe hepatic steatosis. 5.  Acute on chronic fracture deformities of multiple left lateral/posterolateral ribs. 6.  Cholelithiasis. 7.  Wall thickening of the imaged colon consistent with colitis.    CT Head w/o Contrast    Result Date: 1/18/2022  EXAM: CT HEAD W/O CONTRAST LOCATION: [facility] DATE/TIME: 1/18/2022 3:45 AM INDICATION: Headache, intracranial hemorrhage suspected. COMPARISON: 11/4/2021 TECHNIQUE: Routine CT Head without IV contrast. Multiplanar reformats. Dose reduction techniques were used. FINDINGS: INTRACRANIAL CONTENTS: No intracranial hemorrhage, extraaxial collection, or mass effect. No CT evidence of acute infarct. Mild presumed chronic small vessel ischemic changes. Mild to moderate generalized volume loss. No hydrocephalus. Stable calcified dural based lesion along the right frontal convexity measuring 1 cm in diameter, presumably reflecting a small meningioma. VISUALIZED ORBITS/SINUSES/MASTOIDS: No intraorbital abnormality. Complete opacification of the left maxillary sinus with chronic mucoperiosteal reaction. Mucous retention cyst in the left sphenoid sinus. Mild mucosal thickening along the floor of the left frontal sinus. Opacified left frontoethmoidal recess. No middle ear or mastoid effusion. BONES/SOFT TISSUES: No acute abnormality.     IMPRESSION: 1.  No CT evidence for acute intracranial process. 2.  Stable chronic changes as above.    XR Video Swallow with SLP or OT    Result Date: 1/25/2022  EXAM: XR VIDEO SWALLOW WITH SLP OR OT LOCATION: [facility] DATE/TIME: 1/25/2022 3:27 PM INDICATION: Difficulty swallowing. COMPARISON: None. TECHNIQUE: Routine swallow study with speech pathology using multiple barium  thicknesses. FINDINGS: FLUOROSCOPIC TIME: 1.5 minutes NUMBER OF IMAGES: 9 Swallow study with Speech Pathology using multiple barium thicknesses. Moderate to deep penetration with thin liquid (no cough reflex). Small amount of penetration with mildly thick consistency. No other penetration or aspiration.     EEG Coma or Sleep Only    Result Date: 2022  ELECTROENCEPHALOGRAM (EEG) REPORT [facility] NEUROLOGY [place] [address] [place] [address] Tel: [phone]  Fax: [phone] [email or web address] Peewee GILLILAND Jimena,  1965, MRN [id number] PCP: Sarah Canseco Date: 2022 Principal Diagnosis: Altered mental status History : Patient is being evaluated for altered mental status. Medication listed includes Ativan Description of the Recording:  This is a multichannel digital EEG recording done using the international 10-20 placement system. Background of the recording consists of irregular 1 to 3 Hz polymorphic delta activity with amplitudes ranging between 20 to 30  V.  Alerting procedure produce minimal change.  Photic stimulation performed with standard protocol produced minimal change.  No transient sleep patterns were recorded. During this recording, no sharp discharges, spikes or electrographic seizure activity was recorded. Impression: This is an abnormal EEG due to diffusely slow polymorphic delta activity that minimally attenuates with alerting procedures.  This EEG suggests nonspecific generalized cerebral dysfunction.  No focal slowing or periodic discharges were seen to suggest seizures. Classification: Delta grade I generalized Tiburcio Gallagher MD [facility] This note was dictated using voice recognition software.  Any grammatical or context distortions are unintentional and inherent to the software.       Latest radiology report personally reviewed.    Note created using dragon voice  recognition software so sounds alike errors may have escaped editing.      01/29/2022   Morteza Nicholson MD  Hospitalist, [facility]  Pager: [phone]

## 2022-01-30 NOTE — PLAN OF CARE
Problem: Adult Inpatient Plan of Care  Goal: Plan of Care Review  Outcome: No Change  Goal: Patient-Specific Goal (Individualized)  Outcome: No Change  Goal: Absence of Hospital-Acquired Illness or Injury  Outcome: No Change  Intervention: Identify and Manage Fall Risk  Recent Flowsheet Documentation  Taken 1/30/2022 0412 by Kelsie Cannon RN  Safety Promotion/Fall Prevention: bed alarm on  Taken 1/30/2022 0054 by Kelsie Cannon RN  Safety Promotion/Fall Prevention: bed alarm on  Intervention: Prevent Skin Injury  Recent Flowsheet Documentation  Taken 1/30/2022 0412 by Kelsie Cannon RN  Body Position: position changed independently  Taken 1/30/2022 0054 by Kelsie Cannon RN  Body Position: position changed independently  Intervention: Prevent and Manage VTE (Venous Thromboembolism) Risk  Recent Flowsheet Documentation  Taken 1/30/2022 0412 by Kelsie Cannon RN  VTE Prevention/Management: fluids promoted  Taken 1/30/2022 0054 by Kelsie Cannon RN  VTE Prevention/Management: fluids promoted  Goal: Optimal Comfort and Wellbeing  Outcome: No Change  Intervention: Provide Person-Centered Care  Recent Flowsheet Documentation  Taken 1/30/2022 0412 by Kelsie Cannon RN  Trust Relationship/Rapport:   care explained   choices provided   reassurance provided  Taken 1/30/2022 0054 by Kelsie Cannon RN  Trust Relationship/Rapport:   care explained   choices provided   reassurance provided  Goal: Readiness for Transition of Care  Outcome: No Change     Problem: Restraint/Seclusion for Violent Self-Destructive Behavior  Goal: Prevent/manage potential problems during restraint/seclusion  Description: Maintain safety of patient and others during period of restraint/seclusion.  Promote psychological and physical wellbeing.  Prevent injury to skin and involved body parts.  Promote nutrition, hydration, and elimination.  Outcome: No Change  Goal: Prevent future episodes of restraint or seclusion  Description: Identify nonphysical  alternatives to restraint or seclusion.  Identify additional de-escalation supportive measures to use as alternatives to restraint or seclusion.  Outcome: No Change  Pt has been up and restless throughout the shift, prn ativan and xyprexa were given with little effect. Writer has been in the room most of night, replacing pts oxymask and redirecting to stay in bed. Repositioned multiple times. Pt is at this time sleeping, will cont to monitor and treat as needed.    Problem: Risk for Delirium  Goal: Optimal Coping  Outcome: No Change  Goal: Improved Behavioral Control  Outcome: No Change  Intervention: Prevent and Manage Agitation  Recent Flowsheet Documentation  Taken 1/30/2022 0412 by Kelsie Cannon, RN  Complementary Therapy: music therapy provided  Taken 1/30/2022 0054 by Kelsie Cannon, RN  Complementary Therapy: music therapy provided  Goal: Improved Attention and Thought Clarity  Outcome: No Change  Goal: Improved Sleep  Outcome: No Change   Pt had been up all night and finally fell asleep around 0440.   Problem: Restraint for Non-Violent/Non-Self-Destructive Behavior  Goal: Prevent/Manage Potential Problems  Description: Maintain safety of patient and others during period of restraint.  Promote psychological and physical wellbeing.  Prevent injury to skin and involved body parts.  Promote nutrition, hydration, and elimination.  Outcome: No Change     Problem: Alcohol Withdrawal  Goal: Alcohol Withdrawal Symptom Control  Outcome: No Change     Problem: Acute Neurologic Deterioration (Alcohol Withdrawal)  Goal: Optimal Neurologic Function  Outcome: No Change     Problem: Substance Misuse (Alcohol Withdrawal)  Goal: Readiness for Change Identified  Outcome: No Change     Problem: Communication Impairment (Mechanical Ventilation, Invasive)  Goal: Effective Communication  Outcome: No Change     Problem: Device-Related Complication Risk (Mechanical Ventilation, Invasive)  Goal: Optimal Device Function  Outcome: No  Change  Intervention: Optimize Device Care and Function  Recent Flowsheet Documentation  Taken 1/30/2022 0412 by Kelsie Cannon RN  Airway Safety Measures: all equipment/monitors on and audible  Taken 1/30/2022 0054 by Kelsie Cannon RN  Airway Safety Measures: all equipment/monitors on and audible     Problem: Inability to Wean (Mechanical Ventilation, Invasive)  Goal: Mechanical Ventilation Liberation  Outcome: No Change     Problem: Nutrition Impairment (Mechanical Ventilation, Invasive)  Goal: Optimal Nutrition Delivery  Outcome: No Change     Problem: Skin and Tissue Injury (Mechanical Ventilation, Invasive)  Goal: Absence of Device-Related Skin and Tissue Injury  Outcome: No Change     Problem: Ventilator-Induced Lung Injury (Mechanical Ventilation, Invasive)  Goal: Absence of Ventilator-Induced Lung Injury  Outcome: No Change  Intervention: Prevent Ventilator-Associated Pneumonia  Recent Flowsheet Documentation  Taken 1/30/2022 0412 by Kelsie Cannon RN  Head of Bed (HOB) Positioning: HOB at 20-30 degrees  Taken 1/30/2022 0054 by Kelsie Cannon RN  Head of Bed (HOB) Positioning: HOB at 20-30 degrees     Problem: Violence Risk or Actual  Goal: Anger and Impulse Control  Outcome: No Change     Problem: Hypertension Acute  Goal: Blood Pressure Within Desired Range  Outcome: No Change   Blood pressures have been wnl.   Problem: Gas Exchange Impaired  Goal: Optimal Gas Exchange  Outcome: No Change  Intervention: Optimize Oxygenation and Ventilation  Recent Flowsheet Documentation  Taken 1/30/2022 0412 by Kelsie Cannon RN  Head of Bed (HOB) Positioning: HOB at 20-30 degrees  Taken 1/30/2022 0054 by Kelsie Cannon RN  Head of Bed (HOB) Positioning: HOB at 20-30 degrees     Problem: OT General Care Plan  Goal: Transfer (OT)  Description: Transfer (OT)  Outcome: No Change  Goal: Toilet Transfer/Toileting (OT)  Description: Toilet Transfer/Toileting (OT)  Outcome: No Change  Goal: Cognitive (OT)  Description:  Cognitive (OT)  Outcome: No Change

## 2022-01-30 NOTE — PROGRESS NOTES
Vest therapy initiated with Nebusal. Pt tolerated well. Lung sounds are diminished, but clear. Pt able to produce a productive cough and remains on oxymask 10 LPM with sats in the low 90's.

## 2022-01-30 NOTE — PLAN OF CARE
Problem: Gas Exchange Impaired  Goal: Optimal Gas Exchange  Outcome: No Change  Intervention: Optimize Oxygenation and Ventilation  Recent Flowsheet Documentation  Taken 1/29/2022 1637 by Aren Aden, RN  Head of Bed (HOB) Positioning: HOB at 30-45 degrees     Problem: Hypertension Acute  Goal: Blood Pressure Within Desired Range  Outcome: Improving     Pt O2 saturation varies from 50-90% depending on activity. Pt will pull off oxygen mask and sit up at edge of bed, easy to guide back into bed and place oxygen back on with encouragement. Pt is confused and frequently removes oxygen mask and requires reminders to cough and deep breath. Pt will use flutter valve when given directions and supervised, but will forget what the device is for after a couple uses. Blood pressure has been 90s to low 100s systolic and 50s diastolic, metoprolol held. Pt is now on mild thick liquids and will take oral medications with thickened liquids but refuses when asking for something to drink when thirsty. Pt requests thin liquids instead of thickened.

## 2022-01-30 NOTE — PLAN OF CARE
Problem: Adult Inpatient Plan of Care  Goal: Plan of Care Review  Outcome: No Change  Goal: Patient-Specific Goal (Individualized)  Outcome: No Change  Goal: Absence of Hospital-Acquired Illness or Injury  Outcome: No Change  Intervention: Identify and Manage Fall Risk  Recent Flowsheet Documentation  Taken 1/30/2022 1231 by Trina Arreaga RN  Safety Promotion/Fall Prevention: bed alarm on  Goal: Optimal Comfort and Wellbeing  Outcome: No Change  Goal: Readiness for Transition of Care  Outcome: No Change     Problem: Restraint/Seclusion for Violent Self-Destructive Behavior  Goal: Prevent/manage potential problems during restraint/seclusion  Description: Maintain safety of patient and others during period of restraint/seclusion.  Promote psychological and physical wellbeing.  Prevent injury to skin and involved body parts.  Promote nutrition, hydration, and elimination.  Outcome: No Change  Goal: Prevent future episodes of restraint or seclusion  Description: Identify nonphysical alternatives to restraint or seclusion.  Identify additional de-escalation supportive measures to use as alternatives to restraint or seclusion.  Outcome: No Change     Problem: Risk for Delirium  Goal: Optimal Coping  Outcome: No Change  Goal: Improved Behavioral Control  Outcome: No Change  Goal: Improved Attention and Thought Clarity  Outcome: No Change  Goal: Improved Sleep  Outcome: No Change     Problem: Restraint for Non-Violent/Non-Self-Destructive Behavior  Goal: Prevent/Manage Potential Problems  Description: Maintain safety of patient and others during period of restraint.  Promote psychological and physical wellbeing.  Prevent injury to skin and involved body parts.  Promote nutrition, hydration, and elimination.  Outcome: No Change     Problem: Acute Neurologic Deterioration (Alcohol Withdrawal)  Goal: Optimal Neurologic Function  Outcome: No Change     Problem: Substance Misuse (Alcohol Withdrawal)  Goal: Readiness for  Change Identified  Outcome: No Change     Problem: Inability to Wean (Mechanical Ventilation, Invasive)  Goal: Mechanical Ventilation Liberation  Outcome: No Change     Problem: Nutrition Impairment (Mechanical Ventilation, Invasive)  Goal: Optimal Nutrition Delivery  Outcome: No Change     Problem: Skin and Tissue Injury (Mechanical Ventilation, Invasive)  Goal: Absence of Device-Related Skin and Tissue Injury  Outcome: No Change     Problem: Violence Risk or Actual  Goal: Anger and Impulse Control  Outcome: No Change     Problem: Hypertension Acute  Goal: Blood Pressure Within Desired Range  Outcome: No Change     Problem: Gas Exchange Impaired  Goal: Optimal Gas Exchange  Outcome: No Change     Problem: OT General Care Plan  Goal: Transfer (OT)  Description: Transfer (OT)  Outcome: No Change  Goal: Toilet Transfer/Toileting (OT)  Description: Toilet Transfer/Toileting (OT)  Outcome: No Change  Goal: Cognitive (OT)  Description: Cognitive (OT)  Outcome: No Change   Pt is confused,denies discomfort.Poor appetite,sats in the low 90s unable to titrate.

## 2022-01-30 NOTE — PLAN OF CARE
Problem: Risk for Delirium  Goal: Improved Attention and Thought Clarity  Outcome: No Change   Patient continues to be confused.      Problem: Nutrition Impairment (Mechanical Ventilation, Invasive)  Goal: Optimal Nutrition Delivery  Outcome: No Change   Patient is refusing to eat and is not consuming adequate liquids.      Problem: Gas Exchange Impaired  Goal: Optimal Gas Exchange  Intervention: Optimize Oxygenation and Ventilation  Recent Flowsheet Documentation  Taken 1/30/2022 0815 by Sun Hauser RN  Head of Bed (HOB) Positioning: HOB at 20-30 degrees   Patient had to be increased to 9L oxymask from 5L nasal cannula yesterday because sats were dropping into the high 70%'s.  He is now maintaining 90%'s on 9L and has B450 hooked up so he can be closely monitored.  However, with the asymptomatic covid swab this morning, he was found to be positive for COVID-19.

## 2022-01-30 NOTE — PROGRESS NOTES
Albuterol and 3% sodium chloride neb given per scheduled orders. BS diminished, RR 16, HR 86-95, and oxygen saturation 96-99% on 9  L oxymask. Flutter valve treatment also completed, however, patient requiring repetitive instructions due to his confused presentation. Patient able to produce sputum sample.     Heidy Grace,RRT

## 2022-01-30 NOTE — PROGRESS NOTES
Daily Progress Note        CODE STATUS:  Full Code    01/26/22  Assessment/Plan:   55 YO gentleman with PMH significant for alcohol abuse, hypertension, DM-II, multiple abdominal surgeries who was admitted on 1/15/22 with alcohol withdrawal, possible aspiration pneumonia leading to respiratory failure requiring intubation on 1/16, successfully extubated on 1/23/22 and now transferred out of ICU on 1/26/22 for floor care.    Possible aspiration pneumonia  Acute hypoxic respiratory failure  -- Required intubation 1/16, extubated on 1/23. Required BiPAP 1/23, transitioned to HFNC on 1/24. Currently on 30LPM 40% FiO2  -- Completed 7 days course of IV zosyn on 1/22  -- Cont albuterol nebs and pulm hygiene  -- CT PE was negative for PE on adission  -- CT chest 1/28 showed New airway wall thickening in the left upper and lower lobes with new consolidation in the dependent portion of the left upper lobe and at the left lung base, and mucoid impaction in left lower lobe airways.  -- With new consolidation and leucocytosis, restarted him on IV zosyn. Procal elevated as well. Appreciate pulm consult. Cont with pulm hygiene.  -- Weekly COVID (per protocol) came back positive. He was negative on admission on 1/22. Check COVID labs. Start Covid special precaution.   -- Will start him on IV remdesivir and Dexamethasone. Defer to Pulm on Toci/Baricitinib.    Septic shock  -- Likely due to multifocal pneumonia. Resolved   -- Metoprolol started on 1/25 for sinus tachycardia  -- Patient is having intermittent hypotension. Got 2 boluses of 500ml NS on 1/27.Normal cortisol level noted    Alcohol withdrawal  Acute toxic metabolic encephalopathy  -- Clinical concern for alcohol withdrawal during the intubation  -- Precedex weaned off. Cont with thiamine. Ativan prn.    Hypokalemia  -- Replaced    Hyperglycemia  DM type 2  -- A1c was 7.0 on 1/15/22  -- Hypoglycemic again. Lantus discontinued today. Cont only with sliding  scale    Nausea/vomiting  -- Resolved    Disposition; 2-3 days  Barrier to discharge; Respiratory failure    Spouse, Daina called and updated. Answered all her questions.     Subjective:  Interval History: Patient seen and examined. He is lethargic. Able to answer questions but appears confused.     Review of Systems:   As mentioned in subjective.    Patient Active Problem List   Diagnosis     Alcohol dependence with unspecified alcohol-induced disorder (H)     Charcot's joint of foot, left     Fall     Hypomagnesemia     Iron deficiency anemia     Restless legs     Type 2 diabetes mellitus without complication (H)     Secondary hypertension     Tobacco use disorder     Meningioma (H)     Falls frequently     Alcoholic intoxication with complication (H)     Closed nondisplaced fracture of phalanx of toe of left foot, unspecified toe, initial encounter     Alcohol use disorder, severe, dependence (H)     Sinus tachycardia     Pneumonia of both lungs due to infectious organism, unspecified part of lung     Non-intractable vomiting with nausea, unspecified vomiting type     Agitation       Scheduled Meds:    albuterol  2.5 mg Nebulization TID     DULoxetine  60 mg Oral or Feeding Tube Daily     enoxaparin ANTICOAGULANT  40 mg Subcutaneous Q24H     folic acid  1 mg Oral Daily     furosemide  20 mg Intravenous Q8H     gabapentin  200 mg Oral BID     insulin aspart  1-7 Units Subcutaneous TID AC     insulin aspart  1-5 Units Subcutaneous At Bedtime     lidocaine  1 patch Transdermal Q24H     lidocaine   Transdermal Q8H     metoprolol tartrate  12.5 mg Oral BID     multivitamin, therapeutic  1 tablet Oral Daily     pantoprazole  40 mg Oral QAM AC     piperacillin-tazobactam  3.375 g Intravenous Q8H     sodium chloride  3 mL Nebulization TID     sodium chloride (PF)  3 mL Intracatheter Q8H     thiamine  100 mg Oral Daily     Continuous Infusions:    dextrose Stopped (01/24/22 0230)     PRN Meds:.acetaminophen **OR**  acetaminophen, acetaminophen-codeine, albuterol, bisacodyl, calcium carbonate, dextrose, glucose **OR** dextrose **OR** glucagon, famotidine, flumazenil, haloperidol lactate, OLANZapine zydis **OR** haloperidol lactate, lidocaine 4%, lidocaine (buffered or not buffered), LORazepam, magnesium hydroxide, melatonin, menthol-zinc oxide, metoprolol, naloxone **OR** naloxone **OR** naloxone **OR** naloxone, ondansetron **OR** ondansetron, polyethylene glycol, sodium chloride (PF), sucralfate, traZODone    Objective:  Vital signs in last 24 hours:  Temp:  [98.2  F (36.8  C)-99.7  F (37.6  C)] 99.3  F (37.4  C)  Pulse:  [] 103  Resp:  [18-20] 18  BP: ()/(51-76) 96/57  SpO2:  [91 %-99 %] 93 %        Intake/Output Summary (Last 24 hours) at 1/26/2022 1610  Last data filed at 1/26/2022 1150  Gross per 24 hour   Intake 840 ml   Output 2100 ml   Net -1260 ml       Physical Exam:    General: Not in obvious distress.  HEENT: NC, AT   Chest: Diminished breath sounds on auscultation bilaterally  Heart: S1S2 normal, regular. No M/R/G  Abdomen: Soft. NT, ND. Bowel sounds- active.  Extremities: No legs swelling  Neuro: Alert and awake, grossly non-focal      Lab Results:(I have personally reviewed the results)    Recent Results (from the past 24 hour(s))   Glucose by meter    Collection Time: 01/29/22 12:27 PM   Result Value Ref Range    GLUCOSE BY METER POCT 119 (H) 70 - 99 mg/dL   Potassium    Collection Time: 01/29/22  4:30 PM   Result Value Ref Range    Potassium 3.2 (L) 3.5 - 5.0 mmol/L   Glucose by meter    Collection Time: 01/29/22  4:43 PM   Result Value Ref Range    GLUCOSE BY METER POCT 87 70 - 99 mg/dL   Respiratory Aerobic Bacterial Culture with Gram Stain    Collection Time: 01/29/22  8:52 PM    Specimen: Expectorate; Sputum   Result Value Ref Range    Culture       >10 Squamous epithelial cells/low power field indicates oral contamination. Please recollect.    Gram Stain Result >10 Squamous epithelial  cells/low power field     Gram Stain Result >25 PMNs/low power field     Gram Stain Result 4+ Mixed toñito    Glucose by meter    Collection Time: 01/29/22  8:55 PM   Result Value Ref Range    GLUCOSE BY METER POCT 91 70 - 99 mg/dL   Potassium    Collection Time: 01/29/22 10:23 PM   Result Value Ref Range    Potassium 3.6 3.5 - 5.0 mmol/L   Glucose by meter    Collection Time: 01/30/22 12:05 AM   Result Value Ref Range    GLUCOSE BY METER POCT 73 70 - 99 mg/dL   Glucose by meter    Collection Time: 01/30/22  3:23 AM   Result Value Ref Range    GLUCOSE BY METER POCT 70 70 - 99 mg/dL   CBC with platelets    Collection Time: 01/30/22  6:21 AM   Result Value Ref Range    WBC Count 12.8 (H) 4.0 - 11.0 10e3/uL    RBC Count 3.08 (L) 4.40 - 5.90 10e6/uL    Hemoglobin 9.6 (L) 13.3 - 17.7 g/dL    Hematocrit 27.5 (L) 40.0 - 53.0 %    MCV 89 78 - 100 fL    MCH 31.2 26.5 - 33.0 pg    MCHC 34.9 31.5 - 36.5 g/dL    RDW 19.0 (H) 10.0 - 15.0 %    Platelet Count 284 150 - 450 10e3/uL   Basic metabolic panel    Collection Time: 01/30/22  6:21 AM   Result Value Ref Range    Sodium 140 136 - 145 mmol/L    Potassium 3.5 3.5 - 5.0 mmol/L    Chloride 100 98 - 107 mmol/L    Carbon Dioxide (CO2) 31 22 - 31 mmol/L    Anion Gap 9 5 - 18 mmol/L    Urea Nitrogen 5 (L) 8 - 22 mg/dL    Creatinine 0.58 (L) 0.70 - 1.30 mg/dL    Calcium 7.4 (L) 8.5 - 10.5 mg/dL    Glucose 80 70 - 125 mg/dL    GFR Estimate >90 >60 mL/min/1.73m2   Magnesium    Collection Time: 01/30/22  6:21 AM   Result Value Ref Range    Magnesium 1.6 (L) 1.8 - 2.6 mg/dL   Lactic Acid STAT    Collection Time: 01/30/22  6:21 AM   Result Value Ref Range    Lactic Acid 1.0 0.7 - 2.0 mmol/L   Glucose by meter    Collection Time: 01/30/22  8:17 AM   Result Value Ref Range    GLUCOSE BY METER POCT 85 70 - 99 mg/dL   Asymptomatic COVID-19 Virus (Coronavirus) by PCR Nasopharyngeal    Collection Time: 01/30/22  8:32 AM    Specimen: Nasopharyngeal; Swab   Result Value Ref Range    SARS CoV2 PCR  Positive (A) Negative       All laboratory and imaging data in the past 24 hours reviewed  Serum Glucose range:   Recent Labs   Lab 01/30/22  0817 01/30/22  0621 01/30/22  0323 01/30/22  0005   GLC 85 80 70 73     ABG:   Recent Labs   Lab 01/25/22  0442 01/24/22  1003   PH 7.52* 7.52*     CBC:   Recent Labs   Lab 01/30/22  0621 01/29/22  0618 01/28/22  0614   WBC 12.8* 16.6* 18.8*   HGB 9.6* 9.6* 9.8*   HCT 27.5* 27.4* 28.5*   MCV 89 88 91    282 285     Chemistry:   Recent Labs   Lab 01/30/22  0621 01/29/22  2223 01/29/22  1630 01/29/22  0618 01/28/22  0614 01/27/22  0526     --   --  140 137 138   POTASSIUM 3.5 3.6 3.2* 3.0* 3.8 3.6   CHLORIDE 100  --   --  101 100 102   CO2 31  --   --  33* 21* 27   BUN 5*  --   --  6* 6* 6*   CR 0.58*  --   --  0.60* 0.70 0.67*   GFRESTIMATED >90  --   --  >90 >90 >90   BECKY 7.4*  --   --  7.2* 7.4* 7.6*   MAG 1.6*  --   --  1.3*  --  1.5*     Coags:  No results for input(s): INR, PROTIME, PTT in the last 168 hours.    Invalid input(s): APTT  Cardiac Markers:  No results for input(s): CKTOTAL, TROPONINI in the last 168 hours.       MR Brain w/o Contrast    Result Date: 1/22/2022  EXAM: MR BRAIN W/O CONTRAST LOCATION: [facility] DATE/TIME: 1/22/2022 1:05 PM INDICATION: Encephalopathy COMPARISON: CT head 01/18/2022, MRI head 11/23/2020 TECHNIQUE: Routine multiplanar multisequence head MRI without intravenous contrast. FINDINGS: INTRACRANIAL CONTENTS: Please note study is significantly degraded by motion artifact. Suggestion of a few small foci of restricted diffusion at the left temporal occipital region; best appreciated on repeat axial diffusion weighted sequence series 20.2 image 12, image 11; as well as repeat coronal diffusion weighted sequence series 24.2 image 9. No definite associated hemorrhage or significant mass effect. Redemonstration of small chronic infarction left precentral gyrus. Mild generalized cerebral atrophy. No  hydrocephalus. Normal position of the cerebellar tonsils. Small chronic infarction lateral aspect left cerebellum. A few additional tiny infarctions demonstrated on prior exam are not well demonstrated on the current. SELLA: Not well-visualized, grossly normal. OSSEOUS STRUCTURES/SOFT TISSUES: Normal marrow signal. Flow voids are not well-visualized. ORBITS: Not well-visualized. SINUSES/MASTOIDS: The complete opacification left maxillary and left sphenoid sinus, similar to prior. Small amount of opacification right mastoid air cells.     IMPRESSION: 1.  Please note study is significantly degraded by motion artifact. 2.  Suggestion of a few small foci of acute/early subacute infarction at the left temporal occipital region. 3.  No definite associated hemorrhage or significant mass effect. 4.  Small chronic infarction left precentral gyrus, similar to prior. 5.  Probable few small chronic infarctions cerebellar hemispheres. 6.  Mild atrophy.    XR Chest Port 1 View    Result Date: 1/19/2022  EXAM: XR CHEST PORT 1 VIEW LOCATION: [facility] DATE/TIME: 1/19/2022 10:52 AM INDICATION: after advancing et tube COMPARISON: 1/18/2022.     IMPRESSION: Endotracheal tube is been advanced. Tip is 4 cm above the igor in good position. There our persistent bibasilar pulmonary opacities with partial clearing at the right base from the prior study and no change at the left base. No pneumothorax. Enteric tube tip is not visualized tip is below the diaphragm. PICC catheter from right upper extremity approach is unchanged with tip at the junction of the superior vena cava and right atrium. Left posterior rib fractures are again noted as well as deformity of the right humeral head.    XR Chest Port 1 View    Result Date: 1/18/2022  EXAM: XR CHEST PORT 1 VIEW LOCATION: [facility] DATE/TIME: 1/18/2022 6:06 AM INDICATION: Location of ET tube COMPARISON: 01/16/2022.      IMPRESSION:Endotracheal tube is in the trachea at the level of the clavicular heads with tip 6 cm above the igor. PICC catheter from right upper extremity approach is in the distal superior vena cava near its junction with the right atrium. Enteric tube tip is below the diaphragm and not visualized. There is no pneumothorax. Again noted are bilateral patchy airspace opacities there has been increased consolidation or atelectasis at the right medial lung base. There are old healed left posterior rib  fractures no acute fractures are identified.    XR Chest Port 1 View    Result Date: 1/16/2022  EXAM: XR CHEST PORT 1 VIEW LOCATION: [facility] DATE/TIME: 1/16/2022 12:34 PM INDICATION: ET tube placement, PICC line placement COMPARISON: CT pulmonary angiogram 01/15/2022     IMPRESSION: ET tube tip projects 6.5 cm above the igor. Gastric tube extends towards the diaphragmatic hiatus, outside the field-of-view. Right PICC terminates upper right atrium. Extensive bilateral airspace opacities are present with patchy lung involvement, unchanged from yesterday. No new focal lung volume loss. No visible pleural fluid or pneumothorax on this single supine view.    XR Chest Port 1 View    Result Date: 1/15/2022  EXAM: XR CHEST PORT 1 VIEW LOCATION: [facility] DATE/TIME: 1/15/2022 5:16 PM INDICATION: Shortness of breath COMPARISON: 11/5/2021     IMPRESSION: There are mild opacities in both lungs which have increased from the prior exam. This is likely due to a superimposed infectious or inflammatory process on top of mild background scarring. Normal heart size and pulmonary vascularity. Old left  rib fractures.    XR Abdomen Port 1 View    Result Date: 1/17/2022  EXAM: XR ABDOMEN PORT 1 VIEWS LOCATION: [facility] DATE/TIME: 1/17/2022 1:25 PM INDICATION: Location of OG tube COMPARISON: None.     IMPRESSION: Enteric tube in the body the stomach.      CT Chest Pulmonary Embolism w Contrast    Result Date: 1/15/2022  EXAM: CT CHEST PULMONARY EMBOLISM W CONTRAST LOCATION: [facility] DATE/TIME: 1/15/2022 6:17 PM INDICATION: Shortness of breath COMPARISON: Portable AP view of the chest 01/15/2022; CT of the abdomen and pelvis 10/05/2020 TECHNIQUE: CT chest pulmonary angiogram during arterial phase injection of IV contrast. Multiplanar reformats and MIP reconstructions were performed. Dose reduction techniques were used. CONTRAST: 100ml isovue 370 FINDINGS: ANGIOGRAM CHEST: Pulmonary arteries are normal caliber and negative for pulmonary emboli. Normal caliber thoracic aorta. There are no findings to suggest acute aortic syndrome. Proximal great vessels are patent. Diminutive left vertebral artery arises directly from the arch. Mild atheromatous calcifications descending aorta and distal right innominate artery. LUNGS AND PLEURA: Upper lung predominant mixed centrilobular and paraseptal emphysema. Superimposed patchy airspace opacities are present bilaterally, asymmetric to the right associated with internal interstitial opacity. Minimal right pleural fluid layers dependently. There is chronic thickening of the left posterior and posterolateral pleura adjacent to multiple left rib fracture deformities. Central airways are patent. MEDIASTINUM: Cardiac chambers are normal in size. No pericardial effusion is present. Mildly enlarged lymph nodes in both analia, likely reactive in the setting of airspace opacities. Subcarinal, lower paratracheal and subaortic/prevascular lymph nodes are  all normal by size criteria. The middle third and distal thirds of the esophagus has circumferential wall thickening consistent with esophagitis. Imaged thyroid gland is normal. CORONARY ARTERY CALCIFICATION: None. UPPER ABDOMEN: There are multiple calcified gallstones in the neck of the gallbladder. Severe fatty infiltration of the liver. The imaged distal  transverse colon/splenic flecture has wall thickening. Previous splenectomy with small splenule present. 15 mm length ovoid calcification in the region of the pancreas neck is unchanged. MUSCULOSKELETAL: There are multiple ununited left posterior and posterolateral rib fracture deformities including lateral ribs 7, 8 and posterior ribs 9 and 11. The posterolateral right ninth rib is broken in 2 locations and the fractures have fairly sharp borders suggesting acute on subacute fractures..     IMPRESSION: 1.  No acute pulmonary embolism. 2.  Multifocal bilateral airspace opacities are present mixed groundglass and interstitial thickening asymmetric to the right. Imaging features can be seen with (COVID-19)  pneumonia, though are nonspecific and can occur with a variety of infectious and noninfectious processes. Given the other findings below, aspiration is a strong differential consideration. 3.  Severe wall thickening of the middle and distal thirds of the esophagus consistent with a subacute esophagitis. 4.  Severe hepatic steatosis. 5.  Acute on chronic fracture deformities of multiple left lateral/posterolateral ribs. 6.  Cholelithiasis. 7.  Wall thickening of the imaged colon consistent with colitis.    CT Head w/o Contrast    Result Date: 1/18/2022  EXAM: CT HEAD W/O CONTRAST LOCATION: [facility] DATE/TIME: 1/18/2022 3:45 AM INDICATION: Headache, intracranial hemorrhage suspected. COMPARISON: 11/4/2021 TECHNIQUE: Routine CT Head without IV contrast. Multiplanar reformats. Dose reduction techniques were used. FINDINGS: INTRACRANIAL CONTENTS: No intracranial hemorrhage, extraaxial collection, or mass effect. No CT evidence of acute infarct. Mild presumed chronic small vessel ischemic changes. Mild to moderate generalized volume loss. No hydrocephalus. Stable calcified dural based lesion along the right frontal convexity measuring 1 cm in diameter, presumably reflecting a small meningioma.  VISUALIZED ORBITS/SINUSES/MASTOIDS: No intraorbital abnormality. Complete opacification of the left maxillary sinus with chronic mucoperiosteal reaction. Mucous retention cyst in the left sphenoid sinus. Mild mucosal thickening along the floor of the left frontal sinus. Opacified left frontoethmoidal recess. No middle ear or mastoid effusion. BONES/SOFT TISSUES: No acute abnormality.     IMPRESSION: 1.  No CT evidence for acute intracranial process. 2.  Stable chronic changes as above.    XR Video Swallow with SLP or OT    Result Date: 2022  EXAM: XR VIDEO SWALLOW WITH SLP OR OT LOCATION: [facility] DATE/TIME: 2022 3:27 PM INDICATION: Difficulty swallowing. COMPARISON: None. TECHNIQUE: Routine swallow study with speech pathology using multiple barium thicknesses. FINDINGS: FLUOROSCOPIC TIME: 1.5 minutes NUMBER OF IMAGES: 9 Swallow study with Speech Pathology using multiple barium thicknesses. Moderate to deep penetration with thin liquid (no cough reflex). Small amount of penetration with mildly thick consistency. No other penetration or aspiration.     EEG Coma or Sleep Only    Result Date: 2022  ELECTROENCEPHALOGRAM (EEG) REPORT [facility] NEUROLOGY [place] [address] [place] [address] Tel: [phone]  Fax: [phone] [email or web address] RENUKA Yap 1965, MRN [id number] PCP: Sarah Canseco Date: 2022 Principal Diagnosis: Altered mental status History : Patient is being evaluated for altered mental status. Medication listed includes Ativan Description of the Recording:  This is a multichannel digital EEG recording done using the international 10-20 placement system. Background of the recording consists of irregular 1 to 3 Hz polymorphic delta activity with amplitudes ranging between 20 to 30  V.  Alerting procedure produce minimal change.  Photic stimulation performed with standard protocol produced minimal change.  No  transient sleep patterns were recorded. During this recording, no sharp discharges, spikes or electrographic seizure activity was recorded. Impression: This is an abnormal EEG due to diffusely slow polymorphic delta activity that minimally attenuates with alerting procedures.  This EEG suggests nonspecific generalized cerebral dysfunction.  No focal slowing or periodic discharges were seen to suggest seizures. Classification: Delta grade I generalized Tiburcio Gallagher MD [facility] This note was dictated using voice recognition software.  Any grammatical or context distortions are unintentional and inherent to the software.       Latest radiology report personally reviewed.    Note created using dragon voice recognition software so sounds alike errors may have escaped editing.      01/30/2022   Morteza Nicholson MD  Hospitalist, [facility]  Pager: [phone]

## 2022-01-30 NOTE — PROGRESS NOTES
Care Management Follow Up    Length of Stay (days): 15    Expected Discharge Date: 02/01/2022     Concerns to be Addressed: discharge planning     Patient plan of care discussed at interdisciplinary rounds: No    Anticipated Discharge Disposition: Transitional Care,Acute Rehab     Anticipated Discharge Services: Other (see comment) (therapy services)  Anticipated Discharge DME: None    Patient/family educated on Medicare website which has current facility and service quality ratings: yes  Education Provided on the Discharge Plan:    Patient/Family in Agreement with the Plan: yes    Referrals Placed by CM/SW: Post Acute Facilities  Private pay costs discussed: Not applicable    Additional Information:  COVID + 1/30/22. IV antibiotics. Will continue to monitor medical progression and assess discharge needs.      Ana Laura Hull RN

## 2022-01-30 NOTE — CONSULTS
I will notify Employee health of hospital onset asymptomatic COVID, day 14 of admit.  Please record a compass event for Employee health  if patient was unmasked and healthcare worker had a standard mask during encounters in the past 48 hours..  Please report to clinical manager if visitor(s) had COVID symptoms or COVID + lab around time of visitation.

## 2022-01-30 NOTE — PROVIDER NOTIFICATION
Pt came back POSITIVE for Covid-19 after being since 1/15/22. Notified ANS, HMS & close contacts (Daina-wife). No visitors updated. Infection Prevention Protocol ordered.      Fatou Landrum RN

## 2022-01-31 NOTE — PLAN OF CARE
Problem: Gas Exchange Impaired  Goal: Optimal Gas Exchange  Outcome: No Change  Intervention: Optimize Oxygenation and Ventilation  Recent Flowsheet Documentation  Taken 1/31/2022 0403 by Theresa Loyd, RN  Head of Bed ([facility]) Positioning: HOB at 20 degrees  Taken 1/31/2022 0124 by Theresa Loyd, RN  Head of Bed ([facility]) Positioning: HOB at 20 degrees     Problem: Inability to Wean (Mechanical Ventilation, Invasive)  Goal: Mechanical Ventilation Liberation  Outcome: No Change  Pt awake most of the night. Continued to be restless and pulling at lines and oxymask. Pt talking to people who aren't there in the room. Pt desats down into the 80s when oxymask is off. Zyprexa and Haldol given during shift, but minimally ineffective. Pt slept minimally after Haldol given. 1:1 in place.

## 2022-01-31 NOTE — PROGRESS NOTES
"Uneventful night from resp care stand point. Chest vest therapy w/ nebusal neb x1 for 15 min done; no evidence of intolerance. Strong, non-productive cough noted. Remains confused and impulsive. Not in resp distress. Remains on 10lpm oxymask, Sp02 low to mid 90s. Will closely follow.     /64 (BP Location: Left arm)   Pulse 97   Temp 97.8  F (36.6  C) (Oral)   Resp 16   Ht 1.854 m (6' 0.99\")   Wt 73 kg (161 lb)   SpO2 97%   BMI 21.25 kg/m       Javier Leiva, RRT  "

## 2022-01-31 NOTE — PROGRESS NOTES
RT Note    Patient receiving scheduled Sodium Chloride nebulizers TID along with vest treatments. Patient tolerating both well. RT trying to work with patient on flutter valve as well but patient still confused and unable to properly follow directions to effectively perform the flutter valve.     Continues on 10 LPM oxymask, SPO2 92-95%.   BS diminished    RT will continue to follow.     Tiffanie Quinn, RT

## 2022-01-31 NOTE — PLAN OF CARE
Patient was Alert and Disoriented to time and situation. He new he was at [facility], but did not know the reason. He continued on 10 L of oxygen via mask. He became a little restless and agitated around [id number]. He took his scheduled medications and then  a PRN zyprexa and was helpful. He had a swallow study today. He had his remdesivir and tocilizumab this shift. Passed onto continue to monitor. Fatou Lemons RN     Problem: Adult Inpatient Plan of Care  Goal: Plan of Care Review  Outcome: No Change  Goal: Absence of Hospital-Acquired Illness or Injury  Outcome: No Change  Intervention: Identify and Manage Fall Risk  Recent Flowsheet Documentation  Taken 1/31/2022 1000 by Fatou Lemons RN  Safety Promotion/Fall Prevention:   bed alarm on   activity supervised   assistive device/personal items within reach   sitter at bedside   safety round/check completed  Intervention: Prevent Skin Injury  Recent Flowsheet Documentation  Taken 1/31/2022 1316 by Fatou Lemons RN  Body Position:   turned   left  Taken 1/31/2022 1000 by Fatou Lemons RN  Body Position:   turned   right  Taken 1/31/2022 0821 by Fatou Lemons RN  Body Position:   turned   left  Intervention: Prevent Infection  Recent Flowsheet Documentation  Taken 1/31/2022 1000 by Fatou Lemons RN  Infection Prevention:   hand hygiene promoted   personal protective equipment utilized   single patient room provided  Goal: Optimal Comfort and Wellbeing  Outcome: No Change     Problem: Restraint/Seclusion for Violent Self-Destructive Behavior  Goal: Prevent/manage potential problems during restraint/seclusion  Description: Maintain safety of patient and others during period of restraint/seclusion.  Promote psychological and physical wellbeing.  Prevent injury to skin and involved body parts.  Promote nutrition, hydration, and elimination.  Outcome: No Change  Goal: Prevent future episodes of restraint or seclusion  Description:  Identify nonphysical alternatives to restraint or seclusion.  Identify additional de-escalation supportive measures to use as alternatives to restraint or seclusion.  Outcome: No Change     Problem: Risk for Delirium  Goal: Optimal Coping  Outcome: No Change  Goal: Improved Behavioral Control  Outcome: No Change  Goal: Improved Attention and Thought Clarity  Outcome: No Change  Goal: Improved Sleep  Outcome: No Change     Problem: Acute Neurologic Deterioration (Alcohol Withdrawal)  Goal: Optimal Neurologic Function  Outcome: No Change     Problem: Substance Misuse (Alcohol Withdrawal)  Goal: Readiness for Change Identified  Outcome: No Change

## 2022-01-31 NOTE — PROGRESS NOTES
Daily Progress Note    CODE STATUS:  Full Code    01/26/22  Assessment/Plan:   55 YO gentleman with PMH significant for alcohol abuse, hypertension, DM-II, multiple abdominal surgeries who was admitted on 1/15/22 with alcohol withdrawal, possible aspiration pneumonia leading to respiratory failure requiring intubation on 1/16, successfully extubated on 1/23/22 and now transferred out of ICU on 1/26/22 for floor care.    Possible aspiration pneumonia  Acute hypoxic respiratory failure  -- Required intubation 1/16, extubated on 1/23. Required BiPAP 1/23, transitioned to HFNC on 1/24. Currently on 30LPM 40% FiO2  -- Completed 7 days course of IV zosyn on 1/22  -- Cont albuterol nebs and pulm hygiene  -- CT PE was negative for PE on adission  -- CT chest 1/28 showed New airway wall thickening in the left upper and lower lobes with new consolidation in the dependent portion of the left upper lobe and at the left lung base, and mucoid impaction in left lower lobe airways.  -- With new consolidation and leucocytosis, restarted him on IV zosyn. Procal elevated as well. Appreciate pulm consult. Cont with pulm hygiene.  -- Weekly COVID (per protocol) came back positive. He was negative on admission on 1/22. Check COVID labs. Start Covid special precaution.   -- 1/30 started IV remdesivir and Dexamethasone.  --1/31 given dose of tocilizumab/per pulmonology recommendations.    Septic shock-resolved  -- Likely due to multifocal pneumonia.  -- Metoprolol started on 1/25 for sinus tachycardia  -- Patient is having intermittent hypotension. Got 2 boluses of 500ml NS on 1/27.Normal cortisol level noted    Alcohol withdrawal  Acute toxic metabolic encephalopathy  -- Clinical concern for alcohol withdrawal during the intubation  -- Precedex weaned off. Cont with thiamine. Ativan prn.  --Alcohol level less than 10 on admission.  Collateral information from wife-he did not find any evidence of patient drinking recently.  Patient denies  drinking prior to hospitalization.  Most recent chemical dependency treatment in 2001.  Since then patient tried to quit drinking numerous times, ending up in the hospital with withdrawal.  He is on disability for 2 years, in the past was a nicole.    Hypokalemia  -- Replaced, monitor and replace as needed magnesium    Hyperglycemia  DM type 2  -- A1c was 7.0 on 1/15/22  -- Hypoglycemic again. Lantus discontinued today. Cont only with sliding scale    Nausea/vomiting  -- Resolved    Disposition; 2-3 days  Barrier to discharge; Respiratory failure    Spouse, Daina called and updated. Answered all her questions.     Subjective:  Patient is new to me.  Chart reviewed.  Patient seen and examined. He is alert, cooperative.  On 10 LPM via facial mask.  Coughing with sips of water. Able to answer questions but appears confused.     Review of Systems:   As mentioned in subjective.    Patient Active Problem List   Diagnosis     Alcohol dependence with unspecified alcohol-induced disorder (H)     Charcot's joint of foot, left     Fall     Hypomagnesemia     Iron deficiency anemia     Restless legs     Type 2 diabetes mellitus without complication (H)     Secondary hypertension     Tobacco use disorder     Meningioma (H)     Falls frequently     Alcoholic intoxication with complication (H)     Closed nondisplaced fracture of phalanx of toe of left foot, unspecified toe, initial encounter     Alcohol use disorder, severe, dependence (H)     Sinus tachycardia     Pneumonia of both lungs due to infectious organism, unspecified part of lung     Non-intractable vomiting with nausea, unspecified vomiting type     Agitation       Scheduled Meds:    remdesivir  100 mg Intravenous Q24H    And     sodium chloride 0.9%  50 mL Intravenous Q24H     albuterol  2 puff Inhalation TID     [Held by provider] albuterol  2.5 mg Nebulization TID     dexamethasone  6 mg Intravenous Daily     DULoxetine  60 mg Oral or Feeding Tube Daily      enoxaparin ANTICOAGULANT  40 mg Subcutaneous Q24H     folic acid  1 mg Oral Daily     furosemide  20 mg Intravenous Q8H     gabapentin  200 mg Oral BID     insulin aspart  1-7 Units Subcutaneous TID AC     insulin aspart  1-5 Units Subcutaneous At Bedtime     lidocaine  1 patch Transdermal Q24H     lidocaine   Transdermal Q8H     magnesium oxide  400 mg Oral BID     metoprolol tartrate  12.5 mg Oral BID     multivitamin, therapeutic  1 tablet Oral Daily     pantoprazole  40 mg Oral QAM AC     piperacillin-tazobactam  3.375 g Intravenous Q8H     potassium chloride  20 mEq Oral Once     sodium chloride  3 mL Nebulization TID     sodium chloride (PF)  3 mL Intracatheter Q8H     thiamine  100 mg Oral Daily     Continuous Infusions:    dextrose Stopped (01/24/22 0230)     - MEDICATION INSTRUCTIONS -       PRN Meds:.acetaminophen **OR** acetaminophen, acetaminophen-codeine, [Held by provider] albuterol, bisacodyl, calcium carbonate, dextrose, glucose **OR** dextrose **OR** glucagon, famotidine, flumazenil, haloperidol lactate, OLANZapine zydis **OR** haloperidol lactate, lidocaine 4%, lidocaine (buffered or not buffered), LORazepam, magnesium hydroxide, - MEDICATION INSTRUCTIONS -, melatonin, menthol-zinc oxide, metoprolol, naloxone **OR** naloxone **OR** naloxone **OR** naloxone, ondansetron **OR** ondansetron, polyethylene glycol, sodium chloride (PF), sucralfate, traZODone    Objective:  Vital signs in last 24 hours:  Temp:  [97.8  F (36.6  C)-98.9  F (37.2  C)] 97.8  F (36.6  C)  Pulse:  [] 105  Resp:  [16-22] 20  BP: ()/(52-70) 95/52  SpO2:  [92 %-99 %] 95 %        Intake/Output Summary (Last 24 hours) at 1/26/2022 1610  Last data filed at 1/26/2022 1150  Gross per 24 hour   Intake 840 ml   Output 2100 ml   Net -1260 ml       Physical Exam:    General: Not in obvious distress.  HEENT: NC, AT   Chest: Diminished breath sounds on auscultation bilaterally  Heart: S1S2 normal, regular. No M/R/G  Abdomen:  Soft. NT, ND. Bowel sounds- active.  Extremities: No legs swelling  Neuro: Alert and awake, grossly non-focal      Lab Results:(I have personally reviewed the results)    Recent Results (from the past 24 hour(s))   INR    Collection Time: 01/30/22 10:50 AM   Result Value Ref Range    INR 1.67 (H) 0.90 - 1.15   D dimer quantitative    Collection Time: 01/30/22 10:50 AM   Result Value Ref Range    D-Dimer Quantitative 0.81 (H) 0.00 - 0.50 ug/mL FEU   CRP inflammation    Collection Time: 01/30/22 10:50 AM   Result Value Ref Range    CRP 6.3 (H) 0.0-<0.8 mg/dL   Extra Purple Top Tube    Collection Time: 01/30/22 10:50 AM   Result Value Ref Range    Hold Specimen JIC    Glucose by meter    Collection Time: 01/30/22 12:38 PM   Result Value Ref Range    GLUCOSE BY METER POCT 92 70 - 99 mg/dL   Glucose by meter    Collection Time: 01/30/22  5:37 PM   Result Value Ref Range    GLUCOSE BY METER POCT 148 (H) 70 - 99 mg/dL   Glucose by meter    Collection Time: 01/30/22  9:58 PM   Result Value Ref Range    GLUCOSE BY METER POCT 234 (H) 70 - 99 mg/dL   Glucose by meter    Collection Time: 01/31/22  1:30 AM   Result Value Ref Range    GLUCOSE BY METER POCT 230 (H) 70 - 99 mg/dL   CBC with platelets    Collection Time: 01/31/22  5:43 AM   Result Value Ref Range    WBC Count 9.3 4.0 - 11.0 10e3/uL    RBC Count 3.42 (L) 4.40 - 5.90 10e6/uL    Hemoglobin 10.5 (L) 13.3 - 17.7 g/dL    Hematocrit 30.2 (L) 40.0 - 53.0 %    MCV 88 78 - 100 fL    MCH 30.7 26.5 - 33.0 pg    MCHC 34.8 31.5 - 36.5 g/dL    RDW 19.3 (H) 10.0 - 15.0 %    Platelet Count 297 150 - 450 10e3/uL   Basic metabolic panel    Collection Time: 01/31/22  5:43 AM   Result Value Ref Range    Sodium 138 136 - 145 mmol/L    Potassium 3.7 3.5 - 5.0 mmol/L    Chloride 99 98 - 107 mmol/L    Carbon Dioxide (CO2) 26 22 - 31 mmol/L    Anion Gap 13 5 - 18 mmol/L    Urea Nitrogen 6 (L) 8 - 22 mg/dL    Creatinine 0.62 (L) 0.70 - 1.30 mg/dL    Calcium 7.5 (L) 8.5 - 10.5 mg/dL    Glucose  255 (H) 70 - 125 mg/dL    GFR Estimate >90 >60 mL/min/1.73m2   CRP inflammation    Collection Time: 01/31/22  5:43 AM   Result Value Ref Range    CRP 6.0 (H) 0.0-<0.8 mg/dL   D dimer quantitative    Collection Time: 01/31/22  5:43 AM   Result Value Ref Range    D-Dimer Quantitative 1.13 (H) 0.00 - 0.50 ug/mL FEU   Magnesium    Collection Time: 01/31/22  5:43 AM   Result Value Ref Range    Magnesium 1.7 (L) 1.8 - 2.6 mg/dL   Extra Red Top Tube    Collection Time: 01/31/22  5:43 AM   Result Value Ref Range    Hold Specimen JIC        All laboratory and imaging data in the past 24 hours reviewed  Serum Glucose range:   Recent Labs   Lab 01/31/22  0543 01/31/22  0130 01/30/22  2158 01/30/22  1737   * 230* 234* 148*     ABG:   Recent Labs   Lab 01/25/22  0442 01/24/22  1003   PH 7.52* 7.52*     CBC:   Recent Labs   Lab 01/31/22  0543 01/30/22  0621 01/29/22  0618   WBC 9.3 12.8* 16.6*   HGB 10.5* 9.6* 9.6*   HCT 30.2* 27.5* 27.4*   MCV 88 89 88    284 282     Chemistry:   Recent Labs   Lab 01/31/22  0543 01/30/22  0621 01/29/22  2223 01/29/22  1630 01/29/22  0618    140  --   --  140   POTASSIUM 3.7 3.5 3.6   < > 3.0*   CHLORIDE 99 100  --   --  101   CO2 26 31  --   --  33*   BUN 6* 5*  --   --  6*   CR 0.62* 0.58*  --   --  0.60*   GFRESTIMATED >90 >90  --   --  >90   BECKY 7.5* 7.4*  --   --  7.2*   MAG 1.7* 1.6*  --   --  1.3*    < > = values in this interval not displayed.     Coags:  Recent Labs   Lab 01/30/22  1050   INR 1.67*     Cardiac Markers:  No results for input(s): CKTOTAL, TROPONINI in the last 168 hours.       MR Brain w/o Contrast    Result Date: 1/22/2022  EXAM: MR BRAIN W/O CONTRAST LOCATION: [facility] DATE/TIME: 1/22/2022 1:05 PM INDICATION: Encephalopathy COMPARISON: CT head 01/18/2022, MRI head 11/23/2020 TECHNIQUE: Routine multiplanar multisequence head MRI without intravenous contrast. FINDINGS: INTRACRANIAL CONTENTS: Please note study is significantly  degraded by motion artifact. Suggestion of a few small foci of restricted diffusion at the left temporal occipital region; best appreciated on repeat axial diffusion weighted sequence series 20.2 image 12, image 11; as well as repeat coronal diffusion weighted sequence series 24.2 image 9. No definite associated hemorrhage or significant mass effect. Redemonstration of small chronic infarction left precentral gyrus. Mild generalized cerebral atrophy. No hydrocephalus. Normal position of the cerebellar tonsils. Small chronic infarction lateral aspect left cerebellum. A few additional tiny infarctions demonstrated on prior exam are not well demonstrated on the current. SELLA: Not well-visualized, grossly normal. OSSEOUS STRUCTURES/SOFT TISSUES: Normal marrow signal. Flow voids are not well-visualized. ORBITS: Not well-visualized. SINUSES/MASTOIDS: The complete opacification left maxillary and left sphenoid sinus, similar to prior. Small amount of opacification right mastoid air cells.     IMPRESSION: 1.  Please note study is significantly degraded by motion artifact. 2.  Suggestion of a few small foci of acute/early subacute infarction at the left temporal occipital region. 3.  No definite associated hemorrhage or significant mass effect. 4.  Small chronic infarction left precentral gyrus, similar to prior. 5.  Probable few small chronic infarctions cerebellar hemispheres. 6.  Mild atrophy.    XR Chest Port 1 View    Result Date: 1/19/2022  EXAM: XR CHEST PORT 1 VIEW LOCATION: [facility] DATE/TIME: 1/19/2022 10:52 AM INDICATION: after advancing et tube COMPARISON: 1/18/2022.     IMPRESSION: Endotracheal tube is been advanced. Tip is 4 cm above the igor in good position. There our persistent bibasilar pulmonary opacities with partial clearing at the right base from the prior study and no change at the left base. No pneumothorax. Enteric tube tip is not visualized tip is below the diaphragm.  PICC catheter from right upper extremity approach is unchanged with tip at the junction of the superior vena cava and right atrium. Left posterior rib fractures are again noted as well as deformity of the right humeral head.    XR Chest Port 1 View    Result Date: 1/18/2022  EXAM: XR CHEST PORT 1 VIEW LOCATION: [facility] DATE/TIME: 1/18/2022 6:06 AM INDICATION: Location of ET tube COMPARISON: 01/16/2022.     IMPRESSION:Endotracheal tube is in the trachea at the level of the clavicular heads with tip 6 cm above the igor. PICC catheter from right upper extremity approach is in the distal superior vena cava near its junction with the right atrium. Enteric tube tip is below the diaphragm and not visualized. There is no pneumothorax. Again noted are bilateral patchy airspace opacities there has been increased consolidation or atelectasis at the right medial lung base. There are old healed left posterior rib  fractures no acute fractures are identified.    XR Chest Port 1 View    Result Date: 1/16/2022  EXAM: XR CHEST PORT 1 VIEW LOCATION: [facility] DATE/TIME: 1/16/2022 12:34 PM INDICATION: ET tube placement, PICC line placement COMPARISON: CT pulmonary angiogram 01/15/2022     IMPRESSION: ET tube tip projects 6.5 cm above the igor. Gastric tube extends towards the diaphragmatic hiatus, outside the field-of-view. Right PICC terminates upper right atrium. Extensive bilateral airspace opacities are present with patchy lung involvement, unchanged from yesterday. No new focal lung volume loss. No visible pleural fluid or pneumothorax on this single supine view.    XR Chest Port 1 View    Result Date: 1/15/2022  EXAM: XR CHEST PORT 1 VIEW LOCATION: [facility] DATE/TIME: 1/15/2022 5:16 PM INDICATION: Shortness of breath COMPARISON: 11/5/2021     IMPRESSION: There are mild opacities in both lungs which have increased from the prior exam. This is  likely due to a superimposed infectious or inflammatory process on top of mild background scarring. Normal heart size and pulmonary vascularity. Old left  rib fractures.    XR Abdomen Port 1 View    Result Date: 1/17/2022  EXAM: XR ABDOMEN PORT 1 VIEWS LOCATION: [facility] DATE/TIME: 1/17/2022 1:25 PM INDICATION: Location of OG tube COMPARISON: None.     IMPRESSION: Enteric tube in the body the stomach.     CT Chest Pulmonary Embolism w Contrast    Result Date: 1/15/2022  EXAM: CT CHEST PULMONARY EMBOLISM W CONTRAST LOCATION: [facility] DATE/TIME: 1/15/2022 6:17 PM INDICATION: Shortness of breath COMPARISON: Portable AP view of the chest 01/15/2022; CT of the abdomen and pelvis 10/05/2020 TECHNIQUE: CT chest pulmonary angiogram during arterial phase injection of IV contrast. Multiplanar reformats and MIP reconstructions were performed. Dose reduction techniques were used. CONTRAST: 100ml isovue 370 FINDINGS: ANGIOGRAM CHEST: Pulmonary arteries are normal caliber and negative for pulmonary emboli. Normal caliber thoracic aorta. There are no findings to suggest acute aortic syndrome. Proximal great vessels are patent. Diminutive left vertebral artery arises directly from the arch. Mild atheromatous calcifications descending aorta and distal right innominate artery. LUNGS AND PLEURA: Upper lung predominant mixed centrilobular and paraseptal emphysema. Superimposed patchy airspace opacities are present bilaterally, asymmetric to the right associated with internal interstitial opacity. Minimal right pleural fluid layers dependently. There is chronic thickening of the left posterior and posterolateral pleura adjacent to multiple left rib fracture deformities. Central airways are patent. MEDIASTINUM: Cardiac chambers are normal in size. No pericardial effusion is present. Mildly enlarged lymph nodes in both analia, likely reactive in the setting of airspace opacities.  Subcarinal, lower paratracheal and subaortic/prevascular lymph nodes are  all normal by size criteria. The middle third and distal thirds of the esophagus has circumferential wall thickening consistent with esophagitis. Imaged thyroid gland is normal. CORONARY ARTERY CALCIFICATION: None. UPPER ABDOMEN: There are multiple calcified gallstones in the neck of the gallbladder. Severe fatty infiltration of the liver. The imaged distal transverse colon/splenic flecture has wall thickening. Previous splenectomy with small splenule present. 15 mm length ovoid calcification in the region of the pancreas neck is unchanged. MUSCULOSKELETAL: There are multiple ununited left posterior and posterolateral rib fracture deformities including lateral ribs 7, 8 and posterior ribs 9 and 11. The posterolateral right ninth rib is broken in 2 locations and the fractures have fairly sharp borders suggesting acute on subacute fractures..     IMPRESSION: 1.  No acute pulmonary embolism. 2.  Multifocal bilateral airspace opacities are present mixed groundglass and interstitial thickening asymmetric to the right. Imaging features can be seen with (COVID-19)  pneumonia, though are nonspecific and can occur with a variety of infectious and noninfectious processes. Given the other findings below, aspiration is a strong differential consideration. 3.  Severe wall thickening of the middle and distal thirds of the esophagus consistent with a subacute esophagitis. 4.  Severe hepatic steatosis. 5.  Acute on chronic fracture deformities of multiple left lateral/posterolateral ribs. 6.  Cholelithiasis. 7.  Wall thickening of the imaged colon consistent with colitis.    CT Head w/o Contrast    Result Date: 1/18/2022  EXAM: CT HEAD W/O CONTRAST LOCATION: [facility] DATE/TIME: 1/18/2022 3:45 AM INDICATION: Headache, intracranial hemorrhage suspected. COMPARISON: 11/4/2021 TECHNIQUE: Routine CT Head without IV contrast. Multiplanar  reformats. Dose reduction techniques were used. FINDINGS: INTRACRANIAL CONTENTS: No intracranial hemorrhage, extraaxial collection, or mass effect. No CT evidence of acute infarct. Mild presumed chronic small vessel ischemic changes. Mild to moderate generalized volume loss. No hydrocephalus. Stable calcified dural based lesion along the right frontal convexity measuring 1 cm in diameter, presumably reflecting a small meningioma. VISUALIZED ORBITS/SINUSES/MASTOIDS: No intraorbital abnormality. Complete opacification of the left maxillary sinus with chronic mucoperiosteal reaction. Mucous retention cyst in the left sphenoid sinus. Mild mucosal thickening along the floor of the left frontal sinus. Opacified left frontoethmoidal recess. No middle ear or mastoid effusion. BONES/SOFT TISSUES: No acute abnormality.     IMPRESSION: 1.  No CT evidence for acute intracranial process. 2.  Stable chronic changes as above.    XR Video Swallow with SLP or OT    Result Date: 2022  EXAM: XR VIDEO SWALLOW WITH SLP OR OT LOCATION: [facility] DATE/TIME: 2022 3:27 PM INDICATION: Difficulty swallowing. COMPARISON: None. TECHNIQUE: Routine swallow study with speech pathology using multiple barium thicknesses. FINDINGS: FLUOROSCOPIC TIME: 1.5 minutes NUMBER OF IMAGES: 9 Swallow study with Speech Pathology using multiple barium thicknesses. Moderate to deep penetration with thin liquid (no cough reflex). Small amount of penetration with mildly thick consistency. No other penetration or aspiration.     EEG Coma or Sleep Only    Result Date: 2022  ELECTROENCEPHALOGRAM (EEG) REPORT [facility] NEUROLOGY [place] [address] [place] [address] Tel: [phone]  Fax: [phone] [email or web address] Peewee Hess, RENUKA 1965, MRN [id number] PCP: Sarah Canseco Date: 2022 Principal Diagnosis: Altered mental status History : Patient is being evaluated for altered  mental status. Medication listed includes Ativan Description of the Recording:  This is a multichannel digital EEG recording done using the international 10-20 placement system. Background of the recording consists of irregular 1 to 3 Hz polymorphic delta activity with amplitudes ranging between 20 to 30  V.  Alerting procedure produce minimal change.  Photic stimulation performed with standard protocol produced minimal change.  No transient sleep patterns were recorded. During this recording, no sharp discharges, spikes or electrographic seizure activity was recorded. Impression: This is an abnormal EEG due to diffusely slow polymorphic delta activity that minimally attenuates with alerting procedures.  This EEG suggests nonspecific generalized cerebral dysfunction.  No focal slowing or periodic discharges were seen to suggest seizures. Classification: Delta grade I generalized Tiburcio Gallagher MD [facility] (Formerly, [facility] This note was dictated using voice recognition software.  Any grammatical or context distortions are unintentional and inherent to the software.     Latest radiology report personally reviewed.    Note created using dragon voice recognition software so sounds alike errors may have escaped editing.    01/31/2022

## 2022-01-31 NOTE — PROVIDER NOTIFICATION
BP 87/50. . Pt denied dizziness, lightheadedness, nausea, chest pain. Scheduled metoprolol held per parameters. Dr. Olson updated due to BP. New orders for IV  mL bolus; IV bolus started at 1800.

## 2022-01-31 NOTE — PLAN OF CARE
Problem: Adult Inpatient Plan of Care  Goal: Plan of Care Review  1/30/2022 2237 by Trina Arreaga RN  Outcome: No Change  1/30/2022 1537 by Trina Arreaga RN  Outcome: No Change  Goal: Patient-Specific Goal (Individualized)  1/30/2022 2237 by Trina Arreaga RN  Outcome: No Change  1/30/2022 1537 by Trina Arreaga RN  Outcome: No Change  Goal: Absence of Hospital-Acquired Illness or Injury  1/30/2022 2237 by Trina Arreaga RN  Outcome: No Change  1/30/2022 1537 by Trina Arreaga RN  Outcome: No Change  Intervention: Identify and Manage Fall Risk  Recent Flowsheet Documentation  Taken 1/30/2022 1231 by Trina Arreaga RN  Safety Promotion/Fall Prevention: bed alarm on  Goal: Optimal Comfort and Wellbeing  1/30/2022 2237 by Trina Arreaga RN  Outcome: No Change  1/30/2022 1537 by Trina Arreaga RN  Outcome: No Change  Goal: Readiness for Transition of Care  1/30/2022 2237 by Trina Arreaga RN  Outcome: No Change  1/30/2022 1537 by Trina Arreaga RN  Outcome: No Change     Problem: Restraint/Seclusion for Violent Self-Destructive Behavior  Goal: Prevent/manage potential problems during restraint/seclusion  Description: Maintain safety of patient and others during period of restraint/seclusion.  Promote psychological and physical wellbeing.  Prevent injury to skin and involved body parts.  Promote nutrition, hydration, and elimination.  1/30/2022 2237 by Trina Arreaga RN  Outcome: No Change  1/30/2022 1537 by Trina Arreaga RN  Outcome: No Change  Goal: Prevent future episodes of restraint or seclusion  Description: Identify nonphysical alternatives to restraint or seclusion.  Identify additional de-escalation supportive measures to use as alternatives to restraint or seclusion.  1/30/2022 2237 by Trina Arreaga RN  Outcome: No Change  1/30/2022 1537 by Sayi, [place] N, RN  Outcome: No Change     Problem: Risk for Delirium  Goal: Optimal Coping  1/30/2022 2237 by Trina Arreaga, RN  Outcome: No  Change  1/30/2022 1537 by Trina Arreaga RN  Outcome: No Change  Goal: Improved Behavioral Control  1/30/2022 2237 by Trina Arreaga RN  Outcome: No Change  1/30/2022 1537 by Trina Arreaga RN  Outcome: No Change  Goal: Improved Attention and Thought Clarity  1/30/2022 2237 by Trina Arreaga RN  Outcome: No Change  1/30/2022 1537 by Trina Arreaga RN  Outcome: No Change  Goal: Improved Sleep  1/30/2022 2237 by Trina Arreaga RN  Outcome: No Change  1/30/2022 1537 by Trina Arreaga RN  Outcome: No Change     Problem: Restraint for Non-Violent/Non-Self-Destructive Behavior  Goal: Prevent/Manage Potential Problems  Description: Maintain safety of patient and others during period of restraint.  Promote psychological and physical wellbeing.  Prevent injury to skin and involved body parts.  Promote nutrition, hydration, and elimination.  1/30/2022 2237 by Trina Arreaga RN  Outcome: No Change  1/30/2022 1537 by Trina Arreaga RN  Outcome: No Change     Problem: Acute Neurologic Deterioration (Alcohol Withdrawal)  Goal: Optimal Neurologic Function  1/30/2022 2237 by Trina Arreaga RN  Outcome: No Change  1/30/2022 1537 by Trina Arreaga RN  Outcome: No Change     Problem: Substance Misuse (Alcohol Withdrawal)  Goal: Readiness for Change Identified  1/30/2022 2237 by Trina Arreaga RN  Outcome: No Change  1/30/2022 1537 by Trina Arreaga RN  Outcome: No Change     Problem: Inability to Wean (Mechanical Ventilation, Invasive)  Goal: Mechanical Ventilation Liberation  1/30/2022 2237 by Trina Arreaga RN  Outcome: No Change  1/30/2022 1537 by Trina Arreaga RN  Outcome: No Change     Problem: Nutrition Impairment (Mechanical Ventilation, Invasive)  Goal: Optimal Nutrition Delivery  1/30/2022 2237 by Trina Arreaga RN  Outcome: No Change  1/30/2022 1537 by Sayi, [place] N, RN  Outcome: No Change     Problem: Skin and Tissue Injury (Mechanical Ventilation, Invasive)  Goal: Absence of Device-Related Skin and Tissue  Injury  1/30/2022 2237 by Trina Arreaga RN  Outcome: No Change  1/30/2022 1537 by Trina Arreaga RN  Outcome: No Change     Problem: Violence Risk or Actual  Goal: Anger and Impulse Control  1/30/2022 2237 by Trina Arreaga RN  Outcome: No Change  1/30/2022 1537 by Trina Arreaga RN  Outcome: No Change     Problem: Hypertension Acute  Goal: Blood Pressure Within Desired Range  1/30/2022 2237 by Trina Arreaga RN  Outcome: No Change  1/30/2022 1537 by Trina Arreaga RN  Outcome: No Change     Problem: Gas Exchange Impaired  Goal: Optimal Gas Exchange  1/30/2022 2237 by Trina Arreaga RN  Outcome: No Change  1/30/2022 1537 by Trina Arreaga RN  Outcome: No Change     Problem: OT General Care Plan  Goal: Transfer (OT)  Description: Transfer (OT)  1/30/2022 2237 by Trina Arreaga RN  Outcome: No Change  1/30/2022 1537 by Trina Arreaga RN  Outcome: No Change  Goal: Toilet Transfer/Toileting (OT)  Description: Toilet Transfer/Toileting (OT)  1/30/2022 2237 by Trina Arreaga RN  Outcome: No Change  1/30/2022 1537 by Trina Arreaga RN  Outcome: No Change  Goal: Cognitive (OT)  Description: Cognitive (OT)  1/30/2022 2237 by Trina Arreaga RN  Outcome: No Change  1/30/2022 1537 by Trina Arreaga RN  Outcome: No Change   Pt is confused and impulsive,attempted to get out of bed multiple times this evening,had multiple small loose BMS with no abdominal discomfort.tele sinus tachy.On 10L oxymask with sats in the mid to low 90s.-1500 with encouragement.

## 2022-02-01 PROBLEM — E83.51 HYPOCALCEMIA: Status: ACTIVE | Noted: 2022-01-01

## 2022-02-01 NOTE — PROGRESS NOTES
CLINICAL NUTRITION SERVICES - REASSESSMENT NOTE     Nutrition Prescription    RECOMMENDATIONS FOR MDs/PROVIDERS TO ORDER:  Recommend increasing insulin with BG consistently > 200    Malnutrition Status:    Severe in acute illness    Recommendations already ordered by Registered Dietitian (RD):  New weight  Change Glucerna to Vital cuisine daily, Magic cup daily and Gel plus daily     Future/Additional Recommendations:  Adjust supplement pending intake/acceptance/needs     EVALUATION OF THE PROGRESS TOWARD GOALS   Diet: Level 5: Minced and moist. Liquids downgraded to mildly thick (level 2) 1/30-Moderate consistent CHO    Supplement: Glucerna daily- not being sent since 1/30 d/t change in liquid diet    Intake: Poor. 0-25% of meals since 1/29. 0-100% prior  Ordering 1-2 meals/day past 3 days.   Breakfast was ordered this am of fruit x 2 and 1 juice    NEW FINDINGS  Now covid +. Confused, restless, pulling at lines  Received IVF boluses for low Bps yesterday    PHYSICAL FINDINGS  On 10 L O2    GI   1 BM/day. Had 7 on 1/30    LABS  Reviewed  Mag 1.7 2/1 low but improved - on replacement  -275 mg/dl past 24 hours - more elevated on steroid    MEDICATIONS  Folic acid, ssi, lantus, magox, mvi, protonix, thiamine  Decadron, iv abx added. K+ replacement today and yesterday    CURRENT NUTRITION DIAGNOSIS  Malnutrition r/t acute illness evidenced by intake < 50% since admit, moderate fat and muscle loss, suspected weight loss    INTERVENTIONS  Implementation  Recommend increasing insulin   New weight  Change Glucerna to Vital cuisine daily, Magic cup daily and Gel plus daily     Goals  Patient to consume % of nutritionally adequate meals three times per day, or the equivalent with supplements/snacks.- not progressing, AMS/cognition, new illness barriers  Electrolytes WNL- progressing    Monitoring/Evaluation  Progress toward goals will be monitored and evaluated per protocol.

## 2022-02-01 NOTE — PROGRESS NOTES
Daily Progress Note    CODE STATUS:  Full Code    01/26/22  Assessment/Plan:   57 YO gentleman with PMH significant for alcohol abuse, hypertension, DM-II, multiple abdominal surgeries who was admitted on 1/15/22 with alcohol withdrawal, possible aspiration pneumonia leading to respiratory failure requiring intubation on 1/16, successfully extubated on 1/23/22 and now transferred out of ICU on 1/26/22 for floor care.    Possible aspiration pneumonia  Acute hypoxic respiratory failure  -- Required intubation 1/16, extubated on 1/23. Required BiPAP 1/23, transitioned to HFNC on 1/24. Currently on 30LPM 40% FiO2  -- Completed 7 days course of IV zosyn on 1/22  -- Cont albuterol nebs and pulm hygiene  -- CT PE was negative for PE on adission  -- CT chest 1/28 showed New airway wall thickening in the left upper and lower lobes with new consolidation in the dependent portion of the left upper lobe and at the left lung base, and mucoid impaction in left lower lobe airways.  -- With new consolidation and leucocytosis, restarted him on IV zosyn. Procal elevated as well. Appreciate pulm consult. Cont with pulm hygiene.  -- Weekly COVID (per protocol) came back positive. He was negative on admission on 1/22. Check COVID labs. Start Covid special precaution.   -- 1/30 started IV remdesivir and Dexamethasone.  --1/31 given dose of tocilizumab/per pulmonology recommendations.    Septic shock-resolved  -- Likely due to multifocal pneumonia.  -- Metoprolol started on 1/25 for sinus tachycardia  -- Patient is having intermittent hypotension. Got 2 boluses of 500ml NS on 1/27.Normal cortisol level noted    Alcohol withdrawal  Acute toxic metabolic encephalopathy  -- Clinical concern for alcohol withdrawal during the intubation  -- Precedex weaned off. Cont with thiamine. Ativan prn.  --Alcohol level less than 10 on admission.  Collateral information from wife-he did not find any evidence of patient drinking recently.  Patient denies  drinking prior to hospitalization.  Most recent chemical dependency treatment in 2001.  Since then patient tried to quit drinking numerous times, ending up in the hospital with withdrawal.  He is on disability for 2 years, in the past was a nicole.    Encephalopathy, suspect Korsakoff's syndrome.  Hyperactive delirium, developed 1/31.  Psychiatry consulted.    Hypokalemia  -- Replaced, monitor and replace as needed magnesium    Hyperglycemia  DM type 2  -- A1c was 7.0 on 1/15/22  -- Hypoglycemic again. Lantus discontinued today. Cont only with sliding scale    Nausea/vomiting  -- Resolved    Disposition; 2-3 days  Barrier to discharge; Respiratory failure    Spouse, Daina called and updated. Answered all her questions.     Subjective:  Overnight events reviewed.  In the evening was confused and restless, medicated with Zyprexa, melatonin, trazodone.  Ongoing agitation, desaturating quickly to 80s.  One-to-one sitter for remaining of the night was placed.  Patient is pleasant cooperative today.  Not agitated.  Infrequent cough.  Does not present any complaints today.  One-to-one sitter discontinued.  10 L via oxygen mask.    Review of Systems:   As mentioned in subjective.    Patient Active Problem List   Diagnosis     Alcohol dependence with unspecified alcohol-induced disorder (H)     Charcot's joint of foot, left     Fall     Hypomagnesemia     Iron deficiency anemia     Restless legs     Type 2 diabetes mellitus without complication (H)     Secondary hypertension     Tobacco use disorder     Meningioma (H)     Falls frequently     Alcoholic intoxication with complication (H)     Closed nondisplaced fracture of phalanx of toe of left foot, unspecified toe, initial encounter     Alcohol use disorder, severe, dependence (H)     Sinus tachycardia     Pneumonia of both lungs due to infectious organism, unspecified part of lung     Non-intractable vomiting with nausea, unspecified vomiting type     Agitation      Hypocalcemia       Scheduled Meds:    remdesivir  100 mg Intravenous Q24H    And     sodium chloride 0.9%  50 mL Intravenous Q24H     calcium carbonate  1,000 mg Oral BID     dexamethasone  6 mg Intravenous Daily     DULoxetine  60 mg Oral or Feeding Tube Daily     enoxaparin ANTICOAGULANT  40 mg Subcutaneous Q24H     folic acid  1 mg Oral Daily     gabapentin  200 mg Oral BID     insulin aspart  1-7 Units Subcutaneous TID AC     insulin aspart  1-5 Units Subcutaneous At Bedtime     insulin glargine  15 Units Subcutaneous BID     ipratropium-albuterol  1 puff Inhalation 4x daily     lidocaine  1 patch Transdermal Q24H     lidocaine   Transdermal Q8H     magnesium oxide  400 mg Oral BID     metoprolol succinate ER  25 mg Oral Daily     multivitamin, therapeutic  1 tablet Oral Daily     pantoprazole  40 mg Oral QAM AC     piperacillin-tazobactam  3.375 g Intravenous Q8H     QUEtiapine  50 mg Oral At Bedtime     sodium chloride  3 mL Nebulization TID     sodium chloride (PF)  3 mL Intracatheter Q8H     thiamine  100 mg Oral Daily     Continuous Infusions:    dextrose Stopped (01/24/22 0230)     - MEDICATION INSTRUCTIONS -       PRN Meds:.acetaminophen **OR** acetaminophen, acetaminophen-codeine, bisacodyl, dextrose, glucose **OR** dextrose **OR** glucagon, famotidine, flumazenil, haloperidol lactate, OLANZapine zydis **OR** haloperidol lactate, lidocaine 4%, lidocaine (buffered or not buffered), LORazepam, magnesium hydroxide, - MEDICATION INSTRUCTIONS -, melatonin, menthol-zinc oxide, metoprolol, naloxone **OR** naloxone **OR** naloxone **OR** naloxone, ondansetron **OR** ondansetron, polyethylene glycol, sodium chloride (PF), sucralfate    Objective:  Vital signs in last 24 hours:  Temp:  [97.5  F (36.4  C)-98.8  F (37.1  C)] 97.8  F (36.6  C)  Pulse:  [] 84  Resp:  [18-20] 20  BP: ()/(50-73) 126/72  SpO2:  [88 %-100 %] 94 %        Intake/Output Summary (Last 24 hours) at 1/26/2022 1610  Last data filed  at 1/26/2022 1150  Gross per 24 hour   Intake 840 ml   Output 2100 ml   Net -1260 ml       Physical Exam:    General: Not in obvious distress.  HEENT: NC, AT   Chest: Diminished breath sounds on auscultation bilaterally  Heart: S1S2 normal, regular. No M/R/G  Abdomen: Soft. NT, ND. Bowel sounds- active.  Extremities: No legs swelling  Neuro: Alert and awake, grossly non-focal      Lab Results:(I have personally reviewed the results)    Recent Results (from the past 24 hour(s))   Glucose by meter    Collection Time: 01/31/22  1:01 PM   Result Value Ref Range    GLUCOSE BY METER POCT 261 (H) 70 - 99 mg/dL   Glucose by meter    Collection Time: 01/31/22  5:46 PM   Result Value Ref Range    GLUCOSE BY METER POCT 260 (H) 70 - 99 mg/dL   Glucose by meter    Collection Time: 01/31/22  8:36 PM   Result Value Ref Range    GLUCOSE BY METER POCT 254 (H) 70 - 99 mg/dL   Glucose by meter    Collection Time: 02/01/22  2:01 AM   Result Value Ref Range    GLUCOSE BY METER POCT 275 (H) 70 - 99 mg/dL   CBC with platelets    Collection Time: 02/01/22  5:37 AM   Result Value Ref Range    WBC Count 9.4 4.0 - 11.0 10e3/uL    RBC Count 3.29 (L) 4.40 - 5.90 10e6/uL    Hemoglobin 10.1 (L) 13.3 - 17.7 g/dL    Hematocrit 29.0 (L) 40.0 - 53.0 %    MCV 88 78 - 100 fL    MCH 30.7 26.5 - 33.0 pg    MCHC 34.8 31.5 - 36.5 g/dL    RDW 19.4 (H) 10.0 - 15.0 %    Platelet Count 259 150 - 450 10e3/uL   Basic metabolic panel    Collection Time: 02/01/22  5:37 AM   Result Value Ref Range    Sodium 140 136 - 145 mmol/L    Potassium 3.6 3.5 - 5.0 mmol/L    Chloride 102 98 - 107 mmol/L    Carbon Dioxide (CO2) 32 (H) 22 - 31 mmol/L    Anion Gap 6 5 - 18 mmol/L    Urea Nitrogen 6 (L) 8 - 22 mg/dL    Creatinine 0.60 (L) 0.70 - 1.30 mg/dL    Calcium 7.5 (L) 8.5 - 10.5 mg/dL    Glucose 264 (H) 70 - 125 mg/dL    GFR Estimate >90 >60 mL/min/1.73m2   CRP inflammation    Collection Time: 02/01/22  5:37 AM   Result Value Ref Range    CRP 4.8 (H) 0.0-<0.8 mg/dL   D  dimer quantitative    Collection Time: 02/01/22  5:37 AM   Result Value Ref Range    D-Dimer Quantitative 0.82 (H) 0.00 - 0.50 ug/mL FEU   Magnesium    Collection Time: 02/01/22  5:37 AM   Result Value Ref Range    Magnesium 1.7 (L) 1.8 - 2.6 mg/dL   Ionized Calcium    Collection Time: 02/01/22  5:37 AM   Result Value Ref Range    Calcium, Ionized pH 7.4 1.14 1.11 - 1.30 mmol/L    pH 7.50 (H) 7.35 - 7.45    Calcium, Ionized Measured 1.07 (L) 1.11 - 1.30 mmol/L   Glucose by meter    Collection Time: 02/01/22  7:56 AM   Result Value Ref Range    GLUCOSE BY METER POCT 240 (H) 70 - 99 mg/dL       All laboratory and imaging data in the past 24 hours reviewed  Serum Glucose range:   Recent Labs   Lab 02/01/22  0756 02/01/22  0537 02/01/22  0201 01/31/22  2036   * 264* 275* 254*     ABG:   Recent Labs   Lab 02/01/22  0537   PH 7.50*     CBC:   Recent Labs   Lab 02/01/22  0537 01/31/22  0543 01/30/22  0621   WBC 9.4 9.3 12.8*   HGB 10.1* 10.5* 9.6*   HCT 29.0* 30.2* 27.5*   MCV 88 88 89    297 284     Chemistry:   Recent Labs   Lab 02/01/22  0537 01/31/22  0543 01/30/22  0621    138 140   POTASSIUM 3.6 3.7 3.5   CHLORIDE 102 99 100   CO2 32* 26 31   BUN 6* 6* 5*   CR 0.60* 0.62* 0.58*   GFRESTIMATED >90 >90 >90   BECKY 7.5* 7.5* 7.4*   MAG 1.7* 1.7* 1.6*     Coags:  Recent Labs   Lab 01/30/22  1050   INR 1.67*     Cardiac Markers:  No results for input(s): CKTOTAL, TROPONINI in the last 168 hours.       MR Brain w/o Contrast    Result Date: 1/22/2022  EXAM: MR BRAIN W/O CONTRAST LOCATION: [facility] DATE/TIME: 1/22/2022 1:05 PM INDICATION: Encephalopathy COMPARISON: CT head 01/18/2022, MRI head 11/23/2020 TECHNIQUE: Routine multiplanar multisequence head MRI without intravenous contrast. FINDINGS: INTRACRANIAL CONTENTS: Please note study is significantly degraded by motion artifact. Suggestion of a few small foci of restricted diffusion at the left temporal occipital region; best  appreciated on repeat axial diffusion weighted sequence series 20.2 image 12, image 11; as well as repeat coronal diffusion weighted sequence series 24.2 image 9. No definite associated hemorrhage or significant mass effect. Redemonstration of small chronic infarction left precentral gyrus. Mild generalized cerebral atrophy. No hydrocephalus. Normal position of the cerebellar tonsils. Small chronic infarction lateral aspect left cerebellum. A few additional tiny infarctions demonstrated on prior exam are not well demonstrated on the current. SELLA: Not well-visualized, grossly normal. OSSEOUS STRUCTURES/SOFT TISSUES: Normal marrow signal. Flow voids are not well-visualized. ORBITS: Not well-visualized. SINUSES/MASTOIDS: The complete opacification left maxillary and left sphenoid sinus, similar to prior. Small amount of opacification right mastoid air cells.     IMPRESSION: 1.  Please note study is significantly degraded by motion artifact. 2.  Suggestion of a few small foci of acute/early subacute infarction at the left temporal occipital region. 3.  No definite associated hemorrhage or significant mass effect. 4.  Small chronic infarction left precentral gyrus, similar to prior. 5.  Probable few small chronic infarctions cerebellar hemispheres. 6.  Mild atrophy.    XR Chest Port 1 View    Result Date: 1/19/2022  EXAM: XR CHEST PORT 1 VIEW LOCATION: [facility] DATE/TIME: 1/19/2022 10:52 AM INDICATION: after advancing et tube COMPARISON: 1/18/2022.     IMPRESSION: Endotracheal tube is been advanced. Tip is 4 cm above the igor in good position. There our persistent bibasilar pulmonary opacities with partial clearing at the right base from the prior study and no change at the left base. No pneumothorax. Enteric tube tip is not visualized tip is below the diaphragm. PICC catheter from right upper extremity approach is unchanged with tip at the junction of the superior vena cava and right  atrium. Left posterior rib fractures are again noted as well as deformity of the right humeral head.    XR Chest Port 1 View    Result Date: 1/18/2022  EXAM: XR CHEST PORT 1 VIEW LOCATION: [facility] DATE/TIME: 1/18/2022 6:06 AM INDICATION: Location of ET tube COMPARISON: 01/16/2022.     IMPRESSION:Endotracheal tube is in the trachea at the level of the clavicular heads with tip 6 cm above the igor. PICC catheter from right upper extremity approach is in the distal superior vena cava near its junction with the right atrium. Enteric tube tip is below the diaphragm and not visualized. There is no pneumothorax. Again noted are bilateral patchy airspace opacities there has been increased consolidation or atelectasis at the right medial lung base. There are old healed left posterior rib  fractures no acute fractures are identified.    XR Chest Port 1 View    Result Date: 1/16/2022  EXAM: XR CHEST PORT 1 VIEW LOCATION: [facility] DATE/TIME: 1/16/2022 12:34 PM INDICATION: ET tube placement, PICC line placement COMPARISON: CT pulmonary angiogram 01/15/2022     IMPRESSION: ET tube tip projects 6.5 cm above the igor. Gastric tube extends towards the diaphragmatic hiatus, outside the field-of-view. Right PICC terminates upper right atrium. Extensive bilateral airspace opacities are present with patchy lung involvement, unchanged from yesterday. No new focal lung volume loss. No visible pleural fluid or pneumothorax on this single supine view.    XR Chest Port 1 View    Result Date: 1/15/2022  EXAM: XR CHEST PORT 1 VIEW LOCATION: [facility] DATE/TIME: 1/15/2022 5:16 PM INDICATION: Shortness of breath COMPARISON: 11/5/2021     IMPRESSION: There are mild opacities in both lungs which have increased from the prior exam. This is likely due to a superimposed infectious or inflammatory process on top of mild background scarring. Normal heart size and  pulmonary vascularity. Old left  rib fractures.    XR Abdomen Port 1 View    Result Date: 1/17/2022  EXAM: XR ABDOMEN PORT 1 VIEWS LOCATION: [facility] DATE/TIME: 1/17/2022 1:25 PM INDICATION: Location of OG tube COMPARISON: None.     IMPRESSION: Enteric tube in the body the stomach.     CT Chest Pulmonary Embolism w Contrast    Result Date: 1/15/2022  EXAM: CT CHEST PULMONARY EMBOLISM W CONTRAST LOCATION: [facility] DATE/TIME: 1/15/2022 6:17 PM INDICATION: Shortness of breath COMPARISON: Portable AP view of the chest 01/15/2022; CT of the abdomen and pelvis 10/05/2020 TECHNIQUE: CT chest pulmonary angiogram during arterial phase injection of IV contrast. Multiplanar reformats and MIP reconstructions were performed. Dose reduction techniques were used. CONTRAST: 100ml isovue 370 FINDINGS: ANGIOGRAM CHEST: Pulmonary arteries are normal caliber and negative for pulmonary emboli. Normal caliber thoracic aorta. There are no findings to suggest acute aortic syndrome. Proximal great vessels are patent. Diminutive left vertebral artery arises directly from the arch. Mild atheromatous calcifications descending aorta and distal right innominate artery. LUNGS AND PLEURA: Upper lung predominant mixed centrilobular and paraseptal emphysema. Superimposed patchy airspace opacities are present bilaterally, asymmetric to the right associated with internal interstitial opacity. Minimal right pleural fluid layers dependently. There is chronic thickening of the left posterior and posterolateral pleura adjacent to multiple left rib fracture deformities. Central airways are patent. MEDIASTINUM: Cardiac chambers are normal in size. No pericardial effusion is present. Mildly enlarged lymph nodes in both analia, likely reactive in the setting of airspace opacities. Subcarinal, lower paratracheal and subaortic/prevascular lymph nodes are  all normal by size criteria. The middle third and  distal thirds of the esophagus has circumferential wall thickening consistent with esophagitis. Imaged thyroid gland is normal. CORONARY ARTERY CALCIFICATION: None. UPPER ABDOMEN: There are multiple calcified gallstones in the neck of the gallbladder. Severe fatty infiltration of the liver. The imaged distal transverse colon/splenic flecture has wall thickening. Previous splenectomy with small splenule present. 15 mm length ovoid calcification in the region of the pancreas neck is unchanged. MUSCULOSKELETAL: There are multiple ununited left posterior and posterolateral rib fracture deformities including lateral ribs 7, 8 and posterior ribs 9 and 11. The posterolateral right ninth rib is broken in 2 locations and the fractures have fairly sharp borders suggesting acute on subacute fractures..     IMPRESSION: 1.  No acute pulmonary embolism. 2.  Multifocal bilateral airspace opacities are present mixed groundglass and interstitial thickening asymmetric to the right. Imaging features can be seen with (COVID-19)  pneumonia, though are nonspecific and can occur with a variety of infectious and noninfectious processes. Given the other findings below, aspiration is a strong differential consideration. 3.  Severe wall thickening of the middle and distal thirds of the esophagus consistent with a subacute esophagitis. 4.  Severe hepatic steatosis. 5.  Acute on chronic fracture deformities of multiple left lateral/posterolateral ribs. 6.  Cholelithiasis. 7.  Wall thickening of the imaged colon consistent with colitis.    CT Head w/o Contrast    Result Date: 1/18/2022  EXAM: CT HEAD W/O CONTRAST LOCATION: [facility] DATE/TIME: 1/18/2022 3:45 AM INDICATION: Headache, intracranial hemorrhage suspected. COMPARISON: 11/4/2021 TECHNIQUE: Routine CT Head without IV contrast. Multiplanar reformats. Dose reduction techniques were used. FINDINGS: INTRACRANIAL CONTENTS: No intracranial hemorrhage, extraaxial  collection, or mass effect. No CT evidence of acute infarct. Mild presumed chronic small vessel ischemic changes. Mild to moderate generalized volume loss. No hydrocephalus. Stable calcified dural based lesion along the right frontal convexity measuring 1 cm in diameter, presumably reflecting a small meningioma. VISUALIZED ORBITS/SINUSES/MASTOIDS: No intraorbital abnormality. Complete opacification of the left maxillary sinus with chronic mucoperiosteal reaction. Mucous retention cyst in the left sphenoid sinus. Mild mucosal thickening along the floor of the left frontal sinus. Opacified left frontoethmoidal recess. No middle ear or mastoid effusion. BONES/SOFT TISSUES: No acute abnormality.     IMPRESSION: 1.  No CT evidence for acute intracranial process. 2.  Stable chronic changes as above.    XR Video Swallow with SLP or OT    Result Date: 2022  EXAM: XR VIDEO SWALLOW WITH SLP OR OT LOCATION: [facility] DATE/TIME: 2022 3:27 PM INDICATION: Difficulty swallowing. COMPARISON: None. TECHNIQUE: Routine swallow study with speech pathology using multiple barium thicknesses. FINDINGS: FLUOROSCOPIC TIME: 1.5 minutes NUMBER OF IMAGES: 9 Swallow study with Speech Pathology using multiple barium thicknesses. Moderate to deep penetration with thin liquid (no cough reflex). Small amount of penetration with mildly thick consistency. No other penetration or aspiration.     EEG Coma or Sleep Only    Result Date: 2022  ELECTROENCEPHALOGRAM (EEG) REPORT [facility] NEUROLOGY [address] [place] [address] Tel: [phone]  Fax: [phone] [email or web address] RENUKA Yap 1965, MRN [id number] PCP: Sarah Canseco Date: 2022 Principal Diagnosis: Altered mental status History : Patient is being evaluated for altered mental status. Medication listed includes Ativan Description of the Recording:  This is a multichannel digital EEG recording  done using the international 10-20 placement system. Background of the recording consists of irregular 1 to 3 Hz polymorphic delta activity with amplitudes ranging between 20 to 30  V.  Alerting procedure produce minimal change.  Photic stimulation performed with standard protocol produced minimal change.  No transient sleep patterns were recorded. During this recording, no sharp discharges, spikes or electrographic seizure activity was recorded. Impression: This is an abnormal EEG due to diffusely slow polymorphic delta activity that minimally attenuates with alerting procedures.  This EEG suggests nonspecific generalized cerebral dysfunction.  No focal slowing or periodic discharges were seen to suggest seizures. Classification: Delta grade I generalized Tiburcio Gallagher MD [facility] This note was dictated using voice recognition software.  Any grammatical or context distortions are unintentional and inherent to the software.     Latest radiology report personally reviewed.    Note created using dragon voice recognition software so sounds alike errors may have escaped editing.    02/01/2022

## 2022-02-01 NOTE — PROGRESS NOTES
Pulmonary Progress Note  2/1/2022   11:59 AM    Problem List:   Active Problems:    Alcohol dependence with unspecified alcohol-induced disorder (H)    Hypomagnesemia    Type 2 diabetes mellitus without complication (H)    Alcohol use disorder, severe, dependence (H)    Sinus tachycardia    Pneumonia of both lungs due to infectious organism, unspecified part of lung    Non-intractable vomiting with nausea, unspecified vomiting type    Agitation    Hypocalcemia       Plan:   - decrease saline nebs and vest tx to bid. Can probably stop tomorrow - not sure how much good they're doing.   - May benefit from HFNC rather than the oxymask.   - Closely monitor for worsening respiratory status given new dx of COVID. On appropriate tx and received a dose of toci.     Clover Wilson, [facility] Pulmonary/Critical Care    ________________________________________________________________      CC:   Chief Complaint   Patient presents with     Shortness of Breath     Generalized Weakness     HPI: 56 year old smoker with history of ongoing alcohol abuse, multiple abdominal surgeries, hypertension, T2DM, admitted on 1/15/2022 w/ acute hypoxic respiratory failure in setting of multifocal pneumonia requiring intubation on 1/16.  He was extubated 1/23 and his septic shock resolved. Moved out of ICU and unfortunately contracted COVID on 1/30/2022. Oxygen needs have climbed from 4 liters NC on 1/28 to 10 liters oxymask today.     ROS: denies pain, shortness of breath, nausea.     Objective:   Vitals:    01/31/22 2330 02/01/22 0421 02/01/22 0730 02/01/22 0808   BP: 111/73 112/68  126/72   BP Location: Left arm Right arm  Left arm   Patient Position:       Pulse: 76 89 82 84   Resp: 20 20 20 20   Temp: 97.5  F (36.4  C) 97.6  F (36.4  C)  97.8  F (36.6  C)   TempSrc: Oral Oral  Oral   SpO2: 97% 97% 96% 94%   Weight:       Height:           I/O:     Intake/Output Summary (Last 24 hours) at 2/1/2022 1159  Last data filed at  2/1/2022 1100  Gross per 24 hour   Intake 928 ml   Output 1450 ml   Net -522 ml     Weight change:     Medications Reviewed: yes    Physical Exam:   General: pale, ill-appearing man, no acute distress but a bit restless.   HEENT: AT/NC  NEURO: grossly nonfocal.   CARDIAC: RRR S1S2  PULMONARY: unlabored on oxymask; lungs are diminished and coarse.   GASTROINTESTINAL: abdomen is soft without significant tenderness, masses, organomegaly or guarding  INTEGUMENT: pale; visible skin intact.   PSYCH: confused and a bit resltess.     Lab Results Reviewed:   Recent Labs   Lab 02/01/22  0537      CO2 32*   BUN 6*       Recent Labs   Lab 02/01/22 0537   WBC 9.4   HGB 10.1*   HCT 29.0*          Micro: reviewed    Imaging: all imaging personally reviewed     1/29 CXR - Relatively diffuse left lung opacities have increased since the 01/19/2022 chest radiograph, and perhaps slightly increased since the 01/20/2022 chest CT. Differentials for the increased opacities include asymmetric pulmonary edema, infectious /   inflammatory process or layering pleural fluid. Small left pleural effusion is present.     Right lung is hypoexpanded and may have a few subtle scattered opacities.     No pneumothorax. Normal heart size. Atherosclerotic aorta.      Clover Wilson, [facility] Pulmonary/Critical Care

## 2022-02-01 NOTE — PROGRESS NOTES
Patient received Sodium Chloride neb as scheduled along with vest treatment. Pt tolerated the treatment well. RT to continue with current orders.

## 2022-02-01 NOTE — PROGRESS NOTES
RCAT Treatment Plan    Patient Score: 9  Patient Acuity: 4    Clinical Indication for Therapy: prevent atelectasis; diffuse Lt opacities    Therapy Ordered: Nebusal/Vest therapy TID    Assessment Summary: Bilat PNA, tobacco/ETOH abuse, possible bilat aspiration PNA. CXR Lt opacities with small pleural effusions, possible Rt opacities/ hypo-expanded. No hx COPD, no home respiratory medications, slightly confused but cooperative. Tolerating Vest therapy with Nebusal well, will continue with current therapy, continue to encourage deep breathing and cough, Nursing to continue with IS. Up in chair when advisable. RT to follow    Aren Matthews, RT  2/1/2022

## 2022-02-01 NOTE — PLAN OF CARE
Problem: Adult Inpatient Plan of Care  Goal: Plan of Care Review  Outcome: Improving  Goal: Patient-Specific Goal (Individualized)  Outcome: Improving  Goal: Absence of Hospital-Acquired Illness or Injury  Outcome: Improving  Intervention: Identify and Manage Fall Risk  Recent Flow sheet Documentation  Taken 2/1/2022 0421 by Chyna Ward RN  Safety Promotion/Fall Prevention:   bed alarm on   bedside attendant   lighting adjusted  Taken 2/1/2022 0031 by Chyna Ward RN  Safety Promotion/Fall Prevention:   bed alarm on   bedside attendant   lighting adjusted  Intervention: Prevent Skin Injury  Recent Flowsheet Documentation  Taken 2/1/2022 0421 by Chyna Ward RN  Body Position:   turned   right   position changed independently  Taken 2/1/2022 0031 by Chyna Ward RN  Body Position: supine  Intervention: Prevent Infection  Recent Flowsheet Documentation  Taken 2/1/2022 0421 by Chyna Ward RN  Infection Prevention:   environmental surveillance performed   rest/sleep promoted   single patient room provided   visitors restricted/screened  Taken 2/1/2022 0031 by Chyna Ward RN  Infection Prevention:   environmental surveillance performed   rest/sleep promoted   single patient room provided   visitors restricted/screened  Goal: Optimal Comfort and Wellbeing  Outcome: Improving  Intervention: Provide Person-Centered Care  Recent Flowsheet Documentation  Taken 2/1/2022 0031 by Chyna Ward RN  Trust Relationship/Rapport:   care explained   choices provided   emotional support provided   questions answered   questions encouraged   thoughts/feelings acknowledged  Goal: Readiness for Transition of Care  Outcome: Improving     Problem: Restraint/Seclusion for Violent Self-Destructive Behavior  Goal: Prevent/manage potential problems during restraint/seclusion  Description: Maintain safety of patient and others during period of restraint/seclusion.  Promote psychological and physical  wellbeing.  Prevent injury to skin and involved body parts.  Promote nutrition, hydration, and elimination.  Outcome: Improving  Goal: Prevent future episodes of restraint or seclusion  Description: Identify nonphysical alternatives to restraint or seclusion.  Identify additional de-escalation supportive measures to use as alternatives to restraint or seclusion.  Outcome: Improving     Problem: Risk for Delirium  Goal: Optimal Coping  Outcome: Improving  Goal: Improved Behavioral Control  Outcome: Improving  Goal: Improved Attention and Thought Clarity  Outcome: Improving  Goal: Improved Sleep  Outcome: Improving     Problem: Restraint for Non-Violent/Non-Self-Destructive Behavior  Goal: Prevent/Manage Potential Problems  Description: Maintain safety of patient and others during period of restraint.  Promote psychological and physical wellbeing.  Prevent injury to skin and involved body parts.  Promote nutrition, hydration, and elimination.  Outcome: Improving     Problem: Acute Neurologic Deterioration (Alcohol Withdrawal)  Goal: Optimal Neurologic Function  Outcome: Improving     Problem: Substance Misuse (Alcohol Withdrawal)  Goal: Readiness for Change Identified  Outcome: Improving     Problem: Inability to Wean (Mechanical Ventilation, Invasive)  Goal: Mechanical Ventilation Liberation  Outcome: Improving     Problem: Nutrition Impairment (Mechanical Ventilation, Invasive)  Goal: Optimal Nutrition Delivery  Outcome: Improving     Problem: Skin and Tissue Injury (Mechanical Ventilation, Invasive)  Goal: Absence of Device-Related Skin and Tissue Injury  Outcome: Improving     Problem: Violence Risk or Actual  Goal: Anger and Impulse Control  Outcome: Improving     Problem: Hypertension Acute  Goal: Blood Pressure Within Desired Range  Outcome: Improving     Problem: Gas Exchange Impaired  Goal: Optimal Gas Exchange  Outcome: Improving  Intervention: Optimize Oxygenation and Ventilation  Recent Flowsheet  Documentation  Taken 2/1/2022 0031 by Chyna Ward, RN  Head of Bed (HOB) Positioning: HOB at 20 degrees     Problem: OT General Care Plan  Goal: Transfer (OT)  Description: Transfer (OT)  Outcome: Improving  Goal: Toilet Transfer/Toileting (OT)  Description: Toilet Transfer/Toileting (OT)  Outcome: Improving  Goal: Cognitive (OT)  Description: Cognitive (OT)  Outcome: Improving     Pt confused and restless beginning of shift. Prn zyprexa, melatonin, and trazodone given. Pt slept intermittently but continues to pull at lines and especially at mask. Pt desaturates into low 80's quickly with o2 off. O2 sats maintain on 10 L oxymask. 1:1 in place. Will continue to monitor.

## 2022-02-01 NOTE — PLAN OF CARE
Problem: Restraint for Non-Violent/Non-Self-Destructive Behavior  Goal: Prevent/Manage Potential Problems  Description: Maintain safety of patient and others during period of restraint.  Promote psychological and physical wellbeing.  Prevent injury to skin and involved body parts.  Promote nutrition, hydration, and elimination.  Outcome: Improving    Problem: Risk for Delirium  Goal: Improved Behavioral Control  Outcome: No Change     Problem: Gas Exchange Impaired  Goal: Optimal Gas Exchange  Outcome: No Change  Intervention: Optimize Oxygenation and Ventilation  Recent Flowsheet Documentation  Taken 1/31/2022 5728 by Omari Olea RN  Head of Bed (HOB) Positioning: HOB at 20 degrees     Pt was sleeping at the beginning of the shift, but was arousable to speech. Pt denied pain. Pt was on 10 L via oxymask at O2 sat of 96-97%. Pt was decreased to 3 L at 1900, which pt tolerated w/ O2 sat of 92-93%. BP was 87/50 and scheduled metoprolol  was held. MD notified and gave new orders for IV  mL bolus. BP increased to 95/65, and scheduled IV furosemide was held. BG was 260, 254. Pt tolerated HS medications crushed in applesauce. Currently at normal sinus rhythm.

## 2022-02-01 NOTE — PROGRESS NOTES
Speech-Language Pathology: Video Swallow Study     01/31/22 1300   General Information   Onset of Illness/Injury or Date of Surgery 01/23/22   Referring Physician Dr. Donita Olson  (Attending Physician)   Pertinent History of Current Problem Extubated 1/23/22 after 11 day intubation. Per H&P: 56 year old smoker with history of ongoing alcohol abuse, multiple abdominal surgeries, hypertension, T2DM, admitted on 1/15/2022 w/ acute hypoxic respiratory failure in setting of multifocal pneumonia requiring intubation on 1/16.  He was extubated 1/23 and his septic shock is resolved. VFSS 1/25/22 showing inconsistent penetration with thin liquids. Pt started on thin liquid and minced moist diet, however, began showing increase in s/sx aspiration at bedside over the weekend and was downgraded to mildly thick liquids. Repeat VFSS today to assess aspiration risk for thin liquids and diet advancement.    General Observations Intermittent confusion, trouble following directions. Supplemental Oxygen.   Type of Evaluation   Type of Evaluation Swallow Evaluation   General Swallowing Observations   Swallowing Evaluation Videofluoroscopic swallow study (VFSS)   VFSS Evaluation   Radiologist Wes Garcia MD   Views Taken left lateral   Physical Location of Procedure [facility]   VFSS Textures Trialed thin liquids;mildly thick liquids;pureed;moderately thick liquids/liquidized   VFSS Eval: Thin Liquid Texture Trial   Mode of Presentation, Thin Liquid cup;spoon;straw;self-fed;fed by clinician   Preparatory Phase Holding  (variable bolus control)   Oral Phase, Thin Liquid Delayed AP movement;WFL   Pharyngeal Phase, Thin Liquid WFL;Delayed swallow reflex   Rosenbek's Penetration Aspiration Scale: Thin Liquid Trial Results 7 - contrast passes glottis, visible subglottic residue remains despite patient's response (aspiration)   Response to Aspiration unproductive reflexive involuntary cough/throat clear   Diagnostic Statement  Aspiration of thin liquids occured during the swallow with unsuccessful attempt to clear aspirated bolus. Residue remained lining anterior wall of laryngeal vestibule.    VFSS Eval: Mildly Thick Liquids   Mode of Presentation cup;spoon;self-fed;fed by clinician   Preparatory Phase WFL;Holding   Oral Phase WFL;Delayed AP movement   Pharyngeal Phase Delayed swallow reflex   Rosenbek's Penetration Aspiration Scale 2 - contrast enters airway, remains above the vocal cords, no residue remains (penetration)   Diagnostic Statement Trace, shallow transient penetration   VFSS Eval: Moderately Thick Liquids   Mode of Presentation spoon;fed by clinician   Preparatory Phase WFL;Holding   Oral Phase WFL;Delayed AP movement   Pharyngeal Phase Delayed swallow reflex;WFL   Rosenbek's Penetration Aspiration Scale 1 - no aspiration, contrast does not enter airway   Diagnostic Statement Delayed swallow, no penetration or aspiration.    VFSS Evaluation: Puree Solid Texture Trial   Mode of Presentation, Puree spoon   Preparatory Phase Holding;WFL   Oral Phase, Puree WFL;Delayed AP movement  (disorganized)   Pharyngeal Phase, Puree WFL   Rosenbek's Penetration Aspiration Scale: Puree Food Trial Results 1 - no aspiration, contrast does not enter airway   Diagnostic Statement Disorganized, however,    VFSS Evaluation: Solid Food Texture Trial   Diagnostic Statement functional. Withheld further advanced solids due to safety concerns.    Esophageal Phase of Swallow   Patient reports or presents with symptoms of esophageal dysphagia No   Swallowing Recommendations   Diet Consistency Recommendations mildly thick liquids (level 2);minced & moist (level 5)   Supervision Level for Intake close supervision needed   Swallowing Maneuver Recommendations alternate food and liquid intake   Monitoring/Assistance Required (Eating/Swallowing) monitor for cough or change in vocal quality with intake   Recommended Feeding/Eating Techniques (Swallow Eval)  set-up and prepare tray;provide assist with feeding;maintain upright sitting position for eating;minimize distractions during oral intake   Medication Administration Recommendations, Swallowing (SLP) place in [facility]   Instrumental Assessment Recommendations VFSS (videofluroscopic swallowing study)   Comment, Swallowing Recommendations Videofluoroscopic Swallow Study completed. Patient had aspiration with thin liquid and transiet penetration with mildly thick liquids. Pt was sensate to aspiration, cough response elicited though not effective to fully clear aspirated material. Swallow response was delayed and epiglottic inversion was adequate.  Trace stasis occurred in the pyriforms clearing with subsequent spontaneous swallows. Cognitive deficits limiting ability to utilize compensatory strategies and impacting swallow safety. Recommend diet of IDDSI 5 minced and moist diet and IDDSI 2 mildly thick liquids with repeat VFSS prior to diet advancement.   General Therapy Interventions   Planned Therapy Interventions Dysphagia Treatment   Dysphagia treatment Compensatory strategies for swallowing;Instruction of safe swallow strategies;Modified diet education   Intervention Comments follow up re: diet tolerance and possible diet change from minced and moist. VFSS recommended prior to liquid advancement.   SLP Therapy Assessment/Plan   Criteria for Skilled Therapeutic Interventions Met (SLP Eval) yes;treatment indicated   Rehab Potential (SLP Eval) good, to achieve stated therapy goals   Therapy Frequency (SLP Eval) 5 times/wk   Therapy Plan Review/Discharge Plan (SLP)   Therapy Plan Review (SLP) evaluation/treatment results reviewed;participants voiced agreement with care plan;participants included;patient   SLP Discharge Planning    SLP Discharge Recommendation (DC Rec) Transitional Care Facility;Acute Rehab Center-Motivated patient will benefit from intensive, interdisciplinary therapy.  Anticipate will be able to  tolerate 3 hours of therapy per day   SLP Rationale for DC Rec Swallow function remains below baseline.   SLP Brief overview of current status  Minced moist diet and mildly thick liquids. Close supervision for feeding assist and safety.     Total Evaluation Time   Total Evaluation Time (Minutes) 25

## 2022-02-02 NOTE — PLAN OF CARE
Problem: Adult Inpatient Plan of Care  Goal: Plan of Care Review  Outcome: Improving  Goal: Patient-Specific Goal (Individualized)  Outcome: Improving  Goal: Absence of Hospital-Acquired Illness or Injury  Outcome: Improving  Intervention: Identify and Manage Fall Risk  Recent Flowsheet Documentation  Taken 2/2/2022 0325 by Chyna Ward RN  Safety Promotion/Fall Prevention:   bed alarm on   fall prevention program maintained   lighting adjusted   safety round/check completed   supervised activity  Taken 2/2/2022 0023 by Chyna Ward RN  Safety Promotion/Fall Prevention:   bed alarm on   fall prevention program maintained   lighting adjusted   safety round/check completed   supervised activity  Intervention: Prevent Skin Injury  Recent Flowsheet Documentation  Taken 2/2/2022 0325 by Chyna Ward RN  Body Position:   position changed independently   turned   right  Taken 2/2/2022 0023 by Chyna Ward RN  Body Position: position changed independently  Intervention: Prevent Infection  Recent Flowsheet Documentation  Taken 2/2/2022 0325 by Chyna Ward RN  Infection Prevention:   rest/sleep promoted   single patient room provided   equipment surfaces disinfected   environmental surveillance performed  Taken 2/2/2022 0023 by Chyna Ward RN  Infection Prevention:   rest/sleep promoted   single patient room provided   equipment surfaces disinfected   environmental surveillance performed  Goal: Optimal Comfort and Wellbeing  Outcome: Improving  Intervention: Provide Person-Centered Care  Recent Flowsheet Documentation  Taken 2/2/2022 0325 by Chyna Ward RN  Trust Relationship/Rapport:   care explained   choices provided   emotional support provided   empathic listening provided   questions answered   questions encouraged  Taken 2/2/2022 0023 by Chyna Ward RN  Trust Relationship/Rapport:   care explained   choices provided   emotional support provided   empathic listening  provided   questions answered   questions encouraged  Goal: Readiness for Transition of Care  Outcome: Improving     Problem: Restraint/Seclusion for Violent Self-Destructive Behavior  Goal: Prevent/manage potential problems during restraint/seclusion  Description: Maintain safety of patient and others during period of restraint/seclusion.  Promote psychological and physical wellbeing.  Prevent injury to skin and involved body parts.  Promote nutrition, hydration, and elimination.  Outcome: Improving  Goal: Prevent future episodes of restraint or seclusion  Description: Identify nonphysical alternatives to restraint or seclusion.  Identify additional de-escalation supportive measures to use as alternatives to restraint or seclusion.  Outcome: Improving     Problem: Risk for Delirium  Goal: Optimal Coping  Outcome: Improving  Goal: Improved Behavioral Control  Outcome: Improving  Goal: Improved Attention and Thought Clarity  Outcome: Improving  Goal: Improved Sleep  Outcome: Improving     Problem: Restraint for Non-Violent/Non-Self-Destructive Behavior  Goal: Prevent/Manage Potential Problems  Description: Maintain safety of patient and others during period of restraint.  Promote psychological and physical wellbeing.  Prevent injury to skin and involved body parts.  Promote nutrition, hydration, and elimination.  Outcome: Improving     Problem: Acute Neurologic Deterioration (Alcohol Withdrawal)  Goal: Optimal Neurologic Function  Outcome: Improving     Problem: Substance Misuse (Alcohol Withdrawal)  Goal: Readiness for Change Identified  Outcome: Improving     Problem: Inability to Wean (Mechanical Ventilation, Invasive)  Goal: Mechanical Ventilation Liberation  Outcome: Improving     Problem: Nutrition Impairment (Mechanical Ventilation, Invasive)  Goal: Optimal Nutrition Delivery  Outcome: Improving     Problem: Skin and Tissue Injury (Mechanical Ventilation, Invasive)  Goal: Absence of Device-Related Skin and  Tissue Injury  Outcome: Improving     Problem: Violence Risk or Actual  Goal: Anger and Impulse Control  Outcome: Improving     Problem: Hypertension Acute  Goal: Blood Pressure Within Desired Range  Outcome: Improving     Problem: Gas Exchange Impaired  Goal: Optimal Gas Exchange  Outcome: Improving  Intervention: Optimize Oxygenation and Ventilation  Recent Flowsheet Documentation  Taken 2/2/2022 0325 by Chyna Ward, RN  Head of Bed (HOB) Positioning: HOB at 20 degrees  Taken 2/2/2022 0023 by Chyna Wrad, RN  Head of Bed (HOB) Positioning: HOB at 30-45 degrees     Problem: OT General Care Plan  Goal: Transfer (OT)  Description: Transfer (OT)  Outcome: Improving  Goal: Toilet Transfer/Toileting (OT)  Description: Toilet Transfer/Toileting (OT)  Outcome: Improving  Goal: Cognitive (OT)  Description: Cognitive (OT)  Outcome: Improving     Problem: Suicide Risk  Goal: Absence of Self-Harm  Outcome: Improving     Pt alert / confused with some conversation clear and logical and some illogical. Pt not pulling at lines other than occasionally taking off 02. Pt weaned onto nasal cannula. Pt now on 4L nc with o2 sats 100%. Pt has strong productive cough. Tylenol #3 given x1 for sore shoulders and ankles. Pt sleeping better tonight. Tele maintaining NSR. Will continue to monitor.

## 2022-02-02 NOTE — CONSULTS
"  INITIAL PSYCHIATRIC CONSULTATION                  REASON FOR REQUEST: delirium with hallucinations      ASSESSMENT/RECOMMENDATIONS/PLAN :   Metabolic encephalopathy, exacerbated in the context of COVID-19, ?aspiration pneumonia, alcohol withdrawal prolonged hospitalization 18 days.  Cognitive and behavioral changes with restlessness, anxiety and intermittent agitation.  Sleep dysregulation exacerbated the context of hospital environment and above.  Mood changes due to medical issues, alcohol use, and Covid-19   Alcohol use hx   Recommendations:  Duloxetine 60 mg daily.  Continue gabapentin 200 mg twice a day  Seroquel 6.25 mg Bid for anxiety   Seroquel 50 mg at bedtime continue.  Continue to utilize olanzapine 5-10 mg every 6 hours as needed for agitation, psychosis.  Efforts at sleep regulation.  For daytime anxiety would recommend adding Seroquel 6.25 mg in the morning and afternoon.      MENTAL STATUS EXAMINATION:   General Appearance: Resting comfortably, not in acute distress, arousable to verbal stimuli.  Speech: Slow, devoid of content.  Thought Process: Disorganized  Thought content: No evidence of acute psychosis, paranoia or delusions.  Thought Formation: Loosening of associations  Judgment: Depressed  Insight : Depressed  Attention : Fair  Memory: Depressed  Fund Of Knowledge: Depressed  Affect: Neutral  Mood: Congruent  Alert : Arousable  Orientation: X 1-2  Comprehension: Depressed  Generative thought content: Reduced, difficulty retaining new information, difficulty recalling events from last few days, unaware of his prolonged hospitalization so far and his clinical situation.  Language: Fair  Gait and Ambulation: Not tested, patient is somnolent, needing assist in ADLs  Musculoskeletal: Psychomotor slow  Vital Signs: /72   Pulse 82   Temp 98.2  F (36.8  C) (Oral)   Resp 18   Ht 1.854 m (6' 0.99\")   Wt 66.8 kg (147 lb 4.8 oz)   SpO2 94%   BMI 19.44 kg/m        HISTORY OF PRESENT ILLNESS: " "  Presenting history to include: Per [facility] [place]/Specialists:   Patient states he is here for evaluation of nausea, vomiting, and burning pain in his mid chest he attributes to his esophagus.  He tells me this has been going on for the past 4 days.  He has been having persistent hiccups.  Does endorse a cough and thinks he may be able to provide a sputum specimen.  Complains he was having some lower abdominal pain that has now resolved.  Complains he has loose, soft bowel movements that alternate with hard bowel movements.  Denies any shortness of breath, fever, chills, urinary issues.  Denies any recent alcohol use, tells me that he quit 3 weeks ago.  Tells me that \"I'm at my tip-top\" and indicated that he might leave the hospital against medical advice.  After discussion he did agree that he is not doing well and agreed to remain in the hospital.  Tells me that he had his upper teeth removed about 3 weeks ago.     Upon assessment, patient was noted to be resting comfortably.  Not in any obvious physical distress.  He is arousable to verbal stimuli, however does not stay awake long enough to engage in a spontaneous and coherent conversation.  He is unaware of his hospital environment as well as unaware of his clinical condition.  He does not appear to be having any hallucinations or delusions at this time.  Patient is not providing any meaningful information.  Patient is COVID-19 positive.  Chart reviewed.  It would appear that his orientation continues to fluctuate, at times he knows the time and place and most of the time not aware of his hospital environment.  He is more confused and restless then oriented to his current situation.  He has been needing Zyprexa intermittently to help target his anxiety and agitation.  He sleeps off-and-on with trazodone.  Per chart review does not appear to have any premorbid psychiatric illness.  Medications reviewed including but not limited to duloxetine 60 mg daily, gabapentin 200 mg " "twice a day Seroquel 50 mg at bedtime.  Reviewed his home medications as well, it would appear that he has been taking Cymbalta, gabapentin prior to coming to the hospital.    Review of Systems:As per HPI. Remainders of 12 point review of systems negative.  Psychiatric ROS:  Change of Interest/Anhedonia:    Appetite/Weight Changes:   Concentration Changes:  Impaired Energy:  Impaired Sleep:  Anxiety/Panic:  Tearfulness:  Depression:  Psychosis:   Irritability:  SI/VI/HI:   Vivi:         PFSH reviewed  and not pertinent to chief complaint/reason for visit  BP 95/58 (BP Location: Left arm)   Pulse 87   Temp 98.2  F (36.8  C) (Oral)   Resp 18   Ht 1.854 m (6' 0.99\")   Wt 66.8 kg (147 lb 4.8 oz)   SpO2 94%   BMI 19.44 kg/m    Alcohol, Blood (mg/dL)   Date Value   01/15/2022 10 (H)     @24HOURRESULTS@  Recent Results (from the past 72 hour(s))   Glucose by meter    Collection Time: 01/30/22  8:17 AM   Result Value Ref Range    GLUCOSE BY METER POCT 85 70 - 99 mg/dL   Asymptomatic COVID-19 Virus (Coronavirus) by PCR Nasopharyngeal    Collection Time: 01/30/22  8:32 AM    Specimen: Nasopharyngeal; Swab   Result Value Ref Range    SARS CoV2 PCR Positive (A) Negative   INR    Collection Time: 01/30/22 10:50 AM   Result Value Ref Range    INR 1.67 (H) 0.90 - 1.15   D dimer quantitative    Collection Time: 01/30/22 10:50 AM   Result Value Ref Range    D-Dimer Quantitative 0.81 (H) 0.00 - 0.50 ug/mL FEU   CRP inflammation    Collection Time: 01/30/22 10:50 AM   Result Value Ref Range    CRP 6.3 (H) 0.0-<0.8 mg/dL   Interleukin 6 Blood    Collection Time: 01/30/22 10:50 AM   Result Value Ref Range    Interleukin 6 Blood 61.54 (H) <3.01 pg/mL   Extra Purple Top Tube    Collection Time: 01/30/22 10:50 AM   Result Value Ref Range    Hold Specimen JIC    Glucose by meter    Collection Time: 01/30/22 12:38 PM   Result Value Ref Range    GLUCOSE BY METER POCT 92 70 - 99 mg/dL   Glucose by meter    Collection Time: 01/30/22  " 5:37 PM   Result Value Ref Range    GLUCOSE BY METER POCT 148 (H) 70 - 99 mg/dL   Glucose by meter    Collection Time: 01/30/22  9:58 PM   Result Value Ref Range    GLUCOSE BY METER POCT 234 (H) 70 - 99 mg/dL   Glucose by meter    Collection Time: 01/31/22  1:30 AM   Result Value Ref Range    GLUCOSE BY METER POCT 230 (H) 70 - 99 mg/dL   Clostridium difficile toxin B PCR    Collection Time: 01/31/22  2:59 AM    Specimen: Per Rectum; Stool   Result Value Ref Range    C Difficile Toxin B by PCR Negative Negative   CBC with platelets    Collection Time: 01/31/22  5:43 AM   Result Value Ref Range    WBC Count 9.3 4.0 - 11.0 10e3/uL    RBC Count 3.42 (L) 4.40 - 5.90 10e6/uL    Hemoglobin 10.5 (L) 13.3 - 17.7 g/dL    Hematocrit 30.2 (L) 40.0 - 53.0 %    MCV 88 78 - 100 fL    MCH 30.7 26.5 - 33.0 pg    MCHC 34.8 31.5 - 36.5 g/dL    RDW 19.3 (H) 10.0 - 15.0 %    Platelet Count 297 150 - 450 10e3/uL   Basic metabolic panel    Collection Time: 01/31/22  5:43 AM   Result Value Ref Range    Sodium 138 136 - 145 mmol/L    Potassium 3.7 3.5 - 5.0 mmol/L    Chloride 99 98 - 107 mmol/L    Carbon Dioxide (CO2) 26 22 - 31 mmol/L    Anion Gap 13 5 - 18 mmol/L    Urea Nitrogen 6 (L) 8 - 22 mg/dL    Creatinine 0.62 (L) 0.70 - 1.30 mg/dL    Calcium 7.5 (L) 8.5 - 10.5 mg/dL    Glucose 255 (H) 70 - 125 mg/dL    GFR Estimate >90 >60 mL/min/1.73m2   CRP inflammation    Collection Time: 01/31/22  5:43 AM   Result Value Ref Range    CRP 6.0 (H) 0.0-<0.8 mg/dL   D dimer quantitative    Collection Time: 01/31/22  5:43 AM   Result Value Ref Range    D-Dimer Quantitative 1.13 (H) 0.00 - 0.50 ug/mL FEU   Magnesium    Collection Time: 01/31/22  5:43 AM   Result Value Ref Range    Magnesium 1.7 (L) 1.8 - 2.6 mg/dL   Extra Red Top Tube    Collection Time: 01/31/22  5:43 AM   Result Value Ref Range    Hold Specimen JIC    Glucose by meter    Collection Time: 01/31/22  9:47 AM   Result Value Ref Range    GLUCOSE BY METER POCT 231 (H) 70 - 99 mg/dL    Glucose by meter    Collection Time: 01/31/22  1:01 PM   Result Value Ref Range    GLUCOSE BY METER POCT 261 (H) 70 - 99 mg/dL   Glucose by meter    Collection Time: 01/31/22  5:46 PM   Result Value Ref Range    GLUCOSE BY METER POCT 260 (H) 70 - 99 mg/dL   Glucose by meter    Collection Time: 01/31/22  8:36 PM   Result Value Ref Range    GLUCOSE BY METER POCT 254 (H) 70 - 99 mg/dL   Glucose by meter    Collection Time: 02/01/22  2:01 AM   Result Value Ref Range    GLUCOSE BY METER POCT 275 (H) 70 - 99 mg/dL   CBC with platelets    Collection Time: 02/01/22  5:37 AM   Result Value Ref Range    WBC Count 9.4 4.0 - 11.0 10e3/uL    RBC Count 3.29 (L) 4.40 - 5.90 10e6/uL    Hemoglobin 10.1 (L) 13.3 - 17.7 g/dL    Hematocrit 29.0 (L) 40.0 - 53.0 %    MCV 88 78 - 100 fL    MCH 30.7 26.5 - 33.0 pg    MCHC 34.8 31.5 - 36.5 g/dL    RDW 19.4 (H) 10.0 - 15.0 %    Platelet Count 259 150 - 450 10e3/uL   Basic metabolic panel    Collection Time: 02/01/22  5:37 AM   Result Value Ref Range    Sodium 140 136 - 145 mmol/L    Potassium 3.6 3.5 - 5.0 mmol/L    Chloride 102 98 - 107 mmol/L    Carbon Dioxide (CO2) 32 (H) 22 - 31 mmol/L    Anion Gap 6 5 - 18 mmol/L    Urea Nitrogen 6 (L) 8 - 22 mg/dL    Creatinine 0.60 (L) 0.70 - 1.30 mg/dL    Calcium 7.5 (L) 8.5 - 10.5 mg/dL    Glucose 264 (H) 70 - 125 mg/dL    GFR Estimate >90 >60 mL/min/1.73m2   CRP inflammation    Collection Time: 02/01/22  5:37 AM   Result Value Ref Range    CRP 4.8 (H) 0.0-<0.8 mg/dL   D dimer quantitative    Collection Time: 02/01/22  5:37 AM   Result Value Ref Range    D-Dimer Quantitative 0.82 (H) 0.00 - 0.50 ug/mL FEU   Magnesium    Collection Time: 02/01/22  5:37 AM   Result Value Ref Range    Magnesium 1.7 (L) 1.8 - 2.6 mg/dL   Ionized Calcium    Collection Time: 02/01/22  5:37 AM   Result Value Ref Range    Calcium, Ionized pH 7.4 1.14 1.11 - 1.30 mmol/L    pH 7.50 (H) 7.35 - 7.45    Calcium, Ionized Measured 1.07 (L) 1.11 - 1.30 mmol/L   Vitamin D  Deficiency    Collection Time: 02/01/22  5:37 AM   Result Value Ref Range    Vitamin D, Total (25-Hydroxy) 7 (L) 30 - 80 ug/L   Glucose by meter    Collection Time: 02/01/22  7:56 AM   Result Value Ref Range    GLUCOSE BY METER POCT 240 (H) 70 - 99 mg/dL   Glucose by meter    Collection Time: 02/01/22 11:24 AM   Result Value Ref Range    GLUCOSE BY METER POCT 176 (H) 70 - 99 mg/dL   Glucose by meter    Collection Time: 02/01/22  4:20 PM   Result Value Ref Range    GLUCOSE BY METER POCT 120 (H) 70 - 99 mg/dL   Glucose by meter    Collection Time: 02/01/22  8:22 PM   Result Value Ref Range    GLUCOSE BY METER POCT 192 (H) 70 - 99 mg/dL   Glucose by meter    Collection Time: 02/02/22  2:24 AM   Result Value Ref Range    GLUCOSE BY METER POCT 166 (H) 70 - 99 mg/dL   CBC with platelets    Collection Time: 02/02/22  6:14 AM   Result Value Ref Range    WBC Count 8.0 4.0 - 11.0 10e3/uL    RBC Count 3.16 (L) 4.40 - 5.90 10e6/uL    Hemoglobin 9.6 (L) 13.3 - 17.7 g/dL    Hematocrit 28.2 (L) 40.0 - 53.0 %    MCV 89 78 - 100 fL    MCH 30.4 26.5 - 33.0 pg    MCHC 34.0 31.5 - 36.5 g/dL    RDW 19.2 (H) 10.0 - 15.0 %    Platelet Count 223 150 - 450 10e3/uL   CRP inflammation    Collection Time: 02/02/22  6:14 AM   Result Value Ref Range    CRP 2.8 (H) 0.0-<0.8 mg/dL   D dimer quantitative    Collection Time: 02/02/22  6:14 AM   Result Value Ref Range    D-Dimer Quantitative 0.78 (H) 0.00 - 0.50 ug/mL FEU   Comprehensive metabolic panel    Collection Time: 02/02/22  6:14 AM   Result Value Ref Range    Sodium 145 136 - 145 mmol/L    Potassium 3.4 (L) 3.5 - 5.0 mmol/L    Chloride 107 98 - 107 mmol/L    Carbon Dioxide (CO2) 33 (H) 22 - 31 mmol/L    Anion Gap 5 5 - 18 mmol/L    Urea Nitrogen 8 8 - 22 mg/dL    Creatinine 0.60 (L) 0.70 - 1.30 mg/dL    Calcium 7.3 (L) 8.5 - 10.5 mg/dL    Glucose 161 (H) 70 - 125 mg/dL    Alkaline Phosphatase 106 45 - 120 U/L    AST 37 0 - 40 U/L    ALT 19 0 - 45 U/L    Protein Total 4.5 (L) 6.0 - 8.0 g/dL     Albumin 1.6 (L) 3.5 - 5.0 g/dL    Bilirubin Total 0.8 0.0 - 1.0 mg/dL    GFR Estimate >90 >60 mL/min/1.73m2   Ammonia    Collection Time: 02/02/22  6:14 AM   Result Value Ref Range    Ammonia 32 11 - 35 umol/L   Magnesium    Collection Time: 02/02/22  6:14 AM   Result Value Ref Range    Magnesium 1.7 (L) 1.8 - 2.6 mg/dL   Glucose by meter    Collection Time: 02/02/22  7:46 AM   Result Value Ref Range    GLUCOSE BY METER POCT 155 (H) 70 - 99 mg/dL       PMH:   Past Medical History:   Diagnosis Date     Alcohol use disorder, severe, dependence (H) 11/10/2021     Alcohol-induced acute pancreatitis with infected necrosis      Cerebellar stroke (H)     Old lacunar cerebellar stroke noted on imaging     Diabetes mellitus (H)      Neuropathy            Current Medications:Scheduled Meds:    remdesivir  100 mg Intravenous Q24H    And     sodium chloride 0.9%  50 mL Intravenous Q24H     calcium carbonate  1,000 mg Oral BID     dexamethasone  6 mg Intravenous Daily     DULoxetine  60 mg Oral or Feeding Tube Daily     enoxaparin ANTICOAGULANT  40 mg Subcutaneous Q24H     folic acid  1 mg Oral Daily     gabapentin  200 mg Oral BID     insulin aspart  1-7 Units Subcutaneous TID AC     insulin aspart  1-5 Units Subcutaneous At Bedtime     insulin glargine  15 Units Subcutaneous BID     ipratropium-albuterol  1 puff Inhalation 4x daily     lidocaine  1 patch Transdermal Q24H     lidocaine   Transdermal Q8H     magnesium oxide  400 mg Oral BID     metoprolol succinate ER  25 mg Oral Daily     multivitamin, therapeutic  1 tablet Oral Daily     pantoprazole  40 mg Oral QAM AC     piperacillin-tazobactam  3.375 g Intravenous Q8H     potassium chloride  40 mEq Oral Once     QUEtiapine  50 mg Oral At Bedtime     sodium chloride  3 mL Nebulization 2 times daily     sodium chloride (PF)  3 mL Intracatheter Q8H     thiamine  100 mg Oral Daily     Continuous Infusions:    dextrose Stopped (01/24/22 0230)     - MEDICATION INSTRUCTIONS -        PRN Meds:.acetaminophen **OR** acetaminophen, acetaminophen-codeine, bisacodyl, dextrose, glucose **OR** dextrose **OR** glucagon, famotidine, flumazenil, haloperidol lactate, OLANZapine zydis **OR** haloperidol lactate, lidocaine 4%, lidocaine (buffered or not buffered), LORazepam, magnesium hydroxide, - MEDICATION INSTRUCTIONS -, melatonin, menthol-zinc oxide, metoprolol, naloxone **OR** naloxone **OR** naloxone **OR** naloxone, ondansetron **OR** ondansetron, polyethylene glycol, sodium chloride (PF), sucralfate                Family History: PERSONALLY REVIEWED.  Family History   Problem Relation Age of Onset     Hyperlipidemia Mother      Cancer Father      Pertinent Family hx not pertinent to Chief Complaint or reason for visit.     Social History:  PERSONALLY REVIEWED.  Social History     Socioeconomic History     Marital status:      Spouse name: Not on file     Number of children: Not on file     Years of education: Not on file     Highest education level: Not on file   Occupational History     Not on file   Tobacco Use     Smoking status: Current Every Day Smoker     Packs/day: 0.50     Types: Cigarettes     Smokeless tobacco: Never Used   Substance and Sexual Activity     Alcohol use: Not Currently     Comment: 6 pack daily with 2-4 shots daily. Last drink 10/4     Drug use: Not on file     Sexual activity: Not on file   Other Topics Concern     Not on file   Social History Narrative     Not on file     Social Determinants of Health     Financial Resource Strain: Not on file   Food Insecurity: Not on file   Transportation Needs: Not on file   Physical Activity: Not on file   Stress: Not on file   Social Connections: Not on file   Intimate Partner Violence: Not on file   Housing Stability: Not on file    not pertinent to Chief Complaint or reason for visit.             Allergies as of 06/01/2014 Reviewed     Review of Systems:As per HPI. Remainders of 12 point review of systems  negative.    Review of Pertinent Laboratory:      PERSONALLY REVIEWED.    Physical Exam: Temp:  [97.3  F (36.3  C)-98.5  F (36.9  C)] 98.2  F (36.8  C)  Pulse:  [] 87  Resp:  [18-20] 18  BP: ()/(58-74) 95/58  SpO2:  [92 %-100 %] 94 %   Vitals: reviewed in chart     Physical exam as per medical team: reviewed in chart      diagnoses, risk and benefits of medications discussed with staff. Care coordination with care management team.   Thank you for this consultation.       Jemima Fiore; NP  [facility]        This note was created with the help of Dragon dictation system. Grammatical and typing errors are not intentional.

## 2022-02-02 NOTE — PROGRESS NOTES
RESPIRATORY CARE NOTE     Patient Name: Peewee Hess  Today's Date: 2/1/2022       Pt continues to receive BID sodium chloride and vest therapy. BS are decreased clear. Pt is on 6 of oxygen via oxymask, SpO2 is 98%. Post treatment there is increased aeration. Pt also perceives improvement.  RT encouraged deep breathing and coughing techniques .  RT will continue to monitor and assess.

## 2022-02-02 NOTE — PROGRESS NOTES
Discussed with PT the continued use of flutter and IS. Provided schedule for PT to do flutter and IS on his own.  PT verbalized understanding. Currently on 1L NC 97%. RT will continue to monitor.    Casey Perez, RT  2/2/2022

## 2022-02-02 NOTE — PLAN OF CARE
Problem: Adult Inpatient Plan of Care  Goal: Optimal Comfort and Wellbeing  Outcome: Improving  Intervention: Provide Person-Centered Care  Recent Flowsheet Documentation  Taken 2/2/2022 4334 by Basilia Orona RN  Trust Relationship/Rapport:   care explained   questions encouraged   questions answered   Pt denied SOB, nausea. Reported chronic pain in joints, received prn T3 which was effective in addition to scheduled gabapentin  Problem: Risk for Delirium  Goal: Improved Behavioral Control  Outcome: Improving   Pt has been calm, cooperative with cares and pleasant. No attempts to get out of bed w/o asist for staff. Bed alarm in place  Problem: Risk for Delirium  Goal: Improved Attention and Thought Clarity  Outcome: Improving   Pt is alert and oriented x4 and participating in cares. Asks questions regarding cares. Makes needs known and Indep using IS and flutter valve.   Problem: Risk for Delirium  Goal: Improved Sleep  Outcome: Improving   Pt as well as staff reported pt slept well last night  Problem: Skin and Tissue Injury (Mechanical Ventilation, Invasive)  Goal: Absence of Device-Related Skin and Tissue Injury  Outcome: Improving   No new concerns  Problem: Violence Risk or Actual  Goal: Anger and Impulse Control  Outcome: Improving   Calm, cooperative and pleasant, no attempts to get out of bed, using call light for staff assist  Problem: Restraint/Seclusion for Violent Self-Destructive Behavior  Goal: Prevent/manage potential problems during restraint/seclusion  Description: Maintain safety of patient and others during period of restraint/seclusion.  Promote psychological and physical wellbeing.  Prevent injury to skin and involved body parts.  Promote nutrition, hydration, and elimination.  Outcome: Completed  Goal: Prevent future episodes of restraint or seclusion  Description: Identify nonphysical alternatives to restraint or seclusion.  Identify additional de-escalation supportive measures to use as  alternatives to restraint or seclusion.  Outcome: Completed     Problem: Restraint for Non-Violent/Non-Self-Destructive Behavior  Goal: Prevent/Manage Potential Problems  Description: Maintain safety of patient and others during period of restraint.  Promote psychological and physical wellbeing.  Prevent injury to skin and involved body parts.  Promote nutrition, hydration, and elimination.  Outcome: Completed     Problem: Inability to Wean (Mechanical Ventilation, Invasive)  Goal: Mechanical Ventilation Liberation  Outcome: Completed     Problem: Adult Inpatient Plan of Care  Goal: Absence of Hospital-Acquired Illness or Injury  Intervention: Identify and Manage Fall Risk  Recent Flowsheet Documentation  Taken 2/2/2022 0934 by Basilia Orona RN  Safety Promotion/Fall Prevention:   activity supervised   assistive device/personal items within reach   bed alarm on   mobility aid in reach   nonskid shoes/slippers when out of bed   patient and family education   room near nurse's station  Intervention: Prevent Skin Injury  Recent Flowsheet Documentation  Taken 2/2/2022 1418 by Basilia Orona RN  Body Position: position changed independently  Taken 2/2/2022 0934 by Basilia Orona RN  Body Position: position changed independently  Intervention: Prevent Infection  Recent Flowsheet Documentation  Taken 2/2/2022 0934 by Basilia Orona RN  Infection Prevention:   personal protective equipment utilized   visitors restricted/screened   single patient room provided     Problem: Ventilator-Induced Lung Injury (Mechanical Ventilation, Invasive)  Goal: Absence of Ventilator-Induced Lung Injury  Intervention: Prevent Ventilator-Associated Pneumonia  Recent Flowsheet Documentation  Taken 2/2/2022 0934 by Basilia Orona RN  Head of Bed (HOB) Positioning: HOB at 30-45 degrees     Problem: Gas Exchange Impaired  Goal: Optimal Gas Exchange  Intervention: Optimize Oxygenation and Ventilation  Recent Flowsheet  Documentation  Taken 2/2/2022 0578 by Basilia Orona, RN  Head of Bed (HOB) Positioning: HOB at 30-45 degrees   Pt weaned to 1L O2 vis n/c satting mid 90's, denied SOB, reported occasional productive cough, LS diminished with crackles. Continues on Tele- NSR w/ occasional PVC's

## 2022-02-02 NOTE — PROGRESS NOTES
Pulmonary Progress Note  2/1/2022   11:59 AM    Problem List:   Active Problems:    Alcohol dependence with unspecified alcohol-induced disorder (H)    Hypomagnesemia    Type 2 diabetes mellitus without complication (H)    Alcohol use disorder, severe, dependence (H)    Sinus tachycardia    Pneumonia of both lungs due to infectious organism, unspecified part of lung    Non-intractable vomiting with nausea, unspecified vomiting type    Agitation    Hypocalcemia       Plan:   - Stop hypertonic nebs and vest treatments.   - Continue COVID tx   - Encourage therapies - PT/OT, etc.   - Our service will sign off.     Clover Wilson, [facility] Pulmonary/Critical Care    ________________________________________________________________      CC:   Chief Complaint   Patient presents with     Shortness of Breath     Generalized Weakness     HPI: 56 year old smoker with history of ongoing alcohol abuse, multiple abdominal surgeries, hypertension, T2DM, admitted on 1/15/2022 w/ acute hypoxic respiratory failure in setting of multifocal pneumonia requiring intubation on 1/16.  He was extubated 1/23 and his septic shock resolved. Moved out of ICU and unfortunately contracted COVID on 1/30/2022.    Significant improvement today; down to 1-liter nasal cannula and doing well.     ROS: feels great - no complaints.     Objective:   Vitals:    02/02/22 0846 02/02/22 0916 02/02/22 1208 02/02/22 1212   BP:  114/72 100/65    BP Location:   Left arm    Patient Position:   Semi-Fabian's    Pulse:  82 81    Resp:   18    Temp:       TempSrc:   Oral    SpO2: 94%  97% 97%   Weight:       Height:           I/O:     Intake/Output Summary (Last 24 hours) at 2/1/2022 1159  Last data filed at 2/1/2022 1100  Gross per 24 hour   Intake 928 ml   Output 1450 ml   Net -522 ml     Weight change:     Medications Reviewed: yes    Physical Exam:   General: awake and alert, no distress.   HEENT: AT/NC  NEURO: grossly nonfocal.   CARDIAC: RRR  S1S2  PULMONARY: unlabored on  NC.   INTEGUMENT: pale; visible skin intact.   PSYCH: calm and appropriate today    Lab Results Reviewed:   Recent Labs   Lab 02/02/22 0614      CO2 33*   BUN 8   ALKPHOS 106   ALT 19   AST 37       Recent Labs   Lab 02/02/22 0614   WBC 8.0   HGB 9.6*   HCT 28.2*          Micro: reviewed    Imaging: all imaging personally reviewed     1/29 CXR - Relatively diffuse left lung opacities have increased since the 01/19/2022 chest radiograph, and perhaps slightly increased since the 01/20/2022 chest CT. Differentials for the increased opacities include asymmetric pulmonary edema, infectious /   inflammatory process or layering pleural fluid. Small left pleural effusion is present.     Right lung is hypoexpanded and may have a few subtle scattered opacities.     No pneumothorax. Normal heart size. Atherosclerotic aorta.      Clover Wilson, [facility] Pulmonary/Critical Care

## 2022-02-02 NOTE — PLAN OF CARE
Problem: Risk for Delirium  Goal: Improved Behavioral Control  Outcome: Improving    Problem: Adult Inpatient Plan of Care  Goal: Optimal Comfort and Wellbeing  Outcome: No Change  Intervention: Provide Person-Centered Care  Recent Flowsheet Documentation  Taken 2/1/2022 1634 by Omari Olea RN  Trust Relationship/Rapport: care explained    Problem: Inability to Wean (Mechanical Ventilation, Invasive)  Goal: Mechanical Ventilation Liberation  Outcome: Improving  Intervention: Promote Extubation and Mechanical Ventilation Liberation  Recent Flowsheet Documentation  Taken 2/1/2022 1634 by Omari Olea RN  Medication Review/Management: medications reviewed     Problem: Hypertension Acute  Goal: Blood Pressure Within Desired Range  Outcome: Improving  Intervention: Normalize Blood Pressure  Recent Flowsheet Documentation  Taken 2/1/2022 1634 by Omari Olea RN  Medication Review/Management: medications reviewed     Pt alert at this time. Pt oriented to self and time. Pre-prandial BG of 120. No insulin aspart given. Pt ate 25% of meal. Pt tolerating minced and moist food and mildly thick liquids. BG of 192 at HS. Scheduled insulin glargine administered. No insulin aspart given for HS. O2 sat 100% at 10 L via oxymask; decreased to 6 L and current O2 sat of 96%. Pt performing deep breaths w/ encouragement. Tele at the beginning of the shift was normal sinus rhythm; pt currently in sinus tachycardia () at 2137. Denies pain thus far. Pt on magnesium and potassium protocol. Pt received potassium tablet in the AM for one time dose. Pt received scheduled magnesium oxide tablet BID. Levels recheck in AM (2/2/2022). Foam dressing on coccyx changed. Stage 1 pressure ulcer and opened area w/ slough observed on coccyx. Dimethicone cream applied to site and covered w/ a new foam dressing. Bed alarm on for safety.

## 2022-02-02 NOTE — PROGRESS NOTES
Daily Progress Note    CODE STATUS:  Full Code    01/26/22  Assessment/Plan:   57 YO gentleman with PMH significant for alcohol abuse, hypertension, DM-II, multiple abdominal surgeries who was admitted on 1/15/22 with alcohol withdrawal, possible aspiration pneumonia leading to respiratory failure requiring intubation on 1/16, successfully extubated on 1/23/22 and now transferred out of ICU on 1/26/22 for floor care.    Possible aspiration pneumonia  Acute hypoxic respiratory failure  -- Required intubation 1/16, extubated on 1/23. Required BiPAP 1/23, transitioned to HFNC on 1/24. Currently on 30LPM 40% FiO2  -- Completed 7 days course of IV zosyn on 1/22  -- Cont albuterol nebs and pulm hygiene  -- CT PE was negative for PE on adission  -- CT chest 1/28 showed New airway wall thickening in the left upper and lower lobes with new consolidation in the dependent portion of the left upper lobe and at the left lung base, and mucoid impaction in left lower lobe airways.  -- With new consolidation and leucocytosis, restarted him on IV zosyn. Procal elevated as well. Appreciate pulm consult. Cont with pulm hygiene.  -- Weekly COVID (per protocol) came back positive. He was negative on admission on 1/22. Check COVID labs. Start Covid special precaution.   -- 1/30 started IV remdesivir and Dexamethasone.  --1/31 given dose of tocilizumab/per pulmonology recommendations.  --02/02 down to 1L O2.  Complete 1 more day zosyn (or augmentin), last dose remdesivir 02/03.       Septic shock-resolved  -- Likely due to multifocal pneumonia.  -- Metoprolol started on 1/25 for sinus tachycardia  -- Patient is having intermittent hypotension. Got 2 boluses of 500ml NS on 1/27.Normal cortisol level noted    Alcohol withdrawal  Acute toxic metabolic encephalopathy  -- Clinical concern for alcohol withdrawal during the intubation  -- Precedex weaned off. Cont with thiamine. Ativan prn.  --Alcohol level less than 10 on admission.  Collateral  information from wife-he did not find any evidence of patient drinking recently.  Patient denies drinking prior to hospitalization.  Most recent chemical dependency treatment in 2001.  Since then patient tried to quit drinking numerous times, ending up in the hospital with withdrawal.  He is on disability for 2 years, in the past was a nicole.  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  Hyperactive delirium, developed 1/31.  Psychiatry consulted.    Hypokalemia  -- Replaced, monitor and replace as needed magnesium    Hyperglycemia  DM type 2  -- A1c was 7.0 on 1/15/22  -- Hypoglycemic again. Lantus discontinued today. Cont only with sliding scale    Nausea/vomiting  -- Resolved      Disposition; possibly 1-2 days.    Barrier to discharge; O2,       Subjective:      O2 down.  No SOB.  Feeling better.  Working with PT and cooperative when I saw    Review of Systems:   As mentioned in subjective.    Patient Active Problem List   Diagnosis     Alcohol dependence with unspecified alcohol-induced disorder (H)     Charcot's joint of foot, left     Fall     Hypomagnesemia     Iron deficiency anemia     Restless legs     Type 2 diabetes mellitus without complication (H)     Secondary hypertension     Tobacco use disorder     Meningioma (H)     Falls frequently     Alcoholic intoxication with complication (H)     Closed nondisplaced fracture of phalanx of toe of left foot, unspecified toe, initial encounter     Alcohol use disorder, severe, dependence (H)     Sinus tachycardia     Pneumonia of both lungs due to infectious organism, unspecified part of lung     Non-intractable vomiting with nausea, unspecified vomiting type     Agitation     Hypocalcemia       Scheduled Meds:    remdesivir  100 mg Intravenous Q24H    And     sodium chloride 0.9%  50 mL Intravenous Q24H     calcium carbonate  1,000 mg Oral BID     dexamethasone  6 mg Intravenous Daily     DULoxetine  60 mg Oral or Feeding Tube  Daily     enoxaparin ANTICOAGULANT  40 mg Subcutaneous Q24H     folic acid  1 mg Oral Daily     gabapentin  200 mg Oral BID     insulin aspart  1-7 Units Subcutaneous TID AC     insulin aspart  1-5 Units Subcutaneous At Bedtime     insulin glargine  15 Units Subcutaneous BID     ipratropium-albuterol  1 puff Inhalation 4x daily     lidocaine  1 patch Transdermal Q24H     lidocaine   Transdermal Q8H     magnesium oxide  400 mg Oral BID     metoprolol succinate ER  25 mg Oral Daily     multivitamin, therapeutic  1 tablet Oral Daily     pantoprazole  40 mg Oral QAM AC     piperacillin-tazobactam  3.375 g Intravenous Q8H     QUEtiapine  6.25 mg Oral BID w/meals     QUEtiapine  50 mg Oral At Bedtime     sodium chloride (PF)  3 mL Intracatheter Q8H     thiamine  100 mg Oral Daily     Continuous Infusions:    dextrose Stopped (01/24/22 0230)     - MEDICATION INSTRUCTIONS -       PRN Meds:.acetaminophen **OR** acetaminophen, acetaminophen-codeine, bisacodyl, dextrose, glucose **OR** dextrose **OR** glucagon, famotidine, flumazenil, haloperidol lactate, OLANZapine zydis **OR** haloperidol lactate, lidocaine 4%, lidocaine (buffered or not buffered), LORazepam, magnesium hydroxide, - MEDICATION INSTRUCTIONS -, melatonin, menthol-zinc oxide, metoprolol, naloxone **OR** naloxone **OR** naloxone **OR** naloxone, ondansetron **OR** ondansetron, polyethylene glycol, sodium chloride (PF), sucralfate    Objective:  Vital signs in last 24 hours:  Temp:  [97.3  F (36.3  C)-98.5  F (36.9  C)] 97.9  F (36.6  C)  Pulse:  [] 92  Resp:  [18-20] 20  BP: ()/(58-73) 98/61  SpO2:  [92 %-100 %] 97 %        Intake/Output Summary (Last 24 hours) at 1/26/2022 1610  Last data filed at 1/26/2022 1150  Gross per 24 hour   Intake 840 ml   Output 2100 ml   Net -1260 ml       Physical Exam:    General: Not in obvious distress.  HEENT: NC, AT   Chest: Diminished breath sounds on auscultation bilaterally  Heart: S1S2 normal, regular. No  M/R/G  Abdomen: Soft. NT, ND. Bowel sounds- active.  Extremities: No legs swelling  Neuro: Alert and awake, grossly non-focal      Lab Results:(I have personally reviewed the results)    Recent Results (from the past 24 hour(s))   Glucose by meter    Collection Time: 02/01/22  8:22 PM   Result Value Ref Range    GLUCOSE BY METER POCT 192 (H) 70 - 99 mg/dL   Glucose by meter    Collection Time: 02/02/22  2:24 AM   Result Value Ref Range    GLUCOSE BY METER POCT 166 (H) 70 - 99 mg/dL   CBC with platelets    Collection Time: 02/02/22  6:14 AM   Result Value Ref Range    WBC Count 8.0 4.0 - 11.0 10e3/uL    RBC Count 3.16 (L) 4.40 - 5.90 10e6/uL    Hemoglobin 9.6 (L) 13.3 - 17.7 g/dL    Hematocrit 28.2 (L) 40.0 - 53.0 %    MCV 89 78 - 100 fL    MCH 30.4 26.5 - 33.0 pg    MCHC 34.0 31.5 - 36.5 g/dL    RDW 19.2 (H) 10.0 - 15.0 %    Platelet Count 223 150 - 450 10e3/uL   CRP inflammation    Collection Time: 02/02/22  6:14 AM   Result Value Ref Range    CRP 2.8 (H) 0.0-<0.8 mg/dL   D dimer quantitative    Collection Time: 02/02/22  6:14 AM   Result Value Ref Range    D-Dimer Quantitative 0.78 (H) 0.00 - 0.50 ug/mL FEU   Comprehensive metabolic panel    Collection Time: 02/02/22  6:14 AM   Result Value Ref Range    Sodium 145 136 - 145 mmol/L    Potassium 3.4 (L) 3.5 - 5.0 mmol/L    Chloride 107 98 - 107 mmol/L    Carbon Dioxide (CO2) 33 (H) 22 - 31 mmol/L    Anion Gap 5 5 - 18 mmol/L    Urea Nitrogen 8 8 - 22 mg/dL    Creatinine 0.60 (L) 0.70 - 1.30 mg/dL    Calcium 7.3 (L) 8.5 - 10.5 mg/dL    Glucose 161 (H) 70 - 125 mg/dL    Alkaline Phosphatase 106 45 - 120 U/L    AST 37 0 - 40 U/L    ALT 19 0 - 45 U/L    Protein Total 4.5 (L) 6.0 - 8.0 g/dL    Albumin 1.6 (L) 3.5 - 5.0 g/dL    Bilirubin Total 0.8 0.0 - 1.0 mg/dL    GFR Estimate >90 >60 mL/min/1.73m2   Ammonia    Collection Time: 02/02/22  6:14 AM   Result Value Ref Range    Ammonia 32 11 - 35 umol/L   Magnesium    Collection Time: 02/02/22  6:14 AM   Result Value Ref  Range    Magnesium 1.7 (L) 1.8 - 2.6 mg/dL   Glucose by meter    Collection Time: 02/02/22  7:46 AM   Result Value Ref Range    GLUCOSE BY METER POCT 155 (H) 70 - 99 mg/dL   Glucose by meter    Collection Time: 02/02/22 12:04 PM   Result Value Ref Range    GLUCOSE BY METER POCT 106 (H) 70 - 99 mg/dL   Potassium    Collection Time: 02/02/22  1:00 PM   Result Value Ref Range    Potassium 3.4 (L) 3.5 - 5.0 mmol/L   Glucose by meter    Collection Time: 02/02/22  5:04 PM   Result Value Ref Range    GLUCOSE BY METER POCT 91 70 - 99 mg/dL       All laboratory and imaging data in the past 24 hours reviewed  Serum Glucose range:   Recent Labs   Lab 02/02/22  1704 02/02/22  1204 02/02/22  0746 02/02/22 0614   GLC 91 106* 155* 161*     ABG:   Recent Labs   Lab 02/01/22  0537   PH 7.50*     CBC:   Recent Labs   Lab 02/02/22  0614 02/01/22  0537 01/31/22  0543   WBC 8.0 9.4 9.3   HGB 9.6* 10.1* 10.5*   HCT 28.2* 29.0* 30.2*   MCV 89 88 88    259 297     Chemistry:   Recent Labs   Lab 02/02/22  1300 02/02/22  0614 02/01/22  0537 01/31/22  0543   NA  --  145 140 138   POTASSIUM 3.4* 3.4* 3.6 3.7   CHLORIDE  --  107 102 99   CO2  --  33* 32* 26   BUN  --  8 6* 6*   CR  --  0.60* 0.60* 0.62*   GFRESTIMATED  --  >90 >90 >90   BECKY  --  7.3* 7.5* 7.5*   MAG  --  1.7* 1.7* 1.7*   PROTTOTAL  --  4.5*  --   --    ALBUMIN  --  1.6*  --   --    AST  --  37  --   --    ALT  --  19  --   --    ALKPHOS  --  106  --   --    BILITOTAL  --  0.8  --   --    PRIYANKA  --  32  --   --      Coags:  Recent Labs   Lab 01/30/22  1050   INR 1.67*     Cardiac Markers:  No results for input(s): CKTOTAL, TROPONINI in the last 168 hours.       MR Brain w/o Contrast    Result Date: 1/22/2022  EXAM: MR BRAIN W/O CONTRAST LOCATION: [facility] DATE/TIME: 1/22/2022 1:05 PM INDICATION: Encephalopathy COMPARISON: CT head 01/18/2022, MRI head 11/23/2020 TECHNIQUE: Routine multiplanar multisequence head MRI without intravenous contrast.  FINDINGS: INTRACRANIAL CONTENTS: Please note study is significantly degraded by motion artifact. Suggestion of a few small foci of restricted diffusion at the left temporal occipital region; best appreciated on repeat axial diffusion weighted sequence series 20.2 image 12, image 11; as well as repeat coronal diffusion weighted sequence series 24.2 image 9. No definite associated hemorrhage or significant mass effect. Redemonstration of small chronic infarction left precentral gyrus. Mild generalized cerebral atrophy. No hydrocephalus. Normal position of the cerebellar tonsils. Small chronic infarction lateral aspect left cerebellum. A few additional tiny infarctions demonstrated on prior exam are not well demonstrated on the current. SELLA: Not well-visualized, grossly normal. OSSEOUS STRUCTURES/SOFT TISSUES: Normal marrow signal. Flow voids are not well-visualized. ORBITS: Not well-visualized. SINUSES/MASTOIDS: The complete opacification left maxillary and left sphenoid sinus, similar to prior. Small amount of opacification right mastoid air cells.     IMPRESSION: 1.  Please note study is significantly degraded by motion artifact. 2.  Suggestion of a few small foci of acute/early subacute infarction at the left temporal occipital region. 3.  No definite associated hemorrhage or significant mass effect. 4.  Small chronic infarction left precentral gyrus, similar to prior. 5.  Probable few small chronic infarctions cerebellar hemispheres. 6.  Mild atrophy.    XR Chest Port 1 View    Result Date: 1/19/2022  EXAM: XR CHEST PORT 1 VIEW LOCATION: [facility] DATE/TIME: 1/19/2022 10:52 AM INDICATION: after advancing et tube COMPARISON: 1/18/2022.     IMPRESSION: Endotracheal tube is been advanced. Tip is 4 cm above the igor in good position. There our persistent bibasilar pulmonary opacities with partial clearing at the right base from the prior study and no change at the left base. No  pneumothorax. Enteric tube tip is not visualized tip is below the diaphragm. PICC catheter from right upper extremity approach is unchanged with tip at the junction of the superior vena cava and right atrium. Left posterior rib fractures are again noted as well as deformity of the right humeral head.    XR Chest Port 1 View    Result Date: 1/18/2022  EXAM: XR CHEST PORT 1 VIEW LOCATION: [facility] DATE/TIME: 1/18/2022 6:06 AM INDICATION: Location of ET tube COMPARISON: 01/16/2022.     IMPRESSION:Endotracheal tube is in the trachea at the level of the clavicular heads with tip 6 cm above the igor. PICC catheter from right upper extremity approach is in the distal superior vena cava near its junction with the right atrium. Enteric tube tip is below the diaphragm and not visualized. There is no pneumothorax. Again noted are bilateral patchy airspace opacities there has been increased consolidation or atelectasis at the right medial lung base. There are old healed left posterior rib  fractures no acute fractures are identified.    XR Chest Port 1 View    Result Date: 1/16/2022  EXAM: XR CHEST PORT 1 VIEW LOCATION: [facility] DATE/TIME: 1/16/2022 12:34 PM INDICATION: ET tube placement, PICC line placement COMPARISON: CT pulmonary angiogram 01/15/2022     IMPRESSION: ET tube tip projects 6.5 cm above the igor. Gastric tube extends towards the diaphragmatic hiatus, outside the field-of-view. Right PICC terminates upper right atrium. Extensive bilateral airspace opacities are present with patchy lung involvement, unchanged from yesterday. No new focal lung volume loss. No visible pleural fluid or pneumothorax on this single supine view.    XR Chest Port 1 View    Result Date: 1/15/2022  EXAM: XR CHEST PORT 1 VIEW LOCATION: [facility] DATE/TIME: 1/15/2022 5:16 PM INDICATION: Shortness of breath COMPARISON: 11/5/2021     IMPRESSION: There are mild  opacities in both lungs which have increased from the prior exam. This is likely due to a superimposed infectious or inflammatory process on top of mild background scarring. Normal heart size and pulmonary vascularity. Old left  rib fractures.    XR Abdomen Port 1 View    Result Date: 1/17/2022  EXAM: XR ABDOMEN PORT 1 VIEWS LOCATION: [facility] DATE/TIME: 1/17/2022 1:25 PM INDICATION: Location of OG tube COMPARISON: None.     IMPRESSION: Enteric tube in the body the stomach.     CT Chest Pulmonary Embolism w Contrast    Result Date: 1/15/2022  EXAM: CT CHEST PULMONARY EMBOLISM W CONTRAST LOCATION: [facility] DATE/TIME: 1/15/2022 6:17 PM INDICATION: Shortness of breath COMPARISON: Portable AP view of the chest 01/15/2022; CT of the abdomen and pelvis 10/05/2020 TECHNIQUE: CT chest pulmonary angiogram during arterial phase injection of IV contrast. Multiplanar reformats and MIP reconstructions were performed. Dose reduction techniques were used. CONTRAST: 100ml isovue 370 FINDINGS: ANGIOGRAM CHEST: Pulmonary arteries are normal caliber and negative for pulmonary emboli. Normal caliber thoracic aorta. There are no findings to suggest acute aortic syndrome. Proximal great vessels are patent. Diminutive left vertebral artery arises directly from the arch. Mild atheromatous calcifications descending aorta and distal right innominate artery. LUNGS AND PLEURA: Upper lung predominant mixed centrilobular and paraseptal emphysema. Superimposed patchy airspace opacities are present bilaterally, asymmetric to the right associated with internal interstitial opacity. Minimal right pleural fluid layers dependently. There is chronic thickening of the left posterior and posterolateral pleura adjacent to multiple left rib fracture deformities. Central airways are patent. MEDIASTINUM: Cardiac chambers are normal in size. No pericardial effusion is present. Mildly enlarged lymph nodes  in both analia, likely reactive in the setting of airspace opacities. Subcarinal, lower paratracheal and subaortic/prevascular lymph nodes are  all normal by size criteria. The middle third and distal thirds of the esophagus has circumferential wall thickening consistent with esophagitis. Imaged thyroid gland is normal. CORONARY ARTERY CALCIFICATION: None. UPPER ABDOMEN: There are multiple calcified gallstones in the neck of the gallbladder. Severe fatty infiltration of the liver. The imaged distal transverse colon/splenic flecture has wall thickening. Previous splenectomy with small splenule present. 15 mm length ovoid calcification in the region of the pancreas neck is unchanged. MUSCULOSKELETAL: There are multiple ununited left posterior and posterolateral rib fracture deformities including lateral ribs 7, 8 and posterior ribs 9 and 11. The posterolateral right ninth rib is broken in 2 locations and the fractures have fairly sharp borders suggesting acute on subacute fractures..     IMPRESSION: 1.  No acute pulmonary embolism. 2.  Multifocal bilateral airspace opacities are present mixed groundglass and interstitial thickening asymmetric to the right. Imaging features can be seen with (COVID-19)  pneumonia, though are nonspecific and can occur with a variety of infectious and noninfectious processes. Given the other findings below, aspiration is a strong differential consideration. 3.  Severe wall thickening of the middle and distal thirds of the esophagus consistent with a subacute esophagitis. 4.  Severe hepatic steatosis. 5.  Acute on chronic fracture deformities of multiple left lateral/posterolateral ribs. 6.  Cholelithiasis. 7.  Wall thickening of the imaged colon consistent with colitis.    CT Head w/o Contrast    Result Date: 1/18/2022  EXAM: CT HEAD W/O CONTRAST LOCATION: [facility] DATE/TIME: 1/18/2022 3:45 AM INDICATION: Headache, intracranial hemorrhage suspected. COMPARISON:  2021 TECHNIQUE: Routine CT Head without IV contrast. Multiplanar reformats. Dose reduction techniques were used. FINDINGS: INTRACRANIAL CONTENTS: No intracranial hemorrhage, extraaxial collection, or mass effect. No CT evidence of acute infarct. Mild presumed chronic small vessel ischemic changes. Mild to moderate generalized volume loss. No hydrocephalus. Stable calcified dural based lesion along the right frontal convexity measuring 1 cm in diameter, presumably reflecting a small meningioma. VISUALIZED ORBITS/SINUSES/MASTOIDS: No intraorbital abnormality. Complete opacification of the left maxillary sinus with chronic mucoperiosteal reaction. Mucous retention cyst in the left sphenoid sinus. Mild mucosal thickening along the floor of the left frontal sinus. Opacified left frontoethmoidal recess. No middle ear or mastoid effusion. BONES/SOFT TISSUES: No acute abnormality.     IMPRESSION: 1.  No CT evidence for acute intracranial process. 2.  Stable chronic changes as above.    XR Video Swallow with SLP or OT    Result Date: 2022  EXAM: XR VIDEO SWALLOW WITH SLP OR OT LOCATION: [facility] DATE/TIME: 2022 3:27 PM INDICATION: Difficulty swallowing. COMPARISON: None. TECHNIQUE: Routine swallow study with speech pathology using multiple barium thicknesses. FINDINGS: FLUOROSCOPIC TIME: 1.5 minutes NUMBER OF IMAGES: 9 Swallow study with Speech Pathology using multiple barium thicknesses. Moderate to deep penetration with thin liquid (no cough reflex). Small amount of penetration with mildly thick consistency. No other penetration or aspiration.     EEG Coma or Sleep Only    Result Date: 2022  ELECTROENCEPHALOGRAM (EEG) REPORT [facility] NEUROLOGY [place] [address] [place] [address] Tel: [phone]  Fax: [phone] [email or web address] Peewee Hess,  1965, MRN [id number] PCP: Sarah Canseco Date: 2022 Principal Diagnosis:  Altered mental status History : Patient is being evaluated for altered mental status. Medication listed includes Ativan Description of the Recording:  This is a multichannel digital EEG recording done using the international 10-20 placement system. Background of the recording consists of irregular 1 to 3 Hz polymorphic delta activity with amplitudes ranging between 20 to 30  V.  Alerting procedure produce minimal change.  Photic stimulation performed with standard protocol produced minimal change.  No transient sleep patterns were recorded. During this recording, no sharp discharges, spikes or electrographic seizure activity was recorded. Impression: This is an abnormal EEG due to diffusely slow polymorphic delta activity that minimally attenuates with alerting procedures.  This EEG suggests nonspecific generalized cerebral dysfunction.  No focal slowing or periodic discharges were seen to suggest seizures. Classification: Delta grade I generalized Tiburcio Gallagher MD [facility] This note was dictated using voice recognition software.  Any grammatical or context distortions are unintentional and inherent to the software.     Latest radiology report personally reviewed.    Note created using dragon voice recognition software so sounds alike errors may have escaped editing.    02/02/2022

## 2022-02-03 NOTE — PROGRESS NOTES
Hospitalist Progress Note  ADMIT DATE: 1/15/2022     FACILITY: [facility]    PCP: Sarah Canseco, [phone]    Assessment/Plan    Peewee Hess is a 56 year old male with PMH significant for alcohol abuse, hypertension, DM-II, multiple abdominal surgeries who was admitted on 1/15/22 with alcohol withdrawal, possible aspiration pneumonia leading to respiratory failure requiring intubation on 1/16, successfully extubated on 1/23/22 and now transferred out of ICU on 1/26/22 for floor care.    Overnight: hypoglycemia in am with mild change of mental status     Possible aspiration pneumonia  Acute hypoxic respiratory failure  -- Required intubation 1/16, extubated on 1/23. Required BiPAP 1/23, transitioned to HFNC on 1/24. Currently on 30LPM 40% FiO2  -- Completed 7 days course of IV zosyn on 1/22  -- Cont albuterol nebs and pulm hygiene  -- CT PE was negative for PE on adission  -- CT chest 1/28 showed New airway wall thickening in the left upper and lower lobes with new consolidation in the dependent portion of the left upper lobe and at the left lung base, and mucoid impaction in left lower lobe airways.  -- With new consolidation and leucocytosis, restarted him on IV zosyn. Procal elevated as well. Appreciate pulm consult. Cont with pulm hygiene.  -- Weekly COVID (per protocol) came back positive. He was negative on admission on 1/22. Check COVID labs. Start Covid special precaution.   -- 1/30 started IV remdesivir and Dexamethasone.  --1/31 given dose of tocilizumab/per pulmonology recommendations.  --02/02 down to 1L O2.  Complete 1 more day zosyn (done 2/3), last dose remdesivir 02/03.         Septic shock-resolved  -- Likely due to multifocal pneumonia.  -- Metoprolol started on 1/25 for sinus tachycardia  -- Patient is having intermittent hypotension. Got 2 boluses of 500ml NS on 1/27.Normal cortisol level noted     Alcohol withdrawal  Acute toxic metabolic encephalopathy  --  Clinical concern for alcohol withdrawal during the intubation  -- Precedex weaned off. Cont with thiamine. Ativan prn.  --Alcohol level less than 10 on admission.  Collateral information from wife-he did not find any evidence of patient drinking recently.  Patient denies drinking prior to hospitalization.  Most recent chemical dependency treatment in 2001.  Since then patient tried to quit drinking numerous times, ending up in the hospital with withdrawal.  He is on disability for 2 years, in the past was a nicole.  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  Hyperactive delirium, developed 1/31.  Psychiatry consulted.     Hypokalemia  -- Replaced, monitor and replace as needed magnesium     Hyperglycemia  DM type 2  -- A1c was 7.0 on 1/15/22  -- Hypoglycemic again. Hold Lantus; Cont only with sliding scale  -re-eval in am     Nausea/vomiting  -- Resolved        Disposition; possibly 1-2 days to tcu or acute rehab or home? Need pt/ot re-eval  Barrier to discharge; O2, hypoglycemia    Subjective  Hypoglycemia episodes with mild change of mental status; no sob, no cp, no f/c.  Swallow better; still on o2    Objective    Vital signs in last 24 hours  Temp:  [97.7  F (36.5  C)-98.5  F (36.9  C)] 98  F (36.7  C)  Pulse:  [69-92] 75  Resp:  [16-20] 18  BP: ()/(61-81) 120/73  SpO2:  [92 %-97 %] 97 % [unfilled] O2 Device: Nasal cannula    Weight:   [unfilled] Weight change:     Intake/Output last 3 shifts  I/O last 3 completed shifts:  In: 800 [P.O.:800]  Out: 825 [Urine:825]  Body mass index is 19.44 kg/m .    Physical Exam    Physical Exam  General appearance: not in acute distress  HEENT: PERRL, EOMI  Lungs: Clear breath sounds in bilateral lung fields  Cardiovascular: Regular rate and rhythm, normal S1-S2  Abdomen: Soft, non tender, no distension, normal bowel sound  Musculoskeletal: No joint swelling  Skin: No rash and no edema  Neurology: AAO ×3.  Cranial nerves II - XII  normal.  General weakness.   Pertinent Labs   Lab Results: personally reviewed.   Results for KAREY LIVINGSTON (MRN [id number]) as of 2/3/2022 09:59   Ref. Range 2/3/2022 07:59   GLUCOSE BY METER POCT Latest Ref Range: 70 - 99 mg/dL 47 (LL)       Medications  Scheduled Meds:    remdesivir  100 mg Intravenous Q24H    And     sodium chloride 0.9%  50 mL Intravenous Q24H     calcium carbonate  1,000 mg Oral BID     dexamethasone  6 mg Intravenous Daily     DULoxetine  60 mg Oral or Feeding Tube Daily     enoxaparin ANTICOAGULANT  40 mg Subcutaneous Q24H     folic acid  1 mg Oral Daily     gabapentin  200 mg Oral BID     insulin aspart  1-7 Units Subcutaneous TID AC     insulin aspart  1-5 Units Subcutaneous At Bedtime     insulin glargine  15 Units Subcutaneous BID     ipratropium-albuterol  1 puff Inhalation 4x daily     lidocaine  1 patch Transdermal Q24H     lidocaine   Transdermal Q8H     magnesium oxide  400 mg Oral BID     metoprolol succinate ER  25 mg Oral Daily     multivitamin, therapeutic  1 tablet Oral Daily     pantoprazole  40 mg Oral QAM AC     piperacillin-tazobactam  3.375 g Intravenous Q8H     QUEtiapine  6.25 mg Oral BID w/meals     QUEtiapine  50 mg Oral At Bedtime     sodium chloride (PF)  3 mL Intracatheter Q8H     thiamine  100 mg Oral Daily     Continuous Infusions:    dextrose Stopped (01/24/22 0230)     - MEDICATION INSTRUCTIONS -       PRN Meds:.acetaminophen **OR** acetaminophen, acetaminophen-codeine, bisacodyl, dextrose, glucose **OR** dextrose **OR** glucagon, famotidine, flumazenil, haloperidol lactate, OLANZapine zydis **OR** haloperidol lactate, lidocaine 4%, lidocaine (buffered or not buffered), LORazepam, magnesium hydroxide, - MEDICATION INSTRUCTIONS -, melatonin, menthol-zinc oxide, metoprolol, naloxone **OR** naloxone **OR** naloxone **OR** naloxone, ondansetron **OR** ondansetron, polyethylene glycol, sodium chloride (PF), sucralfate      Advanced Care Planning:  Discharge  planning discussed with patient         [facility] Medicine Service  Alicia Parker

## 2022-02-03 NOTE — PLAN OF CARE
Pt has been alert with intermittent confusion, illogical ideas/ responses but will follow directions and is participating in cares. Pt was also more restless today, possibly related to need to use commode as pt has had 3-4 stools today, 3 incontinent, large loose stools. Updated MD. Pt also had low blood sugars, MD updated. Pt has had poor po intake today, denied nausea. Encouraged fluids. For lunch pt omly had half a majic cup and and a few bites of cottage cheese. Pt was heavy assist x2 to commode. Pt is on tele in NSR with occasioanl PVC's. Pt denied SOB, has an occasional non-productive cough, sats upper 90's on 1L O2 n/c. LS clear/diminished.   Problem: Risk for Delirium  Goal: Improved Behavioral Control  Outcome: Improving  Goal: Improved Attention and Thought Clarity  Outcome: Improving     Problem: Violence Risk or Actual  Goal: Anger and Impulse Control  Outcome: Improving     Problem: Gas Exchange Impaired  Goal: Optimal Gas Exchange  Outcome: Improving  Intervention: Optimize Oxygenation and Ventilation  Recent Flowsheet Documentation  Taken 2/3/2022 0820 by Basilia Orona RN  Head of Bed (HOB) Positioning: HOB at 20-30 degrees     Problem: OT General Care Plan  Goal: Toilet Transfer/Toileting (OT)  Description: Toilet Transfer/Toileting (OT)  Outcome: Improving     Problem: Adult Inpatient Plan of Care  Goal: Absence of Hospital-Acquired Illness or Injury  Intervention: Identify and Manage Fall Risk  Recent Flowsheet Documentation  Taken 2/3/2022 0820 by Basilia Orona RN  Safety Promotion/Fall Prevention:   activity supervised   assistive device/personal items within reach   bed alarm on   nonskid shoes/slippers when out of bed   patient and family education  Intervention: Prevent Skin Injury  Recent Flowsheet Documentation  Taken 2/3/2022 0820 by Basilia Orona RN  Body Position: position changed independently  Goal: Optimal Comfort and Wellbeing  Intervention: Provide Person-Centered  Care  Recent Flowsheet Documentation  Taken 2/3/2022 1246 by Basilia Orona, RN  Trust Relationship/Rapport:   care explained   questions answered   questions encouraged   emotional support provided  Taken 2/3/2022 0820 by Basilia Orona RN  Trust Relationship/Rapport:   care explained   choices provided   questions encouraged   questions answered   reassurance provided   thoughts/feelings acknowledged     Problem: Ventilator-Induced Lung Injury (Mechanical Ventilation, Invasive)  Goal: Absence of Ventilator-Induced Lung Injury  Intervention: Prevent Ventilator-Associated Pneumonia  Recent Flowsheet Documentation  Taken 2/3/2022 0820 by Basilia Orona RN  Head of Bed (HOB) Positioning: HOB at 20-30 degrees

## 2022-02-03 NOTE — PROGRESS NOTES
Bed alarm triggered this am by pt, when writer entered room pt had his feet out of bed. When asked what pt was doing he began mumbling, sentences did not make sense to situation. Pt was disoriented to place, time and situation. VS taken and stable, blood sugar check was 43. Pt received 30g glucose, recheck after 15, 20 and 45mins remained under 60. While writer was with pt mentation became more clear, speech became more clear. Pt stated he felt better and at the time of low blood sugar he had felt dizzy/ light headed. Pt also drank 240ml of thicken apple juice and breakfast tray was ordered. MD on unit and updated. Cont to monitor

## 2022-02-03 NOTE — PLAN OF CARE
Problem: Nutrition Impairment (Mechanical Ventilation, Invasive)  Goal: Optimal Nutrition Delivery  Outcome: No Change     Problem: Acute Neurologic Deterioration (Alcohol Withdrawal)  Goal: Optimal Neurologic Function  Outcome: No Change     Problem: Risk for Delirium  Goal: Optimal Coping  Outcome: No Change     Patient on 2 liters O2. On 1 liter during previous shift but started to de sat during evening shift so bumped up one more liter. Patient more A and O this shift then in previous shifts. Able to verbalize desires and needs. Able to answer questions appropriately. Independent ly asked how to properly utilize the flutter valve and when reeducated, used it himself with out having to be coached. Patient assist of 2 to and from chair to bed. Blood sugars WNL, no novolog insulin given. Blood sugar 101 at HS. Lantus given along with thickened OJ and thickened cranberry juice.  Patient incontinent of bowel and bladder. Checked and changed frequently thought out shift.    Patient is on airborne isolation precautions for COVID.

## 2022-02-03 NOTE — PROGRESS NOTES
"CLINICAL NUTRITION SERVICES - REASSESSMENT NOTE     Nutrition Prescription    RECOMMENDATIONS FOR MDs/PROVIDERS TO ORDER:  Recommend increasing insulin with BG consistently > 200    Malnutrition Status:    Severe in acute illness    Recommendations already ordered by Registered Dietitian (RD):  Continue current meals and supplements    Future/Additional Recommendations:  Adjust supplement pending intake/acceptance/needs     EVALUATION OF THE PROGRESS TOWARD GOALS   Diet: Level 5: Minced and moist, mildly thick (level 2) Moderate consistent CHO    Supplement: Vital cuisine, magic cup and gel plus daily    Intake: Improved. Ate 75% of lunch and supper yesterday with estimated intake 752 kcal, 53 g protein (including 1/2 of magic cup)  Ordered 2 fruit and a juice at breakfast yesterday, intake not documented    Pt likes Vital cuisine per nsg.     NEW FINDINGS  Symptomatic hypoglycemia at 0800 today    PHYSICAL FINDINGS  O2 had improved to 1L NC . Back up to 3L now    GI   1 BM/day    LABS  Reviewed  Mag 1.7 2/1 low and stable - on replacement  BG 47-155mg/dl past 24 hours - improved with increases in insulin but now hypoglycemic- lantus decreased today    MEDICATIONS  Folic acid, ssi, lantus, magox, mvi, protonix, thiamine  Decadron, iv abx, mvi with minerals    ANTHROPOMETRICS  Height: 185.4 cm (6' .992\"[documented height[)  Admit wt 186 lb 11.7 oz 1/17/22.  Most Recent Weight: 66.8 kg (147 lb 4.8 oz) 2/2, down 14 lb in 1 week? -17.4% weight loss from weight in Nov  74.3 kg (163 lb 12.8 oz)  ?accurate- 1/25  189 lb 6 oz 1/23/22, -aggressive diuresis  184 lb 8.4 oz 1/24/22  190 lb 7.6 oz 1/19/22,   Weight History:       Wt Readings from Last 8 Encounters:   11/12/21 80.9 kg (178 lb 6.4 oz)   12/30/20 77.1 kg (170 lb)       CURRENT NUTRITION DIAGNOSIS  Malnutrition r/t acute illness evidenced by intake < 50% since admit, moderate fat and muscle loss, suspected weight loss    INTERVENTIONS  Implementation  No " new    Goals  Patient to consume % of nutritionally adequate meals three times per day, or the equivalent with supplements/snacks.-  progressing  Electrolytes WNL- progressing  BG - new    Monitoring/Evaluation  Progress toward goals will be monitored and evaluated per protocol.

## 2022-02-03 NOTE — PLAN OF CARE
Problem: Gas Exchange Impaired  Goal: Optimal Gas Exchange  Intervention: Optimize Oxygenation and Ventilation  Recent Flowsheet Documentation  Taken 2/3/2022 0107 by Eryn Campos RN  Head of Bed (HOB) Positioning: HOB at 20-30 degrees  Oxygen increased to 3L during the shift. Pt noted to be desating in the 87-89%  range on 2L. Frequently removes nasal canula and needs to be reminded to keep it on.   Prn ativan given x1. Pt awake most of the shift. Vital signs stable. Incontinent brief changed, assisted to bedpan per request.

## 2022-02-04 NOTE — PROGRESS NOTES
Care Management Follow Up    Length of Stay (days): 20    Expected Discharge Date: 02/05/2022     Concerns to be Addressed: discharge planning       Patient plan of care discussed at interdisciplinary rounds: Yes    Anticipated Discharge Disposition: Acute Rehab     Anticipated Discharge Services: Other (see comment) (therapy services )    Anticipated Discharge DME: None    Patient/family educated on Medicare website which has current facility and service quality ratings: yes    Education Provided on the Discharge Plan:  Yes    Patient/Family in Agreement with the Plan: yes    Referrals Placed by CM/SW: Post Acute Facilities    Private pay costs discussed: Not applicable    Additional Information: SW noted that pt has continued to be confused, disoriented, and agitated.  Pt Covid positive since 1/30/22. TCUs won't take pt until after 10 or more days from positive test and pt has to be off precautions.  Acute rehabilitation is still recommended by therapy and they can take pts that are Covid positive.    BI called and spoke with pt's wife Daina to discuss discharge planning.  Daina reported that she has also tested positive for Covid, although she has mostly had mild symptoms.  Daina reiterated that she does not believe pt has drank alcohol since November, which is what he has reported to this SW as well as other hospital staff.  Discussed that pt needs to be able to ambulate to go to any kind of chemical dependency treatment and so should wait to begin that until he completes acute rehab.  Daina expressed that she is afraid pt will not follow through with going to CD treatment after getting done with acute rehab.  She stated that this had happened in the past where he feels like he is doing well and does not need to go any longer.  BI recommended that she work with SW at acute rehab on arranging CD treatment for pt to transfer to right when done with acute rehab.  Daina reported that pt was walking around pretty  good after last hospitalization in November.  He only needed a couple of weeks of home care services.  Pt has been on social security disability benefits for one to two years.  Previously he was a .  Discussed sending out more acute rehab referrals, as pt would be able to discharge to one of these facilities if available bed sooner than waiting for precautions to end to go to TCU.  Daina in agreement for BI to send out more acute rehab referrals for pt.      BI sent additional acute rehab referrals to [facility], [facility], [facility], [facility], and [facility] (all pending).      Message from Cyndy at [facility] - no beds this week or next week - if still need bed can re-refer towards end of next week.  Message from Katie at [facility] - not taking any outside referrals at this time.  Message from Fatmata at [facility] that full at both [place] and [facility] currently.  Check back early next week for bed availability.  BI figured out that prior referral sent to wrong option in Epic (correct one is '[facility]'.  BI spoke to Tiffanie at [facility] - she said to wait to send to right one in Epic until Monday 2/7, as currently are no beds.  She also mentioned that they had been experiencing some movement on wait list so only couple people left on it currently.        ROXANNA Armstrong, HANANE 02/04/22 7:48 AM

## 2022-02-04 NOTE — PLAN OF CARE
Problem: Gas Exchange Impaired  Goal: Optimal Gas Exchange  Outcome: Improving  Intervention: Optimize Oxygenation and Ventilation  Lung sounds diminished. Intermittent dry, weak cough. Denies shortness of breath. No labored breathing. Able to wean O2 to RA currently. Continuous pulse oximetry in place.    Problem: Risk for Delirium  Goal: Improved Behavioral Control  Outcome: Improving  Intervention: Prevent and Manage Agitation  Alert to self with confusion to time, place and situation. Garbled, slow speech at times. Restless and falling asleep at the same time. Pulling at O2 tubing, Tele leads and gown this morning; removed gown and O2 and has stopped pulling at lines currently. Sitter remains in room.    Problem: Adult Inpatient Plan of Care  Goal: Readiness for Transition of Care  Outcome: Improving  Care manager following. Awaiting a bed. Spoke with patient's wife to update on status this morning.

## 2022-02-04 NOTE — PLAN OF CARE
Problem: Risk for Delirium  Goal: Optimal Coping  Outcome: Declining  Goal: Improved Behavioral Control  Outcome: Declining  Goal: Improved Attention and Thought Clarity  Outcome: Declining  Goal: Improved Sleep  Outcome: Declining   Patient very restless at beginning of shift. Patient constantly taking off oxygen, pulling off tele leads and trying to get out of bed. Patient was placed on 1:1 at about 0200. Patient received Olanzapine and ativan this shift. Patient has been sleeping since about 0400.

## 2022-02-04 NOTE — PLAN OF CARE
Problem: Gas Exchange Impaired  Goal: Optimal Gas Exchange  Outcome: No Change     Problem: Hypertension Acute  Goal: Blood Pressure Within Desired Range  Outcome: No Change     Patient A and O only to self this shift. General confusion over situation, location and  recent events. Patient showed general weakness, unable to complete physical therapy. Frequent attempts to climb out of bed. Patient on bed alarms for safety.  Haldol IV given, Ativan PO given. Both proved ineffective in treating patient restlessness. Patient incontinent of bowel and bladder. Cdiff test last shift, results are negative.   Patient on 1 liter O2 via nasal canula. Patient frequently pulls off nasal canula and needs it put back on.  Sats in the mid 90s. Home O2 evaluation done this shift.   Patient blood sugars 226 at dinner and 275 at HS.  Novolog insulin given per sliding scale.   Patient on airborne precautions as he is Covid positive.

## 2022-02-04 NOTE — PROGRESS NOTES
Hospitalist Progress Note  ADMIT DATE: 1/15/2022     FACILITY: [facility]    PCP: Sarah Canseco, [phone]    Assessment/Plan    Peewee Hess is a 56 year old male with PMH significant for alcohol abuse, hypertension, DM-II, multiple abdominal surgeries who was admitted on 1/15/22 with alcohol withdrawal, possible aspiration pneumonia leading to respiratory failure requiring intubation on 1/16, successfully extubated on 1/23/22 and now transferred out of ICU on 1/26/22 for floor care.     Possible aspiration pneumonia  Acute hypoxic respiratory failure  -- Required intubation 1/16, extubated on 1/23. Required BiPAP 1/23, transitioned to HFNC on 1/24. Currently on 30LPM 40% FiO2  -- Completed 7 days course of IV zosyn on 1/22  -- Cont albuterol nebs and pulm hygiene  -- CT PE was negative for PE on adission  -- CT chest 1/28 showed New airway wall thickening in the left upper and lower lobes with new consolidation in the dependent portion of the left upper lobe and at the left lung base, and mucoid impaction in left lower lobe airways.  -- With new consolidation and leucocytosis, restarted him on IV zosyn. Procal elevated as well. Appreciate pulm consult. Cont with pulm hygiene.  -- Weekly COVID (per protocol) came back positive. He was negative on admission on 1/22. Check COVID labs. Start Covid special precaution.   -- 1/30 started IV remdesivir and Dexamethasone.  --1/31 given dose of tocilizumab/per pulmonology recommendations.  --RT trying to taper off oxygen     Hyperglycemia  DM type 2  -- A1c was 7.0 on 1/15/22  -- Hypoglycemic resolved after holding Lantus;  -now fs in 200 range; start Lantus 15 units qam     Septic shock-resolved  -- Likely due to multifocal pneumonia.  -- Metoprolol started on 1/25 for sinus tachycardia  -- Patient is having intermittent hypotension. Got 2 boluses of 500ml NS on 1/27.Normal cortisol level noted     Alcohol withdrawal  Acute toxic metabolic  encephalopathy  -- Clinical concern for alcohol withdrawal during the intubation  -- Precedex weaned off. Cont with thiamine. Ativan prn.  --Alcohol level less than 10 on admission.  Collateral information from wife-he did not find any evidence of patient drinking recently.  Patient denies drinking prior to hospitalization.  Most recent chemical dependency treatment in 2001.  Since then patient tried to quit drinking numerous times, ending up in the hospital with withdrawal.  He is on disability for 2 years, in the past was a nicole.  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  Hyperactive delirium, developed 1/31.  Psychiatry consulted.     Hypokalemia/hypomagnesia  -- Replaced, monitor and replace as needed magnesium      Nausea/vomiting  -- Resolved        Disposition; possibly 1-2 days to acute rehab or to tcu when off isolation for covid  Barrier to discharge; DM uncontrolled and placement    Subjective  Feeling better, still very weak; no cp/sob, no n/v, no f/c    Objective    Vital signs in last 24 hours  Temp:  [97.4  F (36.3  C)-98.2  F (36.8  C)] 98.2  F (36.8  C)  Pulse:  [] 100  Resp:  [16-18] 16  BP: (104-140)/(56-90) 104/70  SpO2:  [88 %-100 %] 93 % [unfilled] O2 Device: None (Room air)    Weight:   [unfilled] Weight change:     Intake/Output last 3 shifts  I/O last 3 completed shifts:  In: 3 [I.V.:3]  Out: 200 [Urine:200]  Body mass index is 19.44 kg/m .    Physical Exam    Physical Exam  General appearance: not in acute distress; general weakness  HEENT: PERRL, EOMI  Lungs: Clear breath sounds in bilateral lung fields  Cardiovascular: mild tachycardia  Abdomen: Soft, non tender, no distension, normal bowel sound  Musculoskeletal: No joint swelling  Skin: No rash and no edema; pale+  Neurology: no focal deficits; general weakness      Pertinent Labs   Lab Results: personally reviewed.   Results for KAREY LIVINGSTON (MRN [id number]) as of 2/4/2022 10:44   Ref.  Range 2/4/2022 06:14 2/4/2022 07:54   Potassium Latest Ref Range: 3.5 - 5.0 mmol/L 3.8    Magnesium Latest Ref Range: 1.8 - 2.6 mg/dL 1.7 (L)    GLUCOSE BY METER POCT Latest Ref Range: 70 - 99 mg/dL  251 (H)       Medications  Scheduled Meds:    calcium carbonate  1,000 mg Oral BID     dexamethasone  6 mg Intravenous Daily     DULoxetine  60 mg Oral or Feeding Tube Daily     enoxaparin ANTICOAGULANT  40 mg Subcutaneous Q24H     folic acid  1 mg Oral Daily     gabapentin  200 mg Oral BID     insulin aspart  1-7 Units Subcutaneous TID AC     insulin aspart  1-5 Units Subcutaneous At Bedtime     insulin glargine  15 Units Subcutaneous QAM AC     ipratropium-albuterol  1 puff Inhalation 4x daily     lidocaine  1 patch Transdermal Q24H     lidocaine   Transdermal Q8H     magnesium oxide  400 mg Oral BID     metoprolol succinate ER  25 mg Oral Daily     multivitamin, therapeutic  1 tablet Oral Daily     pantoprazole  40 mg Oral QAM AC     QUEtiapine  6.25 mg Oral BID w/meals     QUEtiapine  50 mg Oral At Bedtime     sodium chloride (PF)  3 mL Intracatheter Q8H     thiamine  100 mg Oral Daily     Continuous Infusions:    dextrose Stopped (01/24/22 0230)     - MEDICATION INSTRUCTIONS -       PRN Meds:.acetaminophen **OR** acetaminophen, acetaminophen-codeine, bisacodyl, dextrose, glucose **OR** dextrose **OR** glucagon, famotidine, flumazenil, haloperidol lactate, OLANZapine zydis **OR** haloperidol lactate, lidocaine 4%, lidocaine (buffered or not buffered), LORazepam, magnesium hydroxide, - MEDICATION INSTRUCTIONS -, melatonin, menthol-zinc oxide, metoprolol, naloxone **OR** naloxone **OR** naloxone **OR** naloxone, ondansetron **OR** ondansetron, polyethylene glycol, sodium chloride (PF), sucralfate      Advanced Care Planning:  Discharge planning discussed with patient and nurse        [facility] Medicine Service  Alicia Parker

## 2022-02-05 NOTE — PROGRESS NOTES
Hospitalist Progress Note  ADMIT DATE: 1/15/2022     FACILITY: [facility]    PCP: Sarah Canseco, [phone]    Assessment/Plan    Peewee Hess is a 56 year old male with PMH significant for alcohol abuse, hypertension, DM-II, multiple abdominal surgeries who was admitted on 1/15/22 with alcohol withdrawal, possible aspiration pneumonia leading to respiratory failure requiring intubation on 1/16, successfully extubated on 1/23/22 and now transferred out of ICU on 1/26/22 for floor care.     Possible aspiration pneumonia  Acute hypoxic respiratory failure  -- Required intubation 1/16, extubated on 1/23. Required BiPAP 1/23, transitioned to HFNC on 1/24. Currently on 30LPM 40% FiO2  -- Completed 7 days course of IV zosyn on 1/22  -- Cont albuterol nebs and pulm hygiene  -- CT PE was negative for PE on adission  -- CT chest 1/28 showed New airway wall thickening in the left upper and lower lobes with new consolidation in the dependent portion of the left upper lobe and at the left lung base, and mucoid impaction in left lower lobe airways.  -- With new consolidation and leucocytosis, restarted him on IV zosyn. Procal elevated as well. Appreciate pulm consult. Cont with pulm hygiene.  -- Weekly COVID (per protocol) came back positive. He was negative on admission on 1/22. Check COVID labs. Start Covid special precaution.   -- 1/30 started IV remdesivir and Dexamethasone. -  --1/31 given dose of tocilizumab/per pulmonology recommendations.  --taper off oxygen; thus discontinue dexamethasone     Hyperglycemia  DM type 2  -- A1c was 7.0 on 1/15/22  -- Hypoglycemic resolved after holding Lantus;  -now fs in 200 range; start Lantus 15 units daily  -discontinue Dexamethasone and expect DM in better control     Septic shock-resolved  -- Likely due to multifocal pneumonia.  -- Metoprolol started on 1/25 for sinus tachycardia  -- Patient is having intermittent hypotension. Got 2 boluses of 500ml  NS on 1/27.Normal cortisol level noted     Alcohol withdrawal  Acute toxic metabolic encephalopathy  -- Clinical concern for alcohol withdrawal during the intubation  -- Precedex weaned off. Cont with thiamine. Ativan prn.  --Alcohol level less than 10 on admission.  Collateral information from wife-he did not find any evidence of patient drinking recently.  Patient denies drinking prior to hospitalization.  Most recent chemical dependency treatment in 2001.  Since then patient tried to quit drinking numerous times, ending up in the hospital with withdrawal.  He is on disability for 2 years, in the past was a nicole.  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  Hyperactive delirium, developed 1/31.  Psychiatry consulted.     Hypokalemia/hypomagnesia  -- Replaced, monitor and replace as needed magnesium      Nausea/vomiting  -- Resolved      Disposition; possibly 1-2 days to acute rehab or to tcu when off isolation for covid  Barrier to discharge; DM uncontrolled and placement    Subjective  No new complaints; still general weak, no cp/sob, no cough, no f/c    Objective    Vital signs in last 24 hours  Temp:  [97.5  F (36.4  C)-98.6  F (37  C)] 98.6  F (37  C)  Pulse:  [] 84  Resp:  [16-20] 20  BP: ()/(64-93) 141/88  SpO2:  [94 %-97 %] 96 % [unfilled] O2 Device: None (Room air)    Weight:   [unfilled] Weight change:     Intake/Output last 3 shifts  I/O last 3 completed shifts:  In: 240 [P.O.:240]  Out: -   Body mass index is 19.44 kg/m .    Physical Exam    Physical Exam  General appearance: not in acute distress; chronic ill  HEENT: PERRL, EOMI  Lungs: Clear breath sounds in bilateral lung fields  Cardiovascular: Regular rate and rhythm, normal S1-S2  Abdomen: Soft, non tender, no distension, normal bowel sound  Musculoskeletal: No joint swelling  Skin: No rash and no edema; + pale  Neurology: AAO ×3.  Cranial nerves II - XII normal. General weakness      Pertinent Labs    Lab Results: personally reviewed.   Results for KAREY LIVINGSTON (MRN [id number]) as of 2/5/2022 11:06   Ref. Range 2/4/2022 20:19 2/5/2022 07:56 2/5/2022 07:58 2/5/2022 08:14   Potassium Latest Ref Range: 3.5 - 5.0 mmol/L   3.9    Magnesium Latest Ref Range: 1.8 - 2.6 mg/dL   1.8    GLUCOSE BY METER POCT Latest Ref Range: 70 - 99 mg/dL 202 (H) 64 (L)  77       Medications  Scheduled Meds:    calcium carbonate  1,000 mg Oral BID     DULoxetine  60 mg Oral or Feeding Tube Daily     enoxaparin ANTICOAGULANT  40 mg Subcutaneous Q24H     folic acid  1 mg Oral Daily     gabapentin  200 mg Oral BID     [Held by provider] insulin aspart  5 Units Subcutaneous TID w/meals     insulin aspart  1-7 Units Subcutaneous TID AC     insulin aspart  1-5 Units Subcutaneous At Bedtime     insulin glargine  15 Units Subcutaneous At Bedtime     ipratropium-albuterol  1 puff Inhalation 4x daily     lidocaine  1 patch Transdermal Q24H     lidocaine   Transdermal Q8H     magnesium oxide  400 mg Oral BID     metoprolol succinate ER  25 mg Oral Daily     multivitamin, therapeutic  1 tablet Oral Daily     pantoprazole  40 mg Oral QAM AC     QUEtiapine  6.25 mg Oral BID w/meals     QUEtiapine  50 mg Oral At Bedtime     sodium chloride (PF)  3 mL Intracatheter Q8H     thiamine  100 mg Oral Daily     Continuous Infusions:    dextrose Stopped (01/24/22 0230)     - MEDICATION INSTRUCTIONS -       PRN Meds:.acetaminophen **OR** acetaminophen, acetaminophen-codeine, bisacodyl, dextrose, glucose **OR** dextrose **OR** glucagon, famotidine, flumazenil, haloperidol lactate, OLANZapine zydis **OR** haloperidol lactate, lidocaine 4%, lidocaine (buffered or not buffered), LORazepam, magnesium hydroxide, - MEDICATION INSTRUCTIONS -, melatonin, menthol-zinc oxide, metoprolol, naloxone **OR** naloxone **OR** naloxone **OR** naloxone, ondansetron **OR** ondansetron, polyethylene glycol, sodium chloride (PF), sucralfate    Advanced Care Planning:  Discharge  planning discussed with patient         [facility] Medicine Service  Alicia Parker

## 2022-02-05 NOTE — PLAN OF CARE
Problem: Adult Inpatient Plan of Care  Goal: Optimal Comfort and Wellbeing  Outcome: Improving  Intervention: Provide Person-Centered Care  Recent Flowsheet Documentation  Taken 2/4/2022 1652 by Omari Olea RN  Trust Relationship/Rapport: care explained     Problem: Risk for Delirium  Goal: Improved Behavioral Control  Outcome: Improving     Problem: Hypertension Acute  Goal: Blood Pressure Within Desired Range  Outcome: Improving     Problem: Gas Exchange Impaired  Goal: Optimal Gas Exchange  Outcome: Improving     Pt alert and oriented to self and location. Pt c/o pain to bilateral shoulders. Pt given PRN acetaminophen-codeine, which was effective. Lidocaine patch placed to left lower back. Pt incontinent of bowel and bladder. Pre-prandial ; given 1 unit of insulin aspart. BG of 202 at HS; given 1 unit of insulin aspart. O2 sat 94% on RA. Denies SOB. Pt able to perform IS and deep breathing exercise w/ encouragement. Pt tolerated minced and moist diet w/ mildly thick liquids. Mepilex dressing to coccyx was clean, dry, and intact. Bed alarm on for patient safety.

## 2022-02-05 NOTE — PLAN OF CARE
Problem: Adult Inpatient Plan of Care  Goal: Absence of Hospital-Acquired Illness or Injury  Intervention: Identify and Manage Fall Risk  Recent Flowsheet Documentation  Taken 2/5/2022 0012 by Laura Shelton RN  Safety Promotion/Fall Prevention:   bed alarm on   assistive device/personal items within reach   clutter free environment maintained   patient video monitoring   room door open   safety round/check completed  No fall overnight. Set off bed alarm x1. Doesn't use call light.     Incontinent urine.

## 2022-02-06 NOTE — PLAN OF CARE
Problem: Adult Inpatient Plan of Care  Goal: Optimal Comfort and Wellbeing  Outcome: No Change  Intervention: Provide Person-Centered Care  Recent Flowsheet Documentation  Taken 2/5/2022 1702 by Omari Olea RN  Trust Relationship/Rapport: care explained     Problem: Risk for Delirium  Goal: Improved Behavioral Control  Outcome: Improving     Problem: Risk for Delirium  Goal: Improved Attention and Thought Clarity  Outcome: No Change     Problem: Inability to Wean (Mechanical Ventilation, Invasive)  Goal: Mechanical Ventilation Liberation  Intervention: Promote Extubation and Mechanical Ventilation Liberation  Recent Flowsheet Documentation  Taken 2/5/2022 1702 by Omari Olea, RN  Medication Review/Management: medications reviewed     Problem: Gas Exchange Impaired  Goal: Optimal Gas Exchange  Outcome: Improving     Pt alert and oriented to self and place. Pt c/o shoulder pain and lower back pain. Scheduled lidocaine patch placed to left lower back. Pt given PRN acetaminophen, which was somewhat effective Pt had hypoglycemic event. BG 41 - given 30 g of glucose gel; BG 53 after 15 mins - given 15 g of glucose gel; BG 68 after 15 mins; pt given IV dextrose 25 mL;  after 15 mins. Dr. Moore updated w/ hypoglycemic event. Bed and chair alarm for pt safety. Pt attempted to get out of chair x1. Pt able to be redirected. Pt cooperative w/ cares. O2 sat 97% on RA. Diminished bilateral lower lobes on auscultation.

## 2022-02-06 NOTE — PROGRESS NOTES
BG 41, given 30 g of glucose gel. BG of 53 after 15 mins. Given another 15 g of glucose gel. BG of 68 15 minutes after that. IV dextrose 25 mL given at this time. Dr. Moore updated w/ pt's hypoglycemic episode. Stated to recheck after IV dextrose. BG of 155 after 15 minutes.

## 2022-02-06 NOTE — SIGNIFICANT EVENT
Significant Event Note    Time of event: 11:32 PM February 5, 2022    Description of event:  Notified of fall that occurred about a half hour ago. Patient was found on his knees by the side of his bed. He did not hit his head or lose consciousness. He was able to stand up, and he was redirected to bed. He has a small abrasion on his knee. No change clinically.    Plan:  No head CT indicated at this time. Will continue to monitor.    Discussed with: bedside nurse    Leana Moore MD

## 2022-02-06 NOTE — PROGRESS NOTES
Hospitalist Progress Note  ADMIT DATE: 1/15/2022     FACILITY: [facility]    PCP: Sarah Canseco, [phone]    Assessment/Plan    Peewee Hess is a 56 year old male with PMH significant for alcohol abuse, hypertension, DM-II, multiple abdominal surgeries who was admitted on 1/15/22 with alcohol withdrawal, possible aspiration pneumonia leading to respiratory failure requiring intubation on 1/16, successfully extubated on 1/23/22 and now transferred out of ICU on 1/26/22 for floor care.    Overnight: Hypoglycemia, soft fall without major injury    Fall  -pt was found on the knee at bedside  -HO eval pt, no need for images    Hypoglycemia -denies symptoms  DM type 2  -- A1c was 7.0 on 1/15/22  -serum glucose up and down -consult DM educator  -discontinue Dexamethasone and expect DM in better control     Possible aspiration pneumonia  Acute hypoxic respiratory failure  -- Required intubation 1/16, extubated on 1/23. Required BiPAP 1/23, transitioned to HFNC on 1/24. Currently on 30LPM 40% FiO2  -- Completed 7 days course of IV zosyn on 1/22  -- Cont albuterol nebs and pulm hygiene  -- CT PE was negative for PE on adission  -- CT chest 1/28 showed New airway wall thickening in the left upper and lower lobes with new consolidation in the dependent portion of the left upper lobe and at the left lung base, and mucoid impaction in left lower lobe airways.  -- With new consolidation and leucocytosis, restarted him on IV zosyn. Procal elevated as well. Appreciate pulm consult. Cont with pulm hygiene.  -- Weekly COVID (per protocol) came back positive. He was negative on admission on 1/22. Check COVID labs. Start Covid special precaution.   -- 1/30 started IV remdesivir and Dexamethasone. -  --1/31 given dose of tocilizumab/per pulmonology recommendations.  --taper off oxygen; thus discontinue dexamethasone on 2/5      Septic shock-resolved  -- Likely due to multifocal pneumonia.  -- Metoprolol  started on 1/25 for sinus tachycardia  -- Patient is having intermittent hypotension. Got 2 boluses of 500ml NS on 1/27.Normal cortisol level noted     Alcohol withdrawal  Acute toxic metabolic encephalopathy  -- Clinical concern for alcohol withdrawal during the intubation  -- Precedex weaned off. Cont with thiamine. Ativan prn.  --Alcohol level less than 10 on admission.  Collateral information from wife-he did not find any evidence of patient drinking recently.  Patient denies drinking prior to hospitalization.  Most recent chemical dependency treatment in 2001.  Since then patient tried to quit drinking numerous times, ending up in the hospital with withdrawal.  He is on disability for 2 years, in the past was a nicole.  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  Hyperactive delirium, developed 1/31.  Psychiatry consulted.     Hypokalemia/hypomagnesia  -- Replaced, monitor and replace as needed magnesium      Nausea/vomiting  -- Resolved      Disposition; possibly 1-2 days to acute rehab or to tcu when off isolation for covid (2/8)  Barrier to discharge; DM uncontrolled and placement     Subjective  Feeling same as yesterday; got confused and try to walk out and fell last night; now awake and alert;     Objective    Vital signs in last 24 hours  Temp:  [97.4  F (36.3  C)-98.6  F (37  C)] 97.8  F (36.6  C)  Pulse:  [65-88] 88  Resp:  [18-20] 18  BP: (115-141)/(75-88) 130/83  SpO2:  [95 %-97 %] 96 % [unfilled] O2 Device: None (Room air)    Weight:   [unfilled] Weight change:     Intake/Output last 3 shifts  No intake/output data recorded.  Body mass index is 19.44 kg/m .    Physical Exam    Physical Exam  General appearance: not in acute distress  HEENT: PERRL, EOMI  Lungs: Clear breath sounds in bilateral lung fields  Cardiovascular: Regular rate and rhythm, normal S1-S2  Abdomen: Soft, non tender, no distension, normal bowel sound  Musculoskeletal: No joint swelling  Skin:  right knee skin abrasion  Neurology: AAO ×3.  Cranial nerves II - XII normal.  General weakness      Pertinent Labs   Lab Results: personally reviewed.   Results for KAREY LIVINGSTON (MRN [id number]) as of 2/6/2022 07:49   Ref. Range 2/5/2022 20:51 2/5/2022 21:15 2/5/2022 21:40 2/5/2022 22:14 2/5/2022 23:33 2/6/2022 02:00 2/6/2022 05:40   Magnesium Latest Ref Range: 1.8 - 2.6 mg/dL       1.8   GLUCOSE BY METER POCT Latest Ref Range: 70 - 99 mg/dL 41 (LL) 53 (L) 68 (L) 155 (H) 171 (H) 244 (H)        Medications  Scheduled Meds:    calcium carbonate  1,000 mg Oral BID     DULoxetine  60 mg Oral or Feeding Tube Daily     enoxaparin ANTICOAGULANT  40 mg Subcutaneous Q24H     folic acid  1 mg Oral Daily     gabapentin  200 mg Oral BID     insulin aspart  5 Units Subcutaneous TID w/meals     insulin aspart  1-7 Units Subcutaneous TID AC     insulin aspart  1-5 Units Subcutaneous At Bedtime     insulin glargine  15 Units Subcutaneous At Bedtime     ipratropium-albuterol  1 puff Inhalation 4x daily     lidocaine  1 patch Transdermal Q24H     lidocaine   Transdermal Q8H     magnesium oxide  400 mg Oral BID     metoprolol succinate ER  25 mg Oral Daily     multivitamin, therapeutic  1 tablet Oral Daily     pantoprazole  40 mg Oral QAM AC     QUEtiapine  6.25 mg Oral BID w/meals     QUEtiapine  50 mg Oral At Bedtime     sodium chloride (PF)  3 mL Intracatheter Q8H     thiamine  100 mg Oral Daily     Continuous Infusions:    dextrose Stopped (01/24/22 0230)     - MEDICATION INSTRUCTIONS -       PRN Meds:.acetaminophen **OR** acetaminophen, acetaminophen-codeine, bisacodyl, dextrose, glucose **OR** dextrose **OR** glucagon, famotidine, flumazenil, haloperidol lactate, OLANZapine zydis **OR** haloperidol lactate, lidocaine 4%, lidocaine (buffered or not buffered), LORazepam, magnesium hydroxide, - MEDICATION INSTRUCTIONS -, melatonin, menthol-zinc oxide, metoprolol, naloxone **OR** naloxone **OR** naloxone **OR** naloxone,  ondansetron **OR** ondansetron, polyethylene glycol, sodium chloride (PF), sucralfate    Advanced Care Planning:  Discharge planning discussed with patient         [facility]  Alicia Parker

## 2022-02-06 NOTE — PLAN OF CARE
Problem: Acute Neurologic Deterioration (Alcohol Withdrawal)  Goal: Optimal Neurologic Function  Outcome: No Change   Pt A&O to person, place, off date by two days. Pt did not recognize self on Ipad in room. Pt not able to explain reason for being here.   Problem: Violence Risk or Actual  Goal: Anger and Impulse Control  Outcome: Improving   Pt restless, redirectable but forgetful. No anger towards staff noted. Pt sleeping on and off this am.  Problem: Fall Injury Risk  Goal: Absence of Fall and Fall-Related Injury  Outcome: No Change  Intervention: Promote Injury-Free Environment  Recent Flowsheet Documentation  Taken 2/6/2022 0819 by Debby Choi, RN  Safety Promotion/Fall Prevention:   safety round/check completed   patient and family education   lighting adjusted   increase visualization of patient   chair alarm on   bed alarm on   Bed and chair alarms in place. Pt redirected to call for assistance, not to get OOB without staff assist. Ipad pt monitoring from desk area for safety of pt.

## 2022-02-06 NOTE — SIGNIFICANT EVENT
Pt set of chair alarm and was found w/ knees on the fall. Pt fall was witnessed by the [facility] via video monitoring. Staff assisted pt back to his bed. Chair alarm was on. Non-skid socks present. Pt's light was on. Pt denied hitting his head or losing consciousness. House officer Dr. Moore notified. Pt has small abrasion to right knee. Site cleansed and is open to air. Current . VSS. Bed alarm on. Video monitoring present.

## 2022-02-06 NOTE — PLAN OF CARE
Problem: Fall Injury Risk  Goal: Absence of Fall and Fall-Related Injury  Outcome: Declining  Intervention: Promote Injury-Free Environment  Recent Flowsheet Documentation  Taken 2/6/2022 0156 by Laura Shelton RN  Safety Promotion/Fall Prevention:   activity supervised   assistive device/personal items within reach   bed alarm on   fall prevention program maintained   nonskid shoes/slippers when out of bed   safety round/check completed   Patient had fall at 11pm. Sustained small skin tear left forearm under PIV dressing.    Problem: Adult Inpatient Plan of Care  Goal: Optimal Comfort and Wellbeing  Outcome: No Change  Shoulder pain controlled with prn Tylenol #3 and Ativan.     Incontinent of urine x2 overnight. Patient does not use urinal on his own, but will if assisted by staff. Removes gown, tosses and turns, attempts to get OOB unassisted setting off bed alarm. Haldol given for agitation.     Patient very unsteady and weak. Able to stand at side of bed, hanging on tight to side rail, for only about 3 minutes. 2am blood sugar=244

## 2022-02-07 NOTE — CONSULTS
DIABETES CARE  Situation:  Consulted by Provider for Diabetes Education   Karey Livingston is a 56 year old male with PMH significant for alcohol abuse, hypertension, DM-II, multiple abdominal surgeries who was admitted on 1/15/22 with alcohol withdrawal, possible aspiration pneumonia leading to respiratory failure requiring intubation on 1/16, successfully extubated on 1/23/22 and now transferred out of ICU on 1/26/22 for floor care.     Background:  PCP: Sarah Canseco PA-C  Social: Will be going to TCU or rehab    Diabetes History:  Note last endocrinologist note 6/2018 patient was on Novolin 70/30 bid.  I also noted in 10/12/20 he was on the mixed insulin 5-15 units bid based on BG    Meds for BG Management PTA:  Novolin 70/30 bid but no dose listed but 5 units given 1/15/22    Current Inpatient Meds for BG Management:  15 units of Lantus at hs  1:15 I:C ratio at meals, Novolog to carb grams  Novolog correction scale: 1/50 > 140    Other Meds:   Last Dexamethasone 2/4    Labs:  Hemoglobin A1C: 7.0 1/15/22                GFR: >90   Blood Glucose POC: Results for KAREY LIVINGSTON (MRN [id number]) as of 2/7/2022 07:31   Ref. Range 2/5/2022 20:51 2/5/2022 21:15 2/5/2022 21:40 2/5/2022 22:14 2/5/2022 23:33 2/6/2022 02:00 2/6/2022 07:56 2/6/2022 12:52 2/6/2022 16:46 2/6/2022 20:52 2/7/2022 02:02   GLUCOSE BY METER POCT Latest Ref Range: 70 - 99 mg/dL 41 (LL) 53 (L) 68 (L) 155 (H) 171 (H) 244 (H) 184 (H) 205 (H) 136 (H) 192 (H) 200 (H)       Diet Order:  60 grams CHO   Intake: Poor   Weight: 66.8 kg    BMI: 19.44    Assessment and Recommendations;    BG up and down with first Lantus bid then Lantus held and BG devin as still on Dexamethasone which has greatest effect on post meal BG. Then Lantus 15 daily and low again in am. Then held yesterday and Lantus up this am. Low last evening with set dose 5 Novolog at meal, ? Intake. Poor documentation of intake but 25 % documented once.    I:C ratio will help with post meal  "BG rise as insulin based on intake not set dose.   And decrease Lantus as 15 too much but will still need basal insulin  Then:   Assess BG pattern daily and adjust doses as needed.    Refer to \"Guidelines for Insulin Initiation and Care in Hospitalized Adults\"  link in Diabetes Management Order set for dosing guidelines    Hospital BG goals for blood glucose levels are < 180 for improved health outcomes.    Thank you,     Keira Boyd RN, Certified [place] [address]  [email or web address]   Office: [phone]  Pager: [phone]                                            "

## 2022-02-07 NOTE — PROGRESS NOTES
Hospitalist Progress Note  ADMIT DATE: 1/15/2022     FACILITY: [facility]    PCP: Sarah Canseco, [phone]    Assessment/Plan    Peewee Hess is a 56 year old male with PMH significant for alcohol abuse, hypertension, DM-II, multiple abdominal surgeries who was admitted on 1/15/22 with alcohol withdrawl, possible aspiration pneumonia leading to respiratory failure requiring intubation on 1/16, successfully extubated on 1/23/22 and now transferred out of ICU on 1/26/22 for floor care.      DM type 2, on insulin  -- A1c was 7.0 on 1/15/22  -serum glucose up and down -consulted DM educator  -discontinue Dexamethasone and expect DM in better control  -no more hypoglycemia  -now hyperglycemia; monitor and adjust insulin as needed     Possible aspiration pneumonia  Acute hypoxic respiratory failure  -- Required intubation 1/16, extubated on 1/23. Required BiPAP 1/23, transitioned to HFNC on 1/24. Currently on 30LPM 40% FiO2  -- Completed 7 days course of IV zosyn on 1/22  -- Cont albuterol nebs and pulm hygiene  -- CT PE was negative for PE on adission  -- CT chest 1/28 showed New airway wall thickening in the left upper and lower lobes with new consolidation in the dependent portion of the left upper lobe and at the left lung base, and mucoid impaction in left lower lobe airways.  -- With new consolidation and leucocytosis, restarted him on IV zosyn. Procal elevated as well. Appreciate pulm consult. Cont with pulm hygiene.  -- Weekly COVID (per protocol) came back positive. He was negative on admission on 1/22. Check COVID labs. Start Covid special precaution.   -- 1/30 started IV remdesivir and Dexamethasone. -  --1/31 given dose of tocilizumab/per pulmonology recommendations.  --taper off oxygen; thus discontinue dexamethasone on 2/5  -will remove isolation after 2/8 (10 days after positive test)      Septic shock-resolved  -- Likely due to multifocal pneumonia.  -- Metoprolol started  on 1/25 for sinus tachycardia  -- Patient is having intermittent hypotension. Got 2 boluses of 500ml NS on 1/27.Normal cortisol level noted     Alcohol withdrawal  Acute toxic metabolic encephalopathy  -- Clinical concern for alcohol withdrawal during the intubation  -- Precedex weaned off. Cont with thiamine. Ativan prn.  --Alcohol level less than 10 on admission.  Collateral information from wife-he did not find any evidence of patient drinking recently.  Patient denies drinking prior to hospitalization.  Most recent chemical dependency treatment in 2001.  Since then patient tried to quit drinking numerous times, ending up in the hospital with withdrawal.  He is on disability for 2 years, in the past was a nicole.  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  Hyperactive delirium, developed 1/31.  Psychiatry consulted.     Hypokalemia/hypomagnesia  -- Replaced, monitor and replace as needed magnesium      Nausea/vomiting  -- Resolved      Disposition; possibly 1-2 days to acute rehab or to tcu when off isolation for covid   Barrier to discharge;  placement    Subjective  No new complaints    Objective    Vital signs in last 24 hours  Temp:  [97.8  F (36.6  C)-98.5  F (36.9  C)] 98.5  F (36.9  C)  Pulse:  [] 103  Resp:  [17-18] 18  BP: ()/(60-73) 98/60  SpO2:  [93 %-95 %] 93 % [unfilled] O2 Device: None (Room air)    Weight:   [unfilled] Weight change:     Intake/Output last 3 shifts  I/O last 3 completed shifts:  In: 720 [P.O.:720]  Out: 1700 [Urine:1700]  Body mass index is 19.44 kg/m .    Physical Exam    Physical Exam  General appearance: not in acute distress  HEENT: PERRL, EOMI  Lungs: Clear breath sounds in bilateral lung fields  Cardiovascular: Regular rate and rhythm, normal S1-S2  Abdomen: Soft, non tender, no distension, normal bowel sound  Musculoskeletal: No joint swelling  Skin: No rash and no edema  Neurology: AAO ×3.  Cranial nerves II - XII normal.   General weakness      Pertinent Labs   Lab Results: personally reviewed.   Results for KAREY LIVINGSTON (MRN [id number]) as of 2/7/2022 12:55   Ref. Range 2/7/2022 02:02 2/7/2022 06:06 2/7/2022 08:46 2/7/2022 12:13   Magnesium Latest Ref Range: 1.8 - 2.6 mg/dL  1.7 (L)     GLUCOSE BY METER POCT Latest Ref Range: 70 - 99 mg/dL 200 (H)  216 (H) 302 (H)       Medications  Scheduled Meds:    calcium carbonate  1,000 mg Oral BID     DULoxetine  60 mg Oral or Feeding Tube Daily     enoxaparin ANTICOAGULANT  40 mg Subcutaneous Q24H     folic acid  1 mg Oral Daily     gabapentin  200 mg Oral BID     insulin aspart   Subcutaneous Daily with breakfast     insulin aspart   Subcutaneous Daily with lunch     insulin aspart   Subcutaneous Daily with supper     insulin aspart  1-7 Units Subcutaneous TID AC     insulin aspart  1-5 Units Subcutaneous At Bedtime     insulin glargine  15 Units Subcutaneous At Bedtime     ipratropium-albuterol  1 puff Inhalation 4x daily     lidocaine  1 patch Transdermal Q24H     lidocaine   Transdermal Q8H     magnesium oxide  400 mg Oral BID     metoprolol succinate ER  25 mg Oral Daily     multivitamin, therapeutic  1 tablet Oral Daily     pantoprazole  40 mg Oral QAM AC     QUEtiapine  6.25 mg Oral BID w/meals     QUEtiapine  50 mg Oral At Bedtime     sodium chloride (PF)  3 mL Intracatheter Q8H     thiamine  100 mg Oral Daily     Continuous Infusions:    dextrose Stopped (01/24/22 0230)     - MEDICATION INSTRUCTIONS -       PRN Meds:.acetaminophen **OR** acetaminophen, acetaminophen-codeine, bisacodyl, dextrose, glucose **OR** dextrose **OR** glucagon, famotidine, flumazenil, haloperidol lactate, OLANZapine zydis **OR** haloperidol lactate, insulin aspart, lidocaine 4%, lidocaine (buffered or not buffered), LORazepam, magnesium hydroxide, - MEDICATION INSTRUCTIONS -, melatonin, menthol-zinc oxide, metoprolol, naloxone **OR** naloxone **OR** naloxone **OR** naloxone, ondansetron **OR**  ondansetron, polyethylene glycol, sodium chloride (PF), sucralfate      Advanced Care Planning:  Discharge planning discussed with patient. CM        [facility]  Alicia Parker

## 2022-02-07 NOTE — PROGRESS NOTES
Care Management Follow Up    Length of Stay (days): 23    Expected Discharge Date: 02/07/2022     Concerns to be Addressed: discharge planning     Patient plan of care discussed at interdisciplinary rounds: Yes    Anticipated Discharge Disposition: Acute Rehab vs TCU     Anticipated Discharge Services: Other (see comment) (therapy services )  Anticipated Discharge DME: None    Patient/family educated on Medicare website which has current facility and service quality ratings: yes  Education Provided on the Discharge Plan:    Patient/Family in Agreement with the Plan: yes    Referrals Placed by CM/SW: Post Acute Facilities  Private pay costs discussed: Not applicable    Additional Information:  SW placed call to [facility] admissions. They report plan to review and call SW back. SW sent updated referral to [facility]. SW left VM for [facility] admissions requesting call back.      Twila Perera [facility]      Addendum: BI spoke with admissions at [facility] who report that at this time they do not feel pt meets qualification for ARU but they can reassess if his ability to do more therapy changes. BI spoke with pts wife, Daina, over the phone and provided update. Discussed that pt may not qualify for ARU. She reports this would still be her top choice but she is understanding that he may need TCU. Discussed TCU options with pts [employer] and she is agreeable to referrals to [facility], [facility] and [facility]. BI sent these referrals and will follow. Anticipate pt will be considered COVID recovered on Wednesday February 9.   2:02 PM    SW received call back from [facility] reporting that they have reviewed pt and he does not meet criteria for ARU at this time.  3:10 PM

## 2022-02-07 NOTE — PROGRESS NOTES
"CLINICAL NUTRITION SERVICES - REASSESSMENT NOTE     Nutrition Prescription    RECOMMENDATIONS FOR MDs/PROVIDERS TO ORDER:  None    Malnutrition Status:    Severe in acute illness    Recommendations already ordered by Registered Dietitian (RD):  New weight  Increase vital cuisine to bid = 1040 kcal, 44 g protein    Future/Additional Recommendations:  Adjust supplement pending intake/acceptance/needs     EVALUATION OF THE PROGRESS TOWARD GOALS   Diet: Level 5: Minced and moist, mildly thick (level 2) Moderate consistent CHO    Supplement: Vital cuisine, magic cup and gel plus daily    Intake: Poor, 0-25%.   Ate some oatmeal, bites of eggs and 1/3 of vital cuisine at breakfast today per RN   25% of breakfast and \"poor\" at supper yesterday Intake not documented 2/5  Ordering 3 meals/day  Fair fluid intake, 720 ml yesterday    Supplement intake not documented.     NEW FINDINGS  Ongoing confusion  On room air     GI   0-3 Soft BM/day    LABS  Reviewed  Mag 1.7 2/1 low and stable - on replacement  -216mg/dl past 24 hours -in fair control. DM educator following     MEDICATIONS  Folic acid, ssi, lantus, magox, mvi, protonix, thiamine   mvi with minerals  Decadron, iv abx dc'd. Novolog 1U: 12 g CHO added today    ANTHROPOMETRICS  Height: 185.4 cm (6' .992\"[documented height[)  Admit wt 186 lb 11.7 oz 1/17/22.  Most Recent Weight: 66.8 kg (147 lb 4.8 oz) 2/2- down 14 lb in 1 week?   73.0 kg (161 lb)  1/26  74.3 kg (163 lb 12.8 oz)  ?accurate- 1/25  189 lb 6 oz 1/23/22, -aggressive diuresis  184 lb 8.4 oz 1/24/22  190 lb 7.6 oz 1/19/22,   Weight History:       Wt Readings from Last 8 Encounters:   11/12/21 80.9 kg (178 lb 6.4 oz)   12/30/20 77.1 kg (170 lb)       CURRENT NUTRITION DIAGNOSIS  Malnutrition r/t acute illness evidenced by intake < 50% since admit, moderate fat and muscle loss, weight loss    INTERVENTIONS  Implementation  New weight  Increase vital cuisine to bid = 1040 kcal, 44 g protein    Goals  Patient to " consume % of nutritionally adequate meals three times per day, or the equivalent with supplements/snacks.- not progressing  Electrolytes WNL- progressing  BG - progressing    Monitoring/Evaluation  Progress toward goals will be monitored and evaluated per protocol.

## 2022-02-07 NOTE — PLAN OF CARE
Problem: Risk for Delirium  Goal: Improved Sleep  Outcome: No Change   Patient has been sleeping quietly most of the night    Problem: Gas Exchange Impaired  Goal: Optimal Gas Exchange  Outcome: No Change  Intervention: Optimize Oxygenation and Ventilation  Recent Flowsheet Documentation  Taken 2/7/2022 0158 by Laura Shelton RN  Head of Bed (HOB) Positioning: HOB at 20-30 degrees  VSS on room air.     Has not attempted to get OOB unassisted. Removes wet brief himself and puts on floor, and also pees the bed. Does not call for assistance.     Tylenol #3 and Ativan given for back pain and anxiety

## 2022-02-07 NOTE — PLAN OF CARE
Problem: Adult Inpatient Plan of Care  Goal: Plan of Care Review  Outcome: Improving  Flowsheets (Taken 2/7/2022 1255)  Plan of Care Reviewed With: patient  Goal: Patient-Specific Goal (Individualized)  Outcome: Improving  Goal: Absence of Hospital-Acquired Illness or Injury  Outcome: Improving  Intervention: Prevent Skin Injury  Recent Flowsheet Documentation  Taken 2/7/2022 0856 by Antelmo Sahu RN  Body Position: position changed independently  Goal: Optimal Comfort and Wellbeing  Outcome: Improving  Goal: Readiness for Transition of Care  Outcome: Improving     Problem: Risk for Delirium  Goal: Optimal Coping  Outcome: Improving  Goal: Improved Behavioral Control  Outcome: Improving  Goal: Improved Attention and Thought Clarity  Outcome: Improving  Goal: Improved Sleep  Outcome: Improving     Problem: Acute Neurologic Deterioration (Alcohol Withdrawal)  Goal: Optimal Neurologic Function  Outcome: Improving     Problem: Substance Misuse (Alcohol Withdrawal)  Goal: Readiness for Change Identified  Outcome: Improving     Problem: Violence Risk or Actual  Goal: Anger and Impulse Control  Outcome: Improving     Problem: Hypertension Acute  Goal: Blood Pressure Within Desired Range  Outcome: Improving     Problem: OT General Care Plan  Goal: Transfer (OT)  Description: Transfer (OT)  Outcome: Improving  Goal: Toilet Transfer/Toileting (OT)  Description: Toilet Transfer/Toileting (OT)  Outcome: Improving  Goal: Cognitive (OT)  Description: Cognitive (OT)  Outcome: Improving     Problem: Fall Injury Risk  Goal: Absence of Fall and Fall-Related Injury  Outcome: Improving

## 2022-02-07 NOTE — PLAN OF CARE
Problem: Risk for Delirium  Goal: Optimal Coping  Outcome: Improving     Problem: Violence Risk or Actual  Goal: Anger and Impulse Control  Outcome: Improving     Problem: Hypertension Acute  Goal: Blood Pressure Within Desired Range  Outcome: Improving  Intervention: Normalize Blood Pressure  Recent Flowsheet Documentation  Taken 2/6/2022 1600 by Shelton Austin RN  Medication Review/Management: medications reviewed     Problem: Gas Exchange Impaired  Goal: Optimal Gas Exchange  Outcome: Improving   Pt is alert but confused. Vital signs wdl. Denies pain. On RA. SPO2 94. Was up to the commode with heavy assist x 2. Fall precautions in place.

## 2022-02-08 NOTE — SIGNIFICANT EVENT
Significant Event Note    Time of event: 9:04 PM February 7, 2022    Description of event:  Concern for hypoglycemia with low blood glucose and due for insulin Lantus tonight     Plan:  Hold night Lantus and am rounder will address insulin regimen     Discussed with: bedside nurse    Johnnie Perkins MD

## 2022-02-08 NOTE — PLAN OF CARE
Problem: Adult Inpatient Plan of Care  Goal: Optimal Comfort and Wellbeing  Outcome: Improving  Intervention: Provide Person-Centered Care  Recent Flowsheet Documentation  Taken 2/7/2022 1709 by Basilia Orona RN  Trust Relationship/Rapport:   care explained   emotional support provided   questions encouraged   questions answered   thoughts/feelings acknowledged   Pt expressed feeling frustrated with lengthy hospital stay and inability to move at his own will. Discussed progress to this point so far and possibility for discharge in the next few days. Pt was more calm the rest of the evening and cooperative with cares. Reproted pain in juan f shoulders and knees, received prn T3 which was effective  Problem: Risk for Delirium  Goal: Improved Attention and Thought Clarity  Outcome: Improving   Pt is alert and oriented x3 but fluctuates. Pt can answer questions appropriately, participate in cares cooperatively and make his needs known. Intermittently pt will say something or respond to a question and does not make sense. Pt stated he was tired and ready for bed at 9pm  Problem: Hypertension Acute  Goal: Blood Pressure Within Desired Range  Outcome: Improving   WNL  Problem: OT General Care Plan  Goal: Toilet Transfer/Toileting (OT)  Description: Toilet Transfer/Toileting (OT)  Outcome: Improving   Assist x1 with transfer belt to bedside commode  Problem: Fall Injury Risk  Goal: Absence of Fall and Fall-Related Injury  Outcome: Improving  Intervention: Promote Injury-Free Environment  Recent Flowsheet Documentation  Taken 2/7/2022 1709 by Basilia Orona, RN  Safety Promotion/Fall Prevention:   activity supervised   assistive device/personal items within reach   bed alarm on   chair alarm on   nonskid shoes/slippers when out of bed   patient and family education   patient video monitoring   room door open   room near nurse's station   Bed alarm in place, pt is on camera using ipad, able to see pt from nursing  station  Problem: Adult Inpatient Plan of Care  Goal: Absence of Hospital-Acquired Illness or Injury  Intervention: Identify and Manage Fall Risk  Recent Flowsheet Documentation  Taken 2/7/2022 1709 by Basilia Orona, RN  Safety Promotion/Fall Prevention:   activity supervised   assistive device/personal items within reach   bed alarm on   chair alarm on   nonskid shoes/slippers when out of bed   patient and family education   patient video monitoring   room door open   room near nurse's station  Intervention: Prevent Skin Injury  Recent Flowsheet Documentation  Taken 2/7/2022 1709 by Basilia Orona, RN  Body Position: position changed independently

## 2022-02-08 NOTE — PROGRESS NOTES
Hospitalist Progress Note  ADMIT DATE: 1/15/2022     FACILITY: [facility]    PCP: Sarah Canseco, [phone]    Assessment/Plan    Peewee Hess is a 56 year old male with PMH significant for alcohol abuse, hypertension, DM-II, multiple abdominal surgeries who was admitted on 1/15/22 with alcohol withdrawl, possible aspiration pneumonia leading to respiratory failure requiring intubation on 1/16, successfully extubated on 1/23/22 and now transferred out of ICU on 1/26/22 for floor care.      DM type 2, on insulin  -- A1c was 7.0 on 1/15/22  -serum glucose up and down -consulted DM educator  -discontinue Dexamethasone and expect DM in better control  -no more hypoglycemia  -now hyperglycemia; monitor and adjust insulin as needed     Possible aspiration pneumonia  Acute hypoxic respiratory failure  -- Required intubation 1/16, extubated on 1/23. Required BiPAP 1/23, transitioned to HFNC on 1/24. Currently on 30LPM 40% FiO2  -- Completed 7 days course of IV zosyn on 1/22  -- Cont albuterol nebs and pulm hygiene  -- CT PE was negative for PE on adission  -- CT chest 1/28 showed New airway wall thickening in the left upper and lower lobes with new consolidation in the dependent portion of the left upper lobe and at the left lung base, and mucoid impaction in left lower lobe airways.  -- With new consolidation and leucocytosis, restarted him on IV zosyn. Procal elevated as well. Appreciate pulm consult. Cont with pulm hygiene.  -- Weekly COVID (per protocol) came back positive. He was negative on admission on 1/22. Check COVID labs. Start Covid special precaution.   -- 1/30 started IV remdesivir and Dexamethasone. -  --1/31 given dose of tocilizumab/per pulmonology recommendations.  --taper off oxygen; thus discontinue dexamethasone on 2/5  -will remove isolation after 2/8 (10 days after positive test)      Septic shock-resolved  -- Likely due to multifocal pneumonia.  -- Metoprolol started  on 1/25 for sinus tachycardia  -- Patient is having intermittent hypotension. Got 2 boluses of 500ml NS on 1/27.Normal cortisol level noted     Alcohol withdrawal  Acute toxic metabolic encephalopathy  -- Clinical concern for alcohol withdrawal during the intubation  -- Precedex weaned off. Cont with thiamine. Ativan prn.  --Alcohol level less than 10 on admission.  Collateral information from wife-he did not find any evidence of patient drinking recently.  Patient denies drinking prior to hospitalization.  Most recent chemical dependency treatment in 2001.  Since then patient tried to quit drinking numerous times, ending up in the hospital with withdrawal.  He is on disability for 2 years, in the past was a nicole.  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  Hyperactive delirium, developed 1/31.  Psychiatry consulted.     Hypokalemia/hypomagnesia  -- Replaced, monitor and replace as needed magnesium      Nausea/vomiting  -- Resolved      Disposition; possibly 1-2 days to acute rehab or to tcu when off isolation for covid   Barrier to discharge;  placement     Subjective  No available; in physical therapy    Objective    Vital signs in last 24 hours  Temp:  [98  F (36.7  C)-98.8  F (37.1  C)] 98.5  F (36.9  C)  Pulse:  [] 105  Resp:  [18] 18  BP: ()/(52-73) 81/52  SpO2:  [93 %-95 %] 95 % [unfilled] O2 Device: None (Room air)    Weight:   [unfilled] Weight change:     Intake/Output last 3 shifts  I/O last 3 completed shifts:  In: 123 [P.O.:120; I.V.:3]  Out: 600 [Urine:600]  Body mass index is 19.44 kg/m .    Physical Exam    Physical Exam  Not available      Pertinent Labs   Lab Results: personally reviewed.   Results for KAREY LIVINGSTON (MRN [id number]) as of 2/8/2022 07:52   Ref. Range 2/7/2022 08:46 2/7/2022 12:13 2/7/2022 16:40 2/7/2022 20:43   GLUCOSE BY METER POCT Latest Ref Range: 70 - 99 mg/dL 216 (H) 302 (H) 148 (H) 109 (H)       Medications  Scheduled  Meds:    sodium chloride 0.9%  500 mL Intravenous Once     calcium carbonate  1,000 mg Oral BID     DULoxetine  60 mg Oral or Feeding Tube Daily     enoxaparin ANTICOAGULANT  40 mg Subcutaneous Q24H     folic acid  1 mg Oral Daily     gabapentin  200 mg Oral BID     insulin aspart   Subcutaneous Daily with breakfast     insulin aspart   Subcutaneous Daily with lunch     insulin aspart   Subcutaneous Daily with supper     insulin aspart  1-7 Units Subcutaneous TID AC     insulin aspart  1-5 Units Subcutaneous At Bedtime     insulin glargine  18 Units Subcutaneous At Bedtime     ipratropium-albuterol  1 puff Inhalation 4x daily     lidocaine  1 patch Transdermal Q24H     lidocaine   Transdermal Q8H     magnesium oxide  400 mg Oral BID     metoprolol succinate ER  25 mg Oral Daily     multivitamin, therapeutic  1 tablet Oral Daily     pantoprazole  40 mg Oral QAM AC     QUEtiapine  6.25 mg Oral BID w/meals     QUEtiapine  50 mg Oral At Bedtime     sodium chloride (PF)  3 mL Intracatheter Q8H     thiamine  100 mg Oral Daily     Continuous Infusions:    dextrose Stopped (01/24/22 0230)     - MEDICATION INSTRUCTIONS -       PRN Meds:.acetaminophen **OR** acetaminophen, acetaminophen-codeine, bisacodyl, dextrose, glucose **OR** dextrose **OR** glucagon, famotidine, flumazenil, haloperidol lactate, OLANZapine zydis **OR** haloperidol lactate, insulin aspart, lidocaine 4%, lidocaine (buffered or not buffered), LORazepam, magnesium hydroxide, - MEDICATION INSTRUCTIONS -, melatonin, menthol-zinc oxide, metoprolol, naloxone **OR** naloxone **OR** naloxone **OR** naloxone, ondansetron **OR** ondansetron, polyethylene glycol, sodium chloride (PF), sucralfate             [facility] Medicine Service  Alicia Parker

## 2022-02-08 NOTE — PLAN OF CARE
Problem: Adult Inpatient Plan of Care  Goal: Plan of Care Review  Outcome: Improving  Flowsheets (Taken 2/8/2022 1115)  Plan of Care Reviewed With: patient  Goal: Patient-Specific Goal (Individualized)  Outcome: Improving  Goal: Absence of Hospital-Acquired Illness or Injury  Outcome: Improving  Goal: Optimal Comfort and Wellbeing  Outcome: Improving  Intervention: Provide Person-Centered Care  Recent Flowsheet Documentation  Taken 2/8/2022 0911 by Minoo Shelton RN  Trust Relationship/Rapport: care explained     Problem: Risk for Delirium  Goal: Improved Behavioral Control  Outcome: Improving   Pt is alert and oriented x2. Pt is med complaint. Pt denies pain. Pt's bp was low this morning and pt received bolus of NS. Pt is up with assist of one. No complain. Continue to monitor pt.

## 2022-02-08 NOTE — PROGRESS NOTES
Speech-Language Pathology: Video Swallow Study     02/08/22 0900   General Information   Onset of Illness/Injury or Date of Surgery 01/15/22   Pertinent History of Current Problem Respiratory failure and 11 day intubation; then COVID; Patient had previous VFSS on 1/23/2022; Repeat VFSS to assess for improvement. Patient has been on mildly thick liquids since previous VFSS.    General Observations Alert and cooperative; Improved cognition from previous VFSS but ongoing   Type of Evaluation   Type of Evaluation Swallow Evaluation   Oral Motor   Oral Musculature generally intact   Dentition (Oral Motor)   Dentition (Oral Motor) some missing teeth   Facial Symmetry (Oral Motor)   Facial Symmetry (Oral Motor) WNL   Lip Function (Oral Motor)   Lip Range of Motion (Oral Motor) WNL   Tongue Function (Oral Motor)   Tongue ROM (Oral Motor) WNL   Jaw Function (Oral Motor)   Jaw Function (Oral Motor) WNL   Vocal Quality/Secretion Management (Oral Motor)   Vocal Quality (Oral Motor) WNL   General Swallowing Observations   Current Diet/Method of Nutritional Intake (General Swallowing Observations, NIS) minced & moist (level 5);mildly thick liquids (level 2)   Comment, General Swallowing Observations VFSS 1/23/22- Aspiration with thin with ineffective cough   Swallowing Evaluation Videofluoroscopic swallow study (VFSS)   VFSS Evaluation   Radiologist Dr. Rdz   Views Taken left lateral   VFSS Textures Trialed thin liquids;mildly thick liquids;pureed;solid foods   VFSS Eval: Thin Liquid Texture Trial   Mode of Presentation, Thin Liquid straw;cup;self-fed   Preparatory Phase WFL   Oral Phase, Thin Liquid WFL;Premature pharyngeal entry   Pharyngeal Phase, Thin Liquid Delayed swallow reflex   Rosenbek's Penetration Aspiration Scale: Thin Liquid Trial Results 8 - contrast passes glottis, visible subglottic residue remains, absent patient response (aspiration)   Response to Aspiration absent response, silent aspiration   VFSS Eval:  Mildly Thick Liquids   Mode of Presentation straw;cup;self-fed   Preparatory Phase WFL   Oral Phase WFL   Pharyngeal Phase WFL;Delayed swallow reflex   Rosenbek's Penetration Aspiration Scale 2 - contrast enters airway, remains above the vocal cords, no residue remains (penetration)   VFSS Evaluation: Puree Solid Texture Trial   Mode of Presentation, Puree spoon   Preparatory Phase WFL   Oral Phase, Puree WFL   Pharyngeal Phase, Puree WFL;Delayed swallow reflex   Rosenbek's Penetration Aspiration Scale: Puree Food Trial Results 1 - no aspiration, contrast does not enter airway   VFSS Evaluation: Solid Food Texture Trial   Mode of Presentation, Solid self-fed   Preparatory Phase WFL   Oral Phase, Solid WFL   Pharyngeal Phase, Solid WFL   Rosenbek's Penetration Aspiration Scale: Solid Food Trial Results 1 - no aspiration, contrast does not enter airway   Esophageal Phase of Swallow   Patient reports or presents with symptoms of esophageal dysphagia No   Swallowing Recommendations   Diet Consistency Recommendations mildly thick liquids (level 2);minced & moist (level 5)   Supervision Level for Intake distant supervision needed   Mode of Delivery Recommendations bolus size, small;slow rate of intake   Swallowing Maneuver Recommendations alternate food and liquid intake   Monitoring/Assistance Required (Eating/Swallowing) monitor for cough or change in vocal quality with intake;stop eating activities when fatigue is present   Recommended Feeding/Eating Techniques (Swallow Eval) patient is independent, no specific recommendations;set-up and prepare tray;maintain upright sitting position for eating;minimize distractions during oral intake   Medication Administration Recommendations, Swallowing (SLP) per patient preference   Instrumental Assessment Recommendations VFSS (videofluroscopic swallowing study)  (repeat in 2-3 weeks)   Comment, Swallowing Recommendations Videofluoroscopic Swallow Study completed. Patient had  silent aspiration with thin liquids during the swallow with minimal clearing with cued cough and consistent penetration with thin liquids with chin tuck posture but no aspiration; intermittent penetration occurred with mildly thick liquids with cup sips. Oral motor function grossly and oral phase showed mild oral holding and decreased bolus control but suspect due to cognition and taste. Swallow response was moderately to severely delayed with thin liquids with pooling in the pyriforms and likely cause of aspiration. Mild delay to the pyriforms occurred with all other textures.Epiglottic inversion was complete.  Minimal stasis occurred with all consistencies, most notable in valleculae and cricopharyngeus but this did not increase as exam progressed. Hyolaryngeal elevation was intact and complete and hyolaryngeal excursion was full and complete. Recommend diet of Minced and Moist and Mildly thick. SLP to follow for dysphagia tx and LRD.   General Therapy Interventions   Planned Therapy Interventions Dysphagia Treatment   Dysphagia treatment Compensatory strategies for swallowing;Modified diet education   Intervention Comments Trialed advanced textures   SLP Therapy Assessment/Plan   Criteria for Skilled Therapeutic Interventions Met (SLP Eval) yes;treatment indicated   SLP Diagnosis dysphagia   Rehab Potential (SLP Eval) good, to achieve stated therapy goals   Therapy Frequency (SLP Eval) 5 times/wk   Predicted Duration of Therapy Intervention (SLP Eval) 1 week   Comment, Therapy Assessment/Plan (SLP) Ongoing aspiration risk with thin liquids despite strategies; SLP to follow   SLP Discharge Planning    SLP Discharge Recommendation (DC Rec) Transitional Care Facility   SLP Brief overview of current status  Repeat VFSS 2/8 showed ongoing trace, silent aspiration with thin liquids; cognition improving but remains impaired; would benefit from advancing food textures; train chin tuck and repeat VFSS in 2-3 weeks     Total Evaluation Time   Total Evaluation Time (Minutes) 10   Coping Strategies   Trust Relationship/Rapport care explained;questions answered;questions encouraged

## 2022-02-08 NOTE — PROGRESS NOTES
Care Management Follow Up    Length of Stay (days): 24    Expected Discharge Date: 02/10/2022     Concerns to be Addressed: COVID 19, will remove isolation precautions after 02/08/202, discharge planning     Patient plan of care discussed at interdisciplinary rounds: Yes    Anticipated Discharge Disposition: TCU, patient does not meet ARF criteria       Anticipated Discharge Services: TCU  Anticipated Discharge DME: None    Patient/family educated on Medicare website which has current facility and service quality ratings: yes  Education Provided on the Discharge Plan:    Patient/Family in Agreement with the Plan: yes    Referrals Placed by CM/SW: Post Acute Facilities  Private pay costs discussed: Not applicable    Additional Information:  Chart reviewed. TCU referrals pending; COVID precautions to be removed after 02/08/20222.      Criselda Crook RN

## 2022-02-08 NOTE — PLAN OF CARE
Problem: Risk for Delirium  Goal: Improved Attention and Thought Clarity  Outcome: Improving  Patient alert and disoriented to time and situation, he was able to use his call light appropriately to let his needs known. He was     Problem: Risk for Delirium  Goal: Improved Sleep  Outcome: Improving   Slept most of the shift, just woke up for cares. .    Problem: Hypertension Acute  Goal: Blood Pressure Within Desired Range  Outcome: Improving  Intervention: Normalize Blood Pressure  Recent Flowsheet Documentation  Taken 2/8/2022 0400 by Bhaskar Leong RN  Medication Review/Management: medications reviewed  Temp: 98  F (36.7  C) Temp src: Oral BP: 113/67 Pulse: 96   Resp: 18 SpO2: 94 % O2 Device: None (Room air)       Problem: Adult Inpatient Plan of Care  Goal: Absence of Hospital-Acquired Illness or Injury  Intervention: Identify and Manage Fall Risk  Recent Flowsheet Documentation  Taken 2/8/2022 0400 by Bhaskar Leong RN  Safety Promotion/Fall Prevention: activity supervised  Taken 2/8/2022 0000 by Bhaskar Leong RN  Safety Promotion/Fall Prevention: activity supervised   Was not impulsive, bed alarm still on.    Problem: Adult Inpatient Plan of Care  Goal: Absence of Hospital-Acquired Illness or Injury  Intervention: Prevent Skin Injury  Recent Flowsheet Documentation  Taken 2/8/2022 0400 by Bhaskar Leong RN  Body Position: position changed independently  He was incontinent of bowel, had loose stools x 2.  Was continent of bladder and used the urinal independently.

## 2022-02-09 NOTE — PLAN OF CARE
Problem: Risk for Delirium  Goal: Improved Behavioral Control  Outcome: Improving   Calm, cooperative and participating in cares. Able to make needs known  Problem: Hypertension Acute  Goal: Blood Pressure Within Desired Range  Outcome: Improving   WNL  Problem: OT General Care Plan  Goal: Toilet Transfer/Toileting (OT)  Description: Toilet Transfer/Toileting (OT)  Outcome: Improving   Remained in bed this shfit  Problem: Adult Inpatient Plan of Care  Goal: Absence of Hospital-Acquired Illness or Injury  Intervention: Prevent Skin Injury  Recent Flowsheet Documentation  Taken 2/8/2022 1927 by Basilia Orona, RN  Body Position:   turned   right   Mepilex to sacrum changed, cream applied to perineum and buttocks due to skin breakdown

## 2022-02-09 NOTE — PROGRESS NOTES
Care Management Follow Up    Length of Stay (days): 25    Expected Discharge Date: 02/09/2022-02/10/2022     Concerns to be Addressed: awaiting for COVID precautions to be lifted today; discharge planning ; accepting TCU bed    Patient plan of care discussed at interdisciplinary rounds: Yes    Anticipated Discharge Disposition: TCU     Anticipated Discharge Services: TCU  Anticipated Discharge DME: None    Patient/family educated on Medicare website which has current facility and service quality ratings: yes  Education Provided on the Discharge Plan:    Patient/Family in Agreement with the Plan: yes    Referrals Placed by CM/SW: Post Acute Facilities  Private pay costs discussed: Not applicable    Additional Information:  MD updated; medically stable for discharge per accepting TCU bed. Left voicemail's for pending TCU, requesting call back. Additional referral made to [facility].    TCU referral updates:  [facility]-declined  [facility] message  [facility]-left message  [facility] message  [facility]-declined    1210 Spoke with Abel Evans with [place] facilities; [facility] full, [facility] full, will cross refer patient to [facility].    Criselda Crook RN

## 2022-02-09 NOTE — PROGRESS NOTES
[facility]    PROGRESS NOTE - Hospitalist Service    Assessment and Plan  Patient is new to me, Peewee Hess is a 56 year old male with PMH significant for alcohol abuse, hypertension, DM-II, multiple abdominal surgeries who was admitted on 1/15/22 with alcohol withdrawl, possible aspiration pneumonia leading to respiratory failure requiring intubation on 1/16, successfully extubated on 1/23/22 and now transferred out of ICU on 1/26/22 for floor care.      DM type 2, on insulin  - A1c was 7.0 on 1/15/22  - serum glucose up and down -consulted DM educator  - discontinue Dexamethasone and expect DM in better control  - Had hypoglycemic episode frequently  - Discontinue Lantus  - Cover with insulin sliding scale and carb counting.  - Start Metformin     Acute respiratory failure with hypoxia.  - Secondary to aspiration pneumonia  - Required intubation 1/16, extubated on 1/23. Required BiPAP 1/23, transitioned to HFNC on 1/24. Currently on 30LPM 40% FiO2  - Completed 7 days course of IV zosyn on 1/22  - Cont albuterol nebs and pulm hygiene  - CT PE was negative for PE on adission  - CT chest 1/28 showed New airway wall thickening in the left upper and lower lobes with new consolidation in the dependent portion of the left upper lobe and at the left lung base, and mucoid impaction in left lower lobe airways.  -- With new consolidation and leucocytosis, restarted him on IV zosyn. Procal elevated as well. Appreciate pulm consult. Cont with pulm hygiene.  -- Weekly COVID (per protocol) came back positive. He was negative on admission on 1/22. Check COVID labs. Start Covid special precaution.   -- 1/30 started IV remdesivir and Dexamethasone. -  --1/31 given dose of tocilizumab/per pulmonology recommendations.  --taper off oxygen; thus discontinue dexamethasone on 2/5  -will remove isolation after 2/8 (10 days after positive test)      Septic shock-resolved  -- Likely due to multifocal  pneumonia.  -- Metoprolol started on 1/25 for sinus tachycardia  -- Patient is having intermittent hypotension. Got 2 boluses of 500ml NS on 1/27.Normal cortisol level noted     Acute toxic metabolic encephalopathy  - Clinical concern for alcohol withdrawal during the intubation  - Precedex weaned off. Cont with thiamine. Ativan prn.  - Alcohol level less than 10 on admission.  Collateral information from wife-he did not find any evidence of patient drinking recently.  Patient denies drinking prior to hospitalization.  Most recent chemical dependency treatment in 2001.  Since then patient tried to quit drinking numerous times, ending up in the hospital with withdrawal.  He is on disability for 2 years, in the past was a nicole.  - Psychiatric consult appreciate input  -02/03 improving     Hypokalemia/hypomagnesia  -- Replaced, monitor and replace as needed magnesium      Nausea/vomiting  -- Resolved  - Tolerating regular diet.    Weakness and deconditioning  -Secondary to above  -Pending TCU placement    Moderate malnutrition  -Protein and calories  -Dietitian consult, continue supplement      Active Problems:    Alcohol dependence with unspecified alcohol-induced disorder (H)    Hypomagnesemia    Type 2 diabetes mellitus without complication (H)    Alcohol use disorder, severe, dependence (H)    Sinus tachycardia    Pneumonia of both lungs due to infectious organism, unspecified part of lung    Non-intractable vomiting with nausea, unspecified vomiting type    Agitation    Hypocalcemia      VTE prophylaxis:  Enoxaparin (Lovenox) SQ  DIET: Orders Placed This Encounter      Combination Diet Moderate Consistent Carb (60 g CHO per Meal) Diet; Minced and Moist Diet (level 5); Mildly Thick (level 2)      Disposition/Barriers to discharge: TCU placement, monitor blood glucose  Code Status: Full Code    Subjective:  Khai is feeling much better today, had incident of hypoglycemia this morning.  Denies any chest pain  or shortness of breath.    PHYSICAL EXAM  Vitals:    01/25/22 0102 01/26/22 0300 02/02/22 0241   Weight: 74.3 kg (163 lb 12.8 oz) 73 kg (161 lb) 66.8 kg (147 lb 4.8 oz)     B/P:91/60 T:98 P:82 R:16     Intake/Output Summary (Last 24 hours) at 2/9/2022 1508  Last data filed at 2/9/2022 1300  Gross per 24 hour   Intake 966 ml   Output 500 ml   Net 466 ml      Body mass index is 19.44 kg/m .    Constitutional: awake, alert, cooperative, no apparent distress, and appears stated age  Eyes: Lids and lashes normal, pupils equal, round and reactive to light, extra ocular muscles intact, sclera clear, conjunctiva normal  ENT: Normocephalic, without obvious abnormality, atraumatic, sinuses nontender on palpation, external ears without lesions, oral pharynx with moist mucous membranes, tonsils without erythema or exudates, gums normal and good dentition.  Respiratory: No increased work of breathing, good air exchange, clear to auscultation bilaterally, no crackles or wheezing  Cardiovascular: Normal apical impulse, regular rate and rhythm, normal S1 and S2, no S3 or S4, and no murmur noted  GI: No scars, normal bowel sounds, soft, non-distended, non-tender, no masses palpated, no hepatosplenomegally  Skin: no bruising or bleeding and normal skin color, texture, turgor  Musculoskeletal: There is no redness, warmth, or swelling of the joints.  Full range of motion noted.  no lower extremity pitting edema present  Neurologic: Awake, alert, oriented to name, place and time.  Cranial nerves II-XII are grossly intact.  Motor is 5 out of 5 bilaterally.   Sensory is intact.    Neuropsychiatric: Appropriate with examiner      PERTINENT LABS/IMAGING:  Recent Labs   Lab 02/09/22  1148 02/09/22  0744 02/09/22  0654 02/05/22  0814 02/05/22  0758 02/04/22  0754 02/04/22  0614 02/03/22  1623 02/03/22  1513 02/03/22  0759 02/03/22  0657   WBC  --   --   --   --   --   --   --   --   --   --  13.9*   HGB  --   --   --   --   --   --   --   --    --   --  11.0*   MCV  --   --   --   --   --   --   --   --   --   --  90   PLT  --   --   --   --   --   --   --   --   --   --  233   NA  --   --   --   --   --   --   --   --  140  --   --    POTASSIUM  --   --   --   --  3.9  --  3.8  --  4.1  --  4.2   CHLORIDE  --   --   --   --   --   --   --   --  106  --   --    CO2  --   --   --   --   --   --   --   --  27  --   --    BUN  --   --   --   --   --   --   --   --  8  --   --    CR  --   --   --   --   --   --   --   --  0.66*  --   --    ANIONGAP  --   --   --   --   --   --   --   --  7  --   --    BECKY  --   --   --   --   --   --   --   --  7.6*  --   --    * 160* 179*   < >  --    < >  --    < > 179*   < >  --     < > = values in this interval not displayed.     No results found for this or any previous visit (from the past 24 hour(s)).    Discussed with patient, family, psychiatry, nursing staff and discharge planner    Johnnie Perkins MD  [facility] Medicine Service  [phone]

## 2022-02-09 NOTE — PLAN OF CARE
Patient was Alert and oriented x 2-3 with some confusion and forgetfulness observed. He only ate about 25% of breakfast and lunch, stating he did not like what was brought. He then are yogurt and sugar free pudding for breakfast and applesauce and  sugar free pudding for lunch. And thickened cranberry juice for drink.  Blood sugars were 160 an 225 this shift. Fatou Lemons RN     Problem: Adult Inpatient Plan of Care  Goal: Plan of Care Review  Outcome: Improving  Goal: Absence of Hospital-Acquired Illness or Injury  Outcome: Improving  Intervention: Identify and Manage Fall Risk  Recent Flowsheet Documentation  Taken 2/9/2022 0830 by Fatou Lemons RN  Safety Promotion/Fall Prevention: bed alarm on  Intervention: Prevent Infection  Recent Flowsheet Documentation  Taken 2/9/2022 0830 by Fatou Lemons RN  Infection Prevention:   single patient room provided   personal protective equipment utilized   hand hygiene promoted  Goal: Optimal Comfort and Wellbeing  Outcome: Improving     Problem: Risk for Delirium  Goal: Optimal Coping  Outcome: Improving  Goal: Improved Behavioral Control  Outcome: Improving  Goal: Improved Attention and Thought Clarity  Outcome: Improving  Goal: Improved Sleep  Outcome: Improving     Problem: Acute Neurologic Deterioration (Alcohol Withdrawal)  Goal: Optimal Neurologic Function  Outcome: Improving     Problem: Substance Misuse (Alcohol Withdrawal)  Goal: Readiness for Change Identified  Outcome: Improving     Problem: Violence Risk or Actual  Goal: Anger and Impulse Control  Outcome: Improving     Problem: Hypertension Acute  Goal: Blood Pressure Within Desired Range  Outcome: Improving     Problem: Fall Injury Risk  Goal: Absence of Fall and Fall-Related Injury  Outcome: Improving  Intervention: Promote Injury-Free Environment  Recent Flowsheet Documentation  Taken 2/9/2022 0830 by Fatou Lemons RN  Safety Promotion/Fall Prevention: bed alarm on

## 2022-02-09 NOTE — PLAN OF CARE
Problem: Adult Inpatient Plan of Care  Goal: Optimal Comfort and Wellbeing  Outcome: Improving     Problem: OT General Care Plan  Goal: Cognitive (OT)  Description: Cognitive (OT)  Outcome: Improving     Patient alert and oriented 4x most of the time. Goes through some spurts of confusion and illogical speech.   No pain reported. Comfortable on room air. Incontinent, does not feel it.     (Evenin units of Lantus given, BS- 128.)  Snack given at 0100.   Blood sugar read as 13 last night at 0500, Pt A/O 4x at this time.   MD notified. 2 Amps of D50 injection given.

## 2022-02-09 NOTE — SIGNIFICANT EVENT
"Significant Event Note    Time of event: 5:22 AM February 9, 2022    Description of event:  Notified of low BG of 10.     Per nursing patient had a BG check and reading came back at 13, they rechecked it and it returned at 10. Patient was responsive through this.     /70 (BP Location: Left arm)   Pulse 104   Temp 98.3  F (36.8  C) (Oral)   Resp 16   Ht 1.854 m (6' 0.99\")   Wt 66.8 kg (147 lb 4.8 oz)   SpO2 96%   BMI 19.44 kg/m    Gen: NAD  Neuro: Oriented to time and place and situation    Plan:  Hypoglycemia  Gave verbal order to give 2 amps of D5- and recheck BG 15 minutes after giving.   - Day team will need to readjust insulin doses    Discussed with: bedside nurse    Leda Mckenzie MD    "

## 2022-02-10 NOTE — PLAN OF CARE
Problem: Risk for Delirium  Goal: Optimal Coping  Outcome: Improving     Problem: Hypertension Acute  Goal: Blood Pressure Within Desired Range  Outcome: Improving  Intervention: Normalize Blood Pressure  Recent Flowsheet Documentation  Taken 2/9/2022 1630 by Shelton Austin RN  Medication Review/Management: medications reviewed   Pt alert but confused. Makes his needs known. Vital signs wdl. On RA. C/o pain and anxiety. Prn Codeine and Ativan given and were effective.

## 2022-02-10 NOTE — PROGRESS NOTES
[facility]    PROGRESS NOTE - Hospitalist Service    Assessment and Plan    56 year old male with PMH significant for alcohol abuse, hypertension, DM-II, multiple abdominal surgeries who was admitted on 1/15/22 with alcohol withdrawl, possible aspiration pneumonia leading to respiratory failure requiring intubation on 1/16, successfully extubated on 1/23/22 and now transferred out of ICU on 1/26/22 for floor care.      DM type 2, on insulin  - A1c was 7.0 on 1/15/22  - serum glucose up and down -consulted DM educator  - discontinue Dexamethasone and expect DM in better control  - Had hypoglycemic episode frequently  - Discontinue Lantus  - Cover with insulin sliding scale and carb counting.  - Started on Metformin on 2/9/2022, increase dose to 1 g twice daily     Acute respiratory failure with hypoxia.  - Secondary to aspiration pneumonia  - Required intubation 1/16, extubated on 1/23. Required BiPAP 1/23, transitioned to HFNC on 1/24. Currently on 30LPM 40% FiO2  - Completed 7 days course of IV zosyn on 1/22  - Cont albuterol nebs and pulm hygiene  - CT PE was negative for PE on adission  - CT chest 1/28 showed New airway wall thickening in the left upper and lower lobes with new consolidation in the dependent portion of the left upper lobe and at the left lung base, and mucoid impaction in left lower lobe airways.  -- With new consolidation and leucocytosis, restarted him on IV zosyn. Procal elevated as well. Appreciate pulm consult. Cont with pulm hygiene.  -- Weekly COVID (per protocol) came back positive. He was negative on admission on 1/22. Check COVID labs. Start Covid special precaution.   -- 1/30 started IV remdesivir and Dexamethasone. -  --1/31 given dose of tocilizumab/per pulmonology recommendations.  --taper off oxygen; thus discontinue dexamethasone on 2/5  -will remove isolation after 2/8 (10 days after positive test)      Septic shock-resolved  -- Likely due to multifocal  pneumonia.  -- Metoprolol started on 1/25 for sinus tachycardia  -- Patient is having intermittent hypotension.   - Got 2 boluses of 500ml NS on 1/27.Normal cortisol level noted  - Restart IV fluid today as patient blood pressure is down  - Check echo     Acute toxic metabolic encephalopathy  - Clinical concern for alcohol withdrawal during the intubation  - Precedex weaned off. Cont with thiamine. Ativan prn.  - Alcohol level less than 10 on admission.  Collateral information from wife-he did not find any evidence of patient drinking recently.  Patient denies drinking prior to hospitalization.  Most recent chemical dependency treatment in 2001.  Since then patient tried to quit drinking numerous times, ending up in the hospital with withdrawal.  He is on disability for 2 years, in the past was a nicole.  - Psychiatric consult appreciate input  -02/03 improving    Leukocytosis  - Probably secondary to recent pneumonia and/or steroid   - Finished course of Zosyn during hospitalization  - Continue to monitor CBC     Hypokalemia/hypomagnesia  -- Replaced, monitor and replace as needed magnesium      Nausea/vomiting  -- Resolved  - Tolerating regular diet.     Weakness and deconditioning  -Secondary to above  -Pending TCU placement     Severe malnutrition  -Protein and calories  -Dietitian consult, continue supplement      Active Problems:    Alcohol dependence with unspecified alcohol-induced disorder (H)    Hypomagnesemia    Type 2 diabetes mellitus without complication (H)    Alcohol use disorder, severe, dependence (H)    Sinus tachycardia    Pneumonia of both lungs due to infectious organism, unspecified part of lung    Non-intractable vomiting with nausea, unspecified vomiting type    Agitation    Hypocalcemia      VTE prophylaxis:  Enoxaparin (Lovenox) SQ  DIET: Orders Placed This Encounter      Combination Diet Moderate Consistent Carb (60 g CHO per Meal) Diet; Easy to Chew (level 7); Mildly Thick (level  2)      Disposition/Barriers to discharge: TCU placement, hypertension  Code Status: Full Code    Subjective:  Khai is feeling about the same today, still confused.  Denies any chest pain or shortness of breath.  Fluctuate oral intake.    PHYSICAL EXAM  Vitals:    01/25/22 0102 01/26/22 0300 02/02/22 0241   Weight: 74.3 kg (163 lb 12.8 oz) 73 kg (161 lb) 66.8 kg (147 lb 4.8 oz)     B/P:113/70 T:97.3 P:102 R:18     Intake/Output Summary (Last 24 hours) at 2/10/2022 1541  Last data filed at 2/10/2022 1442  Gross per 24 hour   Intake --   Output 700 ml   Net -700 ml      Body mass index is 19.44 kg/m .    Constitutional: awake, alert, cooperative, no apparent distress, and appears stated age  Eyes: Lids and lashes normal, pupils equal, round and reactive to light, extra ocular muscles intact, sclera clear, conjunctiva normal  ENT: Normocephalic, without obvious abnormality, atraumatic, sinuses nontender on palpation, external ears without lesions, oral pharynx with moist mucous membranes, tonsils without erythema or exudates, gums normal and good dentition.  Respiratory: No increased work of breathing, good air exchange, clear to auscultation bilaterally, no crackles or wheezing  Cardiovascular: Normal apical impulse, regular rate and rhythm, normal S1 and S2, no S3 or S4, and no murmur noted  GI: No scars, normal bowel sounds, soft, non-distended, non-tender, no masses palpated, no hepatosplenomegally  Skin: no bruising or bleeding and normal skin color, texture, turgor  Musculoskeletal: There is no redness, warmth, or swelling of the joints.  Full range of motion noted.  no lower extremity pitting edema present  Neurologic: Awake, alert, oriented to self only.  Cranial nerves II-XII are grossly intact.  Motor is 5 out of 5 bilaterally.   Sensory is intact.    Neuropsychiatric: Appropriate with examiner      PERTINENT LABS/IMAGING:  Recent Labs   Lab 02/10/22  1205 02/10/22  1018 02/10/22  0749 02/05/22  0814  02/05/22  0758 02/04/22  0754 02/04/22  0614   WBC  --  15.3*  --   --   --   --   --    HGB  --  10.8*  --   --   --   --   --    MCV  --  91  --   --   --   --   --    PLT  --  294  --   --   --   --   --    NA  --  139  --   --   --   --   --    POTASSIUM  --  3.7  --   --  3.9  --  3.8   CHLORIDE  --  107  --   --   --   --   --    CO2  --  24  --   --   --   --   --    BUN  --  8  --   --   --   --   --    CR  --  0.74  --   --   --   --   --    ANIONGAP  --  8  --   --   --   --   --    BECKY  --  7.3*  --   --   --   --   --    * 208* 326*   < >  --    < >  --    ALBUMIN  --  2.1*  --   --   --   --   --    PROTTOTAL  --  4.9*  --   --   --   --   --    BILITOTAL  --  0.9  --   --   --   --   --    ALKPHOS  --  137*  --   --   --   --   --    ALT  --  18  --   --   --   --   --    AST  --  52*  --   --   --   --   --     < > = values in this interval not displayed.     No results found for this or any previous visit (from the past 24 hour(s)).    Discussed with patient, family,, nursing staff and discharge planner    Johnnie Perkins MD  [facility]  [phone]

## 2022-02-10 NOTE — PLAN OF CARE
Problem: Fall Injury Risk  Goal: Absence of Fall and Fall-Related Injury  Outcome: No Change  Intervention: Promote Injury-Free Environment  Recent Flowsheet Documentation  Taken 2/10/2022 0120 by Laura Shelton RN  Safety Promotion/Fall Prevention:   activity supervised   assistive device/personal items within reach   bed alarm on   fall prevention program maintained   nonskid shoes/slippers when out of bed   room door open   safety round/check completed  No fall overnight. Writer assisted patient bathroom with FWW, very unsteady. Patient could not ambulate back. He became very dizzy and weak. Used theo lift to get him back to bed. Patient turns off bed alarm himself.     Tylenol #3 given for generalized pain. Ativan given for anxiety.

## 2022-02-10 NOTE — PLAN OF CARE
Problem: Adult Inpatient Plan of Care  Goal: Plan of Care Review  Outcome: No Change   Pt continues to be a little confused. Pt on some O2 now, SATS had been dipping overnight, will monitor. Pt is now off Covid iso.

## 2022-02-10 NOTE — PROGRESS NOTES
Care Management Follow Up    Length of Stay (days): 26    Expected Discharge Date: 02/10/2022 pending accepting TCU bed     Concerns to be Addressed: COVID recovered 02/10/2022; discharge planning; acceptin TCU bed    Patient plan of care discussed at interdisciplinary rounds: Yes    Anticipated Discharge Disposition: TCU     Anticipated Discharge Services: TCU  Anticipated Discharge DME: None    Patient/family educated on Medicare website which has current facility and service quality ratings: yes  Education Provided on the Discharge Plan:    Patient/Family in Agreement with the Plan: yes    Referrals Placed by CM/SW: TCU referrals  Private pay costs discussed: Not applicable    Additional Information:  TCU updates:  -[facility]-left voice message, contact information provided, requesting call back  -Message left for Cristina with [facility] regarding potential bed availability at [facility] and or [facility]. Awaiting accepting facilities.    1000 MD update, elevated lactic acid, not medically ready for discharge.    1100 [facility] TCU reviewing.    1330 Met with patient and spoke with pt's spouse via phone to provide updates on TCU referrals. Discussed options of additional referrals. Requested referrals in [employer] network in [facility]. Referrals made to [facility].      Criselda Crook RN

## 2022-02-10 NOTE — PROGRESS NOTES
"CLINICAL NUTRITION SERVICES - REASSESSMENT NOTE     Nutrition Prescription    RECOMMENDATIONS FOR MDs/PROVIDERS TO ORDER:  None    Malnutrition Status:    Severe in acute illness    Recommendations already ordered by Registered Dietitian (RD):  New weight    Future/Additional Recommendations:  Adjust supplement pending intake/acceptance/needs     EVALUATION OF THE PROGRESS TOWARD GOALS   Diet:Easy to chew with mildly thick liquids per SLP.  Moderate consistent CHO    Supplement: Vital cuisine bid, magic cup and gel plus daily = 1480 kcal, 73 g protein total    Intake: Variable, improved, %  Estimate intake yesterday 595 kcal, 20 g protein over breakfast and lunch. Supper ordered but intake not documented  Estimate intake 2/8 1055 kcal, 69 g protein over 3 meals  Estimate intake 2/7 471 kcal, 24 g protein over 2 meals for the day  Fair fluid intake, 880 ml  Supplement intake not documented. Pt reports liking these. Only 1 vital cuisine unopened in room     NS IVF bolus today    Pt is meeting 50-70% of estimated needs with meals and potentially more if taking supplements, inadequate but  improved  Pt reports his appetite is good and he is \"hungry\". He reports he likes all of the supplements    NEW FINDINGS  Ongoing confusion  2 L O2    GI   3-4  BM/day    LABS  Reviewed  Mag 1.7 2/7 low and stable - on replacement  -326mg/dl past 24 hours -in poor control. DM educator following     MEDICATIONS  Folic acid, ssi, lantus, magox, mvi, protonix, thiamine, tums, mvi with minerals, novolog 1U:12 g CHo  Metformin added    ANTHROPOMETRICS  Height: 185.4 cm (6' .992\"[documented height[)  Admit wt 186 lb 11.7 oz 1/17/22.  Most Recent Weight: 66.8 kg (147 lb 4.8 oz) 2/2- down 14 lb in 1 week?   73.0 kg (161 lb)  1/26  74.3 kg (163 lb 12.8 oz)  ?accurate- 1/25  189 lb 6 oz 1/23/22, -aggressive diuresis  184 lb 8.4 oz 1/24/22  190 lb 7.6 oz 1/19/22,   Weight History:       Wt Readings from Last 8 Encounters:   11/12/21 " 80.9 kg (178 lb 6.4 oz)   12/30/20 77.1 kg (170 lb)     Dosing Weight: 84.7 kg, current     ASSESSED NUTRITION NEEDS  Estimated Energy Needs: 5371-8203 kcals/day (25 - 30 kcals/kg)  Justification: Maintenance  Estimated Protein Needs: 102-125 grams protein/day (1.2 - 1.5 grams of pro/kg)  Justification: Increased needs, monitor LFTs.  Estimated Fluid Needs: 2115+ mL/day (1 mL/kcal)   Justification: maintenance or pending fluid status.    CURRENT NUTRITION DIAGNOSIS  Malnutrition r/t acute illness evidenced by intake < 50% since admit, moderate fat and muscle loss, weight loss    INTERVENTIONS  Implementation  New weight    Goals  Patient to consume % of nutritionally adequate meals three times per day, or the equivalent with supplements/snacks.-  progressing  Electrolytes WNL- progressing  BG - not progressing    Monitoring/Evaluation  Progress toward goals will be monitored and evaluated per protocol.    Charo Mace RDN, LD  #[phone]

## 2022-02-11 NOTE — PLAN OF CARE
Problem: Fall Injury Risk  Goal: Absence of Fall and Fall-Related Injury  Intervention: Promote Injury-Free Environment  Recent Flowsheet Documentation  Taken 2/10/2022 1600 by Martin Severino RN  Safety Promotion/Fall Prevention: bed alarm on     Problem: Hypertension Acute  Goal: Blood Pressure Within Desired Range  Outcome: No Change     Problem: Risk for Delirium  Goal: Optimal Coping  Outcome: No Change    Patient A and O with fluctuating cognition and confusion. Confusion increases as shift continues. Incontinent of bowel and bladder. Checked and changed frequently. Patient on 75 ml/hr NS. Lactic acid 3.6 on previous shift. Rechecking in morning. Patient repeatedly attempts to climb out of bed. Bed alarm set for safety. Ativan PO PRN given for restlessness. Proved ineffective.  Blood sugars WNL for patient this shift, BS managed with carb counting and oral metformin. No complaints of pain this shift.   Patient on 2 liters O2.

## 2022-02-11 NOTE — PROGRESS NOTES
[facility]    PROGRESS NOTE - Hospitalist Service    Assessment and Plan    56 year old male with PMH significant for alcohol abuse, hypertension, DM-II, multiple abdominal surgeries who was admitted on 1/15/22 with alcohol withdrawl, possible aspiration pneumonia leading to respiratory failure requiring intubation on 1/16, successfully extubated on 1/23/22 and now transferred out of ICU on 1/26/22 for floor care.      DM type 2, on insulin  - A1c was 7.0 on 1/15/22  - serum glucose up and down -consulted DM educator  - discontinue Dexamethasone and expect DM in better control  - Had hypoglycemic episode frequently  - Decrease Lantus to 5 units  - Cover with insulin sliding scale and carb counting.  - Started on Metformin on 2/9/2022, increase dose to 1 g twice daily    Hospital-acquired pneumonia  - Patient has worsening of hypoxia overnight with more confusion  - CT chest is negative for PE but shows worsening pneumonia  - Start IV Zosyn and vancomycin  - Check blood culture  - Check CRP, procalcitonin     Acute respiratory failure with hypoxia.  - Secondary to aspiration pneumonia  - Required intubation 1/16, extubated on 1/23. Required BiPAP 1/23, transitioned to HFNC on 1/24. Currently on 30LPM 40% FiO2  - Completed 7 days course of IV zosyn on 1/22  - Cont albuterol nebs and pulm hygiene  - CT PE was negative for PE on adission  - CT chest 1/28 showed New airway wall thickening in the left upper and lower lobes with new consolidation in the dependent portion of the left upper lobe and at the left lung base, and mucoid impaction in left lower lobe airways.  -- With new consolidation and leucocytosis, restarted him on IV zosyn. Procal elevated as well. Appreciate pulm consult. Cont with pulm hygiene.  -- Weekly COVID (per protocol) came back positive. He was negative on admission on 1/22. Check COVID labs. Start Covid special precaution.   -- 1/30 started IV remdesivir and  Dexamethasone. -  --1/31 given dose of tocilizumab/per pulmonology recommendations.  --taper off oxygen; thus discontinue dexamethasone on 2/5  -will remove isolation after 2/8 (10 days after positive test)      Septic shock-resolved  -- Likely due to multifocal pneumonia.  -- Metoprolol started on 1/25 for sinus tachycardia  -- Patient is having intermittent hypotension.   - Got 2 boluses of 500ml NS on 1/27.Normal cortisol level noted  - Restart IV fluid today as patient blood pressure is down  - Check echo     Acute toxic metabolic encephalopathy  - Clinical concern for alcohol withdrawal during the intubation  - Precedex weaned off. Cont with thiamine. Ativan prn.  - Alcohol level less than 10 on admission.    - Collateral information from wife-he did not find any evidence of patient drinking recently.  Patient denies drinking prior to hospitalization.  Most recent chemical dependency treatment in 2001.  Since then patient tried to quit drinking numerous times, ending up in the hospital with withdrawal.  He is on disability for 2 years, in the past was a nicole.  - Psychiatric consult appreciate input  -Significant worsening of confusion on 2/10/2022 evening.   - Probably secondary to worsening pneumonia     Leukocytosis  - Probably secondary to recent pneumonia and/or steroid   - Finished course of Zosyn during hospitalization 10 days ago  -Restart antibiotics given worsening pneumonia on CT chest  - Continue to monitor CBC     Hypokalemia/hypomagnesia  -- Replaced, monitor and replace as needed magnesium      Nausea/vomiting  -- Resolved  - Tolerating regular diet.     Significant weakness and deconditioning  - Secondary to above  - Pending TCU placement     Severe malnutrition  - Protein and calories  - Dietitian consult, continue supplement  - Patient still has poor oral intake        Active Problems:    Alcohol dependence with unspecified alcohol-induced disorder (H)    Hypomagnesemia    Type 2 diabetes  mellitus without complication (H)    Alcohol use disorder, severe, dependence (H)    Sinus tachycardia    Pneumonia of both lungs due to infectious organism, unspecified part of lung    Non-intractable vomiting with nausea, unspecified vomiting type    Agitation    Hypocalcemia      VTE prophylaxis:  Enoxaparin (Lovenox) SQ  DIET: Orders Placed This Encounter      Combination Diet Moderate Consistent Carb (60 g CHO per Meal) Diet; Easy to Chew (level 7); Mildly Thick (level 2)      Disposition/Barriers to discharge: IV antibiotic, worsening encephalopathy  Code Status: Full Code    Subjective:  Peewee is more confused today, had worsening last night with confusion and ripped off IV lines.  Also he required oxygen overnight    PHYSICAL EXAM  Vitals:    01/25/22 0102 01/26/22 0300 02/02/22 0241   Weight: 74.3 kg (163 lb 12.8 oz) 73 kg (161 lb) 66.8 kg (147 lb 4.8 oz)     B/P:107/71 T:97.7 P:126 R:16     Intake/Output Summary (Last 24 hours) at 2/11/2022 1519  Last data filed at 2/10/2022 1900  Gross per 24 hour   Intake 120 ml   Output 150 ml   Net -30 ml      Body mass index is 19.44 kg/m .    Constitutional: awake, alert, cooperative, no apparent distress, and appears stated age  Eyes: Lids and lashes normal, pupils equal, round and reactive to light, extra ocular muscles intact, sclera clear, conjunctiva normal  ENT: Normocephalic, without obvious abnormality, atraumatic, sinuses nontender on palpation, external ears without lesions, oral pharynx with moist mucous membranes, tonsils without erythema or exudates, gums normal and good dentition.  Respiratory: Decreased breath sounds on lung bases with scattered rhonchi.  Cardiovascular: Normal apical impulse, regular rate and rhythm, normal S1 and S2, no S3 or S4, and no murmur noted  GI: No scars, normal bowel sounds, soft, non-distended, non-tender, no masses palpated, no hepatosplenomegally  Skin: no bruising or bleeding and normal skin color, texture,  turgor  Musculoskeletal: There is no redness, warmth, or swelling of the joints.  Full range of motion noted.  no lower extremity pitting edema present  Neurologic: Awake, alert, oriented to name, place and time.  Cranial nerves II-XII are grossly intact.  Motor is 5 out of 5 bilaterally.   Sensory is intact.    Neuropsychiatric: Appropriate with examiner      PERTINENT LABS/IMAGING:  Recent Labs   Lab 22  1228 22  0736 22  0602 02/10/22  1205 02/10/22  1018 22  0814 22  0758   WBC  --   --  16.4*  --  15.3*  --   --    HGB  --   --  11.0*  --  10.8*  --   --    MCV  --   --  90  --  91  --   --    PLT  --   --  303  --  294  --   --    NA  --   --  141  --  139  --   --    POTASSIUM  --   --  4.2  --  3.7  --  3.9   CHLORIDE  --   --  111*  --  107  --   --    CO2  --   --  19*  --  24  --   --    BUN  --   --  8  --  8  --   --    CR  --   --  0.70  --  0.74  --   --    ANIONGAP  --   --  11  --  8  --   --    BECKY  --   --  7.4*  --  7.3*  --   --    * 311* 308*   < > 208*   < >  --    ALBUMIN  --   --  2.0*  --  2.1*  --   --    PROTTOTAL  --   --  4.6*  --  4.9*  --   --    BILITOTAL  --   --  1.2*  --  0.9  --   --    ALKPHOS  --   --  135*  --  137*  --   --    ALT  --   --  13  --  18  --   --    AST  --   --  51*  --  52*  --   --     < > = values in this interval not displayed.     Recent Results (from the past 24 hour(s))   Echocardiogram Complete   Result Value    LVEF  > 65%    [facility]    [id number]  [id number]  [id number]  [id number]^ALEYDA^[id number]^A     [address] [address]     Name: KAREY LIVINGSTON  MRN: [id number]  : 1965  Study Date: 2022 07:37 AM  Age: 56 yrs  Gender: Male  Patient Location: [facility]  Reason For Study: Hypertension (HTN)  Ordering Physician: LYN MAYNARD  Performed By: TAYLER     BSA: 1.9 m2  Height: 72 in  Weight: 147 lb  HR: 123  BP: 97/59  mmHg  ______________________________________________________________________________  Procedure  Complete Portable Echo Adult. Technically difficult study.Extremely poor  acoustic windows. Compared to the prior study dated 11/5/2021, there have been  no changes. No hemodynamically significant valvular abnormalities on 2D or  color flow imaging.  ______________________________________________________________________________  Interpretation Summary     1.Left ventricular size, wall motion and function are  normal. The ejection fraction is > 65%.  2.There is mild concentric left ventricular hypertrophy.  3.Normal right ventricle size and systolic function.  4.No hemodynamically significant valvular abnormalities on 2D or color flow  imaging.  Compared to the prior study dated 11/5/2021, there have been no changes.  ______________________________________________________________________________  I      WMSI = 1.00     % Normal = 100     X - Cannot   0 -                      (2) - Mildly 2 -          Segments  Size  Interpret    Hyperkinetic 1 - Normal  Hypokinetic  Hypokinetic  1-2     small                                                     7 -          3-5      moderate  3 - Akinetic 4 -          5 -         6 - Akinetic Dyskinetic   6-14    large               Dyskinetic   Aneurysmal  w/scar       w/scar       15-16   diffuse     Left Ventricle  Left ventricular size, wall motion and function are normal. The ejection  fraction is > 65%. There is mild concentric left ventricular hypertrophy. Left  ventricular diastolic function is normal. No regional wall motion  abnormalities noted.     Right Ventricle  Normal right ventricle size and systolic function. TAPSE is normal, which is  consistent with normal right ventricular systolic function.     Atria  Normal left atrial size. Right atrial size is normal. There is no color  Doppler evidence of an atrial shunt.     Mitral Valve  Mitral valve leaflets appear normal. There  is no evidence of mitral stenosis  or clinically significant mitral regurgitation. There is no mitral  regurgitation noted. There is no mitral valve stenosis.     Tricuspid Valve  Tricuspid valve leaflets appear normal. Right ventricle systolic pressure  estimate normal. There is trace tricuspid regurgitation. There is no tricuspid  stenosis.     Aortic Valve  Aortic valve leaflets appear normal. There is no evidence of aortic stenosis  or clinically significant aortic regurgitation. The aortic valve is  trileaflet. No aortic regurgitation is present. No aortic stenosis is present.     Pulmonic Valve  The pulmonic valve is not well seen, but is grossly normal. This degree of  valvular regurgitation is within normal limits. There is no pulmonic valvular  stenosis.     Vessels  The aorta root is normal. Normal size ascending aorta. IVC diameter <2.1 cm  collapsing >50% with sniff suggests a normal RA pressure of 3 mmHg.     Pericardium  There is no pericardial effusion.     Rhythm  The rhythm was sinus tachycardia.     ______________________________________________________________________________  MMode/2D Measurements & Calculations  IVSd: 1.3 cm  LVIDd: 4.1 cm  LVIDs: 2.3 cm  LVPWd: 1.2 cm  FS: 43.4 %     LV mass(C)d: 182.6 grams  LV mass(C)dI: 97.7 grams/m2  Ao root diam: 3.3 cm  LA dimension: 3.6 cm  asc Aorta Diam: 3.8 cm  LA/Ao: 1.1  LVOT diam: 2.2 cm  LVOT area: 3.8 cm2  LA Volume (BP): 29.3 ml  LA Volume Index (BP): 15.7 ml/m2     LA Volume Indexed (AL/bp): 17.3 ml/m2  RWT: 0.61     Doppler Measurements & Calculations  MV E max uriel: 132.0 cm/sec  MV A max uriel: 137.1 cm/sec  MV E/A: 0.96  MV dec slope: 1104 cm/sec2  MV dec time: 0.12 sec  Ao V2 max: 143.6 cm/sec  Ao max P.0 mmHg  Ao V2 mean: 108.6 cm/sec  Ao mean P.2 mmHg  Ao V2 VTI: 26.8 cm  LELO(I,D): 3.5 cm2  LELO(V,D): 3.9 cm2  LV V1 max P.6 mmHg  LV V1 max: 146.7 cm/sec  LV V1 VTI: 24.2 cm  SV(LVOT): 92.9 ml  SI(LVOT): 49.7 ml/m2  PA V2 max:  107.4 cm/sec  PA max P.6 mmHg  PA acc time: 0.07 sec  AV Gustavo Ratio (DI): 1.0  LELO Index (cm2/m2): 1.9  E/E' av.4     Lateral E/e': 18.6  Medial E/e': 20.2     ______________________________________________________________________________  Report approved by: Randal Villa 2022 09:14 AM         CT Chest Pulmonary Embolism w Contrast    Narrative    EXAM: CT CHEST PULMONARY EMBOLISM W CONTRAST  LOCATION: [facility]  DATE/TIME: 2022 1:42 PM    INDICATION: PE suspected, low/intermediate prob, positive D-dimer, hypoxia, history of Covid  COMPARISON: 2022  TECHNIQUE: CT chest pulmonary angiogram during arterial phase injection of IV contrast. Multiplanar reformats and MIP reconstructions were performed. Dose reduction techniques were used.   CONTRAST: IsoVue 370 100mL    FINDINGS:  ANGIOGRAM CHEST: No pulmonary artery filling defects. Normal caliber aorta.    LUNGS AND PLEURA: Moderate emphysema. Persistent but increased patchy groundglass and consolidative pulmonary opacities with areas of interlobular septal thickening. Mild bronchiectasis and bronchial wall thickening have increased when compared to prior   exam. Trace effusions have decreased. No pneumothorax.    MEDIASTINUM/AXILLAE: Heart size is normal. No adenopathy.    CORONARY ARTERY CALCIFICATION: None.    UPPER ABDOMEN: Hepatic steatosis. Cholelithiasis. There is diffuse wall thickening of the stomach. Small volume ascites. Nonobstructing bilateral renal calculi. Prominent calcification in the area of the pancreatic neck is unchanged. Splenectomy with   small accessory splenule.    MUSCULOSKELETAL: Degenerative changes of the spine. Prominent L1 Schmorl's node. Similar appearance of the bilateral rib fractures. Mild anasarca.      Impression    IMPRESSION:  1.  No pulmonary embolus.  2.  Interval worsening of bilateral pulmonary opacities, bronchiectasis, bronchial wall thickening.  3.  Hepatic  steatosis.  4.  Cholelithiasis.  5.  Diffuse wall thickening of the visualized stomach may indicate gastritis.  6.  Manifestations of third spacing.       Discussed with patient, family, nursing staff and discharge planner    Johnnie Perkins MD  [facility] Medicine Service  [phone]

## 2022-02-11 NOTE — PLAN OF CARE
Problem: Adult Inpatient Plan of Care  Goal: Plan of Care Review  Outcome: No Change  Flowsheets (Taken 2/11/2022 1119)  Plan of Care Reviewed With: patient  Goal: Patient-Specific Goal (Individualized)  Outcome: No Change  Goal: Absence of Hospital-Acquired Illness or Injury  Outcome: No Change  Goal: Optimal Comfort and Wellbeing  Outcome: No Change  Goal: Readiness for Transition of Care  Outcome: No Change     Problem: Risk for Delirium  Goal: Improved Behavioral Control  Outcome: No Change  Goal: Improved Attention and Thought Clarity  Outcome: No Change  Goal: Improved Sleep  Outcome: No Change     Problem: Acute Neurologic Deterioration (Alcohol Withdrawal)  Goal: Optimal Neurologic Function  Outcome: No Change     Problem: Substance Misuse (Alcohol Withdrawal)  Goal: Readiness for Change Identified  Outcome: No Change     Problem: Violence Risk or Actual  Goal: Anger and Impulse Control  Outcome: No Change     Problem: Hypertension Acute  Goal: Blood Pressure Within Desired Range  Outcome: No Change     Problem: OT General Care Plan  Goal: Transfer (OT)  Description: Transfer (OT)  Outcome: No Change  Goal: Toilet Transfer/Toileting (OT)  Description: Toilet Transfer/Toileting (OT)  Outcome: No Change  Goal: Cognitive (OT)  Description: Cognitive (OT)  Outcome: No Change     Problem: Fall Injury Risk  Goal: Absence of Fall and Fall-Related Injury  Outcome: No Change

## 2022-02-11 NOTE — PHARMACY-VANCOMYCIN DOSING SERVICE
"Pharmacy Vancomycin Initial Note  Date of Service 2022  Patient's  1965  56 year old, male    Indication: Healthcare-Associated Pneumonia    Current estimated CrCl = Estimated Creatinine Clearance: 114 mL/min (based on SCr of 0.7 mg/dL).    Creatinine for last 3 days  2/10/2022: 10:18 AM Creatinine 0.74 mg/dL  2022:  6:02 AM Creatinine 0.70 mg/dL    Recent Vancomycin Level(s) for last 3 days  No results found for requested labs within last 72 hours.      Vancomycin IV Administrations (past 72 hours)      No vancomycin orders with administrations in past 72 hours.                Nephrotoxins and other renal medications (From now, onward)    Start     Dose/Rate Route Frequency Ordered Stop    22 0500  vancomycin (VANCOCIN) 1000 mg in dextrose 5% 200 mL PREMIX         1,000 mg  200 mL/hr over 1 Hours Intravenous EVERY 12 HOURS 22 1619      22 2138  piperacillin-tazobactam (ZOSYN) 3.375 g vial to attach to  mL bag        Note to Pharmacy: Extended infusion dosing to start 6 hours after initial infusion.   \"Followed by\" Linked Group Details    3.375 g  over 240 Minutes Intravenous EVERY 8 HOURS 22 1539      22 1700  vancomycin (VANCOCIN) 1,250 mg in sodium chloride 0.9 % 250 mL intermittent infusion         1,250 mg  over 90 Minutes Intravenous ONCE 22 1618      22 1600  piperacillin-tazobactam (ZOSYN) 3.375 g vial to attach to  mL bag        \"Followed by\" Linked Group Details    3.375 g  over 30 Minutes Intravenous ONCE 22 1539            Contrast Orders - past 72 hours (72h ago, onward)            Start     Dose/Rate Route Frequency Ordered Stop    22 1400  iopamidol (ISOVUE-370) solution 100 mL         100 mL Intravenous ONCE 22 1342 22 1342          [employer] Prediction of Planned Initial Vancomycin Regimen  Loading dose: 1250 mg at 17:00 2022.  Regimen: 1000 mg IV every 12 hours.  Exposure target: AUC24 " (range)400-600 mg/L.hr   AUC24,ss: 461 mg/L.hr  Probability of AUC24 > 400: 65 %  Ctrough,ss: 13.5 mg/L  Probability of Ctrough,ss > 20: 21 %  Probability of nephrotoxicity (Lodise JUANJOSE 2009): 9 %          Plan:  1. Start vancomycin  1250 mg IV once  2. Follow with vancomycin 1000 mg IV q 12 h   3. Vancomycin monitoring method: AUC  4. Vancomycin therapeutic monitoring goal: 400-600 mg*h/L  5. Pharmacy will check vancomycin levels as appropriate in 1-3 Days.    6. Serum creatinine levels will be ordered daily for the first week of therapy and at least twice weekly for subsequent weeks.      Neno More, EverD

## 2022-02-11 NOTE — PLAN OF CARE
Problem: Adult Inpatient Plan of Care  Goal: Optimal Comfort and Wellbeing  2/11/2022 0239 by Patricia Wooten RN  Outcome: Improving  2/11/2022 0238 by Patricia Wooten RN  Outcome: Improving     Problem: Risk for Delirium  Goal: Improved Attention and Thought Clarity  Outcome: Improving     Problem: Hypertension Acute  Goal: Blood Pressure Within Desired Range  2/11/2022 0239 by Patricia Wooten RN  Outcome: Improving    Pt given prn Haldol and ativan for increases agitation and anxiety. Restless most of the night.  Incont. bowel and bladder. In the beginning of this shift. pt O2 sat <85% on r/a, and initiated O2 on 2L. Lactic acid protocol fired up, VS checked and lab drawn lactic acid 2.5 MD updated. NS run 75 ml/hr. Pt. Slept on and off. Pulled out the IV line this am.

## 2022-02-11 NOTE — PROGRESS NOTES
10:50AM - CHW received delegation from Criselda Crook RN CM, to follow up on TCU referrals.    [facility] - Spoke with Cyndy in admissions. No bed availability for this week or next week.    [facility] - Left message on Cristina's voicemail to call back to [phone].    [facility] - Left message on admissions voicemail to call back to [phone].    [facility] - Spoke with Ana Laura in admissions. Currently under review at this time. Will call back to [phone].    Updated destination comments on Care Management tab.    MICHAEL Davis  Inpatient Community Health Worker  [facility], [facility]

## 2022-02-11 NOTE — PROGRESS NOTES
Care Management Follow Up    Length of Stay (days): 27    Expected Discharge Date: respiratory status, labs, CT of chest     Concerns to be Addressed: discharge planning     Patient plan of care discussed at interdisciplinary rounds: Yes    Anticipated Discharge Disposition: TCU     Anticipated Discharge Services: Other (see comment) (therapy services )  Anticipated Discharge DME: None    Patient/family educated on Medicare website which has current facility and service quality ratings: yes  Education Provided on the Discharge Plan: per team   Patient/Family in Agreement with the Plan: yes    Referrals Placed by CM/SW: Post Acute Facilities  Private pay costs discussed: transportation costs    Additional Information:  MD update, not medically ready for discharge.    TCU referrals pending. See Jama Cunningham's progress note for details.     -[facility] interested in accepting patient for admission; would like to see that blood sugars are under better control. No weekend admissions, care management to follow up with Saige Moon) on Monday 2/14.  -[facility]-no beds til next week  -Full referral sent to [facility]        Criselda Crook RN

## 2022-02-12 NOTE — PLAN OF CARE
Problem: Adult Inpatient Plan of Care  Goal: Plan of Care Review  Outcome: No Change  Flowsheets (Taken 2/12/2022 1039)  Plan of Care Reviewed With: patient  Goal: Patient-Specific Goal (Individualized)  Outcome: No Change  Goal: Absence of Hospital-Acquired Illness or Injury  Outcome: No Change  Intervention: Identify and Manage Fall Risk  Recent Flowsheet Documentation  Taken 2/12/2022 0741 by Antelmo Sahu RN  Safety Promotion/Fall Prevention: bed alarm on  Intervention: Prevent Skin Injury  Recent Flowsheet Documentation  Taken 2/12/2022 0741 by Antelmo Sahu RN  Body Position: position maintained  Intervention: Prevent Infection  Recent Flowsheet Documentation  Taken 2/12/2022 0741 by Antelmo Sahu RN  Infection Prevention: rest/sleep promoted  Goal: Optimal Comfort and Wellbeing  Outcome: No Change  Goal: Readiness for Transition of Care  Outcome: No Change     Problem: Risk for Delirium  Goal: Improved Attention and Thought Clarity  Outcome: No Change  Goal: Improved Sleep  Outcome: No Change     Problem: Acute Neurologic Deterioration (Alcohol Withdrawal)  Goal: Optimal Neurologic Function  Outcome: No Change     Problem: Substance Misuse (Alcohol Withdrawal)  Goal: Readiness for Change Identified  Outcome: No Change     Problem: Violence Risk or Actual  Goal: Anger and Impulse Control  Outcome: No Change     Problem: Hypertension Acute  Goal: Blood Pressure Within Desired Range  Outcome: No Change     Problem: OT General Care Plan  Goal: Transfer (OT)  Description: Transfer (OT)  Outcome: No Change  Goal: Toilet Transfer/Toileting (OT)  Description: Toilet Transfer/Toileting (OT)  Outcome: No Change  Goal: Cognitive (OT)  Description: Cognitive (OT)  Outcome: No Change     Problem: Fall Injury Risk  Goal: Absence of Fall and Fall-Related Injury  Outcome: No Change  Intervention: Promote Injury-Free Environment  Recent Flowsheet Documentation  Taken 2/12/2022 0741 by Antelmo Sahu RN  Safety  Promotion/Fall Prevention: bed alarm on     Problem: Mobility Impairment  Goal: Optimal Mobility  Outcome: No Change  Intervention: Optimize Mobility  Recent Flowsheet Documentation  Taken 2/12/2022 0999 by Antelmo Sahu, RN  Activity Management: activity adjusted per tolerance

## 2022-02-12 NOTE — PLAN OF CARE
Patient very restless and confused; denied pain but having frequent bowel movements x3 between 2300 and 0230. MD notified of this and restlessness after medicating with PO ativan, melatonin and IV haldol. MD ordered one time Imodium and 5mg zyprexa PO for agitation. After dose of zyprexa patient was able to sleep and stay safe in the bed. Arm board added to right arm to keep IV running and patent.     Problem: Adult Inpatient Plan of Care  Goal: Optimal Comfort and Wellbeing  Outcome: Improving     Problem: Risk for Delirium  Goal: Improved Sleep  Outcome: Improving     Problem: Risk for Delirium  Goal: Improved Behavioral Control  Outcome: Change based on patient need/priority      EMT/paramedic

## 2022-02-12 NOTE — PROVIDER NOTIFICATION
Patient has had 3 loose stools between 2300 and 0215. Provider notified and requested dose of imodium as loose frequent stools could be agitating the patient.     Writer has already given the patient ativan PO; melatonin and Haldol IV. Patient is still continually restless and trying to get out of the bed. Requested one time dose of PO zyprexa.

## 2022-02-12 NOTE — PROGRESS NOTES
[facility]    PROGRESS NOTE - Hospitalist Service    Assessment and Plan    56 year old male with PMH significant for alcohol abuse, hypertension, DM-II, multiple abdominal surgeries who was admitted on 1/15/22 with alcohol withdrawl, possible aspiration pneumonia leading to respiratory failure requiring intubation on 1/16, successfully extubated on 1/23/22 and now transferred out of ICU on 1/26/22 for floor care.      DM type 2, on insulin  - A1c was 7.0 on 1/15/22  - serum glucose up and down -consulted DM educator  - discontinue Dexamethasone and expect DM in better control  - Had hypoglycemic episode frequently  - Decrease Lantus to 5 units  - Cover with insulin sliding scale and carb counting.  - Started on Metformin on 2/9/2022, increase dose to 1 g twice daily     Hospital-acquired pneumonia  - Patient has worsening of hypoxia overnight with more confusion  - CT chest is negative for PE but shows worsening pneumonia  - Start IV Zosyn and vancomycin  - Negative blood culture  - Unremarkable CRP, procalcitonin     Acute respiratory failure with hypoxia.  - Secondary to aspiration pneumonia  - Required intubation 1/16, extubated on 1/23. Required BiPAP 1/23, transitioned to HFNC on 1/24. Currently on 30LPM 40% FiO2  - Completed 7 days course of IV zosyn on 1/22  - Cont albuterol nebs and pulm hygiene  - CT PE was negative for PE on adission  - CT chest 1/28 showed New airway wall thickening in the left upper and lower lobes with new consolidation in the dependent portion of the left upper lobe and at the left lung base, and mucoid impaction in left lower lobe airways.  -- With new consolidation and leucocytosis, restarted him on IV zosyn. Procal elevated as well. Appreciate pulm consult. Cont with pulm hygiene.  -- Weekly COVID (per protocol) came back positive. He was negative on admission on 1/22. Check COVID labs. Start Covid special precaution.   -- 1/30 started IV remdesivir and  Dexamethasone. -  --1/31 given dose of tocilizumab/per pulmonology recommendations.  --taper off oxygen; thus discontinue dexamethasone on 2/5  -will remove isolation after 2/8 (10 days after positive test)      Septic shock-resolved  -- Likely due to multifocal pneumonia.  -- Metoprolol started on 1/25 for sinus tachycardia  -- Patient is having intermittent hypotension.   - Got 2 boluses of 500ml NS on 1/27.Normal cortisol level noted  - Restart IV fluid today as patient blood pressure is down  - Check echo     Acute toxic metabolic encephalopathy  - Clinical concern for alcohol withdrawal during the intubation  - Precedex weaned off. Cont with thiamine. Ativan prn.  - Alcohol level less than 10 on admission.    - Collateral information from wife-he did not find any evidence of patient drinking recently.    - Patient denies drinking prior to hospitalization.  Most recent chemical dependency treatment in 2001.  Since then patient tried to quit drinking numerous times, ending up in the hospital with withdrawal.  He is on disability for 2 years, in the past was a nicole.  - Psychiatric consult appreciate input  - Significant worsening of confusion on 2/10/2022 evening.   - Probably secondary to worsening pneumonia  - Change Zyprexa to scheduled at nighttime and continue with as needed     Leukocytosis  - Probably secondary to recent pneumonia and/or steroid   - Finished course of Zosyn during hospitalization 10 days ago  - Restart antibiotics given worsening pneumonia on CT chest  - Continue to monitor CBC     Hypokalemia/hypomagnesia  - Replaced, monitor and replace as needed magnesium      Nausea/vomiting  - Resolved  - Tolerating regular diet.     Significant weakness and deconditioning  - Secondary to above  - Pending TCU placement     Severe malnutrition  - Protein and calories  - Dietitian consult, continue supplement  - Patient still has poor oral intake    S/P fall   - Patient had incident of fall this  morning 2/12/2022, unwitnessed  - Evaluated by house officer and a CT head is negative for significant changes  - Continue for precautions    Active Problems:    Alcohol dependence with unspecified alcohol-induced disorder (H)    Hypomagnesemia    Type 2 diabetes mellitus without complication (H)    Alcohol use disorder, severe, dependence (H)    Sinus tachycardia    Pneumonia of both lungs due to infectious organism, unspecified part of lung    Non-intractable vomiting with nausea, unspecified vomiting type    Agitation    Hypocalcemia      VTE prophylaxis:  Enoxaparin (Lovenox) SQ  DIET: Orders Placed This Encounter      Combination Diet Moderate Consistent Carb (60 g CHO per Meal) Diet; Easy to Chew (level 7); Mildly Thick (level 2)      Disposition/Barriers to discharge: Confusion, IV antibiotics  Code Status: Full Code    Subjective:  Peewee is still confused, had fallen since this morning    PHYSICAL EXAM  Vitals:    01/25/22 0102 01/26/22 0300 02/02/22 0241   Weight: 74.3 kg (163 lb 12.8 oz) 73 kg (161 lb) 66.8 kg (147 lb 4.8 oz)     B/P:100/61 T:97.8 P:106 R:16     Intake/Output Summary (Last 24 hours) at 2/12/2022 1544  Last data filed at 2/12/2022 0452  Gross per 24 hour   Intake 972 ml   Output --   Net 972 ml      Body mass index is 19.44 kg/m .    Constitutional: Lethargic, no apparent distress, and appears stated age  Eyes: Lids and lashes normal, pupils equal, round and reactive to light, extra ocular muscles intact, sclera clear, conjunctiva normal  ENT: Normocephalic, without obvious abnormality, atraumatic, sinuses nontender on palpation, external ears without lesions, oral pharynx with moist mucous membranes, tonsils without erythema or exudates, gums normal and good dentition.  Respiratory: No increased work of breathing, good air exchange, clear to auscultation bilaterally, no crackles or wheezing  Cardiovascular: Normal apical impulse, regular rate and rhythm, normal S1 and S2, no S3 or S4, and  no murmur noted  GI: No scars, normal bowel sounds, soft, non-distended, non-tender, no masses palpated, no hepatosplenomegally  Skin: no bruising or bleeding and normal skin color, texture, turgor  Musculoskeletal: There is no redness, warmth, or swelling of the joints.  Full range of motion noted.  no lower extremity pitting edema present  Neurologic: Confused, lethargic, self oriented only.  No focal deficit.  Neuropsychiatric: Appropriate with examiner      PERTINENT LABS/IMAGING:  Recent Labs   Lab 02/12/22  1230 02/12/22  0820 02/12/22  0603 02/11/22  0736 02/11/22  0602 02/10/22  1205 02/10/22  1018   WBC  --   --  12.7*  --  16.4*  --  15.3*   HGB  --   --  9.9*  --  11.0*  --  10.8*   MCV  --   --  94  --  90  --  91   PLT  --   --  258  --  303  --  294   NA  --   --  141  --  141  --  139   POTASSIUM  --   --  3.5  --  4.2  --  3.7   CHLORIDE  --   --  115*  --  111*  --  107   CO2  --   --  18*  --  19*  --  24   BUN  --   --  6*  --  8  --  8   CR  --   --  0.62*  --  0.70  --  0.74   ANIONGAP  --   --  8  --  11  --  8   BECKY  --   --  7.4*  --  7.4*  --  7.3*   * 81 65*   < > 308*   < > 208*   ALBUMIN  --   --  1.8*  --  2.0*  --  2.1*   PROTTOTAL  --   --  4.3*  --  4.6*  --  4.9*   BILITOTAL  --   --  0.9  --  1.2*  --  0.9   ALKPHOS  --   --  114  --  135*  --  137*   ALT  --   --  13  --  13  --  18   AST  --   --  37  --  51*  --  52*    < > = values in this interval not displayed.     Recent Results (from the past 24 hour(s))   CT Head w/o Contrast    Narrative    EXAM: CT HEAD W/O CONTRAST  LOCATION: [facility]  DATE/TIME: 2/12/2022 9:20 AM    INDICATION: Trauma - Head Injury. Unwitnessed fall.  COMPARISON: CT head 01/18/2022  TECHNIQUE: Routine CT Head without IV contrast. Multiplanar reformats. Dose reduction techniques were used.    FINDINGS:  INTRACRANIAL CONTENTS: No intracranial hemorrhage, extraaxial collection, or mass effect.  No CT evidence of acute  infarct. Small focus of chronic cortical encephalomalacia and adjacent gliosis involving the superior left frontal lobe as seen previously.   Mild presumed chronic small vessel ischemic changes. Mild generalized volume loss. No hydrocephalus.     VISUALIZED ORBITS/SINUSES/MASTOIDS: No intraorbital abnormality. Complete opacification of the left maxillary sinus with some chronic appearing mural hyperostosis similar to the previous exam. Additional subtotal opacification of the left sphenoid sinus   by polypoid mucosal thickening as seen previously. No middle ear or mastoid effusion.    BONES/SOFT TISSUES: Mild right parietal scalp swelling. No skull fracture.      Impression    IMPRESSION:  1.  No CT evidence for acute intracranial process.  2.  Mild right parietal scalp contusion without associated fracture.  3.  Brain atrophy and presumed chronic microvascular ischemic changes as above.  4.  Inflammatory changes of the paranasal sinuses as seen previously.       Discussed with patient, family, nursing staff and discharge planner    Johnnie Perkins MD  [facility] Medicine Service  [phone]

## 2022-02-12 NOTE — PLAN OF CARE
Problem: Mobility Impairment  Goal: Optimal Mobility  Outcome: No Change     Problem: Fall Injury Risk  Goal: Absence of Fall and Fall-Related Injury  Intervention: Promote Injury-Free Environment  Recent Flowsheet Documentation  Taken 2/11/2022 1630 by Martin Severino RN  Safety Promotion/Fall Prevention:   bed alarm on   chair alarm on   fall prevention program maintained   lighting adjusted   room door open     Problem: Acute Neurologic Deterioration (Alcohol Withdrawal)  Goal: Optimal Neurologic Function  Outcome: No Change     Patient is A and O  to self and family. Cognition fluctuates and worsens as shift continues. Patient on 50 ml/ns along with IV abx. Patient incontinent of bowel and bladder. Checked and changed through out shift. Multiple bowel movements this shift. Patient restless this shift, multiple attempts to get out of bed and self transfer. Ativan, haldol given. No effect. Patient blood sugars on sliding scale for patient.   No complaints of pain this shift.

## 2022-02-12 NOTE — SIGNIFICANT EVENT
Today at 728 patient was found on the floor with bed alarm going off. Patient climbed between railings and fell. Patient asked, if he hit his head, Patient denies hitting head, also asked Patient if he has any pain, also denies pain. Vitals are stable, also head CT done and no bleed noted, Doctor came to room and assessed Patient. Currently Patient doing well, still confused though, and still impulsive and pulling on blankets and lines, easily re-directed.

## 2022-02-12 NOTE — SIGNIFICANT EVENT
Significant Event Note    Time of event: 7:55 AM    Description of event:  Patient had an unwitnessed fall this morning. Nursing went in to check on him in the morning and found him on the floor next to his bed. Fall was unwitnessed. Patient is an unreliable historian; he is unable to tell me how he fell, and if he is having any pain of his extremities or head.     Physical exam reveals no bleeding or wound. No obvious head trauma. Patient does not report pain on palpation of extremities. Pupils equal and reactive.    Plan:  Patient is an unreliable historian with unwitnessed fall. Will obtain non-con head CT to evaluate further.     Discussed with: bedside nurse    Sarah Garnett MD

## 2022-02-13 NOTE — PLAN OF CARE
Problem: Fall Injury Risk  Goal: Absence of Fall and Fall-Related Injury  Intervention: Promote Injury-Free Environment  Recent Flowsheet Documentation  Taken 2/12/2022 1700 by Martin Severino RN  Safety Promotion/Fall Prevention:   bed alarm on   chair alarm on   room door open     Problem: Mobility Impairment  Goal: Optimal Mobility  Outcome: No Change     Problem: Acute Neurologic Deterioration (Alcohol Withdrawal)  Goal: Optimal Neurologic Function  Outcome: No Change     Patient A and O to self and family. Hard assist of 2 for transfers and cares. Patient blood sugars WNL for patient. Insulin given for sliding scale and for carb count. BS at .   Patient incontinent of bowel and bladder.   Patient confused and showing restlessness. On bed alarms for safety. Scheduled Zyprexa, PRN Zyprexa, and PRN IV haldol given. Meds ineffective. Patient still attempting to climb out of bed.  Patient on IPAD for safety.

## 2022-02-13 NOTE — PROGRESS NOTES
[facility]    PROGRESS NOTE - Hospitalist Service    Assessment and Plan     56 year old male with PMH significant for alcohol abuse, hypertension, DM-II, multiple abdominal surgeries who was admitted on 1/15/22 with alcohol withdrawl, possible aspiration pneumonia leading to respiratory failure requiring intubation on 1/16, successfully extubated on 1/23/22 and now transferred out of ICU on 1/26/22 for floor care.      DM type 2, on insulin  - A1c was 7.0 on 1/15/22  - serum glucose up and down -consulted DM educator  - discontinue Dexamethasone and expect DM in better control  - Had hypoglycemic episode frequently  - Decrease Lantus to 5 units but still has low blood sugar, hold Lantus  - Cover with insulin sliding scale and carb counting.  - Started on Metformin on 2/9/2022, increased dose to 1 g twice daily     Hospital-acquired pneumonia  - Patient has worsening of hypoxia overnight with more confusion  - CT chest is negative for PE but shows worsening pneumonia  - Start IV Zosyn and vancomycin, plan to discontinue vancomycin tomorrow if continues to improve and blood culture remains negative  - Negative blood culture  - Unremarkable CRP, procalcitonin     Acute respiratory failure with hypoxia.  - Secondary to aspiration pneumonia  - Required intubation 1/16, extubated on 1/23. Required BiPAP 1/23, transitioned to HFNC on 1/24. Currently on 30LPM 40% FiO2  - Completed 7 days course of IV zosyn on 1/22  - Cont albuterol nebs and pulm hygiene  - CT PE was negative for PE on adission  - CT chest 1/28 showed New airway wall thickening in the left upper and lower lobes with new consolidation in the dependent portion of the left upper lobe and at the left lung base, and mucoid impaction in left lower lobe airways.  -- With new consolidation and leucocytosis, restarted him on IV zosyn. Procal elevated as well. Appreciate pulm consult. Cont with pulm hygiene.  -- Weekly COVID (per protocol)  came back positive. He was negative on admission on 1/22. Check COVID labs. Start Covid special precaution.   -- 1/30 started IV remdesivir and Dexamethasone. -  --1/31 given dose of tocilizumab/per pulmonology recommendations.  --taper off oxygen; thus discontinue dexamethasone on 2/5  -will remove isolation after 2/8 (10 days after positive test)      Septic shock-resolved  -- Likely due to multifocal pneumonia.  -- Metoprolol started on 1/25 for sinus tachycardia  -- Patient is having intermittent hypotension.   - Got 2 boluses of 500ml NS on 1/27.Normal cortisol level noted  - Restart IV fluid today as patient blood pressure is down  - Check echo     Acute toxic metabolic encephalopathy  - Clinical concern for alcohol withdrawal during the intubation  - Precedex weaned off. Cont with thiamine. Ativan prn.  - Alcohol level less than 10 on admission.    - Collateral information from wife-he did not find any evidence of patient drinking recently.    - Patient denies drinking prior to hospitalization.  Most recent chemical dependency treatment in 2001.  Since then patient tried to quit drinking numerous times, ending up in the hospital with withdrawal.  He is on disability for 2 years, in the past was a nicole.  - Psychiatric consult appreciate input  - Significant worsening of confusion on 2/10/2022 evening.   - Probably secondary to worsening pneumonia  - Change Zyprexa to scheduled at nighttime and continue with as needed  - Started on one-to-one last night, some improvement today trial of weaning off one-to-one.     Leukocytosis  - Probably secondary to recent pneumonia and/or steroid   - Finished course of Zosyn during hospitalization 10 days ago  - Restart antibiotics given worsening pneumonia on CT chest  - Improving  - Continue to monitor CBC     Hypokalemia/hypomagnesia  - Level is still low, probably secondary to diarrhea  - Replaced, monitor and replace as needed magnesium    Diarrhea  - Check C.  Difficile  - Start Imodium if C. difficile is negative  - Add probiotic      Nausea/vomiting  - Resolved  - Tolerating regular diet.     Significant weakness and deconditioning  - Secondary to above  - Pending TCU placement     Severe malnutrition  - Protein and calories  - Dietitian consult, continue supplement  - Patient still has poor oral intake     S/P fall   - Patient had incident of fall this morning 2/12/2022, unwitnessed  - Evaluated by house officer and a CT head is negative for significant changes  - Continue for precautions       Active Problems:    Alcohol dependence with unspecified alcohol-induced disorder (H)    Hypomagnesemia    Type 2 diabetes mellitus without complication (H)    Alcohol use disorder, severe, dependence (H)    Sinus tachycardia    Pneumonia of both lungs due to infectious organism, unspecified part of lung    Non-intractable vomiting with nausea, unspecified vomiting type    Agitation    Hypocalcemia      VTE prophylaxis:  Enoxaparin (Lovenox) SQ  DIET: Orders Placed This Encounter      Combination Diet Moderate Consistent Carb (60 g CHO per Meal) Diet; Easy to Chew (level 7); Mildly Thick (level 2)      Disposition/Barriers to discharge: IV antibiotic, improving mental condition  Code Status: Full Code    Subjective:  Peewee is more awake today, answer question appropriately but still has some confusion. No fever chills overnight.    PHYSICAL EXAM  Vitals:    01/26/22 0300 02/02/22 0241 02/13/22 0800   Weight: 73 kg (161 lb) 66.8 kg (147 lb 4.8 oz) 66.2 kg (145 lb 15.1 oz)     B/P:98/64 T:97.6 P:108 R:18     Intake/Output Summary (Last 24 hours) at 2/13/2022 1353  Last data filed at 2/13/2022 1328  Gross per 24 hour   Intake 1210 ml   Output --   Net 1210 ml      Body mass index is 19.26 kg/m .    Constitutional: awake, alert, cooperative, no apparent distress, and appears stated age  Eyes: Lids and lashes normal, pupils equal, round and reactive to light, extra ocular muscles  intact, sclera clear, conjunctiva normal  ENT: Normocephalic, without obvious abnormality, atraumatic, sinuses nontender on palpation, external ears without lesions, oral pharynx with moist mucous membranes, tonsils without erythema or exudates, gums normal and good dentition.  Respiratory: Decreased breath sounds on lung bases. Scattered rhonchi but no wheeze.   Cardiovascular: Normal apical impulse, regular rate and rhythm, normal S1 and S2, no S3 or S4, and no murmur noted  GI: No scars, normal bowel sounds, soft, non-distended, non-tender, no masses palpated, no hepatosplenomegally  Skin: no bruising or bleeding and normal skin color, texture, turgor  Musculoskeletal: There is no redness, warmth, or swelling of the joints.  Full range of motion noted.  no lower extremity pitting edema present  Neurologic: Awake, alert, oriented to name, place but not to time.  Cranial nerves II-XII are grossly intact.  Motor is 5 out of 5 bilaterally.   Sensory is intact.    Neuropsychiatric: Appropriate with examiner      PERTINENT LABS/IMAGING:  Recent Labs   Lab 02/13/22  1225 02/13/22  1125 02/13/22  0755 02/13/22  0738 02/13/22  0714 02/13/22  0621 02/12/22  0820 02/12/22  0603 02/11/22  0736 02/11/22  0602   WBC  --   --   --   --   --  11.4*  --  12.7*  --  16.4*   HGB  --   --   --   --   --  9.9*  --  9.9*  --  11.0*   MCV  --   --   --   --   --  90  --  94  --  90   PLT  --   --   --   --   --  258  --  258  --  303   NA  --   --   --   --   --  144  --  141  --  141   POTASSIUM 4.0  --   --   --   --  3.3*  --  3.5  --  4.2   CHLORIDE  --   --   --   --   --  116*  --  115*  --  111*   CO2  --   --   --   --   --  23  --  18*  --  19*   BUN  --   --   --   --   --  4*  --  6*  --  8   CR  --   --   --   --   --  0.60*  --  0.62*  --  0.70   ANIONGAP  --   --   --   --   --  5  --  8  --  11   BECKY  --   --   --   --   --  7.3*  --  7.4*  --  7.4*   GLC  --  144* 101* 86   < > 39*   < > 65*   < > 308*   ALBUMIN  --    --   --   --   --  1.8*  --  1.8*  --  2.0*   PROTTOTAL  --   --   --   --   --  4.0*  --  4.3*  --  4.6*   BILITOTAL  --   --   --   --   --  0.9  --  0.9  --  1.2*   ALKPHOS  --   --   --   --   --  107  --  114  --  135*   ALT  --   --   --   --   --  14  --  13  --  13   AST  --   --   --   --   --  27  --  37  --  51*    < > = values in this interval not displayed.     No results found for this or any previous visit (from the past 24 hour(s)).    Discussed with patient, family, nursing staff and discharge planner    Johnnie Perkins MD  [facility] Medicine Service  [phone]

## 2022-02-13 NOTE — PHARMACY-VANCOMYCIN DOSING SERVICE
"Pharmacy Vancomycin Note  Date of Service 2022  Patient's  1965   56 year old, male    Indication: Healthcare-Associated Pneumonia  Day of Therapy: 3  Current vancomycin regimen:  1000 mg IV q12h  Current vancomycin monitoring method: AUC  Current vancomycin therapeutic monitoring goal: 400-600 mg*h/L    [employer] Prediction of Current Vancomycin Regimen  Loading dose: 1250 mg at 17:00 2022.  Regimen: 1000 mg IV every 12 hours.  Start time: 18:30 on 2022  Exposure target: AUC24 (range)400-600 mg/L.hr   AUC24,ss: 608 mg/L.hr  Probability of AUC24 > 400: 99 %  Ctrough,ss: 18.8 mg/L  Probability of Ctrough,ss > 20: 39 %  Probability of nephrotoxicity (Lodise JUANJOSE ): 15 %    Current estimated CrCl = Estimated Creatinine Clearance: 133 mL/min (A) (based on SCr of 0.6 mg/dL (L)).    Creatinine for last 3 days  2/10/2022: 10:18 AM Creatinine 0.74 mg/dL  2022:  6:02 AM Creatinine 0.70 mg/dL  2022:  6:03 AM Creatinine 0.62 mg/dL  2022:  6:21 AM Creatinine 0.60 mg/dL    Recent Vancomycin Levels (past 3 days)  2022:  6:21 AM Vancomycin 21.5 mg/L    Vancomycin IV Administrations (past 72 hours)                   vancomycin (VANCOCIN) 1000 mg in dextrose 5% 200 mL PREMIX (mg) 1,000 mg New Bag 22 0630     1,000 mg New Bag 22 1935     1,000 mg New Bag  0445    vancomycin (VANCOCIN) 1,250 mg in sodium chloride 0.9 % 250 mL intermittent infusion (mg) 1,250 mg New Bag 22 1809                Nephrotoxins and other renal medications (From now, onward)    Start     Dose/Rate Route Frequency Ordered Stop    22 0500  vancomycin (VANCOCIN) 1000 mg in dextrose 5% 200 mL PREMIX         1,000 mg  200 mL/hr over 1 Hours Intravenous EVERY 12 HOURS 22 1619      22 2138  piperacillin-tazobactam (ZOSYN) 3.375 g vial to attach to  mL bag        Note to Pharmacy: Extended infusion dosing to start 6 hours after initial infusion.   \"Followed by\" Linked " Group Details    3.375 g  over 240 Minutes Intravenous EVERY 8 HOURS 02/11/22 1539               Contrast Orders - past 72 hours (72h ago, onward)            Start     Dose/Rate Route Frequency Ordered Stop    02/11/22 1400  iopamidol (ISOVUE-370) solution 100 mL         100 mL Intravenous ONCE 02/11/22 1342 02/11/22 1342          Interpretation of levels and current regimen:  Vancomycin level is reflective of AUC greater than 600    Has serum creatinine changed greater than 50% in last 72 hours: No    Renal Function: Stable    [employer] Prediction of Planned New Vancomycin Regimen  Regimen: 750 mg IV every 12 hours.  Start time: 18:30 on 02/13/2022  Exposure target: AUC24 (range)400-600 mg/L.hr   AUC24,ss: 460 mg/L.hr  Probability of AUC24 > 400: 79 %  Ctrough,ss: 14.2 mg/L  Probability of Ctrough,ss > 20: 6 %  Probability of nephrotoxicity (Lodise JUANJOSE 2009): 9 %    Plan:  1. Decrease Dose to 750 mg IV q12h.  2. Vancomycin monitoring method: AUC  3. Vancomycin therapeutic monitoring goal: 400-600 mg*h/L  4. Pharmacy will check vancomycin levels as appropriate in 1-3 Days.  5. Serum creatinine levels will be ordered a minimum of twice weekly.    Kaylee Khan, [facility]

## 2022-02-13 NOTE — PLAN OF CARE
Problem: Adult Inpatient Plan of Care  Goal: Plan of Care Review  Outcome: Improving     Alert and oriented to person, place and situation. Off 1:1 sitter at 1050, bed alarm on for safety and IPAD on to monitor distantly.  Vitals stable. PRN pain meds given for back pain. Had 3 large watery stools, updated Dr Perkins,  C-diff. check and PRN Imodium ordered. Dleon Roman RN

## 2022-02-13 NOTE — PROGRESS NOTES
BG low 39, notified from lab. Notified Dr Perkins about low BG and K low 3.1.  Explain BG low to patient and Juice offered to pt, BG rechecked x3, BG was up to 101. K protocol ordered. Delon Roman RN

## 2022-02-14 NOTE — PROGRESS NOTES
"CLINICAL NUTRITION SERVICES - REASSESSMENT NOTE     Nutrition Prescription    RECOMMENDATIONS FOR MDs/PROVIDERS TO ORDER:  Recommend iv mag replacement with severely depleted level    Malnutrition Status:    Severe in acute illness    Recommendations already ordered by Registered Dietitian (RD):  No new    Future/Additional Recommendations:  Adjust supplement pending intake/acceptance/needs     EVALUATION OF THE PROGRESS TOWARD GOALS   Diet:Easy to chew with mildly thick liquids per SLP.  Moderate consistent CHO    Supplement: Vital cuisine bid, magic cup and gel plus daily = 1480 kcal, 73 g protein total    Intake: Variable  Ate 100% of breakfast today at 297 kcal, 10 g protein  Ate 50% of breakfast yesterday and food from home at supper yesterday  25% and 50% of supper x 2 days prior. No other meal intakes documented    Supplement intake not documented. No unopened supplements in room    NS IVF at 50 ml/hr since yesterday    Suspect pt is taking at least some of his supplements. Difficult to determine adequacy of intake with food from home/insufficient data.     NEW FINDINGS  Ongoing confusion    GI   4 loose BM yesterday - prn immodium added yesterday-has not yet been given    LABS  Reviewed  Mag 1.1 decreasing  -331mg/dl past 24 hours -in poor control. Hypoglycemia x 1 2/13 at 0600. DM educator following     MEDICATIONS  Folic acid, ssi, lantus, magox, mvi, protonix, thiamine, tums, mvi with minerals, novolog 1U:10 g Cho, metformin bid  Lantus on hold. Culturelle added 2/13. Iv abx added      ANTHROPOMETRICS  Height: 185.4 cm (6' .992\")  Admit wt 186 lb 11.7 oz 1/17/22.  Most Recent Weight: 71.0 kg (156 lb 11.2 oz) 2/14, up 11 lb from day prior? 2/13 weight down 2 lb from 2 weeks prior  66.8 kg (147 lb 4.8 oz) 2/2- down 14 lb in 1 week?   73.0 kg (161 lb)  1/26  74.3 kg (163 lb 12.8 oz)  ?accurate- 1/25  189 lb 6 oz 1/23/22, -aggressive diuresis  184 lb 8.4 oz 1/24/22  190 lb 7.6 oz 1/19/22,   Weight " History:       Wt Readings from Last 8 Encounters:   11/12/21 80.9 kg (178 lb 6.4 oz)   12/30/20 77.1 kg (170 lb)     Dosing Weight: 84.7 kg, current     ASSESSED NUTRITION NEEDS  Estimated Energy Needs: 6374-1551 kcals/day (25 - 30 kcals/kg)  Justification: Maintenance  Estimated Protein Needs: 102-125 grams protein/day (1.2 - 1.5 grams of pro/kg)  Justification: Increased needs, monitor LFTs.  Estimated Fluid Needs: 2115+ mL/day (1 mL/kcal)   Justification: maintenance or pending fluid status.    CURRENT NUTRITION DIAGNOSIS  Malnutrition r/t acute illness evidenced by intake < 50% since admit, moderate fat and muscle loss, weight loss    INTERVENTIONS  Implementation  None at this time  Recommend iv mag replacement with severely depleted level    Goals  Patient to consume % of nutritionally adequate meals three times per day, or the equivalent with supplements/snacks.-  progressing  Electrolytes WNL- progressing- Mag still not WNL  BG - not progressing    Monitoring/Evaluation  Progress toward goals will be monitored and evaluated per protocol.    Charo Mace RDN, LD  #[phone]

## 2022-02-14 NOTE — PROGRESS NOTES
Psychiatric Progress Note  Metabolic encephalopathy, exacerbated in the context of COVID-19, ?aspiration pneumonia, alcohol withdrawal prolonged hospitalization 18 days.  Cognitive and behavioral changes with restlessness, anxiety and intermittent agitation.  Sleep dysregulation exacerbated the context of hospital environment and above.  Mood changes due to medical issues, alcohol use, and Covid-19   Alcohol use hx   Recommendations:  Duloxetine 60 mg daily.  Continue gabapentin 200 mg twice a day  Seroquel 12.5 mg TID x 6 doses   Seroquel 75 mg at bedtime continue.  Rozerem 8 mg at bedtime.  Continue to utilize olanzapine 5-10 mg every 6 hours as needed for agitation, psychosis.  Efforts at sleep regulation.  For daytime anxiety would recommend adding Seroquel 6.25 mg in the morning and afternoon.    SUBJECTIVE:  Confused, disoriented but easily redirectable. Pleasant and cooperative.   No hallucinations this morning. He wanted to go out to walk his dog last night and buy a bottle of liquor.     MENTAL STATUS EXAMINATION:     General Appearance: NAD, Comfortable  Speech: Slow.  Thought Process: Increased latency  Thought content: No psychosis, NO SI  Thought Formation: Loosening of associations.  Judgment: Depressed  Insight : Depressed  Attention : Adequate  Memory: Depressed for medical condition, hospital stay  Fund Of Knowledge: Depressed  Affect: Neutral  Mood: Congruent  Alert : Arousable  Orientation: X 2  Comprehension: Depressed  Generative thought content:Slow  Language: Intact  Gait and Ambulation:Slow. Some assist for ambulation  Musculoskeletal: No tonal abnormalities    Vital signs in last 24 hours  Temp:  [97.4  F (36.3  C)-97.7  F (36.5  C)] 97.4  F (36.3  C)  Pulse:  [] 105  Resp:  [16-18] 18  BP: ()/(51-77) 91/52  SpO2:  [92 %-96 %] 96 %

## 2022-02-14 NOTE — PLAN OF CARE
Problem: Adult Inpatient Plan of Care  Goal: Plan of Care Review  Outcome: No Change  Problem: Risk for Delirium  Goal: Improved Attention and Thought Clarity  Outcome: No Change  Patient restless over night. Melatonin and ativan given. Patient attempted to get out of bed several times despite close monitoring.  He is confused but redirectable. Made him a 1:1 again for patient safety. Blood sugar monitored closely over night. No hypoglycemic events noted.

## 2022-02-14 NOTE — SIGNIFICANT EVENT
"Assisted to sitting in the chair, assist of 2 with transfer belt.  Heavy transfer, patient moved very slowly.  BP 75/51 when first in the chair, stated he felt dizzy.  Feet elevated and reclined back in the chair, BP 91/52.  Denied dizziness after a few minutes \"I'm not going to ever tell you again I'm dizzy if you keep harping on me about it.\"    Olivia Prince RN  "

## 2022-02-14 NOTE — PHARMACY-VANCOMYCIN DOSING SERVICE
"Pharmacy Vancomycin Note  Date of Service 2022  Patient's  1965   56 year old, male    Indication: Healthcare-Associated Pneumonia  Day of Therapy: 4  Current vancomycin regimen:  1000 mg IV q12h  Current vancomycin monitoring method: AUC  Current vancomycin therapeutic monitoring goal: 400-600 mg*h/L    InsightRX Prediction of Current Vancomycin Regimen  Regimen: 1000 mg IV every 12 hours.  Start time: 18:00 on 2022  Exposure target: AUC24 (range)400-600 mg/L.hr   AUC24,ss: 679 mg/L.hr  Probability of AUC24 > 400: 100 %  Ctrough,ss: 21.6 mg/L  Probability of Ctrough,ss > 20: 64 %  Probability of nephrotoxicity (Lodise JUANJOSE ): 20 %    Current estimated CrCl = Estimated Creatinine Clearance: 114.6 mL/min (A) (based on SCr of 0.69 mg/dL (L)).    Creatinine for last 3 days  2022:  6:03 AM Creatinine 0.62 mg/dL  2022:  6:21 AM Creatinine 0.60 mg/dL  2022:  6:39 AM Creatinine 0.69 mg/dL    Recent Vancomycin Levels (past 3 days)  2022:  6:21 AM Vancomycin 21.5 mg/L    Vancomycin IV Administrations (past 72 hours)                   vancomycin (VANCOCIN) 1000 mg in dextrose 5% 200 mL PREMIX (mg) 1,000 mg New Bag 22 0531     1,000 mg New Bag 22 1816     1,000 mg New Bag  0630     1,000 mg New Bag 22 1935     1,000 mg New Bag  0445    vancomycin (VANCOCIN) 1,250 mg in sodium chloride 0.9 % 250 mL intermittent infusion (mg) 1,250 mg New Bag 22 1809                Nephrotoxins and other renal medications (From now, onward)    Start     Dose/Rate Route Frequency Ordered Stop    22 0500  vancomycin (VANCOCIN) 1000 mg in dextrose 5% 200 mL PREMIX         1,000 mg  200 mL/hr over 1 Hours Intravenous EVERY 12 HOURS 22 1619      22 2138  piperacillin-tazobactam (ZOSYN) 3.375 g vial to attach to  mL bag        Note to Pharmacy: Extended infusion dosing to start 6 hours after initial infusion.   \"Followed by\" Linked Group Details    3.126 " g  over 240 Minutes Intravenous EVERY 8 HOURS 02/11/22 1539               Contrast Orders - past 72 hours (72h ago, onward)            Start     Dose/Rate Route Frequency Ordered Stop    02/11/22 1400  iopamidol (ISOVUE-370) solution 100 mL         100 mL Intravenous ONCE 02/11/22 1342 02/11/22 1342          Interpretation of levels and current regimen:  Vancomycin level is reflective of AUC greater than 600    Has serum creatinine changed greater than 50% in last 72 hours: No    Renal Function: Stable    InsightRX Prediction of Planned New Vancomycin Regimen  Regimen: 750 mg IV every 12 hours.  Start time: 18:00 on 02/14/2022  Exposure target: AUC24 (range)400-600 mg/L.hr   AUC24,ss: 515 mg/L.hr  Probability of AUC24 > 400: 92 %  Ctrough,ss: 16.4 mg/L  Probability of Ctrough,ss > 20: 19 %  Probability of nephrotoxicity (Lodise JUANJOSE 2009): 12 %    Plan:  1. Decrease Dose to 750 mg q12h (as note indicated yesterday, but order never adjusted)  2. Vancomycin monitoring method: AUC  3. Vancomycin therapeutic monitoring goal: 400-600 mg*h/L  4. Pharmacy will check vancomycin levels as appropriate in 1-3 Days.  5. Serum creatinine levels will be ordered a minimum of twice weekly.    Briseyda Nation [facility]

## 2022-02-14 NOTE — PROGRESS NOTES
Care Management Follow Up    Length of Stay (days): 30    Expected Discharge Date: 02/15/2022     Concerns to be Addressed: discharge planning     Patient plan of care discussed at interdisciplinary rounds: Yes    Anticipated Discharge Disposition: Acute Rehab     Anticipated Discharge Services: Other (see comment) (therapy services )  Anticipated Discharge DME: None    Patient/family educated on Medicare website which has current facility and service quality ratings: yes  Education Provided on the Discharge Plan:    Patient/Family in Agreement with the Plan: yes    Referrals Placed by CM/SW: Post Acute Facilities  Private pay costs discussed: Not applicable    Additional Information:  See below       Susan Xiong RN          Message left for Cristina with [facility] to verify if patient is accepted at [facility] when blood sugars are controlled. Also need to know if [employer] auth is needed. Awaiting call back     10:32 AM  Spoke to Cristina. Says blood sugars need to be better controlled before they can accept. Also need to know if patient needs enteric precautions- noted that cdiff is pending .     [employer] auth will be needed.     MD updated     12:45 PM  Updated patient . He is agreeable to plan. Would like transport arranged and is aware of potential cost. Declines my offer to update wife.

## 2022-02-14 NOTE — PROGRESS NOTES
[facility]    PROGRESS NOTE - Hospitalist Service    Assessment and Plan  Brief Hospital course  56 year old male with past medical history of alcohol abuse, hypertension, DM-II, multiple abdominal surgeries who was admitted on 1/15/22 with alcohol withdrawl, possible aspiration pneumonia leading to respiratory failure requiring intubation on 1/16, successfully extubated on 1/23/22 and now transferred out of ICU on 1/26/22 for floor care.  Tested positive for Covid on 1/22/2022 in the hospital  Did improve initially and was pending TCU placement until patient got worsening respiratory condition on 2/11/2022 and CT chest showed worsening pneumonia so restarted on IV antibiotic with Vanco and Zosyn.  Acute encephalopathy got worse with pneumonia    Acute respiratory failure with hypoxia.  - Secondary to aspiration pneumonia  - Required intubation 1/16, extubated on 1/23. Required BiPAP 1/23, transitioned to HFNC on 1/24. Currently on 30LPM 40% FiO2  - Completed 7 days course of IV zosyn on 1/22  - Cont albuterol nebs and pulm hygiene  - CT PE was negative for PE on adission  - CT chest 1/28 showed New airway wall thickening in the left upper and lower lobes with new consolidation in the dependent portion of the left upper lobe and at the left lung base, and mucoid impaction in left lower lobe airways.  -- With new consolidation and leucocytosis, restarted him on IV zosyn. Procal elevated as well. Appreciate pulm consult. Cont with pulm hygiene.  -- Weekly COVID (per protocol) came back positive. He was negative on admission on 1/22. Check COVID labs. Start Covid special precaution.   - Finished IV remdesivir and Dexamethasone course. -  - Received dose of tocilizumab/per pulmonology recommendations on 1/31/2022.  - taper off oxygen; thus discontinue dexamethasone on 2/5  -will remove isolation after 2/8 (10 days after positive test)  -Worsening respiratory condition in 2/11/2022  - CT chest  showed worsening pneumonia so patient was started on IV antibiotic with vancomycin and Zosyn  - Improving on antibiotics and currently on 2-3 L of oxygen    Hospital-acquired pneumonia  - Patient has worsening of hypoxia overnight with more confusion  - CT chest is negative for PE but shows worsening pneumonia  - Start IV Zosyn and vancomycin and 2/11/2022  - plan to discontinue vancomycin tomorrow as he continues to improve and blood culture remains negative  - Continue IV Zosyn for 1 more day  - Negative blood culture  - Unremarkable CRP, procalcitonin      DM type 2  - On NPH insulin 5 units twice daily   - Hemoglobin A1c was 7.0 on 1/15/22  - Blood glucose is very labile up and down   -consulted DM educator  - discontinue Dexamethasone   - Very sensitive to Lantus as patient goes hypoglycemic even with 5 units  - Had hypoglycemic episode frequently  -Discussed Lantus to 5 units   - Change carb counting sliding scale  - Started on Metformin on 2/9/2022, increased dose to 1 g twice daily  - Continue to monitor blood glucose       Septic shock-resolved  -- Likely due to multifocal pneumonia.  -- Metoprolol started on 1/25 for sinus tachycardia  -- Patient is having intermittent hypotension.   - Got 2 boluses of 500ml NS on 1/27.Normal cortisol level noted  - Restart IV fluid today as patient blood pressure is down  - Echo shows EF of 65% with mild concentric left ventricular hypertrophy  - Started on midodrine for persistent low blood pressure     Acute toxic metabolic encephalopathy  - Patient has significant alcohol withdrawal during the intubation and was on Precedex  - Precedex weaned off. Cont with thiamine. Ativan prn.  - Alcohol level less than 10 on admission.    - Collateral information from wife-he did not find any evidence of patient drinking recently.    - Brain MRI shows chronic CVAs with brain atrophy  - Patient denies drinking prior to hospitalization.  Most recent chemical dependency treatment in  2001.  Since then patient tried to quit drinking numerous times, ending up in the hospital with withdrawal.  He is on disability for 2 years, in the past was a nicole.  - Psychiatric consult appreciate input  - Significant worsening of confusion on 2/10/2022 evening.   - Probably secondary to worsening pneumonia  - Change Zyprexa to scheduled at nighttime and continue with as needed  - Started on one-to-one last night, some improvement today trial of weaning off one-to-one.  - Reconsult psychiatry     Leukocytosis  - Probably secondary to recent pneumonia and/or steroid   - Finished course of Zosyn during hospitalization 10 days ago  - Restart antibiotics given worsening pneumonia on CT chest  - Improving  - Continue to monitor CBC     Hypokalemia/hypomagnesia  - Level is still low, probably secondary to diarrhea  - Replaced, monitor and replace as needed magnesium     Diarrhea  - Patient had 2 episodes of diarrhea but improved afterwards  - Discontinue C. difficile as no BM for the past 24 hours  - Add probiotic      Nausea/vomiting  - Resolved  - Tolerating regular diet.     Significant weakness and deconditioning  - Secondary to above  - Pending TCU placement     Severe malnutrition  - Protein and calories  - Dietitian consult, continue supplement  - Patient still has poor oral intake    Hypokalemia and hypomagnesemia  - Replace per protocol     S/P fall   - Patient had incident of fall this morning 2/12/2022, unwitnessed  - Evaluated by house officer and a CT head is negative for significant changes  - Continue fall precautions    Goals of care  - Discussed details goals of care with patient's wife Daina on 2/14/2022  - Patient has poor quality of life prior to admission because of his alcohol use and previous CVAs  - Wife declined going through aggressive care again as what happened on admission with intubation.  - Patient spouse decided on DNR and preferred conservative treatment if patient has not  improved  - We do palliative care consult         Active Problems:    Alcohol dependence with unspecified alcohol-induced disorder (H)    Hypomagnesemia    Type 2 diabetes mellitus without complication (H)    Alcohol use disorder, severe, dependence (H)    Sinus tachycardia    Pneumonia of both lungs due to infectious organism, unspecified part of lung    Non-intractable vomiting with nausea, unspecified vomiting type    Agitation    Hypocalcemia      VTE prophylaxis:  Enoxaparin (Lovenox) SQ  DIET: Orders Placed This Encounter      Combination Diet Moderate Consistent Carb (60 g CHO per Meal) Diet; Easy to Chew (level 7); Mildly Thick (level 2)      Disposition/Barriers to discharge: Improve mental condition, IV antibiotic  Code Status: Full Code    Subjective:  Peewee still has fluctuating mental condition, currently off one-to-one.  Continue close observation as he tries to get out of the bed    PHYSICAL EXAM  Vitals:    02/02/22 0241 02/13/22 0800 02/14/22 0900   Weight: 66.8 kg (147 lb 4.8 oz) 66.2 kg (145 lb 15.1 oz) 71.1 kg (156 lb 11.2 oz)     B/P:94/59 T:97.4 P:98 R:18     Intake/Output Summary (Last 24 hours) at 2/14/2022 1738  Last data filed at 2/14/2022 0957  Gross per 24 hour   Intake --   Output 800 ml   Net -800 ml      Body mass index is 20.68 kg/m .    Constitutional: Confused, cooperative, no apparent distress, and appears stated age  Eyes: Lids and lashes normal, pupils equal, round and reactive to light, extra ocular muscles intact, sclera clear, conjunctiva normal  ENT: Normocephalic, without obvious abnormality, atraumatic, sinuses nontender on palpation, external ears without lesions, oral pharynx with moist mucous membranes, tonsils without erythema or exudates, gums normal and good dentition.  Respiratory: No increased work of breathing, good air exchange, clear to auscultation bilaterally, no crackles or wheezing  Cardiovascular: Normal apical impulse, regular rate and rhythm, normal S1  and S2, no S3 or S4, and no murmur noted  GI: No scars, normal bowel sounds, soft, non-distended, non-tender, no masses palpated, no hepatosplenomegally  Skin: no bruising or bleeding and normal skin color, texture, turgor  Musculoskeletal: There is no redness, warmth, or swelling of the joints.  Full range of motion noted.  no lower extremity pitting edema present  Neurologic: Unable to do full neuro exam because of confusion.  Following simple commands.  Neuropsychiatric: Appropriate with examiner      PERTINENT LABS/IMAGING:  Recent Labs   Lab 02/14/22  1711 02/14/22  1219 02/14/22  1018 02/14/22  0801 02/14/22  0639 02/13/22  1642 02/13/22  1225 02/13/22  0714 02/13/22  0621 02/12/22  0820 02/12/22  0603 02/11/22  0736 02/11/22  0602   WBC  --   --   --   --   --   --   --   --  11.4*  --  12.7*  --  16.4*   HGB  --   --   --   --   --   --   --   --  9.9*  --  9.9*  --  11.0*   MCV  --   --   --   --   --   --   --   --  90  --  94  --  90   PLT  --   --   --   --   --   --   --   --  258  --  258  --  303   NA  --   --   --   --  138  --   --   --  144  --  141  --  141   POTASSIUM  --   --   --   --  4.0  --  4.0  --  3.3*  --  3.5  --  4.2   CHLORIDE  --   --   --   --  112*  --   --   --  116*  --  115*  --  111*   CO2  --   --   --   --  20*  --   --   --  23  --  18*  --  19*   BUN  --   --   --   --  3*  --   --   --  4*  --  6*  --  8   CR  --   --   --   --  0.69*  --   --   --  0.60*  --  0.62*  --  0.70   ANIONGAP  --   --   --   --  6  --   --   --  5  --  8  --  11   BECKY  --   --   --   --  7.2*  --   --   --  7.3*  --  7.4*  --  7.4*   * 222* 296*   < > 317*   < >  --    < > 39*   < > 65*   < > 308*   ALBUMIN  --   --   --   --   --   --   --   --  1.8*  --  1.8*  --  2.0*   PROTTOTAL  --   --   --   --   --   --   --   --  4.0*  --  4.3*  --  4.6*   BILITOTAL  --   --   --   --   --   --   --   --  0.9  --  0.9  --  1.2*   ALKPHOS  --   --   --   --   --   --   --   --  107  --  114  --   135*   ALT  --   --   --   --   --   --   --   --  14  --  13  --  13   AST  --   --   --   --   --   --   --   --  27  --  37  --  51*    < > = values in this interval not displayed.     No results found for this or any previous visit (from the past 24 hour(s)).    Discussed with patient, family, nursing staff and discharge planner    Johnnie Perkins MD  [facility] Medicine Service  [phone]

## 2022-02-14 NOTE — PLAN OF CARE
Problem: Acute Neurologic Deterioration (Alcohol Withdrawal)  Goal: Optimal Neurologic Function  Outcome: No Change     Problem: Hypertension Acute  Goal: Blood Pressure Within Desired Range  Outcome: No Change     Problem: Mobility Impairment  Goal: Optimal Mobility  Outcome: No Change     Patient not on 1 to 1 this shift. Patient calm with no behaviors this shift. One large bowel movement at beginning of shift  but none since. CDIFF order for sample still unfinished. Blood sugars 324 at dinner and 330 at HS. Sliding scale insulin applied both times.   Patient verbalized pain of 8/10. Tylenol 3 given. Proved effective.   Patient on bed alarms for safety.   50 ml/hr .45% running.

## 2022-02-14 NOTE — PROGRESS NOTES
Moved closer to the desk, to room 422.  1:1 this morning until 09:15, patient is redirectable but very restless.  States he is in the hospital when asked but continually wants to go out for a smoke or walk his dog.      Blood pressure 85/48 with machine and manually.  Patient denies dizziness this afternoon.    No stooling this shift, also no complaints of abdominal discomfort.  Continues to have pain in his lower back and hips.  Calmoseptine applied to coccyx.     Olivia Prince RN

## 2022-02-15 NOTE — PHARMACY-VANCOMYCIN DOSING SERVICE
Pharmacy Vancomycin Initial Note  Date of Service February 15, 2022  Patient's  1965  56 year old, male    Indication: Sepsis    Current estimated CrCl = Estimated Creatinine Clearance: 119.6 mL/min (based on SCr of 0.71 mg/dL).    Creatinine for last 3 days  2022:  6:21 AM Creatinine 0.60 mg/dL  2022:  6:39 AM Creatinine 0.69 mg/dL  2/15/2022:  5:59 AM Creatinine 0.71 mg/dL    Recent Vancomycin Level(s) for last 3 days  2022:  6:21 AM Vancomycin 21.5 mg/L      Vancomycin IV Administrations (past 72 hours)                   vancomycin (VANCOCIN) 750 mg in sodium chloride 0.9 % 250 mL intermittent infusion (mg) 750 mg New Bag 22 2058    vancomycin (VANCOCIN) 1000 mg in dextrose 5% 200 mL PREMIX (mg) 1,000 mg New Bag 22 0531     1,000 mg New Bag 22 1816     1,000 mg New Bag  0630     1,000 mg New Bag 22 1935                Nephrotoxins and other renal medications (From now, onward)    Start     Dose/Rate Route Frequency Ordered Stop    02/15/22 1800  vancomycin (VANCOCIN) 750 mg in sodium chloride 0.9 % 250 mL intermittent infusion         750 mg  over 60-90 Minutes Intravenous EVERY 12 HOURS 02/15/22 1631            Contrast Orders - past 72 hours (72h ago, onward)            None          [employer] Prediction of Planned Initial Vancomycin Regimen  Regimen: 750 mg IV every 12 hours.  Start time: 16:32 on 02/15/2022  Exposure target: AUC24 (range)400-600 mg/L.hr   AUC24,ss: 519 mg/L.hr  Probability of AUC24 > 400: 94 %  Ctrough,ss: 16.6 mg/L  Probability of Ctrough,ss > 20: 19 %  Probability of nephrotoxicity (Lodise JUANJOSE ): 12 %        Plan:  1. Start vancomycin  750 mg IV q12h. The patient was previously on vancomycin with a last dose on 22 at 2100.    2. Vancomycin monitoring method: AUC  3. Vancomycin therapeutic monitoring goal: 400-600 mg*h/L  4. Pharmacy will check vancomycin levels as appropriate in 1-3 Days.    5. Serum creatinine levels will be ordered  daily for the first week of therapy and at least twice weekly for subsequent weeks.      Monica Mireles, [facility]

## 2022-02-15 NOTE — PROVIDER NOTIFICATION
MD Hayden, pt's lactic acid was 3.1 and WBC was 24.6. Please address per sepsis protocol, thank you.  Return call to [phone].

## 2022-02-15 NOTE — PLAN OF CARE
Patient is Awake throughout shift. He is confused about where he is, and time of day. He does know himself and his wife. He tried to get out of bed without putting on call light or waiting for help. He continues to state that he wanted to have a cigarette. VSS. He is drinking thickened liquid and is able to take his pills whole. Patient becomes agitated when reminded to get back into bed.    Problem: Adult Inpatient Plan of Care  Goal: Absence of Hospital-Acquired Illness or Injury  Intervention: Identify and Manage Fall Risk  Recent Flowsheet Documentation  Taken 2/14/2022 2200 by Daisy Mast RN  Safety Promotion/Fall Prevention: activity supervised  Taken 2/14/2022 1830 by Daisy Mast RN  Safety Promotion/Fall Prevention: activity supervised  Intervention: Prevent Skin Injury  Recent Flowsheet Documentation  Taken 2/14/2022 2200 by Daisy Mast RN  Body Position: position changed independently  Taken 2/14/2022 2100 by Daisy Mast RN  Body Position: position changed independently  Taken 2/14/2022 1830 by Daisy Mast RN  Body Position: position changed independently  Intervention: Prevent and Manage VTE (Venous Thromboembolism) Risk  Recent Flowsheet Documentation  Taken 2/14/2022 2200 by Daisy Mast RN  VTE Prevention/Management: fluids promoted  Taken 2/14/2022 1830 by Daisy Mast RN  VTE Prevention/Management: fluids promoted  Intervention: Prevent Infection  Recent Flowsheet Documentation  Taken 2/14/2022 2200 by Daisy Mast RN  Infection Prevention: hand hygiene promoted  Taken 2/14/2022 1830 by Daisy Mast RN  Infection Prevention: hand hygiene promoted     Problem: Risk for Delirium  Goal: Improved Attention and Thought Clarity  Outcome: No Change  Goal: Improved Sleep  Outcome: Improving     Problem: Acute Neurologic Deterioration (Alcohol Withdrawal)  Goal: Optimal Neurologic Function  Outcome: No Change     Problem: Substance Misuse (Alcohol Withdrawal)  Goal: Readiness  for Change Identified  Outcome: No Change     Problem: Device-Related Complication Risk (Mechanical Ventilation, Invasive)  Goal: Optimal Device Function  Intervention: Optimize Device Care and Function  Recent Flowsheet Documentation  Taken 2/14/2022 2200 by Daisy Mast RN  Airway Safety Measures: all equipment/monitors on and audible  Taken 2/14/2022 1830 by Daisy Mast RN  Airway Safety Measures: all equipment/monitors on and audible     Problem: Inability to Wean (Mechanical Ventilation, Invasive)  Goal: Mechanical Ventilation Liberation  Intervention: Promote Extubation and Mechanical Ventilation Liberation  Recent Flowsheet Documentation  Taken 2/14/2022 2200 by Daisy Mast RN  Medication Review/Management: medications reviewed  Taken 2/14/2022 1830 by Daisy Mast RN  Medication Review/Management: medications reviewed     Problem: Ventilator-Induced Lung Injury (Mechanical Ventilation, Invasive)  Goal: Absence of Ventilator-Induced Lung Injury  Intervention: Prevent Ventilator-Associated Pneumonia  Recent Flowsheet Documentation  Taken 2/14/2022 2200 by Daisy Mast RN  Oral Care:   teeth brushed   tongue brushed  Head of Bed (HOB) Positioning: HOB at 15 degrees  Taken 2/14/2022 2100 by Daisy Mast RN  Head of Bed (HOB) Positioning: HOB at 15 degrees  Taken 2/14/2022 1830 by Daisy Mast RN  Oral Care:   teeth brushed   tongue brushed  Head of Bed (HOB) Positioning: HOB at 15 degrees     Problem: Violence Risk or Actual  Goal: Anger and Impulse Control  Outcome: Improving     Problem: Hypertension Acute  Goal: Blood Pressure Within Desired Range  Intervention: Normalize Blood Pressure  Recent Flowsheet Documentation  Taken 2/14/2022 2200 by Daisy Mast, RN  Medication Review/Management: medications reviewed  Taken 2/14/2022 1830 by Daisy Mast RN  Medication Review/Management: medications reviewed     Problem: Gas Exchange Impaired  Goal: Optimal Gas Exchange  Intervention:  Optimize Oxygenation and Ventilation  Recent Flowsheet Documentation  Taken 2/14/2022 2200 by Daisy Mast RN  Head of Bed ([facility]) Positioning: HOB at 15 degrees  Taken 2/14/2022 2100 by Daisy Mast RN  Head of Bed ([facility]) Positioning: HOB at 15 degrees  Taken 2/14/2022 1830 by Daisy Mast RN  Head of Bed ([facility]) Positioning: HOB at 15 degrees     Problem: Fall Injury Risk  Goal: Absence of Fall and Fall-Related Injury  Intervention: Identify and Manage Contributors  Recent Flowsheet Documentation  Taken 2/14/2022 2200 by Daisy Mast, RN  Medication Review/Management: medications reviewed  Taken 2/14/2022 1830 by Daisy Mast RN  Medication Review/Management: medications reviewed  Intervention: Promote Injury-Free Environment  Recent Flowsheet Documentation  Taken 2/14/2022 2200 by Daisy Mast RN  Safety Promotion/Fall Prevention: activity supervised  Taken 2/14/2022 1830 by Daisy Mast RN  Safety Promotion/Fall Prevention: activity supervised     Problem: Mobility Impairment  Goal: Optimal Mobility  Outcome: No Change  Intervention: Optimize Mobility  Recent Flowsheet Documentation  Taken 2/14/2022 2200 by Daisy Mast RN  Activity Management: bedrest  Positioning/Transfer Devices:   pillows   in use  Taken 2/14/2022 2100 by Daisy Mast RN  Assistive Device Utilized:   gait belt   walker  Activity Management: bedrest  Positioning/Transfer Devices:   pillows   in use  Taken 2/14/2022 1830 by Daisy Mast RN  Activity Management: bedrest  Positioning/Transfer Devices:   pillows   in use

## 2022-02-15 NOTE — PLAN OF CARE
Problem: Adult Inpatient Plan of Care  Goal: Patient-Specific Goal (Individualized)  Outcome: Improving     Problem: Risk for Delirium  Goal: Improved Sleep  Outcome: Improving     Problem: OT General Care Plan  Goal: Toilet Transfer/Toileting (OT)  Description: Toilet Transfer/Toileting (OT)  Outcome: Improving     Problem: Fall Injury Risk  Goal: Absence of Fall and Fall-Related Injury  Outcome: Improving  Intervention: Identify and Manage Contributors  Recent Flowsheet Documentation  Taken 2/15/2022 0859 by Tejal Blankenship, RN  Medication Review/Management: medications reviewed  Intervention: Promote Injury-Free Environment  Recent Flowsheet Documentation  Taken 2/15/2022 0859 by Tejal Blankenship, RN  Safety Promotion/Fall Prevention:    bed alarm on    clutter free environment maintained    patient video monitoring    room door open    room near nurse's station    room organization consistent     Problem: Hypertension Acute  Goal: Blood Pressure Within Desired Range  Outcome: Change based on patient need/priority  Intervention: Normalize Blood Pressure  Recent Flowsheet Documentation  Taken 2/15/2022 0859 by Tejal Blankenship RN  Medication Review/Management: medications reviewed     Pt drowsy and restless during assessment, constant reminder needed to take medication w/ applesauce. Pt denies pain, and SBP in the 90's. Pt sleeping through most of shift. Pt had two soft bowel movements during shift. Glucose level was 257 mg/dL at 0812, pt given 3 units of Novolog. Glucose level 263 mg/dL at 1144, pt given 3 units of Novolog. Sepsis protocol initiated due to increased WBCs and lactic acid of 3.1. Pt given NS bolus 500ml x2. MD ordered CT of abdomen and blood cultures.

## 2022-02-15 NOTE — PROGRESS NOTES
Updated Cristina from [facility] that patient is not ready for discharge. She will continue to follow

## 2022-02-15 NOTE — CONSULTS
Consultation - Infectious Disease  Peewee Hess,  1965, MRN [id number]    Admitting Dx: Agitation [R45.1]  Tachycardia [R00.0]  Pneumonia of both lungs due to infectious organism, unspecified part of lung [J18.9]  Non-intractable vomiting with nausea, unspecified vomiting type [R11.2]    PCP: Sarah Canseco, [phone]   Code status:  No CPR- Do NOT Intubate       Extended Emergency Contact Information  Primary Emergency Contact: Daina Hess  Address: [address] [place] [address] [facility]  Mobile Phone: [phone]  Relation: Spouse       ASSESSMENT   1. Leukocytosis, elevated lactate, hypotension. He has been afebrile. Chest CT  with worsening findings, some of which are likely sequelae of COVID pneumonia. He was started on pip/tazo and vancomycin , with last doses , and WBC increased from  to today. Would seem early for sepsis to flare following last doses of IV antibiotics just yesterday. Not clear that bacterial infection is cause of leukocytosis. Loose stools not real frequent, was C diff negative earlier this month.   2. Admitted 1/15 with alcohol withdrawal, treated for aspiration pneumonia.   3. COVID positive 22, previous negative 1/15, , presumed therefore contracted in the hospital. Treated with RDV 5 days, dexamethasone -. Vaccinated x2 fall .   4. Encephalopathy. Alcohol withdrawal. MRI 22 showed suggestion of few small foci of acute/early subacute infarctions L occipital region.   5. DM.   6. H/o abdominal surgeries  7. H/o jazmine's gangrene .  Active Problems:    Alcohol dependence with unspecified alcohol-induced disorder (H)    Hypomagnesemia    Type 2 diabetes mellitus without complication (H)    Alcohol use disorder, severe, dependence (H)    Sinus tachycardia    Pneumonia of both lungs due to infectious organism, unspecified part of lung    Non-intractable vomiting with nausea, unspecified vomiting type     Agitation    Hypocalcemia       PLAN   Check CT abdomen, procalcitonin, blood cultures. Follow lactate today.   Further recommendations to follow.     ADDENDUM: procalcitonin elevated. CRP slightly elevated. Check UA/UC reflex. Check MRSA screen. Start meropenem, vancomycin pending results.     Jean Paul Galvin MD  [facility]  [phone] [facility]  [employer] paging  ______________________________________________________________________        Reason For Consult: Sepsis     HPI    We have been requested by Dr. Wells to evaluate Peewee Hess who is a 56 year old year old male for the above. He has a past medical history of alcohol abuse, hypertension, DM-II, multiple abdominal surgeries (pancreatic?), h/o Lay's gangrene in 2018, who was admitted on 1/15/22 with alcohol withdrawl, possible aspiration pneumonia leading to respiratory failure requiring intubation on 1/16, successfully extubated on 1/23/22 and was transferred out of ICU on 1/26/22.  Tested positive for Covid on 1/30/2022 in the hospital having been negative prior, treated with remdesivir, dexamethasone, and received tocilizumab on 1/31/22.     Did improve initially and was pending TCU placement until patient got worsening respiratory condition on 2/11/2022 and CT chest showed worsening pneumonia so restarted on IV antibiotic with Vanco and Zosyn.  Acute encephalopathy got worse with pneumonia. He has been afebrile. WBC increased to 24k today and lactate is elevated. He has been treated with midodrine for low BP. Given fluid bolus for hypotension this morning. He cannot provide reliable history due to encephalopathy. Discussed with his nurse. Has received lorazepam. Given dose of imodium for one loose stool overnight.        Medical History  Past Medical History:   Diagnosis Date     Alcohol use disorder, severe, dependence (H) 11/10/2021     Alcohol-induced acute pancreatitis with infected necrosis      Cerebellar stroke  (H)     Old lacunar cerebellar stroke noted on imaging     Diabetes mellitus (H)      Neuropathy     Surgical History  He  has a past surgical history that includes IR Miscellaneous Procedure (04/14/2007); Pancreas surgery; and PICC/Midline Placement (1/16/2022).   Social History  Reviewed, and he  reports that he has been smoking cigarettes. He has been smoking about 0.50 packs per day. He has never used smokeless tobacco. He reports previous alcohol use.   Allergies  No Known Allergies Family History  family history includes Cancer in his father; Hyperlipidemia in his mother.      Psychosocial Needs  Social History     Social History Narrative     Not on file     Additional psychosocial needs reviewed per nursing assessment.         Immunization History   Administered Date(s) Administered     COVID-19,PF,Pfizer (12+ Yrs) 08/29/2021, 09/19/2021     Flu, Unspecified 10/05/2020     Influenza (IIV3) PF 12/19/2007, 11/19/2010     Influenza Vaccine Im 4yrs+ 4 Valent CCIIV4 10/25/2021     Influenza Vaccine, 6+MO IM (QUADRIVALENT W/PRESERVATIVES) 11/05/2018     Influenza,INJ,MDCK,PF,Quad >4yrs 10/07/2019, 10/05/2020     Pneumo Conj 13-V (2010&after) 12/18/2015     Pneumococcal 23 valent 01/18/2008     Td (Adult), Adsorbed 08/11/1976, 04/07/1994, 10/21/2000     Tdap (Adacel,Boostrix) 12/18/2015        Prior to Admission Medications   Medications Prior to Admission   Medication Sig Dispense Refill Last Dose     acamprosate (CAMPRAL) 333 MG EC tablet Take 2 tablets (666 mg) by mouth 3 times daily 90 tablet 0 1/14/2022     acetaminophen-codeine (TYLENOL W/CODEINE #3) 300-30 MG per tablet Take 1 tablet by mouth every 6 hours as needed   1/14/2022     aspirin (ASA) 81 MG EC tablet Take 81 mg by mouth   1/14/2022     DULoxetine (CYMBALTA) 60 MG capsule Take 60 mg by mouth daily   1/14/2022     folic acid (FOLVITE) 1 MG tablet Take 1 tablet (1 mg) by mouth daily 30 tablet 0 1/14/2022     gabapentin (NEURONTIN) 300 MG capsule Take  2 capsules by mouth 2 times daily And prn   1/14/2022     insulin NPH-Regular (NOVOLIN 70/30 FLEXPEN) susp Inject Subcutaneous 2 times daily Sliding scale   1/15/2022 at 5 units     metoprolol succinate ER (TOPROL-XL) 25 MG 24 hr tablet Take 25 mg by mouth daily   1/14/2022     multivitamin, therapeutic with minerals (THERA-VIT-M) TABS tablet Take 1 tablet by mouth daily    1/14/2022     Omega-3 Fatty Acids (FISH OIL PO) Take 1 g by mouth daily              Anti-infectives: pip/tazo 1/15-1/22,1/28-2/3, 2/11-14  Vancomycin 2/11-14  remdesivir 1/30-2/3  augmentin 2/15-  Cultures: 2/11 blood negative  c diff last negative 2/3/22  Imaging: reviewed images of chest CT          Review of Systems:  Difficult to obtain, but denies headache, neck pain, other pain.  Physical Exam:  Temp:  [95.8  F (35.4  C)-97.4  F (36.3  C)] 95.8  F (35.4  C)  Pulse:  [] 103  Resp:  [18-20] 18  BP: (84-95)/(46-59) 95/52  SpO2:  [31 %-94 %] 31 %    GENERAL:  Somnolent, wakes up some, unable to answer orientation questions, follows commands. O2 3L.   EYES: No conjunctival injection, pupils equally reactive to light, has nystagmus with looking to the right.   Neck supply  HEAD, EARS, NOSE, MOUTH, AND THROAT: Nontraumatic, Unable to visualize if he has teeth  RESPIRATORY: Clear breath sounds to auscultation bilaterally.   CARDIOVASCULAR: Regular rate and rhythm, normal S1 and S2, no murmurs, rubs, or gallops.  ABDOMEN: Soft, nontender, has surgical scars.  Normal bowel sounds.  MUSCULOSKELETAL: No synovitis. Has muscle wasting.  LYMPHATIC: No lymphadenopathy.  SKIN/HAIR/NAILS: No rashes, no signs of peripheral emboli. Report of stage 1 ulcer coccys.   NEUROLOGIC: good hand  bilaterally, moves feet to command, no clonus.  Peripheral IV       Pertinent Labs         Recent Labs   Lab 02/15/22  0559 02/14/22  0639 02/13/22  0621    138 144   CO2 18* 20* 23   BUN 5* 3* 4*       Lab Results   Component Value Date    ALT 14 02/15/2022     AST 35 02/15/2022    ALKPHOS 118 02/15/2022          Lab Results   Component Value Date    CRP 0.2 02/12/2022    CRP 0.3 02/11/2022    CRP 2.3 (H) 02/03/2022          Pertinent Radiology  Radiology Results: Reviewed  MR Brain w/o Contrast    Result Date: 1/22/2022  EXAM: MR BRAIN W/O CONTRAST LOCATION: [facility] DATE/TIME: 1/22/2022 1:05 PM INDICATION: Encephalopathy COMPARISON: CT head 01/18/2022, MRI head 11/23/2020 TECHNIQUE: Routine multiplanar multisequence head MRI without intravenous contrast. FINDINGS: INTRACRANIAL CONTENTS: Please note study is significantly degraded by motion artifact. Suggestion of a few small foci of restricted diffusion at the left temporal occipital region; best appreciated on repeat axial diffusion weighted sequence series 20.2 image 12, image 11; as well as repeat coronal diffusion weighted sequence series 24.2 image 9. No definite associated hemorrhage or significant mass effect. Redemonstration of small chronic infarction left precentral gyrus. Mild generalized cerebral atrophy. No hydrocephalus. Normal position of the cerebellar tonsils. Small chronic infarction lateral aspect left cerebellum. A few additional tiny infarctions demonstrated on prior exam are not well demonstrated on the current. SELLA: Not well-visualized, grossly normal. OSSEOUS STRUCTURES/SOFT TISSUES: Normal marrow signal. Flow voids are not well-visualized. ORBITS: Not well-visualized. SINUSES/MASTOIDS: The complete opacification left maxillary and left sphenoid sinus, similar to prior. Small amount of opacification right mastoid air cells.     IMPRESSION: 1.  Please note study is significantly degraded by motion artifact. 2.  Suggestion of a few small foci of acute/early subacute infarction at the left temporal occipital region. 3.  No definite associated hemorrhage or significant mass effect. 4.  Small chronic infarction left precentral gyrus, similar to prior. 5.  Probable few  small chronic infarctions cerebellar hemispheres. 6.  Mild atrophy.    Echocardiogram Complete    Result Date: 2022  [id number]^ALEYDA^LYN^[place] [address]  Name: KAREY LIVINGSTON MRN: [id number] : 1965 Study Date: 2022 07:37 AM Age: 56 yrs Gender: Male Patient Location: [facility] Reason For Study: Hypertension (HTN) Ordering Physician: LYN MAYNARD Performed By: TAYLER  BSA: 1.9 m2 Height: 72 in Weight: 147 lb HR: 123 BP: 97/59 mmHg ______________________________________________________________________________ Procedure Complete Portable Echo Adult. Technically difficult study.Extremely poor acoustic windows. Compared to the prior study dated 2021, there have been no changes. No hemodynamically significant valvular abnormalities on 2D or color flow imaging. ______________________________________________________________________________ Interpretation Summary  1.Left ventricular size, wall motion and function are normal. The ejection fraction is > 65%. 2.There is mild concentric left ventricular hypertrophy. 3.Normal right ventricle size and systolic function. 4.No hemodynamically significant valvular abnormalities on 2D or color flow imaging. Compared to the prior study dated 2021, there have been no changes. ______________________________________________________________________________ I      WMSI = 1.00     % Normal = 100  X - Cannot   0 -                      (2) - Mildly 2 -          Segments  Size Interpret    Hyperkinetic 1 - Normal  Hypokinetic  Hypokinetic  1-2     small                                                    7 -          3-5    moderate 3 - Akinetic 4 -          5 -         6 - Akinetic Dyskinetic   6-14    large              Dyskinetic   Aneurysmal  w/scar       w/scar       15-16   diffuse  Left Ventricle Left ventricular size, wall motion and function are normal. The ejection fraction is > 65%. There  is mild concentric left ventricular hypertrophy. Left ventricular diastolic function is normal. No regional wall motion abnormalities noted.  Right Ventricle Normal right ventricle size and systolic function. TAPSE is normal, which is consistent with normal right ventricular systolic function.  Atria Normal left atrial size. Right atrial size is normal. There is no color Doppler evidence of an atrial shunt.  Mitral Valve Mitral valve leaflets appear normal. There is no evidence of mitral stenosis or clinically significant mitral regurgitation. There is no mitral regurgitation noted. There is no mitral valve stenosis.  Tricuspid Valve Tricuspid valve leaflets appear normal. Right ventricle systolic pressure estimate normal. There is trace tricuspid regurgitation. There is no tricuspid stenosis.  Aortic Valve Aortic valve leaflets appear normal. There is no evidence of aortic stenosis or clinically significant aortic regurgitation. The aortic valve is trileaflet. No aortic regurgitation is present. No aortic stenosis is present.  Pulmonic Valve The pulmonic valve is not well seen, but is grossly normal. This degree of valvular regurgitation is within normal limits. There is no pulmonic valvular stenosis.  Vessels The aorta root is normal. Normal size ascending aorta. IVC diameter <2.1 cm collapsing >50% with sniff suggests a normal RA pressure of 3 mmHg.  Pericardium There is no pericardial effusion.  Rhythm The rhythm was sinus tachycardia.  ______________________________________________________________________________ MMode/2D Measurements & Calculations IVSd: 1.3 cm LVIDd: 4.1 cm LVIDs: 2.3 cm LVPWd: 1.2 cm FS: 43.4 %  LV mass(C)d: 182.6 grams LV mass(C)dI: 97.7 grams/m2 Ao root diam: 3.3 cm LA dimension: 3.6 cm asc Aorta Diam: 3.8 cm LA/Ao: 1.1 LVOT diam: 2.2 cm LVOT area: 3.8 cm2 LA Volume (BP): 29.3 ml LA Volume Index (BP): 15.7 ml/m2  LA Volume Indexed (AL/bp): 17.3 ml/m2 RWT: 0.61  Doppler Measurements &  Calculations MV E max gustavo: 132.0 cm/sec MV A max gustavo: 137.1 cm/sec MV E/A: 0.96 MV dec slope: 1104 cm/sec2 MV dec time: 0.12 sec Ao V2 max: 143.6 cm/sec Ao max P.0 mmHg Ao V2 mean: 108.6 cm/sec Ao mean P.2 mmHg Ao V2 VTI: 26.8 cm LELO(I,D): 3.5 cm2 LELO(V,D): 3.9 cm2 LV V1 max P.6 mmHg LV V1 max: 146.7 cm/sec LV V1 VTI: 24.2 cm SV(LVOT): 92.9 ml SI(LVOT): 49.7 ml/m2 PA V2 max: 107.4 cm/sec PA max P.6 mmHg PA acc time: 0.07 sec AV Gustavo Ratio (DI): 1.0 LELO Index (cm2/m2): 1.9 E/E' av.4  Lateral E/e': 18.6 Medial E/e': 20.2  ______________________________________________________________________________ Report approved by: Randal Villa 2022 09:14 AM       XR Chest Port 1 View    Result Date: 2022  EXAM: XR CHEST PORT 1 VIEW LOCATION: [facility] DATE/TIME: 2022 6:18 AM INDICATION: Pneumonia follow up. COMPARISON: 2022 chest CT.2022 radiograph.     IMPRESSION: Relatively diffuse left lung opacities have increased since the 2022 chest radiograph, and perhaps slightly increased since the 2022 chest CT. Differentials for the increased opacities include asymmetric pulmonary edema, infectious / inflammatory process or layering pleural fluid. Small left pleural effusion is present. Right lung is hypoexpanded and may have a few subtle scattered opacities. No pneumothorax. Normal heart size. Atherosclerotic aorta.     XR Chest Port 1 View    Result Date: 2022  EXAM: XR CHEST PORT 1 VIEW LOCATION: [facility] DATE/TIME: 2022 10:52 AM INDICATION: after advancing et tube COMPARISON: 2022.     IMPRESSION: Endotracheal tube is been advanced. Tip is 4 cm above the igor in good position. There our persistent bibasilar pulmonary opacities with partial clearing at the right base from the prior study and no change at the left base. No pneumothorax. Enteric tube tip is not visualized tip is below the diaphragm.  PICC catheter from right upper extremity approach is unchanged with tip at the junction of the superior vena cava and right atrium. Left posterior rib fractures are again noted as well as deformity of the right humeral head.    XR Chest Port 1 View    Result Date: 1/18/2022  EXAM: XR CHEST PORT 1 VIEW LOCATION: [facility] DATE/TIME: 1/18/2022 6:06 AM INDICATION: Location of ET tube COMPARISON: 01/16/2022.     IMPRESSION:Endotracheal tube is in the trachea at the level of the clavicular heads with tip 6 cm above the igor. PICC catheter from right upper extremity approach is in the distal superior vena cava near its junction with the right atrium. Enteric tube tip is below the diaphragm and not visualized. There is no pneumothorax. Again noted are bilateral patchy airspace opacities there has been increased consolidation or atelectasis at the right medial lung base. There are old healed left posterior rib  fractures no acute fractures are identified.    XR Chest Port 1 View    Result Date: 1/16/2022  EXAM: XR CHEST PORT 1 VIEW LOCATION: [facility] DATE/TIME: 1/16/2022 12:34 PM INDICATION: ET tube placement, PICC line placement COMPARISON: CT pulmonary angiogram 01/15/2022     IMPRESSION: ET tube tip projects 6.5 cm above the igor. Gastric tube extends towards the diaphragmatic hiatus, outside the field-of-view. Right PICC terminates upper right atrium. Extensive bilateral airspace opacities are present with patchy lung involvement, unchanged from yesterday. No new focal lung volume loss. No visible pleural fluid or pneumothorax on this single supine view.    XR Chest Port 1 View    Result Date: 1/15/2022  EXAM: XR CHEST PORT 1 VIEW LOCATION: [facility] DATE/TIME: 1/15/2022 5:16 PM INDICATION: Shortness of breath COMPARISON: 11/5/2021     IMPRESSION: There are mild opacities in both lungs which have increased from the prior exam. This is  likely due to a superimposed infectious or inflammatory process on top of mild background scarring. Normal heart size and pulmonary vascularity. Old left  rib fractures.    XR Abdomen Port 1 View    Result Date: 1/17/2022  EXAM: XR ABDOMEN PORT 1 VIEWS LOCATION: [facility] DATE/TIME: 1/17/2022 1:25 PM INDICATION: Location of OG tube COMPARISON: None.     IMPRESSION: Enteric tube in the body the stomach.     CT Chest Pulmonary Embolism w Contrast    Result Date: 2/11/2022  EXAM: CT CHEST PULMONARY EMBOLISM W CONTRAST LOCATION: [facility] DATE/TIME: 2/11/2022 1:42 PM INDICATION: PE suspected, low/intermediate prob, positive D-dimer, hypoxia, history of Covid COMPARISON: 01/28/2022 TECHNIQUE: CT chest pulmonary angiogram during arterial phase injection of IV contrast. Multiplanar reformats and MIP reconstructions were performed. Dose reduction techniques were used. CONTRAST: IsoVue 370 100mL FINDINGS: ANGIOGRAM CHEST: No pulmonary artery filling defects. Normal caliber aorta. LUNGS AND PLEURA: Moderate emphysema. Persistent but increased patchy groundglass and consolidative pulmonary opacities with areas of interlobular septal thickening. Mild bronchiectasis and bronchial wall thickening have increased when compared to prior exam. Trace effusions have decreased. No pneumothorax. MEDIASTINUM/AXILLAE: Heart size is normal. No adenopathy. CORONARY ARTERY CALCIFICATION: None. UPPER ABDOMEN: Hepatic steatosis. Cholelithiasis. There is diffuse wall thickening of the stomach. Small volume ascites. Nonobstructing bilateral renal calculi. Prominent calcification in the area of the pancreatic neck is unchanged. Splenectomy with small accessory splenule. MUSCULOSKELETAL: Degenerative changes of the spine. Prominent L1 Schmorl's node. Similar appearance of the bilateral rib fractures. Mild anasarca.     IMPRESSION: 1.  No pulmonary embolus. 2.  Interval worsening of bilateral  pulmonary opacities, bronchiectasis, bronchial wall thickening. 3.  Hepatic steatosis. 4.  Cholelithiasis. 5.  Diffuse wall thickening of the visualized stomach may indicate gastritis. 6.  Manifestations of third spacing.    CT Chest Pulmonary Embolism w Contrast    Result Date: 1/15/2022  EXAM: CT CHEST PULMONARY EMBOLISM W CONTRAST LOCATION: [facility] DATE/TIME: 1/15/2022 6:17 PM INDICATION: Shortness of breath COMPARISON: Portable AP view of the chest 01/15/2022; CT of the abdomen and pelvis 10/05/2020 TECHNIQUE: CT chest pulmonary angiogram during arterial phase injection of IV contrast. Multiplanar reformats and MIP reconstructions were performed. Dose reduction techniques were used. CONTRAST: 100ml isovue 370 FINDINGS: ANGIOGRAM CHEST: Pulmonary arteries are normal caliber and negative for pulmonary emboli. Normal caliber thoracic aorta. There are no findings to suggest acute aortic syndrome. Proximal great vessels are patent. Diminutive left vertebral artery arises directly from the arch. Mild atheromatous calcifications descending aorta and distal right innominate artery. LUNGS AND PLEURA: Upper lung predominant mixed centrilobular and paraseptal emphysema. Superimposed patchy airspace opacities are present bilaterally, asymmetric to the right associated with internal interstitial opacity. Minimal right pleural fluid layers dependently. There is chronic thickening of the left posterior and posterolateral pleura adjacent to multiple left rib fracture deformities. Central airways are patent. MEDIASTINUM: Cardiac chambers are normal in size. No pericardial effusion is present. Mildly enlarged lymph nodes in both analia, likely reactive in the setting of airspace opacities. Subcarinal, lower paratracheal and subaortic/prevascular lymph nodes are  all normal by size criteria. The middle third and distal thirds of the esophagus has circumferential wall thickening consistent with  esophagitis. Imaged thyroid gland is normal. CORONARY ARTERY CALCIFICATION: None. UPPER ABDOMEN: There are multiple calcified gallstones in the neck of the gallbladder. Severe fatty infiltration of the liver. The imaged distal transverse colon/splenic flecture has wall thickening. Previous splenectomy with small splenule present. 15 mm length ovoid calcification in the region of the pancreas neck is unchanged. MUSCULOSKELETAL: There are multiple ununited left posterior and posterolateral rib fracture deformities including lateral ribs 7, 8 and posterior ribs 9 and 11. The posterolateral right ninth rib is broken in 2 locations and the fractures have fairly sharp borders suggesting acute on subacute fractures..     IMPRESSION: 1.  No acute pulmonary embolism. 2.  Multifocal bilateral airspace opacities are present mixed groundglass and interstitial thickening asymmetric to the right. Imaging features can be seen with (COVID-19)  pneumonia, though are nonspecific and can occur with a variety of infectious and noninfectious processes. Given the other findings below, aspiration is a strong differential consideration. 3.  Severe wall thickening of the middle and distal thirds of the esophagus consistent with a subacute esophagitis. 4.  Severe hepatic steatosis. 5.  Acute on chronic fracture deformities of multiple left lateral/posterolateral ribs. 6.  Cholelithiasis. 7.  Wall thickening of the imaged colon consistent with colitis.    CT Chest w/o Contrast    Result Date: 1/28/2022  EXAM: CT CHEST W/O CONTRAST LOCATION: [facility] DATE/TIME: 1/28/2022 2:36 PM INDICATION: Cough, persistent COMPARISON: 10/15/2022 TECHNIQUE: CT chest without IV contrast. Multiplanar reformats were obtained. Dose reduction techniques were used. CONTRAST: None. FINDINGS: LUNGS AND PLEURA: Upper lung predominant centrilobular and paraseptal emphysema. Improvement in groundglass and interstitial opacities in the right  lung. Slight worsening of groundglass and interstitial opacities in the left upper lobe. New airway wall thickening in the left upper lobe and left lower lobe and new consolidation in the dependent portion of the left upper lobe and at the left lung base. Mucoid impaction in the left lower lobe airways. Unchanged left pleural thickening and trace right pleural effusion. MEDIASTINUM/AXILLAE: No thoracic aortic aneurysm. No lymphadenopathy. CORONARY ARTERY CALCIFICATION: None. UPPER ABDOMEN: Hepatic steatosis. Cholelithiasis. MUSCULOSKELETAL: There are demonstrated bilateral rib fractures.     IMPRESSION: 1.  New airway wall thickening in the left upper and lower lobes with new consolidation in the dependent portion of the left upper lobe and at the left lung base, and mucoid impaction in left lower lobe airways, suspicious for aspiration. 2.  Overall improvement in groundglass and interstitial opacities in the right lung and slight worsening in the left upper lobe.     CT Head w/o Contrast    Result Date: 2/12/2022  EXAM: CT HEAD W/O CONTRAST LOCATION: [facility] DATE/TIME: 2/12/2022 9:20 AM INDICATION: Trauma - Head Injury. Unwitnessed fall. COMPARISON: CT head 01/18/2022 TECHNIQUE: Routine CT Head without IV contrast. Multiplanar reformats. Dose reduction techniques were used. FINDINGS: INTRACRANIAL CONTENTS: No intracranial hemorrhage, extraaxial collection, or mass effect.  No CT evidence of acute infarct. Small focus of chronic cortical encephalomalacia and adjacent gliosis involving the superior left frontal lobe as seen previously.  Mild presumed chronic small vessel ischemic changes. Mild generalized volume loss. No hydrocephalus. VISUALIZED ORBITS/SINUSES/MASTOIDS: No intraorbital abnormality. Complete opacification of the left maxillary sinus with some chronic appearing mural hyperostosis similar to the previous exam. Additional subtotal opacification of the left sphenoid sinus by  polypoid mucosal thickening as seen previously. No middle ear or mastoid effusion. BONES/SOFT TISSUES: Mild right parietal scalp swelling. No skull fracture.     IMPRESSION: 1.  No CT evidence for acute intracranial process. 2.  Mild right parietal scalp contusion without associated fracture. 3.  Brain atrophy and presumed chronic microvascular ischemic changes as above. 4.  Inflammatory changes of the paranasal sinuses as seen previously.    CT Head w/o Contrast    Result Date: 1/18/2022  EXAM: CT HEAD W/O CONTRAST LOCATION: [facility] DATE/TIME: 1/18/2022 3:45 AM INDICATION: Headache, intracranial hemorrhage suspected. COMPARISON: 11/4/2021 TECHNIQUE: Routine CT Head without IV contrast. Multiplanar reformats. Dose reduction techniques were used. FINDINGS: INTRACRANIAL CONTENTS: No intracranial hemorrhage, extraaxial collection, or mass effect. No CT evidence of acute infarct. Mild presumed chronic small vessel ischemic changes. Mild to moderate generalized volume loss. No hydrocephalus. Stable calcified dural based lesion along the right frontal convexity measuring 1 cm in diameter, presumably reflecting a small meningioma. VISUALIZED ORBITS/SINUSES/MASTOIDS: No intraorbital abnormality. Complete opacification of the left maxillary sinus with chronic mucoperiosteal reaction. Mucous retention cyst in the left sphenoid sinus. Mild mucosal thickening along the floor of the left frontal sinus. Opacified left frontoethmoidal recess. No middle ear or mastoid effusion. BONES/SOFT TISSUES: No acute abnormality.     IMPRESSION: 1.  No CT evidence for acute intracranial process. 2.  Stable chronic changes as above.    XR Video Swallow with SLP or OT    Result Date: 2/8/2022  EXAM: XR VIDEO SWALLOW WITH SLP OR OT LOCATION: [facility] DATE/TIME: 2/8/2022 8:52 AM INDICATION: Difficulty swallowing. COMPARISON: 01/31/2022. TECHNIQUE: Routine swallow study with speech pathology  using multiple barium thicknesses. FINDINGS: FLUOROSCOPIC TIME: 1.2 minutes NUMBER OF IMAGES: 0 Swallow study with Speech Pathology using multiple barium thicknesses. Small amount of silent aspiration with thin liquid. Penetration with mildly thickened (nectar) liquid. There was persistent mild penetration with chin Tech technique and mildly thickened liquid. No aspiration was solid consistency.     IMPRESSION: 1.  Silent aspiration with thin liquid. Penetration with mildly thickened liquid.     XR Video Swallow with SLP or OT    Result Date: 1/31/2022  EXAM: XR VIDEO SWALLOW WITH SLP OR OT LOCATION: [facility] DATE/TIME: 1/31/2022 11:30 AM INDICATION: Difficulty swallowing. COMPARISON: 1/25/2022. TECHNIQUE: Routine swallow study with speech pathology using multiple barium thicknesses. FINDINGS: FLUOROSCOPIC TIME: 3.3 minutes NUMBER OF IMAGES: 0 Swallow study with Speech Pathology using multiple barium thicknesses. Tracheal aspiration with thin liquid consistency barium with weak ineffective cough. Penetration into the laryngeal vestibule with mildly thickened (nectar) liquid consistency barium. No penetration or aspiration with honey and pudding consistency barium. Please refer to speech therapy report for additional detail.    XR Video Swallow with SLP or OT    Result Date: 1/25/2022  EXAM: XR VIDEO SWALLOW WITH SLP OR OT LOCATION: [facility] DATE/TIME: 1/25/2022 3:27 PM INDICATION: Difficulty swallowing. COMPARISON: None. TECHNIQUE: Routine swallow study with speech pathology using multiple barium thicknesses. FINDINGS: FLUOROSCOPIC TIME: 1.5 minutes NUMBER OF IMAGES: 9 Swallow study with Speech Pathology using multiple barium thicknesses. Moderate to deep penetration with thin liquid (no cough reflex). Small amount of penetration with mildly thick consistency. No other penetration or aspiration.     EEG Coma or Sleep Only    Result Date:  2022  ELECTROENCEPHALOGRAM (EEG) REPORT [facility] [place] [address] [place] [address] Tel: [phone]  Fax: [phone] [email or web address] Peewee Hess,  1965, MRN [id number] PCP: Sarah Canseco Date: 2022 Principal Diagnosis: Altered mental status History : Patient is being evaluated for altered mental status. Medication listed includes Ativan Description of the Recording:  This is a multichannel digital EEG recording done using the international 10-20 placement system. Background of the recording consists of irregular 1 to 3 Hz polymorphic delta activity with amplitudes ranging between 20 to 30  V.  Alerting procedure produce minimal change.  Photic stimulation performed with standard protocol produced minimal change.  No transient sleep patterns were recorded. During this recording, no sharp discharges, spikes or electrographic seizure activity was recorded. Impression: This is an abnormal EEG due to diffusely slow polymorphic delta activity that minimally attenuates with alerting procedures.  This EEG suggests nonspecific generalized cerebral dysfunction.  No focal slowing or periodic discharges were seen to suggest seizures. Classification: Delta grade I generalized Tiburcio Gallagher MD [facility] This note was dictated using voice recognition software.  Any grammatical or context distortions are unintentional and inherent to the software.

## 2022-02-15 NOTE — PLAN OF CARE
"  Problem: Hypertension Acute  Goal: Blood Pressure Within Desired Range  Outcome: Declining     Problem: Risk for Delirium  Goal: Improved Attention and Thought Clarity  Outcome: No Change       Soft Bp's, asymptomatic, pt denies pain, oriented to \"hospital\" and month/year.  Continues to be impulsive needing frequent checks an redirection.  Multiple incontinent loose stools, PRN imodium given.  Sepsis protocol fired for elevated WBC and low BP.  Stat Lactic ordered, awaiting results.   "

## 2022-02-15 NOTE — PROGRESS NOTES
[facility]    PROGRESS NOTE - Hospitalist Service    Assessment and Plan  Brief Hospital course  56 year old male with past medical history of alcohol abuse, hypertension, DM-II, multiple abdominal surgeries who was admitted on 1/15/22 with alcohol withdrawl, possible aspiration pneumonia leading to respiratory failure requiring intubation on 1/16, successfully extubated on 1/23/22 and now transferred out of ICU on 1/26/22 for floor care.  Tested positive for Covid on 1/22/2022 in the hospital  Did improve initially and was pending TCU placement until patient got worsening respiratory condition on 2/11/2022 and CT chest showed worsening pneumonia so restarted on IV antibiotic with Vanco and Zosyn.  Acute encephalopathy got worse with pneumonia    Acute respiratory failure with hypoxia.  - Secondary to aspiration pneumonia  -Was intubation 1/16, extubated on 1/23.   Required BiPAP for a while but now on oxygen  - Cont albuterol nebs and pulm hygiene  - CT PE was negative for PE on admission    Asymptomatic Covid  -- Weekly COVID (per protocol) came back positive. He was negative on admission on 1/22. Check COVID labs. Start Covid special precaution.   - Finished IV remdesivir and Dexamethasone course. -  - Received dose of tocilizumab/per pulmonology recommendations on 1/31/2022.  - taper off oxygen; thus discontinue dexamethasone on 2/5    Hospital-acquired pneumonia  - Patient developed worsening hypoxia few days ago  - CT chest repeat showed worsening pneumonia  Completed 5 days of IV antibiotic but now noted with worsening leukocytosis and lactic acidosis  Consult ID for evaluation today      DM type 2  Very labile blood sugars  - Hemoglobin A1c was 7.0 on 1/15/22  - Very sensitive to Lantus as patient goes hypoglycemic even with 5 units  Stop Metformin due to lactic acidosis  Continue sliding scale insulin and carb counting      Septic shock-resolved  -- Likely due to multifocal  pneumonia.  -- Metoprolol started on 1/25 for sinus tachycardia  -- Patient is having intermittent hypotension.   - Echo shows EF of 65% with mild concentric left ventricular hypertrophy  - Started on midodrine for persistent low blood pressure     Acute toxic metabolic encephalopathy  - Patient has significant alcohol withdrawal during the intubation and was on Precedex  - Precedex weaned off. Cont with thiamine. Ativan prn.  - Alcohol level less than 10 on admission.    - Collateral information from wife-he did not find any evidence of patient drinking recently.    - Brain MRI shows chronic CVAs with brain atrophy  - Patient denies drinking prior to hospitalization.  Most recent chemical dependency treatment in 2001.  Since then patient tried to quit drinking numerous times, ending up in the hospital with withdrawal.  He is on disability for 2 years, in the past was a nicole.  - Psychiatric consult appreciate input  - Significant worsening of confusion on 2/10/2022 evening.   - Probably secondary to worsening pneumonia  - Change Zyprexa to scheduled at nighttime and continue with as needed  - Started on one-to-one last night, some improvement today trial of weaning off one-to-one.  - Reconsult psychiatry     Hypokalemia/hypomagnesia  Replace per protocol     Diarrhea  This is resolved     Significant weakness and deconditioning  - Secondary to above  - Pending TCU placement     Severe malnutrition  - Protein and calories  - Dietitian consult, continue supplement  - Patient still has poor oral intake       S/P fall   - Patient had incident of fall this morning 2/12/2022, unwitnessed  - Evaluated by house officer and a CT head is negative for significant changes  - Continue fall precautions    Goals of care  - Discussed details goals of care with patient's wife Daina on 2/14/2022  - Patient has poor quality of life prior to admission because of his alcohol use and previous CVAs  - Wife declined going through  aggressive care again as what happened on admission with intubation.  - Patient spouse decided on DNR and preferred conservative treatment if patient has not improved  - We do palliative care consult         Active Problems:    Alcohol dependence with unspecified alcohol-induced disorder (H)    Hypomagnesemia    Type 2 diabetes mellitus without complication (H)    Alcohol use disorder, severe, dependence (H)    Sinus tachycardia    Pneumonia of both lungs due to infectious organism, unspecified part of lung    Non-intractable vomiting with nausea, unspecified vomiting type    Agitation    Hypocalcemia      VTE prophylaxis:  Enoxaparin (Lovenox) SQ  DIET: Orders Placed This Encounter      Combination Diet Moderate Consistent Carb (60 g CHO per Meal) Diet; Easy to Chew (level 7); Mildly Thick (level 2)      Disposition/Barriers to discharge: Improve mental condition, IV antibiotic  Code Status: No CPR- Do NOT Intubate    Subjective:  Slightly improved confusion today, mild lower abdominal pain, no fever or chills    PHYSICAL EXAM  Vitals:    02/02/22 0241 02/13/22 0800 02/14/22 0900   Weight: 66.8 kg (147 lb 4.8 oz) 66.2 kg (145 lb 15.1 oz) 71.1 kg (156 lb 11.2 oz)     B/P:94/59 T:97.4 P:98 R:18     Intake/Output Summary (Last 24 hours) at 2/14/2022 1738  Last data filed at 2/14/2022 0957  Gross per 24 hour   Intake --   Output 800 ml   Net -800 ml      Body mass index is 20.68 kg/m .    Constitutional: Confused, cooperative, no apparent distress, and appears stated age  Eyes: Lids and lashes normal, pupils equal, round and reactive to light, extra ocular muscles intact, sclera clear, conjunctiva normal  ENT: Normocephalic, without obvious abnormality, atraumatic, sinuses nontender on palpation, external ears without lesions, oral pharynx with moist mucous membranes, tonsils without erythema or exudates, gums normal and good dentition.  Respiratory: No increased work of breathing, good air exchange, clear to  auscultation bilaterally, no crackles or wheezing  Cardiovascular: Normal apical impulse, regular rate and rhythm, normal S1 and S2, no S3 or S4, and no murmur noted  GI: No scars, normal bowel sounds, soft, non-distended, non-tender, no masses palpated, no hepatosplenomegally  Skin: no bruising or bleeding and normal skin color, texture, turgor  Musculoskeletal: There is no redness, warmth, or swelling of the joints.  Full range of motion noted.  no lower extremity pitting edema present  Neurologic: Unable to do full neuro exam because of confusion.  Following simple commands.  Neuropsychiatric: Appropriate with examiner      PERTINENT LABS/IMAGING:  Recent Labs   Lab 02/15/22  1144 02/15/22  0812 02/15/22  0559 02/15/22  0558 02/14/22  0801 02/14/22  0639 02/13/22  1642 02/13/22  1225 02/13/22  0714 02/13/22  0621 02/12/22  0820 02/12/22  0603   WBC  --   --   --  24.6*  24.6*  --   --   --   --   --  11.4*  --  12.7*   HGB  --   --   --  10.2*  --   --   --   --   --  9.9*  --  9.9*   MCV  --   --   --  90  --   --   --   --   --  90  --  94   PLT  --   --   --  229  --   --   --   --   --  258  --  258   NA  --   --  139  --   --  138  --   --   --  144  --  141   POTASSIUM  --   --  4.4  --   --  4.0  --  4.0  --  3.3*  --  3.5   CHLORIDE  --   --  111*  --   --  112*  --   --   --  116*  --  115*   CO2  --   --  18*  --   --  20*  --   --   --  23  --  18*   BUN  --   --  5*  --   --  3*  --   --   --  4*  --  6*   CR  --   --  0.71  --   --  0.69*  --   --   --  0.60*  --  0.62*   ANIONGAP  --   --  10  --   --  6  --   --   --  5  --  8   BECKY  --   --  7.4*  --   --  7.2*  --   --   --  7.3*  --  7.4*   * 257* 269*  --    < > 317*   < >  --    < > 39*   < > 65*   ALBUMIN  --   --  1.8*  --   --   --   --   --   --  1.8*  --  1.8*   PROTTOTAL  --   --  4.0*  --   --   --   --   --   --  4.0*  --  4.3*   BILITOTAL  --   --  1.1*  --   --   --   --   --   --  0.9  --  0.9   ALKPHOS  --   --  118  --    --   --   --   --   --  107  --  114   ALT  --   --  14  --   --   --   --   --   --  14  --  13   AST  --   --  35  --   --   --   --   --   --  27  --  37    < > = values in this interval not displayed.     No results found for this or any previous visit (from the past 24 hour(s)).    Discussed with patient, family, nursing staff and discharge planner

## 2022-02-15 NOTE — CONSULTS
"PALLIATIVE CARE CONSULT NOTE     Patient Name: Peewee Hess  Date of Admission: 1/15/2022   Requesting Clinician / Team:Gregorio  Reason for consult:   Did improve initially and was pending TCU placement until patient got worsening respiratory condition on 2/11/2022 and CT chest showed worsening pneumonia so restarted on IV antibiotic with Vanco and Zosyn.  Acute encephalopathy got worse with pneumonia. He has been afebrile. WBC increased to 24k today and lactate is elevated. He has been treated with midodrine for low BP. Given fluid bolus for hypotension this morning. He cannot provide reliable history due to encephalopathy     Discussed details goals of care with patient's wife Daina on 2/14/2022  - Patient has poor quality of life prior to admission because of his alcohol use and previous CVAs  - Wife declined going through aggressive care again as what happened on admission with intubation.  - Patient spouse decided on DNR and preferred conservative treatment if patient has not improved  - We do palliative care consult     Impressions and Recommendations     1)   GOALS OF CARE are .   ? Restorative if able, but if not then comfort focused care.  ? I will call her again tomorrow after ID's recommendations are clear after tests are back     2)    ADVANCED CARE PLANNING  ? Patient has completed health care directive:  N  ? Surrogate  health care agent: wife Daina  ? Code Status: DNR DNI     3)    SYMPTOM MANAGEMENT    -Dyspnea due to:aspiration pneumonia   ? Comment \" just completed 5 day of abx. CT of abd pending  ? Recommendations  ?        -Generalized weakness due to extended hospital stay  o Comment:he has had 3 falls this admission  o Recommendations:  o He has monitor on to prevent falls    - AMS due to Encephlopathy  o Comment:  o Recommendations:    Avoid benzodiazepines, antihistamines, anticholinergics if able.    Lights on and blinds open during the day.  Reorient frequently.    Lights & TV off during " "the night.  Promote normal circadian rhythm.    Limit sensory deprivation - utilize hearing aids, glasses, etc.     Frequently assess unmet bathroom needs if applicable.       4)    PSYCHOSOCIAL/SPIRITUAL SUPPORT  ? Will request spiritual care support  For prayers   ? Will ask  palliative social work support for wife  ? Palliative medicine will continue to follow         Admission Info      Active Problems:    Alcohol dependence with unspecified alcohol-induced disorder (H)    Hypomagnesemia    Type 2 diabetes mellitus without complication (H)    Alcohol use disorder, severe, dependence (H)    Sinus tachycardia    Pneumonia of both lungs due to infectious organism, unspecified part of lung    Non-intractable vomiting with nausea, unspecified vomiting type    Agitation    Hypocalcemia      History of Present Illness:  Peewee Hess is a 56 year old male admitted on 1/15/2022. He has history of severe alcohol abuse disorder, necrotizing pancreatitis requiring resection, peripheral neuropathy, diabetes, cerebellar stroke, depression presented 1/15  for evaluation of nausea, vomiting, and chest pain found to have probable aspiration pneumonia and alcohol withdrawal    Alcohol Hx  Pt reported that he has been drinking alcohol since he was [age] years old.  He started with beer, then armand to VSOP ryan, and most recently Onel's carbonated beer.  Pt stated that he knows he needs help with his chemical abuse issues.  \"That's why I'm here in the hospital.  Treatment would be the best.\"  Pt reported that up until right before this hospital admission he had not drank since November of 2021 (his last admission to [facility]).   Wife states that he has not had CD rx  In their 20 years of management, he always tried to do it alone.   After he had a pancreatic issue, drinking worsened          Recent Inpatient Admissions (last 12 mo)    11/4,   10/6/20             Interval History:   -COVID positive 1/30/22, previous negative " 1/15, 1/22, presumed therefore contracted in the hospital. Treated with RDV 5 days, dexamethasone 1/30-2/5. Vaccinated x2 fall 2021.    -Required intubation 1/16, extubated on 1/23. Required BiPAP 1/23, transitioned to HFNC on 1/24. 2/14 30LPM 40% FiO2    2/8 SLP  Ongoing aspiration risk with thin liquids despite strategies     2/15 ID saw, CT scan of Abd pending     Palliative Assessment/discussion     I called wife and  I discussed the reason for Palliative Care Referral and our role in symptom management, patient family communication, understanding their choices for medical treatment, and providing  guidance in making difficult decisions in the framework of focusing on patient comfort and quality of life.    She wants to hear what ID  MD comes up with.  She wants Quality of life rather then quanitity           Hospice discussion  I also discussed  Hospice services briefly and how Palliative Care is different in terms of prognosis terms        Prognosis, goals and planning        Patient's decision making preferences unable    I have concerns about the patient/family's health literacy today: Y    Prognosis, Goals, and/or Advance Care Planning were addressed with wife today: Y    Mood, coping, and/or meaning in the context of serious illness were addressed today: Y    [facility] Symptom data:   Pain: N  Dyspnea: N  Nausea: N  Anxiety: N    Functional Status:  Current PPS% (100% normal, 0% death): 30  Baseline PPS% (2 weeks prior to admit):70    Capacity evaluation:    Patient does not have decision making capacity based on the following:     Ability to understand relevant information about current  condition?N    Ability to demonstrate understanding of  current  illness and care needs? N    Ability to communicate  options for treatment? N      Social:   Living situation:lives at home with his spouse in the lower level of a single family home that they rent   Baseline function: is largely independent at  baseline. He has a cane and walker available for use as needed.  Home support: wife, she works full time  Marital status:  20 years, no kids, have dogs  Work: He is on SSDI, wife works at day care              Review of Systems:     A full 14 point review of systems was otherwise completed and is negative aside from that mentioned above    -----------------------------------------------------------------------------------------------------------------  I have reviewed and supplemented the documentation in this patient's medical record listed below regarding past medical history, social history, active medical problems, allergies and medications.     Current Problem List:   Active Problems:    Alcohol dependence with unspecified alcohol-induced disorder (H)    Hypomagnesemia    Type 2 diabetes mellitus without complication (H)    Alcohol use disorder, severe, dependence (H)    Sinus tachycardia    Pneumonia of both lungs due to infectious organism, unspecified part of lung    Non-intractable vomiting with nausea, unspecified vomiting type    Agitation    Hypocalcemia      Medical/Surgical History :  Past Medical History:   Diagnosis Date     Alcohol use disorder, severe, dependence (H) 11/10/2021     Alcohol-induced acute pancreatitis with infected necrosis      Cerebellar stroke (H)     Old lacunar cerebellar stroke noted on imaging     Diabetes mellitus (H)      Neuropathy      Past Surgical History:   Procedure Laterality Date     IR MISCELLANEOUS PROCEDURE  04/14/2007     PANCREAS SURGERY      Resection for necrotizing pancreatitis     PICC TRIPLE LUMEN PLACEMENT  1/16/2022          Relevant Family History  Family History   Problem Relation Age of Onset     Hyperlipidemia Mother      Cancer Father      Family Status   Relation Name Status     Mo  (Not Specified)     Fa  (Not Specified)     Social History:    he  reports that he has been smoking cigarettes. He has been smoking about 0.50 packs per day. He  has never used smokeless tobacco. He reports previous alcohol use.  Medication  :  Current Facility-Administered Medications   Medication     acetaminophen (TYLENOL) tablet 650 mg    Or     acetaminophen (TYLENOL) Suppository 650 mg     acetaminophen-codeine (TYLENOL #3) 300-30 MG per tablet 1 tablet     amoxicillin-clavulanate (AUGMENTIN) 875-125 MG per tablet 1 tablet     bisacodyl (DULCOLAX) Suppository 10 mg     calcium carbonate (TUMS) chewable tablet 1,000 mg     dextrose 10% infusion     glucose gel 15-30 g    Or     dextrose 50 % injection 25-50 mL    Or     glucagon injection 1 mg     DULoxetine (CYMBALTA) DR capsule 60 mg     enoxaparin ANTICOAGULANT (LOVENOX) injection 40 mg     famotidine (PEPCID) tablet 20 mg     flumazenil (ROMAZICON) injection 0.2 mg     folic acid (FOLVITE) tablet 1 mg     gabapentin (NEURONTIN) capsule 200 mg     haloperidol lactate (HALDOL) injection 1 mg     OLANZapine zydis (zyPREXA) ODT tab 5-10 mg    Or     haloperidol lactate (HALDOL) injection 2.5-5 mg     insulin aspart (NovoLOG) injection (RAPID ACTING)     insulin aspart (NovoLOG) injection (RAPID ACTING)     insulin aspart (NovoLOG) injection (RAPID ACTING)     insulin aspart (NovoLOG) injection (RAPID ACTING)     insulin aspart (NovoLOG) injection (RAPID ACTING)     insulin aspart (NovoLOG) injection (RAPID ACTING)     ipratropium-albuterol (COMBIVENT RESPIMAT) inhaler 1 puff     lactobacillus rhamnosus (GG) (CULTURELL) capsule 1 capsule     Lidocaine (LIDOCARE) 4 % Patch 1 patch     lidocaine (LMX4) cream     lidocaine 1 % 0.1-1 mL     lidocaine patch in PLACE     loperamide (IMODIUM) capsule 2 mg     LORazepam (ATIVAN) tablet 1 mg     magnesium hydroxide (MILK OF MAGNESIA) suspension 30 mL     magnesium oxide (MAG-OX) tablet 400 mg     Medication instructions: Do NOT use nebulized medications     melatonin tablet 3 mg     menthol-zinc oxide (CALMOSEPTINE) 0.44-20.6 % ointment OINT     metFORMIN (GLUCOPHAGE) tablet  "1,000 mg     metoprolol (LOPRESSOR) injection 5 mg     metoprolol succinate ER (TOPROL-XL) 24 hr tablet 25 mg     midodrine (PROAMATINE) tablet 2.5 mg     multivitamin, therapeutic (THERA-VIT) tablet 1 tablet     naloxone (NARCAN) injection 0.2 mg    Or     naloxone (NARCAN) injection 0.4 mg    Or     naloxone (NARCAN) injection 0.2 mg    Or     naloxone (NARCAN) injection 0.4 mg     OLANZapine zydis (zyPREXA) ODT tab 5 mg     ondansetron (ZOFRAN-ODT) ODT tab 4 mg    Or     ondansetron (ZOFRAN) injection 4 mg     pantoprazole (PROTONIX) EC tablet 40 mg     polyethylene glycol (MIRALAX) Packet 17 g     QUEtiapine (SEROquel) half-tab 12.5 mg     QUEtiapine (SEROquel) tablet 75 mg     ramelteon (ROZEREM) tablet 8 mg     sodium chloride (PF) 0.9% PF flush 3 mL     sodium chloride (PF) 0.9% PF flush 3 mL     sodium chloride 0.45% infusion     sucralfate (CARAFATE) suspension 1 g     thiamine (B-1) tablet 100 mg            Allergies:  No Known Allergies  Emergency Contact Info (Pulled from chart)  Extended Emergency Contact Information  Primary Emergency Contact: Daina Hess  Address: [address]  Mobile Phone: [phone]  Relation: Spouse     Evaluation             PERTINENT PHYSICAL EXAMINATION:  Vital Signs: Blood pressure 90/50, pulse 103, temperature 96.9  F (36.1  C), temperature source Axillary, resp. rate 20, height 1.854 m (6' 0.99\"), weight 71.1 kg (156 lb 11.2 oz), SpO2 92 %.   GENERAL:sleepy  SKIN: Warm and dry   HEENT: Normocephalic, anicteric sclera, moist mucous membranes  LUNGS: Clear to auscultation anterolaterally; non-labored o2 3L tho not in nose, sats 92%  CARDIAC: RRR, normal s1/s2, w/o m/r/g   ABDOMINAL: BS (+), soft, non distended, non tender  : ling in place  MUSKL: no gross joint deformities   EXTREMITIES: No edema or cyanosis, pulses 2+ and symmetrical  NEUROLOGIC: sleepy            Data Reviewed:     All labs/imaging reviewed in [facility]   2/15  " lactic acid was 3.1 and WBC was 24.6  MRI 1/22/22 showed suggestion of few small foci of acute/early subacute infarctions L occipital region.    2/8 Repeat VFSS 2/8 showed ongoing trace, silent aspiration with thin liquids; cognition improving but remains impaired; would benefit from advancing food textures; train chin tuck and repeat VFSS in 2-3 weeks     2/11/2022  CT chest showed worsening pneumonia s  ====================================================  TT: I have personally spent a total of 85  minutes on the unit in review of medical record, consultation with the medical providers and assessment of patient today, with more than 50% of this time spent in counseling, coordination of care, and discussion with wife  re: diagnostic results, prognosis, symptom management, risks and benefits of management options, and development of plan of care as noted above.  ====================================================    ELIAS Kirkpatrick, NP-C, [facility]   [facility]  Palliative Medicine  Office: [phone]

## 2022-02-16 NOTE — PLAN OF CARE
Problem: Adult Inpatient Plan of Care  Goal: Patient-Specific Goal (Individualized)  Outcome: Improving  Goal: Optimal Comfort and Wellbeing  Outcome: Improving  Goal: Readiness for Transition of Care  Outcome: Improving     Problem: Risk for Delirium  Goal: Improved Attention and Thought Clarity  Outcome: Improving  Goal: Improved Sleep  Outcome: Improving     Problem: Acute Neurologic Deterioration (Alcohol Withdrawal)  Goal: Optimal Neurologic Function  Outcome: Improving     Problem: Substance Misuse (Alcohol Withdrawal)  Goal: Readiness for Change Identified  Outcome: Improving     Problem: Violence Risk or Actual  Goal: Anger and Impulse Control  Outcome: Improving     Problem: OT General Care Plan  Goal: Transfer (OT)  Description: Transfer (OT)  Outcome: Improving  Goal: Toilet Transfer/Toileting (OT)  Description: Toilet Transfer/Toileting (OT)  Outcome: Improving  Goal: Cognitive (OT)  Description: Cognitive (OT)  Outcome: Improving     Problem: Fall Injury Risk  Goal: Absence of Fall and Fall-Related Injury  Outcome: Improving  Intervention: Promote Injury-Free Environment  Recent Flowsheet Documentation  Taken 2/16/2022 0009 by Chyna Ward, RN  Safety Promotion/Fall Prevention:   activity supervised   bed alarm on   fall prevention program maintained   increased rounding and observation   increase visualization of patient   lighting adjusted   room door open   room near nurse's station   room organization consistent   safety round/check completed   supervised activity   toileting scheduled     Problem: Mobility Impairment  Goal: Optimal Mobility  Outcome: Improving  Intervention: Optimize Mobility  Recent Flowsheet Documentation  Taken 2/16/2022 0009 by Chyna Ward, RN  Activity Management: bedrest     Pt a/o x3 with confusion noted. Pt slurred and rambles at times with some speech not understandable. Bp's continue to run soft even after 1L bolus given. Hospitalist notified and no new  orders. Falls program in place d/t  pt being impulsive. Blood sugar spot check 138. Will continue to monitor.

## 2022-02-16 NOTE — PROGRESS NOTES
Invalid order entered - please contact provider for WOC eval and treat consult order if provider desires WOC involvement.

## 2022-02-16 NOTE — PROGRESS NOTES
"Psychiatric Progress Note  Metabolic encephalopathy, exacerbated in the context of COVID-19, ?aspiration pneumonia, alcohol withdrawal prolonged hospitalization 18 days.  Cognitive and behavioral changes with restlessness, anxiety and intermittent agitation.  Sleep dysregulation exacerbated the context of hospital environment and above.  Mood changes due to medical issues, alcohol use, and Covid-19   Alcohol use hx   Recommendations:  Duloxetine 60 mg daily.  Continue gabapentin 200 mg twice a day  Seroquel 12.5 mg TID x 6 doses   Seroquel 75 mg at bedtime continue.  Rozerem 8 mg at bedtime.  Continue to utilize olanzapine 5-10 mg every 6 hours as needed for agitation, psychosis.  Efforts at sleep regulation.  For daytime anxiety would recommend adding Seroquel 6.25 mg in the morning and afternoon.    SUBJECTIVE:  Pleasantly Confused, disoriented, smiling when does not know the answers.  Pleasant and cooperative.   No hallucinations this morning.  MENTAL STATUS EXAMINATION:     General Appearance: NAD, Comfortable, Pleasant  Speech: Slow. Clear  Thought Process: Increased latency  Thought content: No psychosis, NO SI  Thought Formation: Loosening of associations.  Judgment: Depressed  Insight : Depressed  Attention : Adequate  Memory: Depressed for medical condition, hospital stay  Fund Of Knowledge: Depressed  Affect: Neutral  Mood: Bright  Alert : Arousable  Orientation: X 2  Comprehension: Depressed  Generative thought content:Slow  Language: Intact  Gait and Ambulation:Slow. Some assist for ambulation  Musculoskeletal: No tonal abnormalities    Vital signs in last 24 hours  BP (!) 85/56 (BP Location: Left arm)   Pulse 85   Temp 98.2  F (36.8  C) (Oral)   Resp 18   Ht 1.854 m (6' 0.99\")   Wt 71.1 kg (156 lb 11.2 oz)   SpO2 93%   BMI 20.68 kg/m                "

## 2022-02-16 NOTE — PROGRESS NOTES
Daily Progress Note    Assessment/Plan:  56 year old male with past medical history of alcohol abuse, hypertension, DM-II, multiple abdominal surgeries who was admitted on 1/15/22 with alcohol withdrawl, possible aspiration pneumonia leading to respiratory failure requiring intubation on 1/16, successfully extubated on 1/23/22 and now transferred out of ICU on 1/26/22 for floor care.  Tested positive for Covid on 1/22/2022 in the hospital  Did improve initially and was pending TCU placement until patient got worsening respiratory condition on 2/11/2022 and CT chest showed worsening pneumonia so restarted on IV antibiotic with Vanco and Zosyn.  Now white count up with elevated lactate and hypotension, ID consulted and now on just meropenem.      Acute respiratory failure with hypoxia.  -Thought secondary to aspiration pneumonia/recent COVID/hospital-acquired pneumonia-we will consult speech therapy  -Was intubated 1/16, extubated on 1/23.   Required BiPAP for a while but now on oxygen  - Cont albuterol nebs and pulm hygiene  - CT PE was negative for PE on admission      Leukocytosis: ID consulted and now on meropenem.  White count much improved today since starting IV antibiotics but it is unclear what source we are treating.?  Recurrent aspiration     Recovered Covid  -- Weekly COVID (per protocol) came back positive. He was negative on admission on 1/22. Check COVID labs. Start Covid special precaution.   - Finished IV remdesivir and Dexamethasone course. -  - Received dose of tocilizumab/per pulmonology recommendations on 1/31/2022.  -Try to taper off oxygen; thus discontinue dexamethasone on 2/5         DM type 2  Very labile blood sugars  - Hemoglobin A1c was 7.0 on 1/15/22  - Very sensitive to Lantus as patient goes hypoglycemic even with 5 units  Stop Metformin due to lactic acidosis  Continue sliding scale insulin and carb counting      Septic shock-resolved  -- Likely due to multifocal pneumonia.  --  Metoprolol started on 1/25 for sinus tachycardia  -- Patient is having intermittent hypotension.   - Echo shows EF of 65% with mild concentric left ventricular hypertrophy  - Started on midodrine for persistent low blood pressure     Acute toxic metabolic encephalopathy  - Patient has significant alcohol withdrawal during the intubation and was on Precedex  - Precedex weaned off. Cont with thiamine. Ativan prn.  - Alcohol level less than 10 on admission.    - Collateral information from wife-he did not find any evidence of patient drinking recently.    - Brain MRI shows chronic CVAs with brain atrophy  - Patient denies drinking prior to hospitalization.  Most recent chemical dependency treatment in 2001.  Since then patient tried to quit drinking numerous times, ending up in the hospital with withdrawal.  He is on disability for 2 years, in the past was a nicole.  - Reconsult psychiatry who are recommending:    Duloxetine 60 mg daily.  Continue gabapentin 200 mg twice a day  Seroquel 12.5 mg TID x 6 doses   Seroquel 75 mg at bedtime continue.  Rozerem 8 mg at bedtime.  Continue to utilize olanzapine 5-10 mg every 6 hours as needed for agitation, psychosis.  Efforts at sleep regulation.  For daytime anxiety would recommend adding Seroquel 6.25 mg in the morning and afternoon.     Hypokalemia/hypomagnesia  Replace per protocol     Diarrhea  This is resolved     Significant weakness and deconditioning  - Secondary to above  - Pending TCU placement     Severe malnutrition  - Protein and calories  - Dietitian consult, continue supplement  - Patient still has poor oral intake        S/P fall   - Patient had incident of fall this morning 2/12/2022, unwitnessed  - Evaluated by house officer and a CT head is negative for significant changes  - Continue fall precautions     Goals of care  -Previous rounder discussed details goals of care with patient's wife Daina on 2/14/2022  - Patient has poor quality of life prior to  admission because of his alcohol use and previous CVAs  - Wife declined going through aggressive care again as what happened on admission with intubation.  -Palliative care following  -He is now DNR and his wife desires no further aggressive interventions.  If his blood pressure goes low or he would not be transferred back to the ICU, he would not get pressors.  I reaffirmed this with her again today.          VTE prophylaxis:  Enoxaparin (Lovenox) SQ  DIET: Orders Placed This Encounter      Combination Diet Moderate Consistent Carb (60 g CHO per Meal) Diet; Easy to Chew (level 7); Mildly Thick (level 2)        Disposition/Barriers to discharge: Improve mental condition, IV antibiotic, elevated white count  Code Status: No CPR- Do NOT Intubate     Code status:No CPR- Do NOT Intubate      Subjective:  Harlan is new to me today, chart reviewed and discussed with staff.  I also called and updated his wife Daina and obtain more information from her.  Today he is awake and alert but confused.  This is actually improved from yesterday were catheter reported he was quite sedated.  Harlan denies any chest pain or shortness of breath or cough, he continues to have no fever.        Current Medications Reviewed via EHR List    Objective:  Vital signs in last 24 hours:  [unfilled]  .prog  Weight:   @THISENCWEIGHTS(1)@  Weight change:   Body mass index is 20.68 kg/m .    Intake/Output last 3 shifts:  I/O last 3 completed shifts:  In: 440 [P.O.:440]  Out: 650 [Urine:650]  Intake/Output this shift:  No intake/output data recorded.    Physical Exam:  General: No apparent distress  CV: Regular rate and rhythm  Lungs: Clear to auscultation  Abdomen: Soft, nontender        Imaging:  Personally Reviewed.  MR Brain w/o Contrast    Result Date: 1/22/2022  EXAM: MR BRAIN W/O CONTRAST LOCATION: [facility] DATE/TIME: 1/22/2022 1:05 PM INDICATION: Encephalopathy COMPARISON: CT head 01/18/2022, MRI head 11/23/2020  TECHNIQUE: Routine multiplanar multisequence head MRI without intravenous contrast. FINDINGS: INTRACRANIAL CONTENTS: Please note study is significantly degraded by motion artifact. Suggestion of a few small foci of restricted diffusion at the left temporal occipital region; best appreciated on repeat axial diffusion weighted sequence series 20.2 image 12, image 11; as well as repeat coronal diffusion weighted sequence series 24.2 image 9. No definite associated hemorrhage or significant mass effect. Redemonstration of small chronic infarction left precentral gyrus. Mild generalized cerebral atrophy. No hydrocephalus. Normal position of the cerebellar tonsils. Small chronic infarction lateral aspect left cerebellum. A few additional tiny infarctions demonstrated on prior exam are not well demonstrated on the current. SELLA: Not well-visualized, grossly normal. OSSEOUS STRUCTURES/SOFT TISSUES: Normal marrow signal. Flow voids are not well-visualized. ORBITS: Not well-visualized. SINUSES/MASTOIDS: The complete opacification left maxillary and left sphenoid sinus, similar to prior. Small amount of opacification right mastoid air cells.     IMPRESSION: 1.  Please note study is significantly degraded by motion artifact. 2.  Suggestion of a few small foci of acute/early subacute infarction at the left temporal occipital region. 3.  No definite associated hemorrhage or significant mass effect. 4.  Small chronic infarction left precentral gyrus, similar to prior. 5.  Probable few small chronic infarctions cerebellar hemispheres. 6.  Mild atrophy.    Echocardiogram Complete    Result Date: 2022  [id number]^ALEYDA^[place] [address]  Name: KAREY LIVINGSTON MRN: [id number] : 1965 Study Date: 2022 07:37 AM Age: 56 yrs Gender: Male Patient Location: [facility] Reason For Study: Hypertension (HTN) Ordering Physician: LYN MAYNARD Performed By:  BWM  BSA: 1.9 m2 Height: 72 in Weight: 147 lb HR: 123 BP: 97/59 mmHg ______________________________________________________________________________ Procedure Complete Portable Echo Adult. Technically difficult study.Extremely poor acoustic windows. Compared to the prior study dated 11/5/2021, there have been no changes. No hemodynamically significant valvular abnormalities on 2D or color flow imaging. ______________________________________________________________________________ Interpretation Summary  1.Left ventricular size, wall motion and function are normal. The ejection fraction is > 65%. 2.There is mild concentric left ventricular hypertrophy. 3.Normal right ventricle size and systolic function. 4.No hemodynamically significant valvular abnormalities on 2D or color flow imaging. Compared to the prior study dated 11/5/2021, there have been no changes. ______________________________________________________________________________ I      WMSI = 1.00     % Normal = 100  X - Cannot   0 -                      (2) - Mildly 2 -          Segments  Size Interpret    Hyperkinetic 1 - Normal  Hypokinetic  Hypokinetic  1-2     small                                                    7 -          3-5    moderate 3 - Akinetic 4 -          5 -         6 - Akinetic Dyskinetic   6-14    large              Dyskinetic   Aneurysmal  w/scar       w/scar       15-16   diffuse  Left Ventricle Left ventricular size, wall motion and function are normal. The ejection fraction is > 65%. There is mild concentric left ventricular hypertrophy. Left ventricular diastolic function is normal. No regional wall motion abnormalities noted.  Right Ventricle Normal right ventricle size and systolic function. TAPSE is normal, which is consistent with normal right ventricular systolic function.  Atria Normal left atrial size. Right atrial size is normal. There is no color Doppler evidence of an atrial shunt.  Mitral Valve Mitral valve leaflets  appear normal. There is no evidence of mitral stenosis or clinically significant mitral regurgitation. There is no mitral regurgitation noted. There is no mitral valve stenosis.  Tricuspid Valve Tricuspid valve leaflets appear normal. Right ventricle systolic pressure estimate normal. There is trace tricuspid regurgitation. There is no tricuspid stenosis.  Aortic Valve Aortic valve leaflets appear normal. There is no evidence of aortic stenosis or clinically significant aortic regurgitation. The aortic valve is trileaflet. No aortic regurgitation is present. No aortic stenosis is present.  Pulmonic Valve The pulmonic valve is not well seen, but is grossly normal. This degree of valvular regurgitation is within normal limits. There is no pulmonic valvular stenosis.  Vessels The aorta root is normal. Normal size ascending aorta. IVC diameter <2.1 cm collapsing >50% with sniff suggests a normal RA pressure of 3 mmHg.  Pericardium There is no pericardial effusion.  Rhythm The rhythm was sinus tachycardia.  ______________________________________________________________________________ MMode/2D Measurements & Calculations IVSd: 1.3 cm LVIDd: 4.1 cm LVIDs: 2.3 cm LVPWd: 1.2 cm FS: 43.4 %  LV mass(C)d: 182.6 grams LV mass(C)dI: 97.7 grams/m2 Ao root diam: 3.3 cm LA dimension: 3.6 cm asc Aorta Diam: 3.8 cm LA/Ao: 1.1 LVOT diam: 2.2 cm LVOT area: 3.8 cm2 LA Volume (BP): 29.3 ml LA Volume Index (BP): 15.7 ml/m2  LA Volume Indexed (AL/bp): 17.3 ml/m2 RWT: 0.61  Doppler Measurements & Calculations MV E max gustavo: 132.0 cm/sec MV A max gustavo: 137.1 cm/sec MV E/A: 0.96 MV dec slope: 1104 cm/sec2 MV dec time: 0.12 sec Ao V2 max: 143.6 cm/sec Ao max P.0 mmHg Ao V2 mean: 108.6 cm/sec Ao mean P.2 mmHg Ao V2 VTI: 26.8 cm LELO(I,D): 3.5 cm2 LELO(V,D): 3.9 cm2 LV V1 max P.6 mmHg LV V1 max: 146.7 cm/sec LV V1 VTI: 24.2 cm SV(LVOT): 92.9 ml SI(LVOT): 49.7 ml/m2 PA V2 max: 107.4 cm/sec PA max P.6 mmHg PA acc time: 0.07 sec AV Gustavo  Ratio (DI): 1.0 LELO Index (cm2/m2): 1.9 E/E' av.4  Lateral E/e': 18.6 Medial E/e': 20.2  ______________________________________________________________________________ Report approved by: Randal Villa 2022 09:14 AM       XR Chest Port 1 View    Result Date: 2022  EXAM: XR CHEST PORT 1 VIEW LOCATION: [facility] DATE/TIME: 2022 8:33 AM INDICATION: dyspnea COMPARISON: CT pulmonary angiogram 2022     IMPRESSION: Abnormal increased lung attenuation diffusely through both lungs. There are subsegmental foci of more focal airspace opacity in the peripheral third of the left mid and lower lung, similar to the prior CT. Subpleural emphysema is present in the apices. No new focal airspace opacity or volume loss. Symmetric apical and left lateral pleural thickening has not increased. No pleural effusion or pneumothorax. Cardiac silhouette is not enlarged. Unchanged aortic and other mediastinal interfaces. There are metallic fragments projecting in the left upper quadrant. Chronic left posterolateral rib fracture deformities are ununited.    XR Chest Port 1 View    Result Date: 2022  EXAM: XR CHEST PORT 1 VIEW LOCATION: [facility] DATE/TIME: 2022 6:18 AM INDICATION: Pneumonia follow up. COMPARISON: 2022 chest CT.2022 radiograph.     IMPRESSION: Relatively diffuse left lung opacities have increased since the 2022 chest radiograph, and perhaps slightly increased since the 2022 chest CT. Differentials for the increased opacities include asymmetric pulmonary edema, infectious / inflammatory process or layering pleural fluid. Small left pleural effusion is present. Right lung is hypoexpanded and may have a few subtle scattered opacities. No pneumothorax. Normal heart size. Atherosclerotic aorta.     XR Chest Port 1 View    Result Date: 2022  EXAM: XR CHEST PORT 1 VIEW LOCATION: [facility]  DATE/TIME: 1/19/2022 10:52 AM INDICATION: after advancing et tube COMPARISON: 1/18/2022.     IMPRESSION: Endotracheal tube is been advanced. Tip is 4 cm above the igor in good position. There our persistent bibasilar pulmonary opacities with partial clearing at the right base from the prior study and no change at the left base. No pneumothorax. Enteric tube tip is not visualized tip is below the diaphragm. PICC catheter from right upper extremity approach is unchanged with tip at the junction of the superior vena cava and right atrium. Left posterior rib fractures are again noted as well as deformity of the right humeral head.    XR Chest Port 1 View    Result Date: 1/18/2022  EXAM: XR CHEST PORT 1 VIEW LOCATION: [facility] DATE/TIME: 1/18/2022 6:06 AM INDICATION: Location of ET tube COMPARISON: 01/16/2022.     IMPRESSION:Endotracheal tube is in the trachea at the level of the clavicular heads with tip 6 cm above the igor. PICC catheter from right upper extremity approach is in the distal superior vena cava near its junction with the right atrium. Enteric tube tip is below the diaphragm and not visualized. There is no pneumothorax. Again noted are bilateral patchy airspace opacities there has been increased consolidation or atelectasis at the right medial lung base. There are old healed left posterior rib  fractures no acute fractures are identified.    CT Abdomen Pelvis w Contrast    Result Date: 2/15/2022  EXAM: CT ABDOMEN PELVIS W CONTRAST LOCATION: [facility] DATE/TIME: 2/15/2022 9:50 PM INDICATION: Leukocytosis. COMPARISON: 10/05/2020 TECHNIQUE: CT scan of the abdomen and pelvis was performed following injection of IV contrast. Multiplanar reformats were obtained. Dose reduction techniques were used. CONTRAST: ISOVUE 370 100ML FINDINGS: LOWER CHEST: Bibasilar infiltrates. Small right and trace left pleural effusion. HEPATOBILIARY: Hepatic steatosis.  Cholelithiasis and gallbladder distention. PANCREAS: Pancreatic calcifications suggesting chronic pancreatitis. Prominent calcification at the neck of the pancreas. Difficult to tell if this is parenchymal or intraductal. The distal pancreas is atrophic. SPLEEN: Splenectomy with accessory splenule. ADRENAL GLANDS: Normal. KIDNEYS/BLADDER: Small nonobstructing renal calculi. BOWEL: Gastric and small bowel wall thickening. Central mesenteric congestion. Trace amount of free fluid. Diverticulosis with presumed retained barium. Wall thickening of the proximal colon. Rectal wall thickening versus underdistention. LYMPH NODES: Subcentimeter mesenteric and peripancreatic lymph nodes. VASCULATURE: Atherosclerotic vascular calcification. PELVIC ORGANS: Small amount of free fluid. MUSCULOSKELETAL: Degenerative change osseous structures. Anasarca. Remote and subacute rib fractures.     IMPRESSION: 1.  Wall thickening of stomach, small bowel and proximal colon. Correlate for gastroenteritis/enterocolitis. 2.  Mesenteric congestion and small amount of free fluid. 3.  Bilateral pulmonary infiltrates. Small right and trace left pleural effusion. 4.  Focal rectal wall thickening versus underdistention. Follow-up recommended to exclude underlying lesion. 5.  Hepatic steatosis. 6.  Diverticulosis. 7.  Cholelithiasis and mild gallbladder distention. 8.  Nonobstructing renal calculi. 9.  Coarse pancreatic calcifications again seen    CT Chest Pulmonary Embolism w Contrast    Result Date: 2/11/2022  EXAM: CT CHEST PULMONARY EMBOLISM W CONTRAST LOCATION: [facility] DATE/TIME: 2/11/2022 1:42 PM INDICATION: PE suspected, low/intermediate prob, positive D-dimer, hypoxia, history of Covid COMPARISON: 01/28/2022 TECHNIQUE: CT chest pulmonary angiogram during arterial phase injection of IV contrast. Multiplanar reformats and MIP reconstructions were performed. Dose reduction techniques were used. CONTRAST: IsoVue 370  100mL FINDINGS: ANGIOGRAM CHEST: No pulmonary artery filling defects. Normal caliber aorta. LUNGS AND PLEURA: Moderate emphysema. Persistent but increased patchy groundglass and consolidative pulmonary opacities with areas of interlobular septal thickening. Mild bronchiectasis and bronchial wall thickening have increased when compared to prior exam. Trace effusions have decreased. No pneumothorax. MEDIASTINUM/AXILLAE: Heart size is normal. No adenopathy. CORONARY ARTERY CALCIFICATION: None. UPPER ABDOMEN: Hepatic steatosis. Cholelithiasis. There is diffuse wall thickening of the stomach. Small volume ascites. Nonobstructing bilateral renal calculi. Prominent calcification in the area of the pancreatic neck is unchanged. Splenectomy with small accessory splenule. MUSCULOSKELETAL: Degenerative changes of the spine. Prominent L1 Schmorl's node. Similar appearance of the bilateral rib fractures. Mild anasarca.     IMPRESSION: 1.  No pulmonary embolus. 2.  Interval worsening of bilateral pulmonary opacities, bronchiectasis, bronchial wall thickening. 3.  Hepatic steatosis. 4.  Cholelithiasis. 5.  Diffuse wall thickening of the visualized stomach may indicate gastritis. 6.  Manifestations of third spacing.    CT Chest w/o Contrast    Result Date: 1/28/2022  EXAM: CT CHEST W/O CONTRAST LOCATION: [facility] DATE/TIME: 1/28/2022 2:36 PM INDICATION: Cough, persistent COMPARISON: 10/15/2022 TECHNIQUE: CT chest without IV contrast. Multiplanar reformats were obtained. Dose reduction techniques were used. CONTRAST: None. FINDINGS: LUNGS AND PLEURA: Upper lung predominant centrilobular and paraseptal emphysema. Improvement in groundglass and interstitial opacities in the right lung. Slight worsening of groundglass and interstitial opacities in the left upper lobe. New airway wall thickening in the left upper lobe and left lower lobe and new consolidation in the dependent portion of the left upper lobe and  at the left lung base. Mucoid impaction in the left lower lobe airways. Unchanged left pleural thickening and trace right pleural effusion. MEDIASTINUM/AXILLAE: No thoracic aortic aneurysm. No lymphadenopathy. CORONARY ARTERY CALCIFICATION: None. UPPER ABDOMEN: Hepatic steatosis. Cholelithiasis. MUSCULOSKELETAL: There are demonstrated bilateral rib fractures.     IMPRESSION: 1.  New airway wall thickening in the left upper and lower lobes with new consolidation in the dependent portion of the left upper lobe and at the left lung base, and mucoid impaction in left lower lobe airways, suspicious for aspiration. 2.  Overall improvement in groundglass and interstitial opacities in the right lung and slight worsening in the left upper lobe.     CT Head w/o Contrast    Result Date: 2/12/2022  EXAM: CT HEAD W/O CONTRAST LOCATION: [facility] DATE/TIME: 2/12/2022 9:20 AM INDICATION: Trauma - Head Injury. Unwitnessed fall. COMPARISON: CT head 01/18/2022 TECHNIQUE: Routine CT Head without IV contrast. Multiplanar reformats. Dose reduction techniques were used. FINDINGS: INTRACRANIAL CONTENTS: No intracranial hemorrhage, extraaxial collection, or mass effect.  No CT evidence of acute infarct. Small focus of chronic cortical encephalomalacia and adjacent gliosis involving the superior left frontal lobe as seen previously.  Mild presumed chronic small vessel ischemic changes. Mild generalized volume loss. No hydrocephalus. VISUALIZED ORBITS/SINUSES/MASTOIDS: No intraorbital abnormality. Complete opacification of the left maxillary sinus with some chronic appearing mural hyperostosis similar to the previous exam. Additional subtotal opacification of the left sphenoid sinus by polypoid mucosal thickening as seen previously. No middle ear or mastoid effusion. BONES/SOFT TISSUES: Mild right parietal scalp swelling. No skull fracture.     IMPRESSION: 1.  No CT evidence for acute intracranial process. 2.  Mild  right parietal scalp contusion without associated fracture. 3.  Brain atrophy and presumed chronic microvascular ischemic changes as above. 4.  Inflammatory changes of the paranasal sinuses as seen previously.    CT Head w/o Contrast    Result Date: 1/18/2022  EXAM: CT HEAD W/O CONTRAST LOCATION: [facility] DATE/TIME: 1/18/2022 3:45 AM INDICATION: Headache, intracranial hemorrhage suspected. COMPARISON: 11/4/2021 TECHNIQUE: Routine CT Head without IV contrast. Multiplanar reformats. Dose reduction techniques were used. FINDINGS: INTRACRANIAL CONTENTS: No intracranial hemorrhage, extraaxial collection, or mass effect. No CT evidence of acute infarct. Mild presumed chronic small vessel ischemic changes. Mild to moderate generalized volume loss. No hydrocephalus. Stable calcified dural based lesion along the right frontal convexity measuring 1 cm in diameter, presumably reflecting a small meningioma. VISUALIZED ORBITS/SINUSES/MASTOIDS: No intraorbital abnormality. Complete opacification of the left maxillary sinus with chronic mucoperiosteal reaction. Mucous retention cyst in the left sphenoid sinus. Mild mucosal thickening along the floor of the left frontal sinus. Opacified left frontoethmoidal recess. No middle ear or mastoid effusion. BONES/SOFT TISSUES: No acute abnormality.     IMPRESSION: 1.  No CT evidence for acute intracranial process. 2.  Stable chronic changes as above.    XR Video Swallow with SLP or OT    Result Date: 2/8/2022  EXAM: XR VIDEO SWALLOW WITH SLP OR OT LOCATION: [facility] DATE/TIME: 2/8/2022 8:52 AM INDICATION: Difficulty swallowing. COMPARISON: 01/31/2022. TECHNIQUE: Routine swallow study with speech pathology using multiple barium thicknesses. FINDINGS: FLUOROSCOPIC TIME: 1.2 minutes NUMBER OF IMAGES: 0 Swallow study with Speech Pathology using multiple barium thicknesses. Small amount of silent aspiration with thin liquid. Penetration with  mildly thickened (nectar) liquid. There was persistent mild penetration with chin Tech technique and mildly thickened liquid. No aspiration was solid consistency.     IMPRESSION: 1.  Silent aspiration with thin liquid. Penetration with mildly thickened liquid.     XR Video Swallow with SLP or OT    Result Date: 2022  EXAM: XR VIDEO SWALLOW WITH SLP OR OT LOCATION: [facility] DATE/TIME: 2022 11:30 AM INDICATION: Difficulty swallowing. COMPARISON: 2022. TECHNIQUE: Routine swallow study with speech pathology using multiple barium thicknesses. FINDINGS: FLUOROSCOPIC TIME: 3.3 minutes NUMBER OF IMAGES: 0 Swallow study with Speech Pathology using multiple barium thicknesses. Tracheal aspiration with thin liquid consistency barium with weak ineffective cough. Penetration into the laryngeal vestibule with mildly thickened (nectar) liquid consistency barium. No penetration or aspiration with honey and pudding consistency barium. Please refer to speech therapy report for additional detail.    XR Video Swallow with SLP or OT    Result Date: 2022  EXAM: XR VIDEO SWALLOW WITH SLP OR OT LOCATION: [facility] DATE/TIME: 2022 3:27 PM INDICATION: Difficulty swallowing. COMPARISON: None. TECHNIQUE: Routine swallow study with speech pathology using multiple barium thicknesses. FINDINGS: FLUOROSCOPIC TIME: 1.5 minutes NUMBER OF IMAGES: 9 Swallow study with Speech Pathology using multiple barium thicknesses. Moderate to deep penetration with thin liquid (no cough reflex). Small amount of penetration with mildly thick consistency. No other penetration or aspiration.     EEG Coma or Sleep Only    Result Date: 2022  ELECTROENCEPHALOGRAM (EEG) REPORT [facility] NEUROLOGY [address] [place] [address] Tel: [phone]  Fax: [phone] [email or web address] Peewee Hess,  1965, MRN [id number] PCP: Sarah Canseco  Date: 1/19/2022 Principal Diagnosis: Altered mental status History : Patient is being evaluated for altered mental status. Medication listed includes Ativan Description of the Recording:  This is a multichannel digital EEG recording done using the international 10-20 placement system. Background of the recording consists of irregular 1 to 3 Hz polymorphic delta activity with amplitudes ranging between 20 to 30  V.  Alerting procedure produce minimal change.  Photic stimulation performed with standard protocol produced minimal change.  No transient sleep patterns were recorded. During this recording, no sharp discharges, spikes or electrographic seizure activity was recorded. Impression: This is an abnormal EEG due to diffusely slow polymorphic delta activity that minimally attenuates with alerting procedures.  This EEG suggests nonspecific generalized cerebral dysfunction.  No focal slowing or periodic discharges were seen to suggest seizures. Classification: Delta grade I generalized Tiburcio Gallagher MD [facility] (Former[facility], [facility] This note was dictated using voice recognition software.  Any grammatical or context distortions are unintentional and inherent to the software.       Lab Results:  Personally Reviewed.   Fingerstick Blood Glucose: @[id number](POCGLUFGR:10)@    Last Hbg A1C: No results found for: HGBA1C   Lab Results   Component Value Date    INR 1.67 (H) 01/30/2022    INR 1.72 (H) 01/16/2022    INR 1.69 (H) 01/15/2022     Recent Results (from the past 24 hour(s))   Lactic acid whole blood    Collection Time: 02/15/22  6:22 PM   Result Value Ref Range    Lactic Acid 3.3 (H) 0.7 - 2.0 mmol/L   Glucose by meter    Collection Time: 02/15/22  8:45 PM   Result Value Ref Range    GLUCOSE BY METER POCT 146 (H) 70 - 99 mg/dL   Glucose by meter    Collection Time: 02/15/22 10:14 PM   Result Value Ref Range    GLUCOSE BY METER POCT 125 (H) 70 - 99  mg/dL   Glucose by meter    Collection Time: 02/16/22  2:00 AM   Result Value Ref Range    GLUCOSE BY METER POCT 138 (H) 70 - 99 mg/dL   Glucose by meter    Collection Time: 02/16/22  8:12 AM   Result Value Ref Range    GLUCOSE BY METER POCT 156 (H) 70 - 99 mg/dL   Comprehensive metabolic panel    Collection Time: 02/16/22 10:51 AM   Result Value Ref Range    Sodium 142 136 - 145 mmol/L    Potassium 3.6 3.5 - 5.0 mmol/L    Chloride 118 (H) 98 - 107 mmol/L    Carbon Dioxide (CO2) 19 (L) 22 - 31 mmol/L    Anion Gap 5 5 - 18 mmol/L    Urea Nitrogen 4 (L) 8 - 22 mg/dL    Creatinine 0.75 0.70 - 1.30 mg/dL    Calcium 6.9 (L) 8.5 - 10.5 mg/dL    Glucose 173 (H) 70 - 125 mg/dL    Alkaline Phosphatase 132 (H) 45 - 120 U/L    AST 40 0 - 40 U/L    ALT 14 0 - 45 U/L    Protein Total 3.7 (L) 6.0 - 8.0 g/dL    Albumin 1.6 (L) 3.5 - 5.0 g/dL    Bilirubin Total 0.6 0.0 - 1.0 mg/dL    GFR Estimate >90 >60 mL/min/1.73m2   Lactic acid whole blood    Collection Time: 02/16/22 10:51 AM   Result Value Ref Range    Lactic Acid 3.0 (H) 0.7 - 2.0 mmol/L   CBC with platelets and differential    Collection Time: 02/16/22 10:51 AM   Result Value Ref Range    WBC Count 13.4 (H) 4.0 - 11.0 10e3/uL    RBC Count 3.35 (L) 4.40 - 5.90 10e6/uL    Hemoglobin 10.4 (L) 13.3 - 17.7 g/dL    Hematocrit 30.2 (L) 40.0 - 53.0 %    MCV 90 78 - 100 fL    MCH 31.0 26.5 - 33.0 pg    MCHC 34.4 31.5 - 36.5 g/dL    RDW 18.6 (H) 10.0 - 15.0 %    Platelet Count 226 150 - 450 10e3/uL    % Neutrophils 78 %    % Lymphocytes 14 %    % Monocytes 4 %    % Eosinophils 3 %    % Basophils 1 %    % Immature Granulocytes 0 %    NRBCs per 100 WBC 0 <1 /100    Absolute Neutrophils 10.4 (H) 1.6 - 8.3 10e3/uL    Absolute Lymphocytes 1.9 0.8 - 5.3 10e3/uL    Absolute Monocytes 0.6 0.0 - 1.3 10e3/uL    Absolute Eosinophils 0.5 0.0 - 0.7 10e3/uL    Absolute Basophils 0.2 0.0 - 0.2 10e3/uL    Absolute Immature Granulocytes 0.1 <=0.4 10e3/uL    Absolute NRBCs 0.0 10e3/uL   Magnesium     Collection Time: 02/16/22 10:51 AM   Result Value Ref Range    Magnesium 1.7 (L) 1.8 - 2.6 mg/dL   Lipase    Collection Time: 02/16/22 10:51 AM   Result Value Ref Range    Lipase <9 0 - 52 U/L   Glucose by meter    Collection Time: 02/16/22 12:05 PM   Result Value Ref Range    GLUCOSE BY METER POCT 139 (H) 70 - 99 mg/dL   Glucose by meter    Collection Time: 02/16/22  5:06 PM   Result Value Ref Range    GLUCOSE BY METER POCT 128 (H) 70 - 99 mg/dL           Advanced Care Planning    Time > 35 minutes with greater than 50% of time spent in counseling and coordination of care.    Robert Prince MD  Date: 2/16/2022  Time: 5:34 PM  [facility]

## 2022-02-16 NOTE — PROGRESS NOTES
"INFECTIOUS DISEASE FOLLOW UP NOTE      ASSESSMENT:  1. Leukocytosis, elevated lactate, hypotension. He has been afebrile. Chest CT 2/11 with worsening findings, some of which are likely sequelae of COVID pneumonia. He was started on pip/tazo and vancomycin 2/11, with last doses 2/14, and WBC increased to 24k on 2/15. No clear source of infection showing up. WBC better today since restarting IV antibiotics, he looks better overall.  Not sure what we are treating. Oxygenation worse -- recurrent aspiration?  2. Admitted 1/15 with alcohol withdrawal, treated for aspiration pneumonia.   3. COVID positive 1/30/22, previous negative 1/15, 1/22, presumed therefore contracted in the hospital. Treated with RDV 5 days, dexamethasone 1/30-2/5. Vaccinated x2 fall 2021.   4. Encephalopathy. Alcohol withdrawal. MRI 1/22/22 showed suggestion of few small foci of acute/early subacute infarctions L occipital region.   5. DM.   6. H/o abdominal surgeries  7. H/o jazmine's gangrene 2018.    PLAN:  Continue meropenem  Stop vancomycin  Monitor labs  Add on urine culture if possible    Jean Paul Galvin MD  [facility]  [phone] clinic  [facility]    ______________________________________________________________________    SUBJECTIVE / INTERVAL HISTORY: feels better. Oxygenation worse, with NC turned up to 6L. Dry Cough. Soft stools. Has chronic pain in shoulders, back, ankles, no different. Denies other pain. No fever. Has had low BP requiring fluid boluses.     ROS: deconditioned. All other systems negative except as listed above.        OBJECTIVE:  BP (!) 87/59 (BP Location: Right arm)   Pulse 64   Temp 98.1  F (36.7  C) (Oral)   Resp 18   Ht 1.854 m (6' 0.99\")   Wt 71.1 kg (156 lb 11.2 oz)   SpO2 94%   BMI 20.68 kg/m    FiO2 (%): 2 %    Vital Signs  Temp: 98.1  F (36.7  C)  Temp src: Oral  Resp: 18  Pulse: 64  Pulse Rate Source: Monitor  BP: (!) 87/59  BP Location: Right arm    Temp (24hrs), " Av.4  F (36.3  C), Min:97  F (36.1  C), Max:98.1  F (36.7  C)      GEN: No acute distress.  Oriented to place, year, month, nearly came up with date ()  RESPIRATORY:  Normal breathing pattern. Clear anterior.  CARDIOVASCULAR:  Regular rate and rhythm. Normal S1 and S2. No murmur, click, gallop or rub. No dependent edema. No excess JVD.  ABDOMEN:  Soft, normal bowel sounds, non-tender, no masses, has surgical scars.  EXTREMITIES: No edema.  No synovitis  SKIN/HAIR/NAILS:  No rashes  Neuro: non focal  IV: peripheral        Antibiotics:  Meropenem 2/15-  pip/tazo 1/15-,-2/3, -  Vancomycin , 2/15-  remdesivir -2/3  augmentin 2/15    Pertinent labs:    Recent Labs   Lab 22  1051 02/15/22  0558 22  0621   WBC 13.4* 24.6*  24.6* 11.4*   HGB 10.4* 10.2* 9.9*   HCT 30.2* 29.5* 28.4*    229 258        Recent Labs   Lab 22  1051 02/15/22  0559 22  0639    139 138   CO2 19* 18* 20*   BUN 4* 5* 3*        Lab Results   Component Value Date    CRP 0.8 (H) 02/15/2022    CRP 0.2 2022    CRP 0.3 2022         Lab Results   Component Value Date    ALT 14 2022    AST 40 2022    ALKPHOS 132 (H) 2022         MICROBIOLOGY DATA:   blood negative  2/15 blood negative to date  MRSA screen negative  c diff last negative 2/3/22    RADIOLOGY:  MR Brain w/o Contrast    Result Date: 2022  EXAM: MR BRAIN W/O CONTRAST LOCATION: [facility] DATE/TIME: 2022 1:05 PM INDICATION: Encephalopathy COMPARISON: CT head 2022, MRI head 2020 TECHNIQUE: Routine multiplanar multisequence head MRI without intravenous contrast. FINDINGS: INTRACRANIAL CONTENTS: Please note study is significantly degraded by motion artifact. Suggestion of a few small foci of restricted diffusion at the left temporal occipital region; best appreciated on repeat axial diffusion weighted sequence series 20.2 image 12, image 11; as well  as repeat coronal diffusion weighted sequence series 24.2 image 9. No definite associated hemorrhage or significant mass effect. Redemonstration of small chronic infarction left precentral gyrus. Mild generalized cerebral atrophy. No hydrocephalus. Normal position of the cerebellar tonsils. Small chronic infarction lateral aspect left cerebellum. A few additional tiny infarctions demonstrated on prior exam are not well demonstrated on the current. SELLA: Not well-visualized, grossly normal. OSSEOUS STRUCTURES/SOFT TISSUES: Normal marrow signal. Flow voids are not well-visualized. ORBITS: Not well-visualized. SINUSES/MASTOIDS: The complete opacification left maxillary and left sphenoid sinus, similar to prior. Small amount of opacification right mastoid air cells.     IMPRESSION: 1.  Please note study is significantly degraded by motion artifact. 2.  Suggestion of a few small foci of acute/early subacute infarction at the left temporal occipital region. 3.  No definite associated hemorrhage or significant mass effect. 4.  Small chronic infarction left precentral gyrus, similar to prior. 5.  Probable few small chronic infarctions cerebellar hemispheres. 6.  Mild atrophy.    Echocardiogram Complete    Result Date: 2022  [id number]^ALEYDA^[place] [address]  Name: KAREY LIVINGSTON MRN: [id number] : 1965 Study Date: 2022 07:37 AM Age: 56 yrs Gender: Male Patient Location: [facility] Reason For Study: Hypertension (HTN) Ordering Physician: LYN MAYNARD Performed By: ADRIANA  BSA: 1.9 m2 Height: 72 in Weight: 147 lb HR: 123 BP: 97/59 mmHg ______________________________________________________________________________ Procedure Complete Portable Echo Adult. Technically difficult study.Extremely poor acoustic windows. Compared to the prior study dated 2021, there have been no changes. No hemodynamically significant valvular abnormalities on  2D or color flow imaging. ______________________________________________________________________________ Interpretation Summary  1.Left ventricular size, wall motion and function are normal. The ejection fraction is > 65%. 2.There is mild concentric left ventricular hypertrophy. 3.Normal right ventricle size and systolic function. 4.No hemodynamically significant valvular abnormalities on 2D or color flow imaging. Compared to the prior study dated 11/5/2021, there have been no changes. ______________________________________________________________________________ I      WMSI = 1.00     % Normal = 100  X - Cannot   0 -                      (2) - Mildly 2 -          Segments  Size Interpret    Hyperkinetic 1 - Normal  Hypokinetic  Hypokinetic  1-2     small                                                    7 -          3-5    moderate 3 - Akinetic 4 -          5 -         6 - Akinetic Dyskinetic   6-14    large              Dyskinetic   Aneurysmal  w/scar       w/scar       15-16   diffuse  Left Ventricle Left ventricular size, wall motion and function are normal. The ejection fraction is > 65%. There is mild concentric left ventricular hypertrophy. Left ventricular diastolic function is normal. No regional wall motion abnormalities noted.  Right Ventricle Normal right ventricle size and systolic function. TAPSE is normal, which is consistent with normal right ventricular systolic function.  Atria Normal left atrial size. Right atrial size is normal. There is no color Doppler evidence of an atrial shunt.  Mitral Valve Mitral valve leaflets appear normal. There is no evidence of mitral stenosis or clinically significant mitral regurgitation. There is no mitral regurgitation noted. There is no mitral valve stenosis.  Tricuspid Valve Tricuspid valve leaflets appear normal. Right ventricle systolic pressure estimate normal. There is trace tricuspid regurgitation. There is no tricuspid stenosis.  Aortic Valve Aortic valve  leaflets appear normal. There is no evidence of aortic stenosis or clinically significant aortic regurgitation. The aortic valve is trileaflet. No aortic regurgitation is present. No aortic stenosis is present.  Pulmonic Valve The pulmonic valve is not well seen, but is grossly normal. This degree of valvular regurgitation is within normal limits. There is no pulmonic valvular stenosis.  Vessels The aorta root is normal. Normal size ascending aorta. IVC diameter <2.1 cm collapsing >50% with sniff suggests a normal RA pressure of 3 mmHg.  Pericardium There is no pericardial effusion.  Rhythm The rhythm was sinus tachycardia.  ______________________________________________________________________________ MMode/2D Measurements & Calculations IVSd: 1.3 cm LVIDd: 4.1 cm LVIDs: 2.3 cm LVPWd: 1.2 cm FS: 43.4 %  LV mass(C)d: 182.6 grams LV mass(C)dI: 97.7 grams/m2 Ao root diam: 3.3 cm LA dimension: 3.6 cm asc Aorta Diam: 3.8 cm LA/Ao: 1.1 LVOT diam: 2.2 cm LVOT area: 3.8 cm2 LA Volume (BP): 29.3 ml LA Volume Index (BP): 15.7 ml/m2  LA Volume Indexed (AL/bp): 17.3 ml/m2 RWT: 0.61  Doppler Measurements & Calculations MV E max gustavo: 132.0 cm/sec MV A max gustavo: 137.1 cm/sec MV E/A: 0.96 MV dec slope: 1104 cm/sec2 MV dec time: 0.12 sec Ao V2 max: 143.6 cm/sec Ao max P.0 mmHg Ao V2 mean: 108.6 cm/sec Ao mean P.2 mmHg Ao V2 VTI: 26.8 cm LELO(I,D): 3.5 cm2 LELO(V,D): 3.9 cm2 LV V1 max P.6 mmHg LV V1 max: 146.7 cm/sec LV V1 VTI: 24.2 cm SV(LVOT): 92.9 ml SI(LVOT): 49.7 ml/m2 PA V2 max: 107.4 cm/sec PA max P.6 mmHg PA acc time: 0.07 sec AV Gustavo Ratio (DI): 1.0 LELO Index (cm2/m2): 1.9 E/E' av.4  Lateral E/e': 18.6 Medial E/e': 20.2  ______________________________________________________________________________ Report approved by: Randal Villa 2022 09:14 AM       XR Chest Port 1 View    Result Date: 2022  EXAM: XR CHEST PORT 1 VIEW LOCATION: [facility] DATE/TIME: 2022 8:33  AM INDICATION: dyspnea COMPARISON: CT pulmonary angiogram 02/11/2022     IMPRESSION: Abnormal increased lung attenuation diffusely through both lungs. There are subsegmental foci of more focal airspace opacity in the peripheral third of the left mid and lower lung, similar to the prior CT. Subpleural emphysema is present in the apices. No new focal airspace opacity or volume loss. Symmetric apical and left lateral pleural thickening has not increased. No pleural effusion or pneumothorax. Cardiac silhouette is not enlarged. Unchanged aortic and other mediastinal interfaces. There are metallic fragments projecting in the left upper quadrant. Chronic left posterolateral rib fracture deformities are ununited.    XR Chest Port 1 View    Result Date: 1/29/2022  EXAM: XR CHEST PORT 1 VIEW LOCATION: [facility] DATE/TIME: 1/29/2022 6:18 AM INDICATION: Pneumonia follow up. COMPARISON: 01/28/2022 chest CT.01/19/2022 radiograph.     IMPRESSION: Relatively diffuse left lung opacities have increased since the 01/19/2022 chest radiograph, and perhaps slightly increased since the 01/20/2022 chest CT. Differentials for the increased opacities include asymmetric pulmonary edema, infectious / inflammatory process or layering pleural fluid. Small left pleural effusion is present. Right lung is hypoexpanded and may have a few subtle scattered opacities. No pneumothorax. Normal heart size. Atherosclerotic aorta.     XR Chest Port 1 View    Result Date: 1/19/2022  EXAM: XR CHEST PORT 1 VIEW LOCATION: [facility] DATE/TIME: 1/19/2022 10:52 AM INDICATION: after advancing et tube COMPARISON: 1/18/2022.     IMPRESSION: Endotracheal tube is been advanced. Tip is 4 cm above the igor in good position. There our persistent bibasilar pulmonary opacities with partial clearing at the right base from the prior study and no change at the left base. No pneumothorax. Enteric tube tip is not visualized  tip is below the diaphragm. PICC catheter from right upper extremity approach is unchanged with tip at the junction of the superior vena cava and right atrium. Left posterior rib fractures are again noted as well as deformity of the right humeral head.    XR Chest Port 1 View    Result Date: 1/18/2022  EXAM: XR CHEST PORT 1 VIEW LOCATION: [facility] DATE/TIME: 1/18/2022 6:06 AM INDICATION: Location of ET tube COMPARISON: 01/16/2022.     IMPRESSION:Endotracheal tube is in the trachea at the level of the clavicular heads with tip 6 cm above the igor. PICC catheter from right upper extremity approach is in the distal superior vena cava near its junction with the right atrium. Enteric tube tip is below the diaphragm and not visualized. There is no pneumothorax. Again noted are bilateral patchy airspace opacities there has been increased consolidation or atelectasis at the right medial lung base. There are old healed left posterior rib  fractures no acute fractures are identified.    XR Chest Port 1 View    Result Date: 1/16/2022  EXAM: XR CHEST PORT 1 VIEW LOCATION: [facility] DATE/TIME: 1/16/2022 12:34 PM INDICATION: ET tube placement, PICC line placement COMPARISON: CT pulmonary angiogram 01/15/2022     IMPRESSION: ET tube tip projects 6.5 cm above the igor. Gastric tube extends towards the diaphragmatic hiatus, outside the field-of-view. Right PICC terminates upper right atrium. Extensive bilateral airspace opacities are present with patchy lung involvement, unchanged from yesterday. No new focal lung volume loss. No visible pleural fluid or pneumothorax on this single supine view.    XR Chest Port 1 View    Result Date: 1/15/2022  EXAM: XR CHEST PORT 1 VIEW LOCATION: [facility] DATE/TIME: 1/15/2022 5:16 PM INDICATION: Shortness of breath COMPARISON: 11/5/2021     IMPRESSION: There are mild opacities in both lungs which have increased from  the prior exam. This is likely due to a superimposed infectious or inflammatory process on top of mild background scarring. Normal heart size and pulmonary vascularity. Old left  rib fractures.    XR Abdomen Port 1 View    Result Date: 1/17/2022  EXAM: XR ABDOMEN PORT 1 VIEWS LOCATION: [facility] DATE/TIME: 1/17/2022 1:25 PM INDICATION: Location of OG tube COMPARISON: None.     IMPRESSION: Enteric tube in the body the stomach.     CT Abdomen Pelvis w Contrast    Result Date: 2/15/2022  EXAM: CT ABDOMEN PELVIS W CONTRAST LOCATION: [facility] DATE/TIME: 2/15/2022 9:50 PM INDICATION: Leukocytosis. COMPARISON: 10/05/2020 TECHNIQUE: CT scan of the abdomen and pelvis was performed following injection of IV contrast. Multiplanar reformats were obtained. Dose reduction techniques were used. CONTRAST: ISOVUE 370 100ML FINDINGS: LOWER CHEST: Bibasilar infiltrates. Small right and trace left pleural effusion. HEPATOBILIARY: Hepatic steatosis. Cholelithiasis and gallbladder distention. PANCREAS: Pancreatic calcifications suggesting chronic pancreatitis. Prominent calcification at the neck of the pancreas. Difficult to tell if this is parenchymal or intraductal. The distal pancreas is atrophic. SPLEEN: Splenectomy with accessory splenule. ADRENAL GLANDS: Normal. KIDNEYS/BLADDER: Small nonobstructing renal calculi. BOWEL: Gastric and small bowel wall thickening. Central mesenteric congestion. Trace amount of free fluid. Diverticulosis with presumed retained barium. Wall thickening of the proximal colon. Rectal wall thickening versus underdistention. LYMPH NODES: Subcentimeter mesenteric and peripancreatic lymph nodes. VASCULATURE: Atherosclerotic vascular calcification. PELVIC ORGANS: Small amount of free fluid. MUSCULOSKELETAL: Degenerative change osseous structures. Anasarca. Remote and subacute rib fractures.     IMPRESSION: 1.  Wall thickening of stomach, small bowel and  proximal colon. Correlate for gastroenteritis/enterocolitis. 2.  Mesenteric congestion and small amount of free fluid. 3.  Bilateral pulmonary infiltrates. Small right and trace left pleural effusion. 4.  Focal rectal wall thickening versus underdistention. Follow-up recommended to exclude underlying lesion. 5.  Hepatic steatosis. 6.  Diverticulosis. 7.  Cholelithiasis and mild gallbladder distention. 8.  Nonobstructing renal calculi. 9.  Coarse pancreatic calcifications again seen    CT Chest Pulmonary Embolism w Contrast    Result Date: 2/11/2022  EXAM: CT CHEST PULMONARY EMBOLISM W CONTRAST LOCATION: [facility] DATE/TIME: 2/11/2022 1:42 PM INDICATION: PE suspected, low/intermediate prob, positive D-dimer, hypoxia, history of Covid COMPARISON: 01/28/2022 TECHNIQUE: CT chest pulmonary angiogram during arterial phase injection of IV contrast. Multiplanar reformats and MIP reconstructions were performed. Dose reduction techniques were used. CONTRAST: IsoVue 370 100mL FINDINGS: ANGIOGRAM CHEST: No pulmonary artery filling defects. Normal caliber aorta. LUNGS AND PLEURA: Moderate emphysema. Persistent but increased patchy groundglass and consolidative pulmonary opacities with areas of interlobular septal thickening. Mild bronchiectasis and bronchial wall thickening have increased when compared to prior exam. Trace effusions have decreased. No pneumothorax. MEDIASTINUM/AXILLAE: Heart size is normal. No adenopathy. CORONARY ARTERY CALCIFICATION: None. UPPER ABDOMEN: Hepatic steatosis. Cholelithiasis. There is diffuse wall thickening of the stomach. Small volume ascites. Nonobstructing bilateral renal calculi. Prominent calcification in the area of the pancreatic neck is unchanged. Splenectomy with small accessory splenule. MUSCULOSKELETAL: Degenerative changes of the spine. Prominent L1 Schmorl's node. Similar appearance of the bilateral rib fractures. Mild anasarca.     IMPRESSION: 1.  No  pulmonary embolus. 2.  Interval worsening of bilateral pulmonary opacities, bronchiectasis, bronchial wall thickening. 3.  Hepatic steatosis. 4.  Cholelithiasis. 5.  Diffuse wall thickening of the visualized stomach may indicate gastritis. 6.  Manifestations of third spacing.    CT Chest Pulmonary Embolism w Contrast    Result Date: 1/15/2022  EXAM: CT CHEST PULMONARY EMBOLISM W CONTRAST LOCATION: [facility] DATE/TIME: 1/15/2022 6:17 PM INDICATION: Shortness of breath COMPARISON: Portable AP view of the chest 01/15/2022; CT of the abdomen and pelvis 10/05/2020 TECHNIQUE: CT chest pulmonary angiogram during arterial phase injection of IV contrast. Multiplanar reformats and MIP reconstructions were performed. Dose reduction techniques were used. CONTRAST: 100ml isovue 370 FINDINGS: ANGIOGRAM CHEST: Pulmonary arteries are normal caliber and negative for pulmonary emboli. Normal caliber thoracic aorta. There are no findings to suggest acute aortic syndrome. Proximal great vessels are patent. Diminutive left vertebral artery arises directly from the arch. Mild atheromatous calcifications descending aorta and distal right innominate artery. LUNGS AND PLEURA: Upper lung predominant mixed centrilobular and paraseptal emphysema. Superimposed patchy airspace opacities are present bilaterally, asymmetric to the right associated with internal interstitial opacity. Minimal right pleural fluid layers dependently. There is chronic thickening of the left posterior and posterolateral pleura adjacent to multiple left rib fracture deformities. Central airways are patent. MEDIASTINUM: Cardiac chambers are normal in size. No pericardial effusion is present. Mildly enlarged lymph nodes in both analia, likely reactive in the setting of airspace opacities. Subcarinal, lower paratracheal and subaortic/prevascular lymph nodes are  all normal by size criteria. The middle third and distal thirds of the esophagus has  circumferential wall thickening consistent with esophagitis. Imaged thyroid gland is normal. CORONARY ARTERY CALCIFICATION: None. UPPER ABDOMEN: There are multiple calcified gallstones in the neck of the gallbladder. Severe fatty infiltration of the liver. The imaged distal transverse colon/splenic flecture has wall thickening. Previous splenectomy with small splenule present. 15 mm length ovoid calcification in the region of the pancreas neck is unchanged. MUSCULOSKELETAL: There are multiple ununited left posterior and posterolateral rib fracture deformities including lateral ribs 7, 8 and posterior ribs 9 and 11. The posterolateral right ninth rib is broken in 2 locations and the fractures have fairly sharp borders suggesting acute on subacute fractures..     IMPRESSION: 1.  No acute pulmonary embolism. 2.  Multifocal bilateral airspace opacities are present mixed groundglass and interstitial thickening asymmetric to the right. Imaging features can be seen with (COVID-19)  pneumonia, though are nonspecific and can occur with a variety of infectious and noninfectious processes. Given the other findings below, aspiration is a strong differential consideration. 3.  Severe wall thickening of the middle and distal thirds of the esophagus consistent with a subacute esophagitis. 4.  Severe hepatic steatosis. 5.  Acute on chronic fracture deformities of multiple left lateral/posterolateral ribs. 6.  Cholelithiasis. 7.  Wall thickening of the imaged colon consistent with colitis.    CT Chest w/o Contrast    Result Date: 1/28/2022  EXAM: CT CHEST W/O CONTRAST LOCATION: [facility] DATE/TIME: 1/28/2022 2:36 PM INDICATION: Cough, persistent COMPARISON: 10/15/2022 TECHNIQUE: CT chest without IV contrast. Multiplanar reformats were obtained. Dose reduction techniques were used. CONTRAST: None. FINDINGS: LUNGS AND PLEURA: Upper lung predominant centrilobular and paraseptal emphysema. Improvement in  groundglass and interstitial opacities in the right lung. Slight worsening of groundglass and interstitial opacities in the left upper lobe. New airway wall thickening in the left upper lobe and left lower lobe and new consolidation in the dependent portion of the left upper lobe and at the left lung base. Mucoid impaction in the left lower lobe airways. Unchanged left pleural thickening and trace right pleural effusion. MEDIASTINUM/AXILLAE: No thoracic aortic aneurysm. No lymphadenopathy. CORONARY ARTERY CALCIFICATION: None. UPPER ABDOMEN: Hepatic steatosis. Cholelithiasis. MUSCULOSKELETAL: There are demonstrated bilateral rib fractures.     IMPRESSION: 1.  New airway wall thickening in the left upper and lower lobes with new consolidation in the dependent portion of the left upper lobe and at the left lung base, and mucoid impaction in left lower lobe airways, suspicious for aspiration. 2.  Overall improvement in groundglass and interstitial opacities in the right lung and slight worsening in the left upper lobe.     CT Head w/o Contrast    Result Date: 2/12/2022  EXAM: CT HEAD W/O CONTRAST LOCATION: [facility] DATE/TIME: 2/12/2022 9:20 AM INDICATION: Trauma - Head Injury. Unwitnessed fall. COMPARISON: CT head 01/18/2022 TECHNIQUE: Routine CT Head without IV contrast. Multiplanar reformats. Dose reduction techniques were used. FINDINGS: INTRACRANIAL CONTENTS: No intracranial hemorrhage, extraaxial collection, or mass effect.  No CT evidence of acute infarct. Small focus of chronic cortical encephalomalacia and adjacent gliosis involving the superior left frontal lobe as seen previously.  Mild presumed chronic small vessel ischemic changes. Mild generalized volume loss. No hydrocephalus. VISUALIZED ORBITS/SINUSES/MASTOIDS: No intraorbital abnormality. Complete opacification of the left maxillary sinus with some chronic appearing mural hyperostosis similar to the previous exam. Additional  subtotal opacification of the left sphenoid sinus by polypoid mucosal thickening as seen previously. No middle ear or mastoid effusion. BONES/SOFT TISSUES: Mild right parietal scalp swelling. No skull fracture.     IMPRESSION: 1.  No CT evidence for acute intracranial process. 2.  Mild right parietal scalp contusion without associated fracture. 3.  Brain atrophy and presumed chronic microvascular ischemic changes as above. 4.  Inflammatory changes of the paranasal sinuses as seen previously.    CT Head w/o Contrast    Result Date: 1/18/2022  EXAM: CT HEAD W/O CONTRAST LOCATION: [facility] DATE/TIME: 1/18/2022 3:45 AM INDICATION: Headache, intracranial hemorrhage suspected. COMPARISON: 11/4/2021 TECHNIQUE: Routine CT Head without IV contrast. Multiplanar reformats. Dose reduction techniques were used. FINDINGS: INTRACRANIAL CONTENTS: No intracranial hemorrhage, extraaxial collection, or mass effect. No CT evidence of acute infarct. Mild presumed chronic small vessel ischemic changes. Mild to moderate generalized volume loss. No hydrocephalus. Stable calcified dural based lesion along the right frontal convexity measuring 1 cm in diameter, presumably reflecting a small meningioma. VISUALIZED ORBITS/SINUSES/MASTOIDS: No intraorbital abnormality. Complete opacification of the left maxillary sinus with chronic mucoperiosteal reaction. Mucous retention cyst in the left sphenoid sinus. Mild mucosal thickening along the floor of the left frontal sinus. Opacified left frontoethmoidal recess. No middle ear or mastoid effusion. BONES/SOFT TISSUES: No acute abnormality.     IMPRESSION: 1.  No CT evidence for acute intracranial process. 2.  Stable chronic changes as above.    XR Video Swallow with SLP or OT    Result Date: 2/8/2022  EXAM: XR VIDEO SWALLOW WITH SLP OR OT LOCATION: [facility] DATE/TIME: 2/8/2022 8:52 AM INDICATION: Difficulty swallowing. COMPARISON: 01/31/2022.  TECHNIQUE: Routine swallow study with speech pathology using multiple barium thicknesses. FINDINGS: FLUOROSCOPIC TIME: 1.2 minutes NUMBER OF IMAGES: 0 Swallow study with Speech Pathology using multiple barium thicknesses. Small amount of silent aspiration with thin liquid. Penetration with mildly thickened (nectar) liquid. There was persistent mild penetration with chin Tech technique and mildly thickened liquid. No aspiration was solid consistency.     IMPRESSION: 1.  Silent aspiration with thin liquid. Penetration with mildly thickened liquid.     XR Video Swallow with SLP or OT    Result Date: 1/31/2022  EXAM: XR VIDEO SWALLOW WITH SLP OR OT LOCATION: [facility] [place] DATE/TIME: 1/31/2022 11:30 AM INDICATION: Difficulty swallowing. COMPARISON: 1/25/2022. TECHNIQUE: Routine swallow study with speech pathology using multiple barium thicknesses. FINDINGS: FLUOROSCOPIC TIME: 3.3 minutes NUMBER OF IMAGES: 0 Swallow study with Speech Pathology using multiple barium thicknesses. Tracheal aspiration with thin liquid consistency barium with weak ineffective cough. Penetration into the laryngeal vestibule with mildly thickened (nectar) liquid consistency barium. No penetration or aspiration with honey and pudding consistency barium. Please refer to speech therapy report for additional detail.    XR Video Swallow with SLP or OT    Result Date: 1/25/2022  EXAM: XR VIDEO SWALLOW WITH SLP OR OT LOCATION: [facility] [place] DATE/TIME: 1/25/2022 3:27 PM INDICATION: Difficulty swallowing. COMPARISON: None. TECHNIQUE: Routine swallow study with speech pathology using multiple barium thicknesses. FINDINGS: FLUOROSCOPIC TIME: 1.5 minutes NUMBER OF IMAGES: 9 Swallow study with Speech Pathology using multiple barium thicknesses. Moderate to deep penetration with thin liquid (no cough reflex). Small amount of penetration with mildly thick consistency. No other penetration or aspiration.     EEG Coma  or Sleep Only    Result Date: 2022  ELECTROENCEPHALOGRAM (EEG) REPORT [facility] [place] [address] [place] [address] Tel: [phone]  Fax: [phone] [email or web address] Peewee Hess,  1965, MRN [id number] PCP: Sarah Canseco Date: 2022 Principal Diagnosis: Altered mental status History : Patient is being evaluated for altered mental status. Medication listed includes Ativan Description of the Recording:  This is a multichannel digital EEG recording done using the international 10-20 placement system. Background of the recording consists of irregular 1 to 3 Hz polymorphic delta activity with amplitudes ranging between 20 to 30  V.  Alerting procedure produce minimal change.  Photic stimulation performed with standard protocol produced minimal change.  No transient sleep patterns were recorded. During this recording, no sharp discharges, spikes or electrographic seizure activity was recorded. Impression: This is an abnormal EEG due to diffusely slow polymorphic delta activity that minimally attenuates with alerting procedures.  This EEG suggests nonspecific generalized cerebral dysfunction.  No focal slowing or periodic discharges were seen to suggest seizures. Classification: Delta grade I generalized Tiburcio Gallagher MD [facility] This note was dictated using voice recognition software.  Any grammatical or context distortions are unintentional and inherent to the software.

## 2022-02-16 NOTE — PLAN OF CARE
Problem: Adult Inpatient Plan of Care  Goal: Absence of Hospital-Acquired Illness or Injury  Intervention: Prevent Skin Injury  Recent Flowsheet Documentation  Taken 2/15/2022 1603 by Sotero Cunningham RN  Body Position: supine, head elevated     Problem: Mobility Impairment  Goal: Optimal Mobility  Outcome: No Change  Intervention: Optimize Mobility  Recent Flowsheet Documentation  Taken 2/15/2022 1603 by Sotero Cunningham RN  Activity Management: bedrest  Positioning/Transfer Devices:    pillows    in use    Pt cooperative with cares. Slept most of the day, arouses to voice. Reports numbness and tingling in feet. VSS on 3L oxygen via nasal cannula.  and 125. 1 large loose stool. CT abdomen/pelvis done. Reports pain of 8 on buttocks, managed with PRN. Lactic 3.3. BP 85/54, 1,000cc fluid bolus ordered.

## 2022-02-17 NOTE — PROGRESS NOTES
Care Management Follow Up    Length of Stay (days): 32    Expected Discharge Date: 02/18/2022     Concerns to be Addressed: discharge planning       Patient plan of care discussed at interdisciplinary rounds: Yes    Anticipated Discharge Disposition: Transitional Care     Anticipated Discharge Services: Other (see comment) (therapy services )    Anticipated Discharge DME: None    Patient/family educated on Medicare website which has current facility and service quality ratings: yes    Education Provided on the Discharge Plan:  Yes    Patient/Family in Agreement with the Plan: yes    Referrals Placed by CM/BI: Post Acute Facilities    Private pay costs discussed: Not applicable    Additional Information: BI reviewed pt's chart and discussed with both doctor and pt's nurse.  Pt not medically ready to discharge today.     BI spoke with Bob Evans regarding pt accepted at [facility].  They are still following - she anticipates there will still be a bed for pt when he is medically ready.  HEW at discharge.  Needs PAS.      ROXANNA Armstrong, HANANE 02/16/22 6:19 PM

## 2022-02-17 NOTE — PROGRESS NOTES
INFECTIOUS DISEASE FOLLOW UP NOTE      ASSESSMENT:  1. Leukocytosis, elevated lactate, hypotension. He has been afebrile. Chest CT 2/11 with worsening findings, some of which are likely sequelae of COVID pneumonia. He was started on pip/tazo and vancomycin 2/11, with last doses 2/14, and WBC increased to 24k on 2/15. No clear source of infection showing up. WBC better since restarting IV antibiotics, he looks better overall.  Not sure what we are treating. Oxygenation worse -- recurrent aspiration? Sequelae of COVID? -- crp not very high last check, pending today. CHF? Checking BNP.   2. Admitted 1/15 with alcohol withdrawal, treated for aspiration pneumonia.   3. COVID positive 1/30/22, previous negative 1/15, 1/22, presumed therefore contracted in the hospital. Treated with RDV 5 days, dexamethasone 1/30-2/5, tocilizumab 1/31/22. Vaccinated x2 fall 2021.   4. Encephalopathy. Alcohol withdrawal. MRI 1/22/22 showed suggestion of few small foci of acute/early subacute infarctions L occipital region.   5. DM.   6. H/o abdominal surgeries  7. H/o jazmine's gangrene 2018.  8. Albumin of 1.8 will lead to low oncotic pressure, 3rd spacing with fluids.     PLAN:  Continue meropenem  UC pending  Checking CRP, BNP, further recommendations to follow once these are back.     ADDENDUM: CRP normal, BNP not very high. Low CRP is evidence against significant inflammatory process in his lungs. Could hypotension but from adrenal insufficiency? Not sure why he would have that.     Jean Paul Galvin MD  [facility]  [phone] [facility]    ______________________________________________________________________    SUBJECTIVE / INTERVAL HISTORY: up to 8L face mask. Feels lightheaded when he stands. Dry cough. Chronic pain shoulders, ankles unchanged.     ROS: deconditioned. All other systems negative except as listed above.        OBJECTIVE:  /80 (BP Location: Right arm)   Pulse 103   Temp  "97.3  F (36.3  C) (Oral)   Resp 16   Ht 1.854 m (6' 0.99\")   Wt 71.1 kg (156 lb 11.2 oz)   SpO2 93%   BMI 20.68 kg/m    FiO2 (%): 2 %    Vital Signs  Temp: 98.1  F (36.7  C)  Temp src: Oral  Resp: 18  Pulse: 64  Pulse Rate Source: Monitor  BP: (!) 87/59  BP Location: Right arm    Temp (24hrs), Av.4  F (36.3  C), Min:97  F (36.1  C), Max:98.1  F (36.7  C)      GEN: No acute distress.  Oriented to place, year, month  RESPIRATORY:  Normal breathing pattern. Pretty clear lungs.  CARDIOVASCULAR:  Regular rate and rhythm. Normal S1 and S2. No murmur, click, gallop or rub. No dependent edema. No excess JVD.  ABDOMEN:  Soft, normal bowel sounds, non-tender, no masses, has surgical scars.  EXTREMITIES: No edema.  No synovitis  SKIN/HAIR/NAILS:  No rashes. Stage 2 wound sacrum  Neuro: non focal  IV: peripheral        Antibiotics:  Meropenem 2/15-  pip/tazo 1/15-,-2/3, -  Vancomycin , 2/15-  remdesivir -2/3  augmentin 2/15    Pertinent labs:    Recent Labs   Lab 22  0628 22  1051 02/15/22  0558   WBC 14.8* 13.4* 24.6*  24.6*   HGB 10.6* 10.4* 10.2*   HCT 30.2* 30.2* 29.5*    226 229        Recent Labs   Lab 22  0628 22  1051 02/15/22  0559    142 139   CO2 19* 19* 18*   BUN 4* 4* 5*        Lab Results   Component Value Date    CRP 0.8 (H) 02/15/2022    CRP 0.2 2022    CRP 0.3 2022         Lab Results   Component Value Date    ALT 16 2022    AST 56 (H) 2022    ALKPHOS 145 (H) 2022         MICROBIOLOGY DATA:   blood negative  2/15 blood negative to date  MRSA screen negative  c diff last negative 2/3/22    RADIOLOGY:  MR Brain w/o Contrast    Result Date: 2022  EXAM: MR BRAIN W/O CONTRAST LOCATION: [facility] DATE/TIME: 2022 1:05 PM INDICATION: Encephalopathy COMPARISON: CT head 2022, MRI head 2020 TECHNIQUE: Routine multiplanar multisequence head MRI without intravenous " contrast. FINDINGS: INTRACRANIAL CONTENTS: Please note study is significantly degraded by motion artifact. Suggestion of a few small foci of restricted diffusion at the left temporal occipital region; best appreciated on repeat axial diffusion weighted sequence series 20.2 image 12, image 11; as well as repeat coronal diffusion weighted sequence series 24.2 image 9. No definite associated hemorrhage or significant mass effect. Redemonstration of small chronic infarction left precentral gyrus. Mild generalized cerebral atrophy. No hydrocephalus. Normal position of the cerebellar tonsils. Small chronic infarction lateral aspect left cerebellum. A few additional tiny infarctions demonstrated on prior exam are not well demonstrated on the current. SELLA: Not well-visualized, grossly normal. OSSEOUS STRUCTURES/SOFT TISSUES: Normal marrow signal. Flow voids are not well-visualized. ORBITS: Not well-visualized. SINUSES/MASTOIDS: The complete opacification left maxillary and left sphenoid sinus, similar to prior. Small amount of opacification right mastoid air cells.     IMPRESSION: 1.  Please note study is significantly degraded by motion artifact. 2.  Suggestion of a few small foci of acute/early subacute infarction at the left temporal occipital region. 3.  No definite associated hemorrhage or significant mass effect. 4.  Small chronic infarction left precentral gyrus, similar to prior. 5.  Probable few small chronic infarctions cerebellar hemispheres. 6.  Mild atrophy.    Echocardiogram Complete    Result Date: 2022  [id number]^ALEYDA^[place] [address]  Name: KAREY LIVINGSTON MRN: [id number] : 1965 Study Date: 2022 07:37 AM Age: 56 yrs Gender: Male Patient Location: [facility] Reason For Study: Hypertension (HTN) Ordering Physician: LYN MAYNARD Performed By: TAYLER  BSA: 1.9 m2 Height: 72 in Weight: 147 lb HR: 123 BP: 97/59 mmHg  ______________________________________________________________________________ Procedure Complete Portable Echo Adult. Technically difficult study.Extremely poor acoustic windows. Compared to the prior study dated 11/5/2021, there have been no changes. No hemodynamically significant valvular abnormalities on 2D or color flow imaging. ______________________________________________________________________________ Interpretation Summary  1.Left ventricular size, wall motion and function are normal. The ejection fraction is > 65%. 2.There is mild concentric left ventricular hypertrophy. 3.Normal right ventricle size and systolic function. 4.No hemodynamically significant valvular abnormalities on 2D or color flow imaging. Compared to the prior study dated 11/5/2021, there have been no changes. ______________________________________________________________________________ I      WMSI = 1.00     % Normal = 100  X - Cannot   0 -                      (2) - Mildly 2 -          Segments  Size Interpret    Hyperkinetic 1 - Normal  Hypokinetic  Hypokinetic  1-2     small                                                    7 -          3-5    moderate 3 - Akinetic 4 -          5 -         6 - Akinetic Dyskinetic   6-14    large              Dyskinetic   Aneurysmal  w/scar       w/scar       15-16   diffuse  Left Ventricle Left ventricular size, wall motion and function are normal. The ejection fraction is > 65%. There is mild concentric left ventricular hypertrophy. Left ventricular diastolic function is normal. No regional wall motion abnormalities noted.  Right Ventricle Normal right ventricle size and systolic function. TAPSE is normal, which is consistent with normal right ventricular systolic function.  Atria Normal left atrial size. Right atrial size is normal. There is no color Doppler evidence of an atrial shunt.  Mitral Valve Mitral valve leaflets appear normal. There is no evidence of mitral stenosis or clinically  significant mitral regurgitation. There is no mitral regurgitation noted. There is no mitral valve stenosis.  Tricuspid Valve Tricuspid valve leaflets appear normal. Right ventricle systolic pressure estimate normal. There is trace tricuspid regurgitation. There is no tricuspid stenosis.  Aortic Valve Aortic valve leaflets appear normal. There is no evidence of aortic stenosis or clinically significant aortic regurgitation. The aortic valve is trileaflet. No aortic regurgitation is present. No aortic stenosis is present.  Pulmonic Valve The pulmonic valve is not well seen, but is grossly normal. This degree of valvular regurgitation is within normal limits. There is no pulmonic valvular stenosis.  Vessels The aorta root is normal. Normal size ascending aorta. IVC diameter <2.1 cm collapsing >50% with sniff suggests a normal RA pressure of 3 mmHg.  Pericardium There is no pericardial effusion.  Rhythm The rhythm was sinus tachycardia.  ______________________________________________________________________________ MMode/2D Measurements & Calculations IVSd: 1.3 cm LVIDd: 4.1 cm LVIDs: 2.3 cm LVPWd: 1.2 cm FS: 43.4 %  LV mass(C)d: 182.6 grams LV mass(C)dI: 97.7 grams/m2 Ao root diam: 3.3 cm LA dimension: 3.6 cm asc Aorta Diam: 3.8 cm LA/Ao: 1.1 LVOT diam: 2.2 cm LVOT area: 3.8 cm2 LA Volume (BP): 29.3 ml LA Volume Index (BP): 15.7 ml/m2  LA Volume Indexed (AL/bp): 17.3 ml/m2 RWT: 0.61  Doppler Measurements & Calculations MV E max gustavo: 132.0 cm/sec MV A max gustavo: 137.1 cm/sec MV E/A: 0.96 MV dec slope: 1104 cm/sec2 MV dec time: 0.12 sec Ao V2 max: 143.6 cm/sec Ao max P.0 mmHg Ao V2 mean: 108.6 cm/sec Ao mean P.2 mmHg Ao V2 VTI: 26.8 cm LELO(I,D): 3.5 cm2 LELO(V,D): 3.9 cm2 LV V1 max P.6 mmHg LV V1 max: 146.7 cm/sec LV V1 VTI: 24.2 cm SV(LVOT): 92.9 ml SI(LVOT): 49.7 ml/m2 PA V2 max: 107.4 cm/sec PA max P.6 mmHg PA acc time: 0.07 sec AV Gustavo Ratio (DI): 1.0 LELO Index (cm2/m2): 1.9 E/E' av.4  Lateral E/e':  18.6 Medial E/e': 20.2  ______________________________________________________________________________ Report approved by: Randal Villa 02/11/2022 09:14 AM       XR Chest Port 1 View    Result Date: 2/16/2022  EXAM: XR CHEST PORT 1 VIEW LOCATION: [facility] DATE/TIME: 2/16/2022 8:33 AM INDICATION: dyspnea COMPARISON: CT pulmonary angiogram 02/11/2022     IMPRESSION: Abnormal increased lung attenuation diffusely through both lungs. There are subsegmental foci of more focal airspace opacity in the peripheral third of the left mid and lower lung, similar to the prior CT. Subpleural emphysema is present in the apices. No new focal airspace opacity or volume loss. Symmetric apical and left lateral pleural thickening has not increased. No pleural effusion or pneumothorax. Cardiac silhouette is not enlarged. Unchanged aortic and other mediastinal interfaces. There are metallic fragments projecting in the left upper quadrant. Chronic left posterolateral rib fracture deformities are ununited.    XR Chest Port 1 View    Result Date: 1/29/2022  EXAM: XR CHEST PORT 1 VIEW LOCATION: [facility] DATE/TIME: 1/29/2022 6:18 AM INDICATION: Pneumonia follow up. COMPARISON: 01/28/2022 chest CT.01/19/2022 radiograph.     IMPRESSION: Relatively diffuse left lung opacities have increased since the 01/19/2022 chest radiograph, and perhaps slightly increased since the 01/20/2022 chest CT. Differentials for the increased opacities include asymmetric pulmonary edema, infectious / inflammatory process or layering pleural fluid. Small left pleural effusion is present. Right lung is hypoexpanded and may have a few subtle scattered opacities. No pneumothorax. Normal heart size. Atherosclerotic aorta.     XR Chest Port 1 View    Result Date: 1/19/2022  EXAM: XR CHEST PORT 1 VIEW LOCATION: [facility] DATE/TIME: 1/19/2022 10:52 AM INDICATION: after advancing et tube  COMPARISON: 1/18/2022.     IMPRESSION: Endotracheal tube is been advanced. Tip is 4 cm above the igor in good position. There our persistent bibasilar pulmonary opacities with partial clearing at the right base from the prior study and no change at the left base. No pneumothorax. Enteric tube tip is not visualized tip is below the diaphragm. PICC catheter from right upper extremity approach is unchanged with tip at the junction of the superior vena cava and right atrium. Left posterior rib fractures are again noted as well as deformity of the right humeral head.    XR Chest Port 1 View    Result Date: 1/18/2022  EXAM: XR CHEST PORT 1 VIEW LOCATION: [facility] DATE/TIME: 1/18/2022 6:06 AM INDICATION: Location of ET tube COMPARISON: 01/16/2022.     IMPRESSION:Endotracheal tube is in the trachea at the level of the clavicular heads with tip 6 cm above the igor. PICC catheter from right upper extremity approach is in the distal superior vena cava near its junction with the right atrium. Enteric tube tip is below the diaphragm and not visualized. There is no pneumothorax. Again noted are bilateral patchy airspace opacities there has been increased consolidation or atelectasis at the right medial lung base. There are old healed left posterior rib  fractures no acute fractures are identified.    XR Chest Port 1 View    Result Date: 1/16/2022  EXAM: XR CHEST PORT 1 VIEW LOCATION: [facility] DATE/TIME: 1/16/2022 12:34 PM INDICATION: ET tube placement, PICC line placement COMPARISON: CT pulmonary angiogram 01/15/2022     IMPRESSION: ET tube tip projects 6.5 cm above the igor. Gastric tube extends towards the diaphragmatic hiatus, outside the field-of-view. Right PICC terminates upper right atrium. Extensive bilateral airspace opacities are present with patchy lung involvement, unchanged from yesterday. No new focal lung volume loss. No visible pleural fluid or  pneumothorax on this single supine view.    XR Chest Port 1 View    Result Date: 1/15/2022  EXAM: XR CHEST PORT 1 VIEW LOCATION: [facility] DATE/TIME: 1/15/2022 5:16 PM INDICATION: Shortness of breath COMPARISON: 11/5/2021     IMPRESSION: There are mild opacities in both lungs which have increased from the prior exam. This is likely due to a superimposed infectious or inflammatory process on top of mild background scarring. Normal heart size and pulmonary vascularity. Old left  rib fractures.    XR Abdomen Port 1 View    Result Date: 1/17/2022  EXAM: XR ABDOMEN PORT 1 VIEWS LOCATION: [facility] DATE/TIME: 1/17/2022 1:25 PM INDICATION: Location of OG tube COMPARISON: None.     IMPRESSION: Enteric tube in the body the stomach.     CT Abdomen Pelvis w Contrast    Result Date: 2/15/2022  EXAM: CT ABDOMEN PELVIS W CONTRAST LOCATION: [facility] DATE/TIME: 2/15/2022 9:50 PM INDICATION: Leukocytosis. COMPARISON: 10/05/2020 TECHNIQUE: CT scan of the abdomen and pelvis was performed following injection of IV contrast. Multiplanar reformats were obtained. Dose reduction techniques were used. CONTRAST: ISOVUE 370 100ML FINDINGS: LOWER CHEST: Bibasilar infiltrates. Small right and trace left pleural effusion. HEPATOBILIARY: Hepatic steatosis. Cholelithiasis and gallbladder distention. PANCREAS: Pancreatic calcifications suggesting chronic pancreatitis. Prominent calcification at the neck of the pancreas. Difficult to tell if this is parenchymal or intraductal. The distal pancreas is atrophic. SPLEEN: Splenectomy with accessory splenule. ADRENAL GLANDS: Normal. KIDNEYS/BLADDER: Small nonobstructing renal calculi. BOWEL: Gastric and small bowel wall thickening. Central mesenteric congestion. Trace amount of free fluid. Diverticulosis with presumed retained barium. Wall thickening of the proximal colon. Rectal wall thickening versus underdistention. LYMPH  NODES: Subcentimeter mesenteric and peripancreatic lymph nodes. VASCULATURE: Atherosclerotic vascular calcification. PELVIC ORGANS: Small amount of free fluid. MUSCULOSKELETAL: Degenerative change osseous structures. Anasarca. Remote and subacute rib fractures.     IMPRESSION: 1.  Wall thickening of stomach, small bowel and proximal colon. Correlate for gastroenteritis/enterocolitis. 2.  Mesenteric congestion and small amount of free fluid. 3.  Bilateral pulmonary infiltrates. Small right and trace left pleural effusion. 4.  Focal rectal wall thickening versus underdistention. Follow-up recommended to exclude underlying lesion. 5.  Hepatic steatosis. 6.  Diverticulosis. 7.  Cholelithiasis and mild gallbladder distention. 8.  Nonobstructing renal calculi. 9.  Coarse pancreatic calcifications again seen    CT Chest Pulmonary Embolism w Contrast    Result Date: 2/11/2022  EXAM: CT CHEST PULMONARY EMBOLISM W CONTRAST LOCATION: [facility] DATE/TIME: 2/11/2022 1:42 PM INDICATION: PE suspected, low/intermediate prob, positive D-dimer, hypoxia, history of Covid COMPARISON: 01/28/2022 TECHNIQUE: CT chest pulmonary angiogram during arterial phase injection of IV contrast. Multiplanar reformats and MIP reconstructions were performed. Dose reduction techniques were used. CONTRAST: IsoVue 370 100mL FINDINGS: ANGIOGRAM CHEST: No pulmonary artery filling defects. Normal caliber aorta. LUNGS AND PLEURA: Moderate emphysema. Persistent but increased patchy groundglass and consolidative pulmonary opacities with areas of interlobular septal thickening. Mild bronchiectasis and bronchial wall thickening have increased when compared to prior exam. Trace effusions have decreased. No pneumothorax. MEDIASTINUM/AXILLAE: Heart size is normal. No adenopathy. CORONARY ARTERY CALCIFICATION: None. UPPER ABDOMEN: Hepatic steatosis. Cholelithiasis. There is diffuse wall thickening of the stomach. Small volume ascites.  Nonobstructing bilateral renal calculi. Prominent calcification in the area of the pancreatic neck is unchanged. Splenectomy with small accessory splenule. MUSCULOSKELETAL: Degenerative changes of the spine. Prominent L1 Schmorl's node. Similar appearance of the bilateral rib fractures. Mild anasarca.     IMPRESSION: 1.  No pulmonary embolus. 2.  Interval worsening of bilateral pulmonary opacities, bronchiectasis, bronchial wall thickening. 3.  Hepatic steatosis. 4.  Cholelithiasis. 5.  Diffuse wall thickening of the visualized stomach may indicate gastritis. 6.  Manifestations of third spacing.    CT Chest Pulmonary Embolism w Contrast    Result Date: 1/15/2022  EXAM: CT CHEST PULMONARY EMBOLISM W CONTRAST LOCATION: [facility] DATE/TIME: 1/15/2022 6:17 PM INDICATION: Shortness of breath COMPARISON: Portable AP view of the chest 01/15/2022; CT of the abdomen and pelvis 10/05/2020 TECHNIQUE: CT chest pulmonary angiogram during arterial phase injection of IV contrast. Multiplanar reformats and MIP reconstructions were performed. Dose reduction techniques were used. CONTRAST: 100ml isovue 370 FINDINGS: ANGIOGRAM CHEST: Pulmonary arteries are normal caliber and negative for pulmonary emboli. Normal caliber thoracic aorta. There are no findings to suggest acute aortic syndrome. Proximal great vessels are patent. Diminutive left vertebral artery arises directly from the arch. Mild atheromatous calcifications descending aorta and distal right innominate artery. LUNGS AND PLEURA: Upper lung predominant mixed centrilobular and paraseptal emphysema. Superimposed patchy airspace opacities are present bilaterally, asymmetric to the right associated with internal interstitial opacity. Minimal right pleural fluid layers dependently. There is chronic thickening of the left posterior and posterolateral pleura adjacent to multiple left rib fracture deformities. Central airways are patent. MEDIASTINUM:  Cardiac chambers are normal in size. No pericardial effusion is present. Mildly enlarged lymph nodes in both analia, likely reactive in the setting of airspace opacities. Subcarinal, lower paratracheal and subaortic/prevascular lymph nodes are  all normal by size criteria. The middle third and distal thirds of the esophagus has circumferential wall thickening consistent with esophagitis. Imaged thyroid gland is normal. CORONARY ARTERY CALCIFICATION: None. UPPER ABDOMEN: There are multiple calcified gallstones in the neck of the gallbladder. Severe fatty infiltration of the liver. The imaged distal transverse colon/splenic flecture has wall thickening. Previous splenectomy with small splenule present. 15 mm length ovoid calcification in the region of the pancreas neck is unchanged. MUSCULOSKELETAL: There are multiple ununited left posterior and posterolateral rib fracture deformities including lateral ribs 7, 8 and posterior ribs 9 and 11. The posterolateral right ninth rib is broken in 2 locations and the fractures have fairly sharp borders suggesting acute on subacute fractures..     IMPRESSION: 1.  No acute pulmonary embolism. 2.  Multifocal bilateral airspace opacities are present mixed groundglass and interstitial thickening asymmetric to the right. Imaging features can be seen with (COVID-19)  pneumonia, though are nonspecific and can occur with a variety of infectious and noninfectious processes. Given the other findings below, aspiration is a strong differential consideration. 3.  Severe wall thickening of the middle and distal thirds of the esophagus consistent with a subacute esophagitis. 4.  Severe hepatic steatosis. 5.  Acute on chronic fracture deformities of multiple left lateral/posterolateral ribs. 6.  Cholelithiasis. 7.  Wall thickening of the imaged colon consistent with colitis.    CT Chest w/o Contrast    Result Date: 1/28/2022  EXAM: CT CHEST W/O CONTRAST LOCATION: [facility]  HOSPITAL DATE/TIME: 1/28/2022 2:36 PM INDICATION: Cough, persistent COMPARISON: 10/15/2022 TECHNIQUE: CT chest without IV contrast. Multiplanar reformats were obtained. Dose reduction techniques were used. CONTRAST: None. FINDINGS: LUNGS AND PLEURA: Upper lung predominant centrilobular and paraseptal emphysema. Improvement in groundglass and interstitial opacities in the right lung. Slight worsening of groundglass and interstitial opacities in the left upper lobe. New airway wall thickening in the left upper lobe and left lower lobe and new consolidation in the dependent portion of the left upper lobe and at the left lung base. Mucoid impaction in the left lower lobe airways. Unchanged left pleural thickening and trace right pleural effusion. MEDIASTINUM/AXILLAE: No thoracic aortic aneurysm. No lymphadenopathy. CORONARY ARTERY CALCIFICATION: None. UPPER ABDOMEN: Hepatic steatosis. Cholelithiasis. MUSCULOSKELETAL: There are demonstrated bilateral rib fractures.     IMPRESSION: 1.  New airway wall thickening in the left upper and lower lobes with new consolidation in the dependent portion of the left upper lobe and at the left lung base, and mucoid impaction in left lower lobe airways, suspicious for aspiration. 2.  Overall improvement in groundglass and interstitial opacities in the right lung and slight worsening in the left upper lobe.     CT Head w/o Contrast    Result Date: 2/12/2022  EXAM: CT HEAD W/O CONTRAST LOCATION: [facility] DATE/TIME: 2/12/2022 9:20 AM INDICATION: Trauma - Head Injury. Unwitnessed fall. COMPARISON: CT head 01/18/2022 TECHNIQUE: Routine CT Head without IV contrast. Multiplanar reformats. Dose reduction techniques were used. FINDINGS: INTRACRANIAL CONTENTS: No intracranial hemorrhage, extraaxial collection, or mass effect.  No CT evidence of acute infarct. Small focus of chronic cortical encephalomalacia and adjacent gliosis involving the superior left frontal lobe  as seen previously.  Mild presumed chronic small vessel ischemic changes. Mild generalized volume loss. No hydrocephalus. VISUALIZED ORBITS/SINUSES/MASTOIDS: No intraorbital abnormality. Complete opacification of the left maxillary sinus with some chronic appearing mural hyperostosis similar to the previous exam. Additional subtotal opacification of the left sphenoid sinus by polypoid mucosal thickening as seen previously. No middle ear or mastoid effusion. BONES/SOFT TISSUES: Mild right parietal scalp swelling. No skull fracture.     IMPRESSION: 1.  No CT evidence for acute intracranial process. 2.  Mild right parietal scalp contusion without associated fracture. 3.  Brain atrophy and presumed chronic microvascular ischemic changes as above. 4.  Inflammatory changes of the paranasal sinuses as seen previously.    CT Head w/o Contrast    Result Date: 1/18/2022  EXAM: CT HEAD W/O CONTRAST LOCATION: [facility] DATE/TIME: 1/18/2022 3:45 AM INDICATION: Headache, intracranial hemorrhage suspected. COMPARISON: 11/4/2021 TECHNIQUE: Routine CT Head without IV contrast. Multiplanar reformats. Dose reduction techniques were used. FINDINGS: INTRACRANIAL CONTENTS: No intracranial hemorrhage, extraaxial collection, or mass effect. No CT evidence of acute infarct. Mild presumed chronic small vessel ischemic changes. Mild to moderate generalized volume loss. No hydrocephalus. Stable calcified dural based lesion along the right frontal convexity measuring 1 cm in diameter, presumably reflecting a small meningioma. VISUALIZED ORBITS/SINUSES/MASTOIDS: No intraorbital abnormality. Complete opacification of the left maxillary sinus with chronic mucoperiosteal reaction. Mucous retention cyst in the left sphenoid sinus. Mild mucosal thickening along the floor of the left frontal sinus. Opacified left frontoethmoidal recess. No middle ear or mastoid effusion. BONES/SOFT TISSUES: No acute abnormality.      IMPRESSION: 1.  No CT evidence for acute intracranial process. 2.  Stable chronic changes as above.    XR Video Swallow with SLP or OT    Result Date: 2/8/2022  EXAM: XR VIDEO SWALLOW WITH SLP OR OT LOCATION: [facility] DATE/TIME: 2/8/2022 8:52 AM INDICATION: Difficulty swallowing. COMPARISON: 01/31/2022. TECHNIQUE: Routine swallow study with speech pathology using multiple barium thicknesses. FINDINGS: FLUOROSCOPIC TIME: 1.2 minutes NUMBER OF IMAGES: 0 Swallow study with Speech Pathology using multiple barium thicknesses. Small amount of silent aspiration with thin liquid. Penetration with mildly thickened (nectar) liquid. There was persistent mild penetration with chin Tech technique and mildly thickened liquid. No aspiration was solid consistency.     IMPRESSION: 1.  Silent aspiration with thin liquid. Penetration with mildly thickened liquid.     XR Video Swallow with SLP or OT    Result Date: 1/31/2022  EXAM: XR VIDEO SWALLOW WITH SLP OR OT LOCATION: [facility] DATE/TIME: 1/31/2022 11:30 AM INDICATION: Difficulty swallowing. COMPARISON: 1/25/2022. TECHNIQUE: Routine swallow study with speech pathology using multiple barium thicknesses. FINDINGS: FLUOROSCOPIC TIME: 3.3 minutes NUMBER OF IMAGES: 0 Swallow study with Speech Pathology using multiple barium thicknesses. Tracheal aspiration with thin liquid consistency barium with weak ineffective cough. Penetration into the laryngeal vestibule with mildly thickened (nectar) liquid consistency barium. No penetration or aspiration with honey and pudding consistency barium. Please refer to speech therapy report for additional detail.    XR Video Swallow with SLP or OT    Result Date: 1/25/2022  EXAM: XR VIDEO SWALLOW WITH SLP OR OT LOCATION: [facility] DATE/TIME: 1/25/2022 3:27 PM INDICATION: Difficulty swallowing. COMPARISON: None. TECHNIQUE: Routine swallow study with speech pathology  using multiple barium thicknesses. FINDINGS: FLUOROSCOPIC TIME: 1.5 minutes NUMBER OF IMAGES: 9 Swallow study with Speech Pathology using multiple barium thicknesses. Moderate to deep penetration with thin liquid (no cough reflex). Small amount of penetration with mildly thick consistency. No other penetration or aspiration.     EEG Coma or Sleep Only    Result Date: 2022  ELECTROENCEPHALOGRAM (EEG) REPORT [facility] NEUROLOGY [place] [address] [place] [address] Tel: [phone]  Fax: [phone] [email or web address] Peewee TALAT Hess,  1965, MRN [id number] PCP: Sarah Canseco Date: 2022 Principal Diagnosis: Altered mental status History : Patient is being evaluated for altered mental status. Medication listed includes Ativan Description of the Recording:  This is a multichannel digital EEG recording done using the international 10-20 placement system. Background of the recording consists of irregular 1 to 3 Hz polymorphic delta activity with amplitudes ranging between 20 to 30  V.  Alerting procedure produce minimal change.  Photic stimulation performed with standard protocol produced minimal change.  No transient sleep patterns were recorded. During this recording, no sharp discharges, spikes or electrographic seizure activity was recorded. Impression: This is an abnormal EEG due to diffusely slow polymorphic delta activity that minimally attenuates with alerting procedures.  This EEG suggests nonspecific generalized cerebral dysfunction.  No focal slowing or periodic discharges were seen to suggest seizures. Classification: Delta grade I generalized Tiburcio Gallagher MD [facility] This note was dictated using voice recognition software.  Any grammatical or context distortions are unintentional and inherent to the software.

## 2022-02-17 NOTE — PROGRESS NOTES
"CLINICAL NUTRITION SERVICES - REASSESSMENT NOTE     Nutrition Prescription    RECOMMENDATIONS FOR MDs/PROVIDERS TO ORDER:  None    Malnutrition Status:    Severe in acute illness    Recommendations already ordered by Registered Dietitian (RD):  Calorie count    Future/Additional Recommendations:  Adjust supplement pending intake/acceptance/needs     EVALUATION OF THE PROGRESS TOWARD GOALS   Diet:Easy to chew with mildly thick liquids per SLP.  Moderate consistent CHO    Supplement: Vital cuisine bid, magic cup and gel plus daily = 1480 kcal, 73 g protein total    Intake: unable to determine. Only 1/6 meals documented past 2 days-75% of breakfast yesterday.  2-3 meal/day ordered daily.       Supplement intake not documented. No unopened supplements in room      NEW FINDINGS  New pneumonia past few days. Increased lethargy/confusion in the beginning. Now more alert, following commands  O2 needs increasing, 7 L today    GI   3 loose/mushy yesterday. 1 day prior    LABS  Reviewed  Mag 1.5-decreased. had increased to 1.8 with replacement  LFT mildly increased  WBC increased  -270mg/dl past 24 hours -in fair control, improved. DM educator following     MEDICATIONS  Folic acid, ssi, lantus, magox, mvi, protonix, thiamine, tums, mvi with minerals, novolog 1U:10 g Cho,  culturelle, iv abx  Metformin dc'd    ANTHROPOMETRICS  Height: 185.4 cm (6' .992\")  Admit wt 186 lb 11.7 oz 1/17/22.  Most Recent Weight: 71.0 kg (156 lb 11.2 oz) 2/14, up 11 lb from day prior? 2/13 weight down 2 lb from 2 weeks prior  66.8 kg (147 lb 4.8 oz) 2/2- down 14 lb in 1 week?   73.0 kg (161 lb)  1/26  74.3 kg (163 lb 12.8 oz)  ?accurate- 1/25  189 lb 6 oz 1/23/22, -aggressive diuresis  184 lb 8.4 oz 1/24/22  190 lb 7.6 oz 1/19/22,   Weight History:       Wt Readings from Last 8 Encounters:   11/12/21 80.9 kg (178 lb 6.4 oz)   12/30/20 77.1 kg (170 lb)     Dosing Weight: 84.7 kg, current     ASSESSED NUTRITION NEEDS  Estimated Energy Needs: " 7399-1095 kcals/day (25 - 30 kcals/kg)  Justification: Maintenance  Estimated Protein Needs: 102-125 grams protein/day (1.2 - 1.5 grams of pro/kg)  Justification: Increased needs, monitor LFTs.  Estimated Fluid Needs: 2115+ mL/day (1 mL/kcal)   Justification: maintenance or pending fluid status.    CURRENT NUTRITION DIAGNOSIS  Malnutrition r/t acute illness evidenced by intake < 50% since admit, moderate fat and muscle loss, weight loss    INTERVENTIONS  Implementation  No advancement in cares per wife but keep current plan. Comfort cares if he deteriorates    Calorie count    Goals  Patient to consume % of nutritionally adequate meals three times per day, or the equivalent with supplements/snacks.- unable to assess  Electrolytes WNL- progressing- Mag still not WNL  BG - progressing    Monitoring/Evaluation  Progress toward goals will be monitored and evaluated per protocol.    Charo Mace RDN, LD  #[phone]

## 2022-02-17 NOTE — PLAN OF CARE
Goal Outcome Evaluation:    Plan of Care Reviewed With: patient     Overall Patient Progress: improving    Outcome Evaluation: Awake and following commands @ this time; precedex decreased, norepi off. Dignicare removed 2/2 failure to drain; large incontinent BM, replaced Dignicare with good patency. Tolerating SBT. Updated spouse Daina this afternoon.          Problem: Adult Inpatient Plan of Care  Goal: Optimal Comfort and Wellbeing  Outcome: Ongoing, Progressing  Patient alert, oriented x 4, but forgetful, some hallucinations this AM (seeing dogs in hallway), speech slightly slurred and slow, cooperative, no impulsive behaviors this shift, O2 sat 88% on 7L/NC this AM, placed patient on oxymask at 8L/NC per RT's request and sats 93-95%.  BP 79/59 @ 1208, Midodrine given per order and Order obtained for 500 ml bolus from Dr. Hayden. BP after infused= 99/66 Pulse 84. Sats on 8L/Mask this afternoon 89%. Increased O2 to 9L/oxymask Now sats 92-94%. MD aware, Had one small incontinent stool,  Bed alarm remains on for safety. Tylenol #3 given x 1 for aching ankles, shoulders,

## 2022-02-17 NOTE — SIGNIFICANT EVENT
Significant Event Note    Time of event: 9:10 PM February 16, 2022    Description of event:  BP 86/52. Patient suspected to have pneumonia.   Has been on midodrine for some lower pressures.  Asymptomatic but difficult to assess given confused state. Doesn't appear fluid overloaded    Plan:  500cc bolus now    Discussed with: bedside nurse    Mao Gonzalez MD

## 2022-02-17 NOTE — PROGRESS NOTES
"PALLIATIVE CARE PROGRESS  NOTE     Patient Name: Peewee Hess  Date of Visit 2/18/22           Impressions and Recommendations     1)   GOALS OF CARE are .   ? Restorative if able, but if not then comfort focused care.  ?   his wife desires no further aggressive interventions.  If his blood pressure goes low or he would not be transferred back to the ICU, he would not get pressors.   ? We discussed Hospice but she lives in the basement of her friends home and he cannot descend her stairs.  ? She would like information on where he could live and receive hospice there  ? I sent referral to financial      2)    ADVANCED CARE PLANNING  ? Patient has completed health care directive:  N  ? Surrogate  health care agent: wife Daina  ? Code Status: DNR DNI     3)    SYMPTOM MANAGEMENT    -Dyspnea due to:aspiration pneumonia   ? Comment \" just completed 5 day of abx.   ? Improved       -Generalized weakness due to extended hospital stay  o Comment:he has had 3 falls this admission  o Recommendations:  o He has monitor on to prevent falls    - AMS due to Encephlopathy  o Comment:  o Recommendations:    Improving     4)    PSYCHOSOCIAL/SPIRITUAL SUPPORT  ? Will request spiritual care support  For prayers   ? Will ask  palliative social work support for wife  ? Palliative medicine will continue to follow         Admission Info      Active Problems:    Alcohol dependence with unspecified alcohol-induced disorder (H)    Hypomagnesemia    Type 2 diabetes mellitus without complication (H)    Alcohol use disorder, severe, dependence (H)    Sinus tachycardia    Pneumonia of both lungs due to infectious organism, unspecified part of lung    Non-intractable vomiting with nausea, unspecified vomiting type    Agitation    Hypocalcemia      History of Present Illness:  Peewee Hess is a 56 year old male admitted on 1/15/2022. He has history of severe alcohol abuse disorder, necrotizing pancreatitis requiring resection, " "peripheral neuropathy, diabetes, cerebellar stroke, depression presented 1/15  for evaluation of nausea, vomiting, and chest pain found to have probable aspiration pneumonia and alcohol withdrawal    Alcohol Hx  Pt reported that he has been drinking alcohol since he was [age] years old.  He started with beer, then armand to VSOP ryan, and most recently Onel's carbonated beer.  Pt stated that he knows he needs help with his chemical abuse issues.  \"That's why I'm here in the hospital.  Treatment would be the best.\"  Pt reported that up until right before this hospital admission he had not drank since November of 2021 (his last admission to [facility]).   Wife states that he has not had CD rx  In their 20 years of management, he always tried to do it alone.   After he had a pancreatic issue, drinking worsened             Interval History:   -COVID positive 1/30/22, previous negative 1/15, 1/22, presumed therefore contracted in the hospital. Treated with RDV 5 days, dexamethasone 1/30-2/5. Vaccinated x2 fall 2021.    -Required intubation 1/16, extubated on 1/23. Required BiPAP 1/23, transitioned to HFNC on 1/24. 2/14 30LPM 40% FiO2    2/8 SLP  Ongoing aspiration risk with thin liquids despite strategies     2/15 ID saw, CT scan of Abd pending     Palliative Assessment/discussion     2/18 Called wife  She was upset that Hospitalist called her about goals as she has already indicated that she does not want him to return to ICU.  Patient has been waxing and waning and having Hypotensive issues so transfer to TCU keeps being held.   She thinks Hospice would be appropriate but she would have issue with paying for room and board for hospice.  I made a referral to  [facility]    2/16 I called wife and  I discussed the reason for Palliative Care Referral and our role in symptom management, patient family communication, understanding their choices for medical treatment, and providing  guidance in making difficult decisions in " the framework of focusing on patient comfort and quality of life.  She wants to hear what ID  MD comes up with.  She wants Quality of life rather then quanitity           Hospice discussion  I also discussed  Hospice services briefly and how Palliative Care is different in terms of prognosis terms        Prognosis, goals and planning        Patient's decision making preferences unable    I have concerns about the patient/family's health literacy today: Y    Prognosis, Goals, and/or Advance Care Planning were addressed with wife today: Y    Mood, coping, and/or meaning in the context of serious illness were addressed today: Y    [facility] Symptom data:   Pain: N  Dyspnea: N  Nausea: N  Anxiety: N    Functional Status:  Current PPS% (100% normal, 0% death): 30  Baseline PPS% (2 weeks prior to admit):70    Capacity evaluation:    Patient does not have decision making capacity based on the following:     Ability to understand relevant information about current  condition?N    Ability to demonstrate understanding of  current  illness and care needs? N    Ability to communicate  options for treatment? N      Social:   Living situation:lives in basement of his niece in laws  home with his spouse in the lower level of a single family home that they rent   Baseline function: was largely independent at baseline. He has a cane and walker available for use as needed.  Home support: wife, she works full time at an infant day care  Marital status:  20 years, no kids, have dogs  Work: He is on SSDI, wife works at day care              Review of Systems:     A full 14 point review of systems was otherwise completed and is negative aside from that mentioned above    -----------------------------------------------------------------------------------------------------------------  I have reviewed and supplemented the documentation in this patient's medical record listed below regarding past medical history, social history,  active medical problems, allergies and medications.     Current Problem List:   Active Problems:    Alcohol dependence with unspecified alcohol-induced disorder (H)    Hypomagnesemia    Type 2 diabetes mellitus without complication (H)    Alcohol use disorder, severe, dependence (H)    Sinus tachycardia    Pneumonia of both lungs due to infectious organism, unspecified part of lung    Non-intractable vomiting with nausea, unspecified vomiting type    Agitation    Hypocalcemia        Medication  :  Current Facility-Administered Medications   Medication     acetaminophen (TYLENOL) tablet 650 mg    Or     acetaminophen (TYLENOL) Suppository 650 mg     acetaminophen-codeine (TYLENOL #3) 300-30 MG per tablet 1 tablet     bisacodyl (DULCOLAX) Suppository 10 mg     calcium carbonate (TUMS) chewable tablet 1,000 mg     dextrose 10% infusion     glucose gel 15-30 g    Or     dextrose 50 % injection 25-50 mL    Or     glucagon injection 1 mg     glucose gel 15-30 g    Or     dextrose 50 % injection 25-50 mL    Or     glucagon injection 1 mg     DULoxetine (CYMBALTA) DR capsule 60 mg     enoxaparin ANTICOAGULANT (LOVENOX) injection 40 mg     famotidine (PEPCID) tablet 20 mg     flumazenil (ROMAZICON) injection 0.2 mg     folic acid (FOLVITE) tablet 1 mg     gabapentin (NEURONTIN) capsule 200 mg     haloperidol lactate (HALDOL) injection 1 mg     OLANZapine zydis (zyPREXA) ODT tab 5-10 mg    Or     haloperidol lactate (HALDOL) injection 2.5-5 mg     insulin aspart (NovoLOG) injection (RAPID ACTING)     insulin aspart (NovoLOG) injection (RAPID ACTING)     insulin aspart (NovoLOG) injection (RAPID ACTING)     insulin aspart (NovoLOG) injection (RAPID ACTING)     insulin aspart (NovoLOG) injection (RAPID ACTING)     insulin aspart (NovoLOG) injection (RAPID ACTING)     insulin aspart (NovoLOG) injection (RAPID ACTING)     ipratropium-albuterol (COMBIVENT RESPIMAT) inhaler 1 puff     lactobacillus rhamnosus (GG) (CULTURELL)  "capsule 1 capsule     Lidocaine (LIDOCARE) 4 % Patch 1 patch     lidocaine (LMX4) cream     lidocaine 1 % 0.1-1 mL     lidocaine patch in PLACE     loperamide (IMODIUM) capsule 2 mg     LORazepam (ATIVAN) tablet 1 mg     magnesium hydroxide (MILK OF MAGNESIA) suspension 30 mL     magnesium oxide (MAG-OX) tablet 400 mg     magnesium oxide (MAG-OX) tablet 400 mg     Medication instructions: Do NOT use nebulized medications     melatonin tablet 3 mg     menthol-zinc oxide (CALMOSEPTINE) 0.44-20.6 % ointment OINT     meropenem (MERREM) 1 g vial to attach to  mL bag     metoprolol (LOPRESSOR) injection 5 mg     metoprolol succinate ER (TOPROL-XL) 24 hr tablet 25 mg     midodrine (PROAMATINE) tablet 2.5 mg     multivitamin, therapeutic (THERA-VIT) tablet 1 tablet     naloxone (NARCAN) injection 0.2 mg    Or     naloxone (NARCAN) injection 0.4 mg    Or     naloxone (NARCAN) injection 0.2 mg    Or     naloxone (NARCAN) injection 0.4 mg     OLANZapine zydis (zyPREXA) ODT tab 5 mg     ondansetron (ZOFRAN-ODT) ODT tab 4 mg    Or     ondansetron (ZOFRAN) injection 4 mg     pantoprazole (PROTONIX) EC tablet 40 mg     polyethylene glycol (MIRALAX) Packet 17 g     QUEtiapine (SEROquel) tablet 75 mg     ramelteon (ROZEREM) tablet 8 mg     sodium chloride (PF) 0.9% PF flush 3 mL     sodium chloride (PF) 0.9% PF flush 3 mL     sucralfate (CARAFATE) suspension 1 g     thiamine (B-1) tablet 100 mg                  Evaluation             PERTINENT PHYSICAL EXAMINATION:  Vital Signs: Blood pressure 135/80, pulse 103, temperature 97.3  F (36.3  C), temperature source Oral, resp. rate 16, height 1.854 m (6' 0.99\"), weight 71.1 kg (156 lb 11.2 oz), SpO2 93 %.   GENERAL:sitting in bed, much more alert  SKIN: Warm and dry   HEENT: Normocephalic, anicteric sclera, moist mucous membranes  LUNGS: Clear to auscultation anterolaterally; non-labored o2 3L  , sats 100%  CARDIAC: RRR, normal s1/s2, w/o m/r/g   ABDOMINAL: BS (+), soft, non " distended, non tender  : ling in place  MUSKL: no gross joint deformities   EXTREMITIES: No edema or cyanosis, pulses 2+ and symmetrical  NEUROLOGIC: sleepy            Data Reviewed:     All labs/imaging reviewed in Epic  2/18 Lactic acid    2/15  lactic acid was 3.1 and WBC was 24.6  MRI 1/22/22 showed suggestion of few small foci of acute/early subacute infarctions L occipital region.    2/8 Repeat VFSS 2/8 showed ongoing trace, silent aspiration with thin liquids; cognition improving but remains impaired; would benefit from advancing food textures; train chin tuck and repeat VFSS in 2-3 weeks     2/11/2022  CT chest showed worsening pneumonia s  ====================================================  TT: I have personally spent a total of 25  minutes on the unit in review of medical record, consultation with the medical providers and assessment of patient today, with more than 50% of this time spent in counseling, coordination of care, and discussion with wife  re: diagnostic results, prognosis, symptom management, risks and benefits of management options, and development of plan of care as noted above.  ====================================================    ELIAS Kirkpatrick, NP-C, ACHDORINA   [facility]  Palliative Medicine  Office: [phone]

## 2022-02-17 NOTE — PROGRESS NOTES
Daily Progress Note    Assessment/Plan:  56 year old male with past medical history of alcohol abuse, hypertension, DM-II, multiple abdominal surgeries who was admitted on 1/15/22 with alcohol withdrawl, possible aspiration pneumonia leading to respiratory failure requiring intubation on 1/16, successfully extubated on 1/23/22 and now transferred out of ICU on 1/26/22 for floor care.  Tested positive for Covid on 1/22/2022 in the hospital  Did improve initially and was pending TCU placement until patient got worsening respiratory condition on 2/11/2022 and CT chest showed worsening pneumonia so restarted on IV antibiotic with Vanco and Zosyn.  Now white count up with elevated lactate and hypotension, ID consulted and now on just meropenem.      Acute respiratory failure with hypoxia.  -Thought secondary to aspiration pneumonia/ COVID/hospital-acquired pneumonia  -Was intubated 1/16, extubated on 1/23.   Required BiPAP for a while but now on oxygen  - Cont albuterol nebs and pulm hygiene  - CT PE was negative for PE on admission  S/b speech therapy     Leukocytosis: ID consulted and now on meropenem.  White count much improved today since starting IV antibiotics but it is unclear what source we are treating.?  Recurrent aspiration     Recovered Covid  -- Weekly COVID (per protocol) came back positive. He was negative on admission on 1/22. Check COVID labs. Start Covid special precaution.   - Finished IV remdesivir and Dexamethasone course. -  - Received dose of tocilizumab/per pulmonology recommendations on 1/31/2022.  -Try to taper off oxygen; thus discontinue dexamethasone on 2/5     DM type 2  Very labile blood sugars  - Hemoglobin A1c was 7.0 on 1/15/22  - Very sensitive to Lantus as patient goes hypoglycemic even with 5 units  Stop Metformin due to lactic acidosis  Continue sliding scale insulin and carb counting      Septic shock-resolved  -- Likely due to multifocal pneumonia.  -- Metoprolol started on 1/25  for sinus tachycardia  -- Patient is having intermittent hypotension.   - Echo shows EF of 65% with mild concentric left ventricular hypertrophy  - Started on midodrine for persistent low blood pressure     Acute toxic metabolic encephalopathy  - Patient has significant alcohol withdrawal during the intubation and was on Precedex  - Precedex weaned off. Cont with thiamine. Ativan prn.  - Alcohol level less than 10 on admission.    - Collateral information from wife-he did not find any evidence of patient drinking recently.    - Brain MRI shows chronic CVAs with brain atrophy  - Patient denies drinking prior to hospitalization.  Most recent chemical dependency treatment in 2001.  Since then patient tried to quit drinking numerous times, ending up in the hospital with withdrawal.  He is on disability for 2 years, in the past was a nicole.  - Reconsult psychiatry who are recommending:    Duloxetine 60 mg daily.  Continue gabapentin 200 mg twice a day  Seroquel 12.5 mg TID x 6 doses   Seroquel 75 mg at bedtime continue.  Rozerem 8 mg at bedtime.  Continue to utilize olanzapine 5-10 mg every 6 hours as needed for agitation, psychosis.  Efforts at sleep regulation.  For daytime anxiety would recommend adding Seroquel 6.25 mg in the morning and afternoon.     Hypokalemia/hypomagnesia  Replace per protocol     Diarrhea  This is resolved     Significant weakness and deconditioning  - Secondary to above  - Pending TCU placement     Severe malnutrition  - Protein and calories  - Dietitian consult, continue supplement  - Patient still has poor oral intake     S/P fall   - Patient had incident of fall this morning 2/12/2022, unwitnessed  - Evaluated by house officer and a CT head is negative for significant changes  - Continue fall precautions     Goals of care  -Previous rounder discussed details goals of care with patient's wife Daina on 2/14/2022  - Patient has poor quality of life prior to admission because of his alcohol  use and previous CVAs  - Wife declined going through aggressive care again as what happened on admission with intubation.  -Palliative care following  -He is now DNR and his wife desires no further aggressive interventions.  If his blood pressure goes low or he would not be transferred back to the ICU, he would not get pressors.  I reaffirmed this with her again today.          VTE prophylaxis:  Enoxaparin (Lovenox) SQ  DIET: Orders Placed This Encounter      Combination Diet Moderate Consistent Carb (60 g CHO per Meal) Diet; Easy to Chew (level 7); Mildly Thick (level 2)        Disposition/Barriers to discharge: Improve mental condition, IV antibiotic, elevated white count  Code Status: No CPR- Do NOT Intubate     Code status:No CPR- Do NOT Intubate      Subjective:  Patient has no events overnight, feels comfortable at rest, remains on oxygen.  No chest pain or breathing difficulty      Current Medications Reviewed via EHR List    Objective:  Vital signs in last 24 hours:  [unfilled]  .prog  Weight:   @THISENCWEIGHTS(1)@  Weight change:   Body mass index is 20.68 kg/m .    Intake/Output last 3 shifts:  I/O last 3 completed shifts:  In: 640 [P.O.:640]  Out: 250 [Urine:250]  Intake/Output this shift:  I/O this shift:  In: -   Out: 175 [Urine:175]    Physical Exam:  General: No apparent distress  CV: Regular rate and rhythm  Lungs: Clear to auscultation  Abdomen: Soft, nontender        Imaging:  Personally Reviewed.  MR Brain w/o Contrast    Result Date: 1/22/2022  EXAM: MR BRAIN W/O CONTRAST LOCATION: [facility] DATE/TIME: 1/22/2022 1:05 PM INDICATION: Encephalopathy COMPARISON: CT head 01/18/2022, MRI head 11/23/2020 TECHNIQUE: Routine multiplanar multisequence head MRI without intravenous contrast. FINDINGS: INTRACRANIAL CONTENTS: Please note study is significantly degraded by motion artifact. Suggestion of a few small foci of restricted diffusion at the left temporal occipital region;  best appreciated on repeat axial diffusion weighted sequence series 20.2 image 12, image 11; as well as repeat coronal diffusion weighted sequence series 24.2 image 9. No definite associated hemorrhage or significant mass effect. Redemonstration of small chronic infarction left precentral gyrus. Mild generalized cerebral atrophy. No hydrocephalus. Normal position of the cerebellar tonsils. Small chronic infarction lateral aspect left cerebellum. A few additional tiny infarctions demonstrated on prior exam are not well demonstrated on the current. SELLA: Not well-visualized, grossly normal. OSSEOUS STRUCTURES/SOFT TISSUES: Normal marrow signal. Flow voids are not well-visualized. ORBITS: Not well-visualized. SINUSES/MASTOIDS: The complete opacification left maxillary and left sphenoid sinus, similar to prior. Small amount of opacification right mastoid air cells.     IMPRESSION: 1.  Please note study is significantly degraded by motion artifact. 2.  Suggestion of a few small foci of acute/early subacute infarction at the left temporal occipital region. 3.  No definite associated hemorrhage or significant mass effect. 4.  Small chronic infarction left precentral gyrus, similar to prior. 5.  Probable few small chronic infarctions cerebellar hemispheres. 6.  Mild atrophy.    Echocardiogram Complete    Result Date: 2022  [id number]^[email or web address]^[id number] [address]  Name: KAREY LIVINGSTON MRN: [id number] : 1965 Study Date: 2022 07:37 AM Age: 56 yrs Gender: Male Patient Location: [facility] Reason For Study: Hypertension (HTN) Ordering Physician: LYN MAYNARD Performed By: TAYLER  BSA: 1.9 m2 Height: 72 in Weight: 147 lb HR: 123 BP: 97/59 mmHg ______________________________________________________________________________ Procedure Complete Portable Echo Adult. Technically difficult study.Extremely poor acoustic windows. Compared to the prior study  dated 11/5/2021, there have been no changes. No hemodynamically significant valvular abnormalities on 2D or color flow imaging. ______________________________________________________________________________ Interpretation Summary  1.Left ventricular size, wall motion and function are normal. The ejection fraction is > 65%. 2.There is mild concentric left ventricular hypertrophy. 3.Normal right ventricle size and systolic function. 4.No hemodynamically significant valvular abnormalities on 2D or color flow imaging. Compared to the prior study dated 11/5/2021, there have been no changes. ______________________________________________________________________________ I      WMSI = 1.00     % Normal = 100  X - Cannot   0 -                      (2) - Mildly 2 -          Segments  Size Interpret    Hyperkinetic 1 - Normal  Hypokinetic  Hypokinetic  1-2     small                                                    7 -          3-5    moderate 3 - Akinetic 4 -          5 -         6 - Akinetic Dyskinetic   6-14    large              Dyskinetic   Aneurysmal  w/scar       w/scar       15-16   diffuse  Left Ventricle Left ventricular size, wall motion and function are normal. The ejection fraction is > 65%. There is mild concentric left ventricular hypertrophy. Left ventricular diastolic function is normal. No regional wall motion abnormalities noted.  Right Ventricle Normal right ventricle size and systolic function. TAPSE is normal, which is consistent with normal right ventricular systolic function.  Atria Normal left atrial size. Right atrial size is normal. There is no color Doppler evidence of an atrial shunt.  Mitral Valve Mitral valve leaflets appear normal. There is no evidence of mitral stenosis or clinically significant mitral regurgitation. There is no mitral regurgitation noted. There is no mitral valve stenosis.  Tricuspid Valve Tricuspid valve leaflets appear normal. Right ventricle systolic pressure estimate  normal. There is trace tricuspid regurgitation. There is no tricuspid stenosis.  Aortic Valve Aortic valve leaflets appear normal. There is no evidence of aortic stenosis or clinically significant aortic regurgitation. The aortic valve is trileaflet. No aortic regurgitation is present. No aortic stenosis is present.  Pulmonic Valve The pulmonic valve is not well seen, but is grossly normal. This degree of valvular regurgitation is within normal limits. There is no pulmonic valvular stenosis.  Vessels The aorta root is normal. Normal size ascending aorta. IVC diameter <2.1 cm collapsing >50% with sniff suggests a normal RA pressure of 3 mmHg.  Pericardium There is no pericardial effusion.  Rhythm The rhythm was sinus tachycardia.  ______________________________________________________________________________ MMode/2D Measurements & Calculations IVSd: 1.3 cm LVIDd: 4.1 cm LVIDs: 2.3 cm LVPWd: 1.2 cm FS: 43.4 %  LV mass(C)d: 182.6 grams LV mass(C)dI: 97.7 grams/m2 Ao root diam: 3.3 cm LA dimension: 3.6 cm asc Aorta Diam: 3.8 cm LA/Ao: 1.1 LVOT diam: 2.2 cm LVOT area: 3.8 cm2 LA Volume (BP): 29.3 ml LA Volume Index (BP): 15.7 ml/m2  LA Volume Indexed (AL/bp): 17.3 ml/m2 RWT: 0.61  Doppler Measurements & Calculations MV E max gustavo: 132.0 cm/sec MV A max gustavo: 137.1 cm/sec MV E/A: 0.96 MV dec slope: 1104 cm/sec2 MV dec time: 0.12 sec Ao V2 max: 143.6 cm/sec Ao max P.0 mmHg Ao V2 mean: 108.6 cm/sec Ao mean P.2 mmHg Ao V2 VTI: 26.8 cm LELO(I,D): 3.5 cm2 LELO(V,D): 3.9 cm2 LV V1 max P.6 mmHg LV V1 max: 146.7 cm/sec LV V1 VTI: 24.2 cm SV(LVOT): 92.9 ml SI(LVOT): 49.7 ml/m2 PA V2 max: 107.4 cm/sec PA max P.6 mmHg PA acc time: 0.07 sec AV Gustavo Ratio (DI): 1.0 LELO Index (cm2/m2): 1.9 E/E' av.4  Lateral E/e': 18.6 Medial E/e': 20.2  ______________________________________________________________________________ Report approved by: Randal Villa 2022 09:14 AM       XR Chest Port 1 View    Result Date:  2/16/2022  EXAM: XR CHEST PORT 1 VIEW LOCATION: [facility] DATE/TIME: 2/16/2022 8:33 AM INDICATION: dyspnea COMPARISON: CT pulmonary angiogram 02/11/2022     IMPRESSION: Abnormal increased lung attenuation diffusely through both lungs. There are subsegmental foci of more focal airspace opacity in the peripheral third of the left mid and lower lung, similar to the prior CT. Subpleural emphysema is present in the apices. No new focal airspace opacity or volume loss. Symmetric apical and left lateral pleural thickening has not increased. No pleural effusion or pneumothorax. Cardiac silhouette is not enlarged. Unchanged aortic and other mediastinal interfaces. There are metallic fragments projecting in the left upper quadrant. Chronic left posterolateral rib fracture deformities are ununited.    XR Chest Port 1 View    Result Date: 1/29/2022  EXAM: XR CHEST PORT 1 VIEW LOCATION: [facility] DATE/TIME: 1/29/2022 6:18 AM INDICATION: Pneumonia follow up. COMPARISON: 01/28/2022 chest CT.01/19/2022 radiograph.     IMPRESSION: Relatively diffuse left lung opacities have increased since the 01/19/2022 chest radiograph, and perhaps slightly increased since the 01/20/2022 chest CT. Differentials for the increased opacities include asymmetric pulmonary edema, infectious / inflammatory process or layering pleural fluid. Small left pleural effusion is present. Right lung is hypoexpanded and may have a few subtle scattered opacities. No pneumothorax. Normal heart size. Atherosclerotic aorta.     XR Chest Port 1 View    Result Date: 1/19/2022  EXAM: XR CHEST PORT 1 VIEW LOCATION: [facility] DATE/TIME: 1/19/2022 10:52 AM INDICATION: after advancing et tube COMPARISON: 1/18/2022.     IMPRESSION: Endotracheal tube is been advanced. Tip is 4 cm above the igor in good position. There our persistent bibasilar pulmonary opacities with partial clearing at the right  base from the prior study and no change at the left base. No pneumothorax. Enteric tube tip is not visualized tip is below the diaphragm. PICC catheter from right upper extremity approach is unchanged with tip at the junction of the superior vena cava and right atrium. Left posterior rib fractures are again noted as well as deformity of the right humeral head.    XR Chest Port 1 View    Result Date: 1/18/2022  EXAM: XR CHEST PORT 1 VIEW LOCATION: [facility] DATE/TIME: 1/18/2022 6:06 AM INDICATION: Location of ET tube COMPARISON: 01/16/2022.     IMPRESSION:Endotracheal tube is in the trachea at the level of the clavicular heads with tip 6 cm above the igor. PICC catheter from right upper extremity approach is in the distal superior vena cava near its junction with the right atrium. Enteric tube tip is below the diaphragm and not visualized. There is no pneumothorax. Again noted are bilateral patchy airspace opacities there has been increased consolidation or atelectasis at the right medial lung base. There are old healed left posterior rib  fractures no acute fractures are identified.    CT Abdomen Pelvis w Contrast    Result Date: 2/15/2022  EXAM: CT ABDOMEN PELVIS W CONTRAST LOCATION: [facility] DATE/TIME: 2/15/2022 9:50 PM INDICATION: Leukocytosis. COMPARISON: 10/05/2020 TECHNIQUE: CT scan of the abdomen and pelvis was performed following injection of IV contrast. Multiplanar reformats were obtained. Dose reduction techniques were used. CONTRAST: ISOVUE 370 100ML FINDINGS: LOWER CHEST: Bibasilar infiltrates. Small right and trace left pleural effusion. HEPATOBILIARY: Hepatic steatosis. Cholelithiasis and gallbladder distention. PANCREAS: Pancreatic calcifications suggesting chronic pancreatitis. Prominent calcification at the neck of the pancreas. Difficult to tell if this is parenchymal or intraductal. The distal pancreas is atrophic. SPLEEN: Splenectomy with  accessory splenule. ADRENAL GLANDS: Normal. KIDNEYS/BLADDER: Small nonobstructing renal calculi. BOWEL: Gastric and small bowel wall thickening. Central mesenteric congestion. Trace amount of free fluid. Diverticulosis with presumed retained barium. Wall thickening of the proximal colon. Rectal wall thickening versus underdistention. LYMPH NODES: Subcentimeter mesenteric and peripancreatic lymph nodes. VASCULATURE: Atherosclerotic vascular calcification. PELVIC ORGANS: Small amount of free fluid. MUSCULOSKELETAL: Degenerative change osseous structures. Anasarca. Remote and subacute rib fractures.     IMPRESSION: 1.  Wall thickening of stomach, small bowel and proximal colon. Correlate for gastroenteritis/enterocolitis. 2.  Mesenteric congestion and small amount of free fluid. 3.  Bilateral pulmonary infiltrates. Small right and trace left pleural effusion. 4.  Focal rectal wall thickening versus underdistention. Follow-up recommended to exclude underlying lesion. 5.  Hepatic steatosis. 6.  Diverticulosis. 7.  Cholelithiasis and mild gallbladder distention. 8.  Nonobstructing renal calculi. 9.  Coarse pancreatic calcifications again seen    CT Chest Pulmonary Embolism w Contrast    Result Date: 2/11/2022  EXAM: CT CHEST PULMONARY EMBOLISM W CONTRAST LOCATION: [facility] DATE/TIME: 2/11/2022 1:42 PM INDICATION: PE suspected, low/intermediate prob, positive D-dimer, hypoxia, history of Covid COMPARISON: 01/28/2022 TECHNIQUE: CT chest pulmonary angiogram during arterial phase injection of IV contrast. Multiplanar reformats and MIP reconstructions were performed. Dose reduction techniques were used. CONTRAST: IsoVue 370 100mL FINDINGS: ANGIOGRAM CHEST: No pulmonary artery filling defects. Normal caliber aorta. LUNGS AND PLEURA: Moderate emphysema. Persistent but increased patchy groundglass and consolidative pulmonary opacities with areas of interlobular septal thickening. Mild bronchiectasis and  bronchial wall thickening have increased when compared to prior exam. Trace effusions have decreased. No pneumothorax. MEDIASTINUM/AXILLAE: Heart size is normal. No adenopathy. CORONARY ARTERY CALCIFICATION: None. UPPER ABDOMEN: Hepatic steatosis. Cholelithiasis. There is diffuse wall thickening of the stomach. Small volume ascites. Nonobstructing bilateral renal calculi. Prominent calcification in the area of the pancreatic neck is unchanged. Splenectomy with small accessory splenule. MUSCULOSKELETAL: Degenerative changes of the spine. Prominent L1 Schmorl's node. Similar appearance of the bilateral rib fractures. Mild anasarca.     IMPRESSION: 1.  No pulmonary embolus. 2.  Interval worsening of bilateral pulmonary opacities, bronchiectasis, bronchial wall thickening. 3.  Hepatic steatosis. 4.  Cholelithiasis. 5.  Diffuse wall thickening of the visualized stomach may indicate gastritis. 6.  Manifestations of third spacing.    CT Chest w/o Contrast    Result Date: 1/28/2022  EXAM: CT CHEST W/O CONTRAST LOCATION: [facility] DATE/TIME: 1/28/2022 2:36 PM INDICATION: Cough, persistent COMPARISON: 10/15/2022 TECHNIQUE: CT chest without IV contrast. Multiplanar reformats were obtained. Dose reduction techniques were used. CONTRAST: None. FINDINGS: LUNGS AND PLEURA: Upper lung predominant centrilobular and paraseptal emphysema. Improvement in groundglass and interstitial opacities in the right lung. Slight worsening of groundglass and interstitial opacities in the left upper lobe. New airway wall thickening in the left upper lobe and left lower lobe and new consolidation in the dependent portion of the left upper lobe and at the left lung base. Mucoid impaction in the left lower lobe airways. Unchanged left pleural thickening and trace right pleural effusion. MEDIASTINUM/AXILLAE: No thoracic aortic aneurysm. No lymphadenopathy. CORONARY ARTERY CALCIFICATION: None. UPPER ABDOMEN: Hepatic steatosis.  Cholelithiasis. MUSCULOSKELETAL: There are demonstrated bilateral rib fractures.     IMPRESSION: 1.  New airway wall thickening in the left upper and lower lobes with new consolidation in the dependent portion of the left upper lobe and at the left lung base, and mucoid impaction in left lower lobe airways, suspicious for aspiration. 2.  Overall improvement in groundglass and interstitial opacities in the right lung and slight worsening in the left upper lobe.     CT Head w/o Contrast    Result Date: 2/12/2022  EXAM: CT HEAD W/O CONTRAST LOCATION: [facility] DATE/TIME: 2/12/2022 9:20 AM INDICATION: Trauma - Head Injury. Unwitnessed fall. COMPARISON: CT head 01/18/2022 TECHNIQUE: Routine CT Head without IV contrast. Multiplanar reformats. Dose reduction techniques were used. FINDINGS: INTRACRANIAL CONTENTS: No intracranial hemorrhage, extraaxial collection, or mass effect.  No CT evidence of acute infarct. Small focus of chronic cortical encephalomalacia and adjacent gliosis involving the superior left frontal lobe as seen previously.  Mild presumed chronic small vessel ischemic changes. Mild generalized volume loss. No hydrocephalus. VISUALIZED ORBITS/SINUSES/MASTOIDS: No intraorbital abnormality. Complete opacification of the left maxillary sinus with some chronic appearing mural hyperostosis similar to the previous exam. Additional subtotal opacification of the left sphenoid sinus by polypoid mucosal thickening as seen previously. No middle ear or mastoid effusion. BONES/SOFT TISSUES: Mild right parietal scalp swelling. No skull fracture.     IMPRESSION: 1.  No CT evidence for acute intracranial process. 2.  Mild right parietal scalp contusion without associated fracture. 3.  Brain atrophy and presumed chronic microvascular ischemic changes as above. 4.  Inflammatory changes of the paranasal sinuses as seen previously.    CT Head w/o Contrast    Result Date: 1/18/2022  EXAM: CT HEAD W/O  CONTRAST LOCATION: [facility] DATE/TIME: 1/18/2022 3:45 AM INDICATION: Headache, intracranial hemorrhage suspected. COMPARISON: 11/4/2021 TECHNIQUE: Routine CT Head without IV contrast. Multiplanar reformats. Dose reduction techniques were used. FINDINGS: INTRACRANIAL CONTENTS: No intracranial hemorrhage, extraaxial collection, or mass effect. No CT evidence of acute infarct. Mild presumed chronic small vessel ischemic changes. Mild to moderate generalized volume loss. No hydrocephalus. Stable calcified dural based lesion along the right frontal convexity measuring 1 cm in diameter, presumably reflecting a small meningioma. VISUALIZED ORBITS/SINUSES/MASTOIDS: No intraorbital abnormality. Complete opacification of the left maxillary sinus with chronic mucoperiosteal reaction. Mucous retention cyst in the left sphenoid sinus. Mild mucosal thickening along the floor of the left frontal sinus. Opacified left frontoethmoidal recess. No middle ear or mastoid effusion. BONES/SOFT TISSUES: No acute abnormality.     IMPRESSION: 1.  No CT evidence for acute intracranial process. 2.  Stable chronic changes as above.    XR Video Swallow with SLP or OT    Result Date: 2/8/2022  EXAM: XR VIDEO SWALLOW WITH SLP OR OT LOCATION: [facility] DATE/TIME: 2/8/2022 8:52 AM INDICATION: Difficulty swallowing. COMPARISON: 01/31/2022. TECHNIQUE: Routine swallow study with speech pathology using multiple barium thicknesses. FINDINGS: FLUOROSCOPIC TIME: 1.2 minutes NUMBER OF IMAGES: 0 Swallow study with Speech Pathology using multiple barium thicknesses. Small amount of silent aspiration with thin liquid. Penetration with mildly thickened (nectar) liquid. There was persistent mild penetration with chin Tech technique and mildly thickened liquid. No aspiration was solid consistency.     IMPRESSION: 1.  Silent aspiration with thin liquid. Penetration with mildly thickened liquid.     XR Video  Swallow with SLP or OT    Result Date: 2022  EXAM: XR VIDEO SWALLOW WITH SLP OR OT LOCATION: [facility] DATE/TIME: 2022 11:30 AM INDICATION: Difficulty swallowing. COMPARISON: 2022. TECHNIQUE: Routine swallow study with speech pathology using multiple barium thicknesses. FINDINGS: FLUOROSCOPIC TIME: 3.3 minutes NUMBER OF IMAGES: 0 Swallow study with Speech Pathology using multiple barium thicknesses. Tracheal aspiration with thin liquid consistency barium with weak ineffective cough. Penetration into the laryngeal vestibule with mildly thickened (nectar) liquid consistency barium. No penetration or aspiration with honey and pudding consistency barium. Please refer to speech therapy report for additional detail.    XR Video Swallow with SLP or OT    Result Date: 2022  EXAM: XR VIDEO SWALLOW WITH SLP OR OT LOCATION: [facility] DATE/TIME: 2022 3:27 PM INDICATION: Difficulty swallowing. COMPARISON: None. TECHNIQUE: Routine swallow study with speech pathology using multiple barium thicknesses. FINDINGS: FLUOROSCOPIC TIME: 1.5 minutes NUMBER OF IMAGES: 9 Swallow study with Speech Pathology using multiple barium thicknesses. Moderate to deep penetration with thin liquid (no cough reflex). Small amount of penetration with mildly thick consistency. No other penetration or aspiration.     EEG Coma or Sleep Only    Result Date: 2022  ELECTROENCEPHALOGRAM (EEG) REPORT [facility] NEUROLOGY [address] [place] [address] Tel: [phone]  Fax: [phone] [email or web address] RENUKA Yap 1965, MRN [id number] PCP: Sarah Canseco Date: 2022 Principal Diagnosis: Altered mental status History : Patient is being evaluated for altered mental status. Medication listed includes Ativan Description of the Recording:  This is a multichannel digital EEG recording done using the international 10-20 placement  system. Background of the recording consists of irregular 1 to 3 Hz polymorphic delta activity with amplitudes ranging between 20 to 30  V.  Alerting procedure produce minimal change.  Photic stimulation performed with standard protocol produced minimal change.  No transient sleep patterns were recorded. During this recording, no sharp discharges, spikes or electrographic seizure activity was recorded. Impression: This is an abnormal EEG due to diffusely slow polymorphic delta activity that minimally attenuates with alerting procedures.  This EEG suggests nonspecific generalized cerebral dysfunction.  No focal slowing or periodic discharges were seen to suggest seizures. Classification: Delta grade I generalized Tiburcio Gallagher MD [facility] (Formerly, [facility] This note was dictated using voice recognition software.  Any grammatical or context distortions are unintentional and inherent to the software.       Lab Results:  Personally Reviewed.   Fingerstick Blood Glucose: @[id number](POCGLUFGR:10)@    Last Hbg A1C: No results found for: HGBA1C   Lab Results   Component Value Date    INR 1.67 (H) 01/30/2022    INR 1.72 (H) 01/16/2022    INR 1.69 (H) 01/15/2022     Recent Results (from the past 24 hour(s))   Glucose by meter    Collection Time: 02/16/22 12:05 PM   Result Value Ref Range    GLUCOSE BY METER POCT 139 (H) 70 - 99 mg/dL   Glucose by meter    Collection Time: 02/16/22  5:06 PM   Result Value Ref Range    GLUCOSE BY METER POCT 128 (H) 70 - 99 mg/dL   Magnesium    Collection Time: 02/16/22  7:25 PM   Result Value Ref Range    Magnesium 1.8 1.8 - 2.6 mg/dL   Glucose by meter    Collection Time: 02/16/22  9:19 PM   Result Value Ref Range    GLUCOSE BY METER POCT 145 (H) 70 - 99 mg/dL   Glucose by meter    Collection Time: 02/17/22  3:25 AM   Result Value Ref Range    GLUCOSE BY METER POCT 220 (H) 70 - 99 mg/dL   CBC with platelets    Collection  Time: 02/17/22  6:28 AM   Result Value Ref Range    WBC Count 14.8 (H) 4.0 - 11.0 10e3/uL    RBC Count 3.36 (L) 4.40 - 5.90 10e6/uL    Hemoglobin 10.6 (L) 13.3 - 17.7 g/dL    Hematocrit 30.2 (L) 40.0 - 53.0 %    MCV 90 78 - 100 fL    MCH 31.5 26.5 - 33.0 pg    MCHC 35.1 31.5 - 36.5 g/dL    RDW 18.9 (H) 10.0 - 15.0 %    Platelet Count 222 150 - 450 10e3/uL   Comprehensive metabolic panel    Collection Time: 02/17/22  6:28 AM   Result Value Ref Range    Sodium 141 136 - 145 mmol/L    Potassium 4.0 3.5 - 5.0 mmol/L    Chloride 116 (H) 98 - 107 mmol/L    Carbon Dioxide (CO2) 19 (L) 22 - 31 mmol/L    Anion Gap 6 5 - 18 mmol/L    Urea Nitrogen 4 (L) 8 - 22 mg/dL    Creatinine 0.77 0.70 - 1.30 mg/dL    Calcium 7.3 (L) 8.5 - 10.5 mg/dL    Glucose 270 (H) 70 - 125 mg/dL    Alkaline Phosphatase 145 (H) 45 - 120 U/L    AST 56 (H) 0 - 40 U/L    ALT 16 0 - 45 U/L    Protein Total 3.8 (L) 6.0 - 8.0 g/dL    Albumin 1.8 (L) 3.5 - 5.0 g/dL    Bilirubin Total 0.7 0.0 - 1.0 mg/dL    GFR Estimate >90 >60 mL/min/1.73m2   Lactic acid whole blood    Collection Time: 02/17/22  6:28 AM   Result Value Ref Range    Lactic Acid 1.7 0.7 - 2.0 mmol/L   Magnesium    Collection Time: 02/17/22  6:28 AM   Result Value Ref Range    Magnesium 1.5 (L) 1.8 - 2.6 mg/dL   CRP inflammation    Collection Time: 02/17/22  6:28 AM   Result Value Ref Range    CRP 0.6 0.0-<0.8 mg/dL   B-Type Natriuretic Peptide ([facility] Only)    Collection Time: 02/17/22  6:28 AM   Result Value Ref Range     (H) 0 - 45 pg/mL   Glucose by meter    Collection Time: 02/17/22  7:53 AM   Result Value Ref Range    GLUCOSE BY METER POCT 243 (H) 70 - 99 mg/dL           Advanced Care Planning    Time > 35 minutes with greater than 50% of time spent in counseling and coordination of care.

## 2022-02-17 NOTE — PLAN OF CARE
Problem: Fall Injury Risk  Goal: Absence of Fall and Fall-Related Injury  Outcome: Ongoing, Progressing  Intervention: Identify and Manage Contributors  Recent Flowsheet Documentation  Taken 2/16/2022 1600 by Shelton Austin RN  Medication Review/Management: medications reviewed  Intervention: Promote Injury-Free Environment  Recent Flowsheet Documentation  Taken 2/16/2022 1600 by Shelton Austin, RN  Safety Promotion/Fall Prevention:    activity supervised    bed alarm on    room door open    room near nurse's station    room organization consistent    safety round/check completed     Problem: Mobility Impairment  Goal: Optimal Mobility  Outcome: Ongoing, Progressing  Intervention: Optimize Mobility  Recent Flowsheet Documentation  Taken 2/16/2022 1600 by Shelton Austin RN  Activity Management: activity adjusted per tolerance   Goal Outcome Evaluation:    Plan of Care Reviewed With: patient     Overall Patient Progress: improving    Outcome Evaluation: Awake and following commands @ this time; precedex decreased, norepi off. Dignicare removed 2/2 failure to drain; large incontinent BM, replaced Dignicare with good patency. Tolerating SBT. Updated spouse Daina this afternoon.      Pt alert but confused. Low b/p during the shift. Scheduled midodrine given but did not increase b/p. MD ordered 500 ml bolus Requires infrequent safety check. Fall precautions in place bed and chair alarms on.

## 2022-02-17 NOTE — PLAN OF CARE
Problem: Adult Inpatient Plan of Care  Goal: Patient-Specific Goal (Individualized)  Outcome: Ongoing, Progressing  Goal: Optimal Comfort and Wellbeing  Outcome: Ongoing, Progressing  Goal: Readiness for Transition of Care  Outcome: Ongoing, Progressing     Problem: Risk for Delirium  Goal: Improved Attention and Thought Clarity  Outcome: Ongoing, Progressing  Goal: Improved Sleep  Outcome: Ongoing, Progressing     Problem: Acute Neurologic Deterioration (Alcohol Withdrawal)  Goal: Optimal Neurologic Function  Outcome: Ongoing, Progressing     Problem: Substance Misuse (Alcohol Withdrawal)  Goal: Readiness for Change Identified  Outcome: Ongoing, Progressing     Problem: Violence Risk or Actual  Goal: Anger and Impulse Control  Outcome: Ongoing, Progressing     Problem: OT General Care Plan  Goal: Transfer (OT)  Description: Transfer (OT)  Outcome: Ongoing, Progressing  Goal: Toilet Transfer/Toileting (OT)  Description: Toilet Transfer/Toileting (OT)  Outcome: Ongoing, Progressing  Goal: Cognitive (OT)  Description: Cognitive (OT)  Outcome: Ongoing, Progressing     Problem: Fall Injury Risk  Goal: Absence of Fall and Fall-Related Injury  Outcome: Ongoing, Progressing  Intervention: Promote Injury-Free Environment  Recent Flowsheet Documentation  Taken 2/17/2022 0002 by Chyna Ward, RN  Safety Promotion/Fall Prevention:   fall prevention program maintained   increase visualization of patient   lighting adjusted   room near nurse's station   toileting scheduled     Problem: Mobility Impairment  Goal: Optimal Mobility  Outcome: Ongoing, Progressing  Intervention: Optimize Mobility  Recent Flowsheet Documentation  Taken 2/17/2022 0106 by Chyna Ward, RN  Positioning/Transfer Devices: pillows  Taken 2/17/2022 0002 by Chyna Ward, RN  Activity Management: activity adjusted per tolerance  Positioning/Transfer Devices: pillows   Goal Outcome Evaluation:    Plan of Care Reviewed With: patient     Overall  Patient Progress: improving    Outcome Evaluation: Awake and following commands @ this time. Pain controlled with prn tylenol #3 . T/r every 2-3 hours. Pt continues to require 6L o2 and bp's soft. Will continue to monitor.

## 2022-02-17 NOTE — SIGNIFICANT EVENT
Significant Event Note    Time of event: 1:12 PM February 17, 2022    Description of event:  Spoke to wife re worsening hypoxia and low BP   Wife does not want ant aggressive measures - no ICU, no intubation,   She is ok with comfort cares/hospice if he deteriorates     Plan:  Above     Discussed with: bedside nurse    Abimael Hayden MD

## 2022-02-18 NOTE — PROGRESS NOTES
INFECTIOUS DISEASE FOLLOW UP NOTE      ASSESSMENT:  1. Leukocytosis, elevated lactate, hypotension. He has been afebrile. Chest CT 2/11 with worsening findings, some of which are likely sequelae of COVID pneumonia. He was started on pip/tazo and vancomycin 2/11, with last doses 2/14, and WBC increased to 24k on 2/15. No clear source of infection showing up. WBC better since restarting IV antibiotics, however still gets hypotensive and hypoxia is worse.  Not sure what we are treating with the antibiotic. Oxygenation worse -- recurrent aspiration? Sequelae of COVID? -- crp normal, evidence against inflammatory process.  CHF seems unlikely with BNP not being very high. May have new fibrosis from COVID on top of underlying COPD. EVALI possible although he denies vaping here in the hospital, and would expect high inflammation with this, and does not explain hypotension.   2. Admitted 1/15 with alcohol withdrawal, treated for aspiration pneumonia.   3. COVID positive 1/30/22, previous negative 1/15, 1/22, presumed therefore contracted in the hospital. Treated with RDV 5 days, dexamethasone 1/30-2/5, tocilizumab 1/31/22. Vaccinated x2 fall 2021.   4. Encephalopathy. Alcohol withdrawal. MRI 1/22/22 showed suggestion of few small foci of acute/early subacute infarctions L occipital region. Improved.   5. DM.   6. H/o abdominal surgeries  7. H/o jazmine's gangrene 2018.  8. Albumin of 1.8 will lead to low oncotic pressure, 3rd spacing with fluids.   9. Goals of care -- not planning on excalating cares per wife's wishes.     PLAN:  Continue meropenem 5-7 days  Should we check cosyntropin stim test?  Could consider empiric steroids    Jean Paul Galvin MD  [facility]  [phone] [facility] paging    ______________________________________________________________________    SUBJECTIVE / INTERVAL HISTORY: up to 12L face mask. Feels breathing is better today. Dry cough. Pain in shoulders, ankles,  "back. Temps normal. Found to have vaping paraphernalia in bed overnights. States he uses this but not since he has been in hospital.     ROS: deconditioned. All other systems negative except as listed above.        OBJECTIVE:  BP (!) 82/51   Pulse 91   Temp 97.5  F (36.4  C) (Oral)   Resp 20   Ht 1.854 m (6' 0.99\")   Wt 71.1 kg (156 lb 11.2 oz)   SpO2 91%   BMI 20.68 kg/m    FiO2 (%): 2 %    Vital Signs  Temp: 98.1  F (36.7  C)  Temp src: Oral  Resp: 18  Pulse: 64  Pulse Rate Source: Monitor  BP: (!) 87/59  BP Location: Right arm    Temp (24hrs), Av.4  F (36.3  C), Min:97  F (36.1  C), Max:98.1  F (36.7  C)      GEN: No acute distress.  Oriented to place, year, month. Face mask O2.   RESPIRATORY:  Normal breathing pattern. Rales noted  CARDIOVASCULAR:  Regular rate and rhythm. Normal S1 and S2. No murmur, click, gallop or rub. No dependent edema. No excess JVD.  ABDOMEN:  Soft, normal bowel sounds, non-tender, no masses, has surgical scars.  EXTREMITIES: No edema.  No synovitis  SKIN/HAIR/NAILS:  No rashes. Stage 2 wound sacrum  Neuro: non focal  IV: peripheral        Antibiotics:  Meropenem 2/15-  pip/tazo 1/15-,-2/3,   Vancomycin , 2/15-  remdesivir -2/3  augmentin 2/15    Pertinent labs:    Recent Labs   Lab 22  0355 22  0628 22  1051 02/15/22  0558   WBC 14.5* 14.8* 13.4* 24.6*  24.6*   HGB  --  10.6* 10.4* 10.2*   HCT  --  30.2* 30.2* 29.5*   PLT  --  222 226 229        Recent Labs   Lab 22  0628 22  1051 02/15/22  0559    142 139   CO2 19* 19* 18*   BUN 4* 4* 5*        Lab Results   Component Value Date    CRP 0.6 2022    CRP 0.8 (H) 02/15/2022    CRP 0.2 2022         Lab Results   Component Value Date    ALT 16 2022    AST 56 (H) 2022    ALKPHOS 145 (H) 2022         MICROBIOLOGY DATA:   blood negative  2/15 blood negative to date  MRSA screen negative  c diff last negative 2/3/22    RADIOLOGY:  MR " Brain w/o Contrast    Result Date: 1/22/2022  EXAM: MR BRAIN W/O CONTRAST LOCATION: [facility] DATE/TIME: 1/22/2022 1:05 PM INDICATION: Encephalopathy COMPARISON: CT head 01/18/2022, MRI head 11/23/2020 TECHNIQUE: Routine multiplanar multisequence head MRI without intravenous contrast. FINDINGS: INTRACRANIAL CONTENTS: Please note study is significantly degraded by motion artifact. Suggestion of a few small foci of restricted diffusion at the left temporal occipital region; best appreciated on repeat axial diffusion weighted sequence series 20.2 image 12, image 11; as well as repeat coronal diffusion weighted sequence series 24.2 image 9. No definite associated hemorrhage or significant mass effect. Redemonstration of small chronic infarction left precentral gyrus. Mild generalized cerebral atrophy. No hydrocephalus. Normal position of the cerebellar tonsils. Small chronic infarction lateral aspect left cerebellum. A few additional tiny infarctions demonstrated on prior exam are not well demonstrated on the current. SELLA: Not well-visualized, grossly normal. OSSEOUS STRUCTURES/SOFT TISSUES: Normal marrow signal. Flow voids are not well-visualized. ORBITS: Not well-visualized. SINUSES/MASTOIDS: The complete opacification left maxillary and left sphenoid sinus, similar to prior. Small amount of opacification right mastoid air cells.     IMPRESSION: 1.  Please note study is significantly degraded by motion artifact. 2.  Suggestion of a few small foci of acute/early subacute infarction at the left temporal occipital region. 3.  No definite associated hemorrhage or significant mass effect. 4.  Small chronic infarction left precentral gyrus, similar to prior. 5.  Probable few small chronic infarctions cerebellar hemispheres. 6.  Mild atrophy.    Echocardiogram Complete    Result Date: 2/11/2022  [id number]^[id number]^[id number]^A  [address]  [address]  Name: KAREY LIVINGSTON MRN: [id number] : 1965 Study Date: 2022 07:37 AM Age: 56 yrs Gender: Male Patient Location: [facility] Reason For Study: Hypertension (HTN) Ordering Physician: LYN MAYNARD Performed By: [facility]  BSA: 1.9 m2 Height: 72 in Weight: 147 lb HR: 123 BP: 97/59 mmHg ______________________________________________________________________________ Procedure Complete Portable Echo Adult. Technically difficult study.Extremely poor acoustic windows. Compared to the prior study dated 2021, there have been no changes. No hemodynamically significant valvular abnormalities on 2D or color flow imaging. ______________________________________________________________________________ Interpretation Summary  1.Left ventricular size, wall motion and function are normal. The ejection fraction is > 65%. 2.There is mild concentric left ventricular hypertrophy. 3.Normal right ventricle size and systolic function. 4.No hemodynamically significant valvular abnormalities on 2D or color flow imaging. Compared to the prior study dated 2021, there have been no changes. ______________________________________________________________________________ I      WMSI = 1.00     % Normal = 100  X - Cannot   0 -                      (2) - Mildly 2 -          Segments  Size Interpret    Hyperkinetic 1 - Normal  Hypokinetic  Hypokinetic  1-2     small                                                    7 -          3-5    moderate 3 - Akinetic 4 -          5 -         6 - Akinetic Dyskinetic   6-14    large              Dyskinetic   Aneurysmal  w/scar       w/scar       15-16   diffuse  Left Ventricle Left ventricular size, wall motion and function are normal. The ejection fraction is > 65%. There is mild concentric left ventricular hypertrophy. Left ventricular diastolic function is normal. No regional wall motion abnormalities noted.  Right Ventricle Normal right ventricle size and systolic function. TAPSE is  normal, which is consistent with normal right ventricular systolic function.  Atria Normal left atrial size. Right atrial size is normal. There is no color Doppler evidence of an atrial shunt.  Mitral Valve Mitral valve leaflets appear normal. There is no evidence of mitral stenosis or clinically significant mitral regurgitation. There is no mitral regurgitation noted. There is no mitral valve stenosis.  Tricuspid Valve Tricuspid valve leaflets appear normal. Right ventricle systolic pressure estimate normal. There is trace tricuspid regurgitation. There is no tricuspid stenosis.  Aortic Valve Aortic valve leaflets appear normal. There is no evidence of aortic stenosis or clinically significant aortic regurgitation. The aortic valve is trileaflet. No aortic regurgitation is present. No aortic stenosis is present.  Pulmonic Valve The pulmonic valve is not well seen, but is grossly normal. This degree of valvular regurgitation is within normal limits. There is no pulmonic valvular stenosis.  Vessels The aorta root is normal. Normal size ascending aorta. IVC diameter <2.1 cm collapsing >50% with sniff suggests a normal RA pressure of 3 mmHg.  Pericardium There is no pericardial effusion.  Rhythm The rhythm was sinus tachycardia.  ______________________________________________________________________________ MMode/2D Measurements & Calculations IVSd: 1.3 cm LVIDd: 4.1 cm LVIDs: 2.3 cm LVPWd: 1.2 cm FS: 43.4 %  LV mass(C)d: 182.6 grams LV mass(C)dI: 97.7 grams/m2 Ao root diam: 3.3 cm LA dimension: 3.6 cm asc Aorta Diam: 3.8 cm LA/Ao: 1.1 LVOT diam: 2.2 cm LVOT area: 3.8 cm2 LA Volume (BP): 29.3 ml LA Volume Index (BP): 15.7 ml/m2  LA Volume Indexed (AL/bp): 17.3 ml/m2 RWT: 0.61  Doppler Measurements & Calculations MV E max uriel: 132.0 cm/sec MV A max uriel: 137.1 cm/sec MV E/A: 0.96 MV dec slope: 1104 cm/sec2 MV dec time: 0.12 sec Ao V2 max: 143.6 cm/sec Ao max P.0 mmHg Ao V2 mean: 108.6 cm/sec Ao mean P.2 mmHg Ao  V2 VTI: 26.8 cm LELO(I,D): 3.5 cm2 LELO(V,D): 3.9 cm2 LV V1 max P.6 mmHg LV V1 max: 146.7 cm/sec LV V1 VTI: 24.2 cm SV(LVOT): 92.9 ml SI(LVOT): 49.7 ml/m2 PA V2 max: 107.4 cm/sec PA max P.6 mmHg PA acc time: 0.07 sec AV Gustavo Ratio (DI): 1.0 LELO Index (cm2/m2): 1.9 E/E' av.4  Lateral E/e': 18.6 Medial E/e': 20.2  ______________________________________________________________________________ Report approved by: Randal Villa 2022 09:14 AM       XR Chest Port 1 View    Result Date: 2022  EXAM: XR CHEST PORT 1 VIEW LOCATION: [facility] DATE/TIME: 2022 8:33 AM INDICATION: dyspnea COMPARISON: CT pulmonary angiogram 2022     IMPRESSION: Abnormal increased lung attenuation diffusely through both lungs. There are subsegmental foci of more focal airspace opacity in the peripheral third of the left mid and lower lung, similar to the prior CT. Subpleural emphysema is present in the apices. No new focal airspace opacity or volume loss. Symmetric apical and left lateral pleural thickening has not increased. No pleural effusion or pneumothorax. Cardiac silhouette is not enlarged. Unchanged aortic and other mediastinal interfaces. There are metallic fragments projecting in the left upper quadrant. Chronic left posterolateral rib fracture deformities are ununited.    XR Chest Port 1 View    Result Date: 2022  EXAM: XR CHEST PORT 1 VIEW LOCATION: [facility] DATE/TIME: 2022 6:18 AM INDICATION: Pneumonia follow up. COMPARISON: 2022 chest CT.2022 radiograph.     IMPRESSION: Relatively diffuse left lung opacities have increased since the 2022 chest radiograph, and perhaps slightly increased since the 2022 chest CT. Differentials for the increased opacities include asymmetric pulmonary edema, infectious / inflammatory process or layering pleural fluid. Small left pleural effusion is present. Right lung is hypoexpanded  and may have a few subtle scattered opacities. No pneumothorax. Normal heart size. Atherosclerotic aorta.     XR Chest Port 1 View    Result Date: 1/19/2022  EXAM: XR CHEST PORT 1 VIEW LOCATION: [facility] DATE/TIME: 1/19/2022 10:52 AM INDICATION: after advancing et tube COMPARISON: 1/18/2022.     IMPRESSION: Endotracheal tube is been advanced. Tip is 4 cm above the igor in good position. There our persistent bibasilar pulmonary opacities with partial clearing at the right base from the prior study and no change at the left base. No pneumothorax. Enteric tube tip is not visualized tip is below the diaphragm. PICC catheter from right upper extremity approach is unchanged with tip at the junction of the superior vena cava and right atrium. Left posterior rib fractures are again noted as well as deformity of the right humeral head.    XR Chest Port 1 View    Result Date: 1/18/2022  EXAM: XR CHEST PORT 1 VIEW LOCATION: [facility] DATE/TIME: 1/18/2022 6:06 AM INDICATION: Location of ET tube COMPARISON: 01/16/2022.     IMPRESSION:Endotracheal tube is in the trachea at the level of the clavicular heads with tip 6 cm above the iogr. PICC catheter from right upper extremity approach is in the distal superior vena cava near its junction with the right atrium. Enteric tube tip is below the diaphragm and not visualized. There is no pneumothorax. Again noted are bilateral patchy airspace opacities there has been increased consolidation or atelectasis at the right medial lung base. There are old healed left posterior rib  fractures no acute fractures are identified.    XR Chest Port 1 View    Result Date: 1/16/2022  EXAM: XR CHEST PORT 1 VIEW LOCATION: [facility] DATE/TIME: 1/16/2022 12:34 PM INDICATION: ET tube placement, PICC line placement COMPARISON: CT pulmonary angiogram 01/15/2022     IMPRESSION: ET tube tip projects 6.5 cm above the igor.  Gastric tube extends towards the diaphragmatic hiatus, outside the field-of-view. Right PICC terminates upper right atrium. Extensive bilateral airspace opacities are present with patchy lung involvement, unchanged from yesterday. No new focal lung volume loss. No visible pleural fluid or pneumothorax on this single supine view.    XR Chest Port 1 View    Result Date: 1/15/2022  EXAM: XR CHEST PORT 1 VIEW LOCATION: [facility] DATE/TIME: 1/15/2022 5:16 PM INDICATION: Shortness of breath COMPARISON: 11/5/2021     IMPRESSION: There are mild opacities in both lungs which have increased from the prior exam. This is likely due to a superimposed infectious or inflammatory process on top of mild background scarring. Normal heart size and pulmonary vascularity. Old left  rib fractures.    XR Abdomen Port 1 View    Result Date: 1/17/2022  EXAM: XR ABDOMEN PORT 1 VIEWS LOCATION: [facility] DATE/TIME: 1/17/2022 1:25 PM INDICATION: Location of OG tube COMPARISON: None.     IMPRESSION: Enteric tube in the body the stomach.     CT Abdomen Pelvis w Contrast    Result Date: 2/15/2022  EXAM: CT ABDOMEN PELVIS W CONTRAST LOCATION: [facility] DATE/TIME: 2/15/2022 9:50 PM INDICATION: Leukocytosis. COMPARISON: 10/05/2020 TECHNIQUE: CT scan of the abdomen and pelvis was performed following injection of IV contrast. Multiplanar reformats were obtained. Dose reduction techniques were used. CONTRAST: ISOVUE 370 100ML FINDINGS: LOWER CHEST: Bibasilar infiltrates. Small right and trace left pleural effusion. HEPATOBILIARY: Hepatic steatosis. Cholelithiasis and gallbladder distention. PANCREAS: Pancreatic calcifications suggesting chronic pancreatitis. Prominent calcification at the neck of the pancreas. Difficult to tell if this is parenchymal or intraductal. The distal pancreas is atrophic. SPLEEN: Splenectomy with accessory splenule. ADRENAL GLANDS: Normal.  KIDNEYS/BLADDER: Small nonobstructing renal calculi. BOWEL: Gastric and small bowel wall thickening. Central mesenteric congestion. Trace amount of free fluid. Diverticulosis with presumed retained barium. Wall thickening of the proximal colon. Rectal wall thickening versus underdistention. LYMPH NODES: Subcentimeter mesenteric and peripancreatic lymph nodes. VASCULATURE: Atherosclerotic vascular calcification. PELVIC ORGANS: Small amount of free fluid. MUSCULOSKELETAL: Degenerative change osseous structures. Anasarca. Remote and subacute rib fractures.     IMPRESSION: 1.  Wall thickening of stomach, small bowel and proximal colon. Correlate for gastroenteritis/enterocolitis. 2.  Mesenteric congestion and small amount of free fluid. 3.  Bilateral pulmonary infiltrates. Small right and trace left pleural effusion. 4.  Focal rectal wall thickening versus underdistention. Follow-up recommended to exclude underlying lesion. 5.  Hepatic steatosis. 6.  Diverticulosis. 7.  Cholelithiasis and mild gallbladder distention. 8.  Nonobstructing renal calculi. 9.  Coarse pancreatic calcifications again seen    CT Chest Pulmonary Embolism w Contrast    Result Date: 2/11/2022  EXAM: CT CHEST PULMONARY EMBOLISM W CONTRAST LOCATION: [facility] DATE/TIME: 2/11/2022 1:42 PM INDICATION: PE suspected, low/intermediate prob, positive D-dimer, hypoxia, history of Covid COMPARISON: 01/28/2022 TECHNIQUE: CT chest pulmonary angiogram during arterial phase injection of IV contrast. Multiplanar reformats and MIP reconstructions were performed. Dose reduction techniques were used. CONTRAST: IsoVue 370 100mL FINDINGS: ANGIOGRAM CHEST: No pulmonary artery filling defects. Normal caliber aorta. LUNGS AND PLEURA: Moderate emphysema. Persistent but increased patchy groundglass and consolidative pulmonary opacities with areas of interlobular septal thickening. Mild bronchiectasis and bronchial wall thickening have increased  when compared to prior exam. Trace effusions have decreased. No pneumothorax. MEDIASTINUM/AXILLAE: Heart size is normal. No adenopathy. CORONARY ARTERY CALCIFICATION: None. UPPER ABDOMEN: Hepatic steatosis. Cholelithiasis. There is diffuse wall thickening of the stomach. Small volume ascites. Nonobstructing bilateral renal calculi. Prominent calcification in the area of the pancreatic neck is unchanged. Splenectomy with small accessory splenule. MUSCULOSKELETAL: Degenerative changes of the spine. Prominent L1 Schmorl's node. Similar appearance of the bilateral rib fractures. Mild anasarca.     IMPRESSION: 1.  No pulmonary embolus. 2.  Interval worsening of bilateral pulmonary opacities, bronchiectasis, bronchial wall thickening. 3.  Hepatic steatosis. 4.  Cholelithiasis. 5.  Diffuse wall thickening of the visualized stomach may indicate gastritis. 6.  Manifestations of third spacing.    CT Chest Pulmonary Embolism w Contrast    Result Date: 1/15/2022  EXAM: CT CHEST PULMONARY EMBOLISM W CONTRAST LOCATION: [facility] DATE/TIME: 1/15/2022 6:17 PM INDICATION: Shortness of breath COMPARISON: Portable AP view of the chest 01/15/2022; CT of the abdomen and pelvis 10/05/2020 TECHNIQUE: CT chest pulmonary angiogram during arterial phase injection of IV contrast. Multiplanar reformats and MIP reconstructions were performed. Dose reduction techniques were used. CONTRAST: 100ml isovue 370 FINDINGS: ANGIOGRAM CHEST: Pulmonary arteries are normal caliber and negative for pulmonary emboli. Normal caliber thoracic aorta. There are no findings to suggest acute aortic syndrome. Proximal great vessels are patent. Diminutive left vertebral artery arises directly from the arch. Mild atheromatous calcifications descending aorta and distal right innominate artery. LUNGS AND PLEURA: Upper lung predominant mixed centrilobular and paraseptal emphysema. Superimposed patchy airspace opacities are present bilaterally,  asymmetric to the right associated with internal interstitial opacity. Minimal right pleural fluid layers dependently. There is chronic thickening of the left posterior and posterolateral pleura adjacent to multiple left rib fracture deformities. Central airways are patent. MEDIASTINUM: Cardiac chambers are normal in size. No pericardial effusion is present. Mildly enlarged lymph nodes in both analia, likely reactive in the setting of airspace opacities. Subcarinal, lower paratracheal and subaortic/prevascular lymph nodes are  all normal by size criteria. The middle third and distal thirds of the esophagus has circumferential wall thickening consistent with esophagitis. Imaged thyroid gland is normal. CORONARY ARTERY CALCIFICATION: None. UPPER ABDOMEN: There are multiple calcified gallstones in the neck of the gallbladder. Severe fatty infiltration of the liver. The imaged distal transverse colon/splenic flecture has wall thickening. Previous splenectomy with small splenule present. 15 mm length ovoid calcification in the region of the pancreas neck is unchanged. MUSCULOSKELETAL: There are multiple ununited left posterior and posterolateral rib fracture deformities including lateral ribs 7, 8 and posterior ribs 9 and 11. The posterolateral right ninth rib is broken in 2 locations and the fractures have fairly sharp borders suggesting acute on subacute fractures..     IMPRESSION: 1.  No acute pulmonary embolism. 2.  Multifocal bilateral airspace opacities are present mixed groundglass and interstitial thickening asymmetric to the right. Imaging features can be seen with (COVID-19)  pneumonia, though are nonspecific and can occur with a variety of infectious and noninfectious processes. Given the other findings below, aspiration is a strong differential consideration. 3.  Severe wall thickening of the middle and distal thirds of the esophagus consistent with a subacute esophagitis. 4.  Severe hepatic steatosis. 5.   Acute on chronic fracture deformities of multiple left lateral/posterolateral ribs. 6.  Cholelithiasis. 7.  Wall thickening of the imaged colon consistent with colitis.    CT Chest w/o Contrast    Result Date: 1/28/2022  EXAM: CT CHEST W/O CONTRAST LOCATION: [facility] DATE/TIME: 1/28/2022 2:36 PM INDICATION: Cough, persistent COMPARISON: 10/15/2022 TECHNIQUE: CT chest without IV contrast. Multiplanar reformats were obtained. Dose reduction techniques were used. CONTRAST: None. FINDINGS: LUNGS AND PLEURA: Upper lung predominant centrilobular and paraseptal emphysema. Improvement in groundglass and interstitial opacities in the right lung. Slight worsening of groundglass and interstitial opacities in the left upper lobe. New airway wall thickening in the left upper lobe and left lower lobe and new consolidation in the dependent portion of the left upper lobe and at the left lung base. Mucoid impaction in the left lower lobe airways. Unchanged left pleural thickening and trace right pleural effusion. MEDIASTINUM/AXILLAE: No thoracic aortic aneurysm. No lymphadenopathy. CORONARY ARTERY CALCIFICATION: None. UPPER ABDOMEN: Hepatic steatosis. Cholelithiasis. MUSCULOSKELETAL: There are demonstrated bilateral rib fractures.     IMPRESSION: 1.  New airway wall thickening in the left upper and lower lobes with new consolidation in the dependent portion of the left upper lobe and at the left lung base, and mucoid impaction in left lower lobe airways, suspicious for aspiration. 2.  Overall improvement in groundglass and interstitial opacities in the right lung and slight worsening in the left upper lobe.     CT Head w/o Contrast    Result Date: 2/12/2022  EXAM: CT HEAD W/O CONTRAST LOCATION: [facility] DATE/TIME: 2/12/2022 9:20 AM INDICATION: Trauma - Head Injury. Unwitnessed fall. COMPARISON: CT head 01/18/2022 TECHNIQUE: Routine CT Head without IV contrast. Multiplanar  reformats. Dose reduction techniques were used. FINDINGS: INTRACRANIAL CONTENTS: No intracranial hemorrhage, extraaxial collection, or mass effect.  No CT evidence of acute infarct. Small focus of chronic cortical encephalomalacia and adjacent gliosis involving the superior left frontal lobe as seen previously.  Mild presumed chronic small vessel ischemic changes. Mild generalized volume loss. No hydrocephalus. VISUALIZED ORBITS/SINUSES/MASTOIDS: No intraorbital abnormality. Complete opacification of the left maxillary sinus with some chronic appearing mural hyperostosis similar to the previous exam. Additional subtotal opacification of the left sphenoid sinus by polypoid mucosal thickening as seen previously. No middle ear or mastoid effusion. BONES/SOFT TISSUES: Mild right parietal scalp swelling. No skull fracture.     IMPRESSION: 1.  No CT evidence for acute intracranial process. 2.  Mild right parietal scalp contusion without associated fracture. 3.  Brain atrophy and presumed chronic microvascular ischemic changes as above. 4.  Inflammatory changes of the paranasal sinuses as seen previously.    CT Head w/o Contrast    Result Date: 1/18/2022  EXAM: CT HEAD W/O CONTRAST LOCATION: [facility] DATE/TIME: 1/18/2022 3:45 AM INDICATION: Headache, intracranial hemorrhage suspected. COMPARISON: 11/4/2021 TECHNIQUE: Routine CT Head without IV contrast. Multiplanar reformats. Dose reduction techniques were used. FINDINGS: INTRACRANIAL CONTENTS: No intracranial hemorrhage, extraaxial collection, or mass effect. No CT evidence of acute infarct. Mild presumed chronic small vessel ischemic changes. Mild to moderate generalized volume loss. No hydrocephalus. Stable calcified dural based lesion along the right frontal convexity measuring 1 cm in diameter, presumably reflecting a small meningioma. VISUALIZED ORBITS/SINUSES/MASTOIDS: No intraorbital abnormality. Complete opacification of the left  maxillary sinus with chronic mucoperiosteal reaction. Mucous retention cyst in the left sphenoid sinus. Mild mucosal thickening along the floor of the left frontal sinus. Opacified left frontoethmoidal recess. No middle ear or mastoid effusion. BONES/SOFT TISSUES: No acute abnormality.     IMPRESSION: 1.  No CT evidence for acute intracranial process. 2.  Stable chronic changes as above.    XR Video Swallow with SLP or OT    Result Date: 2/8/2022  EXAM: XR VIDEO SWALLOW WITH SLP OR OT LOCATION: [facility] DATE/TIME: 2/8/2022 8:52 AM INDICATION: Difficulty swallowing. COMPARISON: 01/31/2022. TECHNIQUE: Routine swallow study with speech pathology using multiple barium thicknesses. FINDINGS: FLUOROSCOPIC TIME: 1.2 minutes NUMBER OF IMAGES: 0 Swallow study with Speech Pathology using multiple barium thicknesses. Small amount of silent aspiration with thin liquid. Penetration with mildly thickened (nectar) liquid. There was persistent mild penetration with chin Tech technique and mildly thickened liquid. No aspiration was solid consistency.     IMPRESSION: 1.  Silent aspiration with thin liquid. Penetration with mildly thickened liquid.     XR Video Swallow with SLP or OT    Result Date: 1/31/2022  EXAM: XR VIDEO SWALLOW WITH SLP OR OT LOCATION: [facility] DATE/TIME: 1/31/2022 11:30 AM INDICATION: Difficulty swallowing. COMPARISON: 1/25/2022. TECHNIQUE: Routine swallow study with speech pathology using multiple barium thicknesses. FINDINGS: FLUOROSCOPIC TIME: 3.3 minutes NUMBER OF IMAGES: 0 Swallow study with Speech Pathology using multiple barium thicknesses. Tracheal aspiration with thin liquid consistency barium with weak ineffective cough. Penetration into the laryngeal vestibule with mildly thickened (nectar) liquid consistency barium. No penetration or aspiration with honey and pudding consistency barium. Please refer to speech therapy report for additional  detail.    XR Video Swallow with SLP or OT    Result Date: 2022  EXAM: XR VIDEO SWALLOW WITH SLP OR OT LOCATION: [facility] DATE/TIME: 2022 3:27 PM INDICATION: Difficulty swallowing. COMPARISON: None. TECHNIQUE: Routine swallow study with speech pathology using multiple barium thicknesses. FINDINGS: FLUOROSCOPIC TIME: 1.5 minutes NUMBER OF IMAGES: 9 Swallow study with Speech Pathology using multiple barium thicknesses. Moderate to deep penetration with thin liquid (no cough reflex). Small amount of penetration with mildly thick consistency. No other penetration or aspiration.     EEG Coma or Sleep Only    Result Date: 2022  ELECTROENCEPHALOGRAM (EEG) REPORT [facility] NEUROLOGY [place] [address] [place] [address] Tel: [phone]  Fax: [phone] [email or web address] Peewee Hess,  1965, MRN [id number] PCP: Sarah Canseco Date: 2022 Principal Diagnosis: Altered mental status History : Patient is being evaluated for altered mental status. Medication listed includes Ativan Description of the Recording:  This is a multichannel digital EEG recording done using the international 10-20 placement system. Background of the recording consists of irregular 1 to 3 Hz polymorphic delta activity with amplitudes ranging between 20 to 30  V.  Alerting procedure produce minimal change.  Photic stimulation performed with standard protocol produced minimal change.  No transient sleep patterns were recorded. During this recording, no sharp discharges, spikes or electrographic seizure activity was recorded. Impression: This is an abnormal EEG due to diffusely slow polymorphic delta activity that minimally attenuates with alerting procedures.  This EEG suggests nonspecific generalized cerebral dysfunction.  No focal slowing or periodic discharges were seen to suggest seizures. Classification: Delta grade I generalized Tiburcio Gallagher MD [facility]  [facility] (Formerly, [facility] This note was dictated using voice recognition software.  Any grammatical or context distortions are unintentional and inherent to the software.

## 2022-02-18 NOTE — SIGNIFICANT EVENT
Significant Event Note    Time of event: 11:23 PM February 17, 2022    Description of event:  Notified of elevated lactic acid of 2.1. Patient was hypotensive when sepsis protocol fired. Appears to have had similar findings last evening. On Meropenem for pneumonia. Lactate believed to be drawn prior to 500 mL bolus for hypotension.    Goals of care discussion today with primary team yielded decision of no ICU or intubation if deterioration. Would move to comfort cares if further deterioration.    Plan:  Continue meropenem. Recheck lactic acid in 4 hours.    Discussed with: bedside nurse    Juju Seaman MD

## 2022-02-18 NOTE — PROGRESS NOTES
Daily Progress Note    Assessment/Plan:  56 year old male with past medical history of alcohol abuse, hypertension, DM-II, multiple abdominal surgeries who was admitted on 1/15/22 with alcohol withdrawl, possible aspiration pneumonia leading to respiratory failure requiring intubation on 1/16, successfully extubated on 1/23/22 and now transferred out of ICU on 1/26/22 for floor care.  Tested positive for Covid on 1/22/2022 in the hospital  Did improve initially and was pending TCU placement until patient got worsening respiratory condition on 2/11/2022 and CT chest showed worsening pneumonia so restarted on IV antibiotic with Vanco and Zosyn.  Now white count up with elevated lactate and hypotension, ID consulted and now on just meropenem.      Acute respiratory failure with hypoxia.  -Thought secondary to aspiration pneumonia/ COVID/hospital-acquired pneumonia  -Was intubated 1/16, extubated on 1/23.   Required BiPAP for a while but now on oxygen mask oxygen  - Cont albuterol nebs and pulm hygiene  - CT PE was negative for PE on admission  S/b speech therapy     Leukocytosis: ID consulted and now on meropenem.  White count much improved today since starting IV antibiotics but it is unclear what source we are treating.?  Recurrent aspiration     Recovered Covid  -- Weekly COVID (per protocol) came back positive. He was negative on admission on 1/22. Check COVID labs. Start Covid special precaution.   - Finished IV remdesivir and Dexamethasone course. -  - Received dose of tocilizumab/per pulmonology recommendations on 1/31/2022.  -Try to taper off oxygen; thus discontinue dexamethasone on 2/5     DM type 2  Very labile blood sugars  - Hemoglobin A1c was 7.0 on 1/15/22  - Very sensitive to Lantus as patient goes hypoglycemic even with 5 units  Stop Metformin due to lactic acidosis  Continue sliding scale insulin and carb counting      Septic shock-resolved  -- Likely due to multifocal pneumonia.  -- Metoprolol  started on 1/25 for sinus tachycardia  -- Patient is having intermittent hypotension.   - Echo shows EF of 65% with mild concentric left ventricular hypertrophy  - Started on midodrine for persistent low blood pressure     Acute toxic metabolic encephalopathy  - Patient has significant alcohol withdrawal during the intubation and was on Precedex  - Precedex weaned off. Cont with thiamine. Ativan prn.  - Alcohol level less than 10 on admission.    - Collateral information from wife-he did not find any evidence of patient drinking recently.    - Brain MRI shows chronic CVAs with brain atrophy  - Patient denies drinking prior to hospitalization.  Most recent chemical dependency treatment in 2001.  Since then patient tried to quit drinking numerous times, ending up in the hospital with withdrawal.  He is on disability for 2 years, in the past was a nicole.  - Reconsult psychiatry who are recommending:    Duloxetine 60 mg daily.  Continue gabapentin 200 mg twice a day  Seroquel 12.5 mg TID x 6 doses   Seroquel 75 mg at bedtime continue.  Rozerem 8 mg at bedtime.  Continue to utilize olanzapine 5-10 mg every 6 hours as needed for agitation, psychosis.  Efforts at sleep regulation.  For daytime anxiety would recommend adding Seroquel 6.25 mg in the morning and afternoon.     Hypokalemia/hypomagnesia  Replace per protocol     Diarrhea  This is resolved     Significant weakness and deconditioning  - Secondary to above  - Pending TCU placement     Severe malnutrition  - Protein and calories  - Dietitian consult, continue supplement  - Patient still has poor oral intake     S/P fall   - Patient had incident of fall this morning 2/12/2022, unwitnessed  - Evaluated by house officer and a CT head is negative for significant changes  - Continue fall precautions     Goals of care  -Previous rounder discussed details goals of care with patient's wife Daina on 2/14/2022  - Patient has poor quality of life prior to admission  because of his alcohol use and previous CVAs  - Wife declined going through aggressive care again as what happened on admission with intubation.  -Palliative care following  -He is now DNR and his wife desires no further aggressive interventions.  If his blood pressure goes low or he would not be transferred back to the ICU, he would not get pressors.  I reaffirmed this with her again today.          VTE prophylaxis:  Enoxaparin (Lovenox) SQ  DIET: Orders Placed This Encounter      Combination Diet Moderate Consistent Carb (60 g CHO per Meal) Diet; Easy to Chew (level 7); Mildly Thick (level 2)        Disposition/Barriers to discharge: Improve mental condition, IV antibiotic, elevated white count  Code Status: No CPR- Do NOT Intubate     Code status:No CPR- Do NOT Intubate      Subjective:  Patient remains on oxygen, still has breathing difficulty,      Current Medications Reviewed via EHR List    Objective:  Vital signs in last 24 hours:  [unfilled]  .prog  Weight:   @THISENCWEIGHTS(1)@  Weight change:   Body mass index is 20.68 kg/m .    Intake/Output last 3 shifts:  I/O last 3 completed shifts:  In: 1400 [P.O.:300; I.V.:1100]  Out: 300 [Urine:300]  Intake/Output this shift:  No intake/output data recorded.    Physical Exam:  General: No apparent distress  CV: Regular rate and rhythm  Lungs: Clear to auscultation  Abdomen: Soft, nontender        Imaging:  Personally Reviewed.  MR Brain w/o Contrast    Result Date: 1/22/2022  EXAM: MR BRAIN W/O CONTRAST LOCATION: [facility] DATE/TIME: 1/22/2022 1:05 PM INDICATION: Encephalopathy COMPARISON: CT head 01/18/2022, MRI head 11/23/2020 TECHNIQUE: Routine multiplanar multisequence head MRI without intravenous contrast. FINDINGS: INTRACRANIAL CONTENTS: Please note study is significantly degraded by motion artifact. Suggestion of a few small foci of restricted diffusion at the left temporal occipital region; best appreciated on repeat axial  diffusion weighted sequence series 20.2 image 12, image 11; as well as repeat coronal diffusion weighted sequence series 24.2 image 9. No definite associated hemorrhage or significant mass effect. Redemonstration of small chronic infarction left precentral gyrus. Mild generalized cerebral atrophy. No hydrocephalus. Normal position of the cerebellar tonsils. Small chronic infarction lateral aspect left cerebellum. A few additional tiny infarctions demonstrated on prior exam are not well demonstrated on the current. SELLA: Not well-visualized, grossly normal. OSSEOUS STRUCTURES/SOFT TISSUES: Normal marrow signal. Flow voids are not well-visualized. ORBITS: Not well-visualized. SINUSES/MASTOIDS: The complete opacification left maxillary and left sphenoid sinus, similar to prior. Small amount of opacification right mastoid air cells.     IMPRESSION: 1.  Please note study is significantly degraded by motion artifact. 2.  Suggestion of a few small foci of acute/early subacute infarction at the left temporal occipital region. 3.  No definite associated hemorrhage or significant mass effect. 4.  Small chronic infarction left precentral gyrus, similar to prior. 5.  Probable few small chronic infarctions cerebellar hemispheres. 6.  Mild atrophy.    Echocardiogram Complete    Result Date: 2022  [id number]^ALEYDA^[place]N^[place] [address]  Name: KAREY LIVINGSTON MRN: [id number] : 1965 Study Date: 2022 07:37 AM Age: 56 yrs Gender: Male Patient Location: [facility] Reason For Study: Hypertension (HTN) Ordering Physician: LYN MAYNARD Performed By: TAYLER  BSA: 1.9 m2 Height: 72 in Weight: 147 lb HR: 123 BP: 97/59 mmHg ______________________________________________________________________________ Procedure Complete Portable Echo Adult. Technically difficult study.Extremely poor acoustic windows. Compared to the prior study dated 2021, there have been  no changes. No hemodynamically significant valvular abnormalities on 2D or color flow imaging. ______________________________________________________________________________ Interpretation Summary  1.Left ventricular size, wall motion and function are normal. The ejection fraction is > 65%. 2.There is mild concentric left ventricular hypertrophy. 3.Normal right ventricle size and systolic function. 4.No hemodynamically significant valvular abnormalities on 2D or color flow imaging. Compared to the prior study dated 11/5/2021, there have been no changes. ______________________________________________________________________________ I      WMSI = 1.00     % Normal = 100  X - Cannot   0 -                      (2) - Mildly 2 -          Segments  Size Interpret    Hyperkinetic 1 - Normal  Hypokinetic  Hypokinetic  1-2     small                                                    7 -          3-5    moderate 3 - Akinetic 4 -          5 -         6 - Akinetic Dyskinetic   6-14    large              Dyskinetic   Aneurysmal  w/scar       w/scar       15-16   diffuse  Left Ventricle Left ventricular size, wall motion and function are normal. The ejection fraction is > 65%. There is mild concentric left ventricular hypertrophy. Left ventricular diastolic function is normal. No regional wall motion abnormalities noted.  Right Ventricle Normal right ventricle size and systolic function. TAPSE is normal, which is consistent with normal right ventricular systolic function.  Atria Normal left atrial size. Right atrial size is normal. There is no color Doppler evidence of an atrial shunt.  Mitral Valve Mitral valve leaflets appear normal. There is no evidence of mitral stenosis or clinically significant mitral regurgitation. There is no mitral regurgitation noted. There is no mitral valve stenosis.  Tricuspid Valve Tricuspid valve leaflets appear normal. Right ventricle systolic pressure estimate normal. There is trace tricuspid  regurgitation. There is no tricuspid stenosis.  Aortic Valve Aortic valve leaflets appear normal. There is no evidence of aortic stenosis or clinically significant aortic regurgitation. The aortic valve is trileaflet. No aortic regurgitation is present. No aortic stenosis is present.  Pulmonic Valve The pulmonic valve is not well seen, but is grossly normal. This degree of valvular regurgitation is within normal limits. There is no pulmonic valvular stenosis.  Vessels The aorta root is normal. Normal size ascending aorta. IVC diameter <2.1 cm collapsing >50% with sniff suggests a normal RA pressure of 3 mmHg.  Pericardium There is no pericardial effusion.  Rhythm The rhythm was sinus tachycardia.  ______________________________________________________________________________ MMode/2D Measurements & Calculations IVSd: 1.3 cm LVIDd: 4.1 cm LVIDs: 2.3 cm LVPWd: 1.2 cm FS: 43.4 %  LV mass(C)d: 182.6 grams LV mass(C)dI: 97.7 grams/m2 Ao root diam: 3.3 cm LA dimension: 3.6 cm asc Aorta Diam: 3.8 cm LA/Ao: 1.1 LVOT diam: 2.2 cm LVOT area: 3.8 cm2 LA Volume (BP): 29.3 ml LA Volume Index (BP): 15.7 ml/m2  LA Volume Indexed (AL/bp): 17.3 ml/m2 RWT: 0.61  Doppler Measurements & Calculations MV E max gustavo: 132.0 cm/sec MV A max gustavo: 137.1 cm/sec MV E/A: 0.96 MV dec slope: 1104 cm/sec2 MV dec time: 0.12 sec Ao V2 max: 143.6 cm/sec Ao max P.0 mmHg Ao V2 mean: 108.6 cm/sec Ao mean P.2 mmHg Ao V2 VTI: 26.8 cm LELO(I,D): 3.5 cm2 LELO(V,D): 3.9 cm2 LV V1 max P.6 mmHg LV V1 max: 146.7 cm/sec LV V1 VTI: 24.2 cm SV(LVOT): 92.9 ml SI(LVOT): 49.7 ml/m2 PA V2 max: 107.4 cm/sec PA max P.6 mmHg PA acc time: 0.07 sec AV Gustavo Ratio (DI): 1.0 LELO Index (cm2/m2): 1.9 E/E' av.4  Lateral E/e': 18.6 Medial E/e': 20.2  ______________________________________________________________________________ Report approved by: Randal Villa 2022 09:14 AM       XR Chest Port 1 View    Result Date: 2022  EXAM: XR CHEST PORT 1  VIEW LOCATION: [facility] DATE/TIME: 2/16/2022 8:33 AM INDICATION: dyspnea COMPARISON: CT pulmonary angiogram 02/11/2022     IMPRESSION: Abnormal increased lung attenuation diffusely through both lungs. There are subsegmental foci of more focal airspace opacity in the peripheral third of the left mid and lower lung, similar to the prior CT. Subpleural emphysema is present in the apices. No new focal airspace opacity or volume loss. Symmetric apical and left lateral pleural thickening has not increased. No pleural effusion or pneumothorax. Cardiac silhouette is not enlarged. Unchanged aortic and other mediastinal interfaces. There are metallic fragments projecting in the left upper quadrant. Chronic left posterolateral rib fracture deformities are ununited.    XR Chest Port 1 View    Result Date: 1/29/2022  EXAM: XR CHEST PORT 1 VIEW LOCATION: [facility] DATE/TIME: 1/29/2022 6:18 AM INDICATION: Pneumonia follow up. COMPARISON: 01/28/2022 chest CT.01/19/2022 radiograph.     IMPRESSION: Relatively diffuse left lung opacities have increased since the 01/19/2022 chest radiograph, and perhaps slightly increased since the 01/20/2022 chest CT. Differentials for the increased opacities include asymmetric pulmonary edema, infectious / inflammatory process or layering pleural fluid. Small left pleural effusion is present. Right lung is hypoexpanded and may have a few subtle scattered opacities. No pneumothorax. Normal heart size. Atherosclerotic aorta.     XR Chest Port 1 View    Result Date: 1/19/2022  EXAM: XR CHEST PORT 1 VIEW LOCATION: [facility] DATE/TIME: 1/19/2022 10:52 AM INDICATION: after advancing et tube COMPARISON: 1/18/2022.     IMPRESSION: Endotracheal tube is been advanced. Tip is 4 cm above the igor in good position. There our persistent bibasilar pulmonary opacities with partial clearing at the right base from the prior study and no  change at the left base. No pneumothorax. Enteric tube tip is not visualized tip is below the diaphragm. PICC catheter from right upper extremity approach is unchanged with tip at the junction of the superior vena cava and right atrium. Left posterior rib fractures are again noted as well as deformity of the right humeral head.    XR Chest Port 1 View    Result Date: 1/18/2022  EXAM: XR CHEST PORT 1 VIEW LOCATION: [facility] DATE/TIME: 1/18/2022 6:06 AM INDICATION: Location of ET tube COMPARISON: 01/16/2022.     IMPRESSION:Endotracheal tube is in the trachea at the level of the clavicular heads with tip 6 cm above the igor. PICC catheter from right upper extremity approach is in the distal superior vena cava near its junction with the right atrium. Enteric tube tip is below the diaphragm and not visualized. There is no pneumothorax. Again noted are bilateral patchy airspace opacities there has been increased consolidation or atelectasis at the right medial lung base. There are old healed left posterior rib  fractures no acute fractures are identified.    CT Abdomen Pelvis w Contrast    Result Date: 2/15/2022  EXAM: CT ABDOMEN PELVIS W CONTRAST LOCATION: [facility] DATE/TIME: 2/15/2022 9:50 PM INDICATION: Leukocytosis. COMPARISON: 10/05/2020 TECHNIQUE: CT scan of the abdomen and pelvis was performed following injection of IV contrast. Multiplanar reformats were obtained. Dose reduction techniques were used. CONTRAST: ISOVUE 370 100ML FINDINGS: LOWER CHEST: Bibasilar infiltrates. Small right and trace left pleural effusion. HEPATOBILIARY: Hepatic steatosis. Cholelithiasis and gallbladder distention. PANCREAS: Pancreatic calcifications suggesting chronic pancreatitis. Prominent calcification at the neck of the pancreas. Difficult to tell if this is parenchymal or intraductal. The distal pancreas is atrophic. SPLEEN: Splenectomy with accessory splenule. ADRENAL GLANDS:  Normal. KIDNEYS/BLADDER: Small nonobstructing renal calculi. BOWEL: Gastric and small bowel wall thickening. Central mesenteric congestion. Trace amount of free fluid. Diverticulosis with presumed retained barium. Wall thickening of the proximal colon. Rectal wall thickening versus underdistention. LYMPH NODES: Subcentimeter mesenteric and peripancreatic lymph nodes. VASCULATURE: Atherosclerotic vascular calcification. PELVIC ORGANS: Small amount of free fluid. MUSCULOSKELETAL: Degenerative change osseous structures. Anasarca. Remote and subacute rib fractures.     IMPRESSION: 1.  Wall thickening of stomach, small bowel and proximal colon. Correlate for gastroenteritis/enterocolitis. 2.  Mesenteric congestion and small amount of free fluid. 3.  Bilateral pulmonary infiltrates. Small right and trace left pleural effusion. 4.  Focal rectal wall thickening versus underdistention. Follow-up recommended to exclude underlying lesion. 5.  Hepatic steatosis. 6.  Diverticulosis. 7.  Cholelithiasis and mild gallbladder distention. 8.  Nonobstructing renal calculi. 9.  Coarse pancreatic calcifications again seen    CT Chest Pulmonary Embolism w Contrast    Result Date: 2/11/2022  EXAM: CT CHEST PULMONARY EMBOLISM W CONTRAST LOCATION: [facility] DATE/TIME: 2/11/2022 1:42 PM INDICATION: PE suspected, low/intermediate prob, positive D-dimer, hypoxia, history of Covid COMPARISON: 01/28/2022 TECHNIQUE: CT chest pulmonary angiogram during arterial phase injection of IV contrast. Multiplanar reformats and MIP reconstructions were performed. Dose reduction techniques were used. CONTRAST: IsoVue 370 100mL FINDINGS: ANGIOGRAM CHEST: No pulmonary artery filling defects. Normal caliber aorta. LUNGS AND PLEURA: Moderate emphysema. Persistent but increased patchy groundglass and consolidative pulmonary opacities with areas of interlobular septal thickening. Mild bronchiectasis and bronchial wall thickening have  increased when compared to prior exam. Trace effusions have decreased. No pneumothorax. MEDIASTINUM/AXILLAE: Heart size is normal. No adenopathy. CORONARY ARTERY CALCIFICATION: None. UPPER ABDOMEN: Hepatic steatosis. Cholelithiasis. There is diffuse wall thickening of the stomach. Small volume ascites. Nonobstructing bilateral renal calculi. Prominent calcification in the area of the pancreatic neck is unchanged. Splenectomy with small accessory splenule. MUSCULOSKELETAL: Degenerative changes of the spine. Prominent L1 Schmorl's node. Similar appearance of the bilateral rib fractures. Mild anasarca.     IMPRESSION: 1.  No pulmonary embolus. 2.  Interval worsening of bilateral pulmonary opacities, bronchiectasis, bronchial wall thickening. 3.  Hepatic steatosis. 4.  Cholelithiasis. 5.  Diffuse wall thickening of the visualized stomach may indicate gastritis. 6.  Manifestations of third spacing.    CT Chest w/o Contrast    Result Date: 1/28/2022  EXAM: CT CHEST W/O CONTRAST LOCATION: [facility] DATE/TIME: 1/28/2022 2:36 PM INDICATION: Cough, persistent COMPARISON: 10/15/2022 TECHNIQUE: CT chest without IV contrast. Multiplanar reformats were obtained. Dose reduction techniques were used. CONTRAST: None. FINDINGS: LUNGS AND PLEURA: Upper lung predominant centrilobular and paraseptal emphysema. Improvement in groundglass and interstitial opacities in the right lung. Slight worsening of groundglass and interstitial opacities in the left upper lobe. New airway wall thickening in the left upper lobe and left lower lobe and new consolidation in the dependent portion of the left upper lobe and at the left lung base. Mucoid impaction in the left lower lobe airways. Unchanged left pleural thickening and trace right pleural effusion. MEDIASTINUM/AXILLAE: No thoracic aortic aneurysm. No lymphadenopathy. CORONARY ARTERY CALCIFICATION: None. UPPER ABDOMEN: Hepatic steatosis. Cholelithiasis.  MUSCULOSKELETAL: There are demonstrated bilateral rib fractures.     IMPRESSION: 1.  New airway wall thickening in the left upper and lower lobes with new consolidation in the dependent portion of the left upper lobe and at the left lung base, and mucoid impaction in left lower lobe airways, suspicious for aspiration. 2.  Overall improvement in groundglass and interstitial opacities in the right lung and slight worsening in the left upper lobe.     CT Head w/o Contrast    Result Date: 2/12/2022  EXAM: CT HEAD W/O CONTRAST LOCATION: [facility] DATE/TIME: 2/12/2022 9:20 AM INDICATION: Trauma - Head Injury. Unwitnessed fall. COMPARISON: CT head 01/18/2022 TECHNIQUE: Routine CT Head without IV contrast. Multiplanar reformats. Dose reduction techniques were used. FINDINGS: INTRACRANIAL CONTENTS: No intracranial hemorrhage, extraaxial collection, or mass effect.  No CT evidence of acute infarct. Small focus of chronic cortical encephalomalacia and adjacent gliosis involving the superior left frontal lobe as seen previously.  Mild presumed chronic small vessel ischemic changes. Mild generalized volume loss. No hydrocephalus. VISUALIZED ORBITS/SINUSES/MASTOIDS: No intraorbital abnormality. Complete opacification of the left maxillary sinus with some chronic appearing mural hyperostosis similar to the previous exam. Additional subtotal opacification of the left sphenoid sinus by polypoid mucosal thickening as seen previously. No middle ear or mastoid effusion. BONES/SOFT TISSUES: Mild right parietal scalp swelling. No skull fracture.     IMPRESSION: 1.  No CT evidence for acute intracranial process. 2.  Mild right parietal scalp contusion without associated fracture. 3.  Brain atrophy and presumed chronic microvascular ischemic changes as above. 4.  Inflammatory changes of the paranasal sinuses as seen previously.    CT Head w/o Contrast    Result Date: 1/18/2022  EXAM: CT HEAD W/O CONTRAST LOCATION:  [facility] DATE/TIME: 1/18/2022 3:45 AM INDICATION: Headache, intracranial hemorrhage suspected. COMPARISON: 11/4/2021 TECHNIQUE: Routine CT Head without IV contrast. Multiplanar reformats. Dose reduction techniques were used. FINDINGS: INTRACRANIAL CONTENTS: No intracranial hemorrhage, extraaxial collection, or mass effect. No CT evidence of acute infarct. Mild presumed chronic small vessel ischemic changes. Mild to moderate generalized volume loss. No hydrocephalus. Stable calcified dural based lesion along the right frontal convexity measuring 1 cm in diameter, presumably reflecting a small meningioma. VISUALIZED ORBITS/SINUSES/MASTOIDS: No intraorbital abnormality. Complete opacification of the left maxillary sinus with chronic mucoperiosteal reaction. Mucous retention cyst in the left sphenoid sinus. Mild mucosal thickening along the floor of the left frontal sinus. Opacified left frontoethmoidal recess. No middle ear or mastoid effusion. BONES/SOFT TISSUES: No acute abnormality.     IMPRESSION: 1.  No CT evidence for acute intracranial process. 2.  Stable chronic changes as above.    XR Video Swallow with SLP or OT    Result Date: 2/8/2022  EXAM: XR VIDEO SWALLOW WITH SLP OR OT LOCATION: [facility] DATE/TIME: 2/8/2022 8:52 AM INDICATION: Difficulty swallowing. COMPARISON: 01/31/2022. TECHNIQUE: Routine swallow study with speech pathology using multiple barium thicknesses. FINDINGS: FLUOROSCOPIC TIME: 1.2 minutes NUMBER OF IMAGES: 0 Swallow study with Speech Pathology using multiple barium thicknesses. Small amount of silent aspiration with thin liquid. Penetration with mildly thickened (nectar) liquid. There was persistent mild penetration with chin Tech technique and mildly thickened liquid. No aspiration was solid consistency.     IMPRESSION: 1.  Silent aspiration with thin liquid. Penetration with mildly thickened liquid.     XR Video Swallow with SLP or  OT    Result Date: 2022  EXAM: XR VIDEO SWALLOW WITH SLP OR OT LOCATION: [facility] DATE/TIME: 2022 11:30 AM INDICATION: Difficulty swallowing. COMPARISON: 2022. TECHNIQUE: Routine swallow study with speech pathology using multiple barium thicknesses. FINDINGS: FLUOROSCOPIC TIME: 3.3 minutes NUMBER OF IMAGES: 0 Swallow study with Speech Pathology using multiple barium thicknesses. Tracheal aspiration with thin liquid consistency barium with weak ineffective cough. Penetration into the laryngeal vestibule with mildly thickened (nectar) liquid consistency barium. No penetration or aspiration with honey and pudding consistency barium. Please refer to speech therapy report for additional detail.    XR Video Swallow with SLP or OT    Result Date: 2022  EXAM: XR VIDEO SWALLOW WITH SLP OR OT LOCATION: [facility] DATE/TIME: 2022 3:27 PM INDICATION: Difficulty swallowing. COMPARISON: None. TECHNIQUE: Routine swallow study with speech pathology using multiple barium thicknesses. FINDINGS: FLUOROSCOPIC TIME: 1.5 minutes NUMBER OF IMAGES: 9 Swallow study with Speech Pathology using multiple barium thicknesses. Moderate to deep penetration with thin liquid (no cough reflex). Small amount of penetration with mildly thick consistency. No other penetration or aspiration.     EEG Coma or Sleep Only    Result Date: 2022  ELECTROENCEPHALOGRAM (EEG) REPORT [facility] NEUROLOGY [address] [place] [address] Tel: [phone]  Fax: [phone] [email or web address] RENUKA Yap 1965, MRN [id number] PCP: Sarah Canseco Date: 2022 Principal Diagnosis: Altered mental status History : Patient is being evaluated for altered mental status. Medication listed includes Ativan Description of the Recording:  This is a multichannel digital EEG recording done using the international 10-20 placement system. Background  of the recording consists of irregular 1 to 3 Hz polymorphic delta activity with amplitudes ranging between 20 to 30  V.  Alerting procedure produce minimal change.  Photic stimulation performed with standard protocol produced minimal change.  No transient sleep patterns were recorded. During this recording, no sharp discharges, spikes or electrographic seizure activity was recorded. Impression: This is an abnormal EEG due to diffusely slow polymorphic delta activity that minimally attenuates with alerting procedures.  This EEG suggests nonspecific generalized cerebral dysfunction.  No focal slowing or periodic discharges were seen to suggest seizures. Classification: Delta grade I generalized Tiburcio Gallagher MD [facility] (Former[facility], [facility] This note was dictated using voice recognition software.  Any grammatical or context distortions are unintentional and inherent to the software.       Lab Results:  Personally Reviewed.   Fingerstick Blood Glucose: @[id number](POCGLUFGR:10)@    Last Hbg A1C: No results found for: HGBA1C   Lab Results   Component Value Date    INR 1.67 (H) 01/30/2022    INR 1.72 (H) 01/16/2022    INR 1.69 (H) 01/15/2022     Recent Results (from the past 24 hour(s))   Glucose by meter    Collection Time: 02/17/22  5:28 PM   Result Value Ref Range    GLUCOSE BY METER POCT 107 (H) 70 - 99 mg/dL   Glucose by meter    Collection Time: 02/17/22  9:01 PM   Result Value Ref Range    GLUCOSE BY METER POCT 114 (H) 70 - 99 mg/dL   Lactic Acid STAT    Collection Time: 02/17/22 10:41 PM   Result Value Ref Range    Lactic Acid 2.1 (H) 0.7 - 2.0 mmol/L   Lactic acid whole blood    Collection Time: 02/18/22  3:55 AM   Result Value Ref Range    Lactic Acid 1.6 0.7 - 2.0 mmol/L   Potassium    Collection Time: 02/18/22  3:55 AM   Result Value Ref Range    Potassium 4.2 3.5 - 5.0 mmol/L   WBC and Differential    Collection Time: 02/18/22  3:55 AM    Result Value Ref Range    WBC Count 14.5 (H) 4.0 - 11.0 10e3/uL    % Neutrophils 69 %    % Lymphocytes 22 %    % Monocytes 4 %    % Eosinophils 4 %    % Basophils 1 %    % Immature Granulocytes 0 %    NRBCs per 100 WBC 0 <1 /100    Absolute Neutrophils 10.1 (H) 1.6 - 8.3 10e3/uL    Absolute Lymphocytes 3.1 0.8 - 5.3 10e3/uL    Absolute Monocytes 0.5 0.0 - 1.3 10e3/uL    Absolute Eosinophils 0.6 0.0 - 0.7 10e3/uL    Absolute Basophils 0.2 0.0 - 0.2 10e3/uL    Absolute Immature Granulocytes 0.0 <=0.4 10e3/uL    Absolute NRBCs 0.0 10e3/uL   Glucose by meter    Collection Time: 02/18/22  7:50 AM   Result Value Ref Range    GLUCOSE BY METER POCT 135 (H) 70 - 99 mg/dL   Glucose by meter    Collection Time: 02/18/22  8:30 AM   Result Value Ref Range    GLUCOSE BY METER POCT 144 (H) 70 - 99 mg/dL   Glucose by meter    Collection Time: 02/18/22 12:02 PM   Result Value Ref Range    GLUCOSE BY METER POCT 142 (H) 70 - 99 mg/dL           Advanced Care Planning    Time > 35 minutes with greater than 50% of time spent in counseling and coordination of care.

## 2022-02-18 NOTE — PLAN OF CARE
Problem: Adult Inpatient Plan of Care  Goal: Optimal Comfort and Wellbeing  Outcome: Ongoing, Progressing  Intervention: Provide Person-Centered Care  Recent Flowsheet Documentation  Taken 2/17/2022 1705 by Lucia Jackson, RN  Trust Relationship/Rapport:    care explained    choices provided     Problem: Violence Risk or Actual  Goal: Anger and Impulse Control  Outcome: Ongoing, Progressing     Problem: Fall Injury Risk  Goal: Absence of Fall and Fall-Related Injury  Outcome: Ongoing, Progressing  Intervention: Identify and Manage Contributors  Recent Flowsheet Documentation  Taken 2/17/2022 1705 by Lucia Jackson, RN  Medication Review/Management: medications reviewed  Intervention: Promote Injury-Free Environment  Recent Flowsheet Documentation  Taken 2/17/2022 1705 by Lucia Jackson, RN  Safety Promotion/Fall Prevention:    bed alarm on    assistive device/personal items within reach    clutter free environment maintained    fall prevention program maintained    increased rounding and observation    increase visualization of patient    lighting adjusted    nonskid shoes/slippers when out of bed    patient and family education    room near nurse's station    room door open    room organization consistent    safety round/check completed    toileting scheduled     Problem: Mobility Impairment  Goal: Optimal Mobility  Outcome: Ongoing, Progressing  Intervention: Optimize Mobility  Recent Flowsheet Documentation  Taken 2/17/2022 2004 by Lucia Jackson, RN  Activity Management: activity encouraged  Taken 2/17/2022 1847 by Lucia Jackson, RN  Positioning/Transfer Devices:    pillows    applied     Problem: Adult Inpatient Plan of Care  Goal: Patient-Specific Goal (Individualized)  Outcome: Ongoing, Not Progressing  Goal: Readiness for Transition of Care  Outcome: Ongoing, Not Progressing  Flowsheets (Taken 2/18/2022 0053)  Barriers to Discharge: O2 needs   Goal Outcome Evaluation:  Tolerating discomfort to  "bottom with repositioning, has mepilex in place. Patient likes to have head of bed up, kept encouraging to keep head of bed low to reduce pressure to bottom. Shoulders/ankles discomfort tolerated with PRN tylenol & scheduled Gabapentin. Patient kept asking for non-thickened liquids on shift, reinforced need to keep patient safe. Writer discovered what looked like E-cig pen in patient bed when repositioning. Upon further inspection on pen was written \"[employer].\" Notified Dr. Moore who stated to get security involvement. Security confiscated the pen as could have THC & patient denied knowing where pen came from & gave OK to have destroyed by security. On 10L O2 via Oxymask to keep sats 90-93%. BP 89/61 at beginning of shift, up to 96/63 after scheduled Midodrine. Later BP 83/54 & only able to get temporal temp of 96.7. Notified Dr. Moore & gave 500 mL NS bolus (IV was leaking & new one placed in RUE). BP up to 92/61 & sepsis protocol fired. Lactic at 2.1, notified Dr. Seaman.          "

## 2022-02-19 NOTE — PLAN OF CARE
Problem: Adult Inpatient Plan of Care  Goal: Optimal Comfort and Wellbeing  Outcome: Ongoing, Progressing     Problem: Gas Exchange Impaired  Goal: Optimal Gas Exchange  Intervention: Optimize Oxygenation and Ventilation  Recent Flowsheet Documentation  Taken 2/19/2022 0400 by Briseyda Pires, RN  Head of Bed (HOB) Positioning: HOB at 20-30 degrees  Taken 2/19/2022 0019 by Briseyda Pires RN  Head of Bed (HOB) Positioning: HOB at 20-30 degrees  Pt requiring 12 liters oxygen with oxymask overnight.  Sats drastically fluctuating with any movement. Sats range from 70's to upper 90's.  Oximeter probes changed x2 with better results.  Continuing to monitor.

## 2022-02-19 NOTE — PLAN OF CARE
Problem: Adult Inpatient Plan of Care  Goal: Absence of Hospital-Acquired Illness or Injury  Intervention: Identify and Manage Fall Risk  Recent Flowsheet Documentation  Taken 2/18/2022 1607 by Omari Ritchie, RN  Safety Promotion/Fall Prevention:   bed alarm on   assistive device/personal items within reach  Taken 2/18/2022 0800 by Omari Ritchie, RN  Safety Promotion/Fall Prevention:   bed alarm on   assistive device/personal items within reach     Problem: Adult Inpatient Plan of Care  Goal: Patient-Specific Goal (Individualized)  Outcome: Ongoing, Progressing     Problem: Ventilator-Induced Lung Injury (Mechanical Ventilation, Invasive)  Goal: Absence of Ventilator-Induced Lung Injury  Intervention: Prevent Ventilator-Associated Pneumonia  Recent Flowsheet Documentation  Taken 2/18/2022 1607 by Omari Ritchie, RN  Head of Bed (HOB) Positioning: HOB at 20-30 degrees  Taken 2/18/2022 0800 by Omari Ritchie, RN  Head of Bed (HOB) Positioning: HOB at 20-30 degrees   Goal Outcome Evaluation:    Plan of Care Reviewed With: patient     Overall Patient Progress: improving    Outcome Evaluation: Awake and following commands @ this time; precedex decreased, norepi off. Dignicare removed 2/2 failure to drain; large incontinent BM, replaced Dignicare with good patency. Tolerating SBT. Updated spouse Daina this afternoon.  No verbal or nonverbal expressions of pain, Left note for MD for wound consult for coccyx wound, O2 sats 91% 8L Oxymask. Patient needs reminders to cough/deep breath and use IS.

## 2022-02-19 NOTE — SIGNIFICANT EVENT
Sepsis BPA fired and pt hypotensive.  Call placed to house officer to update on pt status.  New order for 500 ml fluid bolus.  0530-Lactic acid 1.3

## 2022-02-19 NOTE — PLAN OF CARE
Problem: Adult Inpatient Plan of Care  Goal: Plan of Care Review  Outcome: Ongoing, Progressing     Problem: Adult Inpatient Plan of Care  Goal: Absence of Hospital-Acquired Illness or Injury  Intervention: Prevent Skin Injury  Recent Flowsheet Documentation  Taken 2/19/2022 0800 by Omari Ritchie, RN  Body Position: prone     Problem: Adult Inpatient Plan of Care  Goal: Absence of Hospital-Acquired Illness or Injury  Intervention: Prevent and Manage VTE (Venous Thromboembolism) Risk  Recent Flowsheet Documentation  Taken 2/19/2022 0800 by Omari Ritchie, RN  Activity Management: activity adjusted per tolerance   Goal Outcome Evaluation:    Plan of Care Reviewed With: patient     Overall Patient Progress: improving  Patient O2 sats 93% 7 L, oxymask dyspnea with exertion.   Outcome Evaluation: Hospice consult pending.

## 2022-02-19 NOTE — PROGRESS NOTES
Care Management Follow Up    Length of Stay (days): 35    Expected Discharge Date: 02/21/2022        Concerns to be Addressed:   Alteration in respiratory status requiring supplemental oxygen at 12 liters, IV Meropenem every 8 hours. Hospice consult pending. May need insurance authorization for transitional care.   Patient plan of care discussed at interdisciplinary rounds: Yes    Anticipated Discharge Disposition:  Transitional care.     Anticipated Discharge Services:  To be determined by destination, patient/family preferences, medical needs and mobility status closer to the time of discharge.  Anticipated Discharge DME:  To be determined.     Patient/family educated on Medicare website which has current facility and service quality ratings:  Yes  Education Provided on the Discharge Plan:  Per team  Patient/Family in Agreement with the Plan:  Yes    Referrals Placed by CM/SW:  See below  Private pay costs discussed: Not applicable     Additional Information:  Anticipate patient will discharge to TCU but note that hospice has been consulted.  Patient had been accepted at [facility] who is following alone pending medical clearance for discharge.  SW addressed CD consult with patient who is interested in CD treatement but likely will have to go to TCU first. [facility] transport is anticipated.       Susan Joseph RN

## 2022-02-20 NOTE — PLAN OF CARE
Problem: Gas Exchange Impaired  Goal: Optimal Gas Exchange  Intervention: Optimize Oxygenation and Ventilation  Recent Flowsheet Documentation  Taken 2/20/2022 0303 by Briseyda Pires RN  Head of Bed (HOB) Positioning: HOB at 20-30 degrees  Pt continues to fluctuate with oxygen needs.  Started the shift on 10 liters satting well when sleeping.  When pt awake he can be restless causing sats to drop.  Pt sats do not recover quickly though pt does not display signs of respiratory distress.  O2 increased to 12 liters overnight.  Pt cognition fluctuates.  Does have some forgetfulness and confusion at times.  Pleasant and cooperative.     Goal Outcome Evaluation:    Plan of Care Reviewed With: patient     Overall Patient Progress: improving    Outcome Evaluation: Hospice consult pending.

## 2022-02-20 NOTE — CONSULTS
Writer spoke with Spouse Diana via phone to discuss Hospice Philosophy, benefits and services under Medicare. Goals for care are restorative, get stronger to be independent. Diana was familiar with hospice services as her father was on hospice.  Diana stated she is unable to care for patient in the home and provide 24 hour cares as she is working full time. She also care for her elderly mother. When patient is medically stable, Diana would like patient to discharge to [facility] TCU for PT/OT.  Writer will continued to monitor and assist with discharge planning as needed. Updated Dr. Hayden and Susan HENAO CM.       Thank you this referral,    Leda Nguyen, RN, BSN, PHN   RN Hospice Referral Specialist   [facility]   [phone]   24 hour line: [phone]

## 2022-02-20 NOTE — PROGRESS NOTES
Daily Progress Note    Assessment/Plan:  56 year old male with past medical history of alcohol abuse, hypertension, DM-II, multiple abdominal surgeries who was admitted on 1/15/22 with alcohol withdrawl, possible aspiration pneumonia leading to respiratory failure requiring intubation on 1/16, successfully extubated on 1/23/22 and now transferred out of ICU on 1/26/22 for floor care.  Tested positive for Covid on 1/22/2022 in the hospital  Did improve initially and was pending TCU placement until patient got worsening respiratory condition on 2/11/2022 and CT chest showed worsening pneumonia so restarted on IV antibiotic with Vanco and Zosyn.  Now white count up with elevated lactate and hypotension, ID consulted and now on just meropenem.      Acute respiratory failure with hypoxia.  -Thought secondary to aspiration pneumonia/ COVID/hospital-acquired pneumonia  -Was intubated 1/16, extubated on 1/23.   Required BiPAP for a while but now on oxygen mask oxygen  - Cont albuterol nebs and pulm hygiene  - CT PE was negative for PE on admission  S/b speech therapy     Leukocytosis: ID consulted and now on meropenem.  White count much improved today since starting IV antibiotics but it is unclear what source we are treating.?  Recurrent aspiration     Recovered Covid  -- Weekly COVID (per protocol) came back positive. He was negative on admission on 1/22. Check COVID labs. Start Covid special precaution.   - Finished IV remdesivir and Dexamethasone course. -  - Received dose of tocilizumab/per pulmonology recommendations on 1/31/2022.  -Try to taper off oxygen; thus discontinue dexamethasone on 2/5     DM type 2  Very labile blood sugars  - Hemoglobin A1c was 7.0 on 1/15/22  - Very sensitive to Lantus as patient goes hypoglycemic even with 5 units  Stop Metformin due to lactic acidosis  Continue sliding scale insulin and carb counting      Septic shock-resolved  -- Likely due to multifocal pneumonia.  -- Metoprolol  started on 1/25 for sinus tachycardia  -- Patient is having intermittent hypotension.   - Echo shows EF of 65% with mild concentric left ventricular hypertrophy  - Started on midodrine for persistent low blood pressure     Acute toxic metabolic encephalopathy  - Patient has significant alcohol withdrawal during the intubation and was on Precedex  - Precedex weaned off. Cont with thiamine. Ativan prn.  - Alcohol level less than 10 on admission.    - Collateral information from wife-he did not find any evidence of patient drinking recently.    - Brain MRI shows chronic CVAs with brain atrophy  - Patient denies drinking prior to hospitalization.  Most recent chemical dependency treatment in 2001.  Since then patient tried to quit drinking numerous times, ending up in the hospital with withdrawal.  He is on disability for 2 years, in the past was a nicole.  - Reconsult psychiatry who are recommending:    Duloxetine 60 mg daily.  Continue gabapentin 200 mg twice a day  Seroquel 12.5 mg TID x 6 doses   Seroquel 75 mg at bedtime continue.  Rozerem 8 mg at bedtime.  Continue to utilize olanzapine 5-10 mg every 6 hours as needed for agitation, psychosis.  Efforts at sleep regulation.  For daytime anxiety would recommend adding Seroquel 6.25 mg in the morning and afternoon.     Hypokalemia/hypomagnesia  Replace per protocol     Diarrhea  This is resolved     Significant weakness and deconditioning  - Secondary to above  - Pending TCU placement     Severe malnutrition  - Protein and calories  - Dietitian consult, continue supplement  - Patient still has poor oral intake     S/P fall   - Patient had incident of fall this morning 2/12/2022, unwitnessed  - Evaluated by house officer and a CT head is negative for significant changes  - Continue fall precautions     Goals of care  -Previous rounder discussed details goals of care with patient's wife Daina on 2/14/2022  - Patient has poor quality of life prior to admission  because of his alcohol use and previous CVAs  - Wife declined going through aggressive care again as what happened on admission with intubation.  -Palliative care following  -He is now DNR and his wife desires no further aggressive interventions.  If his blood pressure goes low or he would not be transferred back to the ICU, he would not get pressors.  I reaffirmed this with her again today.    Patient deteriorating clinically, hypoxic, hypotensive, per wife and family recommendations previously, hospice consult pending, await palliative care recommendations next week     VTE prophylaxis:  Enoxaparin (Lovenox) SQ  DIET: Orders Placed This Encounter      Combination Diet Moderate Consistent Carb (60 g CHO per Meal) Diet; Easy to Chew (level 7); Mildly Thick (level 2)        Disposition/Barriers to discharge: Improve mental condition, IV antibiotic, elevated white count  Code Status: No CPR- Do NOT Intubate     Code status:No CPR- Do NOT Intubate      Subjective:  Remains oxygen dependent, otherwise no complaints      Current Medications Reviewed via EHR List    Objective:  Vital signs in last 24 hours:  [unfilled]  .prog  Weight:   @THISENCWEIGHTS(1)@  Weight change:   Body mass index is 20.68 kg/m .    Intake/Output last 3 shifts:  I/O last 3 completed shifts:  In: -   Out: 275 [Urine:275]  Intake/Output this shift:  I/O this shift:  In: 240 [P.O.:240]  Out: -     Physical Exam:  General: No apparent distress  CV: Regular rate and rhythm  Lungs: Clear to auscultation  Abdomen: Soft, nontender        Imaging:  Personally Reviewed.  MR Brain w/o Contrast    Result Date: 1/22/2022  EXAM: MR BRAIN W/O CONTRAST LOCATION: [facility] DATE/TIME: 1/22/2022 1:05 PM INDICATION: Encephalopathy COMPARISON: CT head 01/18/2022, MRI head 11/23/2020 TECHNIQUE: Routine multiplanar multisequence head MRI without intravenous contrast. FINDINGS: INTRACRANIAL CONTENTS: Please note study is significantly  degraded by motion artifact. Suggestion of a few small foci of restricted diffusion at the left temporal occipital region; best appreciated on repeat axial diffusion weighted sequence series 20.2 image 12, image 11; as well as repeat coronal diffusion weighted sequence series 24.2 image 9. No definite associated hemorrhage or significant mass effect. Redemonstration of small chronic infarction left precentral gyrus. Mild generalized cerebral atrophy. No hydrocephalus. Normal position of the cerebellar tonsils. Small chronic infarction lateral aspect left cerebellum. A few additional tiny infarctions demonstrated on prior exam are not well demonstrated on the current. SELLA: Not well-visualized, grossly normal. OSSEOUS STRUCTURES/SOFT TISSUES: Normal marrow signal. Flow voids are not well-visualized. ORBITS: Not well-visualized. SINUSES/MASTOIDS: The complete opacification left maxillary and left sphenoid sinus, similar to prior. Small amount of opacification right mastoid air cells.     IMPRESSION: 1.  Please note study is significantly degraded by motion artifact. 2.  Suggestion of a few small foci of acute/early subacute infarction at the left temporal occipital region. 3.  No definite associated hemorrhage or significant mass effect. 4.  Small chronic infarction left precentral gyrus, similar to prior. 5.  Probable few small chronic infarctions cerebellar hemispheres. 6.  Mild atrophy.    Echocardiogram Complete    Result Date: 2022  [id number]^ALEYDA^LYN^[place] [address]  Name: KAREY LIVINGSTON MRN: [id number] : 1965 Study Date: 2022 07:37 AM Age: 56 yrs Gender: Male Patient Location: [facility] Reason For Study: Hypertension (HTN) Ordering Physician: LYN MYANARD Performed By: TAYLER  BSA: 1.9 m2 Height: 72 in Weight: 147 lb HR: 123 BP: 97/59 mmHg ______________________________________________________________________________  Procedure Complete Portable Echo Adult. Technically difficult study.Extremely poor acoustic windows. Compared to the prior study dated 11/5/2021, there have been no changes. No hemodynamically significant valvular abnormalities on 2D or color flow imaging. ______________________________________________________________________________ Interpretation Summary  1.Left ventricular size, wall motion and function are normal. The ejection fraction is > 65%. 2.There is mild concentric left ventricular hypertrophy. 3.Normal right ventricle size and systolic function. 4.No hemodynamically significant valvular abnormalities on 2D or color flow imaging. Compared to the prior study dated 11/5/2021, there have been no changes. ______________________________________________________________________________ I      WMSI = 1.00     % Normal = 100  X - Cannot   0 -                      (2) - Mildly 2 -          Segments  Size Interpret    Hyperkinetic 1 - Normal  Hypokinetic  Hypokinetic  1-2     small                                                    7 -          3-5    moderate 3 - Akinetic 4 -          5 -         6 - Akinetic Dyskinetic   6-14    large              Dyskinetic   Aneurysmal  w/scar       w/scar       15-16   diffuse  Left Ventricle Left ventricular size, wall motion and function are normal. The ejection fraction is > 65%. There is mild concentric left ventricular hypertrophy. Left ventricular diastolic function is normal. No regional wall motion abnormalities noted.  Right Ventricle Normal right ventricle size and systolic function. TAPSE is normal, which is consistent with normal right ventricular systolic function.  Atria Normal left atrial size. Right atrial size is normal. There is no color Doppler evidence of an atrial shunt.  Mitral Valve Mitral valve leaflets appear normal. There is no evidence of mitral stenosis or clinically significant mitral regurgitation. There is no mitral regurgitation noted. There is  no mitral valve stenosis.  Tricuspid Valve Tricuspid valve leaflets appear normal. Right ventricle systolic pressure estimate normal. There is trace tricuspid regurgitation. There is no tricuspid stenosis.  Aortic Valve Aortic valve leaflets appear normal. There is no evidence of aortic stenosis or clinically significant aortic regurgitation. The aortic valve is trileaflet. No aortic regurgitation is present. No aortic stenosis is present.  Pulmonic Valve The pulmonic valve is not well seen, but is grossly normal. This degree of valvular regurgitation is within normal limits. There is no pulmonic valvular stenosis.  Vessels The aorta root is normal. Normal size ascending aorta. IVC diameter <2.1 cm collapsing >50% with sniff suggests a normal RA pressure of 3 mmHg.  Pericardium There is no pericardial effusion.  Rhythm The rhythm was sinus tachycardia.  ______________________________________________________________________________ MMode/2D Measurements & Calculations IVSd: 1.3 cm LVIDd: 4.1 cm LVIDs: 2.3 cm LVPWd: 1.2 cm FS: 43.4 %  LV mass(C)d: 182.6 grams LV mass(C)dI: 97.7 grams/m2 Ao root diam: 3.3 cm LA dimension: 3.6 cm asc Aorta Diam: 3.8 cm LA/Ao: 1.1 LVOT diam: 2.2 cm LVOT area: 3.8 cm2 LA Volume (BP): 29.3 ml LA Volume Index (BP): 15.7 ml/m2  LA Volume Indexed (AL/bp): 17.3 ml/m2 RWT: 0.61  Doppler Measurements & Calculations MV E max gustavo: 132.0 cm/sec MV A max gustavo: 137.1 cm/sec MV E/A: 0.96 MV dec slope: 1104 cm/sec2 MV dec time: 0.12 sec Ao V2 max: 143.6 cm/sec Ao max P.0 mmHg Ao V2 mean: 108.6 cm/sec Ao mean P.2 mmHg Ao V2 VTI: 26.8 cm LELO(I,D): 3.5 cm2 LELO(V,D): 3.9 cm2 LV V1 max P.6 mmHg LV V1 max: 146.7 cm/sec LV V1 VTI: 24.2 cm SV(LVOT): 92.9 ml SI(LVOT): 49.7 ml/m2 PA V2 max: 107.4 cm/sec PA max P.6 mmHg PA acc time: 0.07 sec AV Gustavo Ratio (DI): 1.0 LELO Index (cm2/m2): 1.9 E/E' av.4  Lateral E/e': 18.6 Medial E/e': 20.2   ______________________________________________________________________________ Report approved by: Randal Villa 02/11/2022 09:14 AM       XR Chest Port 1 View    Result Date: 2/16/2022  EXAM: XR CHEST PORT 1 VIEW LOCATION: [facility] DATE/TIME: 2/16/2022 8:33 AM INDICATION: dyspnea COMPARISON: CT pulmonary angiogram 02/11/2022     IMPRESSION: Abnormal increased lung attenuation diffusely through both lungs. There are subsegmental foci of more focal airspace opacity in the peripheral third of the left mid and lower lung, similar to the prior CT. Subpleural emphysema is present in the apices. No new focal airspace opacity or volume loss. Symmetric apical and left lateral pleural thickening has not increased. No pleural effusion or pneumothorax. Cardiac silhouette is not enlarged. Unchanged aortic and other mediastinal interfaces. There are metallic fragments projecting in the left upper quadrant. Chronic left posterolateral rib fracture deformities are ununited.    XR Chest Port 1 View    Result Date: 1/29/2022  EXAM: XR CHEST PORT 1 VIEW LOCATION: [facility] DATE/TIME: 1/29/2022 6:18 AM INDICATION: Pneumonia follow up. COMPARISON: 01/28/2022 chest CT.01/19/2022 radiograph.     IMPRESSION: Relatively diffuse left lung opacities have increased since the 01/19/2022 chest radiograph, and perhaps slightly increased since the 01/20/2022 chest CT. Differentials for the increased opacities include asymmetric pulmonary edema, infectious / inflammatory process or layering pleural fluid. Small left pleural effusion is present. Right lung is hypoexpanded and may have a few subtle scattered opacities. No pneumothorax. Normal heart size. Atherosclerotic aorta.     XR Chest Port 1 View    Result Date: 1/19/2022  EXAM: XR CHEST PORT 1 VIEW LOCATION: [facility] DATE/TIME: 1/19/2022 10:52 AM INDICATION: after advancing et tube COMPARISON: 1/18/2022.      IMPRESSION: Endotracheal tube is been advanced. Tip is 4 cm above the igor in good position. There our persistent bibasilar pulmonary opacities with partial clearing at the right base from the prior study and no change at the left base. No pneumothorax. Enteric tube tip is not visualized tip is below the diaphragm. PICC catheter from right upper extremity approach is unchanged with tip at the junction of the superior vena cava and right atrium. Left posterior rib fractures are again noted as well as deformity of the right humeral head.    XR Chest Port 1 View    Result Date: 1/18/2022  EXAM: XR CHEST PORT 1 VIEW LOCATION: [facility] DATE/TIME: 1/18/2022 6:06 AM INDICATION: Location of ET tube COMPARISON: 01/16/2022.     IMPRESSION:Endotracheal tube is in the trachea at the level of the clavicular heads with tip 6 cm above the igor. PICC catheter from right upper extremity approach is in the distal superior vena cava near its junction with the right atrium. Enteric tube tip is below the diaphragm and not visualized. There is no pneumothorax. Again noted are bilateral patchy airspace opacities there has been increased consolidation or atelectasis at the right medial lung base. There are old healed left posterior rib  fractures no acute fractures are identified.    CT Abdomen Pelvis w Contrast    Result Date: 2/15/2022  EXAM: CT ABDOMEN PELVIS W CONTRAST LOCATION: [facility] DATE/TIME: 2/15/2022 9:50 PM INDICATION: Leukocytosis. COMPARISON: 10/05/2020 TECHNIQUE: CT scan of the abdomen and pelvis was performed following injection of IV contrast. Multiplanar reformats were obtained. Dose reduction techniques were used. CONTRAST: ISOVUE 370 100ML FINDINGS: LOWER CHEST: Bibasilar infiltrates. Small right and trace left pleural effusion. HEPATOBILIARY: Hepatic steatosis. Cholelithiasis and gallbladder distention. PANCREAS: Pancreatic calcifications suggesting chronic  pancreatitis. Prominent calcification at the neck of the pancreas. Difficult to tell if this is parenchymal or intraductal. The distal pancreas is atrophic. SPLEEN: Splenectomy with accessory splenule. ADRENAL GLANDS: Normal. KIDNEYS/BLADDER: Small nonobstructing renal calculi. BOWEL: Gastric and small bowel wall thickening. Central mesenteric congestion. Trace amount of free fluid. Diverticulosis with presumed retained barium. Wall thickening of the proximal colon. Rectal wall thickening versus underdistention. LYMPH NODES: Subcentimeter mesenteric and peripancreatic lymph nodes. VASCULATURE: Atherosclerotic vascular calcification. PELVIC ORGANS: Small amount of free fluid. MUSCULOSKELETAL: Degenerative change osseous structures. Anasarca. Remote and subacute rib fractures.     IMPRESSION: 1.  Wall thickening of stomach, small bowel and proximal colon. Correlate for gastroenteritis/enterocolitis. 2.  Mesenteric congestion and small amount of free fluid. 3.  Bilateral pulmonary infiltrates. Small right and trace left pleural effusion. 4.  Focal rectal wall thickening versus underdistention. Follow-up recommended to exclude underlying lesion. 5.  Hepatic steatosis. 6.  Diverticulosis. 7.  Cholelithiasis and mild gallbladder distention. 8.  Nonobstructing renal calculi. 9.  Coarse pancreatic calcifications again seen    CT Chest Pulmonary Embolism w Contrast    Result Date: 2/11/2022  EXAM: CT CHEST PULMONARY EMBOLISM W CONTRAST LOCATION: [facility] DATE/TIME: 2/11/2022 1:42 PM INDICATION: PE suspected, low/intermediate prob, positive D-dimer, hypoxia, history of Covid COMPARISON: 01/28/2022 TECHNIQUE: CT chest pulmonary angiogram during arterial phase injection of IV contrast. Multiplanar reformats and MIP reconstructions were performed. Dose reduction techniques were used. CONTRAST: IsoVue 370 100mL FINDINGS: ANGIOGRAM CHEST: No pulmonary artery filling defects. Normal caliber aorta. LUNGS  AND PLEURA: Moderate emphysema. Persistent but increased patchy groundglass and consolidative pulmonary opacities with areas of interlobular septal thickening. Mild bronchiectasis and bronchial wall thickening have increased when compared to prior exam. Trace effusions have decreased. No pneumothorax. MEDIASTINUM/AXILLAE: Heart size is normal. No adenopathy. CORONARY ARTERY CALCIFICATION: None. UPPER ABDOMEN: Hepatic steatosis. Cholelithiasis. There is diffuse wall thickening of the stomach. Small volume ascites. Nonobstructing bilateral renal calculi. Prominent calcification in the area of the pancreatic neck is unchanged. Splenectomy with small accessory splenule. MUSCULOSKELETAL: Degenerative changes of the spine. Prominent L1 Schmorl's node. Similar appearance of the bilateral rib fractures. Mild anasarca.     IMPRESSION: 1.  No pulmonary embolus. 2.  Interval worsening of bilateral pulmonary opacities, bronchiectasis, bronchial wall thickening. 3.  Hepatic steatosis. 4.  Cholelithiasis. 5.  Diffuse wall thickening of the visualized stomach may indicate gastritis. 6.  Manifestations of third spacing.    CT Chest w/o Contrast    Result Date: 1/28/2022  EXAM: CT CHEST W/O CONTRAST LOCATION: [facility] DATE/TIME: 1/28/2022 2:36 PM INDICATION: Cough, persistent COMPARISON: 10/15/2022 TECHNIQUE: CT chest without IV contrast. Multiplanar reformats were obtained. Dose reduction techniques were used. CONTRAST: None. FINDINGS: LUNGS AND PLEURA: Upper lung predominant centrilobular and paraseptal emphysema. Improvement in groundglass and interstitial opacities in the right lung. Slight worsening of groundglass and interstitial opacities in the left upper lobe. New airway wall thickening in the left upper lobe and left lower lobe and new consolidation in the dependent portion of the left upper lobe and at the left lung base. Mucoid impaction in the left lower lobe airways. Unchanged left pleural  thickening and trace right pleural effusion. MEDIASTINUM/AXILLAE: No thoracic aortic aneurysm. No lymphadenopathy. CORONARY ARTERY CALCIFICATION: None. UPPER ABDOMEN: Hepatic steatosis. Cholelithiasis. MUSCULOSKELETAL: There are demonstrated bilateral rib fractures.     IMPRESSION: 1.  New airway wall thickening in the left upper and lower lobes with new consolidation in the dependent portion of the left upper lobe and at the left lung base, and mucoid impaction in left lower lobe airways, suspicious for aspiration. 2.  Overall improvement in groundglass and interstitial opacities in the right lung and slight worsening in the left upper lobe.     CT Head w/o Contrast    Result Date: 2/12/2022  EXAM: CT HEAD W/O CONTRAST LOCATION: [facility] DATE/TIME: 2/12/2022 9:20 AM INDICATION: Trauma - Head Injury. Unwitnessed fall. COMPARISON: CT head 01/18/2022 TECHNIQUE: Routine CT Head without IV contrast. Multiplanar reformats. Dose reduction techniques were used. FINDINGS: INTRACRANIAL CONTENTS: No intracranial hemorrhage, extraaxial collection, or mass effect.  No CT evidence of acute infarct. Small focus of chronic cortical encephalomalacia and adjacent gliosis involving the superior left frontal lobe as seen previously.  Mild presumed chronic small vessel ischemic changes. Mild generalized volume loss. No hydrocephalus. VISUALIZED ORBITS/SINUSES/MASTOIDS: No intraorbital abnormality. Complete opacification of the left maxillary sinus with some chronic appearing mural hyperostosis similar to the previous exam. Additional subtotal opacification of the left sphenoid sinus by polypoid mucosal thickening as seen previously. No middle ear or mastoid effusion. BONES/SOFT TISSUES: Mild right parietal scalp swelling. No skull fracture.     IMPRESSION: 1.  No CT evidence for acute intracranial process. 2.  Mild right parietal scalp contusion without associated fracture. 3.  Brain atrophy and presumed  chronic microvascular ischemic changes as above. 4.  Inflammatory changes of the paranasal sinuses as seen previously.    CT Head w/o Contrast    Result Date: 1/18/2022  EXAM: CT HEAD W/O CONTRAST LOCATION: [facility] DATE/TIME: 1/18/2022 3:45 AM INDICATION: Headache, intracranial hemorrhage suspected. COMPARISON: 11/4/2021 TECHNIQUE: Routine CT Head without IV contrast. Multiplanar reformats. Dose reduction techniques were used. FINDINGS: INTRACRANIAL CONTENTS: No intracranial hemorrhage, extraaxial collection, or mass effect. No CT evidence of acute infarct. Mild presumed chronic small vessel ischemic changes. Mild to moderate generalized volume loss. No hydrocephalus. Stable calcified dural based lesion along the right frontal convexity measuring 1 cm in diameter, presumably reflecting a small meningioma. VISUALIZED ORBITS/SINUSES/MASTOIDS: No intraorbital abnormality. Complete opacification of the left maxillary sinus with chronic mucoperiosteal reaction. Mucous retention cyst in the left sphenoid sinus. Mild mucosal thickening along the floor of the left frontal sinus. Opacified left frontoethmoidal recess. No middle ear or mastoid effusion. BONES/SOFT TISSUES: No acute abnormality.     IMPRESSION: 1.  No CT evidence for acute intracranial process. 2.  Stable chronic changes as above.    XR Video Swallow with SLP or OT    Result Date: 2/8/2022  EXAM: XR VIDEO SWALLOW WITH SLP OR OT LOCATION: [facility] DATE/TIME: 2/8/2022 8:52 AM INDICATION: Difficulty swallowing. COMPARISON: 01/31/2022. TECHNIQUE: Routine swallow study with speech pathology using multiple barium thicknesses. FINDINGS: FLUOROSCOPIC TIME: 1.2 minutes NUMBER OF IMAGES: 0 Swallow study with Speech Pathology using multiple barium thicknesses. Small amount of silent aspiration with thin liquid. Penetration with mildly thickened (nectar) liquid. There was persistent mild penetration with chin Tech  technique and mildly thickened liquid. No aspiration was solid consistency.     IMPRESSION: 1.  Silent aspiration with thin liquid. Penetration with mildly thickened liquid.     XR Video Swallow with SLP or OT    Result Date: 2022  EXAM: XR VIDEO SWALLOW WITH SLP OR OT LOCATION: [facility] DATE/TIME: 2022 11:30 AM INDICATION: Difficulty swallowing. COMPARISON: 2022. TECHNIQUE: Routine swallow study with speech pathology using multiple barium thicknesses. FINDINGS: FLUOROSCOPIC TIME: 3.3 minutes NUMBER OF IMAGES: 0 Swallow study with Speech Pathology using multiple barium thicknesses. Tracheal aspiration with thin liquid consistency barium with weak ineffective cough. Penetration into the laryngeal vestibule with mildly thickened (nectar) liquid consistency barium. No penetration or aspiration with honey and pudding consistency barium. Please refer to speech therapy report for additional detail.    XR Video Swallow with SLP or OT    Result Date: 2022  EXAM: XR VIDEO SWALLOW WITH SLP OR OT LOCATION: [facility] DATE/TIME: 2022 3:27 PM INDICATION: Difficulty swallowing. COMPARISON: None. TECHNIQUE: Routine swallow study with speech pathology using multiple barium thicknesses. FINDINGS: FLUOROSCOPIC TIME: 1.5 minutes NUMBER OF IMAGES: 9 Swallow study with Speech Pathology using multiple barium thicknesses. Moderate to deep penetration with thin liquid (no cough reflex). Small amount of penetration with mildly thick consistency. No other penetration or aspiration.     EEG Coma or Sleep Only    Result Date: 2022  ELECTROENCEPHALOGRAM (EEG) REPORT [facility] NEUROLOGY [address] [place] [address] Tel: [phone]  Fax: [phone] [email or web address] RENUKA Yap 1965, MRN [id number] PCP: Sarah Canseco Date: 2022 Principal Diagnosis: Altered mental status History : Patient is being  evaluated for altered mental status. Medication listed includes Ativan Description of the Recording:  This is a multichannel digital EEG recording done using the international 10-20 placement system. Background of the recording consists of irregular 1 to 3 Hz polymorphic delta activity with amplitudes ranging between 20 to 30  V.  Alerting procedure produce minimal change.  Photic stimulation performed with standard protocol produced minimal change.  No transient sleep patterns were recorded. During this recording, no sharp discharges, spikes or electrographic seizure activity was recorded. Impression: This is an abnormal EEG due to diffusely slow polymorphic delta activity that minimally attenuates with alerting procedures.  This EEG suggests nonspecific generalized cerebral dysfunction.  No focal slowing or periodic discharges were seen to suggest seizures. Classification: Delta grade I generalized Tiburcio Gallagher MD [facility] (Former[facility], [facility] This note was dictated using voice recognition software.  Any grammatical or context distortions are unintentional and inherent to the software.       Lab Results:  Personally Reviewed.   Fingerstick Blood Glucose: @[id number](POCGLUFGR:10)@    Last Hbg A1C: No results found for: HGBA1C   Lab Results   Component Value Date    INR 1.67 (H) 01/30/2022    INR 1.72 (H) 01/16/2022    INR 1.69 (H) 01/15/2022     Recent Results (from the past 24 hour(s))   Cosyntropin stimulation study baseline    Collection Time: 02/19/22  6:02 PM   Result Value Ref Range    Cortisol Baseline 10.6 ug/dL   Glucose by meter    Collection Time: 02/19/22  6:16 PM   Result Value Ref Range    GLUCOSE BY METER POCT 379 (H) 70 - 99 mg/dL   Cosyntropin stimulation study post 30    Collection Time: 02/19/22  6:38 PM   Result Value Ref Range    Cortisol 30 min Post-dose 19.4 ug/dL   Cosyntropin stimulation study post 60    Collection Time:  02/19/22  7:04 PM   Result Value Ref Range    Cortisol 60 min Post-dose 21.0 ug/dL   Glucose by meter    Collection Time: 02/19/22  9:11 PM   Result Value Ref Range    GLUCOSE BY METER POCT 295 (H) 70 - 99 mg/dL   Glucose by meter    Collection Time: 02/19/22 10:15 PM   Result Value Ref Range    GLUCOSE BY METER POCT 314 (H) 70 - 99 mg/dL   Potassium    Collection Time: 02/20/22  6:10 AM   Result Value Ref Range    Potassium 4.4 3.5 - 5.0 mmol/L   Magnesium    Collection Time: 02/20/22  6:10 AM   Result Value Ref Range    Magnesium 1.7 (L) 1.8 - 2.6 mg/dL   Glucose by meter    Collection Time: 02/20/22  7:42 AM   Result Value Ref Range    GLUCOSE BY METER POCT 214 (H) 70 - 99 mg/dL   Glucose by meter    Collection Time: 02/20/22 12:08 PM   Result Value Ref Range    GLUCOSE BY METER POCT 266 (H) 70 - 99 mg/dL           Advanced Care Planning    Time > 35 minutes with greater than 50% of time spent in counseling and coordination of care.

## 2022-02-20 NOTE — PLAN OF CARE
Problem: Adult Inpatient Plan of Care  Goal: Optimal Comfort and Wellbeing  Outcome: Ongoing, Progressing  Intervention: Provide Person-Centered Care  Recent Flowsheet Documentation  Taken 2/19/2022 1855 by Omari Olea RN  Trust Relationship/Rapport: care explained     Problem: Risk for Delirium  Goal: Improved Attention and Thought Clarity  Outcome: Ongoing, Progressing  Goal: Improved Sleep  Outcome: Ongoing, Progressing     Problem: Fall Injury Risk  Goal: Absence of Fall and Fall-Related Injury  Outcome: Ongoing, Progressing  Intervention: Identify and Manage Contributors  Recent Flowsheet Documentation  Taken 2/19/2022 1855 by Omari Olea RN  Medication Review/Management: medications reviewed  Intervention: Promote Injury-Free Environment  Recent Flowsheet Documentation  Taken 2/19/2022 1855 by Omari Olea RN  Safety Promotion/Fall Prevention:   activity supervised   bed alarm on   fall prevention program maintained   increase visualization of patient   increased rounding and observation   lighting adjusted   nonskid shoes/slippers when out of bed   patient and family education   room door open   safety round/check completed    Problem: Mobility Impairment  Goal: Optimal Mobility  Outcome: Ongoing, Not Progressing      Pt alert at this time. Pt has confusion, but easily redirectable. Denies pain thus far. Pt worked w/ PT this afternoon; was unable to sit up d/t BLE weakness. Pt was able to sit on the side of the bed and dangle his feet. Pt's O2 needs increased this shift. Pt has been alternating from 8-10 L. Pt's currently at 10 L via oxymask at O2 sat 91-92%. Cosyntropin given at 1605 and baseline cortisol drawn prior, 30 mins after, and 60 mins after. Pt has coarse crackles on bilateral lower extremities. Pre-prandial ; insulin given per sliding scale. Pt given additional insulin for 20 g CHO. HS . Pt given insulin per sliding scale. Pt had BP of 86/59 at the beginning of the shift. Pt  encouraged to drink fluids and was able to get it to 99/61. Pt has been performing incentive spirometer w/ encouragement; pt reaching 1500 consistently and tolerating fair. Pt reminded to use the call light for cares. Call light is within reach. Bed alarm on for pt safety.

## 2022-02-20 NOTE — PROGRESS NOTES
Calorie Count Note    Date of Calorie Count: 2/19/22    Meals Recorded: 0    Calories: unknown    Protein: unkown      Calorie Count Note    Date of Calorie Count: 2/18/22    Meals Recorded: 3    Calories: 1389kcals    Protein: 79g

## 2022-02-20 NOTE — PROGRESS NOTES
"Care Management Follow Up    Length of Stay (days): 36    Expected Discharge Date: 02/21/2022        Concerns to be Addressed:   Alteration in respiratory status requiring supplemental oxygen at 10-12 liters, IV Meropenem every 8 hours (peripheral IV). Hospice consult pending (MET). May need insurance authorization for transitional care.   Patient plan of care discussed at interdisciplinary rounds: Yes  Follow up from rounds/notes:   Per nursing notes, \"Oxygen Sats 92% 15 L Oxymask, patient has been taking off mask to talk, staff explains to patient to keep mask on.\"    Anticipated Discharge Disposition:  Transitional care.     Anticipated Discharge Services:  To be determined by destination, patient/family preferences, medical needs and mobility status closer to the time of discharge.  Anticipated Discharge DME:  To be determined.     Patient/family educated on Medicare website which has current facility and service quality ratings:  Yes  Education Provided on the Discharge Plan:  Per team  Patient/Family in Agreement with the Plan:  Yes    Referrals Placed by CM/SW:  See below  Private pay costs discussed: Not applicable     Additional Information:  Anticipate patient will discharge to TCU but note that hospice has been consulted.  Patient had been accepted at [facility] [place] who is following along pending medical clearance for discharge.  SW addressed CD consult with patient who expressed interest in CD treatement but likely will have to go to TCU first. [facility] transport is anticipated.   11:03 AM:  Spoke with hospice nurse who spoke with patient's spouse Daina. Per Daina, her goals for the patient are restorative. She works full time and is not able to provide adequate support for patient at home. She is in the process of looking for an assisted living for patient. They had also discussed out of pocket costs for SNF on hospice (which Daina did not feel she could afford).  Care management will follow along. "       Susan Joseph RN

## 2022-02-20 NOTE — PLAN OF CARE
Problem: Adult Inpatient Plan of Care  Goal: Patient-Specific Goal (Individualized)  Outcome: Ongoing, Progressing     Problem: Adult Inpatient Plan of Care  Goal: Readiness for Transition of Care  Outcome: Ongoing, Progressing     Problem: Violence Risk or Actual  Goal: Anger and Impulse Control  Outcome: Ongoing, Progressing   Goal Outcome Evaluation:    Plan of Care Reviewed With: patient     Overall Patient Progress: improving    Outcome Evaluation: Hospice consult pending.    No verbal or nonverbal expressions of pain. O2 Sats 92% 15 L Oxymask, patient has been taking off mask to talk, staff explains to patient to keep mask on. Will encourage  cough deep breath, and IS. Patient needs to be reminded.

## 2022-02-20 NOTE — PROVIDER NOTIFICATION
Call placed to Dr. Hayden for clarficiation of cosyntropin study and Dr. Hayden stated to go forth w/ the current lab orders on Epic.    Cosyntropin given at 1805 and baseline cortisol drawn prior to administration.    Call placed to hospice and spoke w/ Leda to have hospice call Dr. Hayden for update on pt's plan of care.

## 2022-02-21 NOTE — PROGRESS NOTES
Palliative Care    Discussed Plan of care with RNAna Laura ALBARRAN. Indicated that goals are comfort focused tho he is not actively dying. She does not want [facility]  She would be interested in Hospice but cannot bring him home as she is renting from her niece in the basement and would not be able to get him down the stairs.  She has financial issues with Room and Board so I referred her to .  Wife wants to  Make it clear that she does not want him to transfer to ICU and she gets very upset when each new hospitalist asks her that.  It makes her feel guilty.   I placed note on Post it that he is to not transfer to [facility].  This is currently a placement issue.       ELIAS Kirkpatrick, NP-C, [facility]  Palliative Care

## 2022-02-21 NOTE — PLAN OF CARE
Problem: Adult Inpatient Plan of Care  Goal: Optimal Comfort and Wellbeing  Outcome: Ongoing, Progressing   Goal Outcome Evaluation:  On continuous oximetry, O2 at 9L oxymask sating at 93%. Does drop down into   The 80's when he removes his mask to eat. Needs encouragement to keep mask on, but does follow commands readily.Has had 2 loose/liquid involuntary stools. Also was incontinent of urine this AM. Placed  male purewick external catheter on patient to allow for healing of buttocks and rash in periarea. Bed alarm on for safety    Plan of Care Reviewed With: patient     Overall Patient Progress: improving    Outcome Evaluation: Family goal is restorative/TCU.

## 2022-02-21 NOTE — CONSULTS
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)  WOUND HISTORY/PLAN OF CARE: Patient has been using Calmoseptine but noted brief and incontinence pad in bed   LAST PHOTO: NA  WOUND BASE: 100 % epidermis     Palpation of the wound bed: normal      Drainage: none     Description of drainage: none     Measurements (length x width x depth, in cm) Area of erythema 12 x 17cm with 4 areas partial thickness denudement in gluteal cleft and buttocks, largest 2 x 1cm     Tunneling N/A     Undermining N/A  PERIWOUND SKIN: erythema- blanchable      Color: pink      Temperature: normal   ODOR: none  PAIN: mild, tender  PAIN INTERVENTIONS PRIOR TO DRESSING CHANGE: NA  WOUND CARE RATIONALE: Promote moist wound healing without tissue dehydration  and Provide protection   TREATMENT GOAL: healing  STATUS: initial assessment  SUPPLIES ORDERED: Calmoseptine (already on order list) and viscopaste    Containment of urine/stool: Brief and Incontinent pad in bed  BMI: Body mass index is 22.62 kg/m .   Active Diet Order: Orders Placed This Encounter      Combination Diet Moderate Consistent Carb (60 g CHO per Meal) Diet; Easy to Chew (level 7); Mildly Thick (level 2)     Output: I/O last 3 completed shifts:  In: 340 [P.O.:340]  Out: 400 [Urine:400]   Labs: Recent Labs   Lab Test 02/18/22  0355 02/17/22  0628 02/15/22  0559 02/15/22  0558 01/31/22  0543 01/30/22  1050   ALBUMIN  --  1.8*   < >  --    < >  --    HGB  --  10.6*   < > 10.2*   < >  --    INR  --   --   --   --   --  1.67*   WBC 14.5* 14.8*   < > 24.6*  24.6*   < >  --    A1C  --   --   --  6.7*  --   --    CRP  --  0.6   < >  --   "  < > 6.3*    < > = values in this interval not displayed.      Pressure Injury Risk Assessment: Denis Risk Assessment  Sensory Perception: 3-->slightly limited    Moisture: 3-->occasionally moist   Activity: 2-->chairfast     Mobility: 3-->slightly limited   Nutrition: 2-->probably inadequate   Friction and Shear: 2-->potential problem  Denis Score: 15        TREATMENT PLAN: Start viscopaste over Calmospetine, use only one layer of incontinence product in bed, start air mattress    Pressure Injury Prevention (PIP) Plan:      Moisture Management: Perineal cleansing /protection: Follow Incontinence Protocol, Avoid brief in bed and Clean and dry skin folds with bathing       Mattress: Follow bed algorithm, reassess daily and order specialty mattress, if indicated.      Repositioning in bed: Left/right positioning; avoid supine      Heels: Keep elevated off mattress      Protective Dressing: None      Positioning Equipment: None      Under Devices: Inspect skin under all medical devices during skin inspection              Ask provider to discontinue device when no longer needed.      If patient is declining pressure injury prevention interventions: Explore reason why and address patient's concerns, Educate on pressure injury risk and prevention intervention(s), If patient is still declining, document \"informed refusal\"  and Ensure Care team is aware ( provider, charge nurse, etc)    RECOMMEND PRIMARY TEAM ORDER: None, at this time  Education provided to: importance of repositioning, plan of care and Moisture management  Discussed plan of care with: Patient, Nurse and CNA  WOC Nurse follow-up plan:weekly  Notify WOC if wound(s) deteriorate.  Nursing to notify the Provider(s) and re-consult the WOC Nurse if new skin concern.    Anuja Bourgoeis, ACEN, RN, PHN, HNB-BC, CWOCN     "

## 2022-02-21 NOTE — PLAN OF CARE
Problem: Adult Inpatient Plan of Care  Goal: Optimal Comfort and Wellbeing  Outcome: Ongoing, Progressing  Pt slept well the beginning of the shift.  Was awake for the latter half.  Pt's oxygen needs fluctuate greatly based on activity.  Desats with activity but sats in the high 90's when sleeping this shift.  No acute events. Lung sounds diminished.  VSS, denied pain.     Goal Outcome Evaluation:    Plan of Care Reviewed With: patient     Overall Patient Progress: improving    Outcome Evaluation: Family goal is restorative/TCU.

## 2022-02-21 NOTE — PROGRESS NOTES
"CLINICAL NUTRITION SERVICES - REASSESSMENT NOTE     Nutrition Prescription    RECOMMENDATIONS FOR MDs/PROVIDERS TO ORDER:  None    Malnutrition Status:    Severe in acute illness    Recommendations already ordered by Registered Dietitian (RD):  Discontinue vital cuisine    Future/Additional Recommendations:  Adjust supplement pending intake/acceptance/needs     EVALUATION OF THE PROGRESS TOWARD GOALS   Diet:Easy to chew with mildly thick liquids per SLP.  Moderate consistent CHO    Supplement: Vital cuisine bid, magic cup and gel plus daily = 1480 kcal, 73 g protein total    Intake:   Ate 100% of breakfast today and 75% of lunch with estimated intake 503 kcal, 5 g protein so far today.    Per calorie count 2/18 pt ate 1389 kcal, 79 g protein (100% of 3 meals) meeting 65% of estimated kcal and 77% of estimated protein needs    Meal intake not documented 2/19 or 2/20    Supplement intake not documented.     Pt reported to meal ambassador that he does not like the vital cuisine. Historically liked these and was taking them      NEW FINDINGS  O2 needs increasing, 9 L today  Noted GOC Comfort Focused    GI   3 loose/watery BM today. None x 3 days prior?    LABS  Reviewed  Mag 1.6-decreased. On replacement  BG 56-266mg/dl past 24 hours -in fair control, hypglycemia x 1 at 2100. DM educator following     MEDICATIONS  Folic acid, ssi, lantus, magox, mvi, protonix, thiamine, tums, mvi with minerals, novolog 1U:10 g Cho,  culturelle, iv abx    ANTHROPOMETRICS  Height: 185.4 cm (6' .992\")  Admit wt 186 lb 11.7 oz 1/17/22.  Most Recent Weight: 77.7 kg (171 lb 6.4 oz) 2/21, up 15 lb in 1 week?   71.0 kg (156 lb 11.2 oz) 2/14, up 11 lb from day prior? 2/13 weight down 2 lb from 2 weeks prior  66.8 kg (147 lb 4.8 oz) 2/2- down 14 lb in 1 week?   73.0 kg (161 lb)  1/26  74.3 kg (163 lb 12.8 oz)  ?accurate- 1/25  189 lb 6 oz 1/23/22, -aggressive diuresis  184 lb 8.4 oz 1/24/22  190 lb 7.6 oz 1/19/22,   Weight History:       Wt " Readings from Last 8 Encounters:   11/12/21 80.9 kg (178 lb 6.4 oz)   12/30/20 77.1 kg (170 lb)     Dosing Weight: 84.7 kg, current     ASSESSED NUTRITION NEEDS  Estimated Energy Needs: 0364-9652 kcals/day (25 - 30 kcals/kg)  Justification: Maintenance  Estimated Protein Needs: 102-125 grams protein/day (1.2 - 1.5 grams of pro/kg)  Justification: Increased needs, monitor LFTs.  Estimated Fluid Needs: 2115+ mL/day (1 mL/kcal)   Justification: maintenance or pending fluid status.    CURRENT NUTRITION DIAGNOSIS  Malnutrition r/t acute illness evidenced by intake < 50% since admit, moderate fat and muscle loss, weight loss    INTERVENTIONS  Implementation  Discontinue Vital cuisine  Continue Magic cup and gel 20 plus daily = 440 kcal, 29 g protein    Goals  Patient to consume % of nutritionally adequate meals three times per day, or the equivalent with supplements/snacks.- progressing  Electrolytes WNL- progressing- Mag still not WNL  BG - progressing    Monitoring/Evaluation  Progress toward goals will be monitored and evaluated per protocol.    Charo Mace RDN, LD  #[phone]

## 2022-02-21 NOTE — PROGRESS NOTES
Daily Progress Note    Assessment/Plan:  56 year old male with past medical history of alcohol abuse, hypertension, DM-II, multiple abdominal surgeries who was admitted on 1/15/22 with alcohol withdrawl, possible aspiration pneumonia leading to respiratory failure requiring intubation on 1/16, successfully extubated on 1/23/22 and now transferred out of ICU on 1/26/22 for floor care.  Tested positive for Covid on 1/22/2022 in the hospital  Did improve initially and was pending TCU placement until patient got worsening respiratory condition on 2/11/2022 and CT chest showed worsening pneumonia so restarted on IV antibiotic with Vanco and Zosyn.  Now white count up with elevated lactate and hypotension, ID consulted and now on just meropenem.      Acute respiratory failure with hypoxia.  -Thought secondary to aspiration pneumonia/ COVID/hospital-acquired pneumonia  -Was intubated 1/16, extubated on 1/23.   Required BiPAP for a while but now on oxygen mask oxygen  - Cont albuterol nebs and pulm hygiene  - CT PE was negative for PE on admission  S/b speech therapy     Leukocytosis: ID consulted and now on meropenem.  White count much improved today since starting IV antibiotics but it is unclear what source we are treating.?  Recurrent aspiration     Recovered Covid  -- Weekly COVID (per protocol) came back positive. He was negative on admission on 1/22. Check COVID labs. Start Covid special precaution.   - Finished IV remdesivir and Dexamethasone course. -  - Received dose of tocilizumab/per pulmonology recommendations on 1/31/2022.  -Try to taper off oxygen; thus discontinue dexamethasone on 2/5     DM type 2  Very labile blood sugars  - Hemoglobin A1c was 7.0 on 1/15/22  - Very sensitive to Lantus as patient goes hypoglycemic even with 5 units  Stop Metformin due to lactic acidosis  Continue sliding scale insulin and carb counting      Septic shock-resolved  -- Likely due to multifocal pneumonia.  -- Metoprolol  started on 1/25 for sinus tachycardia  -- Patient is having intermittent hypotension.   - Echo shows EF of 65% with mild concentric left ventricular hypertrophy  - Started on midodrine for persistent low blood pressure  Stim test negative     Acute toxic metabolic encephalopathy  - Patient has significant alcohol withdrawal during the intubation and was on Precedex  - Precedex weaned off. Cont with thiamine. Ativan prn.  - Alcohol level less than 10 on admission.    - Collateral information from wife-he did not find any evidence of patient drinking recently.    - Brain MRI shows chronic CVAs with brain atrophy  - Patient denies drinking prior to hospitalization.  Most recent chemical dependency treatment in 2001.  Since then patient tried to quit drinking numerous times, ending up in the hospital with withdrawal.  He is on disability for 2 years, in the past was a nicole.  - Reconsult psychiatry who are recommending:    Duloxetine 60 mg daily.  Continue gabapentin 200 mg twice a day  Seroquel 12.5 mg TID x 6 doses   Seroquel 75 mg at bedtime continue.  Rozerem 8 mg at bedtime.  Continue to utilize olanzapine 5-10 mg every 6 hours as needed for agitation, psychosis.  Efforts at sleep regulation.  For daytime anxiety would recommend adding Seroquel 6.25 mg in the morning and afternoon.     Hypokalemia/hypomagnesia  Replace per protocol     Diarrhea  This is resolved     Significant weakness and deconditioning  - Secondary to above  - Pending TCU placement     Severe malnutrition  - Protein and calories  - Dietitian consult, continue supplement  - Patient still has poor oral intake     S/P fall   - Patient had incident of fall this morning 2/12/2022, unwitnessed  - Evaluated by house officer and a CT head is negative for significant changes  - Continue fall precautions     Goals of care  -Previous rounder discussed details goals of care with patient's wife Daina on 2/14/2022  - Patient has poor quality of life prior  to admission because of his alcohol use and previous CVAs  - Wife declined going through aggressive care again as what happened on admission with intubation.  -Palliative care following  -He is now DNR and his wife desires no further aggressive interventions.  If his blood pressure goes low or he would not be transferred back to the ICU, he would not get pressors.  I reaffirmed this with her again today.    Patient deteriorating clinically, hypoxic, hypotensive, per wife and family recommendations previously, hospice consult pending, await palliative care recommendations next week     VTE prophylaxis:  Enoxaparin (Lovenox) SQ  DIET: Orders Placed This Encounter      Combination Diet Moderate Consistent Carb (60 g CHO per Meal) Diet; Easy to Chew (level 7); Mildly Thick (level 2)        Disposition/Barriers to discharge: Continue current management, but do not escalate care provider,    Code status:No CPR- Do NOT Intubate      Subjective:  Remains oxygen dependent, otherwise no complaints      Current Medications Reviewed via EHR List    Objective:  Vital signs in last 24 hours:  [unfilled]  .prog  Weight:   @THISENCWEIGHTS(1)@  Weight change:   Body mass index is 22.62 kg/m .    Intake/Output last 3 shifts:  I/O last 3 completed shifts:  In: 340 [P.O.:340]  Out: 400 [Urine:400]  Intake/Output this shift:  No intake/output data recorded.    Physical Exam:  Occasional inattention  General: No apparent distress  CV: Regular rate and rhythm  Lungs: Clear to auscultation  Abdomen: Soft, nontender  Extremities no edema      Imaging:  Personally Reviewed.  MR Brain w/o Contrast    Result Date: 1/22/2022  EXAM: MR BRAIN W/O CONTRAST LOCATION: [facility] DATE/TIME: 1/22/2022 1:05 PM INDICATION: Encephalopathy COMPARISON: CT head 01/18/2022, MRI head 11/23/2020 TECHNIQUE: Routine multiplanar multisequence head MRI without intravenous contrast. FINDINGS: INTRACRANIAL CONTENTS: Please note study is  significantly degraded by motion artifact. Suggestion of a few small foci of restricted diffusion at the left temporal occipital region; best appreciated on repeat axial diffusion weighted sequence series 20.2 image 12, image 11; as well as repeat coronal diffusion weighted sequence series 24.2 image 9. No definite associated hemorrhage or significant mass effect. Redemonstration of small chronic infarction left precentral gyrus. Mild generalized cerebral atrophy. No hydrocephalus. Normal position of the cerebellar tonsils. Small chronic infarction lateral aspect left cerebellum. A few additional tiny infarctions demonstrated on prior exam are not well demonstrated on the current. SELLA: Not well-visualized, grossly normal. OSSEOUS STRUCTURES/SOFT TISSUES: Normal marrow signal. Flow voids are not well-visualized. ORBITS: Not well-visualized. SINUSES/MASTOIDS: The complete opacification left maxillary and left sphenoid sinus, similar to prior. Small amount of opacification right mastoid air cells.     IMPRESSION: 1.  Please note study is significantly degraded by motion artifact. 2.  Suggestion of a few small foci of acute/early subacute infarction at the left temporal occipital region. 3.  No definite associated hemorrhage or significant mass effect. 4.  Small chronic infarction left precentral gyrus, similar to prior. 5.  Probable few small chronic infarctions cerebellar hemispheres. 6.  Mild atrophy.    Echocardiogram Complete    Result Date: 2022  [id number]^ALEYDA^LYN^[place] [address]  Name: KAREY LIVINGSTON MRN: [id number] : 1965 Study Date: 2022 07:37 AM Age: 56 yrs Gender: Male Patient Location: [facility] Reason For Study: Hypertension (HTN) Ordering Physician: LYN MAYNARD Performed By: ADRIANA  BSA: 1.9 m2 Height: 72 in Weight: 147 lb HR: 123 BP: 97/59 mmHg  ______________________________________________________________________________ Procedure Complete Portable Echo Adult. Technically difficult study.Extremely poor acoustic windows. Compared to the prior study dated 11/5/2021, there have been no changes. No hemodynamically significant valvular abnormalities on 2D or color flow imaging. ______________________________________________________________________________ Interpretation Summary  1.Left ventricular size, wall motion and function are normal. The ejection fraction is > 65%. 2.There is mild concentric left ventricular hypertrophy. 3.Normal right ventricle size and systolic function. 4.No hemodynamically significant valvular abnormalities on 2D or color flow imaging. Compared to the prior study dated 11/5/2021, there have been no changes. ______________________________________________________________________________ I      WMSI = 1.00     % Normal = 100  X - Cannot   0 -                      (2) - Mildly 2 -          Segments  Size Interpret    Hyperkinetic 1 - Normal  Hypokinetic  Hypokinetic  1-2     small                                                    7 -          3-5    moderate 3 - Akinetic 4 -          5 -         6 - Akinetic Dyskinetic   6-14    large              Dyskinetic   Aneurysmal  w/scar       w/scar       15-16   diffuse  Left Ventricle Left ventricular size, wall motion and function are normal. The ejection fraction is > 65%. There is mild concentric left ventricular hypertrophy. Left ventricular diastolic function is normal. No regional wall motion abnormalities noted.  Right Ventricle Normal right ventricle size and systolic function. TAPSE is normal, which is consistent with normal right ventricular systolic function.  Atria Normal left atrial size. Right atrial size is normal. There is no color Doppler evidence of an atrial shunt.  Mitral Valve Mitral valve leaflets appear normal. There is no evidence of mitral stenosis or clinically  significant mitral regurgitation. There is no mitral regurgitation noted. There is no mitral valve stenosis.  Tricuspid Valve Tricuspid valve leaflets appear normal. Right ventricle systolic pressure estimate normal. There is trace tricuspid regurgitation. There is no tricuspid stenosis.  Aortic Valve Aortic valve leaflets appear normal. There is no evidence of aortic stenosis or clinically significant aortic regurgitation. The aortic valve is trileaflet. No aortic regurgitation is present. No aortic stenosis is present.  Pulmonic Valve The pulmonic valve is not well seen, but is grossly normal. This degree of valvular regurgitation is within normal limits. There is no pulmonic valvular stenosis.  Vessels The aorta root is normal. Normal size ascending aorta. IVC diameter <2.1 cm collapsing >50% with sniff suggests a normal RA pressure of 3 mmHg.  Pericardium There is no pericardial effusion.  Rhythm The rhythm was sinus tachycardia.  ______________________________________________________________________________ MMode/2D Measurements & Calculations IVSd: 1.3 cm LVIDd: 4.1 cm LVIDs: 2.3 cm LVPWd: 1.2 cm FS: 43.4 %  LV mass(C)d: 182.6 grams LV mass(C)dI: 97.7 grams/m2 Ao root diam: 3.3 cm LA dimension: 3.6 cm asc Aorta Diam: 3.8 cm LA/Ao: 1.1 LVOT diam: 2.2 cm LVOT area: 3.8 cm2 LA Volume (BP): 29.3 ml LA Volume Index (BP): 15.7 ml/m2  LA Volume Indexed (AL/bp): 17.3 ml/m2 RWT: 0.61  Doppler Measurements & Calculations MV E max gustavo: 132.0 cm/sec MV A max gustavo: 137.1 cm/sec MV E/A: 0.96 MV dec slope: 1104 cm/sec2 MV dec time: 0.12 sec Ao V2 max: 143.6 cm/sec Ao max P.0 mmHg Ao V2 mean: 108.6 cm/sec Ao mean P.2 mmHg Ao V2 VTI: 26.8 cm LELO(I,D): 3.5 cm2 LELO(V,D): 3.9 cm2 LV V1 max P.6 mmHg LV V1 max: 146.7 cm/sec LV V1 VTI: 24.2 cm SV(LVOT): 92.9 ml SI(LVOT): 49.7 ml/m2 PA V2 max: 107.4 cm/sec PA max P.6 mmHg PA acc time: 0.07 sec AV Gustavo Ratio (DI): 1.0 LELO Index (cm2/m2): 1.9 E/E' av.4  Lateral E/e':  18.6 Medial E/e': 20.2  ______________________________________________________________________________ Report approved by: Randal Villa 02/11/2022 09:14 AM       XR Chest Port 1 View    Result Date: 2/16/2022  EXAM: XR CHEST PORT 1 VIEW LOCATION: [facility] DATE/TIME: 2/16/2022 8:33 AM INDICATION: dyspnea COMPARISON: CT pulmonary angiogram 02/11/2022     IMPRESSION: Abnormal increased lung attenuation diffusely through both lungs. There are subsegmental foci of more focal airspace opacity in the peripheral third of the left mid and lower lung, similar to the prior CT. Subpleural emphysema is present in the apices. No new focal airspace opacity or volume loss. Symmetric apical and left lateral pleural thickening has not increased. No pleural effusion or pneumothorax. Cardiac silhouette is not enlarged. Unchanged aortic and other mediastinal interfaces. There are metallic fragments projecting in the left upper quadrant. Chronic left posterolateral rib fracture deformities are ununited.    XR Chest Port 1 View    Result Date: 1/29/2022  EXAM: XR CHEST PORT 1 VIEW LOCATION: [facility] DATE/TIME: 1/29/2022 6:18 AM INDICATION: Pneumonia follow up. COMPARISON: 01/28/2022 chest CT.01/19/2022 radiograph.     IMPRESSION: Relatively diffuse left lung opacities have increased since the 01/19/2022 chest radiograph, and perhaps slightly increased since the 01/20/2022 chest CT. Differentials for the increased opacities include asymmetric pulmonary edema, infectious / inflammatory process or layering pleural fluid. Small left pleural effusion is present. Right lung is hypoexpanded and may have a few subtle scattered opacities. No pneumothorax. Normal heart size. Atherosclerotic aorta.     XR Chest Port 1 View    Result Date: 1/19/2022  EXAM: XR CHEST PORT 1 VIEW LOCATION: [facility] DATE/TIME: 1/19/2022 10:52 AM INDICATION: after advancing et tube  COMPARISON: 1/18/2022.     IMPRESSION: Endotracheal tube is been advanced. Tip is 4 cm above the igor in good position. There our persistent bibasilar pulmonary opacities with partial clearing at the right base from the prior study and no change at the left base. No pneumothorax. Enteric tube tip is not visualized tip is below the diaphragm. PICC catheter from right upper extremity approach is unchanged with tip at the junction of the superior vena cava and right atrium. Left posterior rib fractures are again noted as well as deformity of the right humeral head.    XR Chest Port 1 View    Result Date: 1/18/2022  EXAM: XR CHEST PORT 1 VIEW LOCATION: [facility] DATE/TIME: 1/18/2022 6:06 AM INDICATION: Location of ET tube COMPARISON: 01/16/2022.     IMPRESSION:Endotracheal tube is in the trachea at the level of the clavicular heads with tip 6 cm above the igor. PICC catheter from right upper extremity approach is in the distal superior vena cava near its junction with the right atrium. Enteric tube tip is below the diaphragm and not visualized. There is no pneumothorax. Again noted are bilateral patchy airspace opacities there has been increased consolidation or atelectasis at the right medial lung base. There are old healed left posterior rib  fractures no acute fractures are identified.    CT Abdomen Pelvis w Contrast    Result Date: 2/15/2022  EXAM: CT ABDOMEN PELVIS W CONTRAST LOCATION: [facility] DATE/TIME: 2/15/2022 9:50 PM INDICATION: Leukocytosis. COMPARISON: 10/05/2020 TECHNIQUE: CT scan of the abdomen and pelvis was performed following injection of IV contrast. Multiplanar reformats were obtained. Dose reduction techniques were used. CONTRAST: ISOVUE 370 100ML FINDINGS: LOWER CHEST: Bibasilar infiltrates. Small right and trace left pleural effusion. HEPATOBILIARY: Hepatic steatosis. Cholelithiasis and gallbladder distention. PANCREAS: Pancreatic  calcifications suggesting chronic pancreatitis. Prominent calcification at the neck of the pancreas. Difficult to tell if this is parenchymal or intraductal. The distal pancreas is atrophic. SPLEEN: Splenectomy with accessory splenule. ADRENAL GLANDS: Normal. KIDNEYS/BLADDER: Small nonobstructing renal calculi. BOWEL: Gastric and small bowel wall thickening. Central mesenteric congestion. Trace amount of free fluid. Diverticulosis with presumed retained barium. Wall thickening of the proximal colon. Rectal wall thickening versus underdistention. LYMPH NODES: Subcentimeter mesenteric and peripancreatic lymph nodes. VASCULATURE: Atherosclerotic vascular calcification. PELVIC ORGANS: Small amount of free fluid. MUSCULOSKELETAL: Degenerative change osseous structures. Anasarca. Remote and subacute rib fractures.     IMPRESSION: 1.  Wall thickening of stomach, small bowel and proximal colon. Correlate for gastroenteritis/enterocolitis. 2.  Mesenteric congestion and small amount of free fluid. 3.  Bilateral pulmonary infiltrates. Small right and trace left pleural effusion. 4.  Focal rectal wall thickening versus underdistention. Follow-up recommended to exclude underlying lesion. 5.  Hepatic steatosis. 6.  Diverticulosis. 7.  Cholelithiasis and mild gallbladder distention. 8.  Nonobstructing renal calculi. 9.  Coarse pancreatic calcifications again seen    CT Chest Pulmonary Embolism w Contrast    Result Date: 2/11/2022  EXAM: CT CHEST PULMONARY EMBOLISM W CONTRAST LOCATION: [facility] DATE/TIME: 2/11/2022 1:42 PM INDICATION: PE suspected, low/intermediate prob, positive D-dimer, hypoxia, history of Covid COMPARISON: 01/28/2022 TECHNIQUE: CT chest pulmonary angiogram during arterial phase injection of IV contrast. Multiplanar reformats and MIP reconstructions were performed. Dose reduction techniques were used. CONTRAST: IsoVue 370 100mL FINDINGS: ANGIOGRAM CHEST: No pulmonary artery filling  defects. Normal caliber aorta. LUNGS AND PLEURA: Moderate emphysema. Persistent but increased patchy groundglass and consolidative pulmonary opacities with areas of interlobular septal thickening. Mild bronchiectasis and bronchial wall thickening have increased when compared to prior exam. Trace effusions have decreased. No pneumothorax. MEDIASTINUM/AXILLAE: Heart size is normal. No adenopathy. CORONARY ARTERY CALCIFICATION: None. UPPER ABDOMEN: Hepatic steatosis. Cholelithiasis. There is diffuse wall thickening of the stomach. Small volume ascites. Nonobstructing bilateral renal calculi. Prominent calcification in the area of the pancreatic neck is unchanged. Splenectomy with small accessory splenule. MUSCULOSKELETAL: Degenerative changes of the spine. Prominent L1 Schmorl's node. Similar appearance of the bilateral rib fractures. Mild anasarca.     IMPRESSION: 1.  No pulmonary embolus. 2.  Interval worsening of bilateral pulmonary opacities, bronchiectasis, bronchial wall thickening. 3.  Hepatic steatosis. 4.  Cholelithiasis. 5.  Diffuse wall thickening of the visualized stomach may indicate gastritis. 6.  Manifestations of third spacing.    CT Chest w/o Contrast    Result Date: 1/28/2022  EXAM: CT CHEST W/O CONTRAST LOCATION: [facility] DATE/TIME: 1/28/2022 2:36 PM INDICATION: Cough, persistent COMPARISON: 10/15/2022 TECHNIQUE: CT chest without IV contrast. Multiplanar reformats were obtained. Dose reduction techniques were used. CONTRAST: None. FINDINGS: LUNGS AND PLEURA: Upper lung predominant centrilobular and paraseptal emphysema. Improvement in groundglass and interstitial opacities in the right lung. Slight worsening of groundglass and interstitial opacities in the left upper lobe. New airway wall thickening in the left upper lobe and left lower lobe and new consolidation in the dependent portion of the left upper lobe and at the left lung base. Mucoid impaction in the left lower  lobe airways. Unchanged left pleural thickening and trace right pleural effusion. MEDIASTINUM/AXILLAE: No thoracic aortic aneurysm. No lymphadenopathy. CORONARY ARTERY CALCIFICATION: None. UPPER ABDOMEN: Hepatic steatosis. Cholelithiasis. MUSCULOSKELETAL: There are demonstrated bilateral rib fractures.     IMPRESSION: 1.  New airway wall thickening in the left upper and lower lobes with new consolidation in the dependent portion of the left upper lobe and at the left lung base, and mucoid impaction in left lower lobe airways, suspicious for aspiration. 2.  Overall improvement in groundglass and interstitial opacities in the right lung and slight worsening in the left upper lobe.     CT Head w/o Contrast    Result Date: 2/12/2022  EXAM: CT HEAD W/O CONTRAST LOCATION: [facility] DATE/TIME: 2/12/2022 9:20 AM INDICATION: Trauma - Head Injury. Unwitnessed fall. COMPARISON: CT head 01/18/2022 TECHNIQUE: Routine CT Head without IV contrast. Multiplanar reformats. Dose reduction techniques were used. FINDINGS: INTRACRANIAL CONTENTS: No intracranial hemorrhage, extraaxial collection, or mass effect.  No CT evidence of acute infarct. Small focus of chronic cortical encephalomalacia and adjacent gliosis involving the superior left frontal lobe as seen previously.  Mild presumed chronic small vessel ischemic changes. Mild generalized volume loss. No hydrocephalus. VISUALIZED ORBITS/SINUSES/MASTOIDS: No intraorbital abnormality. Complete opacification of the left maxillary sinus with some chronic appearing mural hyperostosis similar to the previous exam. Additional subtotal opacification of the left sphenoid sinus by polypoid mucosal thickening as seen previously. No middle ear or mastoid effusion. BONES/SOFT TISSUES: Mild right parietal scalp swelling. No skull fracture.     IMPRESSION: 1.  No CT evidence for acute intracranial process. 2.  Mild right parietal scalp contusion without associated  fracture. 3.  Brain atrophy and presumed chronic microvascular ischemic changes as above. 4.  Inflammatory changes of the paranasal sinuses as seen previously.    CT Head w/o Contrast    Result Date: 1/18/2022  EXAM: CT HEAD W/O CONTRAST LOCATION: [facility] DATE/TIME: 1/18/2022 3:45 AM INDICATION: Headache, intracranial hemorrhage suspected. COMPARISON: 11/4/2021 TECHNIQUE: Routine CT Head without IV contrast. Multiplanar reformats. Dose reduction techniques were used. FINDINGS: INTRACRANIAL CONTENTS: No intracranial hemorrhage, extraaxial collection, or mass effect. No CT evidence of acute infarct. Mild presumed chronic small vessel ischemic changes. Mild to moderate generalized volume loss. No hydrocephalus. Stable calcified dural based lesion along the right frontal convexity measuring 1 cm in diameter, presumably reflecting a small meningioma. VISUALIZED ORBITS/SINUSES/MASTOIDS: No intraorbital abnormality. Complete opacification of the left maxillary sinus with chronic mucoperiosteal reaction. Mucous retention cyst in the left sphenoid sinus. Mild mucosal thickening along the floor of the left frontal sinus. Opacified left frontoethmoidal recess. No middle ear or mastoid effusion. BONES/SOFT TISSUES: No acute abnormality.     IMPRESSION: 1.  No CT evidence for acute intracranial process. 2.  Stable chronic changes as above.    XR Video Swallow with SLP or OT    Result Date: 2/8/2022  EXAM: XR VIDEO SWALLOW WITH SLP OR OT LOCATION: [facility] DATE/TIME: 2/8/2022 8:52 AM INDICATION: Difficulty swallowing. COMPARISON: 01/31/2022. TECHNIQUE: Routine swallow study with speech pathology using multiple barium thicknesses. FINDINGS: FLUOROSCOPIC TIME: 1.2 minutes NUMBER OF IMAGES: 0 Swallow study with Speech Pathology using multiple barium thicknesses. Small amount of silent aspiration with thin liquid. Penetration with mildly thickened (nectar) liquid. There was  persistent mild penetration with chin Tech technique and mildly thickened liquid. No aspiration was solid consistency.     IMPRESSION: 1.  Silent aspiration with thin liquid. Penetration with mildly thickened liquid.     XR Video Swallow with SLP or OT    Result Date: 2022  EXAM: XR VIDEO SWALLOW WITH SLP OR OT LOCATION: [facility] DATE/TIME: 2022 11:30 AM INDICATION: Difficulty swallowing. COMPARISON: 2022. TECHNIQUE: Routine swallow study with speech pathology using multiple barium thicknesses. FINDINGS: FLUOROSCOPIC TIME: 3.3 minutes NUMBER OF IMAGES: 0 Swallow study with Speech Pathology using multiple barium thicknesses. Tracheal aspiration with thin liquid consistency barium with weak ineffective cough. Penetration into the laryngeal vestibule with mildly thickened (nectar) liquid consistency barium. No penetration or aspiration with honey and pudding consistency barium. Please refer to speech therapy report for additional detail.    XR Video Swallow with SLP or OT    Result Date: 2022  EXAM: XR VIDEO SWALLOW WITH SLP OR OT LOCATION: [facility] DATE/TIME: 2022 3:27 PM INDICATION: Difficulty swallowing. COMPARISON: None. TECHNIQUE: Routine swallow study with speech pathology using multiple barium thicknesses. FINDINGS: FLUOROSCOPIC TIME: 1.5 minutes NUMBER OF IMAGES: 9 Swallow study with Speech Pathology using multiple barium thicknesses. Moderate to deep penetration with thin liquid (no cough reflex). Small amount of penetration with mildly thick consistency. No other penetration or aspiration.     EEG Coma or Sleep Only    Result Date: 2022  ELECTROENCEPHALOGRAM (EEG) REPORT [facility] NEUROLOGY [address] [place] [address] Tel: [phone]  Fax: [phone] [email or web address] Peewee Hess,  1965, MRN [id number] PCP: Sarah Canseco Date: 2022 Principal Diagnosis: Altered  mental status History : Patient is being evaluated for altered mental status. Medication listed includes Ativan Description of the Recording:  This is a multichannel digital EEG recording done using the international 10-20 placement system. Background of the recording consists of irregular 1 to 3 Hz polymorphic delta activity with amplitudes ranging between 20 to 30  V.  Alerting procedure produce minimal change.  Photic stimulation performed with standard protocol produced minimal change.  No transient sleep patterns were recorded. During this recording, no sharp discharges, spikes or electrographic seizure activity was recorded. Impression: This is an abnormal EEG due to diffusely slow polymorphic delta activity that minimally attenuates with alerting procedures.  This EEG suggests nonspecific generalized cerebral dysfunction.  No focal slowing or periodic discharges were seen to suggest seizures. Classification: Delta grade I generalized Tiburcio Gallagher MD [facility] (Former[facility], [facility], [facility] This note was dictated using voice recognition software.  Any grammatical or context distortions are unintentional and inherent to the software.       Lab Results:  Personally Reviewed.   Fingerstick Blood Glucose: @[id number](POCGLUFGR:10)@    Last Hbg A1C: No results found for: HGBA1C   Lab Results   Component Value Date    INR 1.67 (H) 01/30/2022    INR 1.72 (H) 01/16/2022    INR 1.69 (H) 01/15/2022     Recent Results (from the past 24 hour(s))   Glucose by meter    Collection Time: 02/20/22  4:34 PM   Result Value Ref Range    GLUCOSE BY METER POCT 145 (H) 70 - 99 mg/dL   Glucose by meter    Collection Time: 02/20/22  9:52 PM   Result Value Ref Range    GLUCOSE BY METER POCT 56 (L) 70 - 99 mg/dL   Glucose by meter    Collection Time: 02/20/22 10:21 PM   Result Value Ref Range    GLUCOSE BY METER POCT 77 70 - 99 mg/dL   Glucose by meter    Collection Time: 02/21/22  12:30 AM   Result Value Ref Range    GLUCOSE BY METER POCT 103 (H) 70 - 99 mg/dL   Potassium    Collection Time: 02/21/22  6:35 AM   Result Value Ref Range    Potassium 4.6 3.5 - 5.0 mmol/L   Magnesium    Collection Time: 02/21/22  6:35 AM   Result Value Ref Range    Magnesium 1.6 (L) 1.8 - 2.6 mg/dL   Extra Purple Top Tube    Collection Time: 02/21/22  6:35 AM   Result Value Ref Range    Hold Specimen JIC    Glucose by meter    Collection Time: 02/21/22  8:18 AM   Result Value Ref Range    GLUCOSE BY METER POCT 223 (H) 70 - 99 mg/dL   Glucose by meter    Collection Time: 02/21/22 12:20 PM   Result Value Ref Range    GLUCOSE BY METER POCT 180 (H) 70 - 99 mg/dL           Advanced Care Planning    Time > 35 minutes with greater than 50% of time spent in counseling and coordination of care.

## 2022-02-21 NOTE — PLAN OF CARE
Problem: Adult Inpatient Plan of Care  Goal: Patient-Specific Goal (Individualized)  Outcome: Ongoing, Progressing     Problem: Adult Inpatient Plan of Care  Goal: Readiness for Transition of Care  Outcome: Ongoing, Progressing     Problem: Mobility Impairment  Goal: Optimal Mobility  2/20/2022 1916 by Omari Ritchie, RN  Outcome: Ongoing, Progressing  2/20/2022 1436 by Omari Ritchie RN  Outcome: Ongoing, Progressing   Goal Outcome Evaluation:    Plan of Care Reviewed With: patient     Overall Patient Progress: improving    Outcome Evaluation: Family goal is restorative/TCU.      At the beginning of the shift O2 sat 91% 15L oxymask. Nurse initiated followed up with cough deep breath and IS, Q30 min. Patient currently O2 92% 9 L Oxymask. Patient beginning to have a productive cough.

## 2022-02-21 NOTE — PROGRESS NOTES
INFECTIOUS DISEASE FOLLOW UP NOTE      ASSESSMENT:  1. Leukocytosis, elevated lactate, hypotension. He has been afebrile. Chest CT 2/11 with worsening findings, some of which are likely sequelae of COVID pneumonia. He was started on pip/tazo and vancomycin 2/11, with last doses 2/14, and WBC increased to 24k on 2/15. No clear source of infection showing up. WBC better since restarting IV antibiotics, however still gets hypotensive and hypoxia got worse, now stabilized.  Not sure what we are treating with the antibiotic, suspect that he has had recurrent aspiration. May have new fibrosis from COVID on top of underlying COPD. EVALI not too likely, he denies vaping here in the hospital, and would expect high inflammation with this, and does not explain hypotension.   2. Admitted 1/15 with alcohol withdrawal, treated for aspiration pneumonia.   3. COVID positive 1/30/22, previous negative 1/15, 1/22, presumed therefore contracted in the hospital. Treated with RDV 5 days, dexamethasone 1/30-2/5, tocilizumab 1/31/22. Vaccinated x2 fall 2021.   4. Encephalopathy. Alcohol withdrawal. MRI 1/22/22 showed suggestion of few small foci of acute/early subacute infarctions L occipital region. Improved.   5. DM.   6. H/o abdominal surgeries  7. H/o jazmine's gangrene 2018.  8. Albumin of 1.8 will lead to low oncotic pressure, 3rd spacing with fluids.   9. Goals of care -- not planning on excalating cares per wife's wishes.     PLAN:  Continue meropenem 7 days likely, will check labs tomorrow and decide.   I will check chart on 2/22/22. Please call if further questions.     Jean Paul Galvin MD  [facility]  [phone] [facility] paging    ______________________________________________________________________    SUBJECTIVE / INTERVAL HISTORY: back from swallow study. Breathing a little better, still coughs. Weaned O2 some. Temps ok.    ROS: deconditioned. All other systems negative except as listed  "above.        OBJECTIVE:  /79 (BP Location: Left arm)   Pulse 91   Temp 97.4  F (36.3  C) (Axillary)   Resp 18   Ht 1.854 m (6' 0.99\")   Wt 77.7 kg (171 lb 6.4 oz)   SpO2 94%   BMI 22.62 kg/m    FiO2 (%): 2 %      GEN: No acute distress.  Oriented to place, year, month. Face mask O2.   RESPIRATORY:  Normal breathing pattern. Rales noted  CARDIOVASCULAR:  Regular rate and rhythm. Normal S1 and S2. No murmur, click, gallop or rub. No dependent edema. No excess JVD.  ABDOMEN:  Soft, normal bowel sounds, non-tender, no masses, has surgical scars.  EXTREMITIES: No edema.  No synovitis  SKIN/HAIR/NAILS:  No rashes. Stage 2 wound sacrum  Neuro: non focal  IV: peripheral        Antibiotics:  Meropenem 2/15-  pip/tazo 1/15-1/22,1/28-2/3, 2/11-14  Vancomycin 2/11-14, 2/15-16  remdesivir 1/30-2/3  augmentin 2/15    Pertinent labs:    Recent Labs   Lab 02/18/22  0355 02/17/22  0628 02/16/22  1051 02/15/22  0558   WBC 14.5* 14.8* 13.4* 24.6*  24.6*   HGB  --  10.6* 10.4* 10.2*   HCT  --  30.2* 30.2* 29.5*   PLT  --  222 226 229        Recent Labs   Lab 02/17/22  0628 02/16/22  1051 02/15/22  0559    142 139   CO2 19* 19* 18*   BUN 4* 4* 5*        Lab Results   Component Value Date    CRP 0.6 02/17/2022    CRP 0.8 (H) 02/15/2022    CRP 0.2 02/12/2022         Lab Results   Component Value Date    ALT 16 02/17/2022    AST 56 (H) 02/17/2022    ALKPHOS 145 (H) 02/17/2022         MICROBIOLOGY DATA:  2/11 blood negative  2/15 blood negative  MRSA screen negative  c diff last negative 2/3/22    RADIOLOGY:  MR Brain w/o Contrast    Result Date: 1/22/2022  EXAM: MR BRAIN W/O CONTRAST LOCATION: [facility] DATE/TIME: 1/22/2022 1:05 PM INDICATION: Encephalopathy COMPARISON: CT head 01/18/2022, MRI head 11/23/2020 TECHNIQUE: Routine multiplanar multisequence head MRI without intravenous contrast. FINDINGS: INTRACRANIAL CONTENTS: Please note study is significantly degraded by motion artifact. " Suggestion of a few small foci of restricted diffusion at the left temporal occipital region; best appreciated on repeat axial diffusion weighted sequence series 20.2 image 12, image 11; as well as repeat coronal diffusion weighted sequence series 24.2 image 9. No definite associated hemorrhage or significant mass effect. Redemonstration of small chronic infarction left precentral gyrus. Mild generalized cerebral atrophy. No hydrocephalus. Normal position of the cerebellar tonsils. Small chronic infarction lateral aspect left cerebellum. A few additional tiny infarctions demonstrated on prior exam are not well demonstrated on the current. SELLA: Not well-visualized, grossly normal. OSSEOUS STRUCTURES/SOFT TISSUES: Normal marrow signal. Flow voids are not well-visualized. ORBITS: Not well-visualized. SINUSES/MASTOIDS: The complete opacification left maxillary and left sphenoid sinus, similar to prior. Small amount of opacification right mastoid air cells.     IMPRESSION: 1.  Please note study is significantly degraded by motion artifact. 2.  Suggestion of a few small foci of acute/early subacute infarction at the left temporal occipital region. 3.  No definite associated hemorrhage or significant mass effect. 4.  Small chronic infarction left precentral gyrus, similar to prior. 5.  Probable few small chronic infarctions cerebellar hemispheres. 6.  Mild atrophy.    Echocardiogram Complete    Result Date: 2022  [id number]^ALEYDA^LYN^[place] [address]  Name: KAREY LIVINGSTON MRN: [id number] : 1965 Study Date: 2022 07:37 AM Age: 56 yrs Gender: Male Patient Location: [facility] Reason For Study: Hypertension (HTN) Ordering Physician: LYN MAYNARD Performed By: TAYLER  BSA: 1.9 m2 Height: 72 in Weight: 147 lb HR: 123 BP: 97/59 mmHg ______________________________________________________________________________ Procedure Complete Portable Echo  Adult. Technically difficult study.Extremely poor acoustic windows. Compared to the prior study dated 11/5/2021, there have been no changes. No hemodynamically significant valvular abnormalities on 2D or color flow imaging. ______________________________________________________________________________ Interpretation Summary  1.Left ventricular size, wall motion and function are normal. The ejection fraction is > 65%. 2.There is mild concentric left ventricular hypertrophy. 3.Normal right ventricle size and systolic function. 4.No hemodynamically significant valvular abnormalities on 2D or color flow imaging. Compared to the prior study dated 11/5/2021, there have been no changes. ______________________________________________________________________________ I      WMSI = 1.00     % Normal = 100  X - Cannot   0 -                      (2) - Mildly 2 -          Segments  Size Interpret    Hyperkinetic 1 - Normal  Hypokinetic  Hypokinetic  1-2     small                                                    7 -          3-5    moderate 3 - Akinetic 4 -          5 -         6 - Akinetic Dyskinetic   6-14    large              Dyskinetic   Aneurysmal  w/scar       w/scar       15-16   diffuse  Left Ventricle Left ventricular size, wall motion and function are normal. The ejection fraction is > 65%. There is mild concentric left ventricular hypertrophy. Left ventricular diastolic function is normal. No regional wall motion abnormalities noted.  Right Ventricle Normal right ventricle size and systolic function. TAPSE is normal, which is consistent with normal right ventricular systolic function.  Atria Normal left atrial size. Right atrial size is normal. There is no color Doppler evidence of an atrial shunt.  Mitral Valve Mitral valve leaflets appear normal. There is no evidence of mitral stenosis or clinically significant mitral regurgitation. There is no mitral regurgitation noted. There is no mitral valve stenosis.   Tricuspid Valve Tricuspid valve leaflets appear normal. Right ventricle systolic pressure estimate normal. There is trace tricuspid regurgitation. There is no tricuspid stenosis.  Aortic Valve Aortic valve leaflets appear normal. There is no evidence of aortic stenosis or clinically significant aortic regurgitation. The aortic valve is trileaflet. No aortic regurgitation is present. No aortic stenosis is present.  Pulmonic Valve The pulmonic valve is not well seen, but is grossly normal. This degree of valvular regurgitation is within normal limits. There is no pulmonic valvular stenosis.  Vessels The aorta root is normal. Normal size ascending aorta. IVC diameter <2.1 cm collapsing >50% with sniff suggests a normal RA pressure of 3 mmHg.  Pericardium There is no pericardial effusion.  Rhythm The rhythm was sinus tachycardia.  ______________________________________________________________________________ MMode/2D Measurements & Calculations IVSd: 1.3 cm LVIDd: 4.1 cm LVIDs: 2.3 cm LVPWd: 1.2 cm FS: 43.4 %  LV mass(C)d: 182.6 grams LV mass(C)dI: 97.7 grams/m2 Ao root diam: 3.3 cm LA dimension: 3.6 cm asc Aorta Diam: 3.8 cm LA/Ao: 1.1 LVOT diam: 2.2 cm LVOT area: 3.8 cm2 LA Volume (BP): 29.3 ml LA Volume Index (BP): 15.7 ml/m2  LA Volume Indexed (AL/bp): 17.3 ml/m2 RWT: 0.61  Doppler Measurements & Calculations MV E max gustavo: 132.0 cm/sec MV A max gustavo: 137.1 cm/sec MV E/A: 0.96 MV dec slope: 1104 cm/sec2 MV dec time: 0.12 sec Ao V2 max: 143.6 cm/sec Ao max P.0 mmHg Ao V2 mean: 108.6 cm/sec Ao mean P.2 mmHg Ao V2 VTI: 26.8 cm LELO(I,D): 3.5 cm2 LELO(V,D): 3.9 cm2 LV V1 max P.6 mmHg LV V1 max: 146.7 cm/sec LV V1 VTI: 24.2 cm SV(LVOT): 92.9 ml SI(LVOT): 49.7 ml/m2 PA V2 max: 107.4 cm/sec PA max P.6 mmHg PA acc time: 0.07 sec AV Gustavo Ratio (DI): 1.0 LELO Index (cm2/m2): 1.9 E/E' av.4  Lateral E/e': 18.6 Medial E/e': 20.2  ______________________________________________________________________________ Report  approved by: Randal Villa 02/11/2022 09:14 AM       XR Chest Port 1 View    Result Date: 2/16/2022  EXAM: XR CHEST PORT 1 VIEW LOCATION: [facility] DATE/TIME: 2/16/2022 8:33 AM INDICATION: dyspnea COMPARISON: CT pulmonary angiogram 02/11/2022     IMPRESSION: Abnormal increased lung attenuation diffusely through both lungs. There are subsegmental foci of more focal airspace opacity in the peripheral third of the left mid and lower lung, similar to the prior CT. Subpleural emphysema is present in the apices. No new focal airspace opacity or volume loss. Symmetric apical and left lateral pleural thickening has not increased. No pleural effusion or pneumothorax. Cardiac silhouette is not enlarged. Unchanged aortic and other mediastinal interfaces. There are metallic fragments projecting in the left upper quadrant. Chronic left posterolateral rib fracture deformities are ununited.    XR Chest Port 1 View    Result Date: 1/29/2022  EXAM: XR CHEST PORT 1 VIEW LOCATION: [facility] DATE/TIME: 1/29/2022 6:18 AM INDICATION: Pneumonia follow up. COMPARISON: 01/28/2022 chest CT.01/19/2022 radiograph.     IMPRESSION: Relatively diffuse left lung opacities have increased since the 01/19/2022 chest radiograph, and perhaps slightly increased since the 01/20/2022 chest CT. Differentials for the increased opacities include asymmetric pulmonary edema, infectious / inflammatory process or layering pleural fluid. Small left pleural effusion is present. Right lung is hypoexpanded and may have a few subtle scattered opacities. No pneumothorax. Normal heart size. Atherosclerotic aorta.     XR Chest Port 1 View    Result Date: 1/19/2022  EXAM: XR CHEST PORT 1 VIEW LOCATION: [facility] DATE/TIME: 1/19/2022 10:52 AM INDICATION: after advancing et tube COMPARISON: 1/18/2022.     IMPRESSION: Endotracheal tube is been advanced. Tip is 4 cm above the igor in good  position. There our persistent bibasilar pulmonary opacities with partial clearing at the right base from the prior study and no change at the left base. No pneumothorax. Enteric tube tip is not visualized tip is below the diaphragm. PICC catheter from right upper extremity approach is unchanged with tip at the junction of the superior vena cava and right atrium. Left posterior rib fractures are again noted as well as deformity of the right humeral head.    XR Chest Port 1 View    Result Date: 1/18/2022  EXAM: XR CHEST PORT 1 VIEW LOCATION: [facility] DATE/TIME: 1/18/2022 6:06 AM INDICATION: Location of ET tube COMPARISON: 01/16/2022.     IMPRESSION:Endotracheal tube is in the trachea at the level of the clavicular heads with tip 6 cm above the igor. PICC catheter from right upper extremity approach is in the distal superior vena cava near its junction with the right atrium. Enteric tube tip is below the diaphragm and not visualized. There is no pneumothorax. Again noted are bilateral patchy airspace opacities there has been increased consolidation or atelectasis at the right medial lung base. There are old healed left posterior rib  fractures no acute fractures are identified.    XR Chest Port 1 View    Result Date: 1/16/2022  EXAM: XR CHEST PORT 1 VIEW LOCATION: [facility] DATE/TIME: 1/16/2022 12:34 PM INDICATION: ET tube placement, PICC line placement COMPARISON: CT pulmonary angiogram 01/15/2022     IMPRESSION: ET tube tip projects 6.5 cm above the igor. Gastric tube extends towards the diaphragmatic hiatus, outside the field-of-view. Right PICC terminates upper right atrium. Extensive bilateral airspace opacities are present with patchy lung involvement, unchanged from yesterday. No new focal lung volume loss. No visible pleural fluid or pneumothorax on this single supine view.    XR Chest Port 1 View    Result Date: 1/15/2022  EXAM: XR CHEST PORT 1 VIEW  LOCATION: [facility] DATE/TIME: 1/15/2022 5:16 PM INDICATION: Shortness of breath COMPARISON: 11/5/2021     IMPRESSION: There are mild opacities in both lungs which have increased from the prior exam. This is likely due to a superimposed infectious or inflammatory process on top of mild background scarring. Normal heart size and pulmonary vascularity. Old left  rib fractures.    XR Abdomen Port 1 View    Result Date: 1/17/2022  EXAM: XR ABDOMEN PORT 1 VIEWS LOCATION: [facility] DATE/TIME: 1/17/2022 1:25 PM INDICATION: Location of OG tube COMPARISON: None.     IMPRESSION: Enteric tube in the body the stomach.     CT Abdomen Pelvis w Contrast    Result Date: 2/15/2022  EXAM: CT ABDOMEN PELVIS W CONTRAST LOCATION: [facility] DATE/TIME: 2/15/2022 9:50 PM INDICATION: Leukocytosis. COMPARISON: 10/05/2020 TECHNIQUE: CT scan of the abdomen and pelvis was performed following injection of IV contrast. Multiplanar reformats were obtained. Dose reduction techniques were used. CONTRAST: ISOVUE 370 100ML FINDINGS: LOWER CHEST: Bibasilar infiltrates. Small right and trace left pleural effusion. HEPATOBILIARY: Hepatic steatosis. Cholelithiasis and gallbladder distention. PANCREAS: Pancreatic calcifications suggesting chronic pancreatitis. Prominent calcification at the neck of the pancreas. Difficult to tell if this is parenchymal or intraductal. The distal pancreas is atrophic. SPLEEN: Splenectomy with accessory splenule. ADRENAL GLANDS: Normal. KIDNEYS/BLADDER: Small nonobstructing renal calculi. BOWEL: Gastric and small bowel wall thickening. Central mesenteric congestion. Trace amount of free fluid. Diverticulosis with presumed retained barium. Wall thickening of the proximal colon. Rectal wall thickening versus underdistention. LYMPH NODES: Subcentimeter mesenteric and peripancreatic lymph nodes. VASCULATURE: Atherosclerotic vascular calcification.  PELVIC ORGANS: Small amount of free fluid. MUSCULOSKELETAL: Degenerative change osseous structures. Anasarca. Remote and subacute rib fractures.     IMPRESSION: 1.  Wall thickening of stomach, small bowel and proximal colon. Correlate for gastroenteritis/enterocolitis. 2.  Mesenteric congestion and small amount of free fluid. 3.  Bilateral pulmonary infiltrates. Small right and trace left pleural effusion. 4.  Focal rectal wall thickening versus underdistention. Follow-up recommended to exclude underlying lesion. 5.  Hepatic steatosis. 6.  Diverticulosis. 7.  Cholelithiasis and mild gallbladder distention. 8.  Nonobstructing renal calculi. 9.  Coarse pancreatic calcifications again seen    CT Chest Pulmonary Embolism w Contrast    Result Date: 2/11/2022  EXAM: CT CHEST PULMONARY EMBOLISM W CONTRAST LOCATION: [facility] DATE/TIME: 2/11/2022 1:42 PM INDICATION: PE suspected, low/intermediate prob, positive D-dimer, hypoxia, history of Covid COMPARISON: 01/28/2022 TECHNIQUE: CT chest pulmonary angiogram during arterial phase injection of IV contrast. Multiplanar reformats and MIP reconstructions were performed. Dose reduction techniques were used. CONTRAST: IsoVue 370 100mL FINDINGS: ANGIOGRAM CHEST: No pulmonary artery filling defects. Normal caliber aorta. LUNGS AND PLEURA: Moderate emphysema. Persistent but increased patchy groundglass and consolidative pulmonary opacities with areas of interlobular septal thickening. Mild bronchiectasis and bronchial wall thickening have increased when compared to prior exam. Trace effusions have decreased. No pneumothorax. MEDIASTINUM/AXILLAE: Heart size is normal. No adenopathy. CORONARY ARTERY CALCIFICATION: None. UPPER ABDOMEN: Hepatic steatosis. Cholelithiasis. There is diffuse wall thickening of the stomach. Small volume ascites. Nonobstructing bilateral renal calculi. Prominent calcification in the area of the pancreatic neck is unchanged.  Splenectomy with small accessory splenule. MUSCULOSKELETAL: Degenerative changes of the spine. Prominent L1 Schmorl's node. Similar appearance of the bilateral rib fractures. Mild anasarca.     IMPRESSION: 1.  No pulmonary embolus. 2.  Interval worsening of bilateral pulmonary opacities, bronchiectasis, bronchial wall thickening. 3.  Hepatic steatosis. 4.  Cholelithiasis. 5.  Diffuse wall thickening of the visualized stomach may indicate gastritis. 6.  Manifestations of third spacing.    CT Chest Pulmonary Embolism w Contrast    Result Date: 1/15/2022  EXAM: CT CHEST PULMONARY EMBOLISM W CONTRAST LOCATION: [facility] DATE/TIME: 1/15/2022 6:17 PM INDICATION: Shortness of breath COMPARISON: Portable AP view of the chest 01/15/2022; CT of the abdomen and pelvis 10/05/2020 TECHNIQUE: CT chest pulmonary angiogram during arterial phase injection of IV contrast. Multiplanar reformats and MIP reconstructions were performed. Dose reduction techniques were used. CONTRAST: 100ml isovue 370 FINDINGS: ANGIOGRAM CHEST: Pulmonary arteries are normal caliber and negative for pulmonary emboli. Normal caliber thoracic aorta. There are no findings to suggest acute aortic syndrome. Proximal great vessels are patent. Diminutive left vertebral artery arises directly from the arch. Mild atheromatous calcifications descending aorta and distal right innominate artery. LUNGS AND PLEURA: Upper lung predominant mixed centrilobular and paraseptal emphysema. Superimposed patchy airspace opacities are present bilaterally, asymmetric to the right associated with internal interstitial opacity. Minimal right pleural fluid layers dependently. There is chronic thickening of the left posterior and posterolateral pleura adjacent to multiple left rib fracture deformities. Central airways are patent. MEDIASTINUM: Cardiac chambers are normal in size. No pericardial effusion is present. Mildly enlarged lymph nodes in both analia,  likely reactive in the setting of airspace opacities. Subcarinal, lower paratracheal and subaortic/prevascular lymph nodes are  all normal by size criteria. The middle third and distal thirds of the esophagus has circumferential wall thickening consistent with esophagitis. Imaged thyroid gland is normal. CORONARY ARTERY CALCIFICATION: None. UPPER ABDOMEN: There are multiple calcified gallstones in the neck of the gallbladder. Severe fatty infiltration of the liver. The imaged distal transverse colon/splenic flecture has wall thickening. Previous splenectomy with small splenule present. 15 mm length ovoid calcification in the region of the pancreas neck is unchanged. MUSCULOSKELETAL: There are multiple ununited left posterior and posterolateral rib fracture deformities including lateral ribs 7, 8 and posterior ribs 9 and 11. The posterolateral right ninth rib is broken in 2 locations and the fractures have fairly sharp borders suggesting acute on subacute fractures..     IMPRESSION: 1.  No acute pulmonary embolism. 2.  Multifocal bilateral airspace opacities are present mixed groundglass and interstitial thickening asymmetric to the right. Imaging features can be seen with (COVID-19)  pneumonia, though are nonspecific and can occur with a variety of infectious and noninfectious processes. Given the other findings below, aspiration is a strong differential consideration. 3.  Severe wall thickening of the middle and distal thirds of the esophagus consistent with a subacute esophagitis. 4.  Severe hepatic steatosis. 5.  Acute on chronic fracture deformities of multiple left lateral/posterolateral ribs. 6.  Cholelithiasis. 7.  Wall thickening of the imaged colon consistent with colitis.    CT Chest w/o Contrast    Result Date: 1/28/2022  EXAM: CT CHEST W/O CONTRAST LOCATION: [facility] DATE/TIME: 1/28/2022 2:36 PM INDICATION: Cough, persistent COMPARISON: 10/15/2022 TECHNIQUE: CT chest without IV  contrast. Multiplanar reformats were obtained. Dose reduction techniques were used. CONTRAST: None. FINDINGS: LUNGS AND PLEURA: Upper lung predominant centrilobular and paraseptal emphysema. Improvement in groundglass and interstitial opacities in the right lung. Slight worsening of groundglass and interstitial opacities in the left upper lobe. New airway wall thickening in the left upper lobe and left lower lobe and new consolidation in the dependent portion of the left upper lobe and at the left lung base. Mucoid impaction in the left lower lobe airways. Unchanged left pleural thickening and trace right pleural effusion. MEDIASTINUM/AXILLAE: No thoracic aortic aneurysm. No lymphadenopathy. CORONARY ARTERY CALCIFICATION: None. UPPER ABDOMEN: Hepatic steatosis. Cholelithiasis. MUSCULOSKELETAL: There are demonstrated bilateral rib fractures.     IMPRESSION: 1.  New airway wall thickening in the left upper and lower lobes with new consolidation in the dependent portion of the left upper lobe and at the left lung base, and mucoid impaction in left lower lobe airways, suspicious for aspiration. 2.  Overall improvement in groundglass and interstitial opacities in the right lung and slight worsening in the left upper lobe.     CT Head w/o Contrast    Result Date: 2/12/2022  EXAM: CT HEAD W/O CONTRAST LOCATION: [facility] DATE/TIME: 2/12/2022 9:20 AM INDICATION: Trauma - Head Injury. Unwitnessed fall. COMPARISON: CT head 01/18/2022 TECHNIQUE: Routine CT Head without IV contrast. Multiplanar reformats. Dose reduction techniques were used. FINDINGS: INTRACRANIAL CONTENTS: No intracranial hemorrhage, extraaxial collection, or mass effect.  No CT evidence of acute infarct. Small focus of chronic cortical encephalomalacia and adjacent gliosis involving the superior left frontal lobe as seen previously.  Mild presumed chronic small vessel ischemic changes. Mild generalized volume loss. No hydrocephalus.  VISUALIZED ORBITS/SINUSES/MASTOIDS: No intraorbital abnormality. Complete opacification of the left maxillary sinus with some chronic appearing mural hyperostosis similar to the previous exam. Additional subtotal opacification of the left sphenoid sinus by polypoid mucosal thickening as seen previously. No middle ear or mastoid effusion. BONES/SOFT TISSUES: Mild right parietal scalp swelling. No skull fracture.     IMPRESSION: 1.  No CT evidence for acute intracranial process. 2.  Mild right parietal scalp contusion without associated fracture. 3.  Brain atrophy and presumed chronic microvascular ischemic changes as above. 4.  Inflammatory changes of the paranasal sinuses as seen previously.    CT Head w/o Contrast    Result Date: 1/18/2022  EXAM: CT HEAD W/O CONTRAST LOCATION: [facility] DATE/TIME: 1/18/2022 3:45 AM INDICATION: Headache, intracranial hemorrhage suspected. COMPARISON: 11/4/2021 TECHNIQUE: Routine CT Head without IV contrast. Multiplanar reformats. Dose reduction techniques were used. FINDINGS: INTRACRANIAL CONTENTS: No intracranial hemorrhage, extraaxial collection, or mass effect. No CT evidence of acute infarct. Mild presumed chronic small vessel ischemic changes. Mild to moderate generalized volume loss. No hydrocephalus. Stable calcified dural based lesion along the right frontal convexity measuring 1 cm in diameter, presumably reflecting a small meningioma. VISUALIZED ORBITS/SINUSES/MASTOIDS: No intraorbital abnormality. Complete opacification of the left maxillary sinus with chronic mucoperiosteal reaction. Mucous retention cyst in the left sphenoid sinus. Mild mucosal thickening along the floor of the left frontal sinus. Opacified left frontoethmoidal recess. No middle ear or mastoid effusion. BONES/SOFT TISSUES: No acute abnormality.     IMPRESSION: 1.  No CT evidence for acute intracranial process. 2.  Stable chronic changes as above.    XR Video Swallow with SLP or  OT    Result Date: 2/21/2022  EXAM: XR VIDEO SWALLOW WITH SLP OR OT LOCATION: [facility] DATE/TIME: 2/21/2022 10:33 AM INDICATION: Difficulty swallowing. COMPARISON: None. TECHNIQUE: Routine swallow study with speech pathology using multiple barium thicknesses. FINDINGS: FLUOROSCOPIC TIME: 1.2 minutes NUMBER OF IMAGES: 0 Swallow study with Speech Pathology using multiple barium thicknesses. There is moderate penetration with nectar consistency with regular swallowing and cup sip. With chin tuck maneuver, this becomes just mild and transient. With thin liquids, mild transient penetration including with straw sip. No significant residuals with thicker consistencies.     IMPRESSION: 1.  Patient has moderate penetration with nectar consistency. 2.  With chin tuck maneuver, this significantly improves and the patient should build tolerate all consistencies if using chin tuck maneuver.     XR Video Swallow with SLP or OT    Result Date: 2/8/2022  EXAM: XR VIDEO SWALLOW WITH SLP OR OT LOCATION: [facility] DATE/TIME: 2/8/2022 8:52 AM INDICATION: Difficulty swallowing. COMPARISON: 01/31/2022. TECHNIQUE: Routine swallow study with speech pathology using multiple barium thicknesses. FINDINGS: FLUOROSCOPIC TIME: 1.2 minutes NUMBER OF IMAGES: 0 Swallow study with Speech Pathology using multiple barium thicknesses. Small amount of silent aspiration with thin liquid. Penetration with mildly thickened (nectar) liquid. There was persistent mild penetration with chin Tech technique and mildly thickened liquid. No aspiration was solid consistency.     IMPRESSION: 1.  Silent aspiration with thin liquid. Penetration with mildly thickened liquid.     XR Video Swallow with SLP or OT    Result Date: 1/31/2022  EXAM: XR VIDEO SWALLOW WITH SLP OR OT LOCATION: [facility] DATE/TIME: 1/31/2022 11:30 AM INDICATION: Difficulty swallowing. COMPARISON: 1/25/2022. TECHNIQUE:  Routine swallow study with speech pathology using multiple barium thicknesses. FINDINGS: FLUOROSCOPIC TIME: 3.3 minutes NUMBER OF IMAGES: 0 Swallow study with Speech Pathology using multiple barium thicknesses. Tracheal aspiration with thin liquid consistency barium with weak ineffective cough. Penetration into the laryngeal vestibule with mildly thickened (nectar) liquid consistency barium. No penetration or aspiration with honey and pudding consistency barium. Please refer to speech therapy report for additional detail.    XR Video Swallow with SLP or OT    Result Date: 2022  EXAM: XR VIDEO SWALLOW WITH SLP OR OT LOCATION: [facility] DATE/TIME: 2022 3:27 PM INDICATION: Difficulty swallowing. COMPARISON: None. TECHNIQUE: Routine swallow study with speech pathology using multiple barium thicknesses. FINDINGS: FLUOROSCOPIC TIME: 1.5 minutes NUMBER OF IMAGES: 9 Swallow study with Speech Pathology using multiple barium thicknesses. Moderate to deep penetration with thin liquid (no cough reflex). Small amount of penetration with mildly thick consistency. No other penetration or aspiration.     EEG Coma or Sleep Only    Result Date: 2022  ELECTROENCEPHALOGRAM (EEG) REPORT [facility] NEUROLOGY [address] [place] [address] Tel: [phone]  Fax: [phone] [email or web address] RENUKA Yap 1965, MRN [id number] PCP: Sarah Canseco Date: 2022 Principal Diagnosis: Altered mental status History : Patient is being evaluated for altered mental status. Medication listed includes Ativan Description of the Recording:  This is a multichannel digital EEG recording done using the international 10-20 placement system. Background of the recording consists of irregular 1 to 3 Hz polymorphic delta activity with amplitudes ranging between 20 to 30  V.  Alerting procedure produce minimal change.  Photic stimulation performed with standard  protocol produced minimal change.  No transient sleep patterns were recorded. During this recording, no sharp discharges, spikes or electrographic seizure activity was recorded. Impression: This is an abnormal EEG due to diffusely slow polymorphic delta activity that minimally attenuates with alerting procedures.  This EEG suggests nonspecific generalized cerebral dysfunction.  No focal slowing or periodic discharges were seen to suggest seizures. Classification: Delta grade I generalized Tiburcio Gallagher MD [facility] This note was dictated using voice recognition software.  Any grammatical or context distortions are unintentional and inherent to the software.

## 2022-02-21 NOTE — PROGRESS NOTES
Care Management Follow Up    Length of Stay (days): 37    Expected Discharge Date: 02/23/2022     Concerns to be Addressed: discharge planning     Patient plan of care discussed at interdisciplinary rounds: Yes    Anticipated Discharge Disposition: LTC vs AL vs TCU     Anticipated Discharge Services: Other (see comment) (therapy services )  Anticipated Discharge DME: None    Patient/family educated on Medicare website which has current facility and service quality ratings: yes  Education Provided on the Discharge Plan:    Patient/Family in Agreement with the Plan: yes    Referrals Placed by CM/SW: Post Acute Facilities  Private pay costs discussed: Not applicable    Additional Information:  Looking for LTC placement, including possible AL or [facility] unit. Referrals have been sent out. Will call for updates in AM. Continuing to assess for discharge needs.      Ana Laura Hull RN

## 2022-02-22 NOTE — PROGRESS NOTES
ID chart check    WBC and procalcitonin returned to normal. Has still had hypotension intermittently. Last day of meropenem today. Please call if questions.     Jean Paul Galvin MD

## 2022-02-22 NOTE — PLAN OF CARE
Speech Language Therapy Discharge Summary    Reason for therapy discharge:    No further expectations of functional progress. At this time, repeat VFSS as overall strength improves.    Progress towards therapy goal(s). See goals on Care Plan in [facility] electronic health record for goal details.  Goals met    Therapy recommendation(s):    No further therapy is recommended., at this time.     SLP: Diet f/u completed with patient, chart review and nursing. Patient recalled chin tuck strategy independently and was able to complete independently and accurately on 3/3 sips of thin water; refused further intake. Patient reports he has been completing chin tuck posture nearly 100% of the time. Signage updated. Nursing reported no concerns with patient swallowing noted since upgrade to thin liquids. Nurse also reports patient has independently reported need for chin tuck to her as well. Patient is on easy to chew diet and thin liquids with chin tuck. This appears to be safest, yet LRD at this time and no plan to advance further until overall strength and medical status improve. Easy to chew diet does not appear to be limiting oral intake at this time and is appropriate given ongoing fluctuations in energy and mental status. Educated patient on contacting SLP if any questions or concerns arise. Consider repeat VFSS in 2-3 weeks. Continue chin tuck until repeat VFSS.

## 2022-02-22 NOTE — PLAN OF CARE
Problem: Mobility Impairment  Goal: Optimal Mobility  Outcome: Ongoing, Not Progressing     Problem: Risk for Delirium  Goal: Improved Attention and Thought Clarity  Outcome: Ongoing, Not Progressing     Problem: Fall Injury Risk  Goal: Absence of Fall and Fall-Related Injury  Intervention: Identify and Manage Contributors  Recent Flowsheet Documentation  Taken 2/21/2022 1700 by Martin Severino RN  Medication Review/Management: medications reviewed   Goal Outcome Evaluation:    Plan of Care Reviewed With: patient     Overall Patient Progress: improving    Outcome Evaluation: Family goal is restorative/TCU.    Patient  A and O with fluctuating cognition. Incontinent of bowel and bladder. One bowel movement this shift. Patient blood sugars WNL for patient. Ate 100% of his dinner. Patient verbalized pain  of 6/10. Tylenol W/codeine given. Proved effective. Patient  on 10 liters O2 via mask. No behaviors this shift. Patient did not try to climb out of bed this shift. Patient now on air mattress for pressure ulcer on his buttocks.

## 2022-02-22 NOTE — PLAN OF CARE
Goal Outcome Evaluation:    Plan of Care Reviewed With: patient     Overall Patient Progress: improving    Outcome Evaluation: Family goal is restorative/TCU.    Problem: Adult Inpatient Plan of Care  Goal: Optimal Comfort and Wellbeing  Outcome: Ongoing, Progressing  O2 sats remain 94-97% on 10 L Oxymask. When patient removes it to eat or take a med the sats will drop to the mid 80's, but it quickly returns to the 90's when replacing the O2.  No stools this shift, Does has some confusion and forgetfulness at times. Cooperative and Calm. Chair alarm under patient in bed for safety

## 2022-02-22 NOTE — PLAN OF CARE
Problem: Adult Inpatient Plan of Care  Goal: Optimal Comfort and Wellbeing  Outcome: Ongoing, Progressing  Pt slept intermittently this shift. Restless at times.  Disoriented to situation at times. Pleasant and redirectable.  C/o chronic joint pain and received tylenol with codeine and had a good effect. Remain on 10 liters oxymask and sats stayed in mid 90's with less desaturations this shift.  Dressing to coccyx changed this shift.  Skin denuded with skin slough.   Goal Outcome Evaluation:    Plan of Care Reviewed With: patient     Overall Patient Progress: improving    Outcome Evaluation: Family goal is restorative/TCU.

## 2022-02-22 NOTE — PROGRESS NOTES
Care Management Follow Up    Length of Stay (days): 38    Expected Discharge Date: 02/25/2022     Concerns to be Addressed: discharge planning     Patient plan of care discussed at interdisciplinary rounds: Yes    Anticipated Discharge Disposition: Transitional Care     Anticipated Discharge Services: Other (see comment) (therapy services )  Anticipated Discharge DME: None    Patient/family educated on Medicare website which has current facility and service quality ratings: yes  Education Provided on the Discharge Plan:    Patient/Family in Agreement with the Plan: yes    Referrals Placed by CM/SW: Post Acute Facilities  Private pay costs discussed: Not applicable    Additional Information:  Spoke with patients wife, Daina. States patient wants to pursue TCU. Spoke with Cristina at  [facility]. She is still following patient for placement. Will need to be on no more than 6L NC O2 before acceptance. CM will follow for medical progression and discharge needs.      Ana Laura Hull RN

## 2022-02-22 NOTE — PROGRESS NOTES
Palliative Care chart note    I saw patient in room, NAD but unreliable historian.  Wife Daina had just left.   I set up meeting with her but she left prior to meeting.    Reviewed CM notes:   Daina States patient wants to pursue TCU. She Spoke with Cristina at  [facility]. She is still following patient for placement. Will need to be on no more than 6L NC O2 before acceptance.     Patient is currently on 10 liters    Patient would not be a good TCU candidate. He falls a lot due to impulsiveness.   I had spoken to wife about Hospice.  She was reluctant to pursue ASL options due to costs.  Patient is on SSDI.    I recommended she speak to our Financial SW re: MA options.  I placed this referral on Friday and called them today.   Financial SW will speak to wife tomorrow about MA options. I will follow up with wife on Thursday to discuss concerns about TCU for him secondary to his falls and poor health.       He would not qualify for [facility]OP as his prognosis is longer than weeks, probably months.    ELIAS Kirkpatrick, NP-C, [facility]  No billing submitted

## 2022-02-22 NOTE — PROGRESS NOTES
Hospitalist Service Daily progress note    Assessment/Plan:  56 year old male with past medical history of alcohol abuse, hypertension, DM-II, multiple abdominal surgeries who was admitted on 1/15/22 with alcohol withdrawl, possible aspiration pneumonia leading to respiratory failure requiring intubation on 1/16, successfully extubated on 1/23/22 and now transferred out of ICU on 1/26/22 for floor care.  Tested positive for Covid on 1/22/2022 in the hospital  Did improve initially and was pending TCU placement until patient got worsening respiratory condition on 2/11/2022 and CT chest showed worsening pneumonia so restarted on IV antibiotic with Vanco and Zosyn.  Now white count up with elevated lactate and hypotension, ID consulted and now on just meropenem.      Acute respiratory failure with hypoxia.  -Thought secondary to aspiration pneumonia/ COVID/hospital-acquired pneumonia  -Was intubated 1/16, extubated on 1/23.   Required BiPAP for a while but now on oxygen mask oxygen  - Cont albuterol nebs and pulm hygiene  - CT PE was negative for PE on admission  S/b speech therapy     Leukocytosis: ID consulted and now on meropenem.  White count much improved today since starting IV antibiotics but it is unclear what source we are treating.?  Recurrent aspiration     Recovered Covid  -- Weekly COVID (per protocol) came back positive. He was negative on admission on 1/22. Check COVID labs. Start Covid special precaution.   - Finished IV remdesivir and Dexamethasone course. -  - Received dose of tocilizumab/per pulmonology recommendations on 1/31/2022.  -Try to taper off oxygen; thus discontinue dexamethasone on 2/5     DM type 2  Very labile blood sugars  - Hemoglobin A1c was 7.0 on 1/15/22  - Very sensitive to Lantus as patient goes hypoglycemic even with 5 units  Stop Metformin due to lactic acidosis  Continue sliding scale insulin and carb counting      Septic shock-resolved  -- Likely due to multifocal  pneumonia.  -- Metoprolol started on 1/25 for sinus tachycardia  -- Patient is having intermittent hypotension.   - Echo shows EF of 65% with mild concentric left ventricular hypertrophy  - Started on midodrine for persistent low blood pressure  Stim test negative     Acute toxic metabolic encephalopathy  - Patient has significant alcohol withdrawal during the intubation and was on Precedex  - Precedex weaned off. Cont with thiamine. Ativan prn.  - Alcohol level less than 10 on admission.    - Collateral information from wife-he did not find any evidence of patient drinking recently.    - Brain MRI shows chronic CVAs with brain atrophy  - Patient denies drinking prior to hospitalization.  Most recent chemical dependency treatment in 2001.  Since then patient tried to quit drinking numerous times, ending up in the hospital with withdrawal.  He is on disability for 2 years, in the past was a nicole.  - Reconsult psychiatry who are recommending:    Duloxetine 60 mg daily.  Continue gabapentin 200 mg twice a day  Seroquel 12.5 mg TID x 6 doses   Seroquel 75 mg at bedtime continue.  Rozerem 8 mg at bedtime.  Continue to utilize olanzapine 5-10 mg every 6 hours as needed for agitation, psychosis.  Efforts at sleep regulation.  For daytime anxiety would recommend adding Seroquel 6.25 mg in the morning and afternoon.     Hypokalemia/hypomagnesia  Replace per protocol     Diarrhea  This is resolved     Significant weakness and deconditioning  - Secondary to above  - Pending TCU placement     Severe malnutrition  - Protein and calories  - Dietitian consult, continue supplement  - Patient still has poor oral intake     S/P fall   - Patient had incident of fall this morning 2/12/2022, unwitnessed  - Evaluated by house officer and a CT head is negative for significant changes  - Continue fall precautions     Goals of care  -Previous rounder discussed details goals of care with patient's wife Daina on 2/14/2022  - Patient has  poor quality of life prior to admission because of his alcohol use and previous CVAs  - Wife declined going through aggressive care again as what happened on admission with intubation.  -Palliative care following  -He is now DNR and his wife desires no further aggressive interventions.  If his blood pressure goes low or he would not be transferred back to the ICU, he would not get pressors.  I reaffirmed this with her again today.    Patient deteriorating clinically, hypoxic, hypotensive, per wife and family recommendations previously, hospice consult pending, await palliative care recommendations next week     VTE prophylaxis:  Enoxaparin (Lovenox) SQ  DIET: Orders Placed This Encounter  Combination Diet Moderate Consistent Carb (60 g CHO per Meal) Diet; Easy to Chew (level 7); Mildly Thick (level 2)     Disposition/Barriers to discharge: Continue current management, but do not escalate care provider,    Code status:No CPR- Do NOT Intubate      Subjective:  Remains oxygen dependent, otherwise no complaints    Current Medications Reviewed via EHR List    Objective:  Vital signs in last 24 hours:    Weight:     Weight change:   Body mass index is 22.62 kg/m .    Intake/Output last 3 shifts:  I/O last 3 completed shifts:  In: 240 [P.O.:240]  Out: 150 [Urine:150]  Intake/Output this shift:  No intake/output data recorded.    Physical Exam:  Occasional inattention  General: No apparent distress  CV: Regular rate and rhythm  Lungs: Clear to auscultation  Abdomen: Soft, nontender  Extremities no edema    Imaging:  Personally Reviewed.  MR Brain w/o Contrast    Result Date: 1/22/2022  EXAM: MR BRAIN W/O CONTRAST LOCATION: [facility] DATE/TIME: 1/22/2022 1:05 PM INDICATION: Encephalopathy COMPARISON: CT head 01/18/2022, MRI head 11/23/2020 TECHNIQUE: Routine multiplanar multisequence head MRI without intravenous contrast. FINDINGS: INTRACRANIAL CONTENTS: Please note study is significantly degraded  by motion artifact. Suggestion of a few small foci of restricted diffusion at the left temporal occipital region; best appreciated on repeat axial diffusion weighted sequence series 20.2 image 12, image 11; as well as repeat coronal diffusion weighted sequence series 24.2 image 9. No definite associated hemorrhage or significant mass effect. Redemonstration of small chronic infarction left precentral gyrus. Mild generalized cerebral atrophy. No hydrocephalus. Normal position of the cerebellar tonsils. Small chronic infarction lateral aspect left cerebellum. A few additional tiny infarctions demonstrated on prior exam are not well demonstrated on the current. SELLA: Not well-visualized, grossly normal. OSSEOUS STRUCTURES/SOFT TISSUES: Normal marrow signal. Flow voids are not well-visualized. ORBITS: Not well-visualized. SINUSES/MASTOIDS: The complete opacification left maxillary and left sphenoid sinus, similar to prior. Small amount of opacification right mastoid air cells.     IMPRESSION: 1.  Please note study is significantly degraded by motion artifact. 2.  Suggestion of a few small foci of acute/early subacute infarction at the left temporal occipital region. 3.  No definite associated hemorrhage or significant mass effect. 4.  Small chronic infarction left precentral gyrus, similar to prior. 5.  Probable few small chronic infarctions cerebellar hemispheres. 6.  Mild atrophy.    Echocardiogram Complete    Result Date: 2022  [id number]^ALEYDA^LYN^[place] [address]  Name: KAREY LIVINGSTON MRN: [id number] : 1965 Study Date: 2022 07:37 AM Age: 56 yrs Gender: Male Patient Location: [facility] Reason For Study: Hypertension (HTN) Ordering Physician: LYN MAYNARD Performed By: TAYLER  BSA: 1.9 m2 Height: 72 in Weight: 147 lb HR: 123 BP: 97/59 mmHg ______________________________________________________________________________ Procedure  Complete Portable Echo Adult. Technically difficult study.Extremely poor acoustic windows. Compared to the prior study dated 11/5/2021, there have been no changes. No hemodynamically significant valvular abnormalities on 2D or color flow imaging. ______________________________________________________________________________ Interpretation Summary  1.Left ventricular size, wall motion and function are normal. The ejection fraction is > 65%. 2.There is mild concentric left ventricular hypertrophy. 3.Normal right ventricle size and systolic function. 4.No hemodynamically significant valvular abnormalities on 2D or color flow imaging. Compared to the prior study dated 11/5/2021, there have been no changes. ______________________________________________________________________________ I      WMSI = 1.00     % Normal = 100  X - Cannot   0 -                      (2) - Mildly 2 -          Segments  Size Interpret    Hyperkinetic 1 - Normal  Hypokinetic  Hypokinetic  1-2     small                                                    7 -          3-5    moderate 3 - Akinetic 4 -          5 -         6 - Akinetic Dyskinetic   6-14    large              Dyskinetic   Aneurysmal  w/scar       w/scar       15-16   diffuse  Left Ventricle Left ventricular size, wall motion and function are normal. The ejection fraction is > 65%. There is mild concentric left ventricular hypertrophy. Left ventricular diastolic function is normal. No regional wall motion abnormalities noted.  Right Ventricle Normal right ventricle size and systolic function. TAPSE is normal, which is consistent with normal right ventricular systolic function.  Atria Normal left atrial size. Right atrial size is normal. There is no color Doppler evidence of an atrial shunt.  Mitral Valve Mitral valve leaflets appear normal. There is no evidence of mitral stenosis or clinically significant mitral regurgitation. There is no mitral regurgitation noted. There is no mitral  valve stenosis.  Tricuspid Valve Tricuspid valve leaflets appear normal. Right ventricle systolic pressure estimate normal. There is trace tricuspid regurgitation. There is no tricuspid stenosis.  Aortic Valve Aortic valve leaflets appear normal. There is no evidence of aortic stenosis or clinically significant aortic regurgitation. The aortic valve is trileaflet. No aortic regurgitation is present. No aortic stenosis is present.  Pulmonic Valve The pulmonic valve is not well seen, but is grossly normal. This degree of valvular regurgitation is within normal limits. There is no pulmonic valvular stenosis.  Vessels The aorta root is normal. Normal size ascending aorta. IVC diameter <2.1 cm collapsing >50% with sniff suggests a normal RA pressure of 3 mmHg.  Pericardium There is no pericardial effusion.  Rhythm The rhythm was sinus tachycardia.  ______________________________________________________________________________ MMode/2D Measurements & Calculations IVSd: 1.3 cm LVIDd: 4.1 cm LVIDs: 2.3 cm LVPWd: 1.2 cm FS: 43.4 %  LV mass(C)d: 182.6 grams LV mass(C)dI: 97.7 grams/m2 Ao root diam: 3.3 cm LA dimension: 3.6 cm asc Aorta Diam: 3.8 cm LA/Ao: 1.1 LVOT diam: 2.2 cm LVOT area: 3.8 cm2 LA Volume (BP): 29.3 ml LA Volume Index (BP): 15.7 ml/m2  LA Volume Indexed (AL/bp): 17.3 ml/m2 RWT: 0.61  Doppler Measurements & Calculations MV E max gustavo: 132.0 cm/sec MV A max gustavo: 137.1 cm/sec MV E/A: 0.96 MV dec slope: 1104 cm/sec2 MV dec time: 0.12 sec Ao V2 max: 143.6 cm/sec Ao max P.0 mmHg Ao V2 mean: 108.6 cm/sec Ao mean P.2 mmHg Ao V2 VTI: 26.8 cm LELO(I,D): 3.5 cm2 LELO(V,D): 3.9 cm2 LV V1 max P.6 mmHg LV V1 max: 146.7 cm/sec LV V1 VTI: 24.2 cm SV(LVOT): 92.9 ml SI(LVOT): 49.7 ml/m2 PA V2 max: 107.4 cm/sec PA max P.6 mmHg PA acc time: 0.07 sec AV Gustavo Ratio (DI): 1.0 LELO Index (cm2/m2): 1.9 E/E' av.4  Lateral E/e': 18.6 Medial E/e': 20.2   ______________________________________________________________________________ Report approved by: Randal Villa 02/11/2022 09:14 AM       XR Chest Port 1 View    Result Date: 2/16/2022  EXAM: XR CHEST PORT 1 VIEW LOCATION: [facility] DATE/TIME: 2/16/2022 8:33 AM INDICATION: dyspnea COMPARISON: CT pulmonary angiogram 02/11/2022     IMPRESSION: Abnormal increased lung attenuation diffusely through both lungs. There are subsegmental foci of more focal airspace opacity in the peripheral third of the left mid and lower lung, similar to the prior CT. Subpleural emphysema is present in the apices. No new focal airspace opacity or volume loss. Symmetric apical and left lateral pleural thickening has not increased. No pleural effusion or pneumothorax. Cardiac silhouette is not enlarged. Unchanged aortic and other mediastinal interfaces. There are metallic fragments projecting in the left upper quadrant. Chronic left posterolateral rib fracture deformities are ununited.    XR Chest Port 1 View    Result Date: 1/29/2022  EXAM: XR CHEST PORT 1 VIEW LOCATION: [facility] DATE/TIME: 1/29/2022 6:18 AM INDICATION: Pneumonia follow up. COMPARISON: 01/28/2022 chest CT.01/19/2022 radiograph.     IMPRESSION: Relatively diffuse left lung opacities have increased since the 01/19/2022 chest radiograph, and perhaps slightly increased since the 01/20/2022 chest CT. Differentials for the increased opacities include asymmetric pulmonary edema, infectious / inflammatory process or layering pleural fluid. Small left pleural effusion is present. Right lung is hypoexpanded and may have a few subtle scattered opacities. No pneumothorax. Normal heart size. Atherosclerotic aorta.     XR Chest Port 1 View    Result Date: 1/19/2022  EXAM: XR CHEST PORT 1 VIEW LOCATION: [facility] DATE/TIME: 1/19/2022 10:52 AM INDICATION: after advancing et tube COMPARISON: 1/18/2022.      IMPRESSION: Endotracheal tube is been advanced. Tip is 4 cm above the igor in good position. There our persistent bibasilar pulmonary opacities with partial clearing at the right base from the prior study and no change at the left base. No pneumothorax. Enteric tube tip is not visualized tip is below the diaphragm. PICC catheter from right upper extremity approach is unchanged with tip at the junction of the superior vena cava and right atrium. Left posterior rib fractures are again noted as well as deformity of the right humeral head.    XR Chest Port 1 View    Result Date: 1/18/2022  EXAM: XR CHEST PORT 1 VIEW LOCATION: [facility] DATE/TIME: 1/18/2022 6:06 AM INDICATION: Location of ET tube COMPARISON: 01/16/2022.     IMPRESSION:Endotracheal tube is in the trachea at the level of the clavicular heads with tip 6 cm above the igor. PICC catheter from right upper extremity approach is in the distal superior vena cava near its junction with the right atrium. Enteric tube tip is below the diaphragm and not visualized. There is no pneumothorax. Again noted are bilateral patchy airspace opacities there has been increased consolidation or atelectasis at the right medial lung base. There are old healed left posterior rib  fractures no acute fractures are identified.    CT Abdomen Pelvis w Contrast    Result Date: 2/15/2022  EXAM: CT ABDOMEN PELVIS W CONTRAST LOCATION: [facility] DATE/TIME: 2/15/2022 9:50 PM INDICATION: Leukocytosis. COMPARISON: 10/05/2020 TECHNIQUE: CT scan of the abdomen and pelvis was performed following injection of IV contrast. Multiplanar reformats were obtained. Dose reduction techniques were used. CONTRAST: ISOVUE 370 100ML FINDINGS: LOWER CHEST: Bibasilar infiltrates. Small right and trace left pleural effusion. HEPATOBILIARY: Hepatic steatosis. Cholelithiasis and gallbladder distention. PANCREAS: Pancreatic calcifications suggesting chronic  pancreatitis. Prominent calcification at the neck of the pancreas. Difficult to tell if this is parenchymal or intraductal. The distal pancreas is atrophic. SPLEEN: Splenectomy with accessory splenule. ADRENAL GLANDS: Normal. KIDNEYS/BLADDER: Small nonobstructing renal calculi. BOWEL: Gastric and small bowel wall thickening. Central mesenteric congestion. Trace amount of free fluid. Diverticulosis with presumed retained barium. Wall thickening of the proximal colon. Rectal wall thickening versus underdistention. LYMPH NODES: Subcentimeter mesenteric and peripancreatic lymph nodes. VASCULATURE: Atherosclerotic vascular calcification. PELVIC ORGANS: Small amount of free fluid. MUSCULOSKELETAL: Degenerative change osseous structures. Anasarca. Remote and subacute rib fractures.     IMPRESSION: 1.  Wall thickening of stomach, small bowel and proximal colon. Correlate for gastroenteritis/enterocolitis. 2.  Mesenteric congestion and small amount of free fluid. 3.  Bilateral pulmonary infiltrates. Small right and trace left pleural effusion. 4.  Focal rectal wall thickening versus underdistention. Follow-up recommended to exclude underlying lesion. 5.  Hepatic steatosis. 6.  Diverticulosis. 7.  Cholelithiasis and mild gallbladder distention. 8.  Nonobstructing renal calculi. 9.  Coarse pancreatic calcifications again seen    CT Chest Pulmonary Embolism w Contrast    Result Date: 2/11/2022  EXAM: CT CHEST PULMONARY EMBOLISM W CONTRAST LOCATION: [facility] DATE/TIME: 2/11/2022 1:42 PM INDICATION: PE suspected, low/intermediate prob, positive D-dimer, hypoxia, history of Covid COMPARISON: 01/28/2022 TECHNIQUE: CT chest pulmonary angiogram during arterial phase injection of IV contrast. Multiplanar reformats and MIP reconstructions were performed. Dose reduction techniques were used. CONTRAST: IsoVue 370 100mL FINDINGS: ANGIOGRAM CHEST: No pulmonary artery filling defects. Normal caliber aorta. LUNGS  AND PLEURA: Moderate emphysema. Persistent but increased patchy groundglass and consolidative pulmonary opacities with areas of interlobular septal thickening. Mild bronchiectasis and bronchial wall thickening have increased when compared to prior exam. Trace effusions have decreased. No pneumothorax. MEDIASTINUM/AXILLAE: Heart size is normal. No adenopathy. CORONARY ARTERY CALCIFICATION: None. UPPER ABDOMEN: Hepatic steatosis. Cholelithiasis. There is diffuse wall thickening of the stomach. Small volume ascites. Nonobstructing bilateral renal calculi. Prominent calcification in the area of the pancreatic neck is unchanged. Splenectomy with small accessory splenule. MUSCULOSKELETAL: Degenerative changes of the spine. Prominent L1 Schmorl's node. Similar appearance of the bilateral rib fractures. Mild anasarca.     IMPRESSION: 1.  No pulmonary embolus. 2.  Interval worsening of bilateral pulmonary opacities, bronchiectasis, bronchial wall thickening. 3.  Hepatic steatosis. 4.  Cholelithiasis. 5.  Diffuse wall thickening of the visualized stomach may indicate gastritis. 6.  Manifestations of third spacing.    CT Chest w/o Contrast    Result Date: 1/28/2022  EXAM: CT CHEST W/O CONTRAST LOCATION: [facility] DATE/TIME: 1/28/2022 2:36 PM INDICATION: Cough, persistent COMPARISON: 10/15/2022 TECHNIQUE: CT chest without IV contrast. Multiplanar reformats were obtained. Dose reduction techniques were used. CONTRAST: None. FINDINGS: LUNGS AND PLEURA: Upper lung predominant centrilobular and paraseptal emphysema. Improvement in groundglass and interstitial opacities in the right lung. Slight worsening of groundglass and interstitial opacities in the left upper lobe. New airway wall thickening in the left upper lobe and left lower lobe and new consolidation in the dependent portion of the left upper lobe and at the left lung base. Mucoid impaction in the left lower lobe airways. Unchanged left pleural  thickening and trace right pleural effusion. MEDIASTINUM/AXILLAE: No thoracic aortic aneurysm. No lymphadenopathy. CORONARY ARTERY CALCIFICATION: None. UPPER ABDOMEN: Hepatic steatosis. Cholelithiasis. MUSCULOSKELETAL: There are demonstrated bilateral rib fractures.     IMPRESSION: 1.  New airway wall thickening in the left upper and lower lobes with new consolidation in the dependent portion of the left upper lobe and at the left lung base, and mucoid impaction in left lower lobe airways, suspicious for aspiration. 2.  Overall improvement in groundglass and interstitial opacities in the right lung and slight worsening in the left upper lobe.     CT Head w/o Contrast    Result Date: 2/12/2022  EXAM: CT HEAD W/O CONTRAST LOCATION: [facility] DATE/TIME: 2/12/2022 9:20 AM INDICATION: Trauma - Head Injury. Unwitnessed fall. COMPARISON: CT head 01/18/2022 TECHNIQUE: Routine CT Head without IV contrast. Multiplanar reformats. Dose reduction techniques were used. FINDINGS: INTRACRANIAL CONTENTS: No intracranial hemorrhage, extraaxial collection, or mass effect.  No CT evidence of acute infarct. Small focus of chronic cortical encephalomalacia and adjacent gliosis involving the superior left frontal lobe as seen previously.  Mild presumed chronic small vessel ischemic changes. Mild generalized volume loss. No hydrocephalus. VISUALIZED ORBITS/SINUSES/MASTOIDS: No intraorbital abnormality. Complete opacification of the left maxillary sinus with some chronic appearing mural hyperostosis similar to the previous exam. Additional subtotal opacification of the left sphenoid sinus by polypoid mucosal thickening as seen previously. No middle ear or mastoid effusion. BONES/SOFT TISSUES: Mild right parietal scalp swelling. No skull fracture.     IMPRESSION: 1.  No CT evidence for acute intracranial process. 2.  Mild right parietal scalp contusion without associated fracture. 3.  Brain atrophy and presumed  chronic microvascular ischemic changes as above. 4.  Inflammatory changes of the paranasal sinuses as seen previously.    CT Head w/o Contrast    Result Date: 1/18/2022  EXAM: CT HEAD W/O CONTRAST LOCATION: [facility] DATE/TIME: 1/18/2022 3:45 AM INDICATION: Headache, intracranial hemorrhage suspected. COMPARISON: 11/4/2021 TECHNIQUE: Routine CT Head without IV contrast. Multiplanar reformats. Dose reduction techniques were used. FINDINGS: INTRACRANIAL CONTENTS: No intracranial hemorrhage, extraaxial collection, or mass effect. No CT evidence of acute infarct. Mild presumed chronic small vessel ischemic changes. Mild to moderate generalized volume loss. No hydrocephalus. Stable calcified dural based lesion along the right frontal convexity measuring 1 cm in diameter, presumably reflecting a small meningioma. VISUALIZED ORBITS/SINUSES/MASTOIDS: No intraorbital abnormality. Complete opacification of the left maxillary sinus with chronic mucoperiosteal reaction. Mucous retention cyst in the left sphenoid sinus. Mild mucosal thickening along the floor of the left frontal sinus. Opacified left frontoethmoidal recess. No middle ear or mastoid effusion. BONES/SOFT TISSUES: No acute abnormality.     IMPRESSION: 1.  No CT evidence for acute intracranial process. 2.  Stable chronic changes as above.    XR Video Swallow with SLP or OT    Result Date: 2/8/2022  EXAM: XR VIDEO SWALLOW WITH SLP OR OT LOCATION: [facility] DATE/TIME: 2/8/2022 8:52 AM INDICATION: Difficulty swallowing. COMPARISON: 01/31/2022. TECHNIQUE: Routine swallow study with speech pathology using multiple barium thicknesses. FINDINGS: FLUOROSCOPIC TIME: 1.2 minutes NUMBER OF IMAGES: 0 Swallow study with Speech Pathology using multiple barium thicknesses. Small amount of silent aspiration with thin liquid. Penetration with mildly thickened (nectar) liquid. There was persistent mild penetration with chin Tech  technique and mildly thickened liquid. No aspiration was solid consistency.     IMPRESSION: 1.  Silent aspiration with thin liquid. Penetration with mildly thickened liquid.     XR Video Swallow with SLP or OT    Result Date: 2022  EXAM: XR VIDEO SWALLOW WITH SLP OR OT LOCATION: [facility] DATE/TIME: 2022 11:30 AM INDICATION: Difficulty swallowing. COMPARISON: 2022. TECHNIQUE: Routine swallow study with speech pathology using multiple barium thicknesses. FINDINGS: FLUOROSCOPIC TIME: 3.3 minutes NUMBER OF IMAGES: 0 Swallow study with Speech Pathology using multiple barium thicknesses. Tracheal aspiration with thin liquid consistency barium with weak ineffective cough. Penetration into the laryngeal vestibule with mildly thickened (nectar) liquid consistency barium. No penetration or aspiration with honey and pudding consistency barium. Please refer to speech therapy report for additional detail.    XR Video Swallow with SLP or OT    Result Date: 2022  EXAM: XR VIDEO SWALLOW WITH SLP OR OT LOCATION: [facility] DATE/TIME: 2022 3:27 PM INDICATION: Difficulty swallowing. COMPARISON: None. TECHNIQUE: Routine swallow study with speech pathology using multiple barium thicknesses. FINDINGS: FLUOROSCOPIC TIME: 1.5 minutes NUMBER OF IMAGES: 9 Swallow study with Speech Pathology using multiple barium thicknesses. Moderate to deep penetration with thin liquid (no cough reflex). Small amount of penetration with mildly thick consistency. No other penetration or aspiration.     EEG Coma or Sleep Only    Result Date: 2022  ELECTROENCEPHALOGRAM (EEG) REPORT [facility] NEUROLOGY [address] [place] [address] Tel: [phone]  Fax: [phone] [email or web address] RENUKA Yap 1965, MRN [id number] PCP: Sarah Canseco Date: 2022 Principal Diagnosis: Altered mental status History : Patient is being  evaluated for altered mental status. Medication listed includes Ativan Description of the Recording:  This is a multichannel digital EEG recording done using the international 10-20 placement system. Background of the recording consists of irregular 1 to 3 Hz polymorphic delta activity with amplitudes ranging between 20 to 30  V.  Alerting procedure produce minimal change.  Photic stimulation performed with standard protocol produced minimal change.  No transient sleep patterns were recorded. During this recording, no sharp discharges, spikes or electrographic seizure activity was recorded. Impression: This is an abnormal EEG due to diffusely slow polymorphic delta activity that minimally attenuates with alerting procedures.  This EEG suggests nonspecific generalized cerebral dysfunction.  No focal slowing or periodic discharges were seen to suggest seizures. Classification: Delta grade I generalized Tiburcio Gallagher MD [facility] (Former[facility], [facility] This note was dictated using voice recognition software.  Any grammatical or context distortions are unintentional and inherent to the software.       Lab Results:  Personally Reviewed.   Fingerstick Blood Glucose: @[id number](POCGLUFGR:10)@    Last Hbg A1C: No results found for: HGBA1C   Lab Results   Component Value Date    INR 1.67 (H) 01/30/2022    INR 1.72 (H) 01/16/2022    INR 1.69 (H) 01/15/2022     Recent Results (from the past 24 hour(s))   Glucose by meter    Collection Time: 02/21/22  9:40 PM   Result Value Ref Range    GLUCOSE BY METER POCT 118 (H) 70 - 99 mg/dL   Glucose by meter    Collection Time: 02/22/22 12:43 AM   Result Value Ref Range    GLUCOSE BY METER POCT 115 (H) 70 - 99 mg/dL   Procalcitonin    Collection Time: 02/22/22  6:06 AM   Result Value Ref Range    Procalcitonin 0.36 0.00 - 0.49 ng/mL   CBC with platelets    Collection Time: 02/22/22  6:06 AM   Result Value Ref Range    WBC  Count 10.1 4.0 - 11.0 10e3/uL    RBC Count 3.39 (L) 4.40 - 5.90 10e6/uL    Hemoglobin 10.5 (L) 13.3 - 17.7 g/dL    Hematocrit 30.1 (L) 40.0 - 53.0 %    MCV 89 78 - 100 fL    MCH 31.0 26.5 - 33.0 pg    MCHC 34.9 31.5 - 36.5 g/dL    RDW 18.7 (H) 10.0 - 15.0 %    Platelet Count 143 (L) 150 - 450 10e3/uL   Potassium    Collection Time: 02/22/22  6:06 AM   Result Value Ref Range    Potassium 4.3 3.5 - 5.0 mmol/L   Creatinine    Collection Time: 02/22/22  6:06 AM   Result Value Ref Range    Creatinine 0.56 (L) 0.70 - 1.30 mg/dL    GFR Estimate >90 >60 mL/min/1.73m2   Glucose by meter    Collection Time: 02/22/22  8:26 AM   Result Value Ref Range    GLUCOSE BY METER POCT 158 (H) 70 - 99 mg/dL   Glucose by meter    Collection Time: 02/22/22 11:55 AM   Result Value Ref Range    GLUCOSE BY METER POCT 138 (H) 70 - 99 mg/dL   Glucose by meter    Collection Time: 02/22/22  4:40 PM   Result Value Ref Range    GLUCOSE BY METER POCT 165 (H) 70 - 99 mg/dL     Donita Olson MD

## 2022-02-23 PROBLEM — E43 SEVERE PROTEIN-CALORIE MALNUTRITION (H): Status: ACTIVE | Noted: 2022-01-01

## 2022-02-23 NOTE — PROGRESS NOTES
Care Management Follow Up    Length of Stay (days): 39    Expected Discharge Date: 02/25/2022     Concerns to be Addressed: discharge planning     Patient plan of care discussed at interdisciplinary rounds: Yes    Anticipated Discharge Disposition: Transitional Care     Anticipated Discharge Services: Other (see comment) (therapy services )  Anticipated Discharge DME: None    Patient/family educated on Medicare website which has current facility and service quality ratings: yes  Education Provided on the Discharge Plan:    Patient/Family in Agreement with the Plan: yes    Referrals Placed by CM/SW: Post Acute Facilities  Private pay costs discussed: not at this time    Additional Information:  Chart reviewed and plan of care discussed with hospitalist.  Plan to discharge to TCU.    Contacted Financial .  Their department spoke with pt's wife and found pt does not meet criteria for MA, as their are over the asset limit.  She states spouse had inquired about resources for asset planning surrounding potential divorce and Financial  provided her with resources to find an elder law/    Contacted Cristina from [place].  She states pt had been tentatively accepted at [facility], but they have been monitoring for clinical improvement.   Provided update of Oxygen improvement from 10L to 4L currently.  She will re-review tomorrow morning and check on bed availability and will call writer back.    Debra Veliz RN

## 2022-02-23 NOTE — PLAN OF CARE
Pt is alert to self only. Takes off oxy mask throughout the night. Oxy mask 10ml. Large inc stool overnight.

## 2022-02-23 NOTE — PLAN OF CARE
Problem: Mobility Impairment  Goal: Optimal Mobility  Outcome: Ongoing, Not Progressing     Problem: Risk for Delirium  Goal: Improved Attention and Thought Clarity  Outcome: Ongoing, Not Progressing     Problem: Fall Injury Risk  Goal: Absence of Fall and Fall-Related Injury  Intervention: Identify and Manage Contributors  Recent Flowsheet Documentation  Taken 2/22/2022 1700 by Martin Severino RN  Medication Review/Management: medications reviewed   Goal Outcome Evaluation:    Plan of Care Reviewed With: patient     Overall Patient Progress: improving    Outcome Evaluation: Family goal is restorative/TCU.      Patient A and O with fluctuating confusion. Patient inconsistent in terms of cognition. Assist of 2 for transfers and cares. Incontinent of bowel and bladder. No bowel movement this shift. Patient on 10 liters O2 via mask.   Patient occasionally pulls at mask and pulse ox. Easily redirected.   On K and Mag protocol. Recheck them both in morning.   Ativan given one time for restlessness.

## 2022-02-23 NOTE — PLAN OF CARE
Problem: Gas Exchange Impaired  Goal: Optimal Gas Exchange  Intervention: Optimize Oxygenation and Ventilation  Recent Flowsheet Documentation  Taken 2/23/2022 0945 by Debby Choi, RN  Head of Bed (HOB) Positioning: HOB at 20-30 degrees     Problem: Impaired Wound Healing  Goal: Optimal Wound Healing  Outcome: Ongoing, Not Progressing  Intervention: Promote Wound Healing  Recent Flowsheet Documentation  Taken 2/23/2022 1245 by Debby Choi, RN  Activity Management: up in chair  Taken 2/23/2022 0945 by Debby Choi, RN  Activity Management:   activity encouraged   activity adjusted per tolerance       Goal Outcome Evaluation:  Pt weaned to 4L oxymask this shift. Tolerated 5L NC during meals but would chew on tubing or remove it and desat to mid 80's. Pt tolerates oxymask better with less removal of. Pt encouraged to C&DB which pt performed minimally at times this shift.  Skin remains rashy, red and excoriated, frequent loose stoold. creams applied as ordered. WOC following      Plan of Care Reviewed With: patient     Overall Patient Progress: improving    Outcome Evaluation: Family goal is restorative/TCU.

## 2022-02-23 NOTE — PROGRESS NOTES
Hospitalist Service Daily progress note    Assessment/Plan:  56 year old male with past medical history of alcohol abuse, hypertension, DM-II, multiple abdominal surgeries who was admitted on 1/15/22 with alcohol withdrawl, possible aspiration pneumonia leading to respiratory failure requiring intubation on 1/16, successfully extubated on 1/23/22 and now transferred out of ICU on 1/26/22 for floor care.  Tested positive for Covid on 1/22/2022 in the hospital  Did improve initially and was pending TCU placement until patient got worsening respiratory condition on 2/11/2022 and CT chest showed worsening pneumonia so restarted on IV antibiotic with Vanco and Zosyn.  Now white count up with elevated lactate and hypotension, ID consulted, completed meropenem.      Acute respiratory failure with hypoxia.  -Thought secondary to aspiration pneumonia/ COVID/hospital-acquired pneumonia  -Was intubated 1/16, extubated on 1/23.   Required BiPAP for a while, now on NC, O2 requirements decreasing  - Cont albuterol nebs and pulm hygiene  - CT PE was negative for PE on admission    Leukocytosis: ID consulted and now on meropenem.  Leukocytosis resolved.  Completed meropenem.     Recovered Covid  Weekly COVID (per protocol) came back positive on 1/30/2022. He was negative on admission on 1/22.   S/p IV remdesivir and Dexamethasone course.   S/p dose of tocilizumab/per pulmonology recommendations on 1/31/2022.  Tapering off oxygen.     DM type 2  With labile blood sugars, A1c was 7.0 on 1/15/22.  Very sensitive to Lantus as patient goes hypoglycemic even with 5 units  Stopped Metformin due to lactic acidosis.  Continue sliding scale insulin and carb counting      Septic shock likely due to multifactorial pneumonia -resolved  Metoprolol started on 1/25 for sinus tachycardia  Echo shows EF of 65% with mild concentric left ventricular hypertrophy  Started on midodrine for persistent low blood pressure  Stim test negative     Acute  toxic metabolic encephalopathy  - Patient has significant alcohol withdrawal during the intubation and was on Precedex  - Precedex weaned off. Cont with thiamine. Ativan prn.  - Alcohol level less than 10 on admission.    - Collateral information from wife-he did not find any evidence of patient drinking recently.    - Brain MRI shows chronic CVAs with brain atrophy  - Patient denies drinking prior to hospitalization.  Most recent chemical dependency treatment in 2001.  Since then patient tried to quit drinking numerous times, ending up in the hospital with withdrawal.  He is on disability for 2 years, in the past was a nicole.  - Reconsult psychiatry who are recommending:    Duloxetine 60 mg daily.  Continue gabapentin 200 mg twice a day  Seroquel 12.5 mg TID x 6 doses   Seroquel 75 mg at bedtime continue.  Rozerem 8 mg at bedtime.  Continue to utilize olanzapine 5-10 mg every 6 hours as needed for agitation, psychosis.  Efforts at sleep regulation.  For daytime anxiety would recommend adding Seroquel 6.25 mg in the morning and afternoon.     Hypokalemia/hypomagnesia  Replace per protocol     Diarrhea- resolved     Significant weakness and deconditioning  - Secondary to above  - Pending TCU placement     Severe malnutrition  - Protein and calories  - Dietitian consult, continue supplement  - Patient still has poor oral intake     S/P fall   - Patient had incident of fall this morning 2/12/2022, unwitnessed  - Evaluated by house officer and a CT head is negative for significant changes  - Continue fall precautions     Goals of care  -Previous rounder discussed details goals of care with patient's wife Daina on 2/14/2022  - Patient has poor quality of life prior to admission because of his alcohol use and previous CVAs  - Wife declined going through aggressive care again as what happened on admission with intubation.  -Palliative care following  -He is now DNR and his wife desires no further aggressive  interventions.  If his blood pressure goes low or he would not be transferred back to the ICU, he would not get pressors.  I reaffirmed this with her again today.     VTE prophylaxis:  Enoxaparin (Lovenox) SQ  DIET: Orders Placed This Encounter  Combination Diet Moderate Consistent Carb (60 g CHO per Meal) Diet; Easy to Chew (level 7); Mildly Thick (level 2)     Disposition/Barriers to discharge: Continue current management, but do not escalate care provider,    Code status:No CPR- Do NOT Intubate    Subjective:  Remains confused, pleasant and cooperative.  Oxygen requirements decreased today down to 5 LPM, from 10 LPM yesterday.  No new complaints.  Awaiting placement.    Current Medications Reviewed via EHR List    Objective:  Vital signs in last 24 hours:    Weight:     Weight change:   Body mass index is 22.62 kg/m .    Intake/Output last 3 shifts:  I/O last 3 completed shifts:  In: 240 [P.O.:240]  Out: 150 [Urine:150]  Intake/Output this shift:  I/O this shift:  In: 480 [P.O.:480]  Out: -     Physical Exam:  Occasional inattention  General: No apparent distress  CV: Regular rate and rhythm  Lungs: Clear to auscultation  Abdomen: Soft, nontender  Extremities no edema    Imaging:  Personally Reviewed.  MR Brain w/o Contrast    Result Date: 1/22/2022  EXAM: MR BRAIN W/O CONTRAST LOCATION: [facility] DATE/TIME: 1/22/2022 1:05 PM INDICATION: Encephalopathy COMPARISON: CT head 01/18/2022, MRI head 11/23/2020 TECHNIQUE: Routine multiplanar multisequence head MRI without intravenous contrast. FINDINGS: INTRACRANIAL CONTENTS: Please note study is significantly degraded by motion artifact. Suggestion of a few small foci of restricted diffusion at the left temporal occipital region; best appreciated on repeat axial diffusion weighted sequence series 20.2 image 12, image 11; as well as repeat coronal diffusion weighted sequence series 24.2 image 9. No definite associated hemorrhage or significant  mass effect. Redemonstration of small chronic infarction left precentral gyrus. Mild generalized cerebral atrophy. No hydrocephalus. Normal position of the cerebellar tonsils. Small chronic infarction lateral aspect left cerebellum. A few additional tiny infarctions demonstrated on prior exam are not well demonstrated on the current. SELLA: Not well-visualized, grossly normal. OSSEOUS STRUCTURES/SOFT TISSUES: Normal marrow signal. Flow voids are not well-visualized. ORBITS: Not well-visualized. SINUSES/MASTOIDS: The complete opacification left maxillary and left sphenoid sinus, similar to prior. Small amount of opacification right mastoid air cells.     IMPRESSION: 1.  Please note study is significantly degraded by motion artifact. 2.  Suggestion of a few small foci of acute/early subacute infarction at the left temporal occipital region. 3.  No definite associated hemorrhage or significant mass effect. 4.  Small chronic infarction left precentral gyrus, similar to prior. 5.  Probable few small chronic infarctions cerebellar hemispheres. 6.  Mild atrophy.    Echocardiogram Complete    Result Date: 2022  [id number]^ALEYDA^LYN^[place] [address]  Name: KAREY LIVINGSTON MRN: [id number] : 1965 Study Date: 2022 07:37 AM Age: 56 yrs Gender: Male Patient Location: [facility] Reason For Study: Hypertension (HTN) Ordering Physician: LYN MAYNARD Performed By: TAYLER  BSA: 1.9 m2 Height: 72 in Weight: 147 lb HR: 123 BP: 97/59 mmHg ______________________________________________________________________________ Procedure Complete Portable Echo Adult. Technically difficult study.Extremely poor acoustic windows. Compared to the prior study dated 2021, there have been no changes. No hemodynamically significant valvular abnormalities on 2D or color flow imaging. ______________________________________________________________________________  Interpretation Summary  1.Left ventricular size, wall motion and function are normal. The ejection fraction is > 65%. 2.There is mild concentric left ventricular hypertrophy. 3.Normal right ventricle size and systolic function. 4.No hemodynamically significant valvular abnormalities on 2D or color flow imaging. Compared to the prior study dated 11/5/2021, there have been no changes. ______________________________________________________________________________ I      WMSI = 1.00     % Normal = 100  X - Cannot   0 -                      (2) - Mildly 2 -          Segments  Size Interpret    Hyperkinetic 1 - Normal  Hypokinetic  Hypokinetic  1-2     small                                                    7 -          3-5    moderate 3 - Akinetic 4 -          5 -         6 - Akinetic Dyskinetic   6-14    large              Dyskinetic   Aneurysmal  w/scar       w/scar       15-16   diffuse  Left Ventricle Left ventricular size, wall motion and function are normal. The ejection fraction is > 65%. There is mild concentric left ventricular hypertrophy. Left ventricular diastolic function is normal. No regional wall motion abnormalities noted.  Right Ventricle Normal right ventricle size and systolic function. TAPSE is normal, which is consistent with normal right ventricular systolic function.  Atria Normal left atrial size. Right atrial size is normal. There is no color Doppler evidence of an atrial shunt.  Mitral Valve Mitral valve leaflets appear normal. There is no evidence of mitral stenosis or clinically significant mitral regurgitation. There is no mitral regurgitation noted. There is no mitral valve stenosis.  Tricuspid Valve Tricuspid valve leaflets appear normal. Right ventricle systolic pressure estimate normal. There is trace tricuspid regurgitation. There is no tricuspid stenosis.  Aortic Valve Aortic valve leaflets appear normal. There is no evidence of aortic stenosis or clinically significant aortic  regurgitation. The aortic valve is trileaflet. No aortic regurgitation is present. No aortic stenosis is present.  Pulmonic Valve The pulmonic valve is not well seen, but is grossly normal. This degree of valvular regurgitation is within normal limits. There is no pulmonic valvular stenosis.  Vessels The aorta root is normal. Normal size ascending aorta. IVC diameter <2.1 cm collapsing >50% with sniff suggests a normal RA pressure of 3 mmHg.  Pericardium There is no pericardial effusion.  Rhythm The rhythm was sinus tachycardia.  ______________________________________________________________________________ MMode/2D Measurements & Calculations IVSd: 1.3 cm LVIDd: 4.1 cm LVIDs: 2.3 cm LVPWd: 1.2 cm FS: 43.4 %  LV mass(C)d: 182.6 grams LV mass(C)dI: 97.7 grams/m2 Ao root diam: 3.3 cm LA dimension: 3.6 cm asc Aorta Diam: 3.8 cm LA/Ao: 1.1 LVOT diam: 2.2 cm LVOT area: 3.8 cm2 LA Volume (BP): 29.3 ml LA Volume Index (BP): 15.7 ml/m2  LA Volume Indexed (AL/bp): 17.3 ml/m2 RWT: 0.61  Doppler Measurements & Calculations MV E max gustavo: 132.0 cm/sec MV A max gustavo: 137.1 cm/sec MV E/A: 0.96 MV dec slope: 1104 cm/sec2 MV dec time: 0.12 sec Ao V2 max: 143.6 cm/sec Ao max P.0 mmHg Ao V2 mean: 108.6 cm/sec Ao mean P.2 mmHg Ao V2 VTI: 26.8 cm LELO(I,D): 3.5 cm2 LELO(V,D): 3.9 cm2 LV V1 max P.6 mmHg LV V1 max: 146.7 cm/sec LV V1 VTI: 24.2 cm SV(LVOT): 92.9 ml SI(LVOT): 49.7 ml/m2 PA V2 max: 107.4 cm/sec PA max P.6 mmHg PA acc time: 0.07 sec AV Gustavo Ratio (DI): 1.0 LELO Index (cm2/m2): 1.9 E/E' av.4  Lateral E/e': 18.6 Medial E/e': 20.2  ______________________________________________________________________________ Report approved by: Randal Villa 2022 09:14 AM       XR Chest Port 1 View    Result Date: 2022  EXAM: XR CHEST PORT 1 VIEW LOCATION: [facility] DATE/TIME: 2022 8:33 AM INDICATION: dyspnea COMPARISON: CT pulmonary angiogram 2022     IMPRESSION: Abnormal  increased lung attenuation diffusely through both lungs. There are subsegmental foci of more focal airspace opacity in the peripheral third of the left mid and lower lung, similar to the prior CT. Subpleural emphysema is present in the apices. No new focal airspace opacity or volume loss. Symmetric apical and left lateral pleural thickening has not increased. No pleural effusion or pneumothorax. Cardiac silhouette is not enlarged. Unchanged aortic and other mediastinal interfaces. There are metallic fragments projecting in the left upper quadrant. Chronic left posterolateral rib fracture deformities are ununited.    XR Chest Port 1 View    Result Date: 1/29/2022  EXAM: XR CHEST PORT 1 VIEW LOCATION: [facility] DATE/TIME: 1/29/2022 6:18 AM INDICATION: Pneumonia follow up. COMPARISON: 01/28/2022 chest CT.01/19/2022 radiograph.     IMPRESSION: Relatively diffuse left lung opacities have increased since the 01/19/2022 chest radiograph, and perhaps slightly increased since the 01/20/2022 chest CT. Differentials for the increased opacities include asymmetric pulmonary edema, infectious / inflammatory process or layering pleural fluid. Small left pleural effusion is present. Right lung is hypoexpanded and may have a few subtle scattered opacities. No pneumothorax. Normal heart size. Atherosclerotic aorta.     XR Chest Port 1 View    Result Date: 1/19/2022  EXAM: XR CHEST PORT 1 VIEW LOCATION: [facility] DATE/TIME: 1/19/2022 10:52 AM INDICATION: after advancing et tube COMPARISON: 1/18/2022.     IMPRESSION: Endotracheal tube is been advanced. Tip is 4 cm above the igor in good position. There our persistent bibasilar pulmonary opacities with partial clearing at the right base from the prior study and no change at the left base. No pneumothorax. Enteric tube tip is not visualized tip is below the diaphragm. PICC catheter from right upper extremity approach is unchanged with  tip at the junction of the superior vena cava and right atrium. Left posterior rib fractures are again noted as well as deformity of the right humeral head.    XR Chest Port 1 View    Result Date: 1/18/2022  EXAM: XR CHEST PORT 1 VIEW LOCATION: [facility] DATE/TIME: 1/18/2022 6:06 AM INDICATION: Location of ET tube COMPARISON: 01/16/2022.     IMPRESSION:Endotracheal tube is in the trachea at the level of the clavicular heads with tip 6 cm above the igor. PICC catheter from right upper extremity approach is in the distal superior vena cava near its junction with the right atrium. Enteric tube tip is below the diaphragm and not visualized. There is no pneumothorax. Again noted are bilateral patchy airspace opacities there has been increased consolidation or atelectasis at the right medial lung base. There are old healed left posterior rib  fractures no acute fractures are identified.    CT Abdomen Pelvis w Contrast    Result Date: 2/15/2022  EXAM: CT ABDOMEN PELVIS W CONTRAST LOCATION: [facility] DATE/TIME: 2/15/2022 9:50 PM INDICATION: Leukocytosis. COMPARISON: 10/05/2020 TECHNIQUE: CT scan of the abdomen and pelvis was performed following injection of IV contrast. Multiplanar reformats were obtained. Dose reduction techniques were used. CONTRAST: ISOVUE 370 100ML FINDINGS: LOWER CHEST: Bibasilar infiltrates. Small right and trace left pleural effusion. HEPATOBILIARY: Hepatic steatosis. Cholelithiasis and gallbladder distention. PANCREAS: Pancreatic calcifications suggesting chronic pancreatitis. Prominent calcification at the neck of the pancreas. Difficult to tell if this is parenchymal or intraductal. The distal pancreas is atrophic. SPLEEN: Splenectomy with accessory splenule. ADRENAL GLANDS: Normal. KIDNEYS/BLADDER: Small nonobstructing renal calculi. BOWEL: Gastric and small bowel wall thickening. Central mesenteric congestion. Trace amount of free fluid.  Diverticulosis with presumed retained barium. Wall thickening of the proximal colon. Rectal wall thickening versus underdistention. LYMPH NODES: Subcentimeter mesenteric and peripancreatic lymph nodes. VASCULATURE: Atherosclerotic vascular calcification. PELVIC ORGANS: Small amount of free fluid. MUSCULOSKELETAL: Degenerative change osseous structures. Anasarca. Remote and subacute rib fractures.     IMPRESSION: 1.  Wall thickening of stomach, small bowel and proximal colon. Correlate for gastroenteritis/enterocolitis. 2.  Mesenteric congestion and small amount of free fluid. 3.  Bilateral pulmonary infiltrates. Small right and trace left pleural effusion. 4.  Focal rectal wall thickening versus underdistention. Follow-up recommended to exclude underlying lesion. 5.  Hepatic steatosis. 6.  Diverticulosis. 7.  Cholelithiasis and mild gallbladder distention. 8.  Nonobstructing renal calculi. 9.  Coarse pancreatic calcifications again seen    CT Chest Pulmonary Embolism w Contrast    Result Date: 2/11/2022  EXAM: CT CHEST PULMONARY EMBOLISM W CONTRAST LOCATION: [facility] DATE/TIME: 2/11/2022 1:42 PM INDICATION: PE suspected, low/intermediate prob, positive D-dimer, hypoxia, history of Covid COMPARISON: 01/28/2022 TECHNIQUE: CT chest pulmonary angiogram during arterial phase injection of IV contrast. Multiplanar reformats and MIP reconstructions were performed. Dose reduction techniques were used. CONTRAST: IsoVue 370 100mL FINDINGS: ANGIOGRAM CHEST: No pulmonary artery filling defects. Normal caliber aorta. LUNGS AND PLEURA: Moderate emphysema. Persistent but increased patchy groundglass and consolidative pulmonary opacities with areas of interlobular septal thickening. Mild bronchiectasis and bronchial wall thickening have increased when compared to prior exam. Trace effusions have decreased. No pneumothorax. MEDIASTINUM/AXILLAE: Heart size is normal. No adenopathy. CORONARY ARTERY  CALCIFICATION: None. UPPER ABDOMEN: Hepatic steatosis. Cholelithiasis. There is diffuse wall thickening of the stomach. Small volume ascites. Nonobstructing bilateral renal calculi. Prominent calcification in the area of the pancreatic neck is unchanged. Splenectomy with small accessory splenule. MUSCULOSKELETAL: Degenerative changes of the spine. Prominent L1 Schmorl's node. Similar appearance of the bilateral rib fractures. Mild anasarca.     IMPRESSION: 1.  No pulmonary embolus. 2.  Interval worsening of bilateral pulmonary opacities, bronchiectasis, bronchial wall thickening. 3.  Hepatic steatosis. 4.  Cholelithiasis. 5.  Diffuse wall thickening of the visualized stomach may indicate gastritis. 6.  Manifestations of third spacing.    CT Chest w/o Contrast    Result Date: 1/28/2022  EXAM: CT CHEST W/O CONTRAST LOCATION: [facility] DATE/TIME: 1/28/2022 2:36 PM INDICATION: Cough, persistent COMPARISON: 10/15/2022 TECHNIQUE: CT chest without IV contrast. Multiplanar reformats were obtained. Dose reduction techniques were used. CONTRAST: None. FINDINGS: LUNGS AND PLEURA: Upper lung predominant centrilobular and paraseptal emphysema. Improvement in groundglass and interstitial opacities in the right lung. Slight worsening of groundglass and interstitial opacities in the left upper lobe. New airway wall thickening in the left upper lobe and left lower lobe and new consolidation in the dependent portion of the left upper lobe and at the left lung base. Mucoid impaction in the left lower lobe airways. Unchanged left pleural thickening and trace right pleural effusion. MEDIASTINUM/AXILLAE: No thoracic aortic aneurysm. No lymphadenopathy. CORONARY ARTERY CALCIFICATION: None. UPPER ABDOMEN: Hepatic steatosis. Cholelithiasis. MUSCULOSKELETAL: There are demonstrated bilateral rib fractures.     IMPRESSION: 1.  New airway wall thickening in the left upper and lower lobes with new consolidation in the  dependent portion of the left upper lobe and at the left lung base, and mucoid impaction in left lower lobe airways, suspicious for aspiration. 2.  Overall improvement in groundglass and interstitial opacities in the right lung and slight worsening in the left upper lobe.     CT Head w/o Contrast    Result Date: 2/12/2022  EXAM: CT HEAD W/O CONTRAST LOCATION: [facility] DATE/TIME: 2/12/2022 9:20 AM INDICATION: Trauma - Head Injury. Unwitnessed fall. COMPARISON: CT head 01/18/2022 TECHNIQUE: Routine CT Head without IV contrast. Multiplanar reformats. Dose reduction techniques were used. FINDINGS: INTRACRANIAL CONTENTS: No intracranial hemorrhage, extraaxial collection, or mass effect.  No CT evidence of acute infarct. Small focus of chronic cortical encephalomalacia and adjacent gliosis involving the superior left frontal lobe as seen previously.  Mild presumed chronic small vessel ischemic changes. Mild generalized volume loss. No hydrocephalus. VISUALIZED ORBITS/SINUSES/MASTOIDS: No intraorbital abnormality. Complete opacification of the left maxillary sinus with some chronic appearing mural hyperostosis similar to the previous exam. Additional subtotal opacification of the left sphenoid sinus by polypoid mucosal thickening as seen previously. No middle ear or mastoid effusion. BONES/SOFT TISSUES: Mild right parietal scalp swelling. No skull fracture.     IMPRESSION: 1.  No CT evidence for acute intracranial process. 2.  Mild right parietal scalp contusion without associated fracture. 3.  Brain atrophy and presumed chronic microvascular ischemic changes as above. 4.  Inflammatory changes of the paranasal sinuses as seen previously.    CT Head w/o Contrast    Result Date: 1/18/2022  EXAM: CT HEAD W/O CONTRAST LOCATION: [facility] DATE/TIME: 1/18/2022 3:45 AM INDICATION: Headache, intracranial hemorrhage suspected. COMPARISON: 11/4/2021 TECHNIQUE: Routine CT Head  without IV contrast. Multiplanar reformats. Dose reduction techniques were used. FINDINGS: INTRACRANIAL CONTENTS: No intracranial hemorrhage, extraaxial collection, or mass effect. No CT evidence of acute infarct. Mild presumed chronic small vessel ischemic changes. Mild to moderate generalized volume loss. No hydrocephalus. Stable calcified dural based lesion along the right frontal convexity measuring 1 cm in diameter, presumably reflecting a small meningioma. VISUALIZED ORBITS/SINUSES/MASTOIDS: No intraorbital abnormality. Complete opacification of the left maxillary sinus with chronic mucoperiosteal reaction. Mucous retention cyst in the left sphenoid sinus. Mild mucosal thickening along the floor of the left frontal sinus. Opacified left frontoethmoidal recess. No middle ear or mastoid effusion. BONES/SOFT TISSUES: No acute abnormality.     IMPRESSION: 1.  No CT evidence for acute intracranial process. 2.  Stable chronic changes as above.    XR Video Swallow with SLP or OT    Result Date: 2/8/2022  EXAM: XR VIDEO SWALLOW WITH SLP OR OT LOCATION: [facility] DATE/TIME: 2/8/2022 8:52 AM INDICATION: Difficulty swallowing. COMPARISON: 01/31/2022. TECHNIQUE: Routine swallow study with speech pathology using multiple barium thicknesses. FINDINGS: FLUOROSCOPIC TIME: 1.2 minutes NUMBER OF IMAGES: 0 Swallow study with Speech Pathology using multiple barium thicknesses. Small amount of silent aspiration with thin liquid. Penetration with mildly thickened (nectar) liquid. There was persistent mild penetration with chin Tech technique and mildly thickened liquid. No aspiration was solid consistency.     IMPRESSION: 1.  Silent aspiration with thin liquid. Penetration with mildly thickened liquid.     XR Video Swallow with SLP or OT    Result Date: 1/31/2022  EXAM: XR VIDEO SWALLOW WITH SLP OR OT LOCATION: [facility] DATE/TIME: 1/31/2022 11:30 AM INDICATION: Difficulty  swallowing. COMPARISON: 2022. TECHNIQUE: Routine swallow study with speech pathology using multiple barium thicknesses. FINDINGS: FLUOROSCOPIC TIME: 3.3 minutes NUMBER OF IMAGES: 0 Swallow study with Speech Pathology using multiple barium thicknesses. Tracheal aspiration with thin liquid consistency barium with weak ineffective cough. Penetration into the laryngeal vestibule with mildly thickened (nectar) liquid consistency barium. No penetration or aspiration with honey and pudding consistency barium. Please refer to speech therapy report for additional detail.    XR Video Swallow with SLP or OT    Result Date: 2022  EXAM: XR VIDEO SWALLOW WITH SLP OR OT LOCATION: [facility] DATE/TIME: 2022 3:27 PM INDICATION: Difficulty swallowing. COMPARISON: None. TECHNIQUE: Routine swallow study with speech pathology using multiple barium thicknesses. FINDINGS: FLUOROSCOPIC TIME: 1.5 minutes NUMBER OF IMAGES: 9 Swallow study with Speech Pathology using multiple barium thicknesses. Moderate to deep penetration with thin liquid (no cough reflex). Small amount of penetration with mildly thick consistency. No other penetration or aspiration.     EEG Coma or Sleep Only    Result Date: 2022  ELECTROENCEPHALOGRAM (EEG) REPORT [facility] NEUROLOGY [address] [place] [address] Tel: [phone]  Fax: [phone] [email or web address] RENUKA Yap 1965, MRN [id number] PCP: Sarah Canseco Date: 2022 Principal Diagnosis: Altered mental status History : Patient is being evaluated for altered mental status. Medication listed includes Ativan Description of the Recording:  This is a multichannel digital EEG recording done using the international 10-20 placement system. Background of the recording consists of irregular 1 to 3 Hz polymorphic delta activity with amplitudes ranging between 20 to 30  V.  Alerting procedure produce minimal change.   Photic stimulation performed with standard protocol produced minimal change.  No transient sleep patterns were recorded. During this recording, no sharp discharges, spikes or electrographic seizure activity was recorded. Impression: This is an abnormal EEG due to diffusely slow polymorphic delta activity that minimally attenuates with alerting procedures.  This EEG suggests nonspecific generalized cerebral dysfunction.  No focal slowing or periodic discharges were seen to suggest seizures. Classification: Delta grade I generalized Tiburcio Gallagher MD [facility] (Formerly, [facility], .[facility] This note was dictated using voice recognition software.  Any grammatical or context distortions are unintentional and inherent to the software.       Lab Results:  Personally Reviewed.   Fingerstick Blood Glucose: @[id number](POCGLUFGR:10)@    Last Hbg A1C: No results found for: HGBA1C   Lab Results   Component Value Date    INR 1.67 (H) 01/30/2022    INR 1.72 (H) 01/16/2022    INR 1.69 (H) 01/15/2022     Recent Results (from the past 24 hour(s))   Glucose by meter    Collection Time: 02/22/22  4:40 PM   Result Value Ref Range    GLUCOSE BY METER POCT 165 (H) 70 - 99 mg/dL   Glucose by meter    Collection Time: 02/22/22  8:41 PM   Result Value Ref Range    GLUCOSE BY METER POCT 196 (H) 70 - 99 mg/dL   Potassium    Collection Time: 02/23/22  6:18 AM   Result Value Ref Range    Potassium 4.4 3.5 - 5.0 mmol/L   Magnesium    Collection Time: 02/23/22  6:18 AM   Result Value Ref Range    Magnesium 1.6 (L) 1.8 - 2.6 mg/dL   Extra Purple Top Tube    Collection Time: 02/23/22  6:18 AM   Result Value Ref Range    Hold Specimen JIC    Glucose by meter    Collection Time: 02/23/22  8:27 AM   Result Value Ref Range    GLUCOSE BY METER POCT 157 (H) 70 - 99 mg/dL   Glucose by meter    Collection Time: 02/23/22 12:25 PM   Result Value Ref Range    GLUCOSE BY METER POCT 152 (H) 70 - 99 mg/dL      Donita Olson MD

## 2022-02-24 NOTE — PROGRESS NOTES
Hospitalist Service Daily progress note    Assessment/Plan:  56 year old male with past medical history of alcohol abuse, hypertension, DM-II, multiple abdominal surgeries who was admitted on 1/15/22 with alcohol withdrawl, possible aspiration pneumonia leading to respiratory failure requiring intubation on 1/16, successfully extubated on 1/23/22 and now transferred out of ICU on 1/26/22 for floor care.  Tested positive for Covid on 1/22/2022 in the hospital  Did improve initially and was pending TCU placement until patient got worsening respiratory condition on 2/11/2022 and CT chest showed worsening pneumonia so restarted on IV antibiotic with Vanco and Zosyn.  Now white count up with elevated lactate and hypotension, ID consulted, completed meropenem.   Awaiting TCU placement.     Acute respiratory failure with hypoxia.  -Thought secondary to aspiration pneumonia/ COVID/hospital-acquired pneumonia  -Was intubated 1/16, extubated on 1/23.   Required BiPAP for a while, now on NC, O2 requirements decreasing  - Cont albuterol nebs and pulm hygiene  - CT PE was negative for PE on admission    Leukocytosis: ID consulted and now on meropenem.  Leukocytosis resolved.  Completed meropenem.     Recovered Covid  Weekly COVID (per protocol) came back positive on 1/30/2022. He was negative on admission on 1/22.    S/p IV remdesivir and Dexamethasone course.   S/p dose of tocilizumab/per pulmonology recommendations on 1/31/2022.  Tapering off oxygen.     DM type 2  With labile blood sugars, A1c was 7.0 on 1/15/22.  Very sensitive to Lantus as patient goes hypoglycemic even with 5 units  Stopped Metformin due to lactic acidosis.  Continue sliding scale insulin and carb counting      Septic shock likely due to multifactorial pneumonia -resolved  Metoprolol started on 1/25 for sinus tachycardia  Echo shows EF of 65% with mild concentric left ventricular hypertrophy  Started on midodrine for persistent low blood pressure  Stim  test negative     Acute toxic metabolic encephalopathy  - Patient has significant alcohol withdrawal during the intubation and was on Precedex  - Precedex weaned off. Cont with thiamine. Ativan prn.  - Alcohol level less than 10 on admission.    - Collateral information from wife-he did not find any evidence of patient drinking recently.    - Brain MRI shows chronic CVAs with brain atrophy  - Patient denies drinking prior to hospitalization.  Most recent chemical dependency treatment in 2001.  Since then patient tried to quit drinking numerous times, ending up in the hospital with withdrawal.  He is on disability for 2 years, in the past was a nicole.  - Reconsult psychiatry who are recommending:    Duloxetine 60 mg daily.  Continue gabapentin 200 mg twice a day  Seroquel 12.5 mg TID x 6 doses   Seroquel 75 mg at bedtime continue.  Rozerem 8 mg at bedtime.  Continue to utilize olanzapine 5-10 mg every 6 hours as needed for agitation, psychosis.  Efforts at sleep regulation.  For daytime anxiety would recommend adding Seroquel 6.25 mg in the morning and afternoon.     Hypokalemia/hypomagnesia  Replace per protocol     Diarrhea- resolved     Significant weakness and deconditioning  - Secondary to above  - Pending TCU placement     Severe malnutrition  - Protein and calories  - Dietitian consult, continue supplement  - Patient still has poor oral intake     S/P fall   - Patient had incident of fall this morning 2/12/2022, unwitnessed  - Evaluated by house officer and a CT head is negative for significant changes  - Continue fall precautions     Goals of care  -Previous rounder discussed details goals of care with patient's wife Daina on 2/14/2022  - Patient has poor quality of life prior to admission because of his alcohol use and previous CVAs  - Wife declined going through aggressive care again as what happened on admission with intubation.  -Palliative care following  -He is now DNR and his wife desires no  further aggressive interventions.  If his blood pressure goes low or he would not be transferred back to the ICU, he would not get pressors.  I reaffirmed this with her again today.     VTE prophylaxis:  Enoxaparin (Lovenox) SQ  DIET: Orders Placed This Encounter  Combination Diet Moderate Consistent Carb (60 g CHO per Meal) Diet; Easy to Chew (level 7); Mildly Thick (level 2)     Disposition/Barriers to discharge: Continue current management, but do not escalate care provider,    Code status:No CPR- Do NOT Intubate    Subjective:  Remains confused, pleasant and cooperative.  Oxygen requirements decreased today down to 5 LPM, from 10 LPM yesterday.  No new complaints.  Awaiting placement.    Current Medications Reviewed via EHR List    Objective:  Vital signs in last 24 hours:    Weight:     Weight change:   Body mass index is 22.62 kg/m .    Intake/Output last 3 shifts:  I/O last 3 completed shifts:  In: 660 [P.O.:660]  Out: -   Intake/Output this shift:  No intake/output data recorded.    Physical Exam:  Occasional inattention  General: No apparent distress  CV: Regular rate and rhythm  Lungs: Clear to auscultation  Abdomen: Soft, nontender  Extremities no edema    Imaging:  Personally Reviewed.  MR Brain w/o Contrast    Result Date: 1/22/2022  EXAM: MR BRAIN W/O CONTRAST LOCATION: [facility] DATE/TIME: 1/22/2022 1:05 PM INDICATION: Encephalopathy COMPARISON: CT head 01/18/2022, MRI head 11/23/2020 TECHNIQUE: Routine multiplanar multisequence head MRI without intravenous contrast. FINDINGS: INTRACRANIAL CONTENTS: Please note study is significantly degraded by motion artifact. Suggestion of a few small foci of restricted diffusion at the left temporal occipital region; best appreciated on repeat axial diffusion weighted sequence series 20.2 image 12, image 11; as well as repeat coronal diffusion weighted sequence series 24.2 image 9. No definite associated hemorrhage or significant mass  effect. Redemonstration of small chronic infarction left precentral gyrus. Mild generalized cerebral atrophy. No hydrocephalus. Normal position of the cerebellar tonsils. Small chronic infarction lateral aspect left cerebellum. A few additional tiny infarctions demonstrated on prior exam are not well demonstrated on the current. SELLA: Not well-visualized, grossly normal. OSSEOUS STRUCTURES/SOFT TISSUES: Normal marrow signal. Flow voids are not well-visualized. ORBITS: Not well-visualized. SINUSES/MASTOIDS: The complete opacification left maxillary and left sphenoid sinus, similar to prior. Small amount of opacification right mastoid air cells.     IMPRESSION: 1.  Please note study is significantly degraded by motion artifact. 2.  Suggestion of a few small foci of acute/early subacute infarction at the left temporal occipital region. 3.  No definite associated hemorrhage or significant mass effect. 4.  Small chronic infarction left precentral gyrus, similar to prior. 5.  Probable few small chronic infarctions cerebellar hemispheres. 6.  Mild atrophy.    Echocardiogram Complete    Result Date: 2022  [id number]^ALEYDA^LYN^[place] [address]  Name: KAREY LIVINGSTON MRN: [id number] : 1965 Study Date: 2022 07:37 AM Age: 56 yrs Gender: Male Patient Location: [facility] [address] Reason For Study: Hypertension (HTN) Ordering Physician: LYN MAYNARD Performed By: TAYLER  BSA: 1.9 m2 Height: 72 in Weight: 147 lb HR: 123 BP: 97/59 mmHg ______________________________________________________________________________ Procedure Complete Portable Echo Adult. Technically difficult study.Extremely poor acoustic windows. Compared to the prior study dated 2021, there have been no changes. No hemodynamically significant valvular abnormalities on 2D or color flow imaging. ______________________________________________________________________________ Interpretation  Summary  1.Left ventricular size, wall motion and function are normal. The ejection fraction is > 65%. 2.There is mild concentric left ventricular hypertrophy. 3.Normal right ventricle size and systolic function. 4.No hemodynamically significant valvular abnormalities on 2D or color flow imaging. Compared to the prior study dated 11/5/2021, there have been no changes. ______________________________________________________________________________ I      WMSI = 1.00     % Normal = 100  X - Cannot   0 -                      (2) - Mildly 2 -          Segments  Size Interpret    Hyperkinetic 1 - Normal  Hypokinetic  Hypokinetic  1-2     small                                                    7 -          3-5    moderate 3 - Akinetic 4 -          5 -         6 - Akinetic Dyskinetic   6-14    large              Dyskinetic   Aneurysmal  w/scar       w/scar       15-16   diffuse  Left Ventricle Left ventricular size, wall motion and function are normal. The ejection fraction is > 65%. There is mild concentric left ventricular hypertrophy. Left ventricular diastolic function is normal. No regional wall motion abnormalities noted.  Right Ventricle Normal right ventricle size and systolic function. TAPSE is normal, which is consistent with normal right ventricular systolic function.  Atria Normal left atrial size. Right atrial size is normal. There is no color Doppler evidence of an atrial shunt.  Mitral Valve Mitral valve leaflets appear normal. There is no evidence of mitral stenosis or clinically significant mitral regurgitation. There is no mitral regurgitation noted. There is no mitral valve stenosis.  Tricuspid Valve Tricuspid valve leaflets appear normal. Right ventricle systolic pressure estimate normal. There is trace tricuspid regurgitation. There is no tricuspid stenosis.  Aortic Valve Aortic valve leaflets appear normal. There is no evidence of aortic stenosis or clinically significant aortic regurgitation. The  aortic valve is trileaflet. No aortic regurgitation is present. No aortic stenosis is present.  Pulmonic Valve The pulmonic valve is not well seen, but is grossly normal. This degree of valvular regurgitation is within normal limits. There is no pulmonic valvular stenosis.  Vessels The aorta root is normal. Normal size ascending aorta. IVC diameter <2.1 cm collapsing >50% with sniff suggests a normal RA pressure of 3 mmHg.  Pericardium There is no pericardial effusion.  Rhythm The rhythm was sinus tachycardia.  ______________________________________________________________________________ MMode/2D Measurements & Calculations IVSd: 1.3 cm LVIDd: 4.1 cm LVIDs: 2.3 cm LVPWd: 1.2 cm FS: 43.4 %  LV mass(C)d: 182.6 grams LV mass(C)dI: 97.7 grams/m2 Ao root diam: 3.3 cm LA dimension: 3.6 cm asc Aorta Diam: 3.8 cm LA/Ao: 1.1 LVOT diam: 2.2 cm LVOT area: 3.8 cm2 LA Volume (BP): 29.3 ml LA Volume Index (BP): 15.7 ml/m2  LA Volume Indexed (AL/bp): 17.3 ml/m2 RWT: 0.61  Doppler Measurements & Calculations MV E max gustavo: 132.0 cm/sec MV A max gustavo: 137.1 cm/sec MV E/A: 0.96 MV dec slope: 1104 cm/sec2 MV dec time: 0.12 sec Ao V2 max: 143.6 cm/sec Ao max P.0 mmHg Ao V2 mean: 108.6 cm/sec Ao mean P.2 mmHg Ao V2 VTI: 26.8 cm LELO(I,D): 3.5 cm2 LELO(V,D): 3.9 cm2 LV V1 max P.6 mmHg LV V1 max: 146.7 cm/sec LV V1 VTI: 24.2 cm SV(LVOT): 92.9 ml SI(LVOT): 49.7 ml/m2 PA V2 max: 107.4 cm/sec PA max P.6 mmHg PA acc time: 0.07 sec AV Gustavo Ratio (DI): 1.0 LELO Index (cm2/m2): 1.9 E/E' av.4  Lateral E/e': 18.6 Medial E/e': 20.2  ______________________________________________________________________________ Report approved by: Randal Villa 2022 09:14 AM       XR Chest Port 1 View    Result Date: 2022  EXAM: XR CHEST PORT 1 VIEW LOCATION: [facility] DATE/TIME: 2022 8:33 AM INDICATION: dyspnea COMPARISON: CT pulmonary angiogram 2022     IMPRESSION: Abnormal increased lung  attenuation diffusely through both lungs. There are subsegmental foci of more focal airspace opacity in the peripheral third of the left mid and lower lung, similar to the prior CT. Subpleural emphysema is present in the apices. No new focal airspace opacity or volume loss. Symmetric apical and left lateral pleural thickening has not increased. No pleural effusion or pneumothorax. Cardiac silhouette is not enlarged. Unchanged aortic and other mediastinal interfaces. There are metallic fragments projecting in the left upper quadrant. Chronic left posterolateral rib fracture deformities are ununited.    XR Chest Port 1 View    Result Date: 1/29/2022  EXAM: XR CHEST PORT 1 VIEW LOCATION: [facility] DATE/TIME: 1/29/2022 6:18 AM INDICATION: Pneumonia follow up. COMPARISON: 01/28/2022 chest CT.01/19/2022 radiograph.     IMPRESSION: Relatively diffuse left lung opacities have increased since the 01/19/2022 chest radiograph, and perhaps slightly increased since the 01/20/2022 chest CT. Differentials for the increased opacities include asymmetric pulmonary edema, infectious / inflammatory process or layering pleural fluid. Small left pleural effusion is present. Right lung is hypoexpanded and may have a few subtle scattered opacities. No pneumothorax. Normal heart size. Atherosclerotic aorta.     XR Chest Port 1 View    Result Date: 1/19/2022  EXAM: XR CHEST PORT 1 VIEW LOCATION: [facility] DATE/TIME: 1/19/2022 10:52 AM INDICATION: after advancing et tube COMPARISON: 1/18/2022.     IMPRESSION: Endotracheal tube is been advanced. Tip is 4 cm above the igor in good position. There our persistent bibasilar pulmonary opacities with partial clearing at the right base from the prior study and no change at the left base. No pneumothorax. Enteric tube tip is not visualized tip is below the diaphragm. PICC catheter from right upper extremity approach is unchanged with tip at the  junction of the superior vena cava and right atrium. Left posterior rib fractures are again noted as well as deformity of the right humeral head.    XR Chest Port 1 View    Result Date: 1/18/2022  EXAM: XR CHEST PORT 1 VIEW LOCATION: [facility] DATE/TIME: 1/18/2022 6:06 AM INDICATION: Location of ET tube COMPARISON: 01/16/2022.     IMPRESSION:Endotracheal tube is in the trachea at the level of the clavicular heads with tip 6 cm above the igor. PICC catheter from right upper extremity approach is in the distal superior vena cava near its junction with the right atrium. Enteric tube tip is below the diaphragm and not visualized. There is no pneumothorax. Again noted are bilateral patchy airspace opacities there has been increased consolidation or atelectasis at the right medial lung base. There are old healed left posterior rib  fractures no acute fractures are identified.    CT Abdomen Pelvis w Contrast    Result Date: 2/15/2022  EXAM: CT ABDOMEN PELVIS W CONTRAST LOCATION: [facility] DATE/TIME: 2/15/2022 9:50 PM INDICATION: Leukocytosis. COMPARISON: 10/05/2020 TECHNIQUE: CT scan of the abdomen and pelvis was performed following injection of IV contrast. Multiplanar reformats were obtained. Dose reduction techniques were used. CONTRAST: ISOVUE 370 100ML FINDINGS: LOWER CHEST: Bibasilar infiltrates. Small right and trace left pleural effusion. HEPATOBILIARY: Hepatic steatosis. Cholelithiasis and gallbladder distention. PANCREAS: Pancreatic calcifications suggesting chronic pancreatitis. Prominent calcification at the neck of the pancreas. Difficult to tell if this is parenchymal or intraductal. The distal pancreas is atrophic. SPLEEN: Splenectomy with accessory splenule. ADRENAL GLANDS: Normal. KIDNEYS/BLADDER: Small nonobstructing renal calculi. BOWEL: Gastric and small bowel wall thickening. Central mesenteric congestion. Trace amount of free fluid. Diverticulosis  with presumed retained barium. Wall thickening of the proximal colon. Rectal wall thickening versus underdistention. LYMPH NODES: Subcentimeter mesenteric and peripancreatic lymph nodes. VASCULATURE: Atherosclerotic vascular calcification. PELVIC ORGANS: Small amount of free fluid. MUSCULOSKELETAL: Degenerative change osseous structures. Anasarca. Remote and subacute rib fractures.     IMPRESSION: 1.  Wall thickening of stomach, small bowel and proximal colon. Correlate for gastroenteritis/enterocolitis. 2.  Mesenteric congestion and small amount of free fluid. 3.  Bilateral pulmonary infiltrates. Small right and trace left pleural effusion. 4.  Focal rectal wall thickening versus underdistention. Follow-up recommended to exclude underlying lesion. 5.  Hepatic steatosis. 6.  Diverticulosis. 7.  Cholelithiasis and mild gallbladder distention. 8.  Nonobstructing renal calculi. 9.  Coarse pancreatic calcifications again seen    CT Chest Pulmonary Embolism w Contrast    Result Date: 2/11/2022  EXAM: CT CHEST PULMONARY EMBOLISM W CONTRAST LOCATION: [facility] DATE/TIME: 2/11/2022 1:42 PM INDICATION: PE suspected, low/intermediate prob, positive D-dimer, hypoxia, history of Covid COMPARISON: 01/28/2022 TECHNIQUE: CT chest pulmonary angiogram during arterial phase injection of IV contrast. Multiplanar reformats and MIP reconstructions were performed. Dose reduction techniques were used. CONTRAST: IsoVue 370 100mL FINDINGS: ANGIOGRAM CHEST: No pulmonary artery filling defects. Normal caliber aorta. LUNGS AND PLEURA: Moderate emphysema. Persistent but increased patchy groundglass and consolidative pulmonary opacities with areas of interlobular septal thickening. Mild bronchiectasis and bronchial wall thickening have increased when compared to prior exam. Trace effusions have decreased. No pneumothorax. MEDIASTINUM/AXILLAE: Heart size is normal. No adenopathy. CORONARY ARTERY CALCIFICATION: None.  UPPER ABDOMEN: Hepatic steatosis. Cholelithiasis. There is diffuse wall thickening of the stomach. Small volume ascites. Nonobstructing bilateral renal calculi. Prominent calcification in the area of the pancreatic neck is unchanged. Splenectomy with small accessory splenule. MUSCULOSKELETAL: Degenerative changes of the spine. Prominent L1 Schmorl's node. Similar appearance of the bilateral rib fractures. Mild anasarca.     IMPRESSION: 1.  No pulmonary embolus. 2.  Interval worsening of bilateral pulmonary opacities, bronchiectasis, bronchial wall thickening. 3.  Hepatic steatosis. 4.  Cholelithiasis. 5.  Diffuse wall thickening of the visualized stomach may indicate gastritis. 6.  Manifestations of third spacing.    CT Chest w/o Contrast    Result Date: 1/28/2022  EXAM: CT CHEST W/O CONTRAST LOCATION: [facility] DATE/TIME: 1/28/2022 2:36 PM INDICATION: Cough, persistent COMPARISON: 10/15/2022 TECHNIQUE: CT chest without IV contrast. Multiplanar reformats were obtained. Dose reduction techniques were used. CONTRAST: None. FINDINGS: LUNGS AND PLEURA: Upper lung predominant centrilobular and paraseptal emphysema. Improvement in groundglass and interstitial opacities in the right lung. Slight worsening of groundglass and interstitial opacities in the left upper lobe. New airway wall thickening in the left upper lobe and left lower lobe and new consolidation in the dependent portion of the left upper lobe and at the left lung base. Mucoid impaction in the left lower lobe airways. Unchanged left pleural thickening and trace right pleural effusion. MEDIASTINUM/AXILLAE: No thoracic aortic aneurysm. No lymphadenopathy. CORONARY ARTERY CALCIFICATION: None. UPPER ABDOMEN: Hepatic steatosis. Cholelithiasis. MUSCULOSKELETAL: There are demonstrated bilateral rib fractures.     IMPRESSION: 1.  New airway wall thickening in the left upper and lower lobes with new consolidation in the dependent portion of  the left upper lobe and at the left lung base, and mucoid impaction in left lower lobe airways, suspicious for aspiration. 2.  Overall improvement in groundglass and interstitial opacities in the right lung and slight worsening in the left upper lobe.     CT Head w/o Contrast    Result Date: 2/12/2022  EXAM: CT HEAD W/O CONTRAST LOCATION: [facility] DATE/TIME: 2/12/2022 9:20 AM INDICATION: Trauma - Head Injury. Unwitnessed fall. COMPARISON: CT head 01/18/2022 TECHNIQUE: Routine CT Head without IV contrast. Multiplanar reformats. Dose reduction techniques were used. FINDINGS: INTRACRANIAL CONTENTS: No intracranial hemorrhage, extraaxial collection, or mass effect.  No CT evidence of acute infarct. Small focus of chronic cortical encephalomalacia and adjacent gliosis involving the superior left frontal lobe as seen previously.  Mild presumed chronic small vessel ischemic changes. Mild generalized volume loss. No hydrocephalus. VISUALIZED ORBITS/SINUSES/MASTOIDS: No intraorbital abnormality. Complete opacification of the left maxillary sinus with some chronic appearing mural hyperostosis similar to the previous exam. Additional subtotal opacification of the left sphenoid sinus by polypoid mucosal thickening as seen previously. No middle ear or mastoid effusion. BONES/SOFT TISSUES: Mild right parietal scalp swelling. No skull fracture.     IMPRESSION: 1.  No CT evidence for acute intracranial process. 2.  Mild right parietal scalp contusion without associated fracture. 3.  Brain atrophy and presumed chronic microvascular ischemic changes as above. 4.  Inflammatory changes of the paranasal sinuses as seen previously.    CT Head w/o Contrast    Result Date: 1/18/2022  EXAM: CT HEAD W/O CONTRAST LOCATION: [facility] DATE/TIME: 1/18/2022 3:45 AM INDICATION: Headache, intracranial hemorrhage suspected. COMPARISON: 11/4/2021 TECHNIQUE: Routine CT Head without IV contrast.  Multiplanar reformats. Dose reduction techniques were used. FINDINGS: INTRACRANIAL CONTENTS: No intracranial hemorrhage, extraaxial collection, or mass effect. No CT evidence of acute infarct. Mild presumed chronic small vessel ischemic changes. Mild to moderate generalized volume loss. No hydrocephalus. Stable calcified dural based lesion along the right frontal convexity measuring 1 cm in diameter, presumably reflecting a small meningioma. VISUALIZED ORBITS/SINUSES/MASTOIDS: No intraorbital abnormality. Complete opacification of the left maxillary sinus with chronic mucoperiosteal reaction. Mucous retention cyst in the left sphenoid sinus. Mild mucosal thickening along the floor of the left frontal sinus. Opacified left frontoethmoidal recess. No middle ear or mastoid effusion. BONES/SOFT TISSUES: No acute abnormality.     IMPRESSION: 1.  No CT evidence for acute intracranial process. 2.  Stable chronic changes as above.    XR Video Swallow with SLP or OT    Result Date: 2/8/2022  EXAM: XR VIDEO SWALLOW WITH SLP OR OT LOCATION: [facility] DATE/TIME: 2/8/2022 8:52 AM INDICATION: Difficulty swallowing. COMPARISON: 01/31/2022. TECHNIQUE: Routine swallow study with speech pathology using multiple barium thicknesses. FINDINGS: FLUOROSCOPIC TIME: 1.2 minutes NUMBER OF IMAGES: 0 Swallow study with Speech Pathology using multiple barium thicknesses. Small amount of silent aspiration with thin liquid. Penetration with mildly thickened (nectar) liquid. There was persistent mild penetration with chin Tech technique and mildly thickened liquid. No aspiration was solid consistency.     IMPRESSION: 1.  Silent aspiration with thin liquid. Penetration with mildly thickened liquid.     XR Video Swallow with SLP or OT    Result Date: 1/31/2022  EXAM: XR VIDEO SWALLOW WITH SLP OR OT LOCATION: [facility] DATE/TIME: 1/31/2022 11:30 AM INDICATION: Difficulty swallowing. COMPARISON:  2022. TECHNIQUE: Routine swallow study with speech pathology using multiple barium thicknesses. FINDINGS: FLUOROSCOPIC TIME: 3.3 minutes NUMBER OF IMAGES: 0 Swallow study with Speech Pathology using multiple barium thicknesses. Tracheal aspiration with thin liquid consistency barium with weak ineffective cough. Penetration into the laryngeal vestibule with mildly thickened (nectar) liquid consistency barium. No penetration or aspiration with honey and pudding consistency barium. Please refer to speech therapy report for additional detail.    XR Video Swallow with SLP or OT    Result Date: 2022  EXAM: XR VIDEO SWALLOW WITH SLP OR OT LOCATION: [facility] DATE/TIME: 2022 3:27 PM INDICATION: Difficulty swallowing. COMPARISON: None. TECHNIQUE: Routine swallow study with speech pathology using multiple barium thicknesses. FINDINGS: FLUOROSCOPIC TIME: 1.5 minutes NUMBER OF IMAGES: 9 Swallow study with Speech Pathology using multiple barium thicknesses. Moderate to deep penetration with thin liquid (no cough reflex). Small amount of penetration with mildly thick consistency. No other penetration or aspiration.     EEG Coma or Sleep Only    Result Date: 2022  ELECTROENCEPHALOGRAM (EEG) REPORT [facility] NEUROLOGY [address] [place] [address] Tel: [phone]  Fax: [phone] [email or web address] Yap 1965, MRN [id number] PCP: Sarah Canseco Date: 2022 Principal Diagnosis: Altered mental status History : Patient is being evaluated for altered mental status. Medication listed includes Ativan Description of the Recording:  This is a multichannel digital EEG recording done using the international 10-20 placement system. Background of the recording consists of irregular 1 to 3 Hz polymorphic delta activity with amplitudes ranging between 20 to 30  V.  Alerting procedure produce minimal change.  Photic stimulation  performed with standard protocol produced minimal change.  No transient sleep patterns were recorded. During this recording, no sharp discharges, spikes or electrographic seizure activity was recorded. Impression: This is an abnormal EEG due to diffusely slow polymorphic delta activity that minimally attenuates with alerting procedures.  This EEG suggests nonspecific generalized cerebral dysfunction.  No focal slowing or periodic discharges were seen to suggest seizures. Classification: Delta grade I generalized Tiburcio Gallagher MD [facility] (Former[facility], [facility], .[facility] This note was dictated using voice recognition software.  Any grammatical or context distortions are unintentional and inherent to the software.     Lab Results:  Personally Reviewed.   Fingerstick Blood Glucose: @[id number](POCGLUFGR:10)@    Last Hbg A1C: No results found for: HGBA1C   Lab Results   Component Value Date    INR 1.67 (H) 01/30/2022    INR 1.72 (H) 01/16/2022    INR 1.69 (H) 01/15/2022     Recent Results (from the past 24 hour(s))   Glucose by meter    Collection Time: 02/23/22  8:26 PM   Result Value Ref Range    GLUCOSE BY METER POCT 32 (LL) 70 - 99 mg/dL   Glucose by meter    Collection Time: 02/23/22  9:06 PM   Result Value Ref Range    GLUCOSE BY METER POCT 63 (L) 70 - 99 mg/dL   Glucose by meter    Collection Time: 02/23/22  9:58 PM   Result Value Ref Range    GLUCOSE BY METER POCT 79 70 - 99 mg/dL   Glucose by meter    Collection Time: 02/23/22 10:52 PM   Result Value Ref Range    GLUCOSE BY METER POCT 71 70 - 99 mg/dL   Glucose by meter    Collection Time: 02/24/22 12:03 AM   Result Value Ref Range    GLUCOSE BY METER POCT 200 (H) 70 - 99 mg/dL   Glucose by meter    Collection Time: 02/24/22  5:30 AM   Result Value Ref Range    GLUCOSE BY METER POCT 110 (H) 70 - 99 mg/dL   Potassium    Collection Time: 02/24/22  6:04 AM   Result Value Ref Range    Potassium 4.4 3.5 - 5.0  mmol/L   Glucose by meter    Collection Time: 02/24/22  8:25 AM   Result Value Ref Range    GLUCOSE BY METER POCT 102 (H) 70 - 99 mg/dL   Glucose by meter    Collection Time: 02/24/22  1:00 PM   Result Value Ref Range    GLUCOSE BY METER POCT 154 (H) 70 - 99 mg/dL   Glucose by meter    Collection Time: 02/24/22  4:53 PM   Result Value Ref Range    GLUCOSE BY METER POCT 99 70 - 99 mg/dL     Donita Olson MD

## 2022-02-24 NOTE — PROGRESS NOTES
Care Management Follow Up    Length of Stay (days): 40    Expected Discharge Date: 02/25/2022     Concerns to be Addressed: discharge planning     Patient plan of care discussed at interdisciplinary rounds: No    Anticipated Discharge Disposition: Transitional Care     Anticipated Discharge Services: Other (see comment) (therapy services )  Anticipated Discharge DME: None    Patient/family educated on Medicare website which has current facility and service quality ratings: yes  Education Provided on the Discharge Plan:    Patient/Family in Agreement with the Plan: yes    Referrals Placed by CM/SW: Post Acute Facilities  Private pay costs discussed: Not applicable    Additional Information:  Call placed to pt's wife, Daina, to discuss discharge planning.  She voiced stress of trying to decide what pt needs, finances, and her need to work full-time so not able to provide cares for pt. She would still like to focus on TCU so pt can try to work on improving.    Call placed to Cristina at [facility] to request update on referral as pt had been tentatively accepted at [facility].  She states after re-screening, pt has significantly changed clinically. She states pt might be more appropriate for Hospice or Long-Term Care.  But she will have information reviewed and will call back with update.     Debra Veliz RN

## 2022-02-24 NOTE — PROGRESS NOTES
Palliative Care    GOC Restorative.   Wife did express interest in Hospice but says she can't afford Nursing home or care center and he cannot come home so she chose TCU. With patients impulsiveness, he may fall at TCU and will probably be readmitted to Hospital.  For that reason, I   Asked Financial SW to contact wife to give her resources in the community.  Patient has NO symptoms to manage, he is eating lunch and patient states he is not coughing and is cooperative.    Last night he was restless but does do well during the day.   His  code status is DNR DNI.  Wife decision  Maker.    Patient remains in the hospital as they are looking for placement.    Palliative care will sign off after I have wife sign POLST. Wife is coming in this evening..    I left copy of POLST in his room.      ELIAS Kirkpatrick, NP-C, ACHPN

## 2022-02-24 NOTE — PLAN OF CARE
Goal Outcome Evaluation:    Plan of Care Reviewed With: patient     Overall Patient Progress: improving    Outcome Evaluation: Family goal is restorative/TCU.    Patient refused to use IS. Writer attempted to educate  on importance without success. Patient remained on 4l O2 through out the night. Blood Glucose check was 200 at midnight and spot check at 0530 was 110.

## 2022-02-24 NOTE — PLAN OF CARE
Problem: Risk for Delirium  Goal: Improved Attention and Thought Clarity  Outcome: Ongoing, Not Progressing     Problem: Hypertension Acute  Goal: Blood Pressure Within Desired Range  Intervention: Normalize Blood Pressure  Recent Flowsheet Documentation  Taken 2/23/2022 1600 by Martin Severino RN  Medication Review/Management: medications reviewed     Problem: Mobility Impairment  Goal: Optimal Mobility  Outcome: Ongoing, Not Progressing   Goal Outcome Evaluation:    Plan of Care Reviewed With: patient     Overall Patient Progress: improving    Outcome Evaluation: Family goal is restorative/TCU.    Patient A and O to self and family, confused on situation leading to noncompliance with pulse ox or keeping oxygen mask on. Patient on 4 liters currently via oxy mask. Incontinent of bowel and bladder. Patient Blood sugars at dinner 63, no insulin given. Insulin given per carb count. Recheck blood sugar showed 36. OJ and oral glucose gel given immediately. Recheck showed 63. Oral glucose gel and OJ given. Recheck showed 79. Oral glucose gel and OJ given. Recheck showed 71. P4 out of glucose supplement, PO or IV. Pharmacy alerted. Will monitor and pass on to next shift.

## 2022-02-24 NOTE — PLAN OF CARE
Problem: Risk for Delirium  Goal: Improved Attention and Thought Clarity  Outcome: Ongoing, Progressing  Goal: Improved Sleep  Outcome: Ongoing, Progressing     Problem: Gas Exchange Impaired  Goal: Optimal Gas Exchange  Intervention: Optimize Oxygenation and Ventilation  Recent Flowsheet Documentation  Taken 2/24/2022 0900 by Debby Choi, RN  Head of Bed (HOB) Positioning: HOB at 15 degrees     Problem: Impaired Wound Healing  Goal: Optimal Wound Healing  Outcome: Ongoing, Progressing  Intervention: Promote Wound Healing  Recent Flowsheet Documentation  Taken 2/24/2022 0900 by Debby Choi, RN  Activity Management: activity adjusted per tolerance     Problem: Gas Exchange Impaired (Pulmonary Impairment)  Goal: Optimal Gas Exchange  Outcome: Ongoing, Progressing   Goal Outcome Evaluation:    Pt lethargic, disoriented this shift in am. Reoriented as needed. O2 3LNC and kept NC in nares most of shift. Bottom/perineum remains denudes, rashy red. Meds/creams applied as ordered. AM . Pt not awake enough to eat breakfast, had 1/2 OJ mid to later am with pills.   Plan of Care Reviewed With: patient     Overall Patient Progress: improving    Outcome Evaluation: Family goal is restorative/TCU.

## 2022-02-25 NOTE — PROGRESS NOTES
Palliative Care    POLST completed, DNR DNI  GOC: restorative at TCU    Palliative care will sign off    ELIAS Kirkpatrick, NP-C, ACHPN

## 2022-02-25 NOTE — PROGRESS NOTES
Working on TCU Placement; referrals previously faxed. Per previous note Cristina with [facility] tentatively accepted for [facility]; however, patient status is changed so they are re-reviewing. Left message for Cristina to determine if they are still able to accept; provided call back extension [phone]. [facility] and [facility] Declined. Left messages for Tanvir with [facility]. Care manager to follow.     Briseyda Dudley, RN Care Manager

## 2022-02-25 NOTE — PROGRESS NOTES
"CLINICAL NUTRITION SERVICES - REASSESSMENT NOTE     Nutrition Prescription    RECOMMENDATIONS FOR MDs/PROVIDERS TO ORDER:  None    Malnutrition Status:    Severe in acute illness    Recommendations already ordered by Registered Dietitian (RD):  Continue supplements    Future/Additional Recommendations:  Adjust supplement pending intake/acceptance/needs     EVALUATION OF THE PROGRESS TOWARD GOALS   Diet:Easy to chew with mildly thick liquids per SLP.  Moderate consistent CHO    Supplement: magic cup and gel plus daily    Intake:   % of most meals, sometimes refuses  Pt busy with Therapy - did not see any left over food on tray table    GI   Last BM 2/25    LABS  Reviewed  Mg 1.3 L  FSBG 150, 135    MEDICATIONS  Folic acid, ssi, lantus, magox, mvi, protonix, thiamine, tums, mvi with minerals, novolog 1U:10 g Cho    ANTHROPOMETRICS  Height: 185.4 cm (6' .992\")  Admit wt 186 lb 11.7 oz 1/17/22.  Most Recent Weight:  No new weight  77.7 kg (171 lb 6.4 oz) 2/21, up 15 lb in 1 week?   71.0 kg (156 lb 11.2 oz) 2/14, up 11 lb from day prior? 2/13 weight down 2 lb from 2 weeks prior  66.8 kg (147 lb 4.8 oz) 2/2- down 14 lb in 1 week?   73.0 kg (161 lb)  1/26  74.3 kg (163 lb 12.8 oz)  ?accurate- 1/25  189 lb 6 oz 1/23/22, -aggressive diuresis  184 lb 8.4 oz 1/24/22  190 lb 7.6 oz 1/19/22,   Weight History:       Wt Readings from Last 8 Encounters:   11/12/21 80.9 kg (178 lb 6.4 oz)   12/30/20 77.1 kg (170 lb)     Dosing Weight: 84.7 kg, current     ASSESSED NUTRITION NEEDS  Estimated Energy Needs: 1109-0685 kcals/day (25 - 30 kcals/kg)  Justification: Maintenance  Estimated Protein Needs: 102-125 grams protein/day (1.2 - 1.5 grams of pro/kg)  Justification: Increased needs, monitor LFTs.  Estimated Fluid Needs: 2115+ mL/day (1 mL/kcal)   Justification: maintenance or pending fluid status.    CURRENT NUTRITION DIAGNOSIS  Malnutrition r/t acute illness evidenced by intake < 50% since admit, moderate fat and muscle loss, " weight loss    INTERVENTIONS  Implementation  Continue Magic cup and gel 20 plus arpan    Goals  Patient to consume % of nutritionally adequate meals three times per day, or the equivalent with supplements/snacks.- progressing  Electrolytes WNL- progressing- Mag still not WNL  BG - progressing    Monitoring/Evaluation  Progress toward goals will be monitored and evaluated per protocol.

## 2022-02-25 NOTE — PROGRESS NOTES
Hospitalist Service Daily progress note    Assessment/Plan:  56 year old male with past medical history of alcohol abuse, hypertension, DM-II, multiple abdominal surgeries who was admitted on 1/15/22 with alcohol withdrawl, possible aspiration pneumonia leading to respiratory failure requiring intubation on 1/16, successfully extubated on 1/23/22 and now transferred out of ICU on 1/26/22 for floor care.  Tested positive for Covid on 1/22/2022 in the hospital  Did improve initially and was pending TCU placement until patient got worsening respiratory condition on 2/11/2022 and CT chest showed worsening pneumonia so restarted on IV antibiotic with Vanco and Zosyn.  Now white count up with elevated lactate and hypotension, ID consulted, completed meropenem.   Awaiting TCU placement.     Acute respiratory failure with hypoxia.  -Thought secondary to aspiration pneumonia/ COVID/hospital-acquired pneumonia  -Was intubated 1/16, extubated on 1/23.   Required BiPAP for a while, now on NC, O2 requirements decreasing  - Cont albuterol nebs and pulm hygiene  - CT PE was negative for PE on admission    Leukocytosis: ID consulted and now on meropenem.  Leukocytosis resolved.  Completed meropenem.     Recovered Covid  Weekly COVID (per protocol) came back positive on 1/30/2022. He was negative on admission on 1/22.    S/p IV remdesivir and Dexamethasone course.   S/p dose of tocilizumab/per pulmonology recommendations on 1/31/2022.  Tapering off oxygen.     DM type 2  With labile blood sugars, A1c was 7.0 on 1/15/22.  Very sensitive to Lantus as patient goes hypoglycemic even with 5 units  Stopped Metformin due to lactic acidosis.  Continue sliding scale insulin and carb counting      Septic shock likely due to multifactorial pneumonia -resolved  Metoprolol started on 1/25 for sinus tachycardia  Echo shows EF of 65% with mild concentric left ventricular hypertrophy  Started on midodrine for persistent low blood pressure  Stim  test negative     Acute toxic metabolic encephalopathy  - Patient has significant alcohol withdrawal during the intubation and was on Precedex  - Precedex weaned off. Cont with thiamine. Ativan prn.  - Alcohol level less than 10 on admission.    - Collateral information from wife-he did not find any evidence of patient drinking recently.    - Brain MRI shows chronic CVAs with brain atrophy  - Patient denies drinking prior to hospitalization.  Most recent chemical dependency treatment in 2001.  Since then patient tried to quit drinking numerous times, ending up in the hospital with withdrawal.  He is on disability for 2 years, in the past was a nicole.  - Reconsult psychiatry who are recommending:    Duloxetine 60 mg daily.  Continue gabapentin 200 mg twice a day  Seroquel 12.5 mg TID x 6 doses   Seroquel 75 mg at bedtime continue.  Rozerem 8 mg at bedtime.  Continue to utilize olanzapine 5-10 mg every 6 hours as needed for agitation, psychosis.  Efforts at sleep regulation.  For daytime anxiety would recommend adding Seroquel 6.25 mg in the morning and afternoon.     Hypokalemia/hypomagnesia  Replace per protocol     Diarrhea- resolved     Significant weakness and deconditioning  - Secondary to above  - Pending TCU placement     Severe malnutrition  - Protein and calories  - Dietitian consult, continue supplement  - Patient still has poor oral intake     S/P fall   - Patient had incident of fall this morning 2/12/2022, unwitnessed  - Evaluated by house officer and a CT head is negative for significant changes  - Continue fall precautions     Goals of care  -Previous rounder discussed details goals of care with patient's wife Daina on 2/14/2022  - Patient has poor quality of life prior to admission because of his alcohol use and previous CVAs  - Wife declined going through aggressive care again as what happened on admission with intubation.  -Palliative care following  -He is now DNR and his wife desires no  further aggressive interventions.  If his blood pressure goes low or he would not be transferred back to the ICU, he would not get pressors.  I reaffirmed this with her again today.     VTE prophylaxis:  Enoxaparin (Lovenox) SQ  DIET: Orders Placed This Encounter  Combination Diet Moderate Consistent Carb (60 g CHO per Meal) Diet; Easy to Chew (level 7); Mildly Thick (level 2)     Disposition/Barriers to discharge: Continue current management, but do not escalate care provider,    Code status:No CPR- Do NOT Intubate    Subjective:  Remains confused, pleasant and cooperative.  Oxygen requirements decreased today down to 4 LPM, from 10 LPM on 2/23.  Remains very weak.  Requiring assistance of 2 to transfer from bed to chair.  No new complaints.  Awaiting placement.    Current Medications Reviewed via EHR List    Objective:  Vital signs in last 24 hours:    Weight:     Weight change:   Body mass index is 22.62 kg/m .    Intake/Output last 3 shifts:  I/O last 3 completed shifts:  In: 120 [P.O.:120]  Out: 200 [Urine:200]  Intake/Output this shift:  No intake/output data recorded.    Physical Exam:  Occasional inattention  General: No apparent distress  CV: Regular rate and rhythm  Lungs: Clear to auscultation  Abdomen: Soft, nontender  Extremities no edema, generalized muscle atrophy of extremities    Imaging:  Personally Reviewed.  MR Brain w/o Contrast    Result Date: 1/22/2022  EXAM: MR BRAIN W/O CONTRAST LOCATION: [facility] DATE/TIME: 1/22/2022 1:05 PM INDICATION: Encephalopathy COMPARISON: CT head 01/18/2022, MRI head 11/23/2020 TECHNIQUE: Routine multiplanar multisequence head MRI without intravenous contrast. FINDINGS: INTRACRANIAL CONTENTS: Please note study is significantly degraded by motion artifact. Suggestion of a few small foci of restricted diffusion at the left temporal occipital region; best appreciated on repeat axial diffusion weighted sequence series 20.2 image 12, image 11;  as well as repeat coronal diffusion weighted sequence series 24.2 image 9. No definite associated hemorrhage or significant mass effect. Redemonstration of small chronic infarction left precentral gyrus. Mild generalized cerebral atrophy. No hydrocephalus. Normal position of the cerebellar tonsils. Small chronic infarction lateral aspect left cerebellum. A few additional tiny infarctions demonstrated on prior exam are not well demonstrated on the current. SELLA: Not well-visualized, grossly normal. OSSEOUS STRUCTURES/SOFT TISSUES: Normal marrow signal. Flow voids are not well-visualized. ORBITS: Not well-visualized. SINUSES/MASTOIDS: The complete opacification left maxillary and left sphenoid sinus, similar to prior. Small amount of opacification right mastoid air cells.     IMPRESSION: 1.  Please note study is significantly degraded by motion artifact. 2.  Suggestion of a few small foci of acute/early subacute infarction at the left temporal occipital region. 3.  No definite associated hemorrhage or significant mass effect. 4.  Small chronic infarction left precentral gyrus, similar to prior. 5.  Probable few small chronic infarctions cerebellar hemispheres. 6.  Mild atrophy.    Echocardiogram Complete    Result Date: 2022  [id number]^[email or web address]^[place] [address]  Name: KAREY LIVINGSTON MRN: [id number] : 1965 Study Date: 2022 07:37 AM Age: 56 yrs Gender: Male Patient Location: [facility] Reason For Study: Hypertension (HTN) Ordering Physician: LYN MAYNARD Performed By: TAYLER  BSA: 1.9 m2 Height: 72 in Weight: 147 lb HR: 123 BP: 97/59 mmHg ______________________________________________________________________________ Procedure Complete Portable Echo Adult. Technically difficult study.Extremely poor acoustic windows. Compared to the prior study dated 2021, there have been no changes. No hemodynamically significant valvular  abnormalities on 2D or color flow imaging. ______________________________________________________________________________ Interpretation Summary  1.Left ventricular size, wall motion and function are normal. The ejection fraction is > 65%. 2.There is mild concentric left ventricular hypertrophy. 3.Normal right ventricle size and systolic function. 4.No hemodynamically significant valvular abnormalities on 2D or color flow imaging. Compared to the prior study dated 11/5/2021, there have been no changes. ______________________________________________________________________________ I      WMSI = 1.00     % Normal = 100  X - Cannot   0 -                      (2) - Mildly 2 -          Segments  Size Interpret    Hyperkinetic 1 - Normal  Hypokinetic  Hypokinetic  1-2     small                                                    7 -          3-5    moderate 3 - Akinetic 4 -          5 -         6 - Akinetic Dyskinetic   6-14    large              Dyskinetic   Aneurysmal  w/scar       w/scar       15-16   diffuse  Left Ventricle Left ventricular size, wall motion and function are normal. The ejection fraction is > 65%. There is mild concentric left ventricular hypertrophy. Left ventricular diastolic function is normal. No regional wall motion abnormalities noted.  Right Ventricle Normal right ventricle size and systolic function. TAPSE is normal, which is consistent with normal right ventricular systolic function.  Atria Normal left atrial size. Right atrial size is normal. There is no color Doppler evidence of an atrial shunt.  Mitral Valve Mitral valve leaflets appear normal. There is no evidence of mitral stenosis or clinically significant mitral regurgitation. There is no mitral regurgitation noted. There is no mitral valve stenosis.  Tricuspid Valve Tricuspid valve leaflets appear normal. Right ventricle systolic pressure estimate normal. There is trace tricuspid regurgitation. There is no tricuspid stenosis.  Aortic  Valve Aortic valve leaflets appear normal. There is no evidence of aortic stenosis or clinically significant aortic regurgitation. The aortic valve is trileaflet. No aortic regurgitation is present. No aortic stenosis is present.  Pulmonic Valve The pulmonic valve is not well seen, but is grossly normal. This degree of valvular regurgitation is within normal limits. There is no pulmonic valvular stenosis.  Vessels The aorta root is normal. Normal size ascending aorta. IVC diameter <2.1 cm collapsing >50% with sniff suggests a normal RA pressure of 3 mmHg.  Pericardium There is no pericardial effusion.  Rhythm The rhythm was sinus tachycardia.  ______________________________________________________________________________ MMode/2D Measurements & Calculations IVSd: 1.3 cm LVIDd: 4.1 cm LVIDs: 2.3 cm LVPWd: 1.2 cm FS: 43.4 %  LV mass(C)d: 182.6 grams LV mass(C)dI: 97.7 grams/m2 Ao root diam: 3.3 cm LA dimension: 3.6 cm asc Aorta Diam: 3.8 cm LA/Ao: 1.1 LVOT diam: 2.2 cm LVOT area: 3.8 cm2 LA Volume (BP): 29.3 ml LA Volume Index (BP): 15.7 ml/m2  LA Volume Indexed (AL/bp): 17.3 ml/m2 RWT: 0.61  Doppler Measurements & Calculations MV E max gustavo: 132.0 cm/sec MV A max gustavo: 137.1 cm/sec MV E/A: 0.96 MV dec slope: 1104 cm/sec2 MV dec time: 0.12 sec Ao V2 max: 143.6 cm/sec Ao max P.0 mmHg Ao V2 mean: 108.6 cm/sec Ao mean P.2 mmHg Ao V2 VTI: 26.8 cm LELO(I,D): 3.5 cm2 LELO(V,D): 3.9 cm2 LV V1 max P.6 mmHg LV V1 max: 146.7 cm/sec LV V1 VTI: 24.2 cm SV(LVOT): 92.9 ml SI(LVOT): 49.7 ml/m2 PA V2 max: 107.4 cm/sec PA max P.6 mmHg PA acc time: 0.07 sec AV Gustavo Ratio (DI): 1.0 LELO Index (cm2/m2): 1.9 E/E' av.4  Lateral E/e': 18.6 Medial E/e': 20.2  ______________________________________________________________________________ Report approved by: Randal Villa 2022 09:14 AM       XR Chest Port 1 View    Result Date: 2022  EXAM: XR CHEST PORT 1 VIEW LOCATION: [facility]  DATE/TIME: 2/16/2022 8:33 AM INDICATION: dyspnea COMPARISON: CT pulmonary angiogram 02/11/2022     IMPRESSION: Abnormal increased lung attenuation diffusely through both lungs. There are subsegmental foci of more focal airspace opacity in the peripheral third of the left mid and lower lung, similar to the prior CT. Subpleural emphysema is present in the apices. No new focal airspace opacity or volume loss. Symmetric apical and left lateral pleural thickening has not increased. No pleural effusion or pneumothorax. Cardiac silhouette is not enlarged. Unchanged aortic and other mediastinal interfaces. There are metallic fragments projecting in the left upper quadrant. Chronic left posterolateral rib fracture deformities are ununited.    XR Chest Port 1 View    Result Date: 1/29/2022  EXAM: XR CHEST PORT 1 VIEW LOCATION: [facility] DATE/TIME: 1/29/2022 6:18 AM INDICATION: Pneumonia follow up. COMPARISON: 01/28/2022 chest CT.01/19/2022 radiograph.     IMPRESSION: Relatively diffuse left lung opacities have increased since the 01/19/2022 chest radiograph, and perhaps slightly increased since the 01/20/2022 chest CT. Differentials for the increased opacities include asymmetric pulmonary edema, infectious / inflammatory process or layering pleural fluid. Small left pleural effusion is present. Right lung is hypoexpanded and may have a few subtle scattered opacities. No pneumothorax. Normal heart size. Atherosclerotic aorta.     XR Chest Port 1 View    Result Date: 1/19/2022  EXAM: XR CHEST PORT 1 VIEW LOCATION: [facility] DATE/TIME: 1/19/2022 10:52 AM INDICATION: after advancing et tube COMPARISON: 1/18/2022.     IMPRESSION: Endotracheal tube is been advanced. Tip is 4 cm above the igor in good position. There our persistent bibasilar pulmonary opacities with partial clearing at the right base from the prior study and no change at the left base. No pneumothorax. Enteric  tube tip is not visualized tip is below the diaphragm. PICC catheter from right upper extremity approach is unchanged with tip at the junction of the superior vena cava and right atrium. Left posterior rib fractures are again noted as well as deformity of the right humeral head.    XR Chest Port 1 View    Result Date: 1/18/2022  EXAM: XR CHEST PORT 1 VIEW LOCATION: [facility] DATE/TIME: 1/18/2022 6:06 AM INDICATION: Location of ET tube COMPARISON: 01/16/2022.     IMPRESSION:Endotracheal tube is in the trachea at the level of the clavicular heads with tip 6 cm above the igor. PICC catheter from right upper extremity approach is in the distal superior vena cava near its junction with the right atrium. Enteric tube tip is below the diaphragm and not visualized. There is no pneumothorax. Again noted are bilateral patchy airspace opacities there has been increased consolidation or atelectasis at the right medial lung base. There are old healed left posterior rib  fractures no acute fractures are identified.    CT Abdomen Pelvis w Contrast    Result Date: 2/15/2022  EXAM: CT ABDOMEN PELVIS W CONTRAST LOCATION: [facility] DATE/TIME: 2/15/2022 9:50 PM INDICATION: Leukocytosis. COMPARISON: 10/05/2020 TECHNIQUE: CT scan of the abdomen and pelvis was performed following injection of IV contrast. Multiplanar reformats were obtained. Dose reduction techniques were used. CONTRAST: ISOVUE 370 100ML FINDINGS: LOWER CHEST: Bibasilar infiltrates. Small right and trace left pleural effusion. HEPATOBILIARY: Hepatic steatosis. Cholelithiasis and gallbladder distention. PANCREAS: Pancreatic calcifications suggesting chronic pancreatitis. Prominent calcification at the neck of the pancreas. Difficult to tell if this is parenchymal or intraductal. The distal pancreas is atrophic. SPLEEN: Splenectomy with accessory splenule. ADRENAL GLANDS: Normal. KIDNEYS/BLADDER: Small nonobstructing  renal calculi. BOWEL: Gastric and small bowel wall thickening. Central mesenteric congestion. Trace amount of free fluid. Diverticulosis with presumed retained barium. Wall thickening of the proximal colon. Rectal wall thickening versus underdistention. LYMPH NODES: Subcentimeter mesenteric and peripancreatic lymph nodes. VASCULATURE: Atherosclerotic vascular calcification. PELVIC ORGANS: Small amount of free fluid. MUSCULOSKELETAL: Degenerative change osseous structures. Anasarca. Remote and subacute rib fractures.     IMPRESSION: 1.  Wall thickening of stomach, small bowel and proximal colon. Correlate for gastroenteritis/enterocolitis. 2.  Mesenteric congestion and small amount of free fluid. 3.  Bilateral pulmonary infiltrates. Small right and trace left pleural effusion. 4.  Focal rectal wall thickening versus underdistention. Follow-up recommended to exclude underlying lesion. 5.  Hepatic steatosis. 6.  Diverticulosis. 7.  Cholelithiasis and mild gallbladder distention. 8.  Nonobstructing renal calculi. 9.  Coarse pancreatic calcifications again seen    CT Chest Pulmonary Embolism w Contrast    Result Date: 2/11/2022  EXAM: CT CHEST PULMONARY EMBOLISM W CONTRAST LOCATION: [facility] DATE/TIME: 2/11/2022 1:42 PM INDICATION: PE suspected, low/intermediate prob, positive D-dimer, hypoxia, history of Covid COMPARISON: 01/28/2022 TECHNIQUE: CT chest pulmonary angiogram during arterial phase injection of IV contrast. Multiplanar reformats and MIP reconstructions were performed. Dose reduction techniques were used. CONTRAST: IsoVue 370 100mL FINDINGS: ANGIOGRAM CHEST: No pulmonary artery filling defects. Normal caliber aorta. LUNGS AND PLEURA: Moderate emphysema. Persistent but increased patchy groundglass and consolidative pulmonary opacities with areas of interlobular septal thickening. Mild bronchiectasis and bronchial wall thickening have increased when compared to prior exam. Trace  effusions have decreased. No pneumothorax. MEDIASTINUM/AXILLAE: Heart size is normal. No adenopathy. CORONARY ARTERY CALCIFICATION: None. UPPER ABDOMEN: Hepatic steatosis. Cholelithiasis. There is diffuse wall thickening of the stomach. Small volume ascites. Nonobstructing bilateral renal calculi. Prominent calcification in the area of the pancreatic neck is unchanged. Splenectomy with small accessory splenule. MUSCULOSKELETAL: Degenerative changes of the spine. Prominent L1 Schmorl's node. Similar appearance of the bilateral rib fractures. Mild anasarca.     IMPRESSION: 1.  No pulmonary embolus. 2.  Interval worsening of bilateral pulmonary opacities, bronchiectasis, bronchial wall thickening. 3.  Hepatic steatosis. 4.  Cholelithiasis. 5.  Diffuse wall thickening of the visualized stomach may indicate gastritis. 6.  Manifestations of third spacing.    CT Chest w/o Contrast    Result Date: 1/28/2022  EXAM: CT CHEST W/O CONTRAST LOCATION: [facility] DATE/TIME: 1/28/2022 2:36 PM INDICATION: Cough, persistent COMPARISON: 10/15/2022 TECHNIQUE: CT chest without IV contrast. Multiplanar reformats were obtained. Dose reduction techniques were used. CONTRAST: None. FINDINGS: LUNGS AND PLEURA: Upper lung predominant centrilobular and paraseptal emphysema. Improvement in groundglass and interstitial opacities in the right lung. Slight worsening of groundglass and interstitial opacities in the left upper lobe. New airway wall thickening in the left upper lobe and left lower lobe and new consolidation in the dependent portion of the left upper lobe and at the left lung base. Mucoid impaction in the left lower lobe airways. Unchanged left pleural thickening and trace right pleural effusion. MEDIASTINUM/AXILLAE: No thoracic aortic aneurysm. No lymphadenopathy. CORONARY ARTERY CALCIFICATION: None. UPPER ABDOMEN: Hepatic steatosis. Cholelithiasis. MUSCULOSKELETAL: There are demonstrated bilateral rib  fractures.     IMPRESSION: 1.  New airway wall thickening in the left upper and lower lobes with new consolidation in the dependent portion of the left upper lobe and at the left lung base, and mucoid impaction in left lower lobe airways, suspicious for aspiration. 2.  Overall improvement in groundglass and interstitial opacities in the right lung and slight worsening in the left upper lobe.     CT Head w/o Contrast    Result Date: 2/12/2022  EXAM: CT HEAD W/O CONTRAST LOCATION: [facility] DATE/TIME: 2/12/2022 9:20 AM INDICATION: Trauma - Head Injury. Unwitnessed fall. COMPARISON: CT head 01/18/2022 TECHNIQUE: Routine CT Head without IV contrast. Multiplanar reformats. Dose reduction techniques were used. FINDINGS: INTRACRANIAL CONTENTS: No intracranial hemorrhage, extraaxial collection, or mass effect.  No CT evidence of acute infarct. Small focus of chronic cortical encephalomalacia and adjacent gliosis involving the superior left frontal lobe as seen previously.  Mild presumed chronic small vessel ischemic changes. Mild generalized volume loss. No hydrocephalus. VISUALIZED ORBITS/SINUSES/MASTOIDS: No intraorbital abnormality. Complete opacification of the left maxillary sinus with some chronic appearing mural hyperostosis similar to the previous exam. Additional subtotal opacification of the left sphenoid sinus by polypoid mucosal thickening as seen previously. No middle ear or mastoid effusion. BONES/SOFT TISSUES: Mild right parietal scalp swelling. No skull fracture.     IMPRESSION: 1.  No CT evidence for acute intracranial process. 2.  Mild right parietal scalp contusion without associated fracture. 3.  Brain atrophy and presumed chronic microvascular ischemic changes as above. 4.  Inflammatory changes of the paranasal sinuses as seen previously.    CT Head w/o Contrast    Result Date: 1/18/2022  EXAM: CT HEAD W/O CONTRAST LOCATION: [facility] DATE/TIME:  1/18/2022 3:45 AM INDICATION: Headache, intracranial hemorrhage suspected. COMPARISON: 11/4/2021 TECHNIQUE: Routine CT Head without IV contrast. Multiplanar reformats. Dose reduction techniques were used. FINDINGS: INTRACRANIAL CONTENTS: No intracranial hemorrhage, extraaxial collection, or mass effect. No CT evidence of acute infarct. Mild presumed chronic small vessel ischemic changes. Mild to moderate generalized volume loss. No hydrocephalus. Stable calcified dural based lesion along the right frontal convexity measuring 1 cm in diameter, presumably reflecting a small meningioma. VISUALIZED ORBITS/SINUSES/MASTOIDS: No intraorbital abnormality. Complete opacification of the left maxillary sinus with chronic mucoperiosteal reaction. Mucous retention cyst in the left sphenoid sinus. Mild mucosal thickening along the floor of the left frontal sinus. Opacified left frontoethmoidal recess. No middle ear or mastoid effusion. BONES/SOFT TISSUES: No acute abnormality.     IMPRESSION: 1.  No CT evidence for acute intracranial process. 2.  Stable chronic changes as above.    XR Video Swallow with SLP or OT    Result Date: 2/8/2022  EXAM: XR VIDEO SWALLOW WITH SLP OR OT LOCATION: [facility] DATE/TIME: 2/8/2022 8:52 AM INDICATION: Difficulty swallowing. COMPARISON: 01/31/2022. TECHNIQUE: Routine swallow study with speech pathology using multiple barium thicknesses. FINDINGS: FLUOROSCOPIC TIME: 1.2 minutes NUMBER OF IMAGES: 0 Swallow study with Speech Pathology using multiple barium thicknesses. Small amount of silent aspiration with thin liquid. Penetration with mildly thickened (nectar) liquid. There was persistent mild penetration with chin Tech technique and mildly thickened liquid. No aspiration was solid consistency.     IMPRESSION: 1.  Silent aspiration with thin liquid. Penetration with mildly thickened liquid.     XR Video Swallow with SLP or OT    Result Date: 1/31/2022  EXAM: XR VIDEO  SWALLOW WITH SLP OR OT LOCATION: [facility] DATE/TIME: 2022 11:30 AM INDICATION: Difficulty swallowing. COMPARISON: 2022. TECHNIQUE: Routine swallow study with speech pathology using multiple barium thicknesses. FINDINGS: FLUOROSCOPIC TIME: 3.3 minutes NUMBER OF IMAGES: 0 Swallow study with Speech Pathology using multiple barium thicknesses. Tracheal aspiration with thin liquid consistency barium with weak ineffective cough. Penetration into the laryngeal vestibule with mildly thickened (nectar) liquid consistency barium. No penetration or aspiration with honey and pudding consistency barium. Please refer to speech therapy report for additional detail.    XR Video Swallow with SLP or OT    Result Date: 2022  EXAM: XR VIDEO SWALLOW WITH SLP OR OT LOCATION: [facility] DATE/TIME: 2022 3:27 PM INDICATION: Difficulty swallowing. COMPARISON: None. TECHNIQUE: Routine swallow study with speech pathology using multiple barium thicknesses. FINDINGS: FLUOROSCOPIC TIME: 1.5 minutes NUMBER OF IMAGES: 9 Swallow study with Speech Pathology using multiple barium thicknesses. Moderate to deep penetration with thin liquid (no cough reflex). Small amount of penetration with mildly thick consistency. No other penetration or aspiration.     EEG Coma or Sleep Only    Result Date: 2022  ELECTROENCEPHALOGRAM (EEG) REPORT [facility] NEUROLOGY [address] [place] [address] Tel: [phone]  Fax: [phone] [email or web address] Peewee Hess,  1965, MRN [id number] PCP: Sarah Canseco Date: 2022 Principal Diagnosis: Altered mental status History : Patient is being evaluated for altered mental status. Medication listed includes Ativan Description of the Recording:  This is a multichannel digital EEG recording done using the international 10-20 placement system. Background of the recording consists of irregular 1 to 3  Hz polymorphic delta activity with amplitudes ranging between 20 to 30  V.  Alerting procedure produce minimal change.  Photic stimulation performed with standard protocol produced minimal change.  No transient sleep patterns were recorded. During this recording, no sharp discharges, spikes or electrographic seizure activity was recorded. Impression: This is an abnormal EEG due to diffusely slow polymorphic delta activity that minimally attenuates with alerting procedures.  This EEG suggests nonspecific generalized cerebral dysfunction.  No focal slowing or periodic discharges were seen to suggest seizures. Classification: Delta grade I generalized Tiburcio Gallagher MD [facility] (Former[facility]) This note was dictated using voice recognition software.  Any grammatical or context distortions are unintentional and inherent to the software.     Lab Results:  Personally Reviewed.   Fingerstick Blood Glucose: @VUICQNH23DIO(POCGLUFGR:10)@    Last Hbg A1C: No results found for: HGBA1C   Lab Results   Component Value Date    INR 1.67 (H) 01/30/2022    INR 1.72 (H) 01/16/2022    INR 1.69 (H) 01/15/2022     Recent Results (from the past 24 hour(s))   Glucose by meter    Collection Time: 02/24/22  8:25 PM   Result Value Ref Range    GLUCOSE BY METER POCT 94 70 - 99 mg/dL   Potassium    Collection Time: 02/25/22  6:57 AM   Result Value Ref Range    Potassium 4.7 3.5 - 5.0 mmol/L   Magnesium    Collection Time: 02/25/22  6:57 AM   Result Value Ref Range    Magnesium 1.3 (L) 1.8 - 2.6 mg/dL   Glucose by meter    Collection Time: 02/25/22  8:24 AM   Result Value Ref Range    GLUCOSE BY METER POCT 150 (H) 70 - 99 mg/dL   Glucose by meter    Collection Time: 02/25/22 11:57 AM   Result Value Ref Range    GLUCOSE BY METER POCT 135 (H) 70 - 99 mg/dL   Glucose by meter    Collection Time: 02/25/22  5:19 PM   Result Value Ref Range    GLUCOSE BY METER POCT 171 (H) 70 - 99  mg/dL     Donita Olson MD

## 2022-02-25 NOTE — PLAN OF CARE
Problem: Risk for Delirium  Goal: Improved Sleep  Outcome: Ongoing, Progressing   Pt was awake all shift, restless, pulling out pulse oximetry probe, replaced several times, hallucinating, altered mental status,  trying to get out of bed , had scheduled Seroquel, prn melatonin, prn haldol and prn ativan administered which was ineffective, ( see MARs for time of administration ). Easily redirectable    Problem: Gas Exchange Impaired (Pulmonary Impairment)  Goal: Optimal Gas Exchange  Outcome: Ongoing, Progressing   pt takes off oxymask  But easily redirectable, on 4L sating at 94 -97%. BS at bedtime was 99 Wife updated on pt status.         Goal Outcome Evaluation:    Plan of Care Reviewed With: patient     Overall Patient Progress: improving    Outcome Evaluation: Family goal is restorative/TCU.

## 2022-02-25 NOTE — PLAN OF CARE
Pt confused, sleeping off and on. Occasionally throwing his legs over side rail and thinking its time to get up. Wife tried to feed him dinner but he did not want to eat. No insulin given. Repositioned off of butt as pt c/o pain. Gave prn tylenol. Remains on 4L oxymask. Sating 88-94%. All alarms in place and door open

## 2022-02-25 NOTE — PLAN OF CARE
Problem: Adult Inpatient Plan of Care  Goal: Plan of Care Review  Outcome: Ongoing, Not Progressing  Goal: Patient-Specific Goal (Individualized)  Outcome: Ongoing, Not Progressing  Goal: Absence of Hospital-Acquired Illness or Injury  Intervention: Identify and Manage Fall Risk  Recent Flowsheet Documentation  Taken 2/25/2022 0910 by Debby Choi RN  Safety Promotion/Fall Prevention:   bed alarm on   room near nurse's station   room door open   lighting adjusted   nonskid shoes/slippers when out of bed  Goal: Optimal Comfort and Wellbeing  Outcome: Ongoing, Not Progressing  Goal: Readiness for Transition of Care  Outcome: Ongoing, Not Progressing     Problem: Risk for Delirium  Goal: Improved Attention and Thought Clarity  Outcome: Ongoing, Not Progressing  Goal: Improved Sleep  Outcome: Ongoing, Not Progressing     Problem: Fall Injury Risk  Goal: Absence of Fall and Fall-Related Injury  Outcome: Ongoing, Not Progressing  Intervention: Identify and Manage Contributors  Recent Flowsheet Documentation  Taken 2/25/2022 0910 by Debby Choi RN  Medication Review/Management: medications reviewed  Intervention: Promote Injury-Free Environment  Recent Flowsheet Documentation  Taken 2/25/2022 0910 by Debby Choi RN  Safety Promotion/Fall Prevention:   bed alarm on   room near nurse's station   room door open   lighting adjusted   nonskid shoes/slippers when out of bed     Problem: Mobility Impairment  Goal: Optimal Mobility  Outcome: Ongoing, Not Progressing     Problem: Impaired Wound Healing  Goal: Optimal Wound Healing  Outcome: Ongoing, Not Progressing     Problem: Activity Intolerance (Pulmonary Impairment)  Goal: Improved Activity Tolerance  Outcome: Ongoing, Not Progressing     Problem: Gas Exchange Impaired (Pulmonary Impairment)  Goal: Optimal Gas Exchange  Outcome: Ongoing, Not Progressing   Goal Outcome Evaluation:  Pt confused, alert to self this shift. Needs redirection, picking at  air. Refused to eat but a few bites of lunch. Pushed breakfast tray onto floor. Blood sugars stable. Skin denuded, peeling, cream applied as needed to perineum, buttocks areas. O2 4L oxymask and NC this shift, pt pulling more at oxymask this shift. MD verbalized ok to remove continous pulse O2. Bed and chair alarms in place, pt up to chair currently.    Plan of Care Reviewed With: patient     Overall Patient Progress: improving    Outcome Evaluation: Family goal is restorative/TCU.

## 2022-02-26 NOTE — PLAN OF CARE
Problem: Adult Inpatient Plan of Care  Goal: Plan of Care Review  Outcome: Ongoing, Not Progressing   Goal Outcome Evaluation:    Plan of Care Reviewed With: patient     Overall Patient Progress: improving    Outcome Evaluation: Family goal is restorative/TCU.        Pt continues to be restless and very confused. Pt remains on 4L O2. Pt has been turned and changed. Therapy working with him.

## 2022-02-26 NOTE — PROGRESS NOTES
Care Management Follow Up    Length of Stay (days): 42    Expected Discharge Date: 02/27/2022     Concerns to be Addressed: discharge planning     Patient plan of care discussed at interdisciplinary rounds: Yes    Anticipated Discharge Disposition: Transitional Care     Anticipated Discharge Services: Other (see comment) (therapy services )  Anticipated Discharge DME: None    Patient/family educated on Medicare website which has current facility and service quality ratings: yes  Education Provided on the Discharge Plan:    Patient/Family in Agreement with the Plan: yes    Referrals Placed by CM/SW: Post Acute Facilities  Private pay costs discussed: Not applicable    Additional Information:  BI following for TCU placement. BI called [facility]. The main desk staff informed BI that no one is in admissions until Monday. BI will plan to follow up with the [facility] liaison Monday to check on status of referral. SW left  for [facility] admissions requesting call back.      DARIEL Ma

## 2022-02-26 NOTE — PLAN OF CARE
Problem: Substance Misuse (Alcohol Withdrawal)  Goal: Readiness for Change Identified  Outcome: Ongoing, Not Progressing     Problem: Risk for Delirium  Goal: Improved Attention and Thought Clarity  Outcome: Ongoing, Not Progressing     Problem: Fall Injury Risk  Goal: Absence of Fall and Fall-Related Injury  Intervention: Identify and Manage Contributors  Recent Flowsheet Documentation  Taken 2/25/2022 1645 by Martin Severino RN  Medication Review/Management: medications reviewed   Goal Outcome Evaluation:    Plan of Care Reviewed With: patient     Overall Patient Progress: improving    Outcome Evaluation: Family goal is restorative/TCU.      Patient A and O to self and family this shift. Patient restless, non compliant with oxygen  tubing. On 4 liters O2. On K and Mag protocol. Taking mag, recheck K in morning. Patient blood sugars WNL  this shift, Ativan given for restlessness. Proved ineffective. Haldol given. Results pending.

## 2022-02-26 NOTE — PLAN OF CARE
Goal Outcome Evaluation:    Plan of Care Reviewed With: patient     Overall Patient Progress: improving    Outcome Evaluation: Family goal is restorative/TCU.          Problem: Adult Inpatient Plan of Care  Goal: Plan of Care Review  Outcome: Ongoing, Progressing  Goal: Patient-Specific Goal (Individualized)  Outcome: Ongoing, Progressing  Goal: Absence of Hospital-Acquired Illness or Injury  Intervention: Identify and Manage Fall Risk  Recent Flowsheet Documentation  Taken 2/26/2022 0115 by Bret Young RN  Safety Promotion/Fall Prevention: bed alarm on  Goal: Optimal Comfort and Wellbeing  Outcome: Ongoing, Progressing  Goal: Readiness for Transition of Care  Outcome: Ongoing, Progressing

## 2022-02-27 NOTE — PLAN OF CARE
Goal Outcome Evaluation:    Plan of Care Reviewed With: patient     Overall Patient Progress: improving    Outcome Evaluation: Family goal is restorative/TCU.      Pt at same level of confusion, mostly oriented to self and recognizes staff. Pt has been restless on and off today, redirecting and repositioning has helped with preventing Pt from climbing out of bed. Wound on sacrum was cleaned and barrier cream applied with foam dressing to cover. Pt has been positioned on alternating sides to promote healing for wound but Pt is able to reposition self and frequently turns in bed.

## 2022-02-27 NOTE — PLAN OF CARE
Problem: Adult Inpatient Plan of Care  Goal: Plan of Care Review  Outcome: Ongoing, Not Progressing   Goal Outcome Evaluation:    Plan of Care Reviewed With: patient     Overall Patient Progress: improving    Outcome Evaluation: Family goal is restorative/TCU.        Pt has been more awake today. Pt has worked with therapy. Pt has been incont. Pt remains on O2

## 2022-02-27 NOTE — PLAN OF CARE
Problem: Adult Inpatient Plan of Care  Goal: Plan of Care Review  Outcome: Ongoing, Progressing     Problem: Risk for Delirium  Goal: Improved Attention and Thought Clarity  Outcome: Ongoing, Progressing  Goal: Improved Sleep  Outcome: Ongoing, Progressing     Problem: Activity Intolerance (Pulmonary Impairment)  Goal: Improved Activity Tolerance  Outcome: Ongoing, Progressing   Goal Outcome Evaluation:    Plan of Care Reviewed With: patient     Overall Patient Progress: improving    Outcome Evaluation: Family goal is restorative/TCU.  Patient agitated and restless at shift start. Medicated with PRN Ativan.Patient was able to get some rest periods between cares.

## 2022-02-27 NOTE — PROGRESS NOTES
[facility]    Medicine Progress Note - Hospitalist Service    Date of Admission:  1/15/2022    Assessment & Plan     56 year old male with past medical history of alcohol abuse, hypertension, DM-II, multiple abdominal surgeries who was admitted on 1/15/22 with alcohol withdrawl, possible aspiration pneumonia leading to respiratory failure requiring intubation on 1/16, successfully extubated on 1/23/22 and now transferred out of ICU on 1/26/22 for floor care.  Tested positive for Covid on 1/22/2022 in the hospital. Did improve initially and was pending TCU placement until patient got worsening respiratory condition on 2/11/2022 and CT chest showed worsening pneumonia so restarted on IV antibiotic with Vanco and Zosyn.  Now white count up with elevated lactate and hypotension, ID consulted, completed meropenem. Awaiting TCU placement.     Acute respiratory failure with hypoxia-resolved  -Thought secondary to aspiration pneumonia/ COVID/hospital-acquired pneumonia  -Was intubated 1/16, extubated on 1/23.   Required BiPAP for a while, now on NC, O2 requirements decreasing  - Cont albuterol nebs and pulm hygiene  - CT PE was negative for PE on admission     Leukocytosis-resolved  - ID consulted and now on meropenem.  Leukocytosis resolved.  Completed meropenem.     Recovered Covid  Weekly COVID (per protocol) came back positive on 1/30/2022. He was negative on admission on 1/22.    S/p IV remdesivir and Dexamethasone course.   S/p dose of tocilizumab/per pulmonology recommendations on 1/31/2022.  Tapering off oxygen.     DM type 2  With labile blood sugars, A1c was 7.0 on 1/15/22.  Very sensitive to Lantus as patient goes hypoglycemic even with 5 units  Stopped Metformin due to lactic acidosis.  Continue sliding scale insulin and carb counting      Septic shock likely due to multifactorial pneumonia -resolved  -Metoprolol started on 1/25 for sinus tachycardia  -Echo shows EF of 65% with mild  concentric left ventricular hypertrophy  -Started on midodrine for persistent low blood pressure  -Stim test negative     Acute toxic metabolic encephalopathy  - Patient has significant alcohol withdrawal during the intubation and was on Precedex  - Precedex weaned off. Cont with thiamine. Ativan prn.  - Alcohol level less than 10 on admission.    - Collateral information from wife-he did not find any evidence of patient drinking recently.    - Brain MRI shows chronic CVAs with brain atrophy  - Patient denies drinking prior to hospitalization.  Most recent chemical dependency treatment in 2001.  Since then patient tried to quit drinking numerous times, ending up in the hospital with withdrawal.  He is on disability for 2 years, in the past was a nicole.  - Reconsult psychiatry who are recommending: Duloxetine 60 mg daily, Continue gabapentin 200 mg twice a day, Seroquel 12.5 mg TID x 6 doses , Seroquel 75 mg at bedtime continue., Rozerem 8 mg at bedtime., Continue to utilize olanzapine 5-10 mg every 6 hours as needed for agitation, psychosis., Efforts at sleep regulation., For daytime anxiety would recommend adding Seroquel 6.25 mg in the morning and afternoon.     Hypokalemia/hypomagnesia  Replace per protocol     Diarrhea- resolved     Significant weakness and deconditioning  - Secondary to above  - Pending TCU placement     Severe malnutrition  - Protein and calories  - Dietitian consult, continue supplement  - Patient still has poor oral intake     S/P fall   - Patient had incident of fall this morning 2/12/2022, unwitnessed  - Evaluated by house officer and a CT head is negative for significant changes  - Continue fall precautions     Goals of care  -Previous rounder discussed details goals of care with patient's wife Daina on 2/14/2022  -Patient has poor quality of life prior to admission because of his alcohol use and previous CVAs  -Wife declined going through aggressive care again as what happened on  admission with intubation.  -Palliative care following  -He is now DNR and his wife desires no further aggressive interventions.  If his blood pressure goes low or he would not be transferred back to the ICU, he would not get pressors.  I reaffirmed this with her again today.       Diet: Snacks/Supplements Adult: Magic Cup; With Meals  Snacks/Supplements Adult: Gelatein Plus; With Meals  Combination Diet Moderate Consistent Carb (60 g CHO per Meal) Diet; Easy to Chew (level 7); Thin Liquids (level 0)    DVT Prophylaxis: Enoxaparin (Lovenox) SQ  Booth Catheter: Not present  Central Lines: None  Cardiac Monitoring: None  Code Status: No CPR- Do NOT Intubate      Disposition Plan   Expected Discharge: 02/27/2022     Anticipated discharge location: other (comment) (TCU)    Delays:     Other (Add Comment)  Insurance Authorization needed  Placement - LTC            The patient's care was discussed with the Patient.    Leanne Dejesus MD  Hospitalist Service  [facility]  Securely message with the Vocera Web Console (learn more here)  Text page via [employer] Paging/Directory         Clinically Significant Risk Factors Present on Admission                    ______________________________________________________________________    Interval History   Unable to get a review of systems due to patient's mental status. He did express to me that he was feeling okay. I woke him up from a nap so he did seem tired but not lethargic.    Data reviewed today: I reviewed all medications, new labs and imaging results over the last 24 hours. I personally reviewed no images or EKG's today.    Physical Exam   Vital Signs: Temp: 98.2  F (36.8  C) Temp src: Oral BP: 113/59 Pulse: 88   Resp: 20 SpO2: 95 % O2 Device: Nasal cannula Oxygen Delivery: 4 LPM  Weight: 171 lbs 6.4 oz  General Appearance: Awake, alert, in no acute distress  Respiratory: CTAB, no wheeze  Cardiovascular: RRR, no murmur noted  GI: soft, nontender,  non distended, normal bowel sounds  Skin: no jaundice, no rash      Data   Recent Labs   Lab 02/26/22  1655 02/26/22  1159 02/26/22  0802 02/26/22  0606 02/25/22  0824 02/25/22  0657 02/24/22  0825 02/24/22  0604 02/22/22  0826 02/22/22  0606   WBC  --   --   --   --   --   --   --   --   --  10.1   HGB  --   --   --   --   --   --   --   --   --  10.5*   MCV  --   --   --   --   --   --   --   --   --  89   PLT  --   --   --   --   --   --   --   --   --  143*   POTASSIUM  --   --   --  4.8  --  4.7  --  4.4   < > 4.3   CR  --   --   --   --   --   --   --   --   --  0.56*   * 143* 137*  --    < >  --    < >  --    < >  --     < > = values in this interval not displayed.     No results found for this or any previous visit (from the past 24 hour(s)).  Medications     dextrose Stopped (01/24/22 0230)     - MEDICATION INSTRUCTIONS -         calcium carbonate  1,000 mg Oral BID     DULoxetine  60 mg Oral or Feeding Tube Daily     enoxaparin ANTICOAGULANT  40 mg Subcutaneous Q24H     folic acid  1 mg Oral Daily     gabapentin  100 mg Oral BID     insulin aspart  1-10 Units Subcutaneous TID AC     insulin aspart  1-7 Units Subcutaneous At Bedtime     insulin aspart   Subcutaneous Daily with breakfast     insulin aspart   Subcutaneous Daily with lunch     insulin aspart   Subcutaneous Daily with supper     ipratropium-albuterol  1 puff Inhalation 4x daily     lactobacillus rhamnosus (GG)  1 capsule Oral BID     lidocaine  1 patch Transdermal Q24H     lidocaine   Transdermal Q8H     magnesium oxide  400 mg Oral TID     metoprolol succinate ER  25 mg Oral Daily     midodrine  2.5 mg Oral TID w/meals     multivitamin, therapeutic  1 tablet Oral Daily     pantoprazole  40 mg Oral QAM AC     QUEtiapine  12.5 mg Oral BID     QUEtiapine  75 mg Oral At Bedtime     ramelteon  8 mg Oral At Bedtime     sodium chloride (PF)  3 mL Intracatheter Q8H     thiamine  100 mg Oral Daily

## 2022-02-28 NOTE — PLAN OF CARE
Goal Outcome Evaluation:    Plan of Care Reviewed With: patient     Overall Patient Progress: improving    Outcome Evaluation: Family goal is restorative/TCU.          Problem: Adult Inpatient Plan of Care  Goal: Absence of Hospital-Acquired Illness or Injury  Intervention: Identify and Manage Fall Risk  Recent Flowsheet Documentation  Taken 2/28/2022 0130 by Bret Young RN  Safety Promotion/Fall Prevention:   bed alarm on   clutter free environment maintained   fall prevention program maintained   lighting adjusted   nonskid shoes/slippers when out of bed   patient and family education   room organization consistent   safety round/check completed  Intervention: Prevent Skin Injury  Recent Flowsheet Documentation  Taken 2/28/2022 0130 by Bret Young RN  Body Position: position changed independently     Problem: Adult Inpatient Plan of Care  Goal: Optimal Comfort and Wellbeing  Outcome: Ongoing, Progressing  Intervention: Provide Person-Centered Care  Recent Flowsheet Documentation  Taken 2/28/2022 0130 by Bret Young RN  Trust Relationship/Rapport:   care explained   emotional support provided   empathic listening provided   Patient slept on and off between cares. Set the bed alarm off a few times but is easily redirected. Was agreeable to placing Lidocaine patch this shift.

## 2022-02-28 NOTE — PROGRESS NOTES
Pt alert, confused but talking with staff and wife confused conversations. c/o pain in chest and hips and requested PRN morphine. Given 1mg IV morphine. Gave scheduled midodrine and Seroquel. Per wife ok to hold other meds and insulin. Wife and Pt did not want blood sugar checked. Pt on comfort care. Wife and family in room.

## 2022-02-28 NOTE — PLAN OF CARE
Goal Outcome Evaluation:    Plan of Care Reviewed With: patient     Overall Patient Progress: improving    Outcome Evaluation: Family goal is restorative/TCU.    Pt was sitting up in chair today and needed extensive assist of 2 for transferring back to bed. Pt is able to stand with assistance, but legs were too weak to take steps to the bed. Pt is incont of stool and had barrier cream with a new foam dressing place over wound on sacrum. Discharge placement still to be determined.

## 2022-02-28 NOTE — PROGRESS NOTES
BP 61/41 after 2 doses 2.5 midodrine, 0730 and 0845 given. 2L NS. NS @ 125. Mg running. 9L oxymask sats 90. Pt alert, drowsy but opens eyes spontaneously and talking to staff. Denies dizziness or other symptoms. Oriented base line to self and place. Bp had come up to 71 systolic but came back down. Paged Dr. Dejesus with update.

## 2022-02-28 NOTE — PROGRESS NOTES
Spiritual Health Note    Spiritual Assessment:     visited patient due to staff referral. Patient's sister and brother in law present with other family members coming soon. Harlan was awake lying in bed and was pleasantly conversing, although seemed somewhat confused at times.     Harlan stated that he wasn't too concerned spiritually, but emotionally he was having a hard time without specifying what he meant by that. Sister tearful at times describing the challenges that Harlan has gone through over the past years.      Patient comes from [employer] hy[employer] background and derives meaning, purpose, and comfort from hyun. He welcomed a prayer of blessing. Sister also appreciated it.     Care Provided:    Introduced self and role of Spiritual Care     Empathic listening and presence     Helped patient/family in processing of emotions     Offered prayer     Plan of Care: A  will continue to visit as able or per request by patient/family/staff.        Chaplain Ahsan Jay MDiv, [facility]

## 2022-02-28 NOTE — PLAN OF CARE
BP 60/39 R22 HR 91 11 L Oxymask sats 87-91%. NS @125. Wife at bed side and family members visiting. Received scheduled midodrine 10mg per order. Pt comfort cares. Wife would like BP and glucose checks to continue at this time. Pt drowsy, confused conversations off and on and sleeping off and on.  MD updated via text

## 2022-02-28 NOTE — INTERIM SUMMARY
Patient has been hypotensive in the systolics of 60s to 70s since approximately 730 this morning.  He did have desaturations into the 70% on occasion.  He is currently requiring 10 L nasal cannula or mask depending on tolerability.  He is having hallucinations but is otherwise at his baseline mentation.  He did initially have some dizziness but that has resolved with fluids.  He is received a total of 3 L normal saline and then will be started on 125 mL/h infusion.  He has been given a total of 7.5 mg of midodrine and his next dose at 11 will be 10 mg.  He will be changed to 10 mg 3 times daily.  His wife has been contacted.  She does agree with comfort cares still.  She has contacted his parents and they will be coming to the hospital when able.  Most recently he did become lethargic but that has returned to baseline as well.  His magnesium was low this morning at 1.3 so he was given a 2 g IV dose of magnesium.  Basic metabolic panel is unremarkable.  LFTs are within normal limits except for alkaline phosphatase at 133.  Glucose is normal.  Lactic acid 1.7.  EKG was done showing sinus rhythm with a an undetermined age septal infarct new from prior EKG done on January 17, 2022.

## 2022-02-28 NOTE — PROGRESS NOTES
[facility]    Medicine Progress Note - Hospitalist Service    Date of Admission:  1/15/2022    Assessment & Plan                56 year old male with past medical history of alcohol abuse, hypertension, DM-II, multiple abdominal surgeries who was admitted on 1/15/22 with alcohol withdrawl, possible aspiration pneumonia leading to respiratory failure requiring intubation on 1/16, successfully extubated on 1/23/22 and now transferred out of ICU on 1/26/22 for floor care.  Tested positive for Covid on 1/22/2022 in the hospital. Did improve initially and was pending TCU placement until patient got worsening respiratory condition on 2/11/2022 and CT chest showed worsening pneumonia so restarted on IV antibiotic with Vanco and Zosyn.  Now white count up with elevated lactate and hypotension, ID consulted, completed meropenem. Awaiting TCU placement.     Acute respiratory failure with hypoxia-resolved  -Thought secondary to aspiration pneumonia/ COVID/hospital-acquired pneumonia  -Was intubated 1/16, extubated on 1/23.   Required BiPAP for a while, now on NC, O2 requirements decreasing  - Cont albuterol nebs and pulm hygiene  - CT PE was negative for PE on admission     Leukocytosis-resolved  - ID consulted and now on meropenem.  Leukocytosis resolved.  Completed meropenem.     Recovered Covid  Weekly COVID (per protocol) came back positive on 1/30/2022. He was negative on admission on 1/22.    S/p IV remdesivir and Dexamethasone course.   S/p dose of tocilizumab/per pulmonology recommendations on 1/31/2022.  Tapering off oxygen.     DM type 2  With labile blood sugars, A1c was 7.0 on 1/15/22.  Very sensitive to Lantus as patient goes hypoglycemic even with 5 units  Stopped Metformin due to lactic acidosis.  Continue sliding scale insulin and carb counting      Septic shock likely due to multifactorial pneumonia -resolved  -Metoprolol started on 1/25 for sinus tachycardia  -Echo shows EF of 65%  with mild concentric left ventricular hypertrophy  -Started on midodrine for persistent low blood pressure  -Stim test negative     Acute toxic metabolic encephalopathy  - Patient has significant alcohol withdrawal during the intubation and was on Precedex  - Precedex weaned off. Cont with thiamine. Ativan prn.  - Alcohol level less than 10 on admission.    - Collateral information from wife-he did not find any evidence of patient drinking recently.    - Brain MRI shows chronic CVAs with brain atrophy  - Patient denies drinking prior to hospitalization.  Most recent chemical dependency treatment in 2001.  Since then patient tried to quit drinking numerous times, ending up in the hospital with withdrawal.  He is on disability for 2 years, in the past was a nicole.  - Reconsult psychiatry who are recommending: Duloxetine 60 mg daily, Continue gabapentin 200 mg twice a day, Seroquel 12.5 mg TID x 6 doses , Seroquel 75 mg at bedtime continue., Rozerem 8 mg at bedtime., Continue to utilize olanzapine 5-10 mg every 6 hours as needed for agitation, psychosis., Efforts at sleep regulation., For daytime anxiety would recommend adding Seroquel 6.25 mg in the morning and afternoon.     Hypokalemia/hypomagnesia  Replace per protocol     Diarrhea- resolved     Significant weakness and deconditioning  - Secondary to above  - Pending TCU placement     Severe malnutrition  - Protein and calories  - Dietitian consult, continue supplement  - Patient still has poor oral intake     S/P fall   - Patient had incident of fall this morning 2/12/2022, unwitnessed  - Evaluated by house officer and a CT head is negative for significant changes  - Continue fall precautions     Goals of care  -Previous rounder discussed details goals of care with patient's wife Daina on 2/14/2022  -Patient has poor quality of life prior to admission because of his alcohol use and previous CVAs  -Wife declined going through aggressive care again as what  happened on admission with intubation.  -Palliative care following  -He is now DNR and his wife desires no further aggressive interventions.  If his blood pressure goes low or he would not be transferred back to the ICU, he would not get pressors.  I reaffirmed this with her again today.    Disposition: Pending TCU placement, insurance authorization       Diet: Snacks/Supplements Adult: Magic Cup; With Meals  Snacks/Supplements Adult: Gelatein Plus; With Meals  Combination Diet Moderate Consistent Carb (60 g CHO per Meal) Diet; Easy to Chew (level 7); Thin Liquids (level 0)    DVT Prophylaxis: Enoxaparin (Lovenox) SQ  Booth Catheter: Not present  Central Lines: None  Cardiac Monitoring: None  Code Status: No CPR- Do NOT Intubate      Disposition Plan   Expected Discharge:    Anticipated discharge location: other (comment) (TCU)    Delays:     Other (Add Comment)  Insurance Authorization needed  Placement - LTC            The patient's care was discussed with the Patient.    Leanne Dejesus MD  Hospitalist Service  [facility]  Securely message with the Vocera Web Console (learn more here)  Text page via [employer] Paging/Directory         Clinically Significant Risk Factors Present on Admission                    ______________________________________________________________________    Interval History   Mr. Romo is doing okay today.  He voiced to me that he did not have any complaints and was not in pain.    Data reviewed today: I reviewed all medications, new labs and imaging results over the last 24 hours. I personally reviewed no images or EKG's today.    Physical Exam   Vital Signs: Temp: 97.3  F (36.3  C) Temp src: Oral BP: 114/73 Pulse: 88   Resp: 20 SpO2: 100 % O2 Device: Nasal cannula Oxygen Delivery: 4 LPM  Weight: 171 lbs 6.4 oz  General Appearance: Awake, alert, in no acute distress  Respiratory: CTAB, no wheeze  Cardiovascular: RRR, no murmur noted  GI: soft, nontender, non  distended, normal bowel sounds  Skin: no jaundice, no rash      Data   Recent Labs   Lab 02/27/22  1638 02/27/22  1237 02/27/22  1214 02/27/22  0825 02/27/22  0607 02/26/22  0802 02/26/22  0606 02/25/22  0824 02/25/22  0657 02/22/22  0826 02/22/22  0606   WBC  --   --   --   --  10.1  --   --   --   --   --  10.1   HGB  --   --   --   --  11.3*  --   --   --   --   --  10.5*   MCV  --   --   --   --  88  --   --   --   --   --  89   PLT  --   --   --   --  141*  --   --   --   --   --  143*   NA  --   --   --   --  141  --   --   --   --   --   --    POTASSIUM  --   --   --   --  4.5  --  4.8  --  4.7   < > 4.3   CHLORIDE  --   --   --   --  109*  --   --   --   --   --   --    CO2  --   --   --   --  24  --   --   --   --   --   --    BUN  --   --   --   --  6*  --   --   --   --   --   --    CR  --   --   --   --  0.58*  --   --   --   --   --  0.56*   ANIONGAP  --   --   --   --  8  --   --   --   --   --   --    BECKY  --   --   --   --  7.8*  --   --   --   --   --   --    * 66* 52*   < > 106   < >  --    < >  --    < >  --     < > = values in this interval not displayed.     Medications     dextrose Stopped (01/24/22 0230)     - MEDICATION INSTRUCTIONS -         calcium carbonate  1,000 mg Oral BID     DULoxetine  60 mg Oral or Feeding Tube Daily     enoxaparin ANTICOAGULANT  40 mg Subcutaneous Q24H     folic acid  1 mg Oral Daily     gabapentin  100 mg Oral BID     insulin aspart  1-10 Units Subcutaneous TID AC     insulin aspart  1-7 Units Subcutaneous At Bedtime     insulin aspart   Subcutaneous Daily with breakfast     insulin aspart   Subcutaneous Daily with lunch     insulin aspart   Subcutaneous Daily with supper     ipratropium-albuterol  1 puff Inhalation 4x daily     lactobacillus rhamnosus (GG)  1 capsule Oral BID     lidocaine  1 patch Transdermal Q24H     lidocaine   Transdermal Q8H     metoprolol succinate ER  25 mg Oral Daily     midodrine  2.5 mg Oral TID w/meals     multivitamin,  therapeutic  1 tablet Oral Daily     pantoprazole  40 mg Oral QAM AC     QUEtiapine  12.5 mg Oral BID     QUEtiapine  75 mg Oral At Bedtime     ramelteon  8 mg Oral At Bedtime     sodium chloride (PF)  3 mL Intracatheter Q8H     thiamine  100 mg Oral Daily

## 2022-02-28 NOTE — PROGRESS NOTES
Care Management Follow Up    Length of Stay (days): 44    Expected Discharge Date: 02/28/2022     Concerns to be Addressed: discharge planning     Patient plan of care discussed at interdisciplinary rounds: Yes    Anticipated Discharge Disposition: Transitional Care     Anticipated Discharge Services: Other (see comment) (therapy services )  Anticipated Discharge DME: None    Patient/family educated on Medicare website which has current facility and service quality ratings: yes  Education Provided on the Discharge Plan:    Patient/Family in Agreement with the Plan: yes    Referrals Placed by CM/SW: Post Acute Facilities  Private pay costs discussed: Not applicable    Additional Information:  BI left VM with Cristina from [facility] to check on status of referral.     2:42 PM  BI received call back from Cristina with [facility] and feels at this time that Pt is not TCU appropriate and would need to LTC.  Pt not medically appropriate for SNF a this time due to 02 needs a this time.  Codie asked to be notified when medically ready for discharge.        CARLOS Mckeon

## 2022-02-28 NOTE — PROGRESS NOTES
Called for rapid response.  Pt initially requiring 15L oxymask to maintain spo2 >92%.  Pt initially with low BP.  Pt now being treated.  Pt recovered to 95% on 6lnc at this time.  HR 88, RR 24, and BRS: diminished with fine crackles.  RN at bedside.

## 2022-03-01 ASSESSMENT — ACTIVITIES OF DAILY LIVING (ADL)
ADLS_ACUITY_SCORE: 32

## 2022-03-01 NOTE — SIGNIFICANT EVENT
House officer called to pronounce Peewee TALAT Kwokdana dead. Patient unresponsive to verbal and tactile stimuli.  No heart sounds heard, no pulse felt. No spontaneous respirations.  Pupils fixed and dilated. Patient pronounced dead at 11:10 PM on 2/28/2022. Nursing staff to notify family and primary doctor.      Mao Gonzalez MD  [facility] Medicine Residency Program, PGY-2  Pager # [phone]

## 2022-03-01 NOTE — PROGRESS NOTES
[facility]    Medicine Progress Note - Hospitalist Service    Date of Admission:  1/15/2022    Assessment & Plan        56 year old male with past medical history of alcohol abuse, hypertension, DM-II, multiple abdominal surgeries who was admitted on 1/15/22 with alcohol withdrawl, possible aspiration pneumonia leading to respiratory failure requiring intubation on 1/16, successfully extubated on 1/23/22 and now transferred out of ICU on 1/26/22 for floor care.  Tested positive for Covid on 1/22/2022 in the hospital. Did improve initially and was pending TCU placement until patient got worsening respiratory condition on 2/11/2022 and CT chest showed worsening pneumonia so restarted on IV antibiotic with Vanco and Zosyn.  Now white count up with elevated lactate and hypotension, ID consulted, completed meropenem. Awaiting TCU placement.    Hypotension- cardiac or liver related  - please see interim summary note  - has been having hypotension in the 60-70 with recent declines to the 50s-40s systolic  - family reiterated want for DNI/DNR, no IV pressors, no transfer to ICU  - family has been at bedside all day today  - Hospice and chaplan consulted  - EKG showed septal infarct, wife does not want any aggressive measures, no intervention  - midodrine 10 mg TID     Acute respiratory failure with hypoxia-resolved  -Thought secondary to aspiration pneumonia/ COVID/hospital-acquired pneumonia  -Was intubated 1/16, extubated on 1/23.   Required BiPAP for a while, now on NC, O2 requirements decreasing  - Cont albuterol nebs and pulm hygiene  - CT PE was negative for PE on admission     Leukocytosis-resolved  - ID consulted and now on meropenem.  Leukocytosis resolved.  Completed meropenem.     Recovered Covid  Weekly COVID (per protocol) came back positive on 1/30/2022. He was negative on admission on 1/22.    S/p IV remdesivir and Dexamethasone course.   S/p dose of tocilizumab/per pulmonology  recommendations on 1/31/2022.  Tapering off oxygen.     DM type 2  With labile blood sugars, A1c was 7.0 on 1/15/22.  Very sensitive to Lantus as patient goes hypoglycemic even with 5 units  Stopped Metformin due to lactic acidosis.  Continue sliding scale insulin and carb counting      Septic shock likely due to multifactorial pneumonia -resolved  -Metoprolol started on 1/25 for sinus tachycardia  -Echo shows EF of 65% with mild concentric left ventricular hypertrophy  -Started on midodrine for persistent low blood pressure  -Stim test negative     Acute toxic metabolic encephalopathy  - Patient has significant alcohol withdrawal during the intubation and was on Precedex  - Precedex weaned off. Cont with thiamine. Ativan prn.  - Alcohol level less than 10 on admission.    - Collateral information from wife-he did not find any evidence of patient drinking recently.    - Brain MRI shows chronic CVAs with brain atrophy  - Patient denies drinking prior to hospitalization.  Most recent chemical dependency treatment in 2001.  Since then patient tried to quit drinking numerous times, ending up in the hospital with withdrawal.  He is on disability for 2 years, in the past was a nicole.  - Reconsult psychiatry who are recommending: Duloxetine 60 mg daily, Continue gabapentin 200 mg twice a day, Seroquel 12.5 mg TID x 6 doses , Seroquel 75 mg at bedtime continue., Rozerem 8 mg at bedtime., Continue to utilize olanzapine 5-10 mg every 6 hours as needed for agitation, psychosis., Efforts at sleep regulation., For daytime anxiety would recommend adding Seroquel 6.25 mg in the morning and afternoon.     Hypokalemia/hypomagnesia  Replace per protocol     Diarrhea- resolved     Significant weakness and deconditioning  - Secondary to above  - Pending TCU placement     Severe malnutrition  - Protein and calories  - Dietitian consult, continue supplement  - Patient still has poor oral intake     S/P fall   - Patient had incident  of fall this morning 2/12/2022, unwitnessed  - Evaluated by house officer and a CT head is negative for significant changes  - Continue fall precautions     Goals of care  -Previous rounder discussed details goals of care with patient's wife Daina on 2/14/2022  -Patient has poor quality of life prior to admission because of his alcohol use and previous CVAs  -Wife declined going through aggressive care again as what happened on admission with intubation.  -Palliative care following  -He is now DNR and his wife desires no further aggressive interventions.  If his blood pressure goes low or he would not be transferred back to the ICU, he would not get pressors.  I reaffirmed this with her again today.             Diet: Snacks/Supplements Adult: Magic Cup; With Meals  Snacks/Supplements Adult: Gelatein Plus; With Meals  Combination Diet Easy to Chew (level 7); Thin Liquids (level 0)    DVT Prophylaxis: Enoxaparin (Lovenox) SQ  Booth Catheter: Not present  Central Lines: None  Cardiac Monitoring: None  Code Status: No CPR- Do NOT Intubate      Disposition Plan   Expected Discharge: 03/01/2022     Anticipated discharge location: other (comment) (TCU)    Delays:     Other (Add Comment)  Insurance Authorization needed  Placement - LTC            The patient's care was discussed with the patient, patients family, bedside nurse    Leanne Dejesus MD  Hospitalist Service  [facility]  Securely message with the Vocera Web Console (learn more here)  Text page via [employer] Paging/Directory         Clinically Significant Risk Factors Present on Admission                    ______________________________________________________________________    Interval History   Mr. Hess was hypotensive today. He was awake and alert all day but did have confusion, dizziness that resolved with laying flat, and hallucinations.Multiple family members were able to be with him today including his wife and parents.     Data  reviewed today: I reviewed all medications, new labs and imaging results over the last 24 hours. I personally reviewed the EKG tracing showing NSR, septal infarct.    Physical Exam   Vital Signs: Temp: 97.3  F (36.3  C) Temp src: Oral BP: (!) 46/31 Pulse: 94   Resp: 22 SpO2: 92 % O2 Device: Oxymask Oxygen Delivery: 15 LPM  Weight: 171 lbs 6.4 oz  General Appearance: Awake, alert, intermittent confusion, hallucinations  Respiratory: CTAB, no wheeze  Cardiovascular: RRR, no murmur noted  GI: soft, nontender, non distended, normal bowel sounds  Skin: no jaundice, no rash      Data   Recent Labs   Lab 02/28/22  1209 02/28/22  0717 02/28/22  0618 02/27/22  0825 02/27/22  0607 02/26/22  0802 02/26/22  0606 02/22/22  0826 02/22/22  0606   WBC  --   --   --   --  10.1  --   --   --  10.1   HGB  --   --   --   --  11.3*  --   --   --  10.5*   MCV  --   --   --   --  88  --   --   --  89   PLT  --   --   --   --  141*  --   --   --  143*   NA  --   --  142  --  141  --   --   --   --    POTASSIUM  --   --  4.2  4.2  --  4.5  --  4.8   < > 4.3   CHLORIDE  --   --  110*  --  109*  --   --   --   --    CO2  --   --  23  --  24  --   --   --   --    BUN  --   --  7*  --  6*  --   --   --   --    CR  --   --  0.61*  --  0.58*  --   --   --  0.56*   ANIONGAP  --   --  9  --  8  --   --   --   --    BECKY  --   --  7.5*  --  7.8*  --   --   --   --    * 100* 98   < > 106   < >  --    < >  --    ALBUMIN  --   --  1.5*  --   --   --   --   --   --    PROTTOTAL  --   --  3.3*  --   --   --   --   --   --    BILITOTAL  --   --  0.7  --   --   --   --   --   --    ALKPHOS  --   --  133*  --   --   --   --   --   --    ALT  --   --  19  --   --   --   --   --   --    AST  --   --  31  --   --   --   --   --   --     < > = values in this interval not displayed.     No results found for this or any previous visit (from the past 24 hour(s)).  Medications     dextrose Stopped (01/24/22 0230)     - MEDICATION INSTRUCTIONS -       sodium  chloride 125 mL/hr at 02/28/22 1058       calcium carbonate  1,000 mg Oral BID     DULoxetine  60 mg Oral or Feeding Tube Daily     enoxaparin ANTICOAGULANT  40 mg Subcutaneous Q24H     folic acid  1 mg Oral Daily     gabapentin  100 mg Oral BID     insulin aspart  1-10 Units Subcutaneous TID AC     insulin aspart  1-7 Units Subcutaneous At Bedtime     insulin aspart   Subcutaneous Daily with breakfast     insulin aspart   Subcutaneous Daily with lunch     insulin aspart   Subcutaneous Daily with supper     ipratropium-albuterol  1 puff Inhalation 4x daily     lactobacillus rhamnosus (GG)  1 capsule Oral BID     lidocaine  1 patch Transdermal Q24H     lidocaine   Transdermal Q8H     metoprolol succinate ER  25 mg Oral Daily     midodrine  10 mg Oral TID w/meals     multivitamin, therapeutic  1 tablet Oral Daily     pantoprazole  40 mg Oral QAM AC     QUEtiapine  12.5 mg Oral BID     QUEtiapine  75 mg Oral At Bedtime     ramelteon  8 mg Oral At Bedtime     sodium chloride (PF)  3 mL Intracatheter Q8H     thiamine  100 mg Oral Daily

## 2022-03-01 NOTE — PLAN OF CARE
Physical Therapy Discharge Summary    Discharge Comments: Discontinue care plan; patient .       3/1/2022 by Olivia Mata, PT, DPT

## 2022-03-01 NOTE — PLAN OF CARE
Pt alert but confused conversation. C/o pain in chest and hips again. Gave 1mg IV morphine. Repositioned. Remains on 15L Oxymask. Family at bed side. Last BP 46/31. Pt is comfortable. Pain has improved per pt report

## 2022-03-07 NOTE — DISCHARGE SUMMARY
[facility]  Hospitalist Discharge Summary      Date of Admission:  1/15/2022  Date of Discharge:  3/1/2022  3:17 AM  Discharging Provider: Leanne Dejesus MD  Discharge Service: Hospitalist Service    Discharge Diagnoses    Hypotension secondary to cirrhosis  Possible cardiac ischemia  Acute toxic metabolic encephalopathy    Follow-ups Needed After Discharge       Unresulted Labs Ordered in the Past 30 Days of this Admission     No orders found from 2021 to 2022.      These results will be followed up by Leanne Dejesus    Discharge Disposition   Patient  during this admission  Condition at discharge:     Hospital Course   56 year old male with past medical history of alcohol abuse, hypertension, DM-II, multiple abdominal surgeries who was admitted on 1/15/22 with alcohol withdrawl, possible aspiration pneumonia leading to respiratory failure requiring intubation on , successfully extubated on 22 and now transferred out of ICU on 22 for floor care.  Tested positive for Covid on 2022 in the hospital. Did improve initially and was pending TCU placement until patient got worsening respiratory condition on 2022 and CT chest showed worsening pneumonia so restarted on IV antibiotic with Vanco and Zosyn.  Now white count up with elevated lactate and hypotension, ID consulted, completed meropenem. Awaiting TCU placement.     Hypotension- cardiac or liver related  - please see interim summary note  - has been having hypotension in the 60-70 with recent declines to the 50s-40s systolic  - family reiterated want for DNI/DNR, no IV pressors, no transfer to ICU  - family has been at bedside all day today  - Hospice and chaplan consulted  - EKG showed septal infarct, wife does not want any aggressive measures, no intervention  - midodrine 10 mg TID     Acute respiratory failure with hypoxia-resolved  -Thought secondary to aspiration pneumonia/  COVID/hospital-acquired pneumonia  -Was intubated 1/16, extubated on 1/23.   Required BiPAP for a while, now on NC, O2 requirements decreasing  - Cont albuterol nebs and pulm hygiene  - CT PE was negative for PE on admission     Leukocytosis-resolved  - ID consulted and now on meropenem.  Leukocytosis resolved.  Completed meropenem.     Recovered Covid  Weekly COVID (per protocol) came back positive on 1/30/2022. He was negative on admission on 1/22.    S/p IV remdesivir and Dexamethasone course.   S/p dose of tocilizumab/per pulmonology recommendations on 1/31/2022.  Tapering off oxygen.     DM type 2  With labile blood sugars, A1c was 7.0 on 1/15/22.  Very sensitive to Lantus as patient goes hypoglycemic even with 5 units  Stopped Metformin due to lactic acidosis.  Continue sliding scale insulin and carb counting      Septic shock likely due to multifactorial pneumonia -resolved  -Metoprolol started on 1/25 for sinus tachycardia  -Echo shows EF of 65% with mild concentric left ventricular hypertrophy  -Started on midodrine for persistent low blood pressure  -Stim test negative     Acute toxic metabolic encephalopathy  - Patient has significant alcohol withdrawal during the intubation and was on Precedex  - Precedex weaned off. Cont with thiamine. Ativan prn.  - Alcohol level less than 10 on admission.    - Collateral information from wife-he did not find any evidence of patient drinking recently.    - Brain MRI shows chronic CVAs with brain atrophy  - Patient denies drinking prior to hospitalization.  Most recent chemical dependency treatment in 2001.  Since then patient tried to quit drinking numerous times, ending up in the hospital with withdrawal.  He is on disability for 2 years, in the past was a nicole.  - Reconsult psychiatry who are recommending: Duloxetine 60 mg daily, Continue gabapentin 200 mg twice a day, Seroquel 12.5 mg TID x 6 doses , Seroquel 75 mg at bedtime continue., Rozerem 8 mg at  bedtime., Continue to utilize olanzapine 5-10 mg every 6 hours as needed for agitation, psychosis., Efforts at sleep regulation., For daytime anxiety would recommend adding Seroquel 6.25 mg in the morning and afternoon.     Hypokalemia/hypomagnesia  Replace per protocol     Diarrhea- resolved     Significant weakness and deconditioning  - Secondary to above  - Pending TCU placement     Severe malnutrition  - Protein and calories  - Dietitian consult, continue supplement  - Patient still has poor oral intake     S/P fall   - Patient had incident of fall this morning 2/12/2022, unwitnessed  - Evaluated by house officer and a CT head is negative for significant changes  - Continue fall precautions     Goals of care  -Previous rounder discussed details goals of care with patient's wife Daina on 2/14/2022  -Patient has poor quality of life prior to admission because of his alcohol use and previous CVAs  -Wife declined going through aggressive care again as what happened on admission with intubation.  -Palliative care following  -He is now DNR and his wife desires no further aggressive interventions.  If his blood pressure goes low or he would not be transferred back to the ICU, he would not get pressors.  I reaffirmed this with her again today.    Pt. Had continually decreasing blood pressure despite fluids and midodrine. Pt. Passed with family at bedside.    Consultations This Hospital Stay   PHYSICAL THERAPY ADULT IP CONSULT  CARE MANAGEMENT / SOCIAL WORK IP CONSULT  OCCUPATIONAL THERAPY ADULT IP CONSULT  VASCULAR ACCESS ADULT IP CONSULT  INTENSIVIST IP CONSULT  NUTRITION SERVICES ADULT IP CONSULT  PHARMACY IP CONSULT  SPEECH LANGUAGE PATH ADULT IP CONSULT  SOCIAL WORK IP CONSULT  OCCUPATIONAL THERAPY ADULT IP CONSULT  PHYSICAL THERAPY ADULT IP CONSULT  PULMONARY IP CONSULT  INFECTION PREVENTION IP CONSULT  INFECTIOUS DISEASES IP CONSULT  SOCIAL WORK IP CONSULT  PSYCHIATRY IP CONSULT  SOCIAL WORK IP CONSULT  DIABETES  EDUCATION IP CONSULT  INFECTION PREVENTION IP CONSULT  PHARMACY TO DOSE VANCO  PALLIATIVE CARE ADULT IP CONSULT  INFECTIOUS DISEASES IP CONSULT  PHARMACY TO DOSE VANCO  WOUND OSTOMY CONTINENCE NURSE  IP CONSULT  SOCIAL WORK IP CONSULT  INPATIENT HOSPICE ADULT CONSULT  WOUND OSTOMY CONTINENCE NURSE  IP CONSULT  INPATIENT HOSPICE ADULT CONSULT  SPIRITUAL HEALTH SERVICES IP CONSULT  SPIRITUAL HEALTH SERVICES IP CONSULT    Code Status   Prior    Time Spent on this Encounter   I, Leanne Dejesus MD, personally saw the patient today and spent greater than 30 minutes discharging this patient.       Leanne Dejesus MD  [address] [address]  Phone: [phone]  Fax: [phone]  ______________________________________________________________________    Physical Exam   Vital Signs:                   Weight: 171 lbs 6.4 oz  I did not examine pt at time of death, please see significant event note.        Primary Care Physician   Sarah Canseco    Discharge Orders   No discharge procedures on file.    Significant Results and Procedures   Most Recent 3 CBC's:Recent Labs   Lab Test 02/27/22  0607 02/22/22  0606 02/18/22  0355 02/17/22  0628   WBC 10.1 10.1 14.5* 14.8*   HGB 11.3* 10.5*  --  10.6*   MCV 88 89  --  90   * 143*  --  222     Most Recent 3 BMP's:Recent Labs   Lab Test 02/28/22  1209 02/28/22  0717 02/28/22  0618 02/27/22  0825 02/27/22  0607 02/26/22  0802 02/26/22  0606 02/22/22  0826 02/22/22  0606 02/17/22  0753 02/17/22  0628   NA  --   --  142  --  141  --   --   --   --   --  141   POTASSIUM  --   --  4.2  4.2  --  4.5  --  4.8   < > 4.3   < > 4.0   CHLORIDE  --   --  110*  --  109*  --   --   --   --   --  116*   CO2  --   --  23  --  24  --   --   --   --   --  19*   BUN  --   --  7*  --  6*  --   --   --   --   --  4*   CR  --   --  0.61*  --  0.58*  --   --   --  0.56*  --  0.77   ANIONGAP  --   --  9  --  8  --   --   --   --   --   6   BECKY  --   --  7.5*  --  7.8*  --   --   --   --   --  7.3*   * 100* 98   < > 106   < >  --    < >  --    < > 270*    < > = values in this interval not displayed.     Most Recent 2 LFT's:Recent Labs   Lab Test 02/28/22  0618 02/17/22  0628   AST 31 56*   ALT 19 16   ALKPHOS 133* 145*   BILITOTAL 0.7 0.7     Most Recent 3 INR's:Recent Labs   Lab Test 01/30/22  1050 01/16/22  0757 01/15/22  1649   INR 1.67* 1.72* 1.69*   ,   Results for orders placed or performed during the hospital encounter of 01/15/22   XR Chest Port 1 View    Narrative    EXAM: XR CHEST PORT 1 VIEW  LOCATION: [facility]  DATE/TIME: 1/15/2022 5:16 PM    INDICATION: Shortness of breath  COMPARISON: 11/5/2021      Impression    IMPRESSION: There are mild opacities in both lungs which have increased from the prior exam. This is likely due to a superimposed infectious or inflammatory process on top of mild background scarring. Normal heart size and pulmonary vascularity. Old left   rib fractures.   CT Chest Pulmonary Embolism w Contrast    Narrative    EXAM: CT CHEST PULMONARY EMBOLISM W CONTRAST  LOCATION: [facility]  DATE/TIME: 1/15/2022 6:17 PM    INDICATION: Shortness of breath  COMPARISON: Portable AP view of the chest 01/15/2022; CT of the abdomen and pelvis 10/05/2020  TECHNIQUE: CT chest pulmonary angiogram during arterial phase injection of IV contrast. Multiplanar reformats and MIP reconstructions were performed. Dose reduction techniques were used.   CONTRAST: 100ml isovue 370    FINDINGS:  ANGIOGRAM CHEST: Pulmonary arteries are normal caliber and negative for pulmonary emboli. Normal caliber thoracic aorta. There are no findings to suggest acute aortic syndrome. Proximal great vessels are patent. Diminutive left vertebral artery arises   directly from the arch. Mild atheromatous calcifications descending aorta and distal right innominate artery.    LUNGS AND PLEURA: Upper lung  predominant mixed centrilobular and paraseptal emphysema. Superimposed patchy airspace opacities are present bilaterally, asymmetric to the right associated with internal interstitial opacity. Minimal right pleural fluid   layers dependently. There is chronic thickening of the left posterior and posterolateral pleura adjacent to multiple left rib fracture deformities. Central airways are patent.    MEDIASTINUM: Cardiac chambers are normal in size. No pericardial effusion is present. Mildly enlarged lymph nodes in both analia, likely reactive in the setting of airspace opacities. Subcarinal, lower paratracheal and subaortic/prevascular lymph nodes are   all normal by size criteria.    The middle third and distal thirds of the esophagus has circumferential wall thickening consistent with esophagitis. Imaged thyroid gland is normal.    CORONARY ARTERY CALCIFICATION: None.    UPPER ABDOMEN: There are multiple calcified gallstones in the neck of the gallbladder. Severe fatty infiltration of the liver. The imaged distal transverse colon/splenic flecture has wall thickening. Previous splenectomy with small splenule present. 15   mm length ovoid calcification in the region of the pancreas neck is unchanged.    MUSCULOSKELETAL: There are multiple ununited left posterior and posterolateral rib fracture deformities including lateral ribs 7, 8 and posterior ribs 9 and 11. The posterolateral right ninth rib is broken in 2 locations and the fractures have fairly   sharp borders suggesting acute on subacute fractures..      Impression    IMPRESSION:    1.  No acute pulmonary embolism.  2.  Multifocal bilateral airspace opacities are present mixed groundglass and interstitial thickening asymmetric to the right. Imaging features can be seen with (COVID-19)  pneumonia, though are nonspecific and can occur with a variety of infectious and   noninfectious processes. Given the other findings below, aspiration is a strong differential  consideration.  3.  Severe wall thickening of the middle and distal thirds of the esophagus consistent with a subacute esophagitis.  4.  Severe hepatic steatosis.  5.  Acute on chronic fracture deformities of multiple left lateral/posterolateral ribs.  6.  Cholelithiasis.  7.  Wall thickening of the imaged colon consistent with colitis.   XR Chest Port 1 View    Narrative    EXAM: XR CHEST PORT 1 VIEW  LOCATION: [facility]  DATE/TIME: 1/16/2022 12:34 PM    INDICATION: ET tube placement, PICC line placement  COMPARISON: CT pulmonary angiogram 01/15/2022      Impression    IMPRESSION:     ET tube tip projects 6.5 cm above the igor. Gastric tube extends towards the diaphragmatic hiatus, outside the field-of-view. Right PICC terminates upper right atrium.    Extensive bilateral airspace opacities are present with patchy lung involvement, unchanged from yesterday. No new focal lung volume loss.    No visible pleural fluid or pneumothorax on this single supine view.   XR Abdomen Port 1 View    Narrative    EXAM: XR ABDOMEN PORT 1 VIEWS  LOCATION: [facility]  DATE/TIME: 1/17/2022 1:25 PM    INDICATION: Location of OG tube  COMPARISON: None.      Impression    IMPRESSION: Enteric tube in the body the stomach.    CT Head w/o Contrast    Narrative    EXAM: CT HEAD W/O CONTRAST  LOCATION: [facility]  DATE/TIME: 1/18/2022 3:45 AM    INDICATION: Headache, intracranial hemorrhage suspected.  COMPARISON: 11/4/2021  TECHNIQUE: Routine CT Head without IV contrast. Multiplanar reformats. Dose reduction techniques were used.    FINDINGS:  INTRACRANIAL CONTENTS: No intracranial hemorrhage, extraaxial collection, or mass effect. No CT evidence of acute infarct. Mild presumed chronic small vessel ischemic changes. Mild to moderate generalized volume loss. No hydrocephalus. Stable calcified   dural based lesion along the right frontal convexity measuring 1 cm in  diameter, presumably reflecting a small meningioma.     VISUALIZED ORBITS/SINUSES/MASTOIDS: No intraorbital abnormality. Complete opacification of the left maxillary sinus with chronic mucoperiosteal reaction. Mucous retention cyst in the left sphenoid sinus. Mild mucosal thickening along the floor of the   left frontal sinus. Opacified left frontoethmoidal recess. No middle ear or mastoid effusion.    BONES/SOFT TISSUES: No acute abnormality.      Impression    IMPRESSION:  1.  No CT evidence for acute intracranial process.  2.  Stable chronic changes as above.   XR Chest Port 1 View    Narrative    EXAM: XR CHEST PORT 1 VIEW  LOCATION: [facility]  DATE/TIME: 1/18/2022 6:06 AM    INDICATION: Location of ET tube  COMPARISON: 01/16/2022.       Impression    IMPRESSION:Endotracheal tube is in the trachea at the level of the clavicular heads with tip 6 cm above the igor. PICC catheter from right upper extremity approach is in the distal superior vena cava near its junction with the right atrium. Enteric   tube tip is below the diaphragm and not visualized. There is no pneumothorax. Again noted are bilateral patchy airspace opacities there has been increased consolidation or atelectasis at the right medial lung base. There are old healed left posterior rib   fractures no acute fractures are identified.   XR Chest Port 1 View    Narrative    EXAM: XR CHEST PORT 1 VIEW  LOCATION: [facility]  DATE/TIME: 1/19/2022 10:52 AM    INDICATION: after advancing et tube  COMPARISON: 1/18/2022.      Impression    IMPRESSION: Endotracheal tube is been advanced. Tip is 4 cm above the igor in good position. There our persistent bibasilar pulmonary opacities with partial clearing at the right base from the prior study and no change at the left base. No   pneumothorax. Enteric tube tip is not visualized tip is below the diaphragm. PICC catheter from right upper extremity approach is  unchanged with tip at the junction of the superior vena cava and right atrium. Left posterior rib fractures are again noted   as well as deformity of the right humeral head.   MR Brain w/o Contrast    Narrative    EXAM: MR BRAIN W/O CONTRAST  LOCATION: [facility]  DATE/TIME: 1/22/2022 1:05 PM    INDICATION: Encephalopathy  COMPARISON: CT head 01/18/2022, MRI head 11/23/2020  TECHNIQUE: Routine multiplanar multisequence head MRI without intravenous contrast.    FINDINGS:  INTRACRANIAL CONTENTS: Please note study is significantly degraded by motion artifact. Suggestion of a few small foci of restricted diffusion at the left temporal occipital region; best appreciated on repeat axial diffusion weighted sequence series 20.2   image 12, image 11; as well as repeat coronal diffusion weighted sequence series 24.2 image 9. No definite associated hemorrhage or significant mass effect. Redemonstration of small chronic infarction left precentral gyrus. Mild generalized cerebral   atrophy. No hydrocephalus. Normal position of the cerebellar tonsils. Small chronic infarction lateral aspect left cerebellum. A few additional tiny infarctions demonstrated on prior exam are not well demonstrated on the current.    SELLA: Not well-visualized, grossly normal.    OSSEOUS STRUCTURES/SOFT TISSUES: Normal marrow signal. Flow voids are not well-visualized.     ORBITS: Not well-visualized.     SINUSES/MASTOIDS: The complete opacification left maxillary and left sphenoid sinus, similar to prior. Small amount of opacification right mastoid air cells.       Impression    IMPRESSION:  1.  Please note study is significantly degraded by motion artifact.  2.  Suggestion of a few small foci of acute/early subacute infarction at the left temporal occipital region.  3.  No definite associated hemorrhage or significant mass effect.  4.  Small chronic infarction left precentral gyrus, similar to prior.  5.  Probable few small  chronic infarctions cerebellar hemispheres.  6.  Mild atrophy.   XR Video Swallow with SLP or OT    Narrative    EXAM: XR VIDEO SWALLOW WITH SLP OR OT  LOCATION: [facility]  DATE/TIME: 1/25/2022 3:27 PM    INDICATION: Difficulty swallowing.  COMPARISON: None.    TECHNIQUE: Routine swallow study with speech pathology using multiple barium thicknesses.    FINDINGS:   FLUOROSCOPIC TIME: 1.5 minutes  NUMBER OF IMAGES: 9    Swallow study with Speech Pathology using multiple barium thicknesses. Moderate to deep penetration with thin liquid (no cough reflex). Small amount of penetration with mildly thick consistency. No other penetration or aspiration.         CT Chest w/o Contrast    Narrative    EXAM: CT CHEST W/O CONTRAST  LOCATION: [facility]  DATE/TIME: 1/28/2022 2:36 PM    INDICATION: Cough, persistent  COMPARISON: 10/15/2022  TECHNIQUE: CT chest without IV contrast. Multiplanar reformats were obtained. Dose reduction techniques were used.  CONTRAST: None.    FINDINGS:   LUNGS AND PLEURA: Upper lung predominant centrilobular and paraseptal emphysema. Improvement in groundglass and interstitial opacities in the right lung. Slight worsening of groundglass and interstitial opacities in the left upper lobe. New airway wall   thickening in the left upper lobe and left lower lobe and new consolidation in the dependent portion of the left upper lobe and at the left lung base. Mucoid impaction in the left lower lobe airways. Unchanged left pleural thickening and trace right   pleural effusion.    MEDIASTINUM/AXILLAE: No thoracic aortic aneurysm. No lymphadenopathy.    CORONARY ARTERY CALCIFICATION: None.    UPPER ABDOMEN: Hepatic steatosis. Cholelithiasis.    MUSCULOSKELETAL: There are demonstrated bilateral rib fractures.      Impression    IMPRESSION:   1.  New airway wall thickening in the left upper and lower lobes with new consolidation in the dependent portion of the  left upper lobe and at the left lung base, and mucoid impaction in left lower lobe airways, suspicious for aspiration.    2.  Overall improvement in groundglass and interstitial opacities in the right lung and slight worsening in the left upper lobe.     XR Chest Port 1 View    Narrative    EXAM: XR CHEST PORT 1 VIEW  LOCATION: [facility]  DATE/TIME: 1/29/2022 6:18 AM    INDICATION: Pneumonia follow up.  COMPARISON: 01/28/2022 chest CT.01/19/2022 radiograph.      Impression    IMPRESSION:     Relatively diffuse left lung opacities have increased since the 01/19/2022 chest radiograph, and perhaps slightly increased since the 01/20/2022 chest CT. Differentials for the increased opacities include asymmetric pulmonary edema, infectious /   inflammatory process or layering pleural fluid. Small left pleural effusion is present.    Right lung is hypoexpanded and may have a few subtle scattered opacities.    No pneumothorax. Normal heart size. Atherosclerotic aorta.       XR Video Swallow with SLP or OT    Narrative    EXAM: XR VIDEO SWALLOW WITH SLP OR OT  LOCATION: [facility]  DATE/TIME: 1/31/2022 11:30 AM    INDICATION: Difficulty swallowing.  COMPARISON: 1/25/2022.    TECHNIQUE: Routine swallow study with speech pathology using multiple barium thicknesses.    FINDINGS:   FLUOROSCOPIC TIME: 3.3 minutes  NUMBER OF IMAGES: 0    Swallow study with Speech Pathology using multiple barium thicknesses.     Tracheal aspiration with thin liquid consistency barium with weak ineffective cough. Penetration into the laryngeal vestibule with mildly thickened (nectar) liquid consistency barium. No penetration or aspiration with honey and pudding consistency   barium.    Please refer to speech therapy report for additional detail.   XR Video Swallow with SLP or OT    Narrative    EXAM: XR VIDEO SWALLOW WITH SLP OR OT  LOCATION: [facility]  DATE/TIME: 2/8/2022  8:52 AM    INDICATION: Difficulty swallowing.  COMPARISON: 01/31/2022.    TECHNIQUE: Routine swallow study with speech pathology using multiple barium thicknesses.    FINDINGS:   FLUOROSCOPIC TIME: 1.2 minutes  NUMBER OF IMAGES: 0    Swallow study with Speech Pathology using multiple barium thicknesses.     Small amount of silent aspiration with thin liquid. Penetration with mildly thickened (nectar) liquid. There was persistent mild penetration with chin Tech technique and mildly thickened liquid. No aspiration was solid consistency.      Impression    IMPRESSION:  1.  Silent aspiration with thin liquid. Penetration with mildly thickened liquid.     CT Chest Pulmonary Embolism w Contrast    Narrative    EXAM: CT CHEST PULMONARY EMBOLISM W CONTRAST  LOCATION: [facility]  DATE/TIME: 2/11/2022 1:42 PM    INDICATION: PE suspected, low/intermediate prob, positive D-dimer, hypoxia, history of Covid  COMPARISON: 01/28/2022  TECHNIQUE: CT chest pulmonary angiogram during arterial phase injection of IV contrast. Multiplanar reformats and MIP reconstructions were performed. Dose reduction techniques were used.   CONTRAST: IsoVue 370 100mL    FINDINGS:  ANGIOGRAM CHEST: No pulmonary artery filling defects. Normal caliber aorta.    LUNGS AND PLEURA: Moderate emphysema. Persistent but increased patchy groundglass and consolidative pulmonary opacities with areas of interlobular septal thickening. Mild bronchiectasis and bronchial wall thickening have increased when compared to prior   exam. Trace effusions have decreased. No pneumothorax.    MEDIASTINUM/AXILLAE: Heart size is normal. No adenopathy.    CORONARY ARTERY CALCIFICATION: None.    UPPER ABDOMEN: Hepatic steatosis. Cholelithiasis. There is diffuse wall thickening of the stomach. Small volume ascites. Nonobstructing bilateral renal calculi. Prominent calcification in the area of the pancreatic neck is unchanged. Splenectomy with   small accessory  splenule.    MUSCULOSKELETAL: Degenerative changes of the spine. Prominent L1 Schmorl's node. Similar appearance of the bilateral rib fractures. Mild anasarca.      Impression    IMPRESSION:  1.  No pulmonary embolus.  2.  Interval worsening of bilateral pulmonary opacities, bronchiectasis, bronchial wall thickening.  3.  Hepatic steatosis.  4.  Cholelithiasis.  5.  Diffuse wall thickening of the visualized stomach may indicate gastritis.  6.  Manifestations of third spacing.   CT Head w/o Contrast    Narrative    EXAM: CT HEAD W/O CONTRAST  LOCATION: [facility]  DATE/TIME: 2/12/2022 9:20 AM    INDICATION: Trauma - Head Injury. Unwitnessed fall.  COMPARISON: CT head 01/18/2022  TECHNIQUE: Routine CT Head without IV contrast. Multiplanar reformats. Dose reduction techniques were used.    FINDINGS:  INTRACRANIAL CONTENTS: No intracranial hemorrhage, extraaxial collection, or mass effect.  No CT evidence of acute infarct. Small focus of chronic cortical encephalomalacia and adjacent gliosis involving the superior left frontal lobe as seen previously.   Mild presumed chronic small vessel ischemic changes. Mild generalized volume loss. No hydrocephalus.     VISUALIZED ORBITS/SINUSES/MASTOIDS: No intraorbital abnormality. Complete opacification of the left maxillary sinus with some chronic appearing mural hyperostosis similar to the previous exam. Additional subtotal opacification of the left sphenoid sinus   by polypoid mucosal thickening as seen previously. No middle ear or mastoid effusion.    BONES/SOFT TISSUES: Mild right parietal scalp swelling. No skull fracture.      Impression    IMPRESSION:  1.  No CT evidence for acute intracranial process.  2.  Mild right parietal scalp contusion without associated fracture.  3.  Brain atrophy and presumed chronic microvascular ischemic changes as above.  4.  Inflammatory changes of the paranasal sinuses as seen previously.   CT Abdomen Pelvis w  Contrast    Narrative    EXAM: CT ABDOMEN PELVIS W CONTRAST  LOCATION: [facility]  DATE/TIME: 2/15/2022 9:50 PM    INDICATION: Leukocytosis.  COMPARISON: 10/05/2020  TECHNIQUE: CT scan of the abdomen and pelvis was performed following injection of IV contrast. Multiplanar reformats were obtained. Dose reduction techniques were used.  CONTRAST: ISOVUE 370 100ML    FINDINGS:   LOWER CHEST: Bibasilar infiltrates. Small right and trace left pleural effusion.    HEPATOBILIARY: Hepatic steatosis. Cholelithiasis and gallbladder distention.    PANCREAS: Pancreatic calcifications suggesting chronic pancreatitis. Prominent calcification at the neck of the pancreas. Difficult to tell if this is parenchymal or intraductal. The distal pancreas is atrophic.    SPLEEN: Splenectomy with accessory splenule.    ADRENAL GLANDS: Normal.    KIDNEYS/BLADDER: Small nonobstructing renal calculi.    BOWEL: Gastric and small bowel wall thickening. Central mesenteric congestion. Trace amount of free fluid. Diverticulosis with presumed retained barium. Wall thickening of the proximal colon. Rectal wall thickening versus underdistention.    LYMPH NODES: Subcentimeter mesenteric and peripancreatic lymph nodes.    VASCULATURE: Atherosclerotic vascular calcification.    PELVIC ORGANS: Small amount of free fluid.    MUSCULOSKELETAL: Degenerative change osseous structures. Anasarca. Remote and subacute rib fractures.      Impression    IMPRESSION:   1.  Wall thickening of stomach, small bowel and proximal colon. Correlate for gastroenteritis/enterocolitis.  2.  Mesenteric congestion and small amount of free fluid.  3.  Bilateral pulmonary infiltrates. Small right and trace left pleural effusion.  4.  Focal rectal wall thickening versus underdistention. Follow-up recommended to exclude underlying lesion.  5.  Hepatic steatosis.  6.  Diverticulosis.  7.  Cholelithiasis and mild gallbladder distention.  8.  Nonobstructing renal  calculi.  9.  Coarse pancreatic calcifications again seen   XR Chest Port 1 View    Narrative    EXAM: XR CHEST PORT 1 VIEW  LOCATION: [facility]  DATE/TIME: 2/16/2022 8:33 AM    INDICATION: dyspnea  COMPARISON: CT pulmonary angiogram 02/11/2022      Impression    IMPRESSION:     Abnormal increased lung attenuation diffusely through both lungs. There are subsegmental foci of more focal airspace opacity in the peripheral third of the left mid and lower lung, similar to the prior CT. Subpleural emphysema is present in the apices.   No new focal airspace opacity or volume loss.    Symmetric apical and left lateral pleural thickening has not increased. No pleural effusion or pneumothorax.    Cardiac silhouette is not enlarged. Unchanged aortic and other mediastinal interfaces.    There are metallic fragments projecting in the left upper quadrant. Chronic left posterolateral rib fracture deformities are ununited.   XR Video Swallow with SLP or OT    Narrative    EXAM: XR VIDEO SWALLOW WITH SLP OR OT  LOCATION: [facility]  DATE/TIME: 2/21/2022 10:33 AM    INDICATION: Difficulty swallowing.  COMPARISON: None.    TECHNIQUE: Routine swallow study with speech pathology using multiple barium thicknesses.    FINDINGS:   FLUOROSCOPIC TIME: 1.2 minutes  NUMBER OF IMAGES: 0    Swallow study with Speech Pathology using multiple barium thicknesses.     There is moderate penetration with nectar consistency with regular swallowing and cup sip. With chin tuck maneuver, this becomes just mild and transient. With thin liquids, mild transient penetration including with straw sip. No significant residuals   with thicker consistencies.      Impression    IMPRESSION:  1.  Patient has moderate penetration with nectar consistency.    2.  With chin tuck maneuver, this significantly improves and the patient should build tolerate all consistencies if using chin tuck maneuver.     Echocardiogram  Complete     Value    LVEF  > 65%    [facility]    [id number]  [id number]  [id number]  [id number] [address]     Name: KAREY LIVINGSTON  MRN: [id number]  : 1965  Study Date: 2022 07:37 AM  Age: 56 yrs  Gender: Male  Patient [facility]: [facility]  Reason For Study: Hypertension (HTN)  Ordering Physician: LYN MAYNARD  Performed By: TAYLER     BSA: 1.9 m2  Height: 72 in  Weight: 147 lb  HR: 123  BP: 97/59 mmHg  ______________________________________________________________________________  Procedure  Complete Portable Echo Adult. Technically difficult study.Extremely poor  acoustic windows. Compared to the prior study dated 2021, there have been  no changes. No hemodynamically significant valvular abnormalities on 2D or  color flow imaging.  ______________________________________________________________________________  Interpretation Summary     1.Left ventricular size, wall motion and function are  normal. The ejection fraction is > 65%.  2.There is mild concentric left ventricular hypertrophy.  3.Normal right ventricle size and systolic function.  4.No hemodynamically significant valvular abnormalities on 2D or color flow  imaging.  Compared to the prior study dated 2021, there have been no changes.  ______________________________________________________________________________  I      WMSI = 1.00     % Normal = 100     X - Cannot   0 -                      (2) - Mildly 2 -          Segments  Size  Interpret    Hyperkinetic 1 - Normal  Hypokinetic  Hypokinetic  1-2     small                                                     7 -          3-5      moderate  3 - Akinetic 4 -          5 -         6 - Akinetic Dyskinetic   6-14    large               Dyskinetic   Aneurysmal  w/scar       w/scar       15-16   diffuse     Left Ventricle  Left ventricular size, wall motion and function are normal. The ejection  fraction is > 65%. There is mild  concentric left ventricular hypertrophy. Left  ventricular diastolic function is normal. No regional wall motion  abnormalities noted.     Right Ventricle  Normal right ventricle size and systolic function. TAPSE is normal, which is  consistent with normal right ventricular systolic function.     Atria  Normal left atrial size. Right atrial size is normal. There is no color  Doppler evidence of an atrial shunt.     Mitral Valve  Mitral valve leaflets appear normal. There is no evidence of mitral stenosis  or clinically significant mitral regurgitation. There is no mitral  regurgitation noted. There is no mitral valve stenosis.     Tricuspid Valve  Tricuspid valve leaflets appear normal. Right ventricle systolic pressure  estimate normal. There is trace tricuspid regurgitation. There is no tricuspid  stenosis.     Aortic Valve  Aortic valve leaflets appear normal. There is no evidence of aortic stenosis  or clinically significant aortic regurgitation. The aortic valve is  trileaflet. No aortic regurgitation is present. No aortic stenosis is present.     Pulmonic Valve  The pulmonic valve is not well seen, but is grossly normal. This degree of  valvular regurgitation is within normal limits. There is no pulmonic valvular  stenosis.     Vessels  The aorta root is normal. Normal size ascending aorta. IVC diameter <2.1 cm  collapsing >50% with sniff suggests a normal RA pressure of 3 mmHg.     Pericardium  There is no pericardial effusion.     Rhythm  The rhythm was sinus tachycardia.     ______________________________________________________________________________  MMode/2D Measurements & Calculations  IVSd: 1.3 cm  LVIDd: 4.1 cm  LVIDs: 2.3 cm  LVPWd: 1.2 cm  FS: 43.4 %     LV mass(C)d: 182.6 grams  LV mass(C)dI: 97.7 grams/m2  Ao root diam: 3.3 cm  LA dimension: 3.6 cm  asc Aorta Diam: 3.8 cm  LA/Ao: 1.1  LVOT diam: 2.2 cm  LVOT area: 3.8 cm2  LA Volume (BP): 29.3 ml  LA Volume Index (BP): 15.7 ml/m2     LA Volume  Indexed (AL/bp): 17.3 ml/m2  RWT: 0.61     Doppler Measurements & Calculations  MV E max gustavo: 132.0 cm/sec  MV A max gustavo: 137.1 cm/sec  MV E/A: 0.96  MV dec slope: 1104 cm/sec2  MV dec time: 0.12 sec  Ao V2 max: 143.6 cm/sec  Ao max P.0 mmHg  Ao V2 mean: 108.6 cm/sec  Ao mean P.2 mmHg  Ao V2 VTI: 26.8 cm  LELO(I,D): 3.5 cm2  LELO(V,D): 3.9 cm2  LV V1 max P.6 mmHg  LV V1 max: 146.7 cm/sec  LV V1 VTI: 24.2 cm  SV(LVOT): 92.9 ml  SI(LVOT): 49.7 ml/m2  PA V2 max: 107.4 cm/sec  PA max P.6 mmHg  PA acc time: 0.07 sec  AV Gustavo Ratio (DI): 1.0  LELO Index (cm2/m2): 1.9  E/E' av.4     Lateral E/e': 18.6  Medial E/e': 20.2     ______________________________________________________________________________  Report approved by: Randal Villa 2022 09:14 AM               Discharge Medications   Discharge Medication List as of 3/1/2022  3:18 AM      CONTINUE these medications which have NOT CHANGED    Details   acamprosate (CAMPRAL) 333 MG EC tablet Take 2 tablets (666 mg) by mouth 3 times daily, Disp-90 tablet, R-0, E-Prescribe      acetaminophen-codeine (TYLENOL W/CODEINE #3) 300-30 MG per tablet Take 1 tablet by mouth every 6 hours as needed, Historical      aspirin (ASA) 81 MG EC tablet Take 81 mg by mouth, Historical      DULoxetine (CYMBALTA) 60 MG capsule Take 60 mg by mouth daily, Historical      folic acid (FOLVITE) 1 MG tablet Take 1 tablet (1 mg) by mouth daily, Disp-30 tablet, R-0, E-Prescribe      gabapentin (NEURONTIN) 300 MG capsule Take 2 capsules by mouth 2 times daily And prn, Historical      insulin NPH-Regular (NOVOLIN 70/30 FLEXPEN) susp Inject Subcutaneous 2 times daily Sliding scale, Historical      metoprolol succinate ER (TOPROL-XL) 25 MG 24 hr tablet Take 25 mg by mouth daily, Historical      multivitamin, therapeutic with minerals (THERA-VIT-M) TABS tablet Take 1 tablet by mouth daily , Historical      Omega-3 Fatty Acids (FISH OIL PO) Take 1 g by mouth daily , Historical            Allergies   No Known Allergies

## 2023-09-11 NOTE — INTERIM SUMMARY
Peewee Hess is a 56 year old male admitted on 1/15/2022. He has history of severe alcohol abuse disorder, necrotizing pancreatitis requiring resection, peripheral neuropathy, diabetes, cerebellar stroke, depression presents for evaluation of nausea, vomiting, and chest pain found to have probable aspiration pneumonia and alcohol withdrawal. Overnight he had behavioral issues along with tachycardia and tachypnea. He was escalated from NC to high flow NC then BiPAP.  He was give IV and oral lorazepam. He received 3 L yesterday and was started on a maintenance drip. He was also started on zosyn. His labs this morning are significant for WBC 25.8 (16 yesterday), lactic acid 3.3, INR 1.72.     I was paged for a low blood pressure of 86/60.  I went and evaluated the patient who was saturating well on BiPAP.  He was not arousable but would groan to deep palpation of the abdomen.  His bowel sounds were absent.  He has a large surgical scar in his central abdomen.  He was maintaining a MAP above 65 at that time.  However, an hour later his blood pressure dropped to 79/50.  With his obtunded status and  probable aspiration pneumonia he will be intubated for airway management.  He is having a central line placed so we can give him norepinephrine.  ICU has been consulted and I did give handout over the phone to ICU Dr. Kelley.     Most recent Vitals:   BP 78/51, 97.4F, 85 bpm, RR 6, SpO2 94% on BiPAP   Plan: Continue gentle skin care Discontinue Regimen: Mixed Clobetasol solution in a jar of CeraVe cream Detail Level: Zone

## 2024-09-29 NOTE — PROGRESS NOTES
RT PROGRESS NOTE    VENT DAY# 2    CURRENT SETTINGS:   Ventilation Mode: CMV/AC  (Continuous Mandatory Ventilation/ Assist Control)  FiO2 (%): 40 %  Rate Set (breaths/minute): 16 breaths/min  Tidal Volume Set (mL): 500 mL  PEEP (cm H2O): 8 cmH2O  Oxygen Concentration (%): 40 %  Resp: 16      PATIENT PARAMETERS:  PIP 25  Pplat:  22  Pmean:  12  Compliance:   SBT: No    Secretions:  Small thick pale white  02 Sats:  95%    ETT SIZE 7.5 Secured at 23 cm at teeth/gums    Respiratory Medications: Albuterol       NOTE / SHIFT SUMMARY:   PT remains on mechanical vent, no weaning performed today. RT will continue to follow.    Harlan Hermosillo, RT     Never smoker